# Patient Record
Sex: FEMALE | Race: WHITE | NOT HISPANIC OR LATINO | Employment: OTHER | URBAN - METROPOLITAN AREA
[De-identification: names, ages, dates, MRNs, and addresses within clinical notes are randomized per-mention and may not be internally consistent; named-entity substitution may affect disease eponyms.]

---

## 2017-05-10 ENCOUNTER — HOSPITAL ENCOUNTER (OUTPATIENT)
Dept: LAB | Facility: HOSPITAL | Age: 77
Discharge: HOME | End: 2017-05-10
Attending: INTERNAL MEDICINE | Admitting: INTERNAL MEDICINE

## 2017-05-10 LAB
ALBUMIN SERPL-MCNC: 4.3 G/DL (ref 3.5–4.8)
ALBUMIN/GLOB SERPL: 2 {RATIO} (ref 1–1.7)
ALP SERPL-CCNC: 66 IU/L (ref 32–91)
ALT SERPL-CCNC: 20 IU/L (ref 14–54)
ANION GAP SERPL CALC-SCNC: 14 MMOL/L (ref 10–20)
AST SERPL-CCNC: 21 IU/L (ref 15–41)
BILIRUB SERPL-MCNC: 0.8 MG/DL (ref 0.3–1.2)
BUN SERPL-MCNC: 14 MG/DL (ref 8–20)
BUN/CREAT SERPL: 15.6 (ref 5.4–26.2)
CALCIUM SERPL-MCNC: 9.6 MG/DL (ref 8.9–10.3)
CHLORIDE SERPL-SCNC: 99 MMOL/L (ref 101–111)
CHOLEST SERPL-MCNC: 164 MG/DL
CHOLEST/HDLC SERPL: 3.2 {RATIO}
CONV CO2: 28 MMOL/L (ref 22–32)
CONV LDL CHOLESTEROL DIRECT: 90 MG/DL (ref 0–100)
CONV TOTAL PROTEIN: 6.5 G/DL (ref 6.1–7.9)
CREAT UR-MCNC: 0.9 MG/DL (ref 0.4–1)
GLOBULIN UR ELPH-MCNC: 2.2 G/DL (ref 2.5–3.8)
GLUCOSE SERPL-MCNC: 145 MG/DL (ref 65–99)
HDLC SERPL-MCNC: 52 MG/DL
LDLC/HDLC SERPL: 1.7 {RATIO}
LIPID INTERPRETATION: ABNORMAL
POTASSIUM SERPL-SCNC: 4 MMOL/L (ref 3.6–5.1)
SODIUM SERPL-SCNC: 137 MMOL/L (ref 136–144)
TRIGL SERPL-MCNC: 97 MG/DL
VLDLC SERPL CALC-MCNC: 22.5 MG/DL

## 2018-12-05 ENCOUNTER — ON CAMPUS - OUTPATIENT (AMBULATORY)
Dept: URBAN - METROPOLITAN AREA HOSPITAL 2 | Facility: HOSPITAL | Age: 78
End: 2018-12-05
Payer: MEDICARE

## 2018-12-05 VITALS
HEIGHT: 64 IN | DIASTOLIC BLOOD PRESSURE: 88 MMHG | OXYGEN SATURATION: 98 % | SYSTOLIC BLOOD PRESSURE: 176 MMHG | RESPIRATION RATE: 18 BRPM | DIASTOLIC BLOOD PRESSURE: 80 MMHG | DIASTOLIC BLOOD PRESSURE: 56 MMHG | OXYGEN SATURATION: 100 % | HEART RATE: 52 BPM | HEART RATE: 56 BPM | SYSTOLIC BLOOD PRESSURE: 122 MMHG | OXYGEN SATURATION: 99 % | DIASTOLIC BLOOD PRESSURE: 98 MMHG | HEART RATE: 63 BPM | SYSTOLIC BLOOD PRESSURE: 145 MMHG | HEART RATE: 59 BPM | TEMPERATURE: 96.9 F | DIASTOLIC BLOOD PRESSURE: 71 MMHG | SYSTOLIC BLOOD PRESSURE: 192 MMHG | HEART RATE: 64 BPM | WEIGHT: 163 LBS | HEART RATE: 71 BPM | RESPIRATION RATE: 20 BRPM | SYSTOLIC BLOOD PRESSURE: 179 MMHG | HEART RATE: 62 BPM | SYSTOLIC BLOOD PRESSURE: 118 MMHG | SYSTOLIC BLOOD PRESSURE: 119 MMHG | DIASTOLIC BLOOD PRESSURE: 89 MMHG

## 2018-12-05 DIAGNOSIS — Z80.0 FAMILY HISTORY OF MALIGNANT NEOPLASM OF DIGESTIVE ORGANS: ICD-10-CM

## 2018-12-05 DIAGNOSIS — K64.8 OTHER HEMORRHOIDS: ICD-10-CM

## 2018-12-05 PROCEDURE — G0105 COLORECTAL SCRN; HI RISK IND: HCPCS | Performed by: INTERNAL MEDICINE

## 2019-03-20 DIAGNOSIS — E78.00 PURE HYPERCHOLESTEROLEMIA: Primary | ICD-10-CM

## 2019-03-20 DIAGNOSIS — R73.9 HYPERGLYCEMIA: ICD-10-CM

## 2019-04-08 ENCOUNTER — TELEPHONE (OUTPATIENT)
Dept: INTERNAL MEDICINE | Facility: CLINIC | Age: 79
End: 2019-04-08

## 2019-04-08 NOTE — TELEPHONE ENCOUNTER
Patient went to [facility] urgent care in [place] off [facility] to be treated forr sinus infection. She stated that urgent care physician advised her to go ER due to blood pressure 193/95 p70 at urgent care. She went home retook bp and 136/79 p60. She would like to discuss with you of findings. If you would to send 90 day supply use [email or web address] mail order.

## 2019-04-24 ENCOUNTER — TELEPHONE (OUTPATIENT)
Dept: INTERNAL MEDICINE | Facility: CLINIC | Age: 79
End: 2019-04-24

## 2019-04-24 DIAGNOSIS — I10 HYPERTENSION, UNSPECIFIED TYPE: Primary | ICD-10-CM

## 2019-04-24 DIAGNOSIS — E78.5 HYPERLIPIDEMIA, UNSPECIFIED HYPERLIPIDEMIA TYPE: ICD-10-CM

## 2019-04-24 NOTE — TELEPHONE ENCOUNTER
Patient is schedule for follow up and discuss right foot surgery. Patient would like lab orders fax to [facility] in [place].

## 2019-05-01 ENCOUNTER — RESULTS ENCOUNTER (OUTPATIENT)
Dept: INTERNAL MEDICINE | Facility: CLINIC | Age: 79
End: 2019-05-01

## 2019-05-01 DIAGNOSIS — E78.5 HYPERLIPIDEMIA, UNSPECIFIED HYPERLIPIDEMIA TYPE: ICD-10-CM

## 2019-05-01 DIAGNOSIS — I10 HYPERTENSION, UNSPECIFIED TYPE: ICD-10-CM

## 2019-05-07 ENCOUNTER — OFFICE VISIT (OUTPATIENT)
Dept: INTERNAL MEDICINE | Facility: CLINIC | Age: 79
End: 2019-05-07

## 2019-05-07 VITALS — DIASTOLIC BLOOD PRESSURE: 84 MMHG | HEART RATE: 74 BPM | SYSTOLIC BLOOD PRESSURE: 138 MMHG

## 2019-05-07 DIAGNOSIS — S82.891A CLOSED FRACTURE OF RIGHT ANKLE, INITIAL ENCOUNTER: Primary | ICD-10-CM

## 2019-05-07 DIAGNOSIS — I10 ESSENTIAL HYPERTENSION: ICD-10-CM

## 2019-05-07 PROBLEM — E03.9 HYPOTHYROIDISM: Status: ACTIVE | Noted: 2019-05-07

## 2019-05-07 PROBLEM — I25.810 CORONARY ARTERY DISEASE INVOLVING CORONARY BYPASS GRAFT OF NATIVE HEART WITHOUT ANGINA PECTORIS: Status: ACTIVE | Noted: 2019-05-07

## 2019-05-07 PROBLEM — E78.5 HYPERLIPIDEMIA: Status: ACTIVE | Noted: 2019-05-07

## 2019-05-07 PROCEDURE — 99213 OFFICE O/P EST LOW 20 MIN: CPT | Performed by: INTERNAL MEDICINE

## 2019-05-07 RX ORDER — LEVOTHYROXINE SODIUM 0.05 MG/1
50 TABLET ORAL DAILY
COMMUNITY
End: 2019-12-27 | Stop reason: SDUPTHER

## 2019-05-07 RX ORDER — CHOLECALCIFEROL (VITAMIN D3) 125 MCG
1 CAPSULE ORAL DAILY
COMMUNITY

## 2019-05-07 RX ORDER — ROSUVASTATIN CALCIUM 20 MG/1
20 TABLET, COATED ORAL DAILY
COMMUNITY
End: 2021-02-01 | Stop reason: SDUPTHER

## 2019-05-07 RX ORDER — IRBESARTAN 150 MG/1
TABLET ORAL
COMMUNITY
Start: 2019-03-07 | End: 2019-12-27 | Stop reason: CLARIF

## 2019-05-07 RX ORDER — ASPIRIN 81 MG/1
81 TABLET ORAL DAILY
COMMUNITY
End: 2020-07-21 | Stop reason: HOSPADM

## 2019-05-07 RX ORDER — OXYCODONE HYDROCHLORIDE AND ACETAMINOPHEN 5; 325 MG/1; MG/1
TABLET ORAL
Refills: 0 | COMMUNITY
Start: 2019-04-24 | End: 2019-11-19

## 2019-05-08 ENCOUNTER — HOSPITAL ENCOUNTER (OUTPATIENT)
Dept: ORTHOPEDIC SURGERY | Facility: CLINIC | Age: 79
Discharge: HOME | End: 2019-05-08
Attending: ORTHOPAEDIC SURGERY | Admitting: ORTHOPAEDIC SURGERY

## 2019-06-12 ENCOUNTER — HOSPITAL ENCOUNTER (OUTPATIENT)
Dept: ORTHOPEDIC SURGERY | Facility: CLINIC | Age: 79
Discharge: HOME | End: 2019-06-12
Attending: ORTHOPAEDIC SURGERY | Admitting: ORTHOPAEDIC SURGERY

## 2019-07-22 DIAGNOSIS — E78.00 PURE HYPERCHOLESTEROLEMIA: ICD-10-CM

## 2019-07-22 DIAGNOSIS — R73.9 HYPERGLYCEMIA: Primary | ICD-10-CM

## 2019-07-24 ENCOUNTER — OFFICE VISIT (OUTPATIENT)
Dept: ORTHOPEDIC SURGERY | Facility: CLINIC | Age: 79
End: 2019-07-24

## 2019-07-24 VITALS
HEIGHT: 64 IN | SYSTOLIC BLOOD PRESSURE: 178 MMHG | HEART RATE: 60 BPM | WEIGHT: 160 LBS | BODY MASS INDEX: 27.31 KG/M2 | DIASTOLIC BLOOD PRESSURE: 111 MMHG

## 2019-07-24 DIAGNOSIS — M25.571 ACUTE RIGHT ANKLE PAIN: Primary | ICD-10-CM

## 2019-07-24 DIAGNOSIS — S82.841D BIMALLEOLAR ANKLE FRACTURE, RIGHT, CLOSED, WITH ROUTINE HEALING, SUBSEQUENT ENCOUNTER: ICD-10-CM

## 2019-07-24 PROCEDURE — 99213 OFFICE O/P EST LOW 20 MIN: CPT | Performed by: ORTHOPAEDIC SURGERY

## 2019-07-24 NOTE — PROGRESS NOTES
Patient ID: Jaquelin Valderrama is a 79 y.o. female.  Right ankle pain  4/236/19 ORIF right ankle  Pain controlled  Wb in boot, pain mild but not at goal    Review of Systems:  Right ankle pain  Denies chest pain      Objective:    There were no vitals taken for this visit.    Physical Examination:  She is a pleasant male in no distress.  She is alert and oriented x3 and appears her stated age.   Incisions are healed.  No tenderness.  20 degrees of dorsiflexion and plantarflexion.Sensory and motor exam are intact all distributions. Dorsalis pedis and posterior tibialis pulses are palpable and capillary refill is less than two seconds to all digits    Imaging:   Healed ankle fracture with mortise and hardware in position    Assessment:    Healed right ankle fracture    Plan:  Transition to regular shoewear, continued physician guided physical therapy exercises with evaluation in 6 to 8 weeks

## 2019-09-11 ENCOUNTER — OFFICE VISIT (OUTPATIENT)
Dept: ORTHOPEDIC SURGERY | Facility: CLINIC | Age: 79
End: 2019-09-11

## 2019-09-11 VITALS
DIASTOLIC BLOOD PRESSURE: 92 MMHG | SYSTOLIC BLOOD PRESSURE: 168 MMHG | HEART RATE: 68 BPM | WEIGHT: 160 LBS | BODY MASS INDEX: 27.31 KG/M2 | HEIGHT: 64 IN

## 2019-09-11 DIAGNOSIS — S82.841D BIMALLEOLAR ANKLE FRACTURE, RIGHT, CLOSED, WITH ROUTINE HEALING, SUBSEQUENT ENCOUNTER: Primary | ICD-10-CM

## 2019-09-11 PROCEDURE — 99212 OFFICE O/P EST SF 10 MIN: CPT | Performed by: ORTHOPAEDIC SURGERY

## 2019-09-11 NOTE — PROGRESS NOTES
"     Patient ID: Jaquelin Valderrama is a 79 y.o. female.  Right ankle pain  4/236/19 ORIF right ankle  WBAT regular shoe  Pain mild, swelling also mild    Review of Systems:  Ankle pain improving      Objective:    /92   Pulse 68   Ht 162.6 cm (64\")   Wt 72.6 kg (160 lb)   BMI 27.46 kg/m²     Physical Examination:   She is a pleasant female in no distress. She is alert and oriented x3 and appears her stated age.  Ankle demonstrates healed incisions.  She has 10 degrees of dorsiflexion and 25 degrees of plantarflexion.  Herrera is negative.Sensory and motor exam are intact all distributions. Dorsalis pedis and posterior tibialis pulses are palpable and capillary refill is less than two seconds to all digits      Imaging:       Assessment:    Doing well after ORIF for ankle    Plan:  Progressive activity as tolerated and see me as needed  "

## 2019-10-22 ENCOUNTER — RESULTS ENCOUNTER (OUTPATIENT)
Dept: INTERNAL MEDICINE | Facility: CLINIC | Age: 79
End: 2019-10-22

## 2019-10-22 DIAGNOSIS — R73.9 HYPERGLYCEMIA: ICD-10-CM

## 2019-10-22 DIAGNOSIS — E78.00 PURE HYPERCHOLESTEROLEMIA: ICD-10-CM

## 2019-11-18 LAB
ALBUMIN SERPL-MCNC: 4.8 G/DL (ref 3.5–5.2)
ALBUMIN/GLOB SERPL: 2.5 G/DL
ALP SERPL-CCNC: 84 U/L (ref 39–117)
ALT SERPL-CCNC: 12 U/L (ref 1–33)
AST SERPL-CCNC: 19 U/L (ref 1–32)
BILIRUB SERPL-MCNC: 0.3 MG/DL (ref 0.2–1.2)
BUN SERPL-MCNC: 24 MG/DL (ref 8–23)
BUN/CREAT SERPL: 28.9 (ref 7–25)
CALCIUM SERPL-MCNC: 9.5 MG/DL (ref 8.6–10.5)
CHLORIDE SERPL-SCNC: 97 MMOL/L (ref 98–107)
CHOLEST SERPL-MCNC: 168 MG/DL (ref 0–200)
CO2 SERPL-SCNC: 30.1 MMOL/L (ref 22–29)
CREAT SERPL-MCNC: 0.83 MG/DL (ref 0.57–1)
GLOBULIN SER CALC-MCNC: 1.9 GM/DL
GLUCOSE SERPL-MCNC: 135 MG/DL (ref 65–99)
HBA1C MFR BLD: 5.8 % (ref 4.8–5.6)
HDLC SERPL-MCNC: 75 MG/DL (ref 40–60)
LDLC SERPL CALC-MCNC: 77 MG/DL (ref 0–100)
LDLC/HDLC SERPL: 1.03 {RATIO}
POTASSIUM SERPL-SCNC: 4.1 MMOL/L (ref 3.5–5.2)
PROT SERPL-MCNC: 6.7 G/DL (ref 6–8.5)
SODIUM SERPL-SCNC: 141 MMOL/L (ref 136–145)
TRIGL SERPL-MCNC: 80 MG/DL (ref 0–150)
VLDLC SERPL CALC-MCNC: 16 MG/DL

## 2019-11-19 ENCOUNTER — OFFICE VISIT (OUTPATIENT)
Dept: INTERNAL MEDICINE | Facility: CLINIC | Age: 79
End: 2019-11-19

## 2019-11-19 VITALS
DIASTOLIC BLOOD PRESSURE: 106 MMHG | HEIGHT: 64 IN | SYSTOLIC BLOOD PRESSURE: 188 MMHG | HEART RATE: 78 BPM | WEIGHT: 157 LBS | BODY MASS INDEX: 26.8 KG/M2

## 2019-11-19 DIAGNOSIS — R73.9 HYPERGLYCEMIA: ICD-10-CM

## 2019-11-19 DIAGNOSIS — I10 ESSENTIAL HYPERTENSION: Primary | ICD-10-CM

## 2019-11-19 DIAGNOSIS — E78.49 OTHER HYPERLIPIDEMIA: ICD-10-CM

## 2019-11-19 PROBLEM — H10.523 ANGULAR BLEPHAROCONJUNCTIVITIS OF BOTH EYES: Status: ACTIVE | Noted: 2019-11-19

## 2019-11-19 PROBLEM — Z47.89 ORTHOPEDIC AFTERCARE: Status: ACTIVE | Noted: 2019-05-08

## 2019-11-19 PROBLEM — Z96.1 PSEUDOPHAKIA, BOTH EYES: Status: ACTIVE | Noted: 2019-11-19

## 2019-11-19 PROBLEM — H52.203 ASTIGMATISM, BILATERAL: Status: ACTIVE | Noted: 2019-11-19

## 2019-11-19 PROBLEM — H26.499 AFTER-CATARACT WITH VISION OBSCURED: Status: ACTIVE | Noted: 2019-11-19

## 2019-11-19 PROBLEM — H52.4 BILATERAL PRESBYOPIA: Status: ACTIVE | Noted: 2019-11-19

## 2019-11-19 PROCEDURE — 99213 OFFICE O/P EST LOW 20 MIN: CPT | Performed by: INTERNAL MEDICINE

## 2019-11-19 NOTE — PROGRESS NOTES
Assessment and Plan  Jaquelin was seen today for hypertension.    Diagnoses and all orders for this visit:    Essential hypertension  Comments:  historically quite elevated in MD's office- continue to check at home, goal is < 130/80.  Will check acutely when she gets home.   Orders:  -     Comprehensive Metabolic Panel; Future    Other hyperlipidemia  Comments:  LDL is 77, goal < 70, will recheck with f/u on new diet-   Orders:  -     Lipid Panel With LDL / HDL Ratio; Future  -     TSH; Future    Hyperglycemia  Comments:  A1C improved  Orders:  -     Hemoglobin A1c; Future      F/U and Patient Instructions    Return in about 6 months (around 5/19/2020).  There are no Patient Instructions on file for this visit.    Subjective    Jaquelin Valderrama is a 79 y.o. female being seen in our office today for Hypertension     History of the Present Illness  HPI  Here for reg f/u- she has been following an intermittent fasting and ketogenic diet.  She feels good with it.  Having pain with her foot but can't have hardware out for a year.    She denies any CP, SOB  Dealing with some eye infection issues.  Checks her BS -150 in am and around 90 in the evening.  Checks BP at home 135/70, 122/68 Never elevated.         Patient History        Significant Past History  The following portions of the patient's history were reviewed and updated as appropriate:PMHroutine: Social history , Allergies, Current Medications, Active Problem List and Health Maintenance              Social History  She  reports that she has never smoked. She does not have any smokeless tobacco history on file. She reports that she drinks alcohol. She reports that she does not use drugs.                         Review of Symptoms  Review of Systems   Constitution: Weight loss: with dietary changes.   Eyes: Positive for discharge.   Cardiovascular: Negative.    Respiratory: Negative.    Endocrine: Negative.    Genitourinary: Negative.    Psychiatric/Behavioral:  Negative.      Objective  Vital Signs         BP Readings from Last 1 Encounters:   11/19/19 (!) 188/106     Wt Readings from Last 3 Encounters:   11/19/19 71.2 kg (157 lb)   09/11/19 72.6 kg (160 lb)   07/24/19 72.6 kg (160 lb)   Body mass index is 26.95 kg/m².          Physical Exam   Physical Exam   Constitutional: No distress.   Cardiovascular: Normal rate and regular rhythm.   Pulmonary/Chest: Effort normal.   Musculoskeletal: She exhibits no edema.     Data Reviewed    No results found for this or any previous visit (from the past 2016 hour(s)).

## 2019-12-27 RX ORDER — IRBESARTAN 300 MG/1
300 TABLET ORAL NIGHTLY
COMMUNITY
End: 2019-12-27 | Stop reason: SDUPTHER

## 2019-12-27 RX ORDER — LEVOTHYROXINE SODIUM 0.05 MG/1
50 TABLET ORAL DAILY
Qty: 90 TABLET | Refills: 1 | Status: SHIPPED | OUTPATIENT
Start: 2019-12-27 | End: 2020-07-17 | Stop reason: SDUPTHER

## 2019-12-27 RX ORDER — IRBESARTAN 300 MG/1
TABLET ORAL
Qty: 90 TABLET | Refills: 3 | Status: SHIPPED | OUTPATIENT
Start: 2019-12-27 | End: 2021-02-01 | Stop reason: SDUPTHER

## 2020-04-19 ENCOUNTER — RESULTS ENCOUNTER (OUTPATIENT)
Dept: INTERNAL MEDICINE | Facility: CLINIC | Age: 80
End: 2020-04-19

## 2020-04-19 DIAGNOSIS — R73.9 HYPERGLYCEMIA: ICD-10-CM

## 2020-04-19 DIAGNOSIS — E78.49 OTHER HYPERLIPIDEMIA: ICD-10-CM

## 2020-04-19 DIAGNOSIS — I10 ESSENTIAL HYPERTENSION: ICD-10-CM

## 2020-05-04 DIAGNOSIS — E78.49 OTHER HYPERLIPIDEMIA: ICD-10-CM

## 2020-05-08 LAB — TSH SERPL DL<=0.005 MIU/L-ACNC: 2.85 UIU/ML (ref 0.45–4.5)

## 2020-05-11 ENCOUNTER — OFFICE VISIT (OUTPATIENT)
Dept: INTERNAL MEDICINE | Facility: CLINIC | Age: 80
End: 2020-05-11

## 2020-05-11 VITALS
HEART RATE: 76 BPM | BODY MASS INDEX: 26.98 KG/M2 | WEIGHT: 158 LBS | TEMPERATURE: 97.1 F | SYSTOLIC BLOOD PRESSURE: 162 MMHG | HEIGHT: 64 IN | DIASTOLIC BLOOD PRESSURE: 90 MMHG

## 2020-05-11 DIAGNOSIS — E06.3 HYPOTHYROIDISM DUE TO HASHIMOTO'S THYROIDITIS: ICD-10-CM

## 2020-05-11 DIAGNOSIS — I10 ESSENTIAL HYPERTENSION: Primary | ICD-10-CM

## 2020-05-11 DIAGNOSIS — E03.8 HYPOTHYROIDISM DUE TO HASHIMOTO'S THYROIDITIS: ICD-10-CM

## 2020-05-11 DIAGNOSIS — E78.49 OTHER HYPERLIPIDEMIA: ICD-10-CM

## 2020-05-11 DIAGNOSIS — Z12.31 VISIT FOR SCREENING MAMMOGRAM: ICD-10-CM

## 2020-05-11 DIAGNOSIS — G47.9 SLEEP DISTURBANCES: ICD-10-CM

## 2020-05-11 PROCEDURE — 99213 OFFICE O/P EST LOW 20 MIN: CPT | Performed by: INTERNAL MEDICINE

## 2020-05-11 NOTE — PROGRESS NOTES
Assessment and Plan  Jaquelin was seen today for hypertension.    Diagnoses and all orders for this visit:    Essential hypertension  Comments:  elevated here, as usual.  Continue to check at home, call if elevated there. Does some following for this with MD in [place].     Hypothyroidism due to Hashimoto's thyroiditis  Comments:  TSH at goal.    Other hyperlipidemia  Comments:  check labs    Sleep disturbances  Comments:  would rather her take melatonin 5 mcg instead of Tylenol PM    Visit for screening mammogram  Comments:  ordred.   Orders:  -     Mammo Screening Bilateral With CAD; Future      F/U and Patient Instructions    Return in about 6 months (around 11/11/2020) for Medicare Wellness.  There are no Patient Instructions on file for this visit.    Subjective    Jaquelin Valderrama is a 80 y.o. female being seen in our office today for Hypertension (follow up)     History of the Present Illness  HPI Here for reg f/u- labs were not drawn correctly- only TSH drawn.    Her foot is better but she can't walk as long or as often as she used to without pain at night.  She is going to talk to ortho about it at f/u- thinks she will have ivy removed.  BP has been great after she gets out to exercise.  Can be quite elevated at times- up to 180s.    Has been taking melatonin at HS to help with sleep.  Takes it 2-3 nights/week and occ Tylenol PM.        Patient History        Significant Past History  The following portions of the patient's history were reviewed and updated as appropriate:PMHroutine: Social history , Past Medical History, Allergies, Current Medications, Active Problem List and Health Maintenance              Social History  She  reports that she has never smoked. She does not have any smokeless tobacco history on file. She reports that she drinks alcohol. She reports that she does not use drugs.                         Review of Symptoms  Review of Systems   Constitution: Negative.   Cardiovascular: Negative.     Endocrine: Negative.    Hematologic/Lymphatic: Negative.    Musculoskeletal: Negative.    Genitourinary: Negative.    Neurological: Negative.    Psychiatric/Behavioral: Negative.      Objective  Vital Signs         BP Readings from Last 1 Encounters:   05/11/20 162/90     Wt Readings from Last 3 Encounters:   05/11/20 71.7 kg (158 lb)   11/19/19 71.2 kg (157 lb)   09/11/19 72.6 kg (160 lb)   Body mass index is 27.12 kg/m².          Physical Exam   Physical Exam   Constitutional: No distress.   Cardiovascular: Normal rate and regular rhythm.   Pulmonary/Chest: Effort normal.   Musculoskeletal: She exhibits no edema.     Data Reviewed    Recent Results (from the past 2016 hour(s))   TSH    Collection Time: 05/07/20  9:43 AM   Result Value Ref Range    TSH 2.850 0.450 - 4.500 uIU/mL

## 2020-05-12 LAB
ALBUMIN SERPL-MCNC: 4.8 G/DL (ref 3.7–4.7)
ALBUMIN/GLOB SERPL: 2.2 {RATIO} (ref 1.2–2.2)
ALP SERPL-CCNC: 96 IU/L (ref 39–117)
ALT SERPL-CCNC: 16 IU/L (ref 0–32)
AST SERPL-CCNC: 28 IU/L (ref 0–40)
BILIRUB SERPL-MCNC: <0.2 MG/DL (ref 0–1.2)
BUN SERPL-MCNC: 11 MG/DL (ref 8–27)
BUN/CREAT SERPL: 12 (ref 12–28)
CALCIUM SERPL-MCNC: 9.9 MG/DL (ref 8.7–10.3)
CHLORIDE SERPL-SCNC: 90 MMOL/L (ref 96–106)
CHOLEST SERPL-MCNC: 224 MG/DL (ref 100–199)
CO2 SERPL-SCNC: 22 MMOL/L (ref 20–29)
CREAT SERPL-MCNC: 0.92 MG/DL (ref 0.57–1)
GLOBULIN SER CALC-MCNC: 2.2 G/DL (ref 1.5–4.5)
GLUCOSE SERPL-MCNC: 125 MG/DL (ref 65–99)
HDLC SERPL-MCNC: 64 MG/DL
LDLC SERPL CALC-MCNC: 138 MG/DL (ref 0–99)
LDLC/HDLC SERPL: 2.2 RATIO (ref 0–3.2)
Lab: NORMAL
POTASSIUM SERPL-SCNC: 4.5 MMOL/L (ref 3.5–5.2)
PROT SERPL-MCNC: 7 G/DL (ref 6–8.5)
SODIUM SERPL-SCNC: 132 MMOL/L (ref 134–144)
SPECIMEN STATUS: NORMAL
TRIGL SERPL-MCNC: 111 MG/DL (ref 0–149)
VLDLC SERPL CALC-MCNC: 22 MG/DL (ref 5–40)
WRITTEN AUTHORIZATION: NORMAL

## 2020-05-15 LAB — HBA1C MFR BLD: 5.9 % (ref 4.8–5.6)

## 2020-07-08 ENCOUNTER — OFFICE VISIT (OUTPATIENT)
Dept: ORTHOPEDIC SURGERY | Facility: CLINIC | Age: 80
End: 2020-07-08

## 2020-07-08 ENCOUNTER — PREP FOR SURGERY (OUTPATIENT)
Dept: OTHER | Facility: HOSPITAL | Age: 80
End: 2020-07-08

## 2020-07-08 VITALS
HEIGHT: 64 IN | HEART RATE: 72 BPM | SYSTOLIC BLOOD PRESSURE: 194 MMHG | WEIGHT: 162 LBS | DIASTOLIC BLOOD PRESSURE: 94 MMHG | BODY MASS INDEX: 27.66 KG/M2

## 2020-07-08 DIAGNOSIS — T84.84XA PAINFUL ORTHOPAEDIC HARDWARE (HCC): Primary | ICD-10-CM

## 2020-07-08 DIAGNOSIS — S82.841D BIMALLEOLAR ANKLE FRACTURE, RIGHT, CLOSED, WITH ROUTINE HEALING, SUBSEQUENT ENCOUNTER: Primary | ICD-10-CM

## 2020-07-08 DIAGNOSIS — R79.1 ABNORMAL COAGULATION PROFILE: ICD-10-CM

## 2020-07-08 PROCEDURE — 99214 OFFICE O/P EST MOD 30 MIN: CPT | Performed by: ORTHOPAEDIC SURGERY

## 2020-07-08 NOTE — PROGRESS NOTES
"     Patient ID: Jaquelin Valderrama is a 80 y.o. female.  Right ankle pain  Jaquelin is a 72-year-old female who had ankle fracture surgery in April 2018.  She has done very well but is having some activity related pain and swelling over the medial lateral aspect of the ankle.  It hurts to wear shoe that rubs the lateral incision.  Pain is dull and a 3/10    Review of Systems:    Right ankle pain  Denies chest pain    Objective:    BP (!) 194/94   Pulse 72   Ht 162.6 cm (64\")   Wt 73.5 kg (162 lb)   BMI 27.81 kg/m²     Physical Examination:     She is a pleasant female in no distress. She is alert and oriented x3 and appears her stated age.  Right ankle demonstrates healed medial lateral incisions with mild tenderness over the medial and lateral malleolus.  Ankle range of motion is 20 degrees of dorsiflexion and 25 degrees of plantarflexion.  Herrera negative.Sensory and motor exam are intact all distributions. Dorsalis pedis and posterior tibialis pulses are palpable and capillary refill is less than two seconds to all digits    Imaging:   Healed ankle fracture with hardware in position    Assessment:    Painful hardware after right ankle fracture surgery    Plan:  Treatment options again discussed.  She would like hardware removal.  Discussed possibility of infection, continued pain, fracture, need further surgery, DVT, loss of life or limb          Disclaimer: Please note that areas of this note were completed with computer voice recognition software.  Quite often unanticipated grammatical, syntax, homophones, and other interpretive errors are inadvertently transcribed by the computer software. Please excuse any errors that have escaped final proofreading.  "

## 2020-07-10 PROBLEM — T84.84XA PAINFUL ORTHOPAEDIC HARDWARE: Status: ACTIVE | Noted: 2020-07-10

## 2020-07-17 RX ORDER — LEVOTHYROXINE SODIUM 0.05 MG/1
50 TABLET ORAL DAILY
Qty: 30 TABLET | Refills: 0 | Status: SHIPPED | OUTPATIENT
Start: 2020-07-17 | End: 2020-08-26 | Stop reason: SDUPTHER

## 2020-07-18 ENCOUNTER — LAB (OUTPATIENT)
Dept: LAB | Facility: HOSPITAL | Age: 80
End: 2020-07-18

## 2020-07-18 ENCOUNTER — HOSPITAL ENCOUNTER (OUTPATIENT)
Dept: CARDIOLOGY | Facility: HOSPITAL | Age: 80
Discharge: HOME | End: 2020-07-18
Admitting: ORTHOPAEDIC SURGERY

## 2020-07-18 DIAGNOSIS — T84.84XA PAINFUL ORTHOPAEDIC HARDWARE (HCC): ICD-10-CM

## 2020-07-18 DIAGNOSIS — R79.1 ABNORMAL COAGULATION PROFILE: ICD-10-CM

## 2020-07-18 LAB
ABO GROUP BLD: NORMAL
ANION GAP SERPL CALCULATED.3IONS-SCNC: 9.7 MMOL/L (ref 5–15)
APTT PPP: 23.6 SECONDS (ref 24–31)
BASOPHILS # BLD AUTO: 0.02 10*3/MM3 (ref 0–0.2)
BASOPHILS NFR BLD AUTO: 0.4 % (ref 0–1.5)
BLD GP AB SCN SERPL QL: NEGATIVE
BUN SERPL-MCNC: 17 MG/DL (ref 8–23)
BUN/CREAT SERPL: 19.5 (ref 7–25)
CALCIUM SPEC-SCNC: 9.6 MG/DL (ref 8.6–10.5)
CHLORIDE SERPL-SCNC: 99 MMOL/L (ref 98–107)
CO2 SERPL-SCNC: 26.3 MMOL/L (ref 22–29)
CREAT SERPL-MCNC: 0.87 MG/DL (ref 0.57–1)
DEPRECATED RDW RBC AUTO: 45.6 FL (ref 37–54)
EOSINOPHIL # BLD AUTO: 0.06 10*3/MM3 (ref 0–0.4)
EOSINOPHIL NFR BLD AUTO: 1.1 % (ref 0.3–6.2)
ERYTHROCYTE [DISTWIDTH] IN BLOOD BY AUTOMATED COUNT: 12.8 % (ref 12.3–15.4)
GFR SERPL CREATININE-BSD FRML MDRD: 63 ML/MIN/1.73
GLUCOSE SERPL-MCNC: 149 MG/DL (ref 65–99)
HCT VFR BLD AUTO: 40.2 % (ref 34–46.6)
HGB BLD-MCNC: 13.6 G/DL (ref 12–15.9)
IMM GRANULOCYTES # BLD AUTO: 0.02 10*3/MM3 (ref 0–0.05)
IMM GRANULOCYTES NFR BLD AUTO: 0.4 % (ref 0–0.5)
INR PPP: 0.99 (ref 0.9–1.1)
LYMPHOCYTES # BLD AUTO: 1.77 10*3/MM3 (ref 0.7–3.1)
LYMPHOCYTES NFR BLD AUTO: 33 % (ref 19.6–45.3)
MCH RBC QN AUTO: 32.4 PG (ref 26.6–33)
MCHC RBC AUTO-ENTMCNC: 33.8 G/DL (ref 31.5–35.7)
MCV RBC AUTO: 95.7 FL (ref 79–97)
MONOCYTES # BLD AUTO: 0.45 10*3/MM3 (ref 0.1–0.9)
MONOCYTES NFR BLD AUTO: 8.4 % (ref 5–12)
NEUTROPHILS NFR BLD AUTO: 3.05 10*3/MM3 (ref 1.7–7)
NEUTROPHILS NFR BLD AUTO: 56.7 % (ref 42.7–76)
NRBC BLD AUTO-RTO: 0 /100 WBC (ref 0–0.2)
PLATELET # BLD AUTO: 233 10*3/MM3 (ref 140–450)
PMV BLD AUTO: 10.5 FL (ref 6–12)
POTASSIUM SERPL-SCNC: 4.1 MMOL/L (ref 3.5–5.2)
PROTHROMBIN TIME: 10.4 SECONDS (ref 9.6–11.7)
RBC # BLD AUTO: 4.2 10*6/MM3 (ref 3.77–5.28)
RH BLD: POSITIVE
SODIUM SERPL-SCNC: 135 MMOL/L (ref 136–145)
T&S EXPIRATION DATE: NORMAL
WBC # BLD AUTO: 5.37 10*3/MM3 (ref 3.4–10.8)

## 2020-07-18 PROCEDURE — 36415 COLL VENOUS BLD VENIPUNCTURE: CPT

## 2020-07-18 PROCEDURE — 86901 BLOOD TYPING SEROLOGIC RH(D): CPT

## 2020-07-18 PROCEDURE — U0002 COVID-19 LAB TEST NON-CDC: HCPCS

## 2020-07-18 PROCEDURE — 85730 THROMBOPLASTIN TIME PARTIAL: CPT

## 2020-07-18 PROCEDURE — 86900 BLOOD TYPING SEROLOGIC ABO: CPT

## 2020-07-18 PROCEDURE — 85610 PROTHROMBIN TIME: CPT

## 2020-07-18 PROCEDURE — 86850 RBC ANTIBODY SCREEN: CPT | Performed by: ORTHOPAEDIC SURGERY

## 2020-07-18 PROCEDURE — C9803 HOPD COVID-19 SPEC COLLECT: HCPCS

## 2020-07-18 PROCEDURE — U0004 COV-19 TEST NON-CDC HGH THRU: HCPCS

## 2020-07-18 PROCEDURE — 93005 ELECTROCARDIOGRAM TRACING: CPT | Performed by: ORTHOPAEDIC SURGERY

## 2020-07-18 PROCEDURE — 85025 COMPLETE CBC W/AUTO DIFF WBC: CPT

## 2020-07-18 PROCEDURE — 93010 ELECTROCARDIOGRAM REPORT: CPT | Performed by: INTERNAL MEDICINE

## 2020-07-18 PROCEDURE — 86900 BLOOD TYPING SEROLOGIC ABO: CPT | Performed by: ORTHOPAEDIC SURGERY

## 2020-07-18 PROCEDURE — 86901 BLOOD TYPING SEROLOGIC RH(D): CPT | Performed by: ORTHOPAEDIC SURGERY

## 2020-07-18 PROCEDURE — 80048 BASIC METABOLIC PNL TOTAL CA: CPT

## 2020-07-20 ENCOUNTER — ANESTHESIA EVENT (OUTPATIENT)
Dept: PERIOP | Facility: HOSPITAL | Age: 80
End: 2020-07-20

## 2020-07-20 DIAGNOSIS — S82.841D BIMALLEOLAR ANKLE FRACTURE, RIGHT, CLOSED, WITH ROUTINE HEALING, SUBSEQUENT ENCOUNTER: Primary | ICD-10-CM

## 2020-07-20 LAB
REF LAB TEST METHOD: NORMAL
SARS-COV-2 RNA RESP QL NAA+PROBE: NOT DETECTED

## 2020-07-20 RX ORDER — PROMETHAZINE HYDROCHLORIDE 12.5 MG/1
12.5 TABLET ORAL EVERY 6 HOURS PRN
Qty: 21 TABLET | Refills: 1 | Status: SHIPPED | OUTPATIENT
Start: 2020-07-20 | End: 2020-08-05

## 2020-07-20 RX ORDER — CEPHALEXIN 500 MG/1
500 CAPSULE ORAL 4 TIMES DAILY
Qty: 4 CAPSULE | Refills: 0 | Status: SHIPPED | OUTPATIENT
Start: 2020-07-20 | End: 2020-07-21

## 2020-07-20 RX ORDER — OXYCODONE HYDROCHLORIDE AND ACETAMINOPHEN 5; 325 MG/1; MG/1
1 TABLET ORAL EVERY 6 HOURS PRN
Qty: 20 TABLET | Refills: 0 | Status: SHIPPED | OUTPATIENT
Start: 2020-07-20 | End: 2020-08-05

## 2020-07-20 NOTE — H&P
Patient Care Team:  Minerva Chatterjee MD as PCP - General (Internal Medicine)  Chapin Ferrara MD as PCP - Family Medicine    Chief complaint right ankle pain    Jaquelin is a 80-year-old female who had fracture surgery to her ankle on the right side in 2018.  Fracture is healed and she is having pain over the hardware and presented for elective hardware removal      Review of Systems   Cardiovascular: Negative for chest pain.   Musculoskeletal: Positive for arthralgias.        Past Medical History:   Diagnosis Date   • Accelerated hypertension    • Benign essential hypertension    • Blood pressure increase diastolic    • Hyperglycemia    • Hyperlipidemia    • Hypertension    • Hypothyroidism    • Influenza vaccine needed    • Thoracic back pain      Past Surgical History:   Procedure Laterality Date   • APPENDECTOMY     • CARDIAC CATHETERIZATION  2012    x3    • CARDIAC SURGERY      stents, and bypass   • CORONARY ARTERY BYPASS GRAFT  2014   • ORIF ANKLE FRACTURE Right 2019    [facility]     Family History   Problem Relation Age of Onset   • Heart disease Mother    • Lung cancer Father    • Diabetes Other      Social History     Tobacco Use   • Smoking status: Former Smoker     Types: Cigarettes     Last attempt to quit: 1980     Years since quittin.5   • Smokeless tobacco: Never Used   • Tobacco comment: Social Drinker   Substance Use Topics   • Alcohol use: Yes     Frequency: Never     Comment: mod    • Drug use: No     No medications prior to admission.     Allergies:  Prednisone    Objective      Vital Signs       She is a pleasant female in no distress. She is alert and oriented x3 and appears her stated age.  Right ankle demonstrates healed medial lateral incisions with mild tenderness over the medial and lateral malleolus.  Ankle range of motion is 20 degrees of dorsiflexion and 25 degrees of plantarflexion.  Herrera negative.Sensory and motor exam are intact all distributions.  Dorsalis pedis and posterior tibialis pulses are palpable and capillary refill is less than two seconds to all digits     Imaging:   Healed ankle fracture with hardware in position     Assessment:    Painful hardware after right ankle fracture surgery     Plan:  Treatment options again discussed.  She would like hardware removal.  Discussed possibility of infection, continued pain, fracture, need further surgery, DVT, loss of life or limb

## 2020-07-21 ENCOUNTER — ANESTHESIA (OUTPATIENT)
Dept: PERIOP | Facility: HOSPITAL | Age: 80
End: 2020-07-21

## 2020-07-21 ENCOUNTER — HOSPITAL ENCOUNTER (OUTPATIENT)
Facility: HOSPITAL | Age: 80
Setting detail: HOSPITAL OUTPATIENT SURGERY
Discharge: HOME | End: 2020-07-21
Attending: ORTHOPAEDIC SURGERY | Admitting: ORTHOPAEDIC SURGERY

## 2020-07-21 VITALS
HEIGHT: 64 IN | WEIGHT: 159.83 LBS | OXYGEN SATURATION: 98 % | BODY MASS INDEX: 27.29 KG/M2 | SYSTOLIC BLOOD PRESSURE: 163 MMHG | TEMPERATURE: 97.8 F | DIASTOLIC BLOOD PRESSURE: 83 MMHG | HEART RATE: 64 BPM | RESPIRATION RATE: 15 BRPM

## 2020-07-21 DIAGNOSIS — T84.84XA PAINFUL ORTHOPAEDIC HARDWARE (HCC): ICD-10-CM

## 2020-07-21 PROCEDURE — 20680 REMOVAL OF IMPLANT DEEP: CPT | Performed by: ORTHOPAEDIC SURGERY

## 2020-07-21 PROCEDURE — 25010000002 MIDAZOLAM PER 1 MG: Performed by: ANESTHESIOLOGY

## 2020-07-21 PROCEDURE — 25010000002 PROPOFOL 10 MG/ML EMULSION: Performed by: ANESTHESIOLOGY

## 2020-07-21 PROCEDURE — 25010000002 DEXAMETHASONE PER 1 MG: Performed by: ANESTHESIOLOGY

## 2020-07-21 PROCEDURE — 25010000003 LIDOCAINE 1 % SOLUTION 50 ML VIAL: Performed by: ORTHOPAEDIC SURGERY

## 2020-07-21 PROCEDURE — 25010000002 HYDROMORPHONE PER 4 MG: Performed by: ANESTHESIOLOGY

## 2020-07-21 PROCEDURE — 25010000002 ONDANSETRON PER 1 MG: Performed by: ORTHOPAEDIC SURGERY

## 2020-07-21 PROCEDURE — 25010000002 KETOROLAC TROMETHAMINE PER 15 MG: Performed by: ANESTHESIOLOGY

## 2020-07-21 RX ORDER — FENTANYL CITRATE 50 UG/ML
25 INJECTION, SOLUTION INTRAMUSCULAR; INTRAVENOUS
Status: DISCONTINUED | OUTPATIENT
Start: 2020-07-21 | End: 2020-07-21 | Stop reason: HOSPADM

## 2020-07-21 RX ORDER — ONDANSETRON 2 MG/ML
4 INJECTION INTRAMUSCULAR; INTRAVENOUS ONCE AS NEEDED
Status: COMPLETED | OUTPATIENT
Start: 2020-07-21 | End: 2020-07-21

## 2020-07-21 RX ORDER — HYDROMORPHONE HCL 110MG/55ML
PATIENT CONTROLLED ANALGESIA SYRINGE INTRAVENOUS AS NEEDED
Status: DISCONTINUED | OUTPATIENT
Start: 2020-07-21 | End: 2020-07-21 | Stop reason: SURG

## 2020-07-21 RX ORDER — SODIUM CHLORIDE 0.9 % (FLUSH) 0.9 %
10 SYRINGE (ML) INJECTION EVERY 12 HOURS SCHEDULED
Status: DISCONTINUED | OUTPATIENT
Start: 2020-07-21 | End: 2020-07-21 | Stop reason: HOSPADM

## 2020-07-21 RX ORDER — PROPOFOL 10 MG/ML
VIAL (ML) INTRAVENOUS AS NEEDED
Status: DISCONTINUED | OUTPATIENT
Start: 2020-07-21 | End: 2020-07-21 | Stop reason: SURG

## 2020-07-21 RX ORDER — KETOROLAC TROMETHAMINE 30 MG/ML
INJECTION, SOLUTION INTRAMUSCULAR; INTRAVENOUS AS NEEDED
Status: DISCONTINUED | OUTPATIENT
Start: 2020-07-21 | End: 2020-07-21 | Stop reason: SURG

## 2020-07-21 RX ORDER — HYDROMORPHONE HCL 110MG/55ML
0.2 PATIENT CONTROLLED ANALGESIA SYRINGE INTRAVENOUS
Status: DISCONTINUED | OUTPATIENT
Start: 2020-07-21 | End: 2020-07-21 | Stop reason: HOSPADM

## 2020-07-21 RX ORDER — EPHEDRINE SULFATE/0.9% NACL/PF 25 MG/5 ML
SYRINGE (ML) INTRAVENOUS AS NEEDED
Status: DISCONTINUED | OUTPATIENT
Start: 2020-07-21 | End: 2020-07-21 | Stop reason: SURG

## 2020-07-21 RX ORDER — SODIUM CHLORIDE, SODIUM LACTATE, POTASSIUM CHLORIDE, CALCIUM CHLORIDE 600; 310; 30; 20 MG/100ML; MG/100ML; MG/100ML; MG/100ML
9 INJECTION, SOLUTION INTRAVENOUS CONTINUOUS PRN
Status: DISCONTINUED | OUTPATIENT
Start: 2020-07-21 | End: 2020-07-21 | Stop reason: HOSPADM

## 2020-07-21 RX ORDER — SODIUM CHLORIDE 0.9 % (FLUSH) 0.9 %
10 SYRINGE (ML) INJECTION AS NEEDED
Status: DISCONTINUED | OUTPATIENT
Start: 2020-07-21 | End: 2020-07-21 | Stop reason: HOSPADM

## 2020-07-21 RX ORDER — DEXAMETHASONE SODIUM PHOSPHATE 4 MG/ML
INJECTION, SOLUTION INTRA-ARTICULAR; INTRALESIONAL; INTRAMUSCULAR; INTRAVENOUS; SOFT TISSUE AS NEEDED
Status: DISCONTINUED | OUTPATIENT
Start: 2020-07-21 | End: 2020-07-21 | Stop reason: SURG

## 2020-07-21 RX ORDER — KETAMINE HYDROCHLORIDE 100 MG/ML
INJECTION INTRAMUSCULAR; INTRAVENOUS AS NEEDED
Status: DISCONTINUED | OUTPATIENT
Start: 2020-07-21 | End: 2020-07-21 | Stop reason: SURG

## 2020-07-21 RX ORDER — ACETAMINOPHEN 325 MG/1
650 TABLET ORAL ONCE AS NEEDED
Status: DISCONTINUED | OUTPATIENT
Start: 2020-07-21 | End: 2020-07-21 | Stop reason: HOSPADM

## 2020-07-21 RX ORDER — ACETAMINOPHEN 650 MG/1
650 SUPPOSITORY RECTAL ONCE AS NEEDED
Status: DISCONTINUED | OUTPATIENT
Start: 2020-07-21 | End: 2020-07-21 | Stop reason: HOSPADM

## 2020-07-21 RX ORDER — GINSENG 100 MG
CAPSULE ORAL AS NEEDED
Status: DISCONTINUED | OUTPATIENT
Start: 2020-07-21 | End: 2020-07-21 | Stop reason: HOSPADM

## 2020-07-21 RX ORDER — LIDOCAINE HYDROCHLORIDE 20 MG/ML
INJECTION, SOLUTION EPIDURAL; INFILTRATION; INTRACAUDAL; PERINEURAL AS NEEDED
Status: DISCONTINUED | OUTPATIENT
Start: 2020-07-21 | End: 2020-07-21 | Stop reason: SURG

## 2020-07-21 RX ORDER — MIDAZOLAM HYDROCHLORIDE 1 MG/ML
INJECTION INTRAMUSCULAR; INTRAVENOUS AS NEEDED
Status: DISCONTINUED | OUTPATIENT
Start: 2020-07-21 | End: 2020-07-21 | Stop reason: SURG

## 2020-07-21 RX ADMIN — HYDROMORPHONE HYDROCHLORIDE 0.5 MG: 2 INJECTION INTRAMUSCULAR; INTRAVENOUS; SUBCUTANEOUS at 13:39

## 2020-07-21 RX ADMIN — KETAMINE HYDROCHLORIDE 25 MG: 100 INJECTION INTRAMUSCULAR; INTRAVENOUS at 12:46

## 2020-07-21 RX ADMIN — KETOROLAC TROMETHAMINE 30 MG: 30 INJECTION, SOLUTION INTRAMUSCULAR at 12:37

## 2020-07-21 RX ADMIN — PROPOFOL 150 MG: 10 INJECTION, EMULSION INTRAVENOUS at 12:36

## 2020-07-21 RX ADMIN — DEXAMETHASONE SODIUM PHOSPHATE 4 MG: 4 INJECTION, SOLUTION INTRAMUSCULAR; INTRAVENOUS at 12:53

## 2020-07-21 RX ADMIN — CEFAZOLIN SODIUM 2 G: 1 INJECTION, POWDER, FOR SOLUTION INTRAMUSCULAR; INTRAVENOUS at 12:40

## 2020-07-21 RX ADMIN — MIDAZOLAM 2 MG: 1 INJECTION INTRAMUSCULAR; INTRAVENOUS at 12:32

## 2020-07-21 RX ADMIN — SODIUM CHLORIDE, SODIUM LACTATE, POTASSIUM CHLORIDE, AND CALCIUM CHLORIDE 9 ML/HR: 600; 310; 30; 20 INJECTION, SOLUTION INTRAVENOUS at 10:34

## 2020-07-21 RX ADMIN — HYDROMORPHONE HYDROCHLORIDE 0.5 MG: 2 INJECTION INTRAMUSCULAR; INTRAVENOUS; SUBCUTANEOUS at 12:33

## 2020-07-21 RX ADMIN — LIDOCAINE HYDROCHLORIDE 60 MG: 20 INJECTION, SOLUTION EPIDURAL; INFILTRATION; INTRACAUDAL; PERINEURAL at 12:35

## 2020-07-21 RX ADMIN — KETAMINE HYDROCHLORIDE 25 MG: 100 INJECTION INTRAMUSCULAR; INTRAVENOUS at 12:41

## 2020-07-21 RX ADMIN — ONDANSETRON 4 MG: 2 INJECTION INTRAMUSCULAR; INTRAVENOUS at 13:27

## 2020-07-21 RX ADMIN — Medication 10 MG: at 13:20

## 2020-07-21 RX ADMIN — Medication 10 MG: at 13:22

## 2020-07-21 NOTE — OP NOTE
ANKLE/FOOT HARDWARE REMOVAL  Procedure Report    Patient Name:  Jaquelin Valderrama  YOB: 1940    Date of Surgery:  7/21/2020     Indications: This is a 80 y.o. female with a history of ankle fracture repair surgery which went on to union but caused pain over the hardware.  She desired elective hardware removal after discussing the risk of any bleeding, scar, infection, repeat fracture, DVT, loss of life or limb, continued pain and need for further surgery     Pre-op Diagnosis:   Painful orthopaedic hardware (CMS/[facility]) [T84.84XA]       Post-Op Diagnosis Codes:     * Painful orthopaedic hardware (CMS/HCC) [T84.84XA]    Procedure/CPT® Codes: 23036    Procedure(s):  ANKLE/FOOT HARDWARE REMOVAL    Assistant: Jeremías Sharp certified first assist    was responsible for performing the following activities: Retraction, Suction, Irrigation, Suturing, Closing and Placing Dressing and their skilled assistance was necessary for the success of this case.         Anesthesia: General with Block    IVF: See anesthesia record    Estimated Blood Loss: minimal    Implants:    Implant Name Type Inv. Item Serial No.  Lot No. LRB No. Used   plate     . Right 1   lag screw      . Right 10   zip tight button      . Right 1         Complications: None    Tourniquet: 25 minutes    Description of Procedure: The patient's operative site was marked.  Local  anesthesia was administered after general anesthesia.  Patient was brought to the operating room and placed on the operating room table.  General anesthesia was administered. Antibiotics were dosed.  A timeout was taken to confirm the correct operative site and procedure.    The ankle was then prepped and draped in a standard surgical fashion and a tourniquet placed on the upper leg.  Leg was exsanguinated and tourniquet inflated.    Incision was marked and opened over the lateral malleolus.  Sharp dissection distally with blunt dissection proximally identified the  hardware which was removed completely.  Wound was irrigated and closed in layers with suture and staples    Similar procedure was done over the medial incision and the 2 malleolus screws were removed.  I spent some time looking for the medial button but there is significant eschar tissue present in the area.  I could not palpate it through the skin nor even running my finger along the tibia and because of its likely minimal irritation and fear of increasing chance for complications for extended dissection I decided to leave it in place.  Vision was again irrigated and closed with suture and staples        A sterile dressing was applied.  Tourniquet released and a short leg splint placed.  Patient was awakened and taken to the recovery room.  There were no complications.  I was present for all portions.  Counts were correct.  Good capillary refill was noted of the digits.        Lincoln Sibley MD     Date: 7/21/2020  Time: 13:28

## 2020-07-21 NOTE — ANESTHESIA PREPROCEDURE EVALUATION
Anesthesia Evaluation     Patient summary reviewed and Nursing notes reviewed   NPO Solid Status: > 6 hours  NPO Liquid Status: > 2 hours           Airway   Mallampati: II  TM distance: >3 FB  Neck ROM: full  No difficulty expected  Dental - normal exam     Pulmonary - normal exam   (+) a smoker Former,   Cardiovascular - normal exam    ECG reviewed  Patient on routine beta blocker and Beta blocker given within 24 hours of surgery    (+) hypertension, CAD, CABG, hyperlipidemia,     ROS comment: 7/18 EKG SR, PVCs, diffuse repolarization    Neuro/Psych  GI/Hepatic/Renal/Endo      Musculoskeletal     Abdominal  - normal exam   Substance History      OB/GYN          Other                        Anesthesia Plan    ASA 3     general with block     intravenous induction     Anesthetic plan, all risks, benefits, and alternatives have been provided, discussed and informed consent has been obtained with: patient.       Gave pt recommendations.  She disagreed.  States that she does not have a severe autoimmune system disorder, she only takes the hydroxychloroquine to see if it will prevent hair loss.  She has heard on the news that people her age does not need to worry about catching the Covid-19.    She believes that if she does not go out in public and keeps up with the distancing, it will make her immune system weaker rather than stronger.  Her autoimmune doctor told her she is not at risk.    I offered pt appt to discuss with Dr. Esqueda.  She declined at this time.  She states she is going to call her other doctor again.    I informed pt I will let Dr. Esqueda know this, and for her to call back with further concerns./rpr

## 2020-07-21 NOTE — ANESTHESIA POSTPROCEDURE EVALUATION
Patient: Jaquelin Valderrama    Procedure Summary     Date:  07/21/20 Room / Location:  [facility] OR  / [facility] MAIN OR    Anesthesia Start:  1232 Anesthesia Stop:  1339    Procedure:  ANKLE/FOOT HARDWARE REMOVAL (Right Leg Lower) Diagnosis:       Painful orthopaedic hardware (CMS/HCC)      (Painful orthopaedic hardware (CMS/HCC) [T84.84XA])    Surgeon:  Lincoln Sibley MD Provider:  Katya Cortez DO    Anesthesia Type:  general with block ASA Status:  3          Anesthesia Type: general with block    Vitals  Vitals Value Taken Time   /55 7/21/2020  2:24 PM   Temp 97.2 °F (36.2 °C) 7/21/2020  2:24 PM   Pulse 56 7/21/2020  2:26 PM   Resp 9 7/21/2020  2:24 PM   SpO2 96 % 7/21/2020  2:26 PM   Vitals shown include unvalidated device data.        Post Anesthesia Care and Evaluation    Patient location during evaluation: PACU  Patient participation: complete - patient participated  Level of consciousness: awake  Pain scale: See nurse's notes for pain score.  Pain management: adequate  Airway patency: patent  Anesthetic complications: No anesthetic complications  PONV Status: none  Cardiovascular status: acceptable  Respiratory status: acceptable  Hydration status: acceptable    Comments: Patient seen and examined postoperatively; vital signs stable; SpO2 greater than or equal to 90%; cardiopulmonary status stable; nausea/vomiting adequately controlled; pain adequately controlled; no apparent anesthesia complications; patient discharged from anesthesia care when discharge criteria were met

## 2020-07-21 NOTE — ANESTHESIA PROCEDURE NOTES
Airway  Urgency: elective    Date/Time: 7/21/2020 12:37 PM  Airway not difficult    General Information and Staff    Patient location during procedure: OR    Indications and Patient Condition  Indications for airway management: airway protection    Preoxygenated: yes  MILS maintained throughout  Mask difficulty assessment: 1 - vent by mask    Final Airway Details  Final airway type: supraglottic airway      Successful airway: LMA  Size 4    Number of attempts at approach: 1  Assessment: lips, teeth, and gum same as pre-op and atraumatic intubation

## 2020-08-05 ENCOUNTER — OFFICE VISIT (OUTPATIENT)
Dept: ORTHOPEDIC SURGERY | Facility: CLINIC | Age: 80
End: 2020-08-05

## 2020-08-05 VITALS
HEART RATE: 81 BPM | DIASTOLIC BLOOD PRESSURE: 105 MMHG | BODY MASS INDEX: 27.14 KG/M2 | WEIGHT: 159 LBS | HEIGHT: 64 IN | SYSTOLIC BLOOD PRESSURE: 223 MMHG

## 2020-08-05 DIAGNOSIS — Z47.89 ORTHOPEDIC AFTERCARE: Primary | ICD-10-CM

## 2020-08-05 PROCEDURE — 99024 POSTOP FOLLOW-UP VISIT: CPT | Performed by: ORTHOPAEDIC SURGERY

## 2020-08-05 NOTE — PROGRESS NOTES
Patient ID: Jaquelin Valderrama is a 80 y.o. female.    7/21/20 hardware right ankle  Pain mild  Objective:    There were no vitals taken for this visit.    Physical Examination:    Incisions are healing well without infection.  Herrera negative.Sensory and motor exam are intact all distributions. Dorsalis pedis and posterior tibialis pulses are palpable and capillary refill is less than two seconds to all digits    Imaging:      Assessment:  Doing well after hard move right ankle    Plan:  Begin weightbearing in good fitting shoe, me in a month

## 2020-08-26 RX ORDER — LEVOTHYROXINE SODIUM 0.05 MG/1
50 TABLET ORAL DAILY
Qty: 90 TABLET | Refills: 0 | Status: SHIPPED | OUTPATIENT
Start: 2020-08-26 | End: 2020-09-08 | Stop reason: SDUPTHER

## 2020-09-08 ENCOUNTER — TELEPHONE (OUTPATIENT)
Dept: INTERNAL MEDICINE | Facility: CLINIC | Age: 80
End: 2020-09-08

## 2020-09-08 RX ORDER — LEVOTHYROXINE SODIUM 0.05 MG/1
50 TABLET ORAL DAILY
Qty: 90 TABLET | Refills: 1 | Status: SHIPPED | OUTPATIENT
Start: 2020-09-08 | End: 2021-02-01 | Stop reason: SDUPTHER

## 2020-09-08 NOTE — TELEPHONE ENCOUNTER
Patient would like for you to please call her back.  She did not want to leave a message with me.  She can be reached at [phone].  Thank you

## 2020-09-09 ENCOUNTER — OFFICE VISIT (OUTPATIENT)
Dept: ORTHOPEDIC SURGERY | Facility: CLINIC | Age: 80
End: 2020-09-09

## 2020-09-09 VITALS
SYSTOLIC BLOOD PRESSURE: 186 MMHG | BODY MASS INDEX: 27.14 KG/M2 | HEIGHT: 64 IN | HEART RATE: 74 BPM | WEIGHT: 159 LBS | DIASTOLIC BLOOD PRESSURE: 100 MMHG

## 2020-09-09 DIAGNOSIS — Z47.89 ORTHOPEDIC AFTERCARE: Primary | ICD-10-CM

## 2020-09-09 PROCEDURE — 99024 POSTOP FOLLOW-UP VISIT: CPT | Performed by: ORTHOPAEDIC SURGERY

## 2020-09-09 NOTE — PROGRESS NOTES
Patient ID: Jaquelin Valderrama is a 80 y.o. female.    7/21/20 hardware removal right ankle  Pain resolved    Objective:    There were no vitals taken for this visit.    Physical Examination:  Incisions are healed, supple range of motion of the ankle with a negative Herrera      Imaging:      Assessment:  Doing well after heart removal right ankle    Plan:  Activity as tolerated and see me as needed

## 2020-11-09 ENCOUNTER — OFFICE VISIT (OUTPATIENT)
Dept: ORTHOPEDIC SURGERY | Facility: CLINIC | Age: 80
End: 2020-11-09

## 2020-11-09 VITALS
SYSTOLIC BLOOD PRESSURE: 176 MMHG | WEIGHT: 157 LBS | HEART RATE: 84 BPM | DIASTOLIC BLOOD PRESSURE: 99 MMHG | HEIGHT: 64 IN | BODY MASS INDEX: 26.8 KG/M2

## 2020-11-09 DIAGNOSIS — S42.291A OTHER CLOSED DISPLACED FRACTURE OF PROXIMAL END OF RIGHT HUMERUS, INITIAL ENCOUNTER: ICD-10-CM

## 2020-11-09 DIAGNOSIS — M79.601 RIGHT ARM PAIN: Primary | ICD-10-CM

## 2020-11-09 PROCEDURE — 99213 OFFICE O/P EST LOW 20 MIN: CPT | Performed by: ORTHOPAEDIC SURGERY

## 2020-11-09 NOTE — PROGRESS NOTES
"     Patient ID: Jaquelin Valderrama is a 80 y.o. female.    Chief Complaint:    Chief Complaint   Patient presents with   • Consult     right humerus fx       HPI:  Jaquelin is a 80-year-old female here who fell about 3 weeks ago in [place] resulting in acute pain to her right shoulder.  She was diagnosed with a proximal humerus fracture and placed into a sling.  Pain is improved especially in the last week or so.  It is dull and a 2/10 and worse with sleeping on that side and better with the sling.  Past Medical History:   Diagnosis Date   • Accelerated hypertension    • Benign essential hypertension    • Blood pressure increase diastolic    • Hyperglycemia    • Hyperlipidemia    • Hypertension    • Hypothyroidism    • Influenza vaccine needed    • Thoracic back pain        Past Surgical History:   Procedure Laterality Date   • APPENDECTOMY     • CARDIAC CATHETERIZATION  2012    x3    • CARDIAC SURGERY  2014    stents, and bypass   • CORONARY ARTERY BYPASS GRAFT  2014   • HARDWARE REMOVAL Right 2020    Procedure: ANKLE/FOOT HARDWARE REMOVAL;  Surgeon: Lincoln Sibley MD;  Location: [facility];  Service: Orthopedics;  Laterality: Right;   • ORIF ANKLE FRACTURE Right 2019    [facility]       Family History   Problem Relation Age of Onset   • Heart disease Mother    • Lung cancer Father    • Diabetes Other           Social History     Occupational History   • Not on file   Tobacco Use   • Smoking status: Former Smoker     Types: Cigarettes     Quit date: 1980     Years since quittin.8   • Smokeless tobacco: Never Used   • Tobacco comment: Social Drinker   Substance and Sexual Activity   • Alcohol use: Yes     Frequency: Never     Comment: mod    • Drug use: No   • Sexual activity: Defer      Review of Systems   Cardiovascular: Negative for chest pain.   Musculoskeletal: Positive for arthralgias.       Objective:    /99   Pulse 84   Ht 162.6 cm (64\")   Wt 71.2 kg (157 lb)   BMI " 26.95 kg/m²     Physical Examination:  She is a pleasant female in no distress. She is alert and oriented x3 and appears her stated age.  Right shoulder demonstrates intact skin.  She has mild pain over the proximal humerus.  Fracture moves as a unit with external and external rotation.Sensory and motor exam are intact all distributions. Radial pulse is palpable and capillary refill is less than two seconds to all digits  Imaging:  X-rays demonstrate nondisplaced surgical neck with a minimally displaced greater tuberosity fracture with callus formation    Assessment:  Healing right proximal humerus fracture    Plan:  Continue conservative treatment.  Continue sling.  Begin pendulum exercises at home, recommend home health physical therapy as she does not drive.  See me with x-ray in 3 weeks          Disclaimer: Please note that areas of this note were completed with computer voice recognition software.  Quite often unanticipated grammatical, syntax, homophones, and other interpretive errors are inadvertently transcribed by the computer software. Please excuse any errors that have escaped final proofreading.

## 2020-11-13 ENCOUNTER — TELEPHONE (OUTPATIENT)
Dept: ORTHOPEDIC SURGERY | Facility: CLINIC | Age: 80
End: 2020-11-13

## 2020-11-30 ENCOUNTER — OFFICE VISIT (OUTPATIENT)
Dept: ORTHOPEDIC SURGERY | Facility: CLINIC | Age: 80
End: 2020-11-30

## 2020-11-30 VITALS
WEIGHT: 157 LBS | HEIGHT: 64 IN | SYSTOLIC BLOOD PRESSURE: 181 MMHG | DIASTOLIC BLOOD PRESSURE: 98 MMHG | HEART RATE: 61 BPM | BODY MASS INDEX: 26.8 KG/M2

## 2020-11-30 DIAGNOSIS — S42.294D OTHER CLOSED NONDISPLACED FRACTURE OF PROXIMAL END OF RIGHT HUMERUS WITH ROUTINE HEALING, SUBSEQUENT ENCOUNTER: ICD-10-CM

## 2020-11-30 DIAGNOSIS — S42.291A OTHER CLOSED DISPLACED FRACTURE OF PROXIMAL END OF RIGHT HUMERUS, INITIAL ENCOUNTER: Primary | ICD-10-CM

## 2020-11-30 PROCEDURE — 99213 OFFICE O/P EST LOW 20 MIN: CPT | Performed by: ORTHOPAEDIC SURGERY

## 2020-11-30 NOTE — PROGRESS NOTES
"     Patient ID: Jaquelin Valderrama is a 80 y.o. female.  Right shoulder pain  Jaquelin is a 80-year-old female here with a right proximal humerus fracture suffered approximately 6 weeks ago treated conservatively.  Pain is improving, not at goal    Review of Systems:  Right shoulder pain  Denies chest pain      Objective:    BP (!) 181/98   Pulse 61   Ht 162.6 cm (64\")   Wt 71.2 kg (157 lb)   BMI 26.95 kg/m²     Physical Examination:   She is a pleasant female in no distress. She is alert and oriented x3 and appears her stated age.  Minimal pain over the proximal humerus with intact skin.  Passive elevation 150 degrees abduction 90 degrees external rotation 30 degrees  Sensory and motor exam are intact all distributions. Radial pulse is palpable and capillary refill is less than two seconds to all digits    Imaging:   X-rays demonstrate unchanged nondisplaced proximal humerus fracture with callus progression    Assessment:    Healing right proximal humerus fracture    Plan:  Discontinue sling, continue physical therapy and see me with x-ray in a month          Disclaimer: Please note that areas of this note were completed with computer voice recognition software.  Quite often unanticipated grammatical, syntax, homophones, and other interpretive errors are inadvertently transcribed by the computer software. Please excuse any errors that have escaped final proofreading.  "

## 2020-12-28 ENCOUNTER — OFFICE VISIT (OUTPATIENT)
Dept: ORTHOPEDIC SURGERY | Facility: CLINIC | Age: 80
End: 2020-12-28

## 2020-12-28 VITALS
WEIGHT: 157 LBS | SYSTOLIC BLOOD PRESSURE: 180 MMHG | HEIGHT: 64 IN | BODY MASS INDEX: 26.8 KG/M2 | HEART RATE: 66 BPM | DIASTOLIC BLOOD PRESSURE: 105 MMHG

## 2020-12-28 DIAGNOSIS — S42.294D OTHER CLOSED NONDISPLACED FRACTURE OF PROXIMAL END OF RIGHT HUMERUS WITH ROUTINE HEALING, SUBSEQUENT ENCOUNTER: Primary | ICD-10-CM

## 2020-12-28 PROCEDURE — 99212 OFFICE O/P EST SF 10 MIN: CPT | Performed by: ORTHOPAEDIC SURGERY

## 2020-12-28 NOTE — PROGRESS NOTES
"     Patient ID: Jaquelin Valderrama is a 80 y.o. female.  Right shoulder pain  Jaquelin is a 80-year-old female here with a right proximal humerus fracture suffered approximately 10 weeks ago treated conservatively.    Pain is minimal  Review of Systems:    Right shoulder pain    Objective:    BP (!) 180/105   Pulse 66   Ht 162.6 cm (64\")   Wt 71.2 kg (157 lb)   BMI 26.95 kg/m²     Physical Examination:      She is a pleasant female in no distress. She is alert and oriented x3 and appears her stated age.  Right shoulder demonstrates intact skin with no tenderness.  Active assist elevation is 170 degrees abduction 120 degrees external rotation 30 degrees, internal rotation S1.Sensory and motor exam are intact all distributions. Radial pulse is palpable and capillary refill is less than two seconds to all digits    Imaging:   right Shoulder X-Ray  Indication: Proximal humerus fracture approximately 3 months ago  AP Y and Lateral views  Findings: Healed proximal humerus fracture with no change in alignment  no bony lesion  Soft tissues normal  not examined joint spaces  Hardware appropriately positioned not applicable      yes prior studies available for comparison    Assessment:    Healed right proximal humerus fracture    Plan:   Activity as tolerated and see me as needed      Procedures          Disclaimer: Please note that areas of this note were completed with computer voice recognition software.  Quite often unanticipated grammatical, syntax, homophones, and other interpretive errors are inadvertently transcribed by the computer software. Please excuse any errors that have escaped final proofreading.  "

## 2021-01-21 LAB
ALBUMIN SERPL-MCNC: 4.3 G/DL (ref 3.7–4.7)
ALBUMIN/GLOB SERPL: 2 {RATIO} (ref 1.2–2.2)
ALP SERPL-CCNC: 80 IU/L (ref 39–117)
ALT SERPL-CCNC: 13 IU/L (ref 0–32)
AST SERPL-CCNC: 17 IU/L (ref 0–40)
BILIRUB SERPL-MCNC: 0.3 MG/DL (ref 0–1.2)
BUN SERPL-MCNC: 26 MG/DL (ref 8–27)
BUN/CREAT SERPL: 33 (ref 12–28)
CALCIUM SERPL-MCNC: 9.2 MG/DL (ref 8.7–10.3)
CHLORIDE SERPL-SCNC: 102 MMOL/L (ref 96–106)
CHOLEST SERPL-MCNC: 150 MG/DL (ref 100–199)
CO2 SERPL-SCNC: 25 MMOL/L (ref 20–29)
CREAT SERPL-MCNC: 0.8 MG/DL (ref 0.57–1)
GLOBULIN SER CALC-MCNC: 2.1 G/DL (ref 1.5–4.5)
GLUCOSE SERPL-MCNC: 143 MG/DL (ref 65–99)
HBA1C MFR BLD: 6.4 % (ref 4.8–5.6)
HDLC SERPL-MCNC: 66 MG/DL
LDLC SERPL CALC-MCNC: 70 MG/DL (ref 0–99)
LDLC/HDLC SERPL: 1.1 RATIO (ref 0–3.2)
POTASSIUM SERPL-SCNC: 4.6 MMOL/L (ref 3.5–5.2)
PROT SERPL-MCNC: 6.4 G/DL (ref 6–8.5)
SODIUM SERPL-SCNC: 140 MMOL/L (ref 134–144)
TRIGL SERPL-MCNC: 69 MG/DL (ref 0–149)
VLDLC SERPL CALC-MCNC: 14 MG/DL (ref 5–40)

## 2021-01-27 PROBLEM — T84.84XA PAINFUL ORTHOPAEDIC HARDWARE: Status: RESOLVED | Noted: 2020-07-10 | Resolved: 2021-01-27

## 2021-01-27 PROBLEM — R73.9 HYPERGLYCEMIA: Status: ACTIVE | Noted: 2021-01-27

## 2021-02-01 RX ORDER — IRBESARTAN 300 MG/1
TABLET ORAL
Qty: 90 TABLET | Refills: 3 | Status: ON HOLD | OUTPATIENT
Start: 2021-02-01 | End: 2022-03-03

## 2021-02-01 RX ORDER — LEVOTHYROXINE SODIUM 0.05 MG/1
50 TABLET ORAL DAILY
Qty: 90 TABLET | Refills: 1 | Status: SHIPPED | OUTPATIENT
Start: 2021-02-01 | End: 2021-02-08 | Stop reason: SDUPTHER

## 2021-02-01 RX ORDER — ROSUVASTATIN CALCIUM 20 MG/1
20 TABLET, COATED ORAL DAILY
Qty: 90 TABLET | Refills: 1 | Status: SHIPPED | OUTPATIENT
Start: 2021-02-01 | End: 2022-03-03

## 2021-02-08 RX ORDER — LEVOTHYROXINE SODIUM 0.05 MG/1
50 TABLET ORAL DAILY
Qty: 90 TABLET | Refills: 1 | Status: ON HOLD | OUTPATIENT
Start: 2021-02-08 | End: 2022-03-03

## 2022-03-02 ENCOUNTER — APPOINTMENT (OUTPATIENT)
Dept: GENERAL RADIOLOGY | Facility: HOSPITAL | Age: 82
End: 2022-03-02

## 2022-03-02 ENCOUNTER — HOSPITAL ENCOUNTER (INPATIENT)
Facility: HOSPITAL | Age: 82
LOS: 14 days | Discharge: ANOTHER HOSPITAL | End: 2022-03-17
Attending: EMERGENCY MEDICINE | Admitting: STUDENT IN AN ORGANIZED HEALTH CARE EDUCATION/TRAINING PROGRAM

## 2022-03-02 DIAGNOSIS — I48.91 ATRIAL FIBRILLATION WITH RVR: Primary | ICD-10-CM

## 2022-03-02 DIAGNOSIS — J90 LOCULATED PLEURAL EFFUSION: ICD-10-CM

## 2022-03-02 DIAGNOSIS — Z20.822 LAB TEST NEGATIVE FOR COVID-19 VIRUS: ICD-10-CM

## 2022-03-02 DIAGNOSIS — I50.9 ACUTE CONGESTIVE HEART FAILURE, UNSPECIFIED HEART FAILURE TYPE: ICD-10-CM

## 2022-03-02 DIAGNOSIS — I50.21 ACUTE SYSTOLIC CONGESTIVE HEART FAILURE: ICD-10-CM

## 2022-03-02 LAB
ALBUMIN SERPL-MCNC: 3.6 G/DL (ref 3.5–5.2)
ALBUMIN/GLOB SERPL: 1.6 G/DL
ALP SERPL-CCNC: 105 U/L (ref 39–117)
ALT SERPL W P-5'-P-CCNC: 71 U/L (ref 1–33)
ANION GAP SERPL CALCULATED.3IONS-SCNC: 13 MMOL/L (ref 5–15)
APTT PPP: 25.6 SECONDS (ref 24–31)
AST SERPL-CCNC: 35 U/L (ref 1–32)
BASOPHILS # BLD AUTO: 0.1 10*3/MM3 (ref 0–0.2)
BASOPHILS NFR BLD AUTO: 0.6 % (ref 0–1.5)
BILIRUB SERPL-MCNC: 0.7 MG/DL (ref 0–1.2)
BUN SERPL-MCNC: 21 MG/DL (ref 8–23)
BUN/CREAT SERPL: 25.3 (ref 7–25)
CALCIUM SPEC-SCNC: 9.1 MG/DL (ref 8.6–10.5)
CHLORIDE SERPL-SCNC: 93 MMOL/L (ref 98–107)
CO2 SERPL-SCNC: 24 MMOL/L (ref 22–29)
CREAT SERPL-MCNC: 0.83 MG/DL (ref 0.57–1)
D DIMER PPP FEU-MCNC: 0.48 MG/L (FEU) (ref 0–0.59)
DEPRECATED RDW RBC AUTO: 44.6 FL (ref 37–54)
EGFRCR SERPLBLD CKD-EPI 2021: 71.4 ML/MIN/1.73
EOSINOPHIL # BLD AUTO: 0 10*3/MM3 (ref 0–0.4)
EOSINOPHIL NFR BLD AUTO: 0.5 % (ref 0.3–6.2)
ERYTHROCYTE [DISTWIDTH] IN BLOOD BY AUTOMATED COUNT: 13.4 % (ref 12.3–15.4)
GLOBULIN UR ELPH-MCNC: 2.2 GM/DL
GLUCOSE SERPL-MCNC: 242 MG/DL (ref 65–99)
HCT VFR BLD AUTO: 40.1 % (ref 34–46.6)
HGB BLD-MCNC: 14.1 G/DL (ref 12–15.9)
INR PPP: 1.03 (ref 0.93–1.1)
LYMPHOCYTES # BLD AUTO: 1 10*3/MM3 (ref 0.7–3.1)
LYMPHOCYTES NFR BLD AUTO: 9.8 % (ref 19.6–45.3)
MAGNESIUM SERPL-MCNC: 1.8 MG/DL (ref 1.6–2.4)
MCH RBC QN AUTO: 33.3 PG (ref 26.6–33)
MCHC RBC AUTO-ENTMCNC: 35.1 G/DL (ref 31.5–35.7)
MCV RBC AUTO: 94.9 FL (ref 79–97)
MONOCYTES # BLD AUTO: 0.7 10*3/MM3 (ref 0.1–0.9)
MONOCYTES NFR BLD AUTO: 7.3 % (ref 5–12)
NEUTROPHILS NFR BLD AUTO: 8.4 10*3/MM3 (ref 1.7–7)
NEUTROPHILS NFR BLD AUTO: 81.8 % (ref 42.7–76)
NRBC BLD AUTO-RTO: 0 /100 WBC (ref 0–0.2)
NT-PROBNP SERPL-MCNC: 7717 PG/ML (ref 0–1800)
PLATELET # BLD AUTO: 299 10*3/MM3 (ref 140–450)
PMV BLD AUTO: 7.9 FL (ref 6–12)
POTASSIUM SERPL-SCNC: 4 MMOL/L (ref 3.5–5.2)
PROT SERPL-MCNC: 5.8 G/DL (ref 6–8.5)
PROTHROMBIN TIME: 11.4 SECONDS (ref 9.6–11.7)
RBC # BLD AUTO: 4.23 10*6/MM3 (ref 3.77–5.28)
SODIUM SERPL-SCNC: 130 MMOL/L (ref 136–145)
TROPONIN T SERPL-MCNC: 0.01 NG/ML (ref 0–0.03)
TSH SERPL DL<=0.05 MIU/L-ACNC: 11.15 UIU/ML (ref 0.27–4.2)
WBC NRBC COR # BLD: 10.2 10*3/MM3 (ref 3.4–10.8)

## 2022-03-02 PROCEDURE — 84145 PROCALCITONIN (PCT): CPT | Performed by: EMERGENCY MEDICINE

## 2022-03-02 PROCEDURE — 85730 THROMBOPLASTIN TIME PARTIAL: CPT | Performed by: EMERGENCY MEDICINE

## 2022-03-02 PROCEDURE — 85379 FIBRIN DEGRADATION QUANT: CPT | Performed by: EMERGENCY MEDICINE

## 2022-03-02 PROCEDURE — 84439 ASSAY OF FREE THYROXINE: CPT | Performed by: NURSE PRACTITIONER

## 2022-03-02 PROCEDURE — 99284 EMERGENCY DEPT VISIT MOD MDM: CPT

## 2022-03-02 PROCEDURE — 93005 ELECTROCARDIOGRAM TRACING: CPT | Performed by: EMERGENCY MEDICINE

## 2022-03-02 PROCEDURE — 84443 ASSAY THYROID STIM HORMONE: CPT | Performed by: EMERGENCY MEDICINE

## 2022-03-02 PROCEDURE — 83880 ASSAY OF NATRIURETIC PEPTIDE: CPT | Performed by: EMERGENCY MEDICINE

## 2022-03-02 PROCEDURE — 83930 ASSAY OF BLOOD OSMOLALITY: CPT | Performed by: NURSE PRACTITIONER

## 2022-03-02 PROCEDURE — 83735 ASSAY OF MAGNESIUM: CPT | Performed by: EMERGENCY MEDICINE

## 2022-03-02 PROCEDURE — 87635 SARS-COV-2 COVID-19 AMP PRB: CPT | Performed by: EMERGENCY MEDICINE

## 2022-03-02 PROCEDURE — 85610 PROTHROMBIN TIME: CPT | Performed by: EMERGENCY MEDICINE

## 2022-03-02 PROCEDURE — 84484 ASSAY OF TROPONIN QUANT: CPT | Performed by: EMERGENCY MEDICINE

## 2022-03-02 PROCEDURE — 80053 COMPREHEN METABOLIC PANEL: CPT | Performed by: EMERGENCY MEDICINE

## 2022-03-02 PROCEDURE — 71045 X-RAY EXAM CHEST 1 VIEW: CPT

## 2022-03-02 PROCEDURE — 85025 COMPLETE CBC W/AUTO DIFF WBC: CPT | Performed by: EMERGENCY MEDICINE

## 2022-03-02 RX ORDER — DILTIAZEM HYDROCHLORIDE 5 MG/ML
10 INJECTION INTRAVENOUS ONCE
Status: COMPLETED | OUTPATIENT
Start: 2022-03-02 | End: 2022-03-02

## 2022-03-02 RX ADMIN — DILTIAZEM HYDROCHLORIDE 10 MG: 5 INJECTION INTRAVENOUS at 23:18

## 2022-03-02 RX ADMIN — SODIUM CHLORIDE 5 MG/HR: 900 INJECTION, SOLUTION INTRAVENOUS at 23:27

## 2022-03-03 ENCOUNTER — APPOINTMENT (OUTPATIENT)
Dept: CARDIOLOGY | Facility: HOSPITAL | Age: 82
End: 2022-03-03

## 2022-03-03 PROBLEM — I10 ESSENTIAL HYPERTENSION: Chronic | Status: ACTIVE | Noted: 2019-05-07

## 2022-03-03 PROBLEM — E78.49 OTHER HYPERLIPIDEMIA: Chronic | Status: ACTIVE | Noted: 2019-05-07

## 2022-03-03 PROBLEM — I25.810 CORONARY ARTERY DISEASE INVOLVING CORONARY BYPASS GRAFT OF NATIVE HEART WITHOUT ANGINA PECTORIS: Chronic | Status: ACTIVE | Noted: 2019-05-07

## 2022-03-03 PROBLEM — I50.9 ACUTE CHF (CONGESTIVE HEART FAILURE): Status: ACTIVE | Noted: 2022-03-03

## 2022-03-03 PROBLEM — R79.89 ELEVATED TSH: Status: ACTIVE | Noted: 2022-03-03

## 2022-03-03 PROBLEM — R79.89 ELEVATED LFTS: Status: ACTIVE | Noted: 2022-03-03

## 2022-03-03 PROBLEM — E03.9 HYPOTHYROIDISM: Chronic | Status: ACTIVE | Noted: 2019-05-07

## 2022-03-03 PROBLEM — I48.91 ATRIAL FIBRILLATION WITH RVR: Status: ACTIVE | Noted: 2022-03-03

## 2022-03-03 PROBLEM — E87.1 ACUTE HYPONATREMIA: Status: ACTIVE | Noted: 2022-03-03

## 2022-03-03 PROBLEM — R73.9 ACUTE HYPERGLYCEMIA: Status: ACTIVE | Noted: 2022-03-03

## 2022-03-03 PROBLEM — M25.571 PAIN IN RIGHT ANKLE: Status: RESOLVED | Noted: 2019-05-08 | Resolved: 2022-03-03

## 2022-03-03 PROBLEM — E66.9 OBESITY (BMI 30-39.9): Status: ACTIVE | Noted: 2022-03-03

## 2022-03-03 LAB
BH CV ECHO MEAS - ACS: 1.7 CM
BH CV ECHO MEAS - AO MAX PG: 7 MMHG
BH CV ECHO MEAS - AO MEAN PG: 3.2 MMHG
BH CV ECHO MEAS - AO ROOT DIAM: 3.1 CM
BH CV ECHO MEAS - AO V2 MAX: 132.2 CM/SEC
BH CV ECHO MEAS - AO V2 VTI: 22.4 CM
BH CV ECHO MEAS - AVA(I,D): 2.05 CM2
BH CV ECHO MEAS - EDV(CUBED): 136.6 ML
BH CV ECHO MEAS - EDV(MOD-SP4): 79.9 ML
BH CV ECHO MEAS - EF(MOD-BP): 31 %
BH CV ECHO MEAS - EF(MOD-SP4): 31.5 %
BH CV ECHO MEAS - ESV(CUBED): 101 ML
BH CV ECHO MEAS - ESV(MOD-SP4): 54.7 ML
BH CV ECHO MEAS - FS: 9.6 %
BH CV ECHO MEAS - IVS/LVPW: 1.64 CM
BH CV ECHO MEAS - IVSD: 1.42 CM
BH CV ECHO MEAS - LA DIMENSION(2D): 5 CM
BH CV ECHO MEAS - LV DIASTOLIC VOL/BSA (35-75): 43.5 CM2
BH CV ECHO MEAS - LV MASS(C)D: 228.4 GRAMS
BH CV ECHO MEAS - LV MAX PG: 3.5 MMHG
BH CV ECHO MEAS - LV MEAN PG: 1.85 MMHG
BH CV ECHO MEAS - LV SYSTOLIC VOL/BSA (12-30): 29.8 CM2
BH CV ECHO MEAS - LV V1 MAX: 93.7 CM/SEC
BH CV ECHO MEAS - LV V1 VTI: 14.8 CM
BH CV ECHO MEAS - LVIDD: 5.2 CM
BH CV ECHO MEAS - LVIDS: 4.7 CM
BH CV ECHO MEAS - LVOT AREA: 3.1 CM2
BH CV ECHO MEAS - LVOT DIAM: 1.99 CM
BH CV ECHO MEAS - LVPWD: 0.87 CM
BH CV ECHO MEAS - MR MAX PG: 66.8 MMHG
BH CV ECHO MEAS - MR MAX VEL: 406.2 CM/SEC
BH CV ECHO MEAS - MV MAX PG: 4.6 MMHG
BH CV ECHO MEAS - MV MEAN PG: 2.05 MMHG
BH CV ECHO MEAS - MV V2 VTI: 15 CM
BH CV ECHO MEAS - MVA(VTI): 3.1 CM2
BH CV ECHO MEAS - PA ACC TIME: 0.07 SEC
BH CV ECHO MEAS - PA PR(ACCEL): 46.5 MMHG
BH CV ECHO MEAS - PA V2 MAX: 76.2 CM/SEC
BH CV ECHO MEAS - PI END-D VEL: 117.5 CM/SEC
BH CV ECHO MEAS - RAP SYSTOLE: 3 MMHG
BH CV ECHO MEAS - RV MAX PG: 1.11 MMHG
BH CV ECHO MEAS - RV V1 MAX: 52.6 CM/SEC
BH CV ECHO MEAS - RV V1 VTI: 8.5 CM
BH CV ECHO MEAS - RVDD: 2.5 CM
BH CV ECHO MEAS - RVOT DIAM: 2.5 CM
BH CV ECHO MEAS - RVSP: 41.4 MMHG
BH CV ECHO MEAS - SI(MOD-SP4): 13.7 ML/M2
BH CV ECHO MEAS - SV(LVOT): 45.9 ML
BH CV ECHO MEAS - SV(MOD-SP4): 25.1 ML
BH CV ECHO MEAS - SV(RVOT): 42.8 ML
BH CV ECHO MEAS - TR MAX PG: 38.4 MMHG
BH CV ECHO MEAS - TR MAX VEL: 309.9 CM/SEC
BILIRUB UR QL STRIP: NEGATIVE
CLARITY UR: CLEAR
COLOR UR: YELLOW
GLUCOSE BLDC GLUCOMTR-MCNC: 131 MG/DL (ref 70–105)
GLUCOSE BLDC GLUCOMTR-MCNC: 174 MG/DL (ref 70–105)
GLUCOSE BLDC GLUCOMTR-MCNC: 176 MG/DL (ref 70–105)
GLUCOSE BLDC GLUCOMTR-MCNC: 191 MG/DL (ref 70–105)
GLUCOSE UR STRIP-MCNC: NEGATIVE MG/DL
HBA1C MFR BLD: 7.1 % (ref 3.5–5.6)
HGB UR QL STRIP.AUTO: NEGATIVE
HOLD SPECIMEN: NORMAL
KETONES UR QL STRIP: NEGATIVE
LEUKOCYTE ESTERASE UR QL STRIP.AUTO: NEGATIVE
MAXIMAL PREDICTED HEART RATE: 140 BPM
NITRITE UR QL STRIP: NEGATIVE
OSMOLALITY SERPL: 284 MOSM/KG (ref 280–301)
OSMOLALITY UR: 295 MOSM/KG (ref 300–800)
PH UR STRIP.AUTO: 6.5 [PH] (ref 5–8)
PROCALCITONIN SERPL-MCNC: 0.07 NG/ML (ref 0–0.25)
PROT UR QL STRIP: NEGATIVE
SARS-COV-2 RNA PNL SPEC NAA+PROBE: NOT DETECTED
SODIUM UR-SCNC: 100 MMOL/L
SP GR UR STRIP: 1.01 (ref 1–1.03)
STRESS TARGET HR: 119 BPM
T4 FREE SERPL-MCNC: 1.17 NG/DL (ref 0.93–1.7)
TROPONIN T SERPL-MCNC: <0.01 NG/ML (ref 0–0.03)
TROPONIN T SERPL-MCNC: <0.01 NG/ML (ref 0–0.03)
UROBILINOGEN UR QL STRIP: NORMAL

## 2022-03-03 PROCEDURE — 83935 ASSAY OF URINE OSMOLALITY: CPT | Performed by: NURSE PRACTITIONER

## 2022-03-03 PROCEDURE — 25010000002 FUROSEMIDE PER 20 MG: Performed by: EMERGENCY MEDICINE

## 2022-03-03 PROCEDURE — 99223 1ST HOSP IP/OBS HIGH 75: CPT | Performed by: INTERNAL MEDICINE

## 2022-03-03 PROCEDURE — 83036 HEMOGLOBIN GLYCOSYLATED A1C: CPT | Performed by: NURSE PRACTITIONER

## 2022-03-03 PROCEDURE — 25010000002 FUROSEMIDE PER 20 MG: Performed by: NURSE PRACTITIONER

## 2022-03-03 PROCEDURE — G0108 DIAB MANAGE TRN  PER INDIV: HCPCS

## 2022-03-03 PROCEDURE — 93306 TTE W/DOPPLER COMPLETE: CPT | Performed by: INTERNAL MEDICINE

## 2022-03-03 PROCEDURE — 84484 ASSAY OF TROPONIN QUANT: CPT | Performed by: NURSE PRACTITIONER

## 2022-03-03 PROCEDURE — 36415 COLL VENOUS BLD VENIPUNCTURE: CPT | Performed by: NURSE PRACTITIONER

## 2022-03-03 PROCEDURE — 93306 TTE W/DOPPLER COMPLETE: CPT

## 2022-03-03 PROCEDURE — 25010000002 ENOXAPARIN PER 10 MG: Performed by: NURSE PRACTITIONER

## 2022-03-03 PROCEDURE — 81003 URINALYSIS AUTO W/O SCOPE: CPT | Performed by: NURSE PRACTITIONER

## 2022-03-03 PROCEDURE — 63710000001 INSULIN LISPRO (HUMAN) PER 5 UNITS: Performed by: NURSE PRACTITIONER

## 2022-03-03 PROCEDURE — 84300 ASSAY OF URINE SODIUM: CPT | Performed by: NURSE PRACTITIONER

## 2022-03-03 PROCEDURE — 82962 GLUCOSE BLOOD TEST: CPT

## 2022-03-03 RX ORDER — INSULIN LISPRO 100 [IU]/ML
0-9 INJECTION, SOLUTION INTRAVENOUS; SUBCUTANEOUS AS NEEDED
Status: DISCONTINUED | OUTPATIENT
Start: 2022-03-03 | End: 2022-03-05

## 2022-03-03 RX ORDER — FUROSEMIDE 10 MG/ML
40 INJECTION INTRAMUSCULAR; INTRAVENOUS EVERY 12 HOURS
Status: DISCONTINUED | OUTPATIENT
Start: 2022-03-03 | End: 2022-03-05

## 2022-03-03 RX ORDER — KETOROLAC TROMETHAMINE 15 MG/ML
15 INJECTION, SOLUTION INTRAMUSCULAR; INTRAVENOUS EVERY 6 HOURS PRN
Status: DISCONTINUED | OUTPATIENT
Start: 2022-03-03 | End: 2022-03-04

## 2022-03-03 RX ORDER — POLYETHYLENE GLYCOL 3350 17 G/17G
17 POWDER, FOR SOLUTION ORAL DAILY PRN
Status: DISCONTINUED | OUTPATIENT
Start: 2022-03-03 | End: 2022-03-17 | Stop reason: HOSPADM

## 2022-03-03 RX ORDER — CHOLECALCIFEROL (VITAMIN D3) 125 MCG
5 CAPSULE ORAL NIGHTLY PRN
Status: DISCONTINUED | OUTPATIENT
Start: 2022-03-03 | End: 2022-03-07

## 2022-03-03 RX ORDER — MAGNESIUM SULFATE 1 G/100ML
1 INJECTION INTRAVENOUS AS NEEDED
Status: DISCONTINUED | OUTPATIENT
Start: 2022-03-03 | End: 2022-03-17 | Stop reason: HOSPADM

## 2022-03-03 RX ORDER — ONDANSETRON 4 MG/1
4 TABLET, FILM COATED ORAL EVERY 6 HOURS PRN
Status: DISCONTINUED | OUTPATIENT
Start: 2022-03-03 | End: 2022-03-17 | Stop reason: HOSPADM

## 2022-03-03 RX ORDER — NICOTINE POLACRILEX 4 MG
15 LOZENGE BUCCAL
Status: DISCONTINUED | OUTPATIENT
Start: 2022-03-03 | End: 2022-03-17 | Stop reason: HOSPADM

## 2022-03-03 RX ORDER — LEVOTHYROXINE SODIUM 0.05 MG/1
50 TABLET ORAL
COMMUNITY
End: 2022-04-25 | Stop reason: SDUPTHER

## 2022-03-03 RX ORDER — ACETAMINOPHEN 650 MG/1
650 SUPPOSITORY RECTAL EVERY 4 HOURS PRN
Status: DISCONTINUED | OUTPATIENT
Start: 2022-03-03 | End: 2022-03-17 | Stop reason: HOSPADM

## 2022-03-03 RX ORDER — DEXTROSE MONOHYDRATE 25 G/50ML
25 INJECTION, SOLUTION INTRAVENOUS
Status: DISCONTINUED | OUTPATIENT
Start: 2022-03-03 | End: 2022-03-17 | Stop reason: HOSPADM

## 2022-03-03 RX ORDER — MAGNESIUM SULFATE HEPTAHYDRATE 40 MG/ML
2 INJECTION, SOLUTION INTRAVENOUS AS NEEDED
Status: DISCONTINUED | OUTPATIENT
Start: 2022-03-03 | End: 2022-03-17 | Stop reason: HOSPADM

## 2022-03-03 RX ORDER — SODIUM CHLORIDE 0.9 % (FLUSH) 0.9 %
10 SYRINGE (ML) INJECTION EVERY 12 HOURS SCHEDULED
Status: DISCONTINUED | OUTPATIENT
Start: 2022-03-03 | End: 2022-03-17 | Stop reason: HOSPADM

## 2022-03-03 RX ORDER — INSULIN LISPRO 100 [IU]/ML
0-9 INJECTION, SOLUTION INTRAVENOUS; SUBCUTANEOUS
Status: DISCONTINUED | OUTPATIENT
Start: 2022-03-03 | End: 2022-03-05

## 2022-03-03 RX ORDER — POTASSIUM CHLORIDE 20 MEQ/1
40 TABLET, EXTENDED RELEASE ORAL AS NEEDED
Status: DISCONTINUED | OUTPATIENT
Start: 2022-03-03 | End: 2022-03-17 | Stop reason: HOSPADM

## 2022-03-03 RX ORDER — LEVOTHYROXINE SODIUM 0.05 MG/1
50 TABLET ORAL
Status: DISCONTINUED | OUTPATIENT
Start: 2022-03-03 | End: 2022-03-17 | Stop reason: HOSPADM

## 2022-03-03 RX ORDER — POTASSIUM CHLORIDE 7.45 MG/ML
10 INJECTION INTRAVENOUS
Status: DISCONTINUED | OUTPATIENT
Start: 2022-03-03 | End: 2022-03-17 | Stop reason: HOSPADM

## 2022-03-03 RX ORDER — ALUMINA, MAGNESIA, AND SIMETHICONE 2400; 2400; 240 MG/30ML; MG/30ML; MG/30ML
15 SUSPENSION ORAL EVERY 6 HOURS PRN
Status: DISCONTINUED | OUTPATIENT
Start: 2022-03-03 | End: 2022-03-17 | Stop reason: HOSPADM

## 2022-03-03 RX ORDER — IRBESARTAN 300 MG/1
150 TABLET ORAL DAILY
COMMUNITY
End: 2022-03-29 | Stop reason: HOSPADM

## 2022-03-03 RX ORDER — ASPIRIN 81 MG/1
81 TABLET, CHEWABLE ORAL DAILY
Status: DISCONTINUED | OUTPATIENT
Start: 2022-03-03 | End: 2022-03-17 | Stop reason: HOSPADM

## 2022-03-03 RX ORDER — ONDANSETRON 2 MG/ML
4 INJECTION INTRAMUSCULAR; INTRAVENOUS EVERY 6 HOURS PRN
Status: DISCONTINUED | OUTPATIENT
Start: 2022-03-03 | End: 2022-03-17 | Stop reason: HOSPADM

## 2022-03-03 RX ORDER — ACETAMINOPHEN 325 MG/1
650 TABLET ORAL EVERY 4 HOURS PRN
Status: DISCONTINUED | OUTPATIENT
Start: 2022-03-03 | End: 2022-03-17 | Stop reason: HOSPADM

## 2022-03-03 RX ORDER — MULTIPLE VITAMINS W/ MINERALS TAB 9MG-400MCG
1 TAB ORAL DAILY
Status: DISCONTINUED | OUTPATIENT
Start: 2022-03-03 | End: 2022-03-17 | Stop reason: HOSPADM

## 2022-03-03 RX ORDER — NITROGLYCERIN 0.4 MG/1
0.4 TABLET SUBLINGUAL
Status: DISCONTINUED | OUTPATIENT
Start: 2022-03-03 | End: 2022-03-17 | Stop reason: HOSPADM

## 2022-03-03 RX ORDER — SODIUM CHLORIDE 0.9 % (FLUSH) 0.9 %
10 SYRINGE (ML) INJECTION AS NEEDED
Status: DISCONTINUED | OUTPATIENT
Start: 2022-03-03 | End: 2022-03-17 | Stop reason: HOSPADM

## 2022-03-03 RX ORDER — POTASSIUM CHLORIDE 1.5 G/1.77G
40 POWDER, FOR SOLUTION ORAL AS NEEDED
Status: DISCONTINUED | OUTPATIENT
Start: 2022-03-03 | End: 2022-03-17 | Stop reason: HOSPADM

## 2022-03-03 RX ORDER — OLANZAPINE 10 MG/2ML
1 INJECTION, POWDER, LYOPHILIZED, FOR SOLUTION INTRAMUSCULAR AS NEEDED
Status: DISCONTINUED | OUTPATIENT
Start: 2022-03-03 | End: 2022-03-17 | Stop reason: HOSPADM

## 2022-03-03 RX ORDER — BISACODYL 10 MG
10 SUPPOSITORY, RECTAL RECTAL DAILY PRN
Status: DISCONTINUED | OUTPATIENT
Start: 2022-03-03 | End: 2022-03-17 | Stop reason: HOSPADM

## 2022-03-03 RX ORDER — LOSARTAN POTASSIUM 25 MG/1
50 TABLET ORAL
Status: DISCONTINUED | OUTPATIENT
Start: 2022-03-03 | End: 2022-03-05

## 2022-03-03 RX ORDER — ASPIRIN 81 MG/1
81 TABLET, CHEWABLE ORAL DAILY
COMMUNITY
End: 2022-03-24 | Stop reason: HOSPADM

## 2022-03-03 RX ORDER — AMOXICILLIN 250 MG
2 CAPSULE ORAL 2 TIMES DAILY
Status: DISCONTINUED | OUTPATIENT
Start: 2022-03-03 | End: 2022-03-17 | Stop reason: HOSPADM

## 2022-03-03 RX ORDER — FUROSEMIDE 10 MG/ML
40 INJECTION INTRAMUSCULAR; INTRAVENOUS ONCE
Status: COMPLETED | OUTPATIENT
Start: 2022-03-03 | End: 2022-03-03

## 2022-03-03 RX ORDER — BISACODYL 5 MG/1
5 TABLET, DELAYED RELEASE ORAL DAILY PRN
Status: DISCONTINUED | OUTPATIENT
Start: 2022-03-03 | End: 2022-03-17 | Stop reason: HOSPADM

## 2022-03-03 RX ORDER — ACETAMINOPHEN 160 MG/5ML
650 SOLUTION ORAL EVERY 4 HOURS PRN
Status: DISCONTINUED | OUTPATIENT
Start: 2022-03-03 | End: 2022-03-17 | Stop reason: HOSPADM

## 2022-03-03 RX ADMIN — LOSARTAN POTASSIUM 50 MG: 25 TABLET, FILM COATED ORAL at 09:23

## 2022-03-03 RX ADMIN — METOPROLOL TARTRATE 25 MG: 25 TABLET, FILM COATED ORAL at 21:10

## 2022-03-03 RX ADMIN — FUROSEMIDE 40 MG: 10 INJECTION, SOLUTION INTRAMUSCULAR; INTRAVENOUS at 21:10

## 2022-03-03 RX ADMIN — FUROSEMIDE 40 MG: 10 INJECTION, SOLUTION INTRAMUSCULAR; INTRAVENOUS at 09:22

## 2022-03-03 RX ADMIN — FUROSEMIDE 40 MG: 10 INJECTION, SOLUTION INTRAVENOUS at 01:35

## 2022-03-03 RX ADMIN — LEVOTHYROXINE SODIUM 50 MCG: 0.05 TABLET ORAL at 09:23

## 2022-03-03 RX ADMIN — METOPROLOL TARTRATE 25 MG: 25 TABLET, FILM COATED ORAL at 09:23

## 2022-03-03 RX ADMIN — ENOXAPARIN SODIUM 80 MG: 100 INJECTION SUBCUTANEOUS at 16:59

## 2022-03-03 RX ADMIN — SENNOSIDES AND DOCUSATE SODIUM 2 TABLET: 50; 8.6 TABLET ORAL at 09:22

## 2022-03-03 RX ADMIN — ENOXAPARIN SODIUM 80 MG: 100 INJECTION SUBCUTANEOUS at 03:48

## 2022-03-03 RX ADMIN — SENNOSIDES AND DOCUSATE SODIUM 2 TABLET: 50; 8.6 TABLET ORAL at 21:10

## 2022-03-03 RX ADMIN — ASPIRIN 81 MG CHEWABLE TABLET 81 MG: 81 TABLET CHEWABLE at 09:23

## 2022-03-03 RX ADMIN — Medication 1 TABLET: at 09:23

## 2022-03-03 RX ADMIN — Medication 10 ML: at 22:02

## 2022-03-03 RX ADMIN — SODIUM CHLORIDE 5 MG/HR: 900 INJECTION, SOLUTION INTRAVENOUS at 12:33

## 2022-03-03 RX ADMIN — INSULIN LISPRO 2 UNITS: 100 INJECTION, SOLUTION INTRAVENOUS; SUBCUTANEOUS at 13:43

## 2022-03-03 RX ADMIN — INSULIN LISPRO 2 UNITS: 100 INJECTION, SOLUTION INTRAVENOUS; SUBCUTANEOUS at 09:22

## 2022-03-03 RX ADMIN — Medication 10 ML: at 03:49

## 2022-03-03 RX ADMIN — Medication 10 ML: at 09:23

## 2022-03-03 NOTE — ED NOTES
Pt up to BSC, HR an BP elevated with movement, provider aware, Increased Cardizem to 10 mg/hr     Courtney Linares, RN  03/03/22 0123

## 2022-03-03 NOTE — H&P
[facility]      Patient Name: Jaquelin Valderrama  : 1940  MRN: [id number]  Primary Care Physician:  Minerva Chatterjee MD  Date of admission: 3/2/2022      Subjective      Chief Complaint:  Dyspnea on exertion    History of Present Illness: Jaquelin Valderrama is a 82 y.o. female with a history of CAD, MI, CABG, coronary stent placement, HTN, HLD, prediabetes, and hypothyroidism who presented to the ER at [facility] on 3/2/2022 complaining of dyspnea on exertion. The patient also reports bilateral leg swelling and weight gain over the past 1 week. She denies any exacerbating or alleviating factors. She denies any chest pain. She denies a history of atrial fibrillation or congestive heart failure.    The patient states she has a second home in [place]. Her cardiologist practices at the [facility]. Her PCP is located in [place], [place].     Of note, the patient states she hasn't taken her levothyroxine since 2021. She states she ran out and couldn't get her PCP's office to refill the prescription without making an appointment. She also reports she stopped taking Crestor due to concerns of it causing dementia. She states she has been compliant with her other medications. She states she gets her medications from [facility] because they are cheaper. She states she occasionally checks her blood sugars at home. She is not on any medications for her prediabetes. She states her last A1c was around 6.2%.    Workup in the ER revealed atrial fibrillation with rapid ventricular response and acute CHF. The patient was given Cardizem bolus and started on a continuous drip. She was also given Lasix 40 mg IV. She was admitted to the Hospitalist group for further evaluation and treatment.       Review of Systems   Constitutional: Positive for weight gain.   Cardiovascular: Positive for dyspnea on exertion and leg swelling. Negative for chest pain.   All other  systems reviewed and are negative.       Personal History     Past Medical History:   Diagnosis Date   • Astigmatism, bilateral 11/19/2019   • Benign essential hypertension    • Bilateral presbyopia 11/19/2019   • CAD (coronary artery disease)    • Closed right ankle fracture    • COVID-19 vaccination refused    • Hyperlipidemia    • Hypothyroidism    • Influenza vaccine needed    • Myocardial infarction (HCC)    • Prediabetes    • Pseudophakia, both eyes 11/19/2019   • Thoracic back pain        Past Surgical History:   Procedure Laterality Date   • APPENDECTOMY     • CARDIAC CATHETERIZATION  2012    x3    • CORONARY ARTERY BYPASS GRAFT  12/2014   • CORONARY STENT PLACEMENT      x3   • HARDWARE REMOVAL Right 7/21/2020    Procedure: ANKLE/FOOT HARDWARE REMOVAL;  Surgeon: Lincoln Sibley MD;  Location: [facility];  Service: Orthopedics;  Laterality: Right;   • ORIF ANKLE FRACTURE Right 04/23/2019    Radha Vazquez Mem       Family History: family history includes Diabetes in an other family member; Heart disease in her mother; Lung cancer in her father. Otherwise pertinent FHx was reviewed and not pertinent to current issue.    Social History:  reports that she quit smoking about 42 years ago. Her smoking use included cigarettes. She has never used smokeless tobacco. She reports current alcohol use. She reports that she does not use drugs.    Home Medications:  Prior to Admission Medications     Prescriptions Last Dose Informant Patient Reported? Taking?    aspirin 81 MG chewable tablet 3/2/2022  Yes Yes    Chew 81 mg Daily.    Cholecalciferol (VITAMIN D3) 2000 units tablet 3/2/2022  Yes Yes    Take 1 tablet by mouth Daily.    irbesartan (AVAPRO) 300 MG tablet 3/2/2022  No Yes    1/2 tab po daily    metoprolol tartrate (LOPRESSOR) 25 MG tablet 3/2/2022  No Yes    Take 1 tablet by mouth twice daily    Multiple Vitamins-Minerals (MULTIVITAMIN ADULT EXTRA C PO) 3/2/2022  Yes Yes    levothyroxine (Synthroid) 50  MCG tablet More than a month  No No    Take 1 tablet by mouth Daily.          Allergies:  Allergies   Allergen Reactions   • Prednisone Other (See Comments)     Increased BP       Objective      Vitals:   Temp:  [97.8 °F (36.6 °C)] 97.8 °F (36.6 °C)  Heart Rate:  [] 111  Resp:  [22] 22  BP: (141-210)/() 141/90    Physical Exam  Vitals reviewed.   Constitutional:       Appearance: She is obese.   HENT:      Head: Normocephalic.   Eyes:      Extraocular Movements: Extraocular movements intact.      Conjunctiva/sclera: Conjunctivae normal.      Pupils: Pupils are equal, round, and reactive to light.   Cardiovascular:      Rate and Rhythm: Tachycardia present. Rhythm irregularly irregular. Frequent extrasystoles are present.     Pulses:           Dorsalis pedis pulses are 2+ on the right side and 2+ on the left side.        Posterior tibial pulses are 2+ on the right side and 2+ on the left side.   Pulmonary:      Effort: Pulmonary effort is normal.      Breath sounds: Examination of the right-lower field reveals rales. Examination of the left-lower field reveals rales. Rales present.      Comments: On room air  Abdominal:      General: Bowel sounds are normal.      Palpations: Abdomen is soft.      Tenderness: There is no abdominal tenderness.   Musculoskeletal:         General: Normal range of motion.      Cervical back: Normal range of motion.      Right lower le+ Edema present.      Left lower le+ Edema present.   Skin:     General: Skin is warm and dry.   Neurological:      Mental Status: She is alert and oriented to person, place, and time.   Psychiatric:         Mood and Affect: Mood normal.         Behavior: Behavior normal.          Result Review    Result Review:  I have personally reviewed the results from the time of this admission to 3/3/2022 02:26 EST and agree with these findings:  [x]  Laboratory  [x]  Microbiology  [x]  Radiology  [x]  EKG/Telemetry   []  Cardiology/Vascular   []   Pathology  [x]  Old records  []  Other:    Most notable findings include:   Na 130, K 4.0, Mg 1.8, glucose 242, AST 35, ALT 71, proBNP 7,717, troponin 0.013, TSH 11.150, dimer 0.48    Covid-19 not detected      Assessment/Plan        Active Hospital Problems:  Active Hospital Problems    Diagnosis    • **Atrial fibrillation with RVR (HCC)    • Acute CHF (congestive heart failure) ([facility])    • Acute hyponatremia    • Elevated LFTs    • Elevated TSH    • Acute hyperglycemia    • Obesity (BMI 30-39.9)    • Coronary artery disease involving coronary bypass graft of native heart without angina pectoris    • Essential hypertension    • Hypothyroidism    • Other hyperlipidemia      Plan:     Atrial fibrillation with RVR, new onset  -possibly secondary to uncontrolled hypothyroidism  -consider outpatient sleep study   -given Cardizem 10 mg IV bolus in ER and started on drip  -continue Cardizem drip, titrate for HR <120  -Lovenox 1 mg/kg sq q12h  -check serial troponin  -obtain 2D echo  -restart levothyroxine at previous home dose  -continuous cardiac monitoring   -consult cardiology (patient requesting Dr. Evans since her regular cardiologist is located in [place])    Acute CHF (congestive heart failure), new onset, unsure of type   -obtain 2D echo  -given Lasix 40 mg IV in ER; continue q12h  -monitor I&Os and daily weight  -continue BB and ARB  -consult HF Nurse Navigator and cardiac rehab  -2 g Na diet    Acute hyponatremia  -suspect secondary to hypervolemia  -diuresis as above  -monitor BMP    Elevated LFTs  -etiology unclear  -AST 35, ALT 71 on admission compared to 17/13 on 01/20/21  -monitor     Elevated TSH with history of hypothyroidism  -secondary to noncompliance with medication  -restart levothyroxine 50 mcg PO daily  -patient needs outpatient follow up with PCP with TSH recheck in 4-6 weeks  -compliance encouraged    Acute hyperglycemia with history of prediabetes  -glucose 242 on admission  -check A1c  -accu  checks AC&HS with SSI  -diabetic consistent carb diet  -consult diabetes educator     Coronary artery disease involving coronary bypass graft of native heart without angina pectoris / Other hyperlipidemia / Essential hypertension, chronic  -h/o MI, CABG, and stents  -continue aspirin and metoprolol  -losartan substituted for irbesartan  -patient states she quit taking her statin d/t concerns of it causing dementia  -monitor BP    Former smoker    Obesity  -BMI 30.65 on admission  -encourage lifestyle modifications         DVT prophylaxis:  Medical DVT prophylaxis orders are present.    CODE STATUS:    Code Status (Patient has no pulse and is not breathing): CPR (Attempt to Resuscitate)  Medical Interventions (Patient has pulse or is breathing): Full Support    Admission Status:  I believe this patient meets inpatient status.    I discussed the patient's findings and my recommendations with patient and family.    This patient has been examined wearing appropriate Personal Protective Equipment. 03/03/22      Signature: Electronically signed by VALENTINE Reeves, 03/03/22, 2:44 AM EST.      I have reviewed this documentation and agree.      I also personally evaluated this patient and made the medical decisions.    82-year-old female with multiple comorbidities including CAD status post CABG/PCI, hypothyroidism, hypertension and hyperlipidemia.  Presented to [facility] ED on 3/2/2022 with complaints of dyspnea on exertion.  Also related having bilateral lower extremity and some weight gain in the past 1 week.  On evaluation in the ED patient was noted to be in atrial fibrillation with rapid ventricular response.  Cardizem bolus was given and patient started on Cardizem drip.  Was also given diuretic for acute CHF.  Patient was admitted for further care    On general examination, patient is in no obvious distress  Heart-irregular irregular  Lungs-decreased air entry at the bases  Abdomen  benign  Extremities-positive for edema    Plan  Patient has been noncompliant with her thyroid medication  TSH 11.15  Electrolytes-potassium and magnesium okay  Check 2D echo  Continue on Cardizem drip and therapeutic dose Lovenox  Cardiologist consulted  Restart on Synthroid  Further recommendation following clinical course    Electronically signed by Sandip Javed MD, 03/03/22, 5:19 PM EST.

## 2022-03-03 NOTE — CONSULTS
"Heart Failure Program  Nurse Navigator  Discharge Planning    Patient Name:Jaquelin Valderrama  :1940  Cardiologist:[place] cardiologist  Current Admission Date: 3/2/2022   Previous Admission:    Admission frequency: 1 admissions in 6 months    Heart Failure history per record:    Symptoms on admission:c/o SOA with LÓPEZ, weight gain 1 wk. Swelling lower legs, \"thye were twice their size\". Pt advise of a CABG surgery , no CHF or afib hx per pt.       Admission Weight:  Flowsheet Rows      First Filed Value   Admission Height 162.6 cm (64\") Documented at 2022   Admission Weight 81 kg (178 lb 9.2 oz) Documented at 2022            Current Home Medications:  Prior to Admission medications    Medication Sig Start Date End Date Taking? Authorizing Provider   aspirin 81 MG chewable tablet Chew 81 mg Daily.   Yes Malinda Long MD   Cholecalciferol (VITAMIN D3) 2000 units tablet Take 1 tablet by mouth Daily.   Yes Malinda Long MD   irbesartan (Avapro) 300 MG tablet Take 150 mg by mouth Daily.   Yes Malinda Long MD   metoprolol tartrate (LOPRESSOR) 25 MG tablet Take 1 tablet by mouth twice daily 22  Yes Minerva Chatterjee MD   Multiple Vitamins-Minerals (MULTIVITAMIN ADULT EXTRA C PO) Take 1 tablet by mouth Daily. 12  Yes Malinda Long MD   irbesartan (AVAPRO) 300 MG tablet 1/2 tab po daily 2/1/21 3/3/22 Yes Minerva Chatterjee MD   levothyroxine (SYNTHROID, LEVOTHROID) 50 MCG tablet Take 50 mcg by mouth Every Morning.    Malinda Long MD   levothyroxine (Synthroid) 50 MCG tablet Take 1 tablet by mouth Daily. 2/8/21 3/3/22  Minerva Chatterjee MD   rosuvastatin (Crestor) 20 MG tablet Take 1 tablet by mouth Daily. 2/1/21 3/3/22  Minerva Chatterjee MD       Social history:   Pt lives with significant other, they travel back and forth between [place] and hospitals, pt advises she has a cardiologist  In [place] but not here    Smoking status:     Diagnostics " "Testing:  proBNP level: 7717    Echocardiogram:      Patient Assessment:   Pt lying in bed, resp even and unlabored, no SOA with conversation, noted 1+ bilateral edema. Pt a reports \"better than yesterday\", less SOA today per pt,.     Current O2: none  Home O2:      Education provided to patient:  yes- Heart Failure disease education  yes -Symptom identification/management  pending -Daily Weights  pending- Diet education  na- Fluid restriction (if ordered)  yes- Activity education  pending- Medication education  na- Smoking cessation  yes- Follow-up Appointments  pending-Provided information on how to access [facility] My HF Guide/Heart Failure Interactive workbook    Acceptance of learning: acceptance, cooperative    Heart Failure education interactive teaching session time: 30 minutes    GWTG: pending echo    Identified needs/barriers:   Volume status - pending echo, I&O, daily weights    Intervention:              "

## 2022-03-03 NOTE — CONSULTS
"Diabetes Education  Assessment/Teaching    Patient Name:  Jaquelin Valderrama  YOB: 1940  MRN: [id number]  Admit Date:  3/2/2022      Assessment Date:  3/3/2022    Most Recent Value   General Information     Referral From: MD order,  A1c  [Consult recieved due to adm bs of 242. A1c this adm 7.1%]   Height 160 cm (63\")   Height Method Stated   Weight 80.3 kg (177 lb)   Weight Method Bed scale   Pregnancy Assessment    Diabetes History    What type of diabetes do you have? --  [Pt states has been dx with prediabetes but has not been told by MD she has diabetes]   Do you test your blood sugar at home? yes   Frequency of checks several days/week   Meter type ReliOn   Who performs the test? self   Education Preferences    Nutrition Information    Assessment Topics    DM Goals             Most Recent Value   DM Education Needs    Meter Has own   Frequency of Testing --  [Encouraged pt to continue checking bs and discussed importance of notifying MD if bs running outside healthy range]   Blood Glucose Target Range Discussed A1c result of 7.1%. Encouraged pt to discuss this result with MD. Explained it is running in diabetes range. Gave A1c info sheet   Medication --  [Pt does not take diabetes medication]   Healthy Eating --  [Pt eating 2 meals/day. She has been following an intermittent fast meal plan and low CHO meal plan. Pt states she likes vegetables and feels she eats healthy]   Motivation Engaged   Teaching Method Explanation,  Discussion,  Handouts   Patient Response Verbalized understanding            Other Comments:  Met with pt and  at bedside. Pt states she thinks bs may have been running higher due to not following meal plan as closely since she hasn't been feeling well. Pt states she also has not been routinely exercising since back in December. Pt states she usually likes to walk 3.4 miles 4-5 days/week but has not been doing this since the temperature has been cold. Discussed possibility " of exercising indoors. Pt states she does have a [employer] and plans to have this brought to current resident. Pt has PCP and educator discussed importance of follow up with MD regarding bs management. Pt with no additional questions. Gave First Steps booklet and Planning Healthy Meals packet.      Electronically signed by:  Patricia Soto RN  03/03/22 14:56 EST

## 2022-03-03 NOTE — CONSULTS
Cardiology Consult Note    Patient Identification:  Name: Jaquelin Valderrama  Age: 82 y.o.  Sex: female  :  1940  MRN: [id number]             Requesting Physician : Dr. Javed    Reason for Consultation / Chief Complaint : Atrial fibrillation    History of Present Illness:      Ms. Jaquelin Valderrama has PMH of    CAD, CABG 2014  Hypertension  Dyslipidemia  Prediabetes  Alopecia  Hypothyroidism, noncompliant since 2021  ORIF  Family history of heart disease in mother  Allergies/intolerance to prednisone  Former smoker quit       Presented through emergency room 3/2/2022 with progressively worsening shortness of breath and dyspnea on exertion weight gain and leg edema. Was found to be in A. fib and congestive heart failure. Patient denies any chest pain. Patient lives in [place] and has a second home in [place] and see his cardiologist at [facility]. Patient has been having issues with alopecia and memory issues therefore stopped beta-blockers and statin on her own. Also has not been taking  thyroid replacement therapy since November because she ran out of medication. Patient has history of prediabetes. Does not check her sugars at home.  Work-up here revealed troponin x3 are negative. proBNP is 7717. CMP reveals a glucose of 242, hemoglobin A1c over 7.1. BUN/creatinine are 21/0.83. ALT elevated at 71, AST 35. TSH is 11.15  Chest x-ray 3/2/2022 reveals left pleural effusion, left basilar disease most likely atelectasis with possible pneumonia.  EKG 3/2/2022 reviewed/interpreted by me reveals A. fib with RVR at 126 bpm with PVCs    Assessment:  :    Acute HFrEF probably a combination of systolic dysfunction from ischemic cardiomyopathy and diastolic dysfunction with A. fib with RVR  Severe LV dysfunction EF of 30%  Newly diagnosed A. fib with RVR  Severe biatrial enlargement  RV enlargement  PVC  CAD, CABG  Hypertension  Dyslipidemia  Diabetes  Hypothyroidism, noncompliant on meds  Alopecia  Elevated  LFTs      Recommendations / Plan:        Telemetry to monitor rhythm  Rate control with IV Cardizem  Diuresis with IV Lasix as tolerated  Monitor renal function and urine output  Counseled on importance of compliance. 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 At age 20 she wants to live life on her terms.  We will see if we can do GABRIEL cardioversion.  Patient would benefit from long-term anticoagulation for atrial fibrillation because of high [id number] score due to female gender age over 75, hypertension, diabetes, CAD making it at least 6. We will start her on Eliquis or warfarin before discharge.  Discussed with patient and her  by bedside.  Patient's elevated LFTs could be due to passive congestion but need to rule out other etiologies.  Will defer evaluation and treatment of diabetes to primary team.  Patient has significantly large pleural effusion on the left would better benefit from pleural tap.  Treatment for hypothyroidism per primary team             Diagnosis Plan   1. Atrial fibrillation with RVR (HCC)     2. Acute congestive heart failure, unspecified heart failure type (HCC)     3. Lab test negative for COVID-19 virus                Past Medical History:  Past Medical History:   Diagnosis Date   • Astigmatism, bilateral 11/19/2019   • Benign essential hypertension    • Bilateral presbyopia 11/19/2019   • CAD (coronary artery disease)    • Closed right ankle fracture    • COVID-19 vaccination refused    • Hyperlipidemia    • Hypothyroidism    • Influenza vaccine needed    • Myocardial infarction (HCC)    • Prediabetes    • Pseudophakia, both eyes 11/19/2019   • Thoracic back pain      Past Surgical History:  Past Surgical History:   Procedure Laterality Date   • APPENDECTOMY     • CARDIAC CATHETERIZATION  2012    x3    • CORONARY ARTERY BYPASS GRAFT  12/2014   • CORONARY STENT PLACEMENT       x3   • HARDWARE REMOVAL Right 7/21/2020    Procedure: ANKLE/FOOT HARDWARE REMOVAL;  Surgeon: Lincoln Sibley MD;  Location: [facility];  Service: Orthopedics;  Laterality: Right;   • ORIF ANKLE FRACTURE Right 04/23/2019    Radha Vazquez Mem      Allergies:  Allergies   Allergen Reactions   • Prednisone Other (See Comments)     Increased BP     Home Meds:  Medications Prior to Admission   Medication Sig Dispense Refill Last Dose   • aspirin 81 MG chewable tablet Chew 81 mg Daily.   3/2/2022 at Unknown time   • Cholecalciferol (VITAMIN D3) 2000 units tablet Take 1 tablet by mouth Daily.   3/2/2022 at Unknown time   • irbesartan (Avapro) 300 MG tablet Take 150 mg by mouth Daily.      • metoprolol tartrate (LOPRESSOR) 25 MG tablet Take 1 tablet by mouth twice daily 180 tablet 0 3/2/2022 at Unknown time   • Multiple Vitamins-Minerals (MULTIVITAMIN ADULT EXTRA C PO) Take 1 tablet by mouth Daily.   3/2/2022 at Unknown time   • levothyroxine (SYNTHROID, LEVOTHROID) 50 MCG tablet Take 50 mcg by mouth Every Morning.        Current Meds:     Current Facility-Administered Medications:   •  acetaminophen (TYLENOL) tablet 650 mg, 650 mg, Oral, Q4H PRN **OR** acetaminophen (TYLENOL) 160 MG/5ML solution 650 mg, 650 mg, Oral, Q4H PRN **OR** acetaminophen (TYLENOL) suppository 650 mg, 650 mg, Rectal, Q4H PRN, Arriola, Angelica, APRN  •  aluminum-magnesium hydroxide-simethicone (MAALOX MAX) 400-400-40 MG/5ML suspension 15 mL, 15 mL, Oral, Q6H PRN, Arriola, Angelica, APRN  •  aspirin chewable tablet 81 mg, 81 mg, Oral, Daily, Arriola, Angelica, APRN, 81 mg at 03/03/22 0923  •  sennosides-docusate (PERICOLACE) 8.6-50 MG per tablet 2 tablet, 2 tablet, Oral, BID, 2 tablet at 03/03/22 0922 **AND** polyethylene glycol (MIRALAX) packet 17 g, 17 g, Oral, Daily PRN **AND** bisacodyl (DULCOLAX) EC tablet 5 mg, 5 mg, Oral, Daily PRN **AND** bisacodyl (DULCOLAX) suppository 10 mg, 10 mg, Rectal, Daily PRN, Angelica Arriola, APRN  •  dextrose (D50W)  (25 g/50 mL) IV injection 25 g, 25 g, Intravenous, Q15 Min PRN, Arriola, Angelica, APRN  •  dextrose (GLUTOSE) oral gel 15 g, 15 g, Oral, Q15 Min PRN, Arriola, Angelica, APRN  •  dilTIAZem (CARDIZEM) 100 mg in 100 mL NS infusion (ADV), 5-15 mg/hr, Intravenous, Titrated, Tim Valle MD, Last Rate: 5 mL/hr at 03/03/22 1233, 5 mg/hr at 03/03/22 1233  •  enoxaparin (LOVENOX) syringe 80 mg, 1 mg/kg, Subcutaneous, Q12H, Arriola, Angelica, APRN, 80 mg at 03/03/22 1659  •  furosemide (LASIX) injection 40 mg, 40 mg, Intravenous, Q12H, Arriola, Angelica, APRN, 40 mg at 03/03/22 0922  •  glucagon (human recombinant) (GLUCAGEN DIAGNOSTIC) 1 mg in sterile water (preservative free) 1 mL injection, 1 mg, Subcutaneous, PRN, Arriola, Angelica, APRN  •  insulin lispro (ADMELOG) injection 0-9 Units, 0-9 Units, Subcutaneous, TID AC, 2 Units at 03/03/22 1343 **AND** insulin lispro (ADMELOG) injection 0-9 Units, 0-9 Units, Subcutaneous, PRN, Arriola, Angelica, APRN  •  ketorolac (TORADOL) injection 15 mg, 15 mg, Intravenous, Q6H PRN, Arriola, Angelica, APRN  •  levothyroxine (SYNTHROID, LEVOTHROID) tablet 50 mcg, 50 mcg, Oral, Q AM, Arriola, Angelica, APRN, 50 mcg at 03/03/22 0923  •  losartan (COZAAR) tablet 50 mg, 50 mg, Oral, Q24H, Arriola, Angelica, APRN, 50 mg at 03/03/22 0923  •  Magnesium Sulfate 2 gram infusion - Mg less than or equal to 1.5 mg/dL, 2 g, Intravenous, PRN **OR** Magnesium Sulfate 1 gram infusion - Mg 1.6-1.9 mg/dL, 1 g, Intravenous, PRN, Arriola, Angelica, APRN  •  melatonin tablet 5 mg, 5 mg, Oral, Nightly PRN, Arriola, Angelica, APRN  •  metoprolol tartrate (LOPRESSOR) tablet 25 mg, 25 mg, Oral, BID, Arriola, Angelica, APRN, 25 mg at 03/03/22 0923  •  multivitamin with minerals 1 tablet, 1 tablet, Oral, Daily, Arriola, Angelica, APRN, 1 tablet at 03/03/22 0923  •  nitroglycerin (NITROSTAT) SL tablet 0.4 mg, 0.4 mg, Sublingual, Q5 Min PRN, Arriola, Angelica, APRN  •  ondansetron (ZOFRAN) tablet 4 mg, 4 mg, Oral, Q6H PRN **OR** ondansetron (ZOFRAN) injection 4 mg, 4  "mg, Intravenous, Q6H PRN, Arriola, Angelica, APRN  •  potassium chloride (K-DUR,KLOR-CON) CR tablet 40 mEq, 40 mEq, Oral, PRN **OR** potassium chloride (KLOR-CON) packet 40 mEq, 40 mEq, Oral, PRN **OR** potassium chloride 10 mEq in 100 mL IVPB, 10 mEq, Intravenous, Q1H PRN, Arriola, Angelica, APRN  •  sodium chloride 0.9 % flush 10 mL, 10 mL, Intravenous, Q12H, Arriola, Angelica, APRN, 10 mL at 22 0923  •  sodium chloride 0.9 % flush 10 mL, 10 mL, Intravenous, PRN, Arriola, Angelica, APRN  Social History:   Social History     Tobacco Use   • Smoking status: Former Smoker     Types: Cigarettes     Quit date:      Years since quittin.1   • Smokeless tobacco: Never Used   • Tobacco comment: Social Drinker   Substance Use Topics   • Alcohol use: Yes     Comment: mod       Family History:  Family History   Problem Relation Age of Onset   • Heart disease Mother    • Lung cancer Father    • Diabetes Other         Review of Systems : Review of Systems   Constitutional: Positive for malaise/fatigue and weight gain.   Cardiovascular: Positive for leg swelling.   Respiratory: Positive for shortness of breath.    All other systems reviewed and are negative.             Constitutional:  Temp:  [97.6 °F (36.4 °C)-98.3 °F (36.8 °C)] 98.3 °F (36.8 °C)  Heart Rate:  [] 101  Resp:  [16-22] 18  BP: (109-210)/() 109/70    Physical Exam   /70 (BP Location: Left arm, Patient Position: Lying)   Pulse 101   Temp 98.3 °F (36.8 °C) (Oral)   Resp 18   Ht 160 cm (63\")   Wt 80.3 kg (177 lb)   SpO2 98%   BMI 31.35 kg/m²   Physical Exam  General:  Appears in no acute distress  Eyes: Sclera is anicteric,  conjunctiva is clear   HEENT:  No JVD. Alopecia seen  Respiratory: Respirations regular and unlabored at rest. Decreased breath sounds left base  Cardiovascular: S1,S2 irregular rate and rhythm. 3/6 holosystolic murmur no rub or gallop auscultated. No pretibial pitting edema  Gastrointestinal: Abdomen " nondistended,  Musculoskeletal:  No abnormal movements  Extremities: 1+ edema  Skin: Stasis changes  Neuro: Alert and awake, no lateralizing deficits appreciated    Cardiographics  ECG: EKG tracing was  personally reviewed/interpreted by me  ECG 12 Lead   Preliminary Result   HEART RATE= 126  bpm   RR Interval= 476  ms   UT Interval=   ms   P Horizontal Axis=   deg   P Front Axis=   deg   QRSD Interval= 101  ms   QT Interval= 310  ms   QRS Axis= 28  deg   T Wave Axis= 211  deg   - ABNORMAL ECG -   Atrial fibrillation   Ventricular premature complex   When compared with ECG of 18-Jul-2020 7:52:12,   Significant change in rhythm: previously sinus   Electronically Signed By:    Date and Time of Study: 2022-03-02 22:32:03      SCANNED - TELEMETRY     Final Result      SCANNED - TELEMETRY     Final Result      SCANNED - TELEMETRY     Final Result          Telemetry: Atrial fibrillation    Echocardiogram:       Imaging  Chest X-ray:   Imaging Results (Last 24 Hours)     Procedure Component Value Units Date/Time    XR Chest 1 View [[id number]] Collected: 03/03/22 0728     Updated: 03/03/22 0732    Narrative:      EXAM: XR CHEST 1 VW-     DATE OF EXAM: 3/2/2022 11:03 PM     INDICATION: soa.       COMPARISONS: 04/23/2019      FINDINGS:     There is a large amount of dense opacity in the left lower lobe and  lingula with blunting of the costo phrenic angle. This is new. No  pneumothorax. Mild subtalar right-sided atelectasis. Heart is enlarged.  Sternotomy wires again noted..       Impression:         Left pleural effusion.     Left basilar disease most likely atelectasis with possible pneumonia  although follow-up to ensure resolution is recommended as this is a new  finding and underlying mass cannot be excluded.     Electronically Signed By-Nain Mccauley On:3/3/2022 7:30 AM  This report was finalized on [id number] by  Nain Mccauley, .          Lab Review: I have reviewed the labs  Results from last 7 days   Lab Units  03/03/22  1347 03/03/22  0618 03/02/22  2308   TROPONIN T ng/mL <0.010 <0.010 0.013     Results from last 7 days   Lab Units 03/02/22  2308   MAGNESIUM mg/dL 1.8     Results from last 7 days   Lab Units 03/02/22  2308   SODIUM mmol/L 130*   POTASSIUM mmol/L 4.0   BUN mg/dL 21   CREATININE mg/dL 0.83   CALCIUM mg/dL 9.1     @[id number]@  Results from last 7 days   Lab Units 03/02/22  2308   WBC 10*3/mm3 10.20   HEMOGLOBIN g/dL 14.1   HEMATOCRIT % 40.1   PLATELETS 10*3/mm3 299     Results from last 7 days   Lab Units 03/02/22  2308   INR  1.03   APTT seconds 25.6             Jackson Salgado MD  3/3/2022, 21:09 EST      EMR Dragon/Transcription:   Dictated utilizing Dragon dictation  Much of the above report is an electronic transcription/translation of the spoken language to printed text using Dragon Software. As such, the subtleties and finesse of the spoken language may permit erroneous, or at times, nonsensical words or phrases to be inadvertently transcribed; thus changes may be made at a later date to rectify these errors.

## 2022-03-03 NOTE — CASE MANAGEMENT/SOCIAL WORK
Discharge Planning Assessment  [facility]     Patient Name: Jaquelin Valderrama  MRN: [id number]  Today's Date: 3/3/2022    Admit Date: 3/2/2022     Discharge Needs Assessment     Row Name 03/03/22 1249       Living Environment    Lives With spouse    Name(s) of Who Lives With Patient -Rodney    Current Living Arrangements home/apartment/condo    Primary Care Provided by self    Provides Primary Care For no one    Family Caregiver if Needed spouse    Quality of Family Relationships helpful; involved; supportive    Able to Return to Prior Arrangements yes       Resource/Environmental Concerns    Resource/Environmental Concerns none    Transportation Concerns car, none       Transition Planning    Patient/Family Anticipates Transition to home; home with family    Patient/Family Anticipated Services at Transition none    Transportation Anticipated car, drives self; family or friend will provide       Discharge Needs Assessment    Readmission Within the Last 30 Days no previous admission in last 30 days    Equipment Currently Used at Home none    Concerns to be Addressed no discharge needs identified; compliance issue    Anticipated Changes Related to Illness none    Equipment Needed After Discharge none    Provided Post Acute Provider List? N/A    Provided Post Acute Provider Quality & Resource List? N/A               Discharge Plan     Row Name 03/03/22 1864       Plan    Plan DC plan: return home with spouse.    Provided Post Acute Provider List? N/A    Provided Post Acute Provider Quality & Resource List? N/A    Patient/Family in Agreement with Plan yes    Plan Comments Met with patient and her  at bedside. They go back and forth between their home here in [place] and one in [place]; pt drives and is independent with ADL's. PCP and pharmacy confirmed. Pt denies DME use at home. No issues affording meds/food. No needs for HHC/services at this time.               Demographic Summary     Row Name 03/03/22 1248        General Information    Admission Type inpatient    Arrived From emergency department    Referral Source admission list    Reason for Consult discharge planning; care coordination/care conference    Preferred Language English     Used During This Interaction no       Contact Information    Permission Granted to Share Info With                Functional Status     Row Name 03/03/22 1249       Functional Status    Usual Activity Tolerance good    Current Activity Tolerance good       Functional Status, IADL    Medications independent    Meal Preparation independent    Housekeeping independent    Laundry independent    Shopping independent              Met with patient in room wearing PPE: mask, face shield/goggles.      Maintained distance greater than six feet and spent less than 15 minutes in the room.      Megan Naegele, RN      Office Phone: [phone]  Office Cell: [phone]

## 2022-03-03 NOTE — PLAN OF CARE
Goal Outcome Evaluation:      Pt remains on Cardizem  Problem: Adult Inpatient Plan of Care  Goal: Plan of Care Review  Outcome: Ongoing, Progressing  Goal: Patient-Specific Goal (Individualized)  Outcome: Ongoing, Progressing  Goal: Absence of Hospital-Acquired Illness or Injury  Outcome: Ongoing, Progressing  Goal: Optimal Comfort and Wellbeing  Outcome: Ongoing, Progressing  Goal: Readiness for Transition of Care  Outcome: Ongoing, Progressing     Problem: Arrhythmia/Dysrhythmia (Acute Coronary Syndrome)  Goal: Normalized Cardiac Rhythm  Outcome: Ongoing, Progressing    drip at this time will continue to monitor at this time.

## 2022-03-03 NOTE — ED PROVIDER NOTES
Subjective   Pleasant 82-year-old female with history of coronary disease but no history of arrhythmia complains of dyspnea on exertion with pedal edema and weight gain for the past 1 week.  Patient had a mild dry cough otherwise no fever, no chest pain, no GI  symptoms, no recent trips or travel no history of DVT or PE.  Symptoms are moderate, worse with exertion.  Patient states she had been taking a beta-blocker for 20 years and thought it was causing her hair to fall out and gradually stopped it over the past months.          Review of Systems   Respiratory: Positive for cough and shortness of breath.    Cardiovascular: Positive for leg swelling.   All other systems reviewed and are negative.      Past Medical History:   Diagnosis Date   • Astigmatism, bilateral 11/19/2019   • Benign essential hypertension    • Bilateral presbyopia 11/19/2019   • CAD (coronary artery disease)    • Closed right ankle fracture    • COVID-19 vaccination refused    • Hyperlipidemia    • Hypothyroidism    • Influenza vaccine needed    • Myocardial infarction (HCC)    • Prediabetes    • Pseudophakia, both eyes 11/19/2019   • Thoracic back pain        Allergies   Allergen Reactions   • Prednisone Other (See Comments)     Increased BP       Past Surgical History:   Procedure Laterality Date   • APPENDECTOMY     • CARDIAC CATHETERIZATION  2012    x3    • CORONARY ARTERY BYPASS GRAFT  12/2014   • CORONARY STENT PLACEMENT      x3   • HARDWARE REMOVAL Right 7/21/2020    Procedure: ANKLE/FOOT HARDWARE REMOVAL;  Surgeon: Lincoln Sibley MD;  Location: [facility];  Service: Orthopedics;  Laterality: Right;   • ORIF ANKLE FRACTURE Right 04/23/2019    [facility] V[facility] Mem       Family History   Problem Relation Age of Onset   • Heart disease Mother    • Lung cancer Father    • Diabetes Other        Social History     Socioeconomic History   • Marital status:    Tobacco Use   • Smoking status: Former Smoker     Types: Cigarettes      Quit date: 1980     Years since quittin.2   • Smokeless tobacco: Never Used   • Tobacco comment: Social Drinker   Vaping Use   • Vaping Use: Never used   Substance and Sexual Activity   • Alcohol use: Yes     Comment: mod    • Drug use: No   • Sexual activity: Defer           Objective   Physical Exam  Constitutional:       Appearance: Normal appearance.   HENT:      Head: Normocephalic and atraumatic.      Mouth/Throat:      Mouth: Mucous membranes are moist.   Eyes:      Extraocular Movements: Extraocular movements intact.      Conjunctiva/sclera: Conjunctivae normal.      Pupils: Pupils are equal, round, and reactive to light.   Neck:      Comments: Mild jvd  Cardiovascular:      Rate and Rhythm: Tachycardia present. Rhythm irregular.   Pulmonary:      Effort: Pulmonary effort is normal.      Breath sounds: Normal breath sounds.   Abdominal:      General: Bowel sounds are normal. There is no distension.      Palpations: Abdomen is soft.      Tenderness: There is no abdominal tenderness.   Musculoskeletal:      Comments: Bilateral lower extremity edema, nontender   Skin:     General: Skin is warm and dry.      Capillary Refill: Capillary refill takes less than 2 seconds.   Neurological:      General: No focal deficit present.      Mental Status: She is alert and oriented to person, place, and time.   Psychiatric:         Mood and Affect: Mood normal.         Behavior: Behavior normal.         Procedures           ED Course  ED Course as of 03/18/22 2242   Wed Mar 02, 2022   2234 EKG interpretation: Atrial fibrillation, rate 126, occasional PVC, no STEMI seen [JR]      ED Course User Index  [JR] Tim Valle MD                                                 MDM  Number of Diagnoses or Management Options  Acute congestive heart failure, unspecified heart failure type (HCC)  Atrial fibrillation with RVR (HCC)  Lab test negative for COVID-19 virus  Diagnosis management comments: Results for orders placed  or performed during the hospital encounter of 03/02/22  -COVID-19,CEPHEID/NGOZI,COR/SPRING/PAD/SUMANTH IN-HOUSE(OR EMERGENT/ADD-ON),NP SWAB IN TRANSPORT MEDIA 3-4 HR TAT, RT-PCR - Swab, Nasopharynx:   Specimen: Nasopharynx; Swab       Result                      Value             Ref Range           COVID19                     Not Detected      Not Detected*  -Comprehensive Metabolic Panel:   Specimen: Blood       Result                      Value             Ref Range           Glucose                     242 (H)           65 - 99 mg/dL       BUN                         21                8 - 23 mg/dL        Creatinine                  0.83              0.57 - 1.00 *       Sodium                      130 (L)           136 - 145 mm*       Potassium                   4.0               3.5 - 5.2 mm*       Chloride                    93 (L)            98 - 107 mmo*       CO2                         24.0              22.0 - 29.0 *       Calcium                     9.1               8.6 - 10.5 m*       Total Protein               5.8 (L)           6.0 - 8.5 g/*       Albumin                     3.60              3.50 - 5.20 *       ALT (SGPT)                  71 (H)            1 - 33 U/L          AST (SGOT)                  35 (H)            1 - 32 U/L          Alkaline Phosphatase        105               39 - 117 U/L        Total Bilirubin             0.7               0.0 - 1.2 mg*       Globulin                    2.2               gm/dL               A/G Ratio                   1.6               g/dL                BUN/Creatinine Ratio        25.3 (H)          7.0 - 25.0          Anion Gap                   13.0              5.0 - 15.0 m*       eGFR                        71.4              >60.0 mL/min*  -Protime-INR:   Specimen: Blood       Result                      Value             Ref Range           Protime                     11.4              9.6 - 11.7 S*       INR                         1.03              0.93 -  1.10    -aPTT:   Specimen: Blood       Result                      Value             Ref Range           PTT                         25.6              24.0 - 31.0 *  -BNP:   Specimen: Blood       Result                      Value             Ref Range           proBNP                      7,717.0 (H)       0.0-1,800.0 *  -D-dimer, Quantitative:   Specimen: Blood       Result                      Value             Ref Range           D-Dimer, Quantitative       0.48              0.00 - 0.59 *  -Troponin:   Specimen: Blood       Result                      Value             Ref Range           Troponin T                  0.013             0.000 - 0.03*  -TSH:   Specimen: Blood       Result                      Value             Ref Range           TSH                         11.150 (H)        0.270 - 4.20*  -Magnesium:   Specimen: Blood       Result                      Value             Ref Range           Magnesium                   1.8               1.6 - 2.4 mg*  -CBC Auto Differential:   Specimen: Blood       Result                      Value             Ref Range           WBC                         10.20             3.40 - 10.80*       RBC                         4.23              3.77 - 5.28 *       Hemoglobin                  14.1              12.0 - 15.9 *       Hematocrit                  40.1              34.0 - 46.6 %       MCV                         94.9              79.0 - 97.0 *       MCH                         33.3 (H)          26.6 - 33.0 *       MCHC                        35.1              31.5 - 35.7 *       RDW                         13.4              12.3 - 15.4 %       RDW-SD                      44.6              37.0 - 54.0 *       MPV                         7.9               6.0 - 12.0 fL       Platelets                   299               140 - 450 10*       Neutrophil %                81.8 (H)          42.7 - 76.0 %       Lymphocyte %                9.8 (L)           19.6 - 45.3 %        Monocyte %                  7.3               5.0 - 12.0 %        Eosinophil %                0.5               0.3 - 6.2 %         Basophil %                  0.6               0.0 - 1.5 %         Neutrophils, Absolute       8.40 (H)          1.70 - 7.00 *       Lymphocytes, Absolute       1.00              0.70 - 3.10 *       Monocytes, Absolute         0.70              0.10 - 0.90 *       Eosinophils, Absolute       0.00              0.00 - 0.40 *       Basophils, Absolute         0.10              0.00 - 0.20 *       nRBC                        0.0               0.0 - 0.2 /1*  -Procalcitonin:   Specimen: Blood       Result                      Value             Ref Range           Procalcitonin               0.07              0.00 - 0.25 *  -ECG 12 Lead:        Result                      Value             Ref Range           QT Interval                 310               ms             -Gold Top - SST:        Result                      Value             Ref Range           Extra Tube                                                    Hold for add-ons.  XR Chest 1 View    (Results Pending)    Patient well, still in RVR, will increase cardizem, will diurese, admit.       Amount and/or Complexity of Data Reviewed  Clinical lab tests: ordered and reviewed  Tests in the radiology section of CPT®: ordered and reviewed  Tests in the medicine section of CPT®: reviewed and ordered  Discuss the patient with other providers: (Hospitalist)        Final diagnoses:   Atrial fibrillation with RVR (HCC)   Acute congestive heart failure, unspecified heart failure type (HCC)   Lab test negative for COVID-19 virus       ED Disposition  ED Disposition     ED Disposition   Decision to Admit    Condition   --    Comment   Level of Care: Progressive Care [20]   Diagnosis: Atrial fibrillation with RVR (HCC) [397373]   Admitting Physician: JAGRUTI LIM [[id number]]   Attending Physician: JAGRUTI LIM [[id number]]   Isolate for  COVID?: No [0]   Certification: I Certify That Inpatient Hospital Services Are Medically Necessary For Greater Than 2 Midnights               Jackson Salgado MD  [address] [facility] [place] [address] [phone]    Schedule an appointment as soon as possible for a visit      [facility]  [address] [facility] [address] [phone]        Minerva Chatterjee MD  [address] [address]  [phone]      follow up with PCP in 5-7 days    Minerva Chatterjee MD  [address] [address]  [phone]               Medication List      New Prescriptions    amiodarone 200 MG tablet  Commonly known as: PACERONE  Take 1 tablet by mouth Every 12 (Twelve) Hours for 22 doses.     arformoterol 15 MCG/2ML nebulizer solution  Commonly known as: BROVANA  Take 2 mL by nebulization 2 (Two) Times a Day.     budesonide 0.5 MG/2ML nebulizer solution  Commonly known as: PULMICORT  Take 4 mL by nebulization 2 (Two) Times a Day.     dilTIAZem 90 MG tablet  Commonly known as: CARDIZEM  Take 1 tablet by mouth Every 8 (Eight) Hours.     insulin glargine 100 UNIT/ML injection  Commonly known as: LANTUS, SEMGLEE  Inject 10 Units under the skin into the appropriate area as directed Every Night.           Where to Get Your Medications      You can get these medications from any pharmacy    Bring a paper prescription for each of these medications  · insulin glargine 100 UNIT/ML injection     Information about where to get these medications is not yet available    Ask your nurse or doctor about these medications  · amiodarone 200 MG tablet  · arformoterol 15 MCG/2ML nebulizer solution  · budesonide 0.5 MG/2ML nebulizer solution  · dilTIAZem 90 MG tablet          Tim Valle MD  03/03/22 7828       Tim Valle MD  03/18/22 9932

## 2022-03-03 NOTE — PLAN OF CARE
Goal Outcome Evaluation:  Plan of Care Reviewed With: patient        Progress: improving  Outcome Summary: Pt. admitted from ER to PCU in the early am, no c/o pain or SOA, cardizem gtt continued, noncompliance with PO meds at home, will restart today per MAR, no acute changes, will continue to monitor

## 2022-03-04 ENCOUNTER — APPOINTMENT (OUTPATIENT)
Dept: GENERAL RADIOLOGY | Facility: HOSPITAL | Age: 82
End: 2022-03-04

## 2022-03-04 ENCOUNTER — APPOINTMENT (OUTPATIENT)
Dept: CT IMAGING | Facility: HOSPITAL | Age: 82
End: 2022-03-04

## 2022-03-04 LAB
ALBUMIN FLD-MCNC: 1.4 G/DL
ANION GAP SERPL CALCULATED.3IONS-SCNC: 12 MMOL/L (ref 5–15)
APPEARANCE FLD: ABNORMAL
BASOPHILS # BLD AUTO: 0 10*3/MM3 (ref 0–0.2)
BASOPHILS NFR BLD AUTO: 0.7 % (ref 0–1.5)
BUN SERPL-MCNC: 16 MG/DL (ref 8–23)
BUN/CREAT SERPL: 20.8 (ref 7–25)
CALCIUM SPEC-SCNC: 8.8 MG/DL (ref 8.6–10.5)
CHLORIDE SERPL-SCNC: 93 MMOL/L (ref 98–107)
CO2 SERPL-SCNC: 30 MMOL/L (ref 22–29)
COLOR FLD: ABNORMAL
CREAT SERPL-MCNC: 0.77 MG/DL (ref 0.57–1)
DEPRECATED RDW RBC AUTO: 45.1 FL (ref 37–54)
EGFRCR SERPLBLD CKD-EPI 2021: 78.1 ML/MIN/1.73
EOSINOPHIL # BLD AUTO: 0.1 10*3/MM3 (ref 0–0.4)
EOSINOPHIL NFR BLD AUTO: 1 % (ref 0.3–6.2)
ERYTHROCYTE [DISTWIDTH] IN BLOOD BY AUTOMATED COUNT: 13.6 % (ref 12.3–15.4)
GLUCOSE BLDC GLUCOMTR-MCNC: 152 MG/DL (ref 70–105)
GLUCOSE BLDC GLUCOMTR-MCNC: 178 MG/DL (ref 70–105)
GLUCOSE BLDC GLUCOMTR-MCNC: 217 MG/DL (ref 70–105)
GLUCOSE BLDC GLUCOMTR-MCNC: 251 MG/DL (ref 70–105)
GLUCOSE FLD-MCNC: 176 MG/DL
GLUCOSE SERPL-MCNC: 189 MG/DL (ref 65–99)
HCT VFR BLD AUTO: 34.6 % (ref 34–46.6)
HCT VFR BLD AUTO: 39.2 % (ref 34–46.6)
HGB BLD-MCNC: 11.6 G/DL (ref 12–15.9)
HGB BLD-MCNC: 13.9 G/DL (ref 12–15.9)
INR PPP: 1.13 (ref 0.93–1.1)
LDH FLD-CCNC: 105 U/L
LYMPHOCYTES # BLD AUTO: 1 10*3/MM3 (ref 0.7–3.1)
LYMPHOCYTES NFR BLD AUTO: 14.3 % (ref 19.6–45.3)
LYMPHOCYTES NFR FLD MANUAL: 80 %
MAGNESIUM SERPL-MCNC: 1.6 MG/DL (ref 1.6–2.4)
MCH RBC QN AUTO: 33.8 PG (ref 26.6–33)
MCHC RBC AUTO-ENTMCNC: 35.4 G/DL (ref 31.5–35.7)
MCV RBC AUTO: 95.3 FL (ref 79–97)
MESOTHL CELL NFR FLD MANUAL: 3 %
METHOD: ABNORMAL
MONOCYTES # BLD AUTO: 0.7 10*3/MM3 (ref 0.1–0.9)
MONOCYTES NFR BLD AUTO: 9.4 % (ref 5–12)
MONOCYTES NFR FLD: 5 %
NEUTROPHILS NFR BLD AUTO: 5.4 10*3/MM3 (ref 1.7–7)
NEUTROPHILS NFR BLD AUTO: 74.6 % (ref 42.7–76)
NEUTROPHILS NFR FLD MANUAL: 12 %
NRBC BLD AUTO-RTO: 0.1 /100 WBC (ref 0–0.2)
NUC CELL # FLD: 850 /MM3
PH FLD: 7.5 [PH] (ref 6.5–7.5)
PLATELET # BLD AUTO: 248 10*3/MM3 (ref 140–450)
PMV BLD AUTO: 7.5 FL (ref 6–12)
POTASSIUM SERPL-SCNC: 3.1 MMOL/L (ref 3.5–5.2)
PROT FLD-MCNC: 1.8 G/DL
PROTHROMBIN TIME: 12.4 SECONDS (ref 9.6–11.7)
RBC # BLD AUTO: 4.11 10*6/MM3 (ref 3.77–5.28)
SODIUM SERPL-SCNC: 135 MMOL/L (ref 136–145)
WBC NRBC COR # BLD: 7.2 10*3/MM3 (ref 3.4–10.8)

## 2022-03-04 PROCEDURE — 85610 PROTHROMBIN TIME: CPT | Performed by: INTERNAL MEDICINE

## 2022-03-04 PROCEDURE — 82945 GLUCOSE OTHER FLUID: CPT | Performed by: HOSPITALIST

## 2022-03-04 PROCEDURE — 80048 BASIC METABOLIC PNL TOTAL CA: CPT | Performed by: NURSE PRACTITIONER

## 2022-03-04 PROCEDURE — 25010000002 FUROSEMIDE PER 20 MG: Performed by: NURSE PRACTITIONER

## 2022-03-04 PROCEDURE — 85018 HEMOGLOBIN: CPT | Performed by: INTERNAL MEDICINE

## 2022-03-04 PROCEDURE — 82962 GLUCOSE BLOOD TEST: CPT

## 2022-03-04 PROCEDURE — 85014 HEMATOCRIT: CPT | Performed by: INTERNAL MEDICINE

## 2022-03-04 PROCEDURE — 99233 SBSQ HOSP IP/OBS HIGH 50: CPT | Performed by: INTERNAL MEDICINE

## 2022-03-04 PROCEDURE — 83615 LACTATE (LD) (LDH) ENZYME: CPT | Performed by: HOSPITALIST

## 2022-03-04 PROCEDURE — 25010000002 ENOXAPARIN PER 10 MG: Performed by: NURSE PRACTITIONER

## 2022-03-04 PROCEDURE — 88185 FLOWCYTOMETRY/TC ADD-ON: CPT

## 2022-03-04 PROCEDURE — 63710000001 INSULIN GLARGINE PER 5 UNITS: Performed by: INTERNAL MEDICINE

## 2022-03-04 PROCEDURE — 0W9B3ZZ DRAINAGE OF LEFT PLEURAL CAVITY, PERCUTANEOUS APPROACH: ICD-10-PCS | Performed by: HOSPITALIST

## 2022-03-04 PROCEDURE — 83986 ASSAY PH BODY FLUID NOS: CPT | Performed by: HOSPITALIST

## 2022-03-04 PROCEDURE — 89051 BODY FLUID CELL COUNT: CPT | Performed by: HOSPITALIST

## 2022-03-04 PROCEDURE — 99232 SBSQ HOSP IP/OBS MODERATE 35: CPT | Performed by: INTERNAL MEDICINE

## 2022-03-04 PROCEDURE — 84157 ASSAY OF PROTEIN OTHER: CPT | Performed by: HOSPITALIST

## 2022-03-04 PROCEDURE — 85025 COMPLETE CBC W/AUTO DIFF WBC: CPT | Performed by: NURSE PRACTITIONER

## 2022-03-04 PROCEDURE — 88108 CYTOPATH CONCENTRATE TECH: CPT | Performed by: HOSPITALIST

## 2022-03-04 PROCEDURE — 25010000002 MAGNESIUM SULFATE IN D5W 1G/100ML (PREMIX) 1-5 GM/100ML-% SOLUTION: Performed by: NURSE PRACTITIONER

## 2022-03-04 PROCEDURE — 71045 X-RAY EXAM CHEST 1 VIEW: CPT

## 2022-03-04 PROCEDURE — 71250 CT THORAX DX C-: CPT

## 2022-03-04 PROCEDURE — 63710000001 INSULIN LISPRO (HUMAN) PER 5 UNITS: Performed by: NURSE PRACTITIONER

## 2022-03-04 PROCEDURE — 32555 ASPIRATE PLEURA W/ IMAGING: CPT | Performed by: HOSPITALIST

## 2022-03-04 PROCEDURE — 83735 ASSAY OF MAGNESIUM: CPT | Performed by: NURSE PRACTITIONER

## 2022-03-04 PROCEDURE — 82042 OTHER SOURCE ALBUMIN QUAN EA: CPT | Performed by: HOSPITALIST

## 2022-03-04 PROCEDURE — 88184 FLOWCYTOMETRY/ TC 1 MARKER: CPT

## 2022-03-04 RX ORDER — BUDESONIDE 0.5 MG/2ML
1 INHALANT ORAL
Status: DISCONTINUED | OUTPATIENT
Start: 2022-03-04 | End: 2022-03-17 | Stop reason: HOSPADM

## 2022-03-04 RX ORDER — HYDROCODONE BITARTRATE AND ACETAMINOPHEN 5; 325 MG/1; MG/1
1 TABLET ORAL EVERY 6 HOURS PRN
Status: DISPENSED | OUTPATIENT
Start: 2022-03-04 | End: 2022-03-11

## 2022-03-04 RX ORDER — SODIUM CHLORIDE 9 MG/ML
75 INJECTION, SOLUTION INTRAVENOUS ONCE
Status: COMPLETED | OUTPATIENT
Start: 2022-03-04 | End: 2022-03-04

## 2022-03-04 RX ORDER — ARFORMOTEROL TARTRATE 15 UG/2ML
15 SOLUTION RESPIRATORY (INHALATION)
Status: DISCONTINUED | OUTPATIENT
Start: 2022-03-04 | End: 2022-03-17 | Stop reason: HOSPADM

## 2022-03-04 RX ADMIN — INSULIN GLARGINE 10 UNITS: 100 INJECTION, SOLUTION SUBCUTANEOUS at 22:16

## 2022-03-04 RX ADMIN — SENNOSIDES AND DOCUSATE SODIUM 2 TABLET: 50; 8.6 TABLET ORAL at 09:24

## 2022-03-04 RX ADMIN — DILTIAZEM HYDROCHLORIDE 90 MG: 30 TABLET, FILM COATED ORAL at 22:15

## 2022-03-04 RX ADMIN — POTASSIUM CHLORIDE 40 MEQ: 1500 TABLET, EXTENDED RELEASE ORAL at 09:25

## 2022-03-04 RX ADMIN — MAGNESIUM SULFATE 1 G: 1 INJECTION INTRAVENOUS at 09:25

## 2022-03-04 RX ADMIN — ACETAMINOPHEN 650 MG: 325 TABLET ORAL at 13:07

## 2022-03-04 RX ADMIN — ENOXAPARIN SODIUM 80 MG: 100 INJECTION SUBCUTANEOUS at 16:44

## 2022-03-04 RX ADMIN — LEVOTHYROXINE SODIUM 50 MCG: 0.05 TABLET ORAL at 05:33

## 2022-03-04 RX ADMIN — LOSARTAN POTASSIUM 50 MG: 25 TABLET, FILM COATED ORAL at 09:25

## 2022-03-04 RX ADMIN — DILTIAZEM HYDROCHLORIDE 90 MG: 30 TABLET, FILM COATED ORAL at 13:07

## 2022-03-04 RX ADMIN — SODIUM CHLORIDE 75 ML/HR: 9 INJECTION, SOLUTION INTRAVENOUS at 20:48

## 2022-03-04 RX ADMIN — ENOXAPARIN SODIUM 80 MG: 100 INJECTION SUBCUTANEOUS at 03:34

## 2022-03-04 RX ADMIN — FUROSEMIDE 40 MG: 10 INJECTION, SOLUTION INTRAMUSCULAR; INTRAVENOUS at 22:16

## 2022-03-04 RX ADMIN — FUROSEMIDE 40 MG: 10 INJECTION, SOLUTION INTRAMUSCULAR; INTRAVENOUS at 09:24

## 2022-03-04 RX ADMIN — ASPIRIN 81 MG CHEWABLE TABLET 81 MG: 81 TABLET CHEWABLE at 09:25

## 2022-03-04 RX ADMIN — METOPROLOL TARTRATE 25 MG: 25 TABLET, FILM COATED ORAL at 09:25

## 2022-03-04 RX ADMIN — HYDROCODONE BITARTRATE AND ACETAMINOPHEN 1 TABLET: 5; 325 TABLET ORAL at 22:16

## 2022-03-04 RX ADMIN — INSULIN LISPRO 6 UNITS: 100 INJECTION, SOLUTION INTRAVENOUS; SUBCUTANEOUS at 16:44

## 2022-03-04 RX ADMIN — SENNOSIDES AND DOCUSATE SODIUM 2 TABLET: 50; 8.6 TABLET ORAL at 22:15

## 2022-03-04 RX ADMIN — Medication 1 TABLET: at 09:25

## 2022-03-04 RX ADMIN — INSULIN LISPRO 2 UNITS: 100 INJECTION, SOLUTION INTRAVENOUS; SUBCUTANEOUS at 09:27

## 2022-03-04 RX ADMIN — METOPROLOL TARTRATE 25 MG: 25 TABLET, FILM COATED ORAL at 22:16

## 2022-03-04 RX ADMIN — Medication 10 ML: at 09:27

## 2022-03-04 NOTE — PROGRESS NOTES
[facility] Medicine Services Daily Progress Note    Patient Name: Jaquelin Valderrama  : 1940  MRN: [id number]  Primary Care Physician:  Minerva Chatterjee MD  Date of admission: 3/2/2022      Subjective      Chief Complaint: Shortness of breath especially with exertion    Patient Reports    3/4/2022  Patient seen and examined  She reports that shortness of breath has significantly improved  Denies any chest pain, palpitation    Review of Systems   Constitutional: Negative for chills and fever.   HENT: Negative for congestion.    Eyes: Negative for blurred vision.   Cardiovascular: Negative for chest pain and palpitations.   Respiratory: Positive for shortness of breath.    Endocrine: Negative for cold intolerance and heat intolerance.   Gastrointestinal: Negative for abdominal pain and nausea.   Genitourinary: Negative for flank pain.   Neurological: Positive for weakness.   Psychiatric/Behavioral: Negative for altered mental status.          Objective      Vitals:   Temp:  [97.4 °F (36.3 °C)-98.3 °F (36.8 °C)] 98 °F (36.7 °C)  Heart Rate:  [] 95  Resp:  [18-22] 20  BP: (109-145)/() 127/59    Physical Exam  Vitals reviewed.   Constitutional:       General: She is not in acute distress.  HENT:      Head: Normocephalic.      Nose: Nose normal.      Mouth/Throat:      Mouth: Mucous membranes are moist.   Eyes:      Extraocular Movements: Extraocular movements intact.      Conjunctiva/sclera: Conjunctivae normal.      Pupils: Pupils are equal, round, and reactive to light.   Cardiovascular:      Rate and Rhythm: Normal rate. Rhythm irregular.   Pulmonary:      Effort: No respiratory distress.      Comments: Decreased air entry left base  Abdominal:      General: Bowel sounds are normal.      Palpations: Abdomen is soft.      Tenderness: There is no abdominal tenderness.   Musculoskeletal:      Cervical back: Neck supple.      Right lower leg: Edema present.      Left lower leg: Edema  present.      Comments: Degenerative changes   Skin:     General: Skin is warm.   Neurological:      Mental Status: She is alert and oriented to person, place, and time.   Psychiatric:         Mood and Affect: Mood normal.           Result Review    Result Review:  I have personally reviewed the results from the time of this admission to 3/4/2022 17:03 EST and agree with these findings:  [x]  Laboratory  [x]  Microbiology  [x]  Radiology  []  EKG/Telemetry   []  Cardiology/Vascular   []  Pathology  []  Old records  []  Other:  Most notable findings include:     Wounds (last 24 hours)             Assessment/Plan      Brief Patient Summary:  82-year-old female with multiple comorbidities including CAD status post CABG/PCI, hypothyroidism, hypertension and hyperlipidemia.  Presented to [facility] ED on 3/2/2022 with complaints of dyspnea on exertion.  Also related having bilateral lower extremity and some weight gain in the past 1 week.  On evaluation in the ED patient was noted to be in atrial fibrillation with rapid ventricular response.  Cardizem bolus was given and patient started on Cardizem drip.  Was also given diuretic for acute CHF.  Patient was admitted for further care      aspirin, 81 mg, Oral, Daily  dilTIAZem, 90 mg, Oral, Q8H  enoxaparin, 1 mg/kg, Subcutaneous, Q12H  furosemide, 40 mg, Intravenous, Q12H  insulin lispro, 0-9 Units, Subcutaneous, TID AC  levothyroxine, 50 mcg, Oral, Q AM  losartan, 50 mg, Oral, Q24H  metoprolol tartrate, 25 mg, Oral, BID  multivitamin with minerals, 1 tablet, Oral, Daily  senna-docusate sodium, 2 tablet, Oral, BID  sodium chloride, 10 mL, Intravenous, Q12H       dilTIAZem, 5-15 mg/hr, Last Rate: Stopped (03/04/22 1311)  hold, 1 each         Active Hospital Problems:  Active Hospital Problems    Diagnosis    • **Atrial fibrillation with RVR (HCC)    • Acute CHF (congestive heart failure) ([facility])    • Acute hyponatremia    • Elevated LFTs    • Elevated TSH    • Acute  hyperglycemia    • Obesity (BMI 30-39.9)    • Coronary artery disease involving coronary bypass graft of native heart without angina pectoris    • Essential hypertension    • Hypothyroidism    • Other hyperlipidemia      Plan:     Atrial fibrillation with rapid ventricle response on presentation  Newly diagnosed  Probably related to poorly controlled hypothyroidism  Electrolytes-potassium and magnesium okay  Was given Cardizem bolus and started on Cardizem drip  Heart rates fairly controlled but patient continues in atrial fibrillation  Will transition Cardizem drip to oral Cardizem  On therapeutic dose Lovenox as patient has high [id number] score  Cardiologist following and plans possible GABRIEL cardioversion    Acute combined systolic/diastolic heart failure  Secondary to ischemic cardiomyopathy and atrial fibrillation with rapid ventricular response  2D echo revealed severe LV dysfunction-EF of 30%, RV enlargement and severe biatrial enlargement  On diuretic as tolerated by renal function  On beta-blocker and alps  Cardiologist following    Large left-sided pleural effusion  Probably due to above  Status post thoracentesis with drainage of 1050 mls of pleural fluid which was sent for analysis  Follow-up chest x-ray showed no evidence of pneumothorax  Continue on diuretic    Hyponatremia  ?  Chronicity  Improved from 130-135      Hypokalemia  Most likely due to diuretic  Magnesium okay  On replacement protocol    Elevated liver enzymes  Most likely due to hepatic congestion from heart failure  Monitor     Hypothyroidism  Significantly elevated TSH-due to medication noncompliance  Restarted on Synthroid  Patient will follow up with PCP for further titration     Diabetes mellitus type 2 with hyperglycemia  Patient reported history of prediabetes  A1c 7.1  On Premeal insulin and sliding scale insulin  Blood sugar not controlled- will start on low-dose Lantus  Will need oral hypoglycemic at discharge  Diabetic educator  following    CAD status post CABG and PCI  Patient has no angina  On aspirin and metoprolol  Check lipid panel       Hypertension  Blood pressure controlled  On alps and metoprolol       Former smoker     Obesity  BMI 30.65 on admission  encourage lifestyle modifications                DVT prophylaxis:  Medical DVT prophylaxis orders are present.    CODE STATUS:    Code Status (Patient has no pulse and is not breathing): CPR (Attempt to Resuscitate)  Medical Interventions (Patient has pulse or is breathing): Full Support      Disposition: Pending clinical progress    This patient has been examined with appropriate PPE. 03/04/22      Electronically signed by Sandip Javed MD, 03/04/22, 17:03 EST.  [facility] Hospitalist Team

## 2022-03-04 NOTE — PROGRESS NOTES
Heart Failure Program  Nurse Navigator  Discharge Planning: Follow-up Note    Patient Name:Jaquelin Valderrama  :1940  Current Admission Date: 3/2/2022       Last 3 Weights:  Wt Readings from Last 3 Encounters:   22 77.3 kg (170 lb 6.7 oz)   21 68.5 kg (151 lb)   20 71.2 kg (157 lb)       Intake and Output totals: I/O last 3 completed shifts:  In: 1080 [P.O.:1080]  Out: 5700 [Urine:5700]  No intake/output data recorded.          Patient Assessment:   Pt lying in bed, resp even and unlabored, no SOA with conversation, trace edema ankles. Pt advises she was up in room not as SOA but some.       Patient Education:   review HF diagnosis, s/s of HF, discussion of HF specific meds, low sodium diet, follow-up MD appts    Review HF Education provided to patient:  yes-Symptoms worsening  yes-Prescribed medications  yes-HF self-care  yes-Follow-up Appointments       Acceptance of learning: acceptance, cooperative, teachback    Heart Failure education interactive teaching session time: 30 minutes      Identified needs/barriers:   Volume status - pending thoracentesis, GABRIEL. I&O, daily weights. New dx - education provided.     Intervention follow-up:

## 2022-03-04 NOTE — CASE MANAGEMENT/SOCIAL WORK
Continued Stay Note  GUERLINE Vazquez     Patient Name: Jaquelin Valderrama  MRN: [id number]  Today's Date: 3/4/2022    Admit Date: 3/2/2022     Discharge Plan     Row Name 03/04/22 1244       Plan    Plan Comments Barriers to d/c: cardizem gtt, IV lasix, hypokalemia. Patient had thoracentesis today.                          Kailyn Flores RN

## 2022-03-04 NOTE — PROCEDURES
"Ultrasound guided thoracentesis.    Date/Time: 3/4/2022 12:31 PM  Performed by: Latoya Dawkins MD  Authorized by: Latoya Dawkins MD   Consent: Verbal consent obtained. Written consent obtained.  Risks and benefits: risks, benefits and alternatives were discussed  Consent given by: patient  Patient understanding: patient states understanding of the procedure being performed  Patient consent: the patient's understanding of the procedure matches consent given  Procedure consent: procedure consent matches procedure scheduled  Relevant documents: relevant documents present and verified  Test results: test results available and properly labeled  Site marked: the operative site was marked  Imaging studies: imaging studies available  Required items: required blood products, implants, devices, and special equipment available  Patient identity confirmed: verbally with patient  Time out: Immediately prior to procedure a \"time out\" was called to verify the correct patient, procedure, equipment, support staff and site/side marked as required.  Procedure purpose: diagnostic and therapeutic  Preparation: Patient was prepped and draped in the usual sterile fashion.  Local anesthesia used: yes  Anesthesia: local infiltration    Anesthesia:  Local anesthesia used: yes  Local Anesthetic: lidocaine 1% without epinephrine  Preparation: skin prepped with ChloraPrep  Patient position: sitting  Ultrasound guidance: yes  Location: left posterior  Intercostal space: 8th  Puncture method: over-the-needle catheter  Needle size: 18  Catheter size: 6 Qatari  Number of attempts: 1  Drainage characteristics: serosanguinous  Patient tolerance: patient tolerated the procedure well with no immediate complications  Chest x-ray performed: yes  Chest x-ray findings: normal findings  Comments: Drained 1050 ml of pleural fluids, sent for analysis, post procedure chest x.ray revealed no pneumothorax        "

## 2022-03-04 NOTE — PLAN OF CARE
Goal Outcome Evaluation:   Pt family is at the bedside at this time, pt vitals are stable and pt is now off cardizem drip and switched to po per cardiology. Will continue to monitor at this time.   Problem: Adult Inpatient Plan of Care  Goal: Plan of Care Review  Outcome: Ongoing, Progressing  Goal: Patient-Specific Goal (Individualized)  Outcome: Ongoing, Progressing  Goal: Absence of Hospital-Acquired Illness or Injury  Outcome: Ongoing, Progressing  Intervention: Identify and Manage Fall Risk  Goal: Optimal Comfort and Wellbeing  Outcome: Ongoing, Progressing  Goal: Readiness for Transition of Care  Outcome: Ongoing, Progressing     Problem: Arrhythmia/Dysrhythmia (Acute Coronary Syndrome)  Goal: Normalized Cardiac Rhythm  Outcome: Ongoing, Progressing

## 2022-03-04 NOTE — PROGRESS NOTES
Cardiology Progress Note    Patient Identification:  Name: Jaquelin Valderrama  Age: 82 y.o.  Sex: female  :  1940  MRN: [id number]                 Follow Up / Chief Complaint: Amiodarone  Chief Complaint   Patient presents with   • Weakness - Generalized       Interval History: Patient is in A. fib with RVR requiring IV Cardizem.       Subjective: Patient seen and examined.  Chart reviewed.  Labs reviewed.  Discussed with RN taking care of patient.      Objective:  3/4/2022: Glucose elevated, potassium is  3.1, BUN/creatinine 16/0.77      History of Present Illness:       Ms. Jaquelin Valderrama has PMH of     CAD, CABG 2014  Hypertension  Dyslipidemia  Prediabetes  Alopecia  Hypothyroidism, noncompliant since 2021  ORIF  Family history of heart disease in mother  Allergies/intolerance to prednisone  Former smoker quit         Presented through emergency room 3/2/2022 with progressively worsening shortness of breath and dyspnea on exertion weight gain and leg edema. Was found to be in A. fib and congestive heart failure. Patient denies any chest pain. Patient lives in [place] and has a second home in [place] and see his cardiologist at [facility]. Patient has been having issues with alopecia and memory issues therefore stopped beta-blockers and statin on her own. Also has not been taking  thyroid replacement therapy since November because she ran out of medication. Patient has history of prediabetes. Does not check her sugars at home.  Work-up here revealed troponin x3 are negative. proBNP is 7717. CMP reveals a glucose of 242, hemoglobin A1c over 7.1. BUN/creatinine are 21/0.83. ALT elevated at 71, AST 35. TSH is 11.15  Chest x-ray 3/2/2022 reveals left pleural effusion, left basilar disease most likely atelectasis with possible pneumonia.  EKG 3/2/2022 reviewed/interpreted by me reveals A. fib with RVR at 126 bpm with PVCs     Assessment:  :     Acute HFrEF probably a combination of systolic dysfunction  from ischemic cardiomyopathy and diastolic dysfunction with A. fib with RVR  Severe LV dysfunction EF of 30%  Newly diagnosed A. fib with RVR  Severe biatrial enlargement  RV enlargement  PVC  CAD, CABG  Hypertension  Dyslipidemia  Diabetes  Hypothyroidism, noncompliant on meds  Alopecia  Elevated LFTs        Recommendations / Plan:         Telemetry to monitor rhythm  Rate control with IV Cardizem  Diuresis with IV Lasix as tolerated  Monitor renal function and urine output  Counseled on importance of compliance. 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 At age 82 she wants to live life on her terms.  Patient would benefit from long-term anticoagulation for atrial fibrillation because of high [id number] score due to female gender age over 75, hypertension, diabetes, CAD making it at least 6. We will start her on Eliquis or warfarin before discharge.  Patient is currently on Lovenox full dose.  Patient's elevated LFTs could be due to passive congestion but need to rule out other etiologies.  Will defer evaluation and treatment of diabetes to primary team.  Patient has significantly large pleural effusion on the left would better benefit from pleural tap.  Discussed with Dr. Javed.  Will order chest x-rays and if its layering get IR to tap pleural effusion.  Dr. Smalls will follow over the weekend and follow-up in the morning.          Past Medical History:  Past Medical History:   Diagnosis Date   • Astigmatism, bilateral 11/19/2019   • Benign essential hypertension    • Bilateral presbyopia 11/19/2019   • CAD (coronary artery disease)    • Closed right ankle fracture    • COVID-19 vaccination refused    • Hyperlipidemia    • Hypothyroidism    • Influenza vaccine needed    • Myocardial infarction (HCC)    • Prediabetes    • Pseudophakia, both eyes 11/19/2019   • Thoracic back pain      Past Surgical History:  Past  "Surgical History:   Procedure Laterality Date   • APPENDECTOMY     • CARDIAC CATHETERIZATION  2012    x3    • CORONARY ARTERY BYPASS GRAFT  2014   • CORONARY STENT PLACEMENT      x3   • HARDWARE REMOVAL Right 2020    Procedure: ANKLE/FOOT HARDWARE REMOVAL;  Surgeon: Lincoln Sibley MD;  Location: [facility];  Service: Orthopedics;  Laterality: Right;   • ORIF ANKLE FRACTURE Right 2019    [facility]        Social History:   Social History     Tobacco Use   • Smoking status: Former Smoker     Types: Cigarettes     Quit date:      Years since quittin.2   • Smokeless tobacco: Never Used   • Tobacco comment: Social Drinker   Substance Use Topics   • Alcohol use: Yes     Comment: mod       Family History:  Family History   Problem Relation Age of Onset   • Heart disease Mother    • Lung cancer Father    • Diabetes Other           Allergies:  Allergies   Allergen Reactions   • Prednisone Other (See Comments)     Increased BP     Scheduled Meds:  aspirin, 81 mg, Daily  dilTIAZem, 90 mg, Q8H  enoxaparin, 1 mg/kg, Q12H  furosemide, 40 mg, Q12H  insulin lispro, 0-9 Units, TID AC  levothyroxine, 50 mcg, Q AM  losartan, 50 mg, Q24H  metoprolol tartrate, 25 mg, BID  multivitamin with minerals, 1 tablet, Daily  senna-docusate sodium, 2 tablet, BID  sodium chloride, 10 mL, Q12H          Review of Systems:   ROS     Constitution: Negative for chills and fever.   Cardiovascular: Negative for chest pain and palpitations.   Respiratory: Negative for cough and hemoptysis.    Gastrointestinal: Negative for nausea.        Constitutional:  Temp:  [97.4 °F (36.3 °C)-98.3 °F (36.8 °C)] 98 °F (36.7 °C)  Heart Rate:  [] 95  Resp:  [18-22] 20  BP: (109-145)/() 127/59    Physical Exam   /59 (BP Location: Left arm, Patient Position: Sitting)   Pulse 95   Temp 98 °F (36.7 °C) (Oral)   Resp 20   Ht 160 cm (63\")   Wt 77.3 kg (170 lb 6.7 oz)   SpO2 93%   BMI 30.19 kg/m²   General:  " Appears chronically ill.  Eyes: Sclera is anicteric,  conjunctiva is clear   HEENT:  No JVD.  Alopecia present.  Respiratory: Respirations regular and unlabored at rest.  Decreased breath sounds at left base  Cardiovascular: S1,S2 irregular rate and rhythm. No murmur, rub or gallop auscultated.  . No pretibial pitting edema  Gastrointestinal: Abdomen nondistended, soft  Musculoskeletal:  No abnormal movements  Extremities: No digital clubbing or cyanosis  Skin: Color pink. Skin warm and dry to touch. No rashes  No xanthoma  Neuro: Alert and awake, no lateralizing deficits appreciated    INTAKE AND OUTPUT:    Intake/Output Summary (Last 24 hours) at 3/4/2022 1547  Last data filed at 3/4/2022 0534  Gross per 24 hour   Intake 480 ml   Output 2900 ml   Net -2420 ml       Cardiographics  Telemetry: A. fib with intermittent RVR    ECG:   ECG 12 Lead   Preliminary Result   HEART RATE= 126  bpm   RR Interval= 476  ms   IN Interval=   ms   P Horizontal Axis=   deg   P Front Axis=   deg   QRSD Interval= 101  ms   QT Interval= 310  ms   QRS Axis= 28  deg   T Wave Axis= 211  deg   - ABNORMAL ECG -   Atrial fibrillation   Ventricular premature complex   When compared with ECG of 18-Jul-2020 7:52:12,   Significant change in rhythm: previously sinus   Electronically Signed By:    Date and Time of Study: 2022-03-02 22:32:03      SCANNED - TELEMETRY     Final Result      SCANNED - TELEMETRY     Final Result      SCANNED - TELEMETRY     Final Result      SCANNED - TELEMETRY     Final Result      SCANNED - TELEMETRY     Final Result      SCANNED - TELEMETRY     Final Result      SCANNED - TELEMETRY     Final Result      SCANNED - TELEMETRY     Final Result      SCANNED - TELEMETRY     Final Result      SCANNED - TELEMETRY     Final Result        I have personally reviewed EKG    Echocardiogram: Results for orders placed during the hospital encounter of 03/02/22    Adult Transthoracic Echo Complete W/ Cont if Necessary Per  Protocol    Interpretation Summary  LVE with severe global left ventricular hypokinesis, estimated LV ejection fraction of 30%.  Severe right ventricular enlargement and moderate right atrial enlargement seen  Severe left atrial enlargement seen.  Aortic valve, mitral valve, tricuspid valve appears structurally normal, moderate mitral and mild tricuspid regurgitation seen.  Calculated RV systolic pressure of 40 to 45 mmHg.  No pericardial effusion seen.  Large pleural effusion seen.  Proximal aorta appears normal in size.      Lab Review   I have reviewed the labs  Results from last 7 days   Lab Units 03/03/22  1347 03/03/22  0618 03/02/22  2308   TROPONIN T ng/mL <0.010 <0.010 0.013     Results from last 7 days   Lab Units 03/04/22  0458   MAGNESIUM mg/dL 1.6     Results from last 7 days   Lab Units 03/04/22  0458   SODIUM mmol/L 135*   POTASSIUM mmol/L 3.1*   BUN mg/dL 16   CREATININE mg/dL 0.77   CALCIUM mg/dL 8.8         Results from last 7 days   Lab Units 03/04/22  0458 03/02/22  2308   WBC 10*3/mm3 7.20 10.20   HEMOGLOBIN g/dL 13.9 14.1   HEMATOCRIT % 39.2 40.1   PLATELETS 10*3/mm3 248 299     Results from last 7 days   Lab Units 03/02/22  2308   INR  1.03   APTT seconds 25.6       RADIOLOGY:  Imaging Results (Last 24 Hours)     Procedure Component Value Units Date/Time    XR Chest 1 View [[id number]] Collected: 03/04/22 1224     Updated: 03/04/22 1227    Narrative:      DATE OF EXAM:  3/4/2022 12:00 PM     PROCEDURE:  XR CHEST 1 VW-     INDICATIONS:  Thoracocentesis left side.; I48.91-Unspecified atrial fibrillation;  I50.9-Heart failure, unspecified; Z20.822-Contact with and (suspected)  exposure to covid-19       COMPARISON:  AP portable chest 3/2/2022.     TECHNIQUE:   Single radiographic view of the chest was obtained.     FINDINGS:  The left pleural effusion has significantly diminished with only small  left pleural fluid remaining. There is significantly improved aeration  in the left lower lobe with  "some mild atelectasis remaining. The right  lung appears clear. There is stable moderate cardiac enlargement with  median sternotomy. No pneumothorax is visible. Osseous structures are  within normal limits.       Impression:         1. No visible pneumothorax status post left thoracentesis. Significantly  improved left pleural effusion with small left pleural fluid remaining.     2. Significantly improved left lung aeration with mild residual left  basilar atelectasis.     Electronically Signed By-Saadia Islas MD On:3/4/2022 12:24 PM  This report was finalized on [id number] by  Saadia Islas MD.                )3/4/2022  Jackson Salgado MD      [facility] Dragon/Transcription:   \"Dictated utilizing Dragon dictation\".   "

## 2022-03-04 NOTE — PLAN OF CARE
Goal Outcome Evaluation:  Plan of Care Reviewed With: patient        Progress: no change  Outcome Summary: Patient received pm medications, patient is on room air and is relaxing.

## 2022-03-05 ENCOUNTER — APPOINTMENT (OUTPATIENT)
Dept: GENERAL RADIOLOGY | Facility: HOSPITAL | Age: 82
End: 2022-03-05

## 2022-03-05 LAB
ALBUMIN SERPL-MCNC: 3 G/DL (ref 3.5–5.2)
ALBUMIN SERPL-MCNC: 3 G/DL (ref 3.5–5.2)
ALBUMIN/GLOB SERPL: 1.4 G/DL
ALP SERPL-CCNC: 82 U/L (ref 39–117)
ALP SERPL-CCNC: 83 U/L (ref 39–117)
ALT SERPL W P-5'-P-CCNC: 40 U/L (ref 1–33)
ALT SERPL W P-5'-P-CCNC: 43 U/L (ref 1–33)
ANION GAP SERPL CALCULATED.3IONS-SCNC: 13 MMOL/L (ref 5–15)
ANION GAP SERPL CALCULATED.3IONS-SCNC: 15 MMOL/L (ref 5–15)
AST SERPL-CCNC: 15 U/L (ref 1–32)
AST SERPL-CCNC: 18 U/L (ref 1–32)
BASOPHILS # BLD AUTO: 0 10*3/MM3 (ref 0–0.2)
BASOPHILS # BLD AUTO: 0.1 10*3/MM3 (ref 0–0.2)
BASOPHILS NFR BLD AUTO: 0.1 % (ref 0–1.5)
BASOPHILS NFR BLD AUTO: 0.7 % (ref 0–1.5)
BILIRUB CONJ SERPL-MCNC: <0.2 MG/DL (ref 0–0.3)
BILIRUB INDIRECT SERPL-MCNC: ABNORMAL MG/DL
BILIRUB SERPL-MCNC: 0.4 MG/DL (ref 0–1.2)
BILIRUB SERPL-MCNC: 0.4 MG/DL (ref 0–1.2)
BUN SERPL-MCNC: 28 MG/DL (ref 8–23)
BUN SERPL-MCNC: 28 MG/DL (ref 8–23)
BUN/CREAT SERPL: 26.2 (ref 7–25)
BUN/CREAT SERPL: 29.2 (ref 7–25)
CA-I SERPL ISE-MCNC: 1.16 MMOL/L (ref 1.2–1.3)
CALCIUM SPEC-SCNC: 8.5 MG/DL (ref 8.6–10.5)
CALCIUM SPEC-SCNC: 8.6 MG/DL (ref 8.6–10.5)
CHLORIDE SERPL-SCNC: 103 MMOL/L (ref 98–107)
CHLORIDE SERPL-SCNC: 98 MMOL/L (ref 98–107)
CHOLEST SERPL-MCNC: 154 MG/DL (ref 0–200)
CO2 SERPL-SCNC: 22 MMOL/L (ref 22–29)
CO2 SERPL-SCNC: 26 MMOL/L (ref 22–29)
CREAT SERPL-MCNC: 0.96 MG/DL (ref 0.57–1)
CREAT SERPL-MCNC: 1.07 MG/DL (ref 0.57–1)
DEPRECATED RDW RBC AUTO: 45.9 FL (ref 37–54)
DEPRECATED RDW RBC AUTO: 47.3 FL (ref 37–54)
EGFRCR SERPLBLD CKD-EPI 2021: 52.6 ML/MIN/1.73
EGFRCR SERPLBLD CKD-EPI 2021: 59.9 ML/MIN/1.73
EOSINOPHIL # BLD AUTO: 0 10*3/MM3 (ref 0–0.4)
EOSINOPHIL # BLD AUTO: 0 10*3/MM3 (ref 0–0.4)
EOSINOPHIL NFR BLD AUTO: 0.1 % (ref 0.3–6.2)
EOSINOPHIL NFR BLD AUTO: 0.1 % (ref 0.3–6.2)
ERYTHROCYTE [DISTWIDTH] IN BLOOD BY AUTOMATED COUNT: 13.7 % (ref 12.3–15.4)
ERYTHROCYTE [DISTWIDTH] IN BLOOD BY AUTOMATED COUNT: 13.7 % (ref 12.3–15.4)
GLOBULIN UR ELPH-MCNC: 2.1 GM/DL
GLUCOSE BLDC GLUCOMTR-MCNC: 118 MG/DL (ref 70–105)
GLUCOSE BLDC GLUCOMTR-MCNC: 217 MG/DL (ref 70–105)
GLUCOSE BLDC GLUCOMTR-MCNC: 351 MG/DL (ref 70–105)
GLUCOSE BLDC GLUCOMTR-MCNC: 423 MG/DL (ref 70–105)
GLUCOSE SERPL-MCNC: 274 MG/DL (ref 65–99)
GLUCOSE SERPL-MCNC: 367 MG/DL (ref 65–99)
HCT VFR BLD AUTO: 30.6 % (ref 34–46.6)
HCT VFR BLD AUTO: 31.2 % (ref 34–46.6)
HDLC SERPL-MCNC: 40 MG/DL (ref 40–60)
HGB BLD-MCNC: 10.6 G/DL (ref 12–15.9)
HGB BLD-MCNC: 10.8 G/DL (ref 12–15.9)
LDLC SERPL CALC-MCNC: 93 MG/DL (ref 0–100)
LDLC/HDLC SERPL: 2.27 {RATIO}
LYMPHOCYTES # BLD AUTO: 0.6 10*3/MM3 (ref 0.7–3.1)
LYMPHOCYTES # BLD AUTO: 0.9 10*3/MM3 (ref 0.7–3.1)
LYMPHOCYTES NFR BLD AUTO: 4.9 % (ref 19.6–45.3)
LYMPHOCYTES NFR BLD AUTO: 6.1 % (ref 19.6–45.3)
MAGNESIUM SERPL-MCNC: 2 MG/DL (ref 1.6–2.4)
MCH RBC QN AUTO: 33.6 PG (ref 26.6–33)
MCH RBC QN AUTO: 33.7 PG (ref 26.6–33)
MCHC RBC AUTO-ENTMCNC: 34.6 G/DL (ref 31.5–35.7)
MCHC RBC AUTO-ENTMCNC: 34.8 G/DL (ref 31.5–35.7)
MCV RBC AUTO: 96.7 FL (ref 79–97)
MCV RBC AUTO: 97.4 FL (ref 79–97)
MONOCYTES # BLD AUTO: 0.7 10*3/MM3 (ref 0.1–0.9)
MONOCYTES # BLD AUTO: 0.8 10*3/MM3 (ref 0.1–0.9)
MONOCYTES NFR BLD AUTO: 5.3 % (ref 5–12)
MONOCYTES NFR BLD AUTO: 5.8 % (ref 5–12)
NEUTROPHILS NFR BLD AUTO: 11.1 10*3/MM3 (ref 1.7–7)
NEUTROPHILS NFR BLD AUTO: 12.5 10*3/MM3 (ref 1.7–7)
NEUTROPHILS NFR BLD AUTO: 87.3 % (ref 42.7–76)
NEUTROPHILS NFR BLD AUTO: 89.6 % (ref 42.7–76)
NRBC BLD AUTO-RTO: 0 /100 WBC (ref 0–0.2)
NRBC BLD AUTO-RTO: 0.1 /100 WBC (ref 0–0.2)
PHOSPHATE SERPL-MCNC: 4.7 MG/DL (ref 2.5–4.5)
PLATELET # BLD AUTO: 290 10*3/MM3 (ref 140–450)
PLATELET # BLD AUTO: 311 10*3/MM3 (ref 140–450)
PMV BLD AUTO: 8.1 FL (ref 6–12)
PMV BLD AUTO: 8.5 FL (ref 6–12)
POTASSIUM SERPL-SCNC: 4.2 MMOL/L (ref 3.5–5.2)
POTASSIUM SERPL-SCNC: 4.4 MMOL/L (ref 3.5–5.2)
PROT SERPL-MCNC: 4.6 G/DL (ref 6–8.5)
PROT SERPL-MCNC: 5.1 G/DL (ref 6–8.5)
QT INTERVAL: 319 MS
QT INTERVAL: 327 MS
RBC # BLD AUTO: 3.14 10*6/MM3 (ref 3.77–5.28)
RBC # BLD AUTO: 3.22 10*6/MM3 (ref 3.77–5.28)
SODIUM SERPL-SCNC: 137 MMOL/L (ref 136–145)
SODIUM SERPL-SCNC: 140 MMOL/L (ref 136–145)
TRIGL SERPL-MCNC: 117 MG/DL (ref 0–150)
VLDLC SERPL-MCNC: 21 MG/DL (ref 5–40)
WBC NRBC COR # BLD: 12.4 10*3/MM3 (ref 3.4–10.8)
WBC NRBC COR # BLD: 14.4 10*3/MM3 (ref 3.4–10.8)

## 2022-03-05 PROCEDURE — 25010000002 DOPAMINE PER 40 MG

## 2022-03-05 PROCEDURE — 80061 LIPID PANEL: CPT | Performed by: INTERNAL MEDICINE

## 2022-03-05 PROCEDURE — 84100 ASSAY OF PHOSPHORUS: CPT | Performed by: STUDENT IN AN ORGANIZED HEALTH CARE EDUCATION/TRAINING PROGRAM

## 2022-03-05 PROCEDURE — 25010000002 CEFEPIME PER 500 MG: Performed by: INTERNAL MEDICINE

## 2022-03-05 PROCEDURE — 25010000002 CALCIUM GLUCONATE 2-0.675 GM/100ML-% SOLUTION: Performed by: STUDENT IN AN ORGANIZED HEALTH CARE EDUCATION/TRAINING PROGRAM

## 2022-03-05 PROCEDURE — 93005 ELECTROCARDIOGRAM TRACING: CPT | Performed by: STUDENT IN AN ORGANIZED HEALTH CARE EDUCATION/TRAINING PROGRAM

## 2022-03-05 PROCEDURE — 94799 UNLISTED PULMONARY SVC/PX: CPT

## 2022-03-05 PROCEDURE — 80053 COMPREHEN METABOLIC PANEL: CPT | Performed by: INTERNAL MEDICINE

## 2022-03-05 PROCEDURE — 25010000002 AMIODARONE IN DEXTROSE 5% 150-4.21 MG/100ML-% SOLUTION: Performed by: STUDENT IN AN ORGANIZED HEALTH CARE EDUCATION/TRAINING PROGRAM

## 2022-03-05 PROCEDURE — 93010 ELECTROCARDIOGRAM REPORT: CPT | Performed by: INTERNAL MEDICINE

## 2022-03-05 PROCEDURE — 82962 GLUCOSE BLOOD TEST: CPT

## 2022-03-05 PROCEDURE — 25010000002 ONDANSETRON PER 1 MG: Performed by: INTERNAL MEDICINE

## 2022-03-05 PROCEDURE — 71045 X-RAY EXAM CHEST 1 VIEW: CPT

## 2022-03-05 PROCEDURE — 83735 ASSAY OF MAGNESIUM: CPT | Performed by: INTERNAL MEDICINE

## 2022-03-05 PROCEDURE — 0W9B30Z DRAINAGE OF LEFT PLEURAL CAVITY WITH DRAINAGE DEVICE, PERCUTANEOUS APPROACH: ICD-10-PCS | Performed by: INTERNAL MEDICINE

## 2022-03-05 PROCEDURE — 63710000001 INSULIN LISPRO (HUMAN) PER 5 UNITS: Performed by: INTERNAL MEDICINE

## 2022-03-05 PROCEDURE — 63710000001 INSULIN GLARGINE PER 5 UNITS: Performed by: INTERNAL MEDICINE

## 2022-03-05 PROCEDURE — 99232 SBSQ HOSP IP/OBS MODERATE 35: CPT | Performed by: INTERNAL MEDICINE

## 2022-03-05 PROCEDURE — 82330 ASSAY OF CALCIUM: CPT | Performed by: INTERNAL MEDICINE

## 2022-03-05 PROCEDURE — 25010000002 HYDROMORPHONE PER 4 MG

## 2022-03-05 PROCEDURE — 82248 BILIRUBIN DIRECT: CPT | Performed by: INTERNAL MEDICINE

## 2022-03-05 PROCEDURE — 85025 COMPLETE CBC W/AUTO DIFF WBC: CPT | Performed by: STUDENT IN AN ORGANIZED HEALTH CARE EDUCATION/TRAINING PROGRAM

## 2022-03-05 PROCEDURE — 25010000002 AMIODARONE IN DEXTROSE 5% 360-4.14 MG/200ML-% SOLUTION: Performed by: STUDENT IN AN ORGANIZED HEALTH CARE EDUCATION/TRAINING PROGRAM

## 2022-03-05 PROCEDURE — 93005 ELECTROCARDIOGRAM TRACING: CPT | Performed by: NURSE PRACTITIONER

## 2022-03-05 PROCEDURE — 25010000002 ALBUMIN HUMAN 25% PER 50 ML: Performed by: STUDENT IN AN ORGANIZED HEALTH CARE EDUCATION/TRAINING PROGRAM

## 2022-03-05 PROCEDURE — 63710000001 INSULIN LISPRO (HUMAN) PER 5 UNITS

## 2022-03-05 PROCEDURE — P9047 ALBUMIN (HUMAN), 25%, 50ML: HCPCS | Performed by: STUDENT IN AN ORGANIZED HEALTH CARE EDUCATION/TRAINING PROGRAM

## 2022-03-05 PROCEDURE — 36415 COLL VENOUS BLD VENIPUNCTURE: CPT | Performed by: NURSE PRACTITIONER

## 2022-03-05 RX ORDER — AMIODARONE HYDROCHLORIDE 200 MG/1
200 TABLET ORAL EVERY 12 HOURS
Status: DISCONTINUED | OUTPATIENT
Start: 2022-03-13 | End: 2022-03-17 | Stop reason: HOSPADM

## 2022-03-05 RX ORDER — INSULIN LISPRO 100 [IU]/ML
0-14 INJECTION, SOLUTION INTRAVENOUS; SUBCUTANEOUS
Status: DISCONTINUED | OUTPATIENT
Start: 2022-03-05 | End: 2022-03-17 | Stop reason: HOSPADM

## 2022-03-05 RX ORDER — HYDROMORPHONE HCL 110MG/55ML
2 PATIENT CONTROLLED ANALGESIA SYRINGE INTRAVENOUS ONCE
Status: COMPLETED | OUTPATIENT
Start: 2022-03-05 | End: 2022-03-05

## 2022-03-05 RX ORDER — DOPAMINE HYDROCHLORIDE 160 MG/100ML
2-20 INJECTION, SOLUTION INTRAVENOUS
Status: DISCONTINUED | OUTPATIENT
Start: 2022-03-05 | End: 2022-03-05

## 2022-03-05 RX ORDER — HYDROMORPHONE HCL 110MG/55ML
2 PATIENT CONTROLLED ANALGESIA SYRINGE INTRAVENOUS ONCE
Status: COMPLETED | OUTPATIENT
Start: 2022-03-05 | End: 2022-03-12

## 2022-03-05 RX ORDER — DOPAMINE HYDROCHLORIDE 160 MG/100ML
INJECTION, SOLUTION INTRAVENOUS
Status: COMPLETED
Start: 2022-03-05 | End: 2022-03-05

## 2022-03-05 RX ORDER — AMIODARONE HYDROCHLORIDE 200 MG/1
200 TABLET ORAL EVERY 8 HOURS
Status: COMPLETED | OUTPATIENT
Start: 2022-03-06 | End: 2022-03-12

## 2022-03-05 RX ORDER — ALBUMIN (HUMAN) 12.5 G/50ML
25 SOLUTION INTRAVENOUS ONCE
Status: COMPLETED | OUTPATIENT
Start: 2022-03-05 | End: 2022-03-05

## 2022-03-05 RX ORDER — INSULIN LISPRO 100 [IU]/ML
0-14 INJECTION, SOLUTION INTRAVENOUS; SUBCUTANEOUS AS NEEDED
Status: DISCONTINUED | OUTPATIENT
Start: 2022-03-05 | End: 2022-03-17 | Stop reason: HOSPADM

## 2022-03-05 RX ORDER — AMIODARONE HYDROCHLORIDE 200 MG/1
200 TABLET ORAL DAILY
Status: DISCONTINUED | OUTPATIENT
Start: 2022-03-27 | End: 2022-03-17 | Stop reason: HOSPADM

## 2022-03-05 RX ORDER — AMIODARONE HYDROCHLORIDE 200 MG/1
200 TABLET ORAL ONCE
Status: COMPLETED | OUTPATIENT
Start: 2022-03-06 | End: 2022-03-06

## 2022-03-05 RX ORDER — SODIUM CHLORIDE 9 MG/ML
100 INJECTION, SOLUTION INTRAVENOUS CONTINUOUS
Status: DISCONTINUED | OUTPATIENT
Start: 2022-03-05 | End: 2022-03-06

## 2022-03-05 RX ORDER — LIDOCAINE HYDROCHLORIDE 20 MG/ML
10 INJECTION, SOLUTION INFILTRATION; PERINEURAL ONCE
Status: COMPLETED | OUTPATIENT
Start: 2022-03-05 | End: 2022-03-05

## 2022-03-05 RX ORDER — CALCIUM GLUCONATE 20 MG/ML
2 INJECTION, SOLUTION INTRAVENOUS ONCE
Status: COMPLETED | OUTPATIENT
Start: 2022-03-05 | End: 2022-03-05

## 2022-03-05 RX ADMIN — AMIODARONE HYDROCHLORIDE 0.5 MG/MIN: 1.8 INJECTION, SOLUTION INTRAVENOUS at 22:23

## 2022-03-05 RX ADMIN — Medication 1 TABLET: at 09:49

## 2022-03-05 RX ADMIN — SODIUM CHLORIDE 1000 ML: 0.9 INJECTION, SOLUTION INTRAVENOUS at 00:45

## 2022-03-05 RX ADMIN — Medication 10 ML: at 10:00

## 2022-03-05 RX ADMIN — INSULIN GLARGINE 10 UNITS: 100 INJECTION, SOLUTION SUBCUTANEOUS at 20:04

## 2022-03-05 RX ADMIN — ASPIRIN 81 MG CHEWABLE TABLET 81 MG: 81 TABLET CHEWABLE at 09:49

## 2022-03-05 RX ADMIN — CALCIUM GLUCONATE 2 G: 20 INJECTION, SOLUTION INTRAVENOUS at 06:37

## 2022-03-05 RX ADMIN — ARFORMOTEROL TARTRATE 15 MCG: 15 SOLUTION RESPIRATORY (INHALATION) at 07:52

## 2022-03-05 RX ADMIN — DOPAMINE HYDROCHLORIDE 5 MCG/KG/MIN: 160 INJECTION, SOLUTION INTRAVENOUS at 02:57

## 2022-03-05 RX ADMIN — INSULIN LISPRO 10 UNITS: 100 INJECTION, SOLUTION INTRAVENOUS; SUBCUTANEOUS at 11:30

## 2022-03-05 RX ADMIN — ARFORMOTEROL TARTRATE 15 MCG: 15 SOLUTION RESPIRATORY (INHALATION) at 19:05

## 2022-03-05 RX ADMIN — DILTIAZEM HYDROCHLORIDE 90 MG: 30 TABLET, FILM COATED ORAL at 22:26

## 2022-03-05 RX ADMIN — SODIUM CHLORIDE 100 ML/HR: 0.9 INJECTION, SOLUTION INTRAVENOUS at 11:21

## 2022-03-05 RX ADMIN — AMIODARONE HYDROCHLORIDE 1 MG/MIN: 1.8 INJECTION, SOLUTION INTRAVENOUS at 05:37

## 2022-03-05 RX ADMIN — ALBUMIN (HUMAN) 25 G: 0.25 INJECTION, SOLUTION INTRAVENOUS at 07:31

## 2022-03-05 RX ADMIN — AMIODARONE HYDROCHLORIDE 0.5 MG/MIN: 1.8 INJECTION, SOLUTION INTRAVENOUS at 11:33

## 2022-03-05 RX ADMIN — INSULIN LISPRO 8 UNITS: 100 INJECTION, SOLUTION INTRAVENOUS; SUBCUTANEOUS at 07:34

## 2022-03-05 RX ADMIN — AMIODARONE HYDROCHLORIDE 150 MG: 1.5 INJECTION, SOLUTION INTRAVENOUS at 05:25

## 2022-03-05 RX ADMIN — LEVOTHYROXINE SODIUM 50 MCG: 0.05 TABLET ORAL at 05:44

## 2022-03-05 RX ADMIN — INSULIN LISPRO 5 UNITS: 100 INJECTION, SOLUTION INTRAVENOUS; SUBCUTANEOUS at 17:49

## 2022-03-05 RX ADMIN — HYDROMORPHONE HYDROCHLORIDE 2 MG: 2 INJECTION, SOLUTION INTRAMUSCULAR; INTRAVENOUS; SUBCUTANEOUS at 13:54

## 2022-03-05 RX ADMIN — METOPROLOL TARTRATE 25 MG: 25 TABLET, FILM COATED ORAL at 20:04

## 2022-03-05 RX ADMIN — ONDANSETRON 4 MG: 2 INJECTION INTRAMUSCULAR; INTRAVENOUS at 05:44

## 2022-03-05 RX ADMIN — BUDESONIDE 1 MG: 0.5 INHALANT RESPIRATORY (INHALATION) at 07:52

## 2022-03-05 RX ADMIN — Medication 10 ML: at 20:05

## 2022-03-05 RX ADMIN — CEFEPIME HYDROCHLORIDE 2 G: 2 INJECTION, POWDER, FOR SOLUTION INTRAMUSCULAR; INTRAVENOUS at 16:50

## 2022-03-05 RX ADMIN — BUDESONIDE 1 MG: 0.5 INHALANT RESPIRATORY (INHALATION) at 19:09

## 2022-03-05 RX ADMIN — DILTIAZEM HYDROCHLORIDE 90 MG: 30 TABLET, FILM COATED ORAL at 17:00

## 2022-03-05 RX ADMIN — LIDOCAINE HYDROCHLORIDE 10 ML: 20 INJECTION, SOLUTION INFILTRATION; PERINEURAL at 15:43

## 2022-03-05 NOTE — PROGRESS NOTES
LOS: 2 days   Admiting Physician- Bharat Montgomery MD    Reason For Followup:    Atrial fibrillation  CAD  CABG  Hypertension  Bilateral pleural effusion    Subjective     Patient is comfortable no chest pain    Objective     Heart rate is slightly elevated.  Heart rate is better    Review of Systems:   Review of Systems   Constitutional: Negative for chills and fever.   HENT: Negative for ear discharge and nosebleeds.    Eyes: Negative for discharge and redness.   Cardiovascular: Negative for chest pain, orthopnea, palpitations, paroxysmal nocturnal dyspnea and syncope.   Respiratory: Positive for shortness of breath. Negative for cough and wheezing.    Endocrine: Negative for heat intolerance.   Skin: Negative for rash.   Musculoskeletal: Positive for arthritis and joint pain. Negative for myalgias.   Gastrointestinal: Negative for abdominal pain, melena, nausea and vomiting.   Genitourinary: Negative for dysuria and hematuria.   Neurological: Negative for dizziness, light-headedness, numbness and tremors.   Psychiatric/Behavioral: Negative for depression. The patient is not nervous/anxious.          Vital Signs  Vitals:    03/05/22 1000 03/05/22 1100 03/05/22 1200 03/05/22 1300   BP: 122/75 109/68 103/62 139/78   BP Location:  Left arm     Patient Position:  Lying     Pulse: 115 98 88 106   Resp:  23     Temp:  98.2 °F (36.8 °C)     TempSrc:  Rectal     SpO2: 93% 94% 95% 94%   Weight:       Height:         Wt Readings from Last 1 Encounters:   03/05/22 76.1 kg (167 lb 12.3 oz)       Intake/Output Summary (Last 24 hours) at 3/5/2022 1334  Last data filed at 3/5/2022 0415  Gross per 24 hour   Intake 240 ml   Output 800 ml   Net -560 ml     Physical Exam:  Constitutional:       Appearance: Well-developed.   Eyes:      General: No scleral icterus.        Right eye: No discharge.   HENT:      Head: Normocephalic and atraumatic.   Neck:      Thyroid: No thyromegaly.      Lymphadenopathy: No cervical adenopathy.    Pulmonary:      Effort: Pulmonary effort is normal. No respiratory distress.      Breath sounds: Normal breath sounds. No wheezing. No rales.   Cardiovascular:      Normal rate. Regular rhythm.      No gallop.   Edema:     Peripheral edema absent.   Abdominal:      Tenderness: There is no abdominal tenderness.   Skin:     Findings: No erythema or rash.   Neurological:      Mental Status: Alert and oriented to person, place, and time.         Results Review:   Lab Results (last 24 hours)     Procedure Component Value Units Date/Time    POC Glucose Once [[id number]]  (Abnormal) Collected: 03/05/22 1059    Specimen: Blood Updated: 03/05/22 1101     Glucose 351 mg/dL      Comment: Serial Number: [id number]:  [id number]       POC Glucose Once [[id number]]  (Abnormal) Collected: 03/05/22 0830    Specimen: Blood Updated: 03/05/22 0832     Glucose 423 mg/dL      Comment: Serial Number: [id number]:  [id number]       Comprehensive Metabolic Panel [[id number]]  (Abnormal) Collected: 03/05/22 0536    Specimen: Blood Updated: 03/05/22 0624     Glucose 367 mg/dL      BUN 28 mg/dL      Creatinine 1.07 mg/dL      Sodium 140 mmol/L      Potassium 4.4 mmol/L      Comment: Slight hemolysis detected by analyzer. Results may be affected.        Chloride 103 mmol/L      CO2 22.0 mmol/L      Calcium 8.6 mg/dL      Total Protein 5.1 g/dL      Albumin 3.00 g/dL      ALT (SGPT) 40 U/L      AST (SGOT) 15 U/L      Alkaline Phosphatase 82 U/L      Total Bilirubin 0.4 mg/dL      Globulin 2.1 gm/dL      A/G Ratio 1.4 g/dL      BUN/Creatinine Ratio 26.2     Anion Gap 15.0 mmol/L      eGFR 52.6 mL/min/1.73      Comment: National Kidney Foundation and American Society of Nephrology (ASN) Task Force recommended calculation based on the Chronic Kidney Disease Epidemiology Collaboration (CKD-EPI) equation refit without adjustment for race.       Narrative:      GFR Normal >60  Chronic Kidney Disease <60  Kidney Failure <15      Phosphorus  [[id number]]  (Abnormal) Collected: 03/05/22 0536    Specimen: Blood Updated: 03/05/22 0615     Phosphorus 4.7 mg/dL     Basic Metabolic Panel [[id number]]  (Abnormal) Collected: 03/05/22 0345    Specimen: Blood Updated: 03/05/22 0516     Glucose 274 mg/dL      BUN 28 mg/dL      Creatinine 0.96 mg/dL      Sodium 137 mmol/L      Potassium 4.2 mmol/L      Comment: Slight hemolysis detected by analyzer. Results may be affected. Result checked         Chloride 98 mmol/L      CO2 26.0 mmol/L      Calcium 8.5 mg/dL      BUN/Creatinine Ratio 29.2     Anion Gap 13.0 mmol/L      eGFR 59.9 mL/min/1.73      Comment: National Kidney Foundation and American Society of Nephrology (ASN) Task Force recommended calculation based on the Chronic Kidney Disease Epidemiology Collaboration (CKD-EPI) equation refit without adjustment for race.       Narrative:      GFR Normal >60  Chronic Kidney Disease <60  Kidney Failure <15      Magnesium [[id number]]  (Normal) Collected: 03/05/22 0345    Specimen: Blood Updated: 03/05/22 0516     Magnesium 2.0 mg/dL      Comment: Result checked        Hepatic Function Panel [[id number]]  (Abnormal) Collected: 03/05/22 0345    Specimen: Blood Updated: 03/05/22 0513     Total Protein 4.6 g/dL      Albumin 3.00 g/dL      ALT (SGPT) 43 U/L      AST (SGOT) 18 U/L      Alkaline Phosphatase 83 U/L      Total Bilirubin 0.4 mg/dL      Bilirubin, Direct <0.2 mg/dL      Bilirubin, Indirect --     Comment: Unable to calculate       Lipid Panel [[id number]] Collected: 03/05/22 0345    Specimen: Blood Updated: 03/05/22 0513     Total Cholesterol 154 mg/dL      Triglycerides 117 mg/dL      HDL Cholesterol 40 mg/dL      LDL Cholesterol  93 mg/dL      VLDL Cholesterol 21 mg/dL      LDL/HDL Ratio 2.27    Narrative:      Cholesterol Reference Ranges  (U.S. Department of Health and Human Services ATP III Classifications)    Desirable          <200 mg/dL  Borderline High    200-239 mg/dL  High Risk          >240  mg/dL      Triglyceride Reference Ranges  (U.S. Department of Health and Human Services ATP III Classifications)    Normal           <150 mg/dL  Borderline High  150-199 mg/dL  High             200-499 mg/dL  Very High        >500 mg/dL    HDL Reference Ranges  (U.S. Department of Health and Human Services ATP III Classifications)    Low     <40 mg/dl (major risk factor for CHD)  High    >60 mg/dl ('negative' risk factor for CHD)        LDL Reference Ranges  (U.S. Department of Health and Human Services ATP III Classifications)    Optimal          <100 mg/dL  Near Optimal     100-129 mg/dL  Borderline High  130-159 mg/dL  High             160-189 mg/dL  Very High        >189 mg/dL    Calcium, Ionized [[id number]]  (Abnormal) Collected: 03/05/22 0345    Specimen: Blood Updated: 03/05/22 0453     Ionized Calcium 1.16 mmol/L     CBC & Differential [[id number]]  (Abnormal) Collected: 03/05/22 0345    Specimen: Blood Updated: 03/05/22 0401    Narrative:      The following orders were created for panel order CBC & Differential.  Procedure                               Abnormality         Status                     ---------                               -----------         ------                     CBC Auto Differential[[id number]]        Abnormal            Final result                 Please view results for these tests on the individual orders.    CBC Auto Differential [[id number]]  (Abnormal) Collected: 03/05/22 0345    Specimen: Blood Updated: 03/05/22 0401     WBC 14.40 10*3/mm3      RBC 3.22 10*6/mm3      Hemoglobin 10.8 g/dL      Hematocrit 31.2 %      MCV 96.7 fL      MCH 33.6 pg      MCHC 34.8 g/dL      RDW 13.7 %      RDW-SD 45.9 fl      MPV 8.5 fL      Platelets 311 10*3/mm3      Neutrophil % 87.3 %      Lymphocyte % 6.1 %      Monocyte % 5.8 %      Eosinophil % 0.1 %      Basophil % 0.7 %      Neutrophils, Absolute 12.50 10*3/mm3      Lymphocytes, Absolute 0.90 10*3/mm3      Monocytes, Absolute 0.80 10*3/mm3       Eosinophils, Absolute 0.00 10*3/mm3      Basophils, Absolute 0.10 10*3/mm3      nRBC 0.1 /100 WBC     CBC & Differential [[id number]]  (Abnormal) Collected: 03/05/22 0051    Specimen: Blood Updated: 03/05/22 0359    Narrative:      The following orders were created for panel order CBC & Differential.  Procedure                               Abnormality         Status                     ---------                               -----------         ------                     CBC Auto Differential[[id number]]        Abnormal            Final result                 Please view results for these tests on the individual orders.    CBC Auto Differential [[id number]]  (Abnormal) Collected: 03/05/22 0051    Specimen: Blood Updated: 03/05/22 0359     WBC 12.40 10*3/mm3      RBC 3.14 10*6/mm3      Hemoglobin 10.6 g/dL      Hematocrit 30.6 %      MCV 97.4 fL      MCH 33.7 pg      MCHC 34.6 g/dL      RDW 13.7 %      RDW-SD 47.3 fl      MPV 8.1 fL      Platelets 290 10*3/mm3      Neutrophil % 89.6 %      Lymphocyte % 4.9 %      Monocyte % 5.3 %      Eosinophil % 0.1 %      Basophil % 0.1 %      Neutrophils, Absolute 11.10 10*3/mm3      Lymphocytes, Absolute 0.60 10*3/mm3      Monocytes, Absolute 0.70 10*3/mm3      Eosinophils, Absolute 0.00 10*3/mm3      Basophils, Absolute 0.00 10*3/mm3      nRBC 0.0 /100 WBC     Protime-INR [[id number]]  (Abnormal) Collected: 03/04/22 2053    Specimen: Blood Updated: 03/04/22 2113     Protime 12.4 Seconds      INR 1.13    Hemoglobin & Hematocrit, Blood [[id number]]  (Abnormal) Collected: 03/04/22 2053    Specimen: Blood Updated: 03/04/22 2103     Hemoglobin 11.6 g/dL      Comment: Result checked         Hematocrit 34.6 %     POC Glucose Once [[id number]]  (Abnormal) Collected: 03/04/22 1912    Specimen: Blood Updated: 03/04/22 1913     Glucose 217 mg/dL      Comment: Serial Number: [id number]:  [id number]       Lactate Dehydrogenase, Body Fluid - Body Fluid, Pleural Cavity [[id number]]  Collected: 03/04/22 1143    Specimen: Body Fluid from Pleural Cavity Updated: 03/04/22 1746     Lactate Dehydrogenase (LD), Fluid 105 U/L     Narrative:      No Reference Ranges Established.    Serous fluid LDH greater than 60 percent of the serum LDH or serous fluid LDH two-thirds of the upper limit of normal for serum LDH suggests the fluid is an exudate.     1. Pleural TP/Serum TP >0.5  2. Pleural LD/Serum LD >0.6  3. Pleural LD >2/3 of the upper limit of normal for serum LDH    This test was developed, it performance characteristics determined and judged suitable for clinical purposes by [facility].  It has not been cleared or approved by the FDA.  The laboratory is regulated under CLIA as qualified to perform high-complexity testing.     Protein, Body Fluid - Pleural Fluid, Pleural Cavity [[id number]] Collected: 03/04/22 1141    Specimen: Pleural Fluid from Pleural Cavity Updated: 03/04/22 1731     Protein, Total, Fluid 1.8 g/dL     Narrative:      No Reference Ranges Established.    A serous fluid total fluid (TP) greater than 50 percent of the serum TP suggests the fluid is an exudate.      1. Pleural TP/Serum TP >0.5  2. Pleural LD/Serum LD >0.6  3. Pleural LD >2/3 of the upper limit of normal for serum LDH    This test was developed, it performance characteristics determined and judged suitable for clinical purposes by [facility].  It has not been cleared or approved by the FDA.  The laboratory is regulated under CLIA as qualified to perform high-complexity testing.     Glucose, Body Fluid - Pleural Fluid, Pleural Cavity [[id number]] Collected: 03/04/22 1141    Specimen: Pleural Fluid from Pleural Cavity Updated: 03/04/22 1731     Glucose, Fluid 176 mg/dL     Narrative:      No Reference Ranges Established.    Serous fluid glucose less than 60 mg/dL or less than 30 mg/dL below serum glucose suggests an infectious or malignant exudate.     This test was  developed, it performance characteristics determined and judged suitable for clinical purposes by [facility].  It has not been cleared or approved by the FDA.  The laboratory is regulated under CLIA as qualified to perform high-complexity testing.     Albumin, Fluid - Pleural Fluid, Pleural Cavity [[id number]] Collected: 03/04/22 1141    Specimen: Pleural Fluid from Pleural Cavity Updated: 03/04/22 1731     Albumin, Fluid 1.40 g/dL     Narrative:      No Reference Ranges Established.    A Serous fluid albumin gradient (serum albumin-fluid) <1.1 g/dL suggests the fluid is an exudate.  Cirrhosis usually results in an ascites fluid albumin gradient >1.1 g/dL.    This test was developed, its performance characteristics determined and judged suitable for clinical purposes by [facility].  It has not been cleared or approved by the FDA.  The laboratory is regulated under CLIA as qualified to perfom high-complexity testing.      POC Glucose Once [[id number]]  (Abnormal) Collected: 03/04/22 1630    Specimen: Blood Updated: 03/04/22 1632     Glucose 251 mg/dL      Comment: Serial Number: [id number]:  [id number]           Imaging Results (Last 72 Hours)     Procedure Component Value Units Date/Time    XR Chest 1 View [[id number]] Collected: 03/05/22 1014     Updated: 03/05/22 1019    Narrative:         DATE OF EXAM:   3/5/2022 9:05 AM     PROCEDURE:   XR CHEST 1 VW-     INDICATIONS:   Shortness of breath; I48.91-Unspecified atrial fibrillation; I50.9-Heart  failure, unspecified; Z20.822-Contact with and (suspected) exposure to  covid-19; I50.21-Acute systolic (congestive) heart failure     COMPARISON:  3/4/2022 and prior     TECHNIQUE:   Portable Chest     FINDINGS:      Near complete opacification of the left hemithorax is now noted. Minimal  residual aerated lung noted within the left hemithorax. Patient is  status post median sternotomy.     Heart size is stable.  Patient status post median sternotomy.     Right lung remains grossly clear. Osseous structures are stable        Impression:      IMPRESSION :   Continued increasing opacification of the left hemithorax compatible  with large pleural effusion/hemothorax given prior CT.     Electronically Signed By-Jarrett De Jesus On:3/5/2022 10:17 AM  This report was finalized on [id number] by  Jarrett De Jesus, .    CT Chest Without Contrast Diagnostic [[id number]] Collected: 03/04/22 2137     Updated: 03/04/22 2204    Narrative:      CT CHEST WO CONTRAST DIAGNOSTIC-     Date of Exam: 3/4/2022 9:22 PM     Indication: Pneumonia, effusion or abscess suspected, xray done;  I48.91-Unspecified atrial fibrillation; I50.9-Heart failure,  unspecified; Z20.822-Contact with and (suspected) exposure to covid-19;  I50.21-Acute systolic (congestive) heart failure.     Comparison: Portable chest 03/04/2022     Technique: Serial and axial CT images of the chest were obtained.  Reconstructions in the coronal and sagittal planes were performed.   Automated exposure control and iterative reconstruction methods were  used.     FINDINGS:      There is a large amount of pleural fluid in the left chest. Some of this  is loculated along the lateral aspect of the mid-lower chest. In the  upper chest, this has density between 29-40 HU. In the Lower left chest,  the collection is of mixed density, with Hounsfield readings up to 53.  The collection has a craniocaudal length of about 25 cm. In the lower  chest the collection is about 10 x 12 cm. The left diaphragm is inverted  by the hemothorax. It is hard to separate out lung from the hemothorax,  but it is likely that the left lower lobe is compressed by it.  There  are couple bubbles of gas in the left pleural space.     The left upper and lower lobes are compressed and demonstrated  groundglass opacities. There is minimal dependent right lung  atelectasis. No right pleural effusion.     There is  atherosclerotic vascular calcification including in the  coronary arteries. The heart size is within normal. Aorta is not  enlarged. There is borderline enlargement of a paratracheal node. Distal  thoracic esophagus is not well-defined and is hard to evaluate.     Bone window show prior median sternotomy. Limited abdominal imaging  shows a 1 cm right renal lesion that contains fat density, consistent  with an angiomyolipoma.       Impression:      1. There is evidence of large left pleural fluid collection which is  compatible with a hemothorax. Recommend surgical consultation for  possible chest tube. The case was discussed with Sabra Roberts at 9:56  PM.  2. The left upper and lower lobes show some compression and groundglass  opacity which may be due to atelectasis or edema.  3. Additional findings as reported above.     Electronically Signed By-Denisse Li MD On:3/4/2022 10:02 PM  This report was finalized on [id number] by  Denisse Li MD.    XR Chest 1 View [[id number]] Collected: 03/04/22 1950     Updated: 03/04/22 1955    Narrative:      Examination: XR CHEST 1 VW-     Date of Exam: 3/4/2022 7:16 PM     Indication: soa; I48.91-Unspecified atrial fibrillation; I50.9-Heart  failure, unspecified; Z20.822-Contact with and (suspected) exposure to  covid-19; I50.21-Acute systolic (congestive) heart failure.       Comparison: 03/04/2022, 11:45 AM, 03/02/2022     Technique: 1 view of the chest      FINDINGS:  There is a relatively large, partly lobulated opacity in the lateral  aspect of left mid-lower chest which has developed since the prior exam,  and which is consistent with pleural fluid. It has a width of about 4  cm. There is opacity in the left lower chest obscuring the diaphragm  which could be due to pleural effusion and lung consolidation. This  shows slight progression since prior. No pneumothorax.      Right lung is clear. Heart size is mildly enlarged. There has been a  median sternotomy. No  bone abnormality is seen.       Impression:      1. There is a new large opacity in the left chest which has developed  since the study from 11:45 AM today, which could be due to rapid  reaccumulation of  pleural fluid or to blood in the pleural  space-possible hemothorax. This was reported to the patient's nurse  Racquel  Chana by myself at 7:50 PM with a request to give the report  to the referring physician.     Electronically Signed By-Denisse Li MD On:3/4/2022 7:53 PM  This report was finalized on [id number] by  Denisse Li MD.    XR Chest 1 View [[id number]] Collected: 03/04/22 1224     Updated: 03/04/22 1227    Narrative:      DATE OF EXAM:  3/4/2022 12:00 PM     PROCEDURE:  XR CHEST 1 VW-     INDICATIONS:  Thoracocentesis left side.; I48.91-Unspecified atrial fibrillation;  I50.9-Heart failure, unspecified; Z20.822-Contact with and (suspected)  exposure to covid-19       COMPARISON:  AP portable chest 3/2/2022.     TECHNIQUE:   Single radiographic view of the chest was obtained.     FINDINGS:  The left pleural effusion has significantly diminished with only small  left pleural fluid remaining. There is significantly improved aeration  in the left lower lobe with some mild atelectasis remaining. The right  lung appears clear. There is stable moderate cardiac enlargement with  median sternotomy. No pneumothorax is visible. Osseous structures are  within normal limits.       Impression:         1. No visible pneumothorax status post left thoracentesis. Significantly  improved left pleural effusion with small left pleural fluid remaining.     2. Significantly improved left lung aeration with mild residual left  basilar atelectasis.     Electronically Signed By-Saadia Islas MD On:3/4/2022 12:24 PM  This report was finalized on [id number] by  Saadia Islas MD.    XR Chest 1 View [[id number]] Collected: 03/03/22 0728     Updated: 03/03/22 0732    Narrative:      EXAM: XR CHEST 1 VW-     DATE OF EXAM:  3/2/2022 11:03 PM     INDICATION: soa.       COMPARISONS: 04/23/2019      FINDINGS:     There is a large amount of dense opacity in the left lower lobe and  lingula with blunting of the costo phrenic angle. This is new. No  pneumothorax. Mild subtalar right-sided atelectasis. Heart is enlarged.  Sternotomy wires again noted..       Impression:         Left pleural effusion.     Left basilar disease most likely atelectasis with possible pneumonia  although follow-up to ensure resolution is recommended as this is a new  finding and underlying mass cannot be excluded.     Electronically Signed By-Nain Mccauley On:3/3/2022 7:30 AM  This report was finalized on [id number] by  Nain Mccauley, .        ECG/EMG Results (most recent)     Procedure Component Value Units Date/Time    ECG 12 Lead [[id number]] Collected: 03/02/22 2232     Updated: 03/02/22 2233     QT Interval 310 ms     Narrative:      HEART RATE= 126  bpm  RR Interval= 476  ms  SD Interval=   ms  P Horizontal Axis=   deg  P Front Axis=   deg  QRSD Interval= 101  ms  QT Interval= 310  ms  QRS Axis= 28  deg  T Wave Axis= 211  deg  - ABNORMAL ECG -  Atrial fibrillation  Ventricular premature complex  When compared with ECG of 18-Jul-2020 7:52:12,  Significant change in rhythm: previously sinus  Electronically Signed By:   Date and Time of Study: 2022-03-02 22:32:03    SCANNED - TELEMETRY   [[id number]] Resulted: 03/02/22     Updated: 03/03/22 0954    SCANNED - TELEMETRY   [[id number]] Resulted: 03/02/22     Updated: 03/03/22 1022    SCANNED - TELEMETRY   [[id number]] Resulted: 03/02/22     Updated: 03/03/22 1254    Adult Transthoracic Echo Complete W/ Cont if Necessary Per Protocol [[id number]] Resulted: 03/03/22 2118     Updated: 03/03/22 2120     Target HR (85%) 119 bpm      Max. Pred. HR (100%) 140 bpm      ACS 1.70 cm      Ao root diam 3.1 cm      Ao pk jhon 132.2 cm/sec      Ao V2 VTI 22.4 cm      JOSE(I,D) 2.05 cm2      EDV(cubed) 136.6 ml      EDV(MOD-sp4) 79.9 ml       EF(MOD-bp) 31.0 %      EF(MOD-sp4) 31.5 %      ESV(cubed) 101.0 ml      ESV(MOD-sp4) 54.7 ml      IVS/LVPW 1.64 cm      LV mass(C)d 228.4 grams      LV V1 max PG 3.5 mmHg      LV V1 mean PG 1.85 mmHg      LV V1 max 93.7 cm/sec      LVPWd 0.87 cm      MR max PG 66.8 mmHg      MV V2 VTI 15.0 cm      MVA(VTI) 3.1 cm2      PA acc time 0.07 sec      PA pr(Accel) 46.5 mmHg      PA V2 max 76.2 cm/sec      PI end-d jhon 117.5 cm/sec      RAP systole 3.0 mmHg      RV V1 max PG 1.11 mmHg      RV V1 max 52.6 cm/sec      RV V1 VTI 8.5 cm      RVIDd 2.5 cm      RVSP(TR) 41.4 mmHg      SI(MOD-sp4) 13.7 ml/m2      SV(LVOT) 45.9 ml      SV(MOD-sp4) 25.1 ml      SV(RVOT) 42.8 ml      TR max PG 38.4 mmHg      Ao max PG 7.0 mmHg      Ao mean PG 3.2 mmHg      FS 9.6 %      IVSd 1.42 cm      LA dimension(2D) 5.0 cm      LV V1 VTI 14.8 cm      LVIDd 5.2 cm      LVIDs 4.7 cm      LVOT area 3.1 cm2      LVOT diam 1.99 cm      MV max PG 4.6 mmHg      MV mean PG 2.05 mmHg      RVOT diam 2.5 cm      MR max jhon 406.2 cm/sec      TR max jhon 309.9 cm/sec      LV Jama Vol (BSA corrected) 43.5 cm2      LV Sys Vol (BSA corrected) 29.8 cm2     Narrative:      LVE with severe global left ventricular hypokinesis, estimated LV ejection   fraction of 30%.  Severe right ventricular enlargement and moderate right atrial enlargement   seen  Severe left atrial enlargement seen.  Aortic valve, mitral valve, tricuspid valve appears structurally normal,   moderate mitral and mild tricuspid regurgitation seen.  Calculated RV systolic pressure of 40 to 45 mmHg.  No pericardial effusion seen.  Large pleural effusion seen.  Proximal aorta appears normal in size.    SCANNED - TELEMETRY   [[id number]] Resulted: 03/02/22     Updated: 03/04/22 0724    SCANNED - TELEMETRY   [[id number]] Resulted: 03/02/22     Updated: 03/04/22 0732    SCANNED - TELEMETRY   [[id number]] Resulted: 03/02/22     Updated: 03/04/22 0748    SCANNED - TELEMETRY   [[id number]] Resulted:  03/02/22     Updated: 03/04/22 1343    SCANNED - TELEMETRY   [[id number]] Resulted: 03/02/22     Updated: 03/04/22 1351    SCANNED - TELEMETRY   [[id number]] Resulted: 03/02/22     Updated: 03/04/22 1407    SCANNED - TELEMETRY   [[id number]] Resulted: 03/02/22     Updated: 03/04/22 1448    ECG 12 Lead [[id number]] Collected: 03/05/22 0410     Updated: 03/05/22 1136     QT Interval 319 ms     Narrative:      HEART RATE= 128  bpm  RR Interval= 470  ms  SC Interval=   ms  P Horizontal Axis=   deg  P Front Axis=   deg  QRSD Interval= 98  ms  QT Interval= 319  ms  QRS Axis= 46  deg  T Wave Axis= 236  deg  - ABNORMAL ECG -  Atrial fibrillation  Repol abnrm suggests ischemia, lateral leads  When compared with ECG of 02-Mar-2022 22:32:03,  No significant change  Electronically Signed By: Jacek Jane ([facility]) 05-Mar-2022 11:36:37  Date and Time of Study: 2022-03-05 04:10:53    ECG 12 Lead [[id number]] Collected: 03/05/22 0527     Updated: 03/05/22 1137     QT Interval 327 ms     Narrative:      HEART RATE= 134  bpm  RR Interval= 453  ms  SC Interval=   ms  P Horizontal Axis=   deg  P Front Axis=   deg  QRSD Interval= 96  ms  QT Interval= 327  ms  QRS Axis= 36  deg  T Wave Axis= 245  deg  - ABNORMAL ECG -  Atrial flutter  Ventricular premature complex  Inferior infarct, age indeterminate  Nonspecific T abnormalities, lateral leads  When compared with ECG of 05-Mar-2022 4:10:53,  Significant change in rhythm: previously atrial fibrillation  Significant repolarization change  Electronically Signed By: Jacek Jane ([facility]) 05-Mar-2022 11:37:36  Date and Time of Study: 2022-03-05 05:27:08        CBC    Results from last 7 days   Lab Units 03/05/22  0345 03/05/22  0051 03/04/22 2053 03/04/22  0458 03/02/22  2308   WBC 10*3/mm3 14.40* 12.40*  --  7.20 10.20   HEMOGLOBIN g/dL 10.8* 10.6* 11.6* 13.9 14.1   PLATELETS 10*3/mm3 311 290  --  248 299     BMP   Results from last 7 days   Lab Units 03/05/22  0536 03/05/22  0345 03/04/22  0458  03/02/22  2308   SODIUM mmol/L 140 137 135* 130*   POTASSIUM mmol/L 4.4 4.2 3.1* 4.0   CHLORIDE mmol/L 103 98 93* 93*   CO2 mmol/L 22.0 26.0 30.0* 24.0   BUN mg/dL 28* 28* 16 21   CREATININE mg/dL 1.07* 0.96 0.77 0.83   GLUCOSE mg/dL 367* 274* 189* 242*   MAGNESIUM mg/dL  --  2.0 1.6 1.8   PHOSPHORUS mg/dL 4.7*  --   --   --      CMP   Results from last 7 days   Lab Units 03/05/22  0536 03/05/22  0345 03/04/22  0458 03/02/22  2308   SODIUM mmol/L 140 137 135* 130*   POTASSIUM mmol/L 4.4 4.2 3.1* 4.0   CHLORIDE mmol/L 103 98 93* 93*   CO2 mmol/L 22.0 26.0 30.0* 24.0   BUN mg/dL 28* 28* 16 21   CREATININE mg/dL 1.07* 0.96 0.77 0.83   GLUCOSE mg/dL 367* 274* 189* 242*   ALBUMIN g/dL 3.00* 3.00*  --  3.60   BILIRUBIN mg/dL 0.4 0.4  --  0.7   ALK PHOS U/L 82 83  --  105   AST (SGOT) U/L 15 18  --  35*   ALT (SGPT) U/L 40* 43*  --  71*     Cardiac Studies:  Echo- Results for orders placed during the hospital encounter of 03/02/22    Adult Transthoracic Echo Complete W/ Cont if Necessary Per Protocol    Interpretation Summary  LVE with severe global left ventricular hypokinesis, estimated LV ejection fraction of 30%.  Severe right ventricular enlargement and moderate right atrial enlargement seen  Severe left atrial enlargement seen.  Aortic valve, mitral valve, tricuspid valve appears structurally normal, moderate mitral and mild tricuspid regurgitation seen.  Calculated RV systolic pressure of 40 to 45 mmHg.  No pericardial effusion seen.  Large pleural effusion seen.  Proximal aorta appears normal in size.    Stress Myoview-  Cath-      Medication Review:   Scheduled Meds:[START ON 3/6/2022] amiodarone, 200 mg, Oral, Once   Followed by  [START ON 3/6/2022] amiodarone, 200 mg, Oral, Q8H   Followed by  [START ON 3/13/2022] amiodarone, 200 mg, Oral, Q12H   Followed by  [START ON 3/27/2022] amiodarone, 200 mg, Oral, Daily  arformoterol, 15 mcg, Nebulization, BID - RT  aspirin, 81 mg, Oral, Daily  budesonide, 1 mg,  Nebulization, BID - RT  dilTIAZem, 90 mg, Oral, Q8H  HYDROmorphone, 2 mg, Intravenous, Once  HYDROmorphone, 2 mg, Intravenous, Once  insulin glargine, 10 Units, Subcutaneous, Nightly  insulin lispro, 0-14 Units, Subcutaneous, TID AC  levothyroxine, 50 mcg, Oral, Q AM  metoprolol tartrate, 25 mg, Oral, BID  multivitamin with minerals, 1 tablet, Oral, Daily  senna-docusate sodium, 2 tablet, Oral, BID  sodium chloride, 10 mL, Intravenous, Q12H      Continuous Infusions:amiodarone, 0.5 mg/min, Last Rate: 0.5 mg/min (03/05/22 1300)  hold, 1 each  sodium chloride, 100 mL/hr, Last Rate: 100 mL/hr (03/05/22 1121)      PRN Meds:.•  acetaminophen **OR** acetaminophen **OR** acetaminophen  •  aluminum-magnesium hydroxide-simethicone  •  senna-docusate sodium **AND** polyethylene glycol **AND** bisacodyl **AND** bisacodyl  •  dextrose  •  dextrose  •  glucagon (human recombinant)  •  hold  •  HYDROcodone-acetaminophen  •  insulin lispro **AND** insulin lispro  •  magnesium sulfate **OR** magnesium sulfate in D5W 1g/100mL (PREMIX)  •  melatonin  •  nitroglycerin  •  ondansetron **OR** ondansetron  •  potassium chloride **OR** potassium chloride **OR** potassium chloride  •  sodium chloride      Assessment/Plan   Patient Active Problem List   Diagnosis   • Other hyperlipidemia   • Essential hypertension   • Hypothyroidism   • Coronary artery disease involving coronary bypass graft of native heart without angina pectoris   • Astigmatism, bilateral   • Bilateral presbyopia   • Pseudophakia, both eyes   • Atrial fibrillation with RVR ([facility])   • Acute CHF (congestive heart failure) ([facility])   • Acute hyponatremia   • Elevated LFTs   • Elevated TSH   • Acute hyperglycemia   • Obesity (BMI 30-39.9)     MDM:    1.  Atrial fibrillation:    Patient is in atrial fibrillation with rapid ventricular response.  Patient is on amiodarone and Cardizem and metoprolol.  I would increase Lopressor.    2.  Pleural effusion:    Patient probably would  need chest tube.    3.  Hypertension:    Blood pressure is stable    Mauricio Smalls MD  03/05/22  13:34 EST

## 2022-03-05 NOTE — NURSING NOTE
Left Chest tube Insertion by Dr. Gaby Montgomery. 20 [place] chest tube inserted. 10 mL of 2% lidocaine given and 2mg of Morphine given. Pt. Tolerated well. Chest tube is at -20 wall suction.

## 2022-03-05 NOTE — NURSING NOTE
Pt complaining of slight pain in the left side of the back, RN to assess pt and ordered bedside chest xray.

## 2022-03-05 NOTE — NURSING NOTE
Pt blood pressure again dropped to 74/52 (MAP 56).  BP initially was 90's/ 50's after 1 Liter bolus.  Pt asymptomatic.    AM labs changed to stat, new orders noted for dopamine drip.

## 2022-03-05 NOTE — PROGRESS NOTES
"PULMONARY CRITICAL CARE Progress  NOTE      PATIENT IDENTIFICATION:  Name: Jaquelin Valderrama  MRN: [id number]  :  1940     Age: 82 y.o.  Sex: female    DATE OF Note:  3/5/2022   Referring Physician: Sandip Javed MD                  Subjective:   On 2 L no SOB no chest pain,   Drop hg but now stable  no nausea or vomiting, no change in bowel habit, no dysuria,  no new  skin rash or itching.    Objective:  tMax 24 hrs: Temp (24hrs), Av.3 °F (36.3 °C), Min:96.1 °F (35.6 °C), Max:98.2 °F (36.8 °C)      Vitals Ranges:   Temp:  [96.1 °F (35.6 °C)-98.2 °F (36.8 °C)] 96.4 °F (35.8 °C)  Heart Rate:  [] 115  Resp:  [16-40] 19  BP: ()/() 122/75    Intake and Output Last 3 Shifts:   I/O last 3 completed shifts:  In: 240 [P.O.:240]  Out: 3400 [Urine:3400]    Exam:  /75   Pulse 115   Temp 96.4 °F (35.8 °C) (Rectal)   Resp 19   Ht 160 cm (63\")   Wt 76.1 kg (167 lb 12.3 oz)   SpO2 93%   BMI 29.72 kg/m²     General Appearance:   AA  HEENT:  Normocephalic, without obvious abnormality, Conjunctiva/corneas clear,.  Normal external ear canals, Nares normal, no drainage     Neck:  Supple, symmetrical, trachea midline. No JVD.  Lungs /Chest wall:  No BS on left side    respirations unlabored symmetrical wall movement.     Heart:  Regular rate and rhythm, systolic murmur PMI left sternal border  Abdomen: Soft, non-tender, no masses, no organomegaly.    Extremities: Trace edema no clubbing or Cyanosis        Medications:    Current Facility-Administered Medications:   •  acetaminophen (TYLENOL) tablet 650 mg, 650 mg, Oral, Q4H PRN, 650 mg at 22 1307 **OR** acetaminophen (TYLENOL) 160 MG/5ML solution 650 mg, 650 mg, Oral, Q4H PRN **OR** acetaminophen (TYLENOL) suppository 650 mg, 650 mg, Rectal, Q4H PRN, Bharat Montgomery MD  •  aluminum-magnesium hydroxide-simethicone (MAALOX MAX) 400-400-40 MG/5ML suspension 15 mL, 15 mL, Oral, Q6H PRN, Bharat Montgomery MD  •  [COMPLETED] amiodarone in dextrose " 5% (NEXTERONE) loading dose 150mg/100mL, 150 mg, Intravenous, Once, 150 mg at 03/05/22 0525 **FOLLOWED BY** amiodarone 360 mg in 200 mL D5W infusion, 1 mg/min, Intravenous, Continuous, Last Rate: 33.3 mL/hr at 03/05/22 0537, 1 mg/min at 03/05/22 0537 **FOLLOWED BY** amiodarone 360 mg in 200 mL D5W infusion, 0.5 mg/min, Intravenous, Continuous **FOLLOWED BY** [START ON 3/6/2022] amiodarone (PACERONE) tablet 200 mg, 200 mg, Oral, Once **FOLLOWED BY** [START ON 3/6/2022] amiodarone (PACERONE) tablet 200 mg, 200 mg, Oral, Q8H **FOLLOWED BY** [START ON 3/13/2022] amiodarone (PACERONE) tablet 200 mg, 200 mg, Oral, Q12H **FOLLOWED BY** [START ON 3/27/2022] amiodarone (PACERONE) tablet 200 mg, 200 mg, Oral, Daily, Sabra Roberts APRN  •  arformoterol (BROVANA) nebulizer solution 15 mcg, 15 mcg, Nebulization, BID - RT, Bharat Montgomery MD, 15 mcg at 03/05/22 0752  •  aspirin chewable tablet 81 mg, 81 mg, Oral, Daily, Bharat Montgomery MD, 81 mg at 03/05/22 0949  •  sennosides-docusate (PERICOLACE) 8.6-50 MG per tablet 2 tablet, 2 tablet, Oral, BID, 2 tablet at 03/04/22 2215 **AND** polyethylene glycol (MIRALAX) packet 17 g, 17 g, Oral, Daily PRN **AND** bisacodyl (DULCOLAX) EC tablet 5 mg, 5 mg, Oral, Daily PRN **AND** bisacodyl (DULCOLAX) suppository 10 mg, 10 mg, Rectal, Daily PRN, Bharat Montgomery MD  •  budesonide (PULMICORT) nebulizer solution 1 mg, 1 mg, Nebulization, BID - RT, Bharat Montgomery MD, 1 mg at 03/05/22 6963  •  dextrose (D50W) (25 g/50 mL) IV injection 25 g, 25 g, Intravenous, Q15 Min PRN, Bharat Montgomery MD  •  dextrose (GLUTOSE) oral gel 15 g, 15 g, Oral, Q15 Min PRN, Bharat Montgomery MD  •  dilTIAZem (CARDIZEM) tablet 90 mg, 90 mg, Oral, Q8H, Bharat Montgomery MD, 90 mg at 03/04/22 3321  •  glucagon (human recombinant) (GLUCAGEN DIAGNOSTIC) 1 mg in sterile water (preservative free) 1 mL injection, 1 mg, Subcutaneous, PRN, Bharat Montgomery MD  •  Hold medication, 1 each, Does not apply, Continuous PRN, Ulises  MD Bharat  •  HYDROcodone-acetaminophen (NORCO) 5-325 MG per tablet 1 tablet, 1 tablet, Oral, Q6H PRN, Bharat Montgomery MD, 1 tablet at 03/04/22 2216  •  insulin glargine (LANTUS, SEMGLEE) injection 10 Units, 10 Units, Subcutaneous, Nightly, Bharat Montgomery MD, 10 Units at 03/04/22 2216  •  insulin lispro (ADMELOG) injection 0-14 Units, 0-14 Units, Subcutaneous, TID AC **AND** insulin lispro (ADMELOG) injection 0-14 Units, 0-14 Units, Subcutaneous, PRN, Elina Adams APRN  •  levothyroxine (SYNTHROID, LEVOTHROID) tablet 50 mcg, 50 mcg, Oral, Q AM, Bharat Montgomery MD, 50 mcg at 03/05/22 0544  •  Magnesium Sulfate 2 gram infusion - Mg less than or equal to 1.5 mg/dL, 2 g, Intravenous, PRN **OR** Magnesium Sulfate 1 gram infusion - Mg 1.6-1.9 mg/dL, 1 g, Intravenous, PRN, Bharat Montgomery MD, Last Rate: 100 mL/hr at 03/04/22 0925, 1 g at 03/04/22 0925  •  melatonin tablet 5 mg, 5 mg, Oral, Nightly PRN, Bharat Montgomery MD  •  metoprolol tartrate (LOPRESSOR) tablet 25 mg, 25 mg, Oral, BID, Bharat Montgomery MD, 25 mg at 03/04/22 2216  •  multivitamin with minerals 1 tablet, 1 tablet, Oral, Daily, Bharat Montgomery MD, 1 tablet at 03/05/22 0949  •  nitroglycerin (NITROSTAT) SL tablet 0.4 mg, 0.4 mg, Sublingual, Q5 Min PRN, Bharat Montgomery MD  •  ondansetron (ZOFRAN) tablet 4 mg, 4 mg, Oral, Q6H PRN **OR** ondansetron (ZOFRAN) injection 4 mg, 4 mg, Intravenous, Q6H PRN, Bharat Montgomery MD, 4 mg at 03/05/22 0544  •  potassium chloride (K-DUR,KLOR-CON) CR tablet 40 mEq, 40 mEq, Oral, PRN, 40 mEq at 03/04/22 0925 **OR** potassium chloride (KLOR-CON) packet 40 mEq, 40 mEq, Oral, PRN **OR** potassium chloride 10 mEq in 100 mL IVPB, 10 mEq, Intravenous, Q1H PRN, Bharat Montgomery MD  •  sodium chloride 0.9 % flush 10 mL, 10 mL, Intravenous, Q12H, Bharat Montgomery MD, 10 mL at 03/05/22 1000  •  sodium chloride 0.9 % flush 10 mL, 10 mL, Intravenous, PRN, Bharat Montgomery MD    Data Review:  All labs (24hrs):   Recent Results (from  the past 24 hour(s))   POC Glucose Once    Collection Time: 03/04/22 11:35 AM    Specimen: Blood   Result Value Ref Range    Glucose 152 (H) 70 - 105 mg/dL   Albumin, Fluid - Pleural Fluid, Pleural Cavity    Collection Time: 03/04/22 11:41 AM    Specimen: Pleural Cavity; Pleural Fluid   Result Value Ref Range    Albumin, Fluid 1.40 g/dL   Protein, Body Fluid - Pleural Fluid, Pleural Cavity    Collection Time: 03/04/22 11:41 AM    Specimen: Pleural Cavity; Pleural Fluid   Result Value Ref Range    Protein, Total, Fluid 1.8 g/dL   Glucose, Body Fluid - Pleural Fluid, Pleural Cavity    Collection Time: 03/04/22 11:41 AM    Specimen: Pleural Cavity; Pleural Fluid   Result Value Ref Range    Glucose, Fluid 176 mg/dL   Lactate Dehydrogenase, Body Fluid - Body Fluid, Pleural Cavity    Collection Time: 03/04/22 11:43 AM    Specimen: Pleural Cavity; Body Fluid   Result Value Ref Range    Lactate Dehydrogenase (LD), Fluid 105 U/L   pH, Body Fluid - Body Fluid, Pleural Cavity    Collection Time: 03/04/22 11:44 AM    Specimen: Pleural Cavity; Body Fluid   Result Value Ref Range    pH, Fluid 7.50 6.50 - 7.50   Body fluid cell count - Body Fluid, Pleural Cavity    Collection Time: 03/04/22 11:44 AM    Specimen: Pleural Cavity; Body Fluid   Result Value Ref Range    Color, Fluid Orange     Appearance, Fluid Slightly Hazy (A) Clear    Nucleated Cells, Fluid 850 /mm3    Method: Automated DXH Analyzer    Body fluid differential - Body Fluid, Pleural Cavity    Collection Time: 03/04/22 11:44 AM    Specimen: Pleural Cavity; Body Fluid   Result Value Ref Range    Neutrophils, Fluid 12 %    Lymphocytes, Fluid 80 %    Monocytes, Fluid 5 %    Mesothelial Cells, Fluid 3 %   POC Glucose Once    Collection Time: 03/04/22  4:30 PM    Specimen: Blood   Result Value Ref Range    Glucose 251 (H) 70 - 105 mg/dL   POC Glucose Once    Collection Time: 03/04/22  7:12 PM    Specimen: Blood   Result Value Ref Range    Glucose 217 (H) 70 - 105 mg/dL    Hemoglobin & Hematocrit, Blood    Collection Time: 03/04/22  8:53 PM    Specimen: Blood   Result Value Ref Range    Hemoglobin 11.6 (L) 12.0 - 15.9 g/dL    Hematocrit 34.6 34.0 - 46.6 %   Protime-INR    Collection Time: 03/04/22  8:53 PM    Specimen: Blood   Result Value Ref Range    Protime 12.4 (H) 9.6 - 11.7 Seconds    INR 1.13 (H) 0.93 - 1.10   CBC Auto Differential    Collection Time: 03/05/22 12:51 AM    Specimen: Blood   Result Value Ref Range    WBC 12.40 (H) 3.40 - 10.80 10*3/mm3    RBC 3.14 (L) 3.77 - 5.28 10*6/mm3    Hemoglobin 10.6 (L) 12.0 - 15.9 g/dL    Hematocrit 30.6 (L) 34.0 - 46.6 %    MCV 97.4 (H) 79.0 - 97.0 fL    MCH 33.7 (H) 26.6 - 33.0 pg    MCHC 34.6 31.5 - 35.7 g/dL    RDW 13.7 12.3 - 15.4 %    RDW-SD 47.3 37.0 - 54.0 fl    MPV 8.1 6.0 - 12.0 fL    Platelets 290 140 - 450 10*3/mm3    Neutrophil % 89.6 (H) 42.7 - 76.0 %    Lymphocyte % 4.9 (L) 19.6 - 45.3 %    Monocyte % 5.3 5.0 - 12.0 %    Eosinophil % 0.1 (L) 0.3 - 6.2 %    Basophil % 0.1 0.0 - 1.5 %    Neutrophils, Absolute 11.10 (H) 1.70 - 7.00 10*3/mm3    Lymphocytes, Absolute 0.60 (L) 0.70 - 3.10 10*3/mm3    Monocytes, Absolute 0.70 0.10 - 0.90 10*3/mm3    Eosinophils, Absolute 0.00 0.00 - 0.40 10*3/mm3    Basophils, Absolute 0.00 0.00 - 0.20 10*3/mm3    nRBC 0.0 0.0 - 0.2 /100 WBC   Basic Metabolic Panel    Collection Time: 03/05/22  3:45 AM    Specimen: Blood   Result Value Ref Range    Glucose 274 (H) 65 - 99 mg/dL    BUN 28 (H) 8 - 23 mg/dL    Creatinine 0.96 0.57 - 1.00 mg/dL    Sodium 137 136 - 145 mmol/L    Potassium 4.2 3.5 - 5.2 mmol/L    Chloride 98 98 - 107 mmol/L    CO2 26.0 22.0 - 29.0 mmol/L    Calcium 8.5 (L) 8.6 - 10.5 mg/dL    BUN/Creatinine Ratio 29.2 (H) 7.0 - 25.0    Anion Gap 13.0 5.0 - 15.0 mmol/L    eGFR 59.9 (L) >60.0 mL/min/1.73   Lipid Panel    Collection Time: 03/05/22  3:45 AM    Specimen: Blood   Result Value Ref Range    Total Cholesterol 154 0 - 200 mg/dL    Triglycerides 117 0 - 150 mg/dL    HDL  Cholesterol 40 40 - 60 mg/dL    LDL Cholesterol  93 0 - 100 mg/dL    VLDL Cholesterol 21 5 - 40 mg/dL    LDL/HDL Ratio 2.27    Magnesium    Collection Time: 03/05/22  3:45 AM    Specimen: Blood   Result Value Ref Range    Magnesium 2.0 1.6 - 2.4 mg/dL   Calcium, Ionized    Collection Time: 03/05/22  3:45 AM    Specimen: Blood   Result Value Ref Range    Ionized Calcium 1.16 (L) 1.20 - 1.30 mmol/L   Hepatic Function Panel    Collection Time: 03/05/22  3:45 AM    Specimen: Blood   Result Value Ref Range    Total Protein 4.6 (L) 6.0 - 8.5 g/dL    Albumin 3.00 (L) 3.50 - 5.20 g/dL    ALT (SGPT) 43 (H) 1 - 33 U/L    AST (SGOT) 18 1 - 32 U/L    Alkaline Phosphatase 83 39 - 117 U/L    Total Bilirubin 0.4 0.0 - 1.2 mg/dL    Bilirubin, Direct <0.2 0.0 - 0.3 mg/dL    Bilirubin, Indirect     CBC Auto Differential    Collection Time: 03/05/22  3:45 AM    Specimen: Blood   Result Value Ref Range    WBC 14.40 (H) 3.40 - 10.80 10*3/mm3    RBC 3.22 (L) 3.77 - 5.28 10*6/mm3    Hemoglobin 10.8 (L) 12.0 - 15.9 g/dL    Hematocrit 31.2 (L) 34.0 - 46.6 %    MCV 96.7 79.0 - 97.0 fL    MCH 33.6 (H) 26.6 - 33.0 pg    MCHC 34.8 31.5 - 35.7 g/dL    RDW 13.7 12.3 - 15.4 %    RDW-SD 45.9 37.0 - 54.0 fl    MPV 8.5 6.0 - 12.0 fL    Platelets 311 140 - 450 10*3/mm3    Neutrophil % 87.3 (H) 42.7 - 76.0 %    Lymphocyte % 6.1 (L) 19.6 - 45.3 %    Monocyte % 5.8 5.0 - 12.0 %    Eosinophil % 0.1 (L) 0.3 - 6.2 %    Basophil % 0.7 0.0 - 1.5 %    Neutrophils, Absolute 12.50 (H) 1.70 - 7.00 10*3/mm3    Lymphocytes, Absolute 0.90 0.70 - 3.10 10*3/mm3    Monocytes, Absolute 0.80 0.10 - 0.90 10*3/mm3    Eosinophils, Absolute 0.00 0.00 - 0.40 10*3/mm3    Basophils, Absolute 0.10 0.00 - 0.20 10*3/mm3    nRBC 0.1 0.0 - 0.2 /100 WBC   ECG 12 Lead    Collection Time: 03/05/22  4:10 AM   Result Value Ref Range    QT Interval 319 ms   ECG 12 Lead    Collection Time: 03/05/22  5:27 AM   Result Value Ref Range    QT Interval 327 ms   Comprehensive Metabolic Panel     Collection Time: 03/05/22  5:36 AM    Specimen: Blood   Result Value Ref Range    Glucose 367 (H) 65 - 99 mg/dL    BUN 28 (H) 8 - 23 mg/dL    Creatinine 1.07 (H) 0.57 - 1.00 mg/dL    Sodium 140 136 - 145 mmol/L    Potassium 4.4 3.5 - 5.2 mmol/L    Chloride 103 98 - 107 mmol/L    CO2 22.0 22.0 - 29.0 mmol/L    Calcium 8.6 8.6 - 10.5 mg/dL    Total Protein 5.1 (L) 6.0 - 8.5 g/dL    Albumin 3.00 (L) 3.50 - 5.20 g/dL    ALT (SGPT) 40 (H) 1 - 33 U/L    AST (SGOT) 15 1 - 32 U/L    Alkaline Phosphatase 82 39 - 117 U/L    Total Bilirubin 0.4 0.0 - 1.2 mg/dL    Globulin 2.1 gm/dL    A/G Ratio 1.4 g/dL    BUN/Creatinine Ratio 26.2 (H) 7.0 - 25.0    Anion Gap 15.0 5.0 - 15.0 mmol/L    eGFR 52.6 (L) >60.0 mL/min/1.73   Phosphorus    Collection Time: 03/05/22  5:36 AM    Specimen: Blood   Result Value Ref Range    Phosphorus 4.7 (H) 2.5 - 4.5 mg/dL   POC Glucose Once    Collection Time: 03/05/22  8:30 AM    Specimen: Blood   Result Value Ref Range    Glucose 423 (C) 70 - 105 mg/dL        Imaging:  XR Chest 1 View  Narrative:    DATE OF EXAM:   3/5/2022 9:05 AM     PROCEDURE:   XR CHEST 1 VW-     INDICATIONS:   Shortness of breath; I48.91-Unspecified atrial fibrillation; I50.9-Heart  failure, unspecified; Z20.822-Contact with and (suspected) exposure to  covid-19; I50.21-Acute systolic (congestive) heart failure     COMPARISON:  3/4/2022 and prior     TECHNIQUE:   Portable Chest     FINDINGS:      Near complete opacification of the left hemithorax is now noted. Minimal  residual aerated lung noted within the left hemithorax. Patient is  status post median sternotomy.     Heart size is stable. Patient status post median sternotomy.     Right lung remains grossly clear. Osseous structures are stable      Impression: IMPRESSION :   Continued increasing opacification of the left hemithorax compatible  with large pleural effusion/hemothorax given prior CT.     Electronically Signed By-Jarrett De Jesus On:3/5/2022 10:17 AM  This report  was finalized on [id number] by  Jarrett De Jesus, .       ASSESSMENT:  Possible hemothorax  Acute respiratory distress  COPD exacerbation  Atrial fibrillation with RVR (HCC)    Other hyperlipidemia    Essential hypertension    Hypothyroidism    Coronary artery disease involving coronary bypass graft of native heart without angina pectoris    Acute CHF (congestive heart failure) (HCC) EFG 45%    Acute hyponatremia    Elevated LFTs    Elevated TSH    Acute hyperglycemia    Obesity (BMI 30-39.9)        PLAN:    Hold on anticoagulation  Chest tube today  Encouraged to use I-S flutter valve  Continue to monitor blood pressure  Metabolic alkalosis etiology is not known yet  Bronchodilator  Inhaled corticosteroids  Electrolytes/ glycemic control  DVT and GI prophylaxis.    Total Critical care time in direct medical management (   ) minutes, This time specifically excludes time spent performing procedures.  Bharat Montgomery MD. D, ABSM.     3/5/2022  10:29 EST

## 2022-03-05 NOTE — PROCEDURES
Chest tube placement    Name: Jaquelin Valderrama  MRN: [id number]  :  1940  Age: 82 y.o.  Sex: female      Date of Operation: 3/5/2022     Pre-op Diagnosis: Left hemothorax  Site: Left side  Post-op Diagnosis: same    Surgeon: Bharat Montgomery    Sample: Pleural fluid    Informed consent obtained   Time out preformed with staff confirming patient ID, procedure and location.    Procedure: X-ray was reviewed, informed consent was obtained, Patient was sitting, physical exam used to confirm site and location for placement tube in the 5th intercostal space mid axillar line left side using ultrasound to locate the past spot for draining, preparing the site with chlorhexidine, then lidocaine 1% 15 mL used as a local anesthesia for the skin tissue above the rib, pleura, then into the pleural space bloody fluid was draining, 1 .5  centimeters incision made,  to skin,, dilatation with forceps, to the rib edge, then above the rib edge entering the pleural space with forceps dilatation very dark bloody fluid came out, then feeling the space in the finger and the tube was passed to the apex of the lung patient has significant amount of bloody fluid draining, connected to Pleur-evac, sutured dressed with vasselin attached to pleuravac with -20 pressure water seal, patient tolerated the procedure very well no acute complication vitals continue to be stable  chest x-ray to follow.         Bharat Montgomery MD. D, ABSM.  3/5/2022

## 2022-03-05 NOTE — NURSING NOTE
During rounds, Dr Javed and Dr Dawkins both at the bedside with Rn and  at the bedside, both patient and  agreed to an unplanned bedside thoracentesis, Provider performed bedside ultrasound then performed procedure right  After ultrasound and verbal consent was given.

## 2022-03-05 NOTE — NURSING NOTE
Received verbal report of abnormal CXR results from Radiologist.    Call placed to hospitalist to report results of CXR and to update that pt is having increased L side lung pain.  New orders noted from Dr Smith to consult Pulmonology and pain medications.   Consult placed.    Received orders for CT chest stat.

## 2022-03-05 NOTE — CONSULTS
PULMONARY CRITICAL CARE CONSULT NOTE      PATIENT IDENTIFICATION:  Name: Jaquelin Valderrama  MRN: [id number]  :  1940     Age: 82 y.o.  Sex: female        DATE OF CONSULTATION:  3/4/2022  PRIMARY CARE PHYSICIAN    Minerva Chatterjee MD                  CHIEF COMPLAINT: Shortness of breath worsening pleural effusion    History of Present Illness:   Jaquelin Valderrama is a 82 y.o. female presented to the hospital because worsening shortness of breath increased dyspnea exertion orthopnea and leg swellings, found the patient to be an A. fib, systolic congestive heart failure EF of 30%, and elevated pulmonary artery pressure, chest x-ray showed left pleural effusion thoracentesis was done removed almost a liter, post thoracentesis that showed there is possible subpleural hemothorax patient started having more pain on the left side no nausea no vomiting no abscesses no fever no chills      Review of Systems:   Constitutional:  As above   Eyes: negative   ENT/oropharynx: negative   Cardiovascular: negative   Respiratory:  As above   Gastrointestinal: negative   Genitourinary: negative   Neurological: negative   Musculoskeletal: negative   Integument/breast: negative   Endocrine: negative   Allergic/Immunologic: negative     Past Medical History:  Past Medical History:   Diagnosis Date   • Astigmatism, bilateral 2019   • Benign essential hypertension    • Bilateral presbyopia 2019   • CAD (coronary artery disease)    • Closed right ankle fracture    • COVID-19 vaccination refused    • Hyperlipidemia    • Hypothyroidism    • Influenza vaccine needed    • Myocardial infarction (HCC)    • Prediabetes    • Pseudophakia, both eyes 2019   • Thoracic back pain        Past Surgical History:  Past Surgical History:   Procedure Laterality Date   • APPENDECTOMY     • CARDIAC CATHETERIZATION  2012    x3    • CORONARY ARTERY BYPASS GRAFT  2014   • CORONARY STENT PLACEMENT      x3   • HARDWARE REMOVAL Right 2020     Procedure: ANKLE/FOOT HARDWARE REMOVAL;  Surgeon: Lincoln Sibley MD;  Location: [facility] OR;  Service: Orthopedics;  Laterality: Right;   • ORIF ANKLE FRACTURE Right 2019    Radha Vazquez Zia        Family History:  Family History   Problem Relation Age of Onset   • Heart disease Mother    • Lung cancer Father    • Diabetes Other         Social History:   Social History     Tobacco Use   • Smoking status: Former Smoker     Types: Cigarettes     Quit date: 1980     Years since quittin.2   • Smokeless tobacco: Never Used   • Tobacco comment: Social Drinker   Substance Use Topics   • Alcohol use: Yes     Comment: mod         Allergies:  Allergies   Allergen Reactions   • Prednisone Other (See Comments)     Increased BP       Home Meds:  Medications Prior to Admission   Medication Sig Dispense Refill Last Dose   • aspirin 81 MG chewable tablet Chew 81 mg Daily.   3/2/2022 at Unknown time   • Cholecalciferol (VITAMIN D3) 2000 units tablet Take 1 tablet by mouth Daily.   3/2/2022 at Unknown time   • irbesartan (Avapro) 300 MG tablet Take 150 mg by mouth Daily.      • metoprolol tartrate (LOPRESSOR) 25 MG tablet Take 1 tablet by mouth twice daily 180 tablet 0 3/2/2022 at Unknown time   • Multiple Vitamins-Minerals (MULTIVITAMIN ADULT EXTRA C PO) Take 1 tablet by mouth Daily.   3/2/2022 at Unknown time   • levothyroxine (SYNTHROID, LEVOTHROID) 50 MCG tablet Take 50 mcg by mouth Every Morning.          Objective:  tMax 24 hrs: Temp (24hrs), Av.8 °F (36.6 °C), Min:97.4 °F (36.3 °C), Max:98.1 °F (36.7 °C)      Vitals Ranges:   Temp:  [97.4 °F (36.3 °C)-98.1 °F (36.7 °C)] 97.5 °F (36.4 °C)  Heart Rate:  [] 98  Resp:  [20-24] 24  BP: (115-145)/() 124/65    Intake and Output Last 3 Shifts:   I/O last 3 completed shifts:  In: 1320 [P.O.:1320]  Out: 4200 [Urine:4200]    Exam:  /65 (BP Location: Right arm, Patient Position: Lying)   Pulse 98   Temp 97.5 °F (36.4 °C) (Oral)   Resp 24  "  Ht 160 cm (63\")   Wt 77.3 kg (170 lb 6.7 oz)   SpO2 94%   BMI 30.19 kg/m²       03/03/22  0622 03/04/22  0500   Weight: 80.3 kg (177 lb) 77.3 kg (170 lb 6.7 oz)     General Appearance:      HEENT:  Normocephalic, without obvious abnormality, atraumaticConjunctiva/corneas clear,.  Normal external ear canals, Nares normal, no drainage  Neck:  Supple, symmetrical, trachea midline. No JVD.  Lungs /Chest wall:   Bilateral basal rhonchi, respirations unlabored symmetrical wall movement.     Heart:  Regular rate and rhythm, systolic murmur PMI left sternal border  Abdomen: Soft, non-tender, no masses, no organomegaly.    Extremities: Trace edema no clubbing or Cyanosis        Data Review:  All labs (24hrs):   Recent Results (from the past 24 hour(s))   Magnesium    Collection Time: 03/04/22  4:58 AM    Specimen: Blood   Result Value Ref Range    Magnesium 1.6 1.6 - 2.4 mg/dL   Basic Metabolic Panel    Collection Time: 03/04/22  4:58 AM    Specimen: Blood   Result Value Ref Range    Glucose 189 (H) 65 - 99 mg/dL    BUN 16 8 - 23 mg/dL    Creatinine 0.77 0.57 - 1.00 mg/dL    Sodium 135 (L) 136 - 145 mmol/L    Potassium 3.1 (L) 3.5 - 5.2 mmol/L    Chloride 93 (L) 98 - 107 mmol/L    CO2 30.0 (H) 22.0 - 29.0 mmol/L    Calcium 8.8 8.6 - 10.5 mg/dL    BUN/Creatinine Ratio 20.8 7.0 - 25.0    Anion Gap 12.0 5.0 - 15.0 mmol/L    eGFR 78.1 >60.0 mL/min/1.73   CBC Auto Differential    Collection Time: 03/04/22  4:58 AM    Specimen: Blood   Result Value Ref Range    WBC 7.20 3.40 - 10.80 10*3/mm3    RBC 4.11 3.77 - 5.28 10*6/mm3    Hemoglobin 13.9 12.0 - 15.9 g/dL    Hematocrit 39.2 34.0 - 46.6 %    MCV 95.3 79.0 - 97.0 fL    MCH 33.8 (H) 26.6 - 33.0 pg    MCHC 35.4 31.5 - 35.7 g/dL    RDW 13.6 12.3 - 15.4 %    RDW-SD 45.1 37.0 - 54.0 fl    MPV 7.5 6.0 - 12.0 fL    Platelets 248 140 - 450 10*3/mm3    Neutrophil % 74.6 42.7 - 76.0 %    Lymphocyte % 14.3 (L) 19.6 - 45.3 %    Monocyte % 9.4 5.0 - 12.0 %    Eosinophil % 1.0 0.3 - 6.2 " %    Basophil % 0.7 0.0 - 1.5 %    Neutrophils, Absolute 5.40 1.70 - 7.00 10*3/mm3    Lymphocytes, Absolute 1.00 0.70 - 3.10 10*3/mm3    Monocytes, Absolute 0.70 0.10 - 0.90 10*3/mm3    Eosinophils, Absolute 0.10 0.00 - 0.40 10*3/mm3    Basophils, Absolute 0.00 0.00 - 0.20 10*3/mm3    nRBC 0.1 0.0 - 0.2 /100 WBC   POC Glucose Once    Collection Time: 03/04/22  7:07 AM    Specimen: Blood   Result Value Ref Range    Glucose 178 (H) 70 - 105 mg/dL   POC Glucose Once    Collection Time: 03/04/22 11:35 AM    Specimen: Blood   Result Value Ref Range    Glucose 152 (H) 70 - 105 mg/dL   Albumin, Fluid - Pleural Fluid, Pleural Cavity    Collection Time: 03/04/22 11:41 AM    Specimen: Pleural Cavity; Pleural Fluid   Result Value Ref Range    Albumin, Fluid 1.40 g/dL   Protein, Body Fluid - Pleural Fluid, Pleural Cavity    Collection Time: 03/04/22 11:41 AM    Specimen: Pleural Cavity; Pleural Fluid   Result Value Ref Range    Protein, Total, Fluid 1.8 g/dL   Glucose, Body Fluid - Pleural Fluid, Pleural Cavity    Collection Time: 03/04/22 11:41 AM    Specimen: Pleural Cavity; Pleural Fluid   Result Value Ref Range    Glucose, Fluid 176 mg/dL   Lactate Dehydrogenase, Body Fluid - Body Fluid, Pleural Cavity    Collection Time: 03/04/22 11:43 AM    Specimen: Pleural Cavity; Body Fluid   Result Value Ref Range    Lactate Dehydrogenase (LD), Fluid 105 U/L   pH, Body Fluid - Body Fluid, Pleural Cavity    Collection Time: 03/04/22 11:44 AM    Specimen: Pleural Cavity; Body Fluid   Result Value Ref Range    pH, Fluid 7.50 6.50 - 7.50   Body fluid cell count - Body Fluid, Pleural Cavity    Collection Time: 03/04/22 11:44 AM    Specimen: Pleural Cavity; Body Fluid   Result Value Ref Range    Color, Fluid Orange     Appearance, Fluid Slightly Hazy (A) Clear    Nucleated Cells, Fluid 850 /mm3    Method: Automated DXH Analyzer    Body fluid differential - Body Fluid, Pleural Cavity    Collection Time: 03/04/22 11:44 AM    Specimen: Pleural  Cavity; Body Fluid   Result Value Ref Range    Neutrophils, Fluid 12 %    Lymphocytes, Fluid 80 %    Monocytes, Fluid 5 %    Mesothelial Cells, Fluid 3 %   POC Glucose Once    Collection Time: 03/04/22  4:30 PM    Specimen: Blood   Result Value Ref Range    Glucose 251 (H) 70 - 105 mg/dL   POC Glucose Once    Collection Time: 03/04/22  7:12 PM    Specimen: Blood   Result Value Ref Range    Glucose 217 (H) 70 - 105 mg/dL        Imaging:  [unfilled]    ASSESSMENT:  Possible hemothorax  Acute respiratory distress  COPD exacerbation  Atrial fibrillation with RVR (HCC)    Other hyperlipidemia    Essential hypertension    Hypothyroidism    Coronary artery disease involving coronary bypass graft of native heart without angina pectoris    Acute CHF (congestive heart failure) (formerly [facility])    Acute hyponatremia    Elevated LFTs    Elevated TSH    Acute hyperglycemia    Obesity (BMI 30-39.9)       PLAN:  Start patient on IV fluid at 75 cc an hour  Hold on anticoagulation  CT scan of the chest is pending  Encouraged to use I-S flutter valve  Continue to monitor blood pressure  Metabolic alkalosis etiology is not known yet  Bronchodilator  Inhaled corticosteroids  Electrolytes/ glycemic control  DVT and GI prophylaxis.  Total Critical care time in direct medical management (   ) minutes, This time specifically excludes time spent performing procedures.       Bharat Montgomery MD. D, ABSM.  3/4/2022  20:57 EST

## 2022-03-06 ENCOUNTER — APPOINTMENT (OUTPATIENT)
Dept: GENERAL RADIOLOGY | Facility: HOSPITAL | Age: 82
End: 2022-03-06

## 2022-03-06 LAB
ABO GROUP BLD: NORMAL
ALBUMIN SERPL-MCNC: 3.2 G/DL (ref 3.5–5.2)
ALBUMIN/GLOB SERPL: 1.5 G/DL
ALP SERPL-CCNC: 68 U/L (ref 39–117)
ALT SERPL W P-5'-P-CCNC: 27 U/L (ref 1–33)
ANION GAP SERPL CALCULATED.3IONS-SCNC: 13 MMOL/L (ref 5–15)
AST SERPL-CCNC: 14 U/L (ref 1–32)
BASOPHILS # BLD AUTO: 0 10*3/MM3 (ref 0–0.2)
BASOPHILS NFR BLD AUTO: 0.1 % (ref 0–1.5)
BILIRUB SERPL-MCNC: 0.3 MG/DL (ref 0–1.2)
BLD GP AB SCN SERPL QL: NEGATIVE
BUN SERPL-MCNC: 32 MG/DL (ref 8–23)
BUN/CREAT SERPL: 23.7 (ref 7–25)
CA-I SERPL ISE-MCNC: 1.16 MMOL/L (ref 1.2–1.3)
CALCIUM SPEC-SCNC: 8.8 MG/DL (ref 8.6–10.5)
CHLORIDE SERPL-SCNC: 96 MMOL/L (ref 98–107)
CO2 SERPL-SCNC: 24 MMOL/L (ref 22–29)
CREAT SERPL-MCNC: 1.35 MG/DL (ref 0.57–1)
DEPRECATED RDW RBC AUTO: 46.4 FL (ref 37–54)
DEPRECATED RDW RBC AUTO: 48.1 FL (ref 37–54)
DEPRECATED RDW RBC AUTO: 48.1 FL (ref 37–54)
DEPRECATED RDW RBC AUTO: 51.2 FL (ref 37–54)
EGFRCR SERPLBLD CKD-EPI 2021: 39.8 ML/MIN/1.73
EOSINOPHIL # BLD AUTO: 0 10*3/MM3 (ref 0–0.4)
EOSINOPHIL NFR BLD AUTO: 0.1 % (ref 0.3–6.2)
ERYTHROCYTE [DISTWIDTH] IN BLOOD BY AUTOMATED COUNT: 13.8 % (ref 12.3–15.4)
ERYTHROCYTE [DISTWIDTH] IN BLOOD BY AUTOMATED COUNT: 14.1 % (ref 12.3–15.4)
ERYTHROCYTE [DISTWIDTH] IN BLOOD BY AUTOMATED COUNT: 14.1 % (ref 12.3–15.4)
ERYTHROCYTE [DISTWIDTH] IN BLOOD BY AUTOMATED COUNT: 15.7 % (ref 12.3–15.4)
GLOBULIN UR ELPH-MCNC: 2.1 GM/DL
GLUCOSE BLDC GLUCOMTR-MCNC: 173 MG/DL (ref 70–105)
GLUCOSE BLDC GLUCOMTR-MCNC: 174 MG/DL (ref 70–105)
GLUCOSE BLDC GLUCOMTR-MCNC: 220 MG/DL (ref 70–105)
GLUCOSE SERPL-MCNC: 110 MG/DL (ref 65–99)
HCT VFR BLD AUTO: 21.6 % (ref 34–46.6)
HCT VFR BLD AUTO: 22.9 % (ref 34–46.6)
HCT VFR BLD AUTO: 24.3 % (ref 34–46.6)
HCT VFR BLD AUTO: 26.1 % (ref 34–46.6)
HGB BLD-MCNC: 7.3 G/DL (ref 12–15.9)
HGB BLD-MCNC: 7.5 G/DL (ref 12–15.9)
HGB BLD-MCNC: 8.1 G/DL (ref 12–15.9)
HGB BLD-MCNC: 9.2 G/DL (ref 12–15.9)
LYMPHOCYTES # BLD AUTO: 1.1 10*3/MM3 (ref 0.7–3.1)
LYMPHOCYTES NFR BLD AUTO: 9.8 % (ref 19.6–45.3)
MAGNESIUM SERPL-MCNC: 2 MG/DL (ref 1.6–2.4)
MCH RBC QN AUTO: 31.9 PG (ref 26.6–33)
MCH RBC QN AUTO: 32.1 PG (ref 26.6–33)
MCH RBC QN AUTO: 32.4 PG (ref 26.6–33)
MCH RBC QN AUTO: 32.8 PG (ref 26.6–33)
MCHC RBC AUTO-ENTMCNC: 32.7 G/DL (ref 31.5–35.7)
MCHC RBC AUTO-ENTMCNC: 33.4 G/DL (ref 31.5–35.7)
MCHC RBC AUTO-ENTMCNC: 33.5 G/DL (ref 31.5–35.7)
MCHC RBC AUTO-ENTMCNC: 35.1 G/DL (ref 31.5–35.7)
MCV RBC AUTO: 93.4 FL (ref 79–97)
MCV RBC AUTO: 95.7 FL (ref 79–97)
MCV RBC AUTO: 97 FL (ref 79–97)
MCV RBC AUTO: 97.5 FL (ref 79–97)
MONOCYTES # BLD AUTO: 1.2 10*3/MM3 (ref 0.1–0.9)
MONOCYTES NFR BLD AUTO: 10.9 % (ref 5–12)
NEUTROPHILS NFR BLD AUTO: 79.1 % (ref 42.7–76)
NEUTROPHILS NFR BLD AUTO: 8.9 10*3/MM3 (ref 1.7–7)
NRBC BLD AUTO-RTO: 0.1 /100 WBC (ref 0–0.2)
PHOSPHATE SERPL-MCNC: 4.8 MG/DL (ref 2.5–4.5)
PLATELET # BLD AUTO: 196 10*3/MM3 (ref 140–450)
PLATELET # BLD AUTO: 220 10*3/MM3 (ref 140–450)
PLATELET # BLD AUTO: 225 10*3/MM3 (ref 140–450)
PLATELET # BLD AUTO: 248 10*3/MM3 (ref 140–450)
PMV BLD AUTO: 7.7 FL (ref 6–12)
PMV BLD AUTO: 7.8 FL (ref 6–12)
PMV BLD AUTO: 7.9 FL (ref 6–12)
PMV BLD AUTO: 7.9 FL (ref 6–12)
POTASSIUM SERPL-SCNC: 3.8 MMOL/L (ref 3.5–5.2)
PROT SERPL-MCNC: 5.3 G/DL (ref 6–8.5)
QT INTERVAL: 310 MS
RBC # BLD AUTO: 2.26 10*6/MM3 (ref 3.77–5.28)
RBC # BLD AUTO: 2.34 10*6/MM3 (ref 3.77–5.28)
RBC # BLD AUTO: 2.5 10*6/MM3 (ref 3.77–5.28)
RBC # BLD AUTO: 2.79 10*6/MM3 (ref 3.77–5.28)
RH BLD: POSITIVE
SODIUM SERPL-SCNC: 133 MMOL/L (ref 136–145)
T&S EXPIRATION DATE: NORMAL
WBC NRBC COR # BLD: 10 10*3/MM3 (ref 3.4–10.8)
WBC NRBC COR # BLD: 10.7 10*3/MM3 (ref 3.4–10.8)
WBC NRBC COR # BLD: 11.3 10*3/MM3 (ref 3.4–10.8)
WBC NRBC COR # BLD: 13.2 10*3/MM3 (ref 3.4–10.8)

## 2022-03-06 PROCEDURE — 86923 COMPATIBILITY TEST ELECTRIC: CPT

## 2022-03-06 PROCEDURE — 84100 ASSAY OF PHOSPHORUS: CPT | Performed by: STUDENT IN AN ORGANIZED HEALTH CARE EDUCATION/TRAINING PROGRAM

## 2022-03-06 PROCEDURE — 63710000001 INSULIN GLARGINE PER 5 UNITS: Performed by: INTERNAL MEDICINE

## 2022-03-06 PROCEDURE — 86900 BLOOD TYPING SEROLOGIC ABO: CPT

## 2022-03-06 PROCEDURE — 85027 COMPLETE CBC AUTOMATED: CPT | Performed by: INTERNAL MEDICINE

## 2022-03-06 PROCEDURE — 36430 TRANSFUSION BLD/BLD COMPNT: CPT

## 2022-03-06 PROCEDURE — 80053 COMPREHEN METABOLIC PANEL: CPT | Performed by: STUDENT IN AN ORGANIZED HEALTH CARE EDUCATION/TRAINING PROGRAM

## 2022-03-06 PROCEDURE — 99232 SBSQ HOSP IP/OBS MODERATE 35: CPT | Performed by: INTERNAL MEDICINE

## 2022-03-06 PROCEDURE — 93010 ELECTROCARDIOGRAM REPORT: CPT | Performed by: INTERNAL MEDICINE

## 2022-03-06 PROCEDURE — 94799 UNLISTED PULMONARY SVC/PX: CPT

## 2022-03-06 PROCEDURE — 25010000002 CEFEPIME PER 500 MG: Performed by: INTERNAL MEDICINE

## 2022-03-06 PROCEDURE — 86901 BLOOD TYPING SEROLOGIC RH(D): CPT

## 2022-03-06 PROCEDURE — 82962 GLUCOSE BLOOD TEST: CPT

## 2022-03-06 PROCEDURE — 93005 ELECTROCARDIOGRAM TRACING: CPT | Performed by: STUDENT IN AN ORGANIZED HEALTH CARE EDUCATION/TRAINING PROGRAM

## 2022-03-06 PROCEDURE — 63710000001 INSULIN LISPRO (HUMAN) PER 5 UNITS

## 2022-03-06 PROCEDURE — P9016 RBC LEUKOCYTES REDUCED: HCPCS

## 2022-03-06 PROCEDURE — 25010000002 HEPARIN (PORCINE) PER 1000 UNITS: Performed by: INTERNAL MEDICINE

## 2022-03-06 PROCEDURE — 83735 ASSAY OF MAGNESIUM: CPT | Performed by: INTERNAL MEDICINE

## 2022-03-06 PROCEDURE — 86850 RBC ANTIBODY SCREEN: CPT

## 2022-03-06 PROCEDURE — 85025 COMPLETE CBC W/AUTO DIFF WBC: CPT | Performed by: INTERNAL MEDICINE

## 2022-03-06 PROCEDURE — 82330 ASSAY OF CALCIUM: CPT | Performed by: INTERNAL MEDICINE

## 2022-03-06 PROCEDURE — 71045 X-RAY EXAM CHEST 1 VIEW: CPT

## 2022-03-06 RX ORDER — HEPARIN SODIUM 5000 [USP'U]/ML
5000 INJECTION, SOLUTION INTRAVENOUS; SUBCUTANEOUS EVERY 12 HOURS SCHEDULED
Status: DISCONTINUED | OUTPATIENT
Start: 2022-03-06 | End: 2022-03-17 | Stop reason: HOSPADM

## 2022-03-06 RX ADMIN — Medication 10 ML: at 20:22

## 2022-03-06 RX ADMIN — HEPARIN SODIUM 5000 UNITS: 5000 INJECTION INTRAVENOUS; SUBCUTANEOUS at 11:44

## 2022-03-06 RX ADMIN — DILTIAZEM HYDROCHLORIDE 90 MG: 30 TABLET, FILM COATED ORAL at 22:00

## 2022-03-06 RX ADMIN — INSULIN LISPRO 3 UNITS: 100 INJECTION, SOLUTION INTRAVENOUS; SUBCUTANEOUS at 11:44

## 2022-03-06 RX ADMIN — INSULIN GLARGINE 10 UNITS: 100 INJECTION, SOLUTION SUBCUTANEOUS at 20:25

## 2022-03-06 RX ADMIN — AMIODARONE HYDROCHLORIDE 200 MG: 200 TABLET ORAL at 17:00

## 2022-03-06 RX ADMIN — CEFEPIME HYDROCHLORIDE 2 G: 2 INJECTION, POWDER, FOR SOLUTION INTRAMUSCULAR; INTRAVENOUS at 00:44

## 2022-03-06 RX ADMIN — BUDESONIDE 1 MG: 0.5 INHALANT RESPIRATORY (INHALATION) at 21:29

## 2022-03-06 RX ADMIN — Medication 10 ML: at 09:39

## 2022-03-06 RX ADMIN — METOPROLOL TARTRATE 25 MG: 25 TABLET, FILM COATED ORAL at 11:45

## 2022-03-06 RX ADMIN — SENNOSIDES AND DOCUSATE SODIUM 2 TABLET: 50; 8.6 TABLET ORAL at 20:21

## 2022-03-06 RX ADMIN — Medication 1 TABLET: at 09:38

## 2022-03-06 RX ADMIN — DILTIAZEM HYDROCHLORIDE 90 MG: 30 TABLET, FILM COATED ORAL at 06:04

## 2022-03-06 RX ADMIN — BUDESONIDE 1 MG: 0.5 INHALANT RESPIRATORY (INHALATION) at 08:36

## 2022-03-06 RX ADMIN — CEFEPIME HYDROCHLORIDE 2 G: 2 INJECTION, POWDER, FOR SOLUTION INTRAMUSCULAR; INTRAVENOUS at 13:30

## 2022-03-06 RX ADMIN — LEVOTHYROXINE SODIUM 50 MCG: 0.05 TABLET ORAL at 06:03

## 2022-03-06 RX ADMIN — AMIODARONE HYDROCHLORIDE 200 MG: 200 TABLET ORAL at 06:03

## 2022-03-06 RX ADMIN — SENNOSIDES AND DOCUSATE SODIUM 1 TABLET: 50; 8.6 TABLET ORAL at 09:37

## 2022-03-06 RX ADMIN — ARFORMOTEROL TARTRATE 15 MCG: 15 SOLUTION RESPIRATORY (INHALATION) at 21:29

## 2022-03-06 RX ADMIN — ARFORMOTEROL TARTRATE 15 MCG: 15 SOLUTION RESPIRATORY (INHALATION) at 08:31

## 2022-03-06 RX ADMIN — ASPIRIN 81 MG CHEWABLE TABLET 81 MG: 81 TABLET CHEWABLE at 09:37

## 2022-03-06 RX ADMIN — HEPARIN SODIUM 5000 UNITS: 5000 INJECTION INTRAVENOUS; SUBCUTANEOUS at 20:22

## 2022-03-06 RX ADMIN — AMIODARONE HYDROCHLORIDE 200 MG: 200 TABLET ORAL at 22:01

## 2022-03-06 RX ADMIN — METOPROLOL TARTRATE 25 MG: 25 TABLET, FILM COATED ORAL at 20:21

## 2022-03-06 NOTE — PROGRESS NOTES
"PULMONARY CRITICAL CARE Progress  NOTE      PATIENT IDENTIFICATION:  Name: Jaquelin Valderrama  MRN: [id number]  :  1940     Age: 82 y.o.  Sex: female    DATE OF Note:  3/6/2022   Referring Physician: Sandip Javed MD                  Subjective:   On 2 L no SOB no chest pain,   Feeling weak  Tolerated diet   no nausea or vomiting, no change in bowel habit, no dysuria,  no new  skin rash or itching.    Objective:  tMax 24 hrs: Temp (24hrs), Av.3 °F (36.8 °C), Min:97.8 °F (36.6 °C), Max:99.1 °F (37.3 °C)      Vitals Ranges:   Temp:  [97.8 °F (36.6 °C)-99.1 °F (37.3 °C)] 97.8 °F (36.6 °C)  Heart Rate:  [] 74  Resp:  [7-16] 13  BP: ()/(52-98) 93/53    Intake and Output Last 3 Shifts:   I/O last 3 completed shifts:  In:  [P.O.:570; I.V.:1377]  Out: 1420 [Urine:800; Chest Tube:620]    Exam:  BP 93/53   Pulse 74   Temp 97.8 °F (36.6 °C) (Oral)   Resp 13   Ht 160 cm (63\")   Wt 77 kg (169 lb 12.1 oz)   SpO2 95%   BMI 30.07 kg/m²     General Appearance:   AA  HEENT:  Normocephalic, without obvious abnormality, Conjunctiva/corneas clear,.  Normal external ear canals, Nares normal, no drainage     Neck:  Supple, symmetrical, trachea midline. No JVD.  Lungs /Chest wall: chest tube in place  No BS on left side    respirations unlabored symmetrical wall movement.     Heart:  Regular rate and rhythm, systolic murmur PMI left sternal border  Abdomen: Soft, non-tender, no masses, no organomegaly.    Extremities: Trace edema no clubbing or Cyanosis        Medications:    Current Facility-Administered Medications:   •  acetaminophen (TYLENOL) tablet 650 mg, 650 mg, Oral, Q4H PRN, 650 mg at 22 1307 **OR** acetaminophen (TYLENOL) 160 MG/5ML solution 650 mg, 650 mg, Oral, Q4H PRN **OR** acetaminophen (TYLENOL) suppository 650 mg, 650 mg, Rectal, Q4H PRN, Bharat Montgomery MD  •  aluminum-magnesium hydroxide-simethicone (MAALOX MAX) 400-400-40 MG/5ML suspension 15 mL, 15 mL, Oral, Q6H PRN, Ulises, " MD Bharat  •  [COMPLETED] amiodarone in dextrose 5% (NEXTERONE) loading dose 150mg/100mL, 150 mg, Intravenous, Once, 150 mg at 22 0525 **FOLLOWED BY** [] amiodarone 360 mg in 200 mL D5W infusion, 1 mg/min, Intravenous, Continuous, Last Rate: 33.3 mL/hr at 22 1100, 1 mg/min at 22 1100 **FOLLOWED BY** [] amiodarone 360 mg in 200 mL D5W infusion, 0.5 mg/min, Intravenous, Continuous, Stopped at 22 0232 **FOLLOWED BY** [COMPLETED] amiodarone (PACERONE) tablet 200 mg, 200 mg, Oral, Once, 200 mg at 22 0603 **FOLLOWED BY** amiodarone (PACERONE) tablet 200 mg, 200 mg, Oral, Q8H **FOLLOWED BY** [START ON 3/13/2022] amiodarone (PACERONE) tablet 200 mg, 200 mg, Oral, Q12H **FOLLOWED BY** [START ON 3/27/2022] amiodarone (PACERONE) tablet 200 mg, 200 mg, Oral, Daily, Sabra Roberts APRN  •  arformoterol (BROVANA) nebulizer solution 15 mcg, 15 mcg, Nebulization, BID - RT, Bharat Montgomery MD, 15 mcg at 22 0831  •  aspirin chewable tablet 81 mg, 81 mg, Oral, Daily, Bharat Montgomery MD, 81 mg at 22 0937  •  sennosides-docusate (PERICOLACE) 8.6-50 MG per tablet 2 tablet, 2 tablet, Oral, BID, 1 tablet at 22 0937 **AND** polyethylene glycol (MIRALAX) packet 17 g, 17 g, Oral, Daily PRN **AND** bisacodyl (DULCOLAX) EC tablet 5 mg, 5 mg, Oral, Daily PRN **AND** bisacodyl (DULCOLAX) suppository 10 mg, 10 mg, Rectal, Daily PRN, Bharat Montgomery MD  •  budesonide (PULMICORT) nebulizer solution 1 mg, 1 mg, Nebulization, BID - RT, Bharat Montgomery MD, 1 mg at 22 0836  •  cefepime 2 gm IVPB in 100 ml NS (MBP), 2 g, Intravenous, Q12H, Bharat Montgomery MD, 2 g at 22 0044  •  dextrose (D50W) (25 g/50 mL) IV injection 25 g, 25 g, Intravenous, Q15 Min PRN, Bharat Montgomery MD  •  dextrose (GLUTOSE) oral gel 15 g, 15 g, Oral, Q15 Min PRN, Bharat Montgomery MD  •  dilTIAZem (CARDIZEM) tablet 90 mg, 90 mg, Oral, Q8H, Bharat Montgomery MD, 90 mg at 22 0604  •  glucagon (human  recombinant) (GLUCAGEN DIAGNOSTIC) 1 mg in sterile water (preservative free) 1 mL injection, 1 mg, Subcutaneous, PRN, Bharat Montgomery MD  •  Hold medication, 1 each, Does not apply, Continuous PRN, Bharat Montgomery MD  •  HYDROcodone-acetaminophen (NORCO) 5-325 MG per tablet 1 tablet, 1 tablet, Oral, Q6H PRN, Bharat Montgomery MD, 1 tablet at 03/04/22 2216  •  HYDROmorphone (DILAUDID) injection 2 mg, 2 mg, Intravenous, Once, Bharat Montgomery MD  •  insulin glargine (LANTUS, SEMGLEE) injection 10 Units, 10 Units, Subcutaneous, Nightly, Bharat Montgomery MD, 10 Units at 03/05/22 2004  •  insulin lispro (ADMELOG) injection 0-14 Units, 0-14 Units, Subcutaneous, TID AC, 5 Units at 03/05/22 1749 **AND** insulin lispro (ADMELOG) injection 0-14 Units, 0-14 Units, Subcutaneous, PRN, Elina Adams APRN  •  levothyroxine (SYNTHROID, LEVOTHROID) tablet 50 mcg, 50 mcg, Oral, Q AM, Bharat Montgomery MD, 50 mcg at 03/06/22 0603  •  Magnesium Sulfate 2 gram infusion - Mg less than or equal to 1.5 mg/dL, 2 g, Intravenous, PRN **OR** Magnesium Sulfate 1 gram infusion - Mg 1.6-1.9 mg/dL, 1 g, Intravenous, PRN, Bharat Montgomery MD, Last Rate: 100 mL/hr at 03/04/22 0925, 1 g at 03/04/22 0925  •  melatonin tablet 5 mg, 5 mg, Oral, Nightly PRN, Bharat Montgomery MD  •  metoprolol tartrate (LOPRESSOR) tablet 25 mg, 25 mg, Oral, BID, Bharat Montgomery MD, 25 mg at 03/05/22 2004  •  multivitamin with minerals 1 tablet, 1 tablet, Oral, Daily, Bharat Montgomery MD, 1 tablet at 03/06/22 0938  •  nitroglycerin (NITROSTAT) SL tablet 0.4 mg, 0.4 mg, Sublingual, Q5 Min PRN, Bharat Montgomery MD  •  ondansetron (ZOFRAN) tablet 4 mg, 4 mg, Oral, Q6H PRN **OR** ondansetron (ZOFRAN) injection 4 mg, 4 mg, Intravenous, Q6H PRN, Bharat Montgomery MD, 4 mg at 03/05/22 0544  •  potassium chloride (K-DUR,KLOR-CON) CR tablet 40 mEq, 40 mEq, Oral, PRN, 40 mEq at 03/04/22 0925 **OR** potassium chloride (KLOR-CON) packet 40 mEq, 40 mEq, Oral, PRN **OR** potassium chloride  10 mEq in 100 mL IVPB, 10 mEq, Intravenous, Q1H PRN, Bharat Montgomery MD  •  sodium chloride 0.9 % flush 10 mL, 10 mL, Intravenous, Q12H, Bharat Montgomery MD, 10 mL at 03/06/22 0939  •  sodium chloride 0.9 % flush 10 mL, 10 mL, Intravenous, PRN, Bharat Montgomery MD  •  sodium chloride 0.9 % infusion, 100 mL/hr, Intravenous, Continuous, Bharat Montgomery MD, Last Rate: 100 mL/hr at 03/06/22 0900, 100 mL/hr at 03/06/22 0900    Data Review:  All labs (24hrs):   Recent Results (from the past 24 hour(s))   POC Glucose Once    Collection Time: 03/05/22  4:32 PM    Specimen: Blood   Result Value Ref Range    Glucose 217 (H) 70 - 105 mg/dL   POC Glucose Once    Collection Time: 03/05/22  8:37 PM    Specimen: Blood   Result Value Ref Range    Glucose 118 (H) 70 - 105 mg/dL   CBC (No Diff)    Collection Time: 03/06/22 12:34 AM    Specimen: Blood   Result Value Ref Range    WBC 13.20 (H) 3.40 - 10.80 10*3/mm3    RBC 2.26 (L) 3.77 - 5.28 10*6/mm3    Hemoglobin 7.3 (L) 12.0 - 15.9 g/dL    Hematocrit 21.6 (L) 34.0 - 46.6 %    MCV 95.7 79.0 - 97.0 fL    MCH 32.1 26.6 - 33.0 pg    MCHC 33.5 31.5 - 35.7 g/dL    RDW 13.8 12.3 - 15.4 %    RDW-SD 46.4 37.0 - 54.0 fl    MPV 7.8 6.0 - 12.0 fL    Platelets 248 140 - 450 10*3/mm3   ECG 12 Lead    Collection Time: 03/06/22  3:30 AM   Result Value Ref Range    QT Interval 398 ms   Magnesium    Collection Time: 03/06/22  6:50 AM    Specimen: Blood   Result Value Ref Range    Magnesium 2.0 1.6 - 2.4 mg/dL   Calcium, Ionized    Collection Time: 03/06/22  6:50 AM    Specimen: Blood   Result Value Ref Range    Ionized Calcium 1.16 (L) 1.20 - 1.30 mmol/L   Comprehensive Metabolic Panel    Collection Time: 03/06/22  6:50 AM    Specimen: Blood   Result Value Ref Range    Glucose 110 (H) 65 - 99 mg/dL    BUN 32 (H) 8 - 23 mg/dL    Creatinine 1.35 (H) 0.57 - 1.00 mg/dL    Sodium 133 (L) 136 - 145 mmol/L    Potassium 3.8 3.5 - 5.2 mmol/L    Chloride 96 (L) 98 - 107 mmol/L    CO2 24.0 22.0 - 29.0 mmol/L     Calcium 8.8 8.6 - 10.5 mg/dL    Total Protein 5.3 (L) 6.0 - 8.5 g/dL    Albumin 3.20 (L) 3.50 - 5.20 g/dL    ALT (SGPT) 27 1 - 33 U/L    AST (SGOT) 14 1 - 32 U/L    Alkaline Phosphatase 68 39 - 117 U/L    Total Bilirubin 0.3 0.0 - 1.2 mg/dL    Globulin 2.1 gm/dL    A/G Ratio 1.5 g/dL    BUN/Creatinine Ratio 23.7 7.0 - 25.0    Anion Gap 13.0 5.0 - 15.0 mmol/L    eGFR 39.8 (L) >60.0 mL/min/1.73   Phosphorus    Collection Time: 03/06/22  6:50 AM    Specimen: Blood   Result Value Ref Range    Phosphorus 4.8 (H) 2.5 - 4.5 mg/dL   CBC Auto Differential    Collection Time: 03/06/22  6:50 AM    Specimen: Blood   Result Value Ref Range    WBC 11.30 (H) 3.40 - 10.80 10*3/mm3    RBC 2.34 (L) 3.77 - 5.28 10*6/mm3    Hemoglobin 7.5 (L) 12.0 - 15.9 g/dL    Hematocrit 22.9 (L) 34.0 - 46.6 %    MCV 97.5 (H) 79.0 - 97.0 fL    MCH 31.9 26.6 - 33.0 pg    MCHC 32.7 31.5 - 35.7 g/dL    RDW 14.1 12.3 - 15.4 %    RDW-SD 48.1 37.0 - 54.0 fl    MPV 7.7 6.0 - 12.0 fL    Platelets 225 140 - 450 10*3/mm3    Neutrophil % 79.1 (H) 42.7 - 76.0 %    Lymphocyte % 9.8 (L) 19.6 - 45.3 %    Monocyte % 10.9 5.0 - 12.0 %    Eosinophil % 0.1 (L) 0.3 - 6.2 %    Basophil % 0.1 0.0 - 1.5 %    Neutrophils, Absolute 8.90 (H) 1.70 - 7.00 10*3/mm3    Lymphocytes, Absolute 1.10 0.70 - 3.10 10*3/mm3    Monocytes, Absolute 1.20 (H) 0.10 - 0.90 10*3/mm3    Eosinophils, Absolute 0.00 0.00 - 0.40 10*3/mm3    Basophils, Absolute 0.00 0.00 - 0.20 10*3/mm3    nRBC 0.1 0.0 - 0.2 /100 WBC        Imaging:  XR Chest 1 View  Narrative:    DATE OF EXAM:   3/6/2022 5:12 AM     PROCEDURE:   XR CHEST 1 VW-     INDICATIONS:   Shortness of breath; I48.91-Unspecified atrial fibrillation; I50.9-Heart  failure, unspecified; Z20.822-Contact with and (suspected) exposure to  covid-19; I50.21-Acute systolic (congestive) heart failure     COMPARISON:  03/05/2022     TECHNIQUE:   Portable chest radiograph.     FINDINGS:    Sternotomy wires and changes of CABG noted. The right lung is  without  consolidation. There is a chest tube overlying the left lung base.  Previously seen large left-sided pleural effusion is decreased in size.  There is a suspected residual small-to-moderate left basilar effusion.  There is a small amount of pleural air noted measuring up to 10 mm  abutting the left lateral chest wall. Left basilar airspace disease  likely compressive atelectasis.     Impression: 1. Decrease in size of large left pleural effusion with left-sided chest  tube in place. Small amount of pleural air noted compatible with  hydropneumothorax with small amount of pleural fluid component at the  left lung base.  2. Left perihilar and left basilar airspace disease likely compressive  atelectasis.     Electronically Signed By-Ronnie Carrero MD On:3/6/2022 8:35 AM  This report was finalized on [id number] by  Ronnie Carrero MD.       ASSESSMENT:  Possible hemothorax  Acute respiratory distress  COPD exacerbation  Atrial fibrillation with RVR ([facility])    Other hyperlipidemia    Essential hypertension    Hypothyroidism    Coronary artery disease involving coronary bypass graft of native heart without angina pectoris    Acute CHF (congestive heart failure) ([facility]) EFG 45%    Acute hyponatremia    Elevated LFTs    Elevated TSH    Acute hyperglycemia    Obesity (BMI 30-39.9)   ARF     PLAN:  Hold on anticoagulation  Blood transfusion  Dc IV fluids   Chest tube management   Encouraged to use I-S flutter valve  Heart rate control  Continue to monitor blood pressure  Bronchodilator  Inhaled corticosteroids  Electrolytes/ glycemic control  Add heparin sq prophylaxis   DVT and GI prophylaxis.    Total Critical care time in direct medical management (   ) minutes, This time specifically excludes time spent performing procedures.  Bahrat Montgomery MD. D, ABSM.     3/6/2022  11:12 EST

## 2022-03-07 LAB
ALBUMIN SERPL-MCNC: 3.1 G/DL (ref 3.5–5.2)
ALBUMIN/GLOB SERPL: 1.5 G/DL
ALP SERPL-CCNC: 77 U/L (ref 39–117)
ALT SERPL W P-5'-P-CCNC: 28 U/L (ref 1–33)
ANION GAP SERPL CALCULATED.3IONS-SCNC: 12 MMOL/L (ref 5–15)
AST SERPL-CCNC: 19 U/L (ref 1–32)
BASOPHILS # BLD AUTO: 0 10*3/MM3 (ref 0–0.2)
BASOPHILS NFR BLD AUTO: 0.1 % (ref 0–1.5)
BH BB BLOOD EXPIRATION DATE: NORMAL
BH BB BLOOD TYPE BARCODE: 6200
BH BB DISPENSE STATUS: NORMAL
BH BB PRODUCT CODE: NORMAL
BH BB UNIT NUMBER: NORMAL
BILIRUB SERPL-MCNC: 0.3 MG/DL (ref 0–1.2)
BUN SERPL-MCNC: 36 MG/DL (ref 8–23)
BUN/CREAT SERPL: 31.3 (ref 7–25)
CA-I SERPL ISE-MCNC: 1.21 MMOL/L (ref 1.2–1.3)
CALCIUM SPEC-SCNC: 8.3 MG/DL (ref 8.6–10.5)
CHLORIDE SERPL-SCNC: 94 MMOL/L (ref 98–107)
CO2 SERPL-SCNC: 23 MMOL/L (ref 22–29)
CREAT SERPL-MCNC: 1.15 MG/DL (ref 0.57–1)
CROSSMATCH INTERPRETATION: NORMAL
DEPRECATED RDW RBC AUTO: 50.8 FL (ref 37–54)
DEPRECATED RDW RBC AUTO: 51.2 FL (ref 37–54)
DEPRECATED RDW RBC AUTO: 51.2 FL (ref 37–54)
DEPRECATED RDW RBC AUTO: 51.6 FL (ref 37–54)
EGFRCR SERPLBLD CKD-EPI 2021: 48.3 ML/MIN/1.73
EOSINOPHIL # BLD AUTO: 0 10*3/MM3 (ref 0–0.4)
EOSINOPHIL NFR BLD AUTO: 0.3 % (ref 0.3–6.2)
ERYTHROCYTE [DISTWIDTH] IN BLOOD BY AUTOMATED COUNT: 15.6 % (ref 12.3–15.4)
ERYTHROCYTE [DISTWIDTH] IN BLOOD BY AUTOMATED COUNT: 15.8 % (ref 12.3–15.4)
GLOBULIN UR ELPH-MCNC: 2.1 GM/DL
GLUCOSE BLDC GLUCOMTR-MCNC: 131 MG/DL (ref 70–105)
GLUCOSE BLDC GLUCOMTR-MCNC: 153 MG/DL (ref 70–105)
GLUCOSE BLDC GLUCOMTR-MCNC: 162 MG/DL (ref 70–105)
GLUCOSE BLDC GLUCOMTR-MCNC: 276 MG/DL (ref 70–105)
GLUCOSE SERPL-MCNC: 213 MG/DL (ref 65–99)
HCT VFR BLD AUTO: 25.5 % (ref 34–46.6)
HCT VFR BLD AUTO: 25.6 % (ref 34–46.6)
HCT VFR BLD AUTO: 26 % (ref 34–46.6)
HCT VFR BLD AUTO: 27 % (ref 34–46.6)
HGB BLD-MCNC: 8.7 G/DL (ref 12–15.9)
HGB BLD-MCNC: 8.9 G/DL (ref 12–15.9)
HGB BLD-MCNC: 9.1 G/DL (ref 12–15.9)
HGB BLD-MCNC: 9.4 G/DL (ref 12–15.9)
LAB AP CASE REPORT: NORMAL
LAB AP FLOW CYTOMETRY SUMMARY: NORMAL
LYMPHOCYTES # BLD AUTO: 1 10*3/MM3 (ref 0.7–3.1)
LYMPHOCYTES NFR BLD AUTO: 9.3 % (ref 19.6–45.3)
MAGNESIUM SERPL-MCNC: 2.2 MG/DL (ref 1.6–2.4)
MCH RBC QN AUTO: 31.5 PG (ref 26.6–33)
MCH RBC QN AUTO: 32.3 PG (ref 26.6–33)
MCH RBC QN AUTO: 32.7 PG (ref 26.6–33)
MCH RBC QN AUTO: 33 PG (ref 26.6–33)
MCHC RBC AUTO-ENTMCNC: 34.1 G/DL (ref 31.5–35.7)
MCHC RBC AUTO-ENTMCNC: 34.7 G/DL (ref 31.5–35.7)
MCHC RBC AUTO-ENTMCNC: 35 G/DL (ref 31.5–35.7)
MCHC RBC AUTO-ENTMCNC: 35.2 G/DL (ref 31.5–35.7)
MCV RBC AUTO: 92.5 FL (ref 79–97)
MCV RBC AUTO: 92.9 FL (ref 79–97)
MCV RBC AUTO: 93 FL (ref 79–97)
MCV RBC AUTO: 94.2 FL (ref 79–97)
MONOCYTES # BLD AUTO: 0.9 10*3/MM3 (ref 0.1–0.9)
MONOCYTES NFR BLD AUTO: 8 % (ref 5–12)
NEUTROPHILS NFR BLD AUTO: 82.3 % (ref 42.7–76)
NEUTROPHILS NFR BLD AUTO: 9.1 10*3/MM3 (ref 1.7–7)
NRBC BLD AUTO-RTO: 0.2 /100 WBC (ref 0–0.2)
PATH REPORT.FINAL DX SPEC: NORMAL
PATH REPORT.GROSS SPEC: NORMAL
PHOSPHATE SERPL-MCNC: 3.5 MG/DL (ref 2.5–4.5)
PLATELET # BLD AUTO: 194 10*3/MM3 (ref 140–450)
PLATELET # BLD AUTO: 206 10*3/MM3 (ref 140–450)
PLATELET # BLD AUTO: 211 10*3/MM3 (ref 140–450)
PLATELET # BLD AUTO: 220 10*3/MM3 (ref 140–450)
PMV BLD AUTO: 7.9 FL (ref 6–12)
PMV BLD AUTO: 8 FL (ref 6–12)
PMV BLD AUTO: 8.1 FL (ref 6–12)
PMV BLD AUTO: 8.2 FL (ref 6–12)
POTASSIUM SERPL-SCNC: 4.1 MMOL/L (ref 3.5–5.2)
PROT SERPL-MCNC: 5.2 G/DL (ref 6–8.5)
QT INTERVAL: 398 MS
RBC # BLD AUTO: 2.75 10*6/MM3 (ref 3.77–5.28)
RBC # BLD AUTO: 2.77 10*6/MM3 (ref 3.77–5.28)
RBC # BLD AUTO: 2.8 10*6/MM3 (ref 3.77–5.28)
RBC # BLD AUTO: 2.87 10*6/MM3 (ref 3.77–5.28)
REF LAB TEST METHOD: NORMAL
SODIUM SERPL-SCNC: 129 MMOL/L (ref 136–145)
UNIT  ABO: NORMAL
UNIT  RH: NORMAL
WBC NRBC COR # BLD: 10.1 10*3/MM3 (ref 3.4–10.8)
WBC NRBC COR # BLD: 11 10*3/MM3 (ref 3.4–10.8)
WBC NRBC COR # BLD: 12.4 10*3/MM3 (ref 3.4–10.8)
WBC NRBC COR # BLD: 8.4 10*3/MM3 (ref 3.4–10.8)

## 2022-03-07 PROCEDURE — 25010000002 HEPARIN (PORCINE) PER 1000 UNITS: Performed by: INTERNAL MEDICINE

## 2022-03-07 PROCEDURE — 85027 COMPLETE CBC AUTOMATED: CPT | Performed by: INTERNAL MEDICINE

## 2022-03-07 PROCEDURE — 82962 GLUCOSE BLOOD TEST: CPT

## 2022-03-07 PROCEDURE — 94799 UNLISTED PULMONARY SVC/PX: CPT

## 2022-03-07 PROCEDURE — 84100 ASSAY OF PHOSPHORUS: CPT | Performed by: STUDENT IN AN ORGANIZED HEALTH CARE EDUCATION/TRAINING PROGRAM

## 2022-03-07 PROCEDURE — 82330 ASSAY OF CALCIUM: CPT | Performed by: INTERNAL MEDICINE

## 2022-03-07 PROCEDURE — 83735 ASSAY OF MAGNESIUM: CPT | Performed by: INTERNAL MEDICINE

## 2022-03-07 PROCEDURE — 63710000001 INSULIN LISPRO (HUMAN) PER 5 UNITS

## 2022-03-07 PROCEDURE — 93010 ELECTROCARDIOGRAM REPORT: CPT | Performed by: INTERNAL MEDICINE

## 2022-03-07 PROCEDURE — 25010000002 CEFEPIME PER 500 MG: Performed by: INTERNAL MEDICINE

## 2022-03-07 PROCEDURE — 99233 SBSQ HOSP IP/OBS HIGH 50: CPT | Performed by: INTERNAL MEDICINE

## 2022-03-07 PROCEDURE — 80053 COMPREHEN METABOLIC PANEL: CPT | Performed by: STUDENT IN AN ORGANIZED HEALTH CARE EDUCATION/TRAINING PROGRAM

## 2022-03-07 PROCEDURE — 93005 ELECTROCARDIOGRAM TRACING: CPT | Performed by: STUDENT IN AN ORGANIZED HEALTH CARE EDUCATION/TRAINING PROGRAM

## 2022-03-07 PROCEDURE — 85025 COMPLETE CBC W/AUTO DIFF WBC: CPT | Performed by: INTERNAL MEDICINE

## 2022-03-07 PROCEDURE — 63710000001 INSULIN GLARGINE PER 5 UNITS: Performed by: INTERNAL MEDICINE

## 2022-03-07 RX ORDER — CHOLECALCIFEROL (VITAMIN D3) 125 MCG
5 CAPSULE ORAL NIGHTLY
Status: DISCONTINUED | OUTPATIENT
Start: 2022-03-07 | End: 2022-03-17 | Stop reason: HOSPADM

## 2022-03-07 RX ADMIN — SENNOSIDES AND DOCUSATE SODIUM 2 TABLET: 50; 8.6 TABLET ORAL at 21:55

## 2022-03-07 RX ADMIN — ARFORMOTEROL TARTRATE 15 MCG: 15 SOLUTION RESPIRATORY (INHALATION) at 07:52

## 2022-03-07 RX ADMIN — INSULIN LISPRO 3 UNITS: 100 INJECTION, SOLUTION INTRAVENOUS; SUBCUTANEOUS at 08:09

## 2022-03-07 RX ADMIN — LEVOTHYROXINE SODIUM 50 MCG: 0.05 TABLET ORAL at 05:15

## 2022-03-07 RX ADMIN — ARFORMOTEROL TARTRATE 15 MCG: 15 SOLUTION RESPIRATORY (INHALATION) at 19:52

## 2022-03-07 RX ADMIN — METOPROLOL TARTRATE 25 MG: 25 TABLET, FILM COATED ORAL at 21:52

## 2022-03-07 RX ADMIN — SENNOSIDES AND DOCUSATE SODIUM 2 TABLET: 50; 8.6 TABLET ORAL at 08:09

## 2022-03-07 RX ADMIN — BUDESONIDE 0.5 MG: 0.5 INHALANT RESPIRATORY (INHALATION) at 19:57

## 2022-03-07 RX ADMIN — ASPIRIN 81 MG CHEWABLE TABLET 81 MG: 81 TABLET CHEWABLE at 08:10

## 2022-03-07 RX ADMIN — Medication 10 ML: at 21:57

## 2022-03-07 RX ADMIN — AMIODARONE HYDROCHLORIDE 200 MG: 200 TABLET ORAL at 12:58

## 2022-03-07 RX ADMIN — DILTIAZEM HYDROCHLORIDE 90 MG: 30 TABLET, FILM COATED ORAL at 05:15

## 2022-03-07 RX ADMIN — DILTIAZEM HYDROCHLORIDE 90 MG: 30 TABLET, FILM COATED ORAL at 13:28

## 2022-03-07 RX ADMIN — HEPARIN SODIUM 5000 UNITS: 5000 INJECTION INTRAVENOUS; SUBCUTANEOUS at 21:56

## 2022-03-07 RX ADMIN — INSULIN LISPRO 8 UNITS: 100 INJECTION, SOLUTION INTRAVENOUS; SUBCUTANEOUS at 11:24

## 2022-03-07 RX ADMIN — Medication 10 ML: at 08:13

## 2022-03-07 RX ADMIN — Medication 5 MG: at 21:55

## 2022-03-07 RX ADMIN — DILTIAZEM HYDROCHLORIDE 90 MG: 30 TABLET, FILM COATED ORAL at 21:52

## 2022-03-07 RX ADMIN — AMIODARONE HYDROCHLORIDE 200 MG: 200 TABLET ORAL at 21:59

## 2022-03-07 RX ADMIN — INSULIN GLARGINE 10 UNITS: 100 INJECTION, SOLUTION SUBCUTANEOUS at 22:00

## 2022-03-07 RX ADMIN — CEFEPIME HYDROCHLORIDE 2 G: 2 INJECTION, POWDER, FOR SOLUTION INTRAMUSCULAR; INTRAVENOUS at 12:58

## 2022-03-07 RX ADMIN — METOPROLOL TARTRATE 25 MG: 25 TABLET, FILM COATED ORAL at 08:10

## 2022-03-07 RX ADMIN — Medication 1 TABLET: at 08:10

## 2022-03-07 RX ADMIN — AMIODARONE HYDROCHLORIDE 200 MG: 200 TABLET ORAL at 05:15

## 2022-03-07 RX ADMIN — HEPARIN SODIUM 5000 UNITS: 5000 INJECTION INTRAVENOUS; SUBCUTANEOUS at 08:09

## 2022-03-07 RX ADMIN — BUDESONIDE 1 MG: 0.5 INHALANT RESPIRATORY (INHALATION) at 07:57

## 2022-03-07 RX ADMIN — CEFEPIME HYDROCHLORIDE 2 G: 2 INJECTION, POWDER, FOR SOLUTION INTRAMUSCULAR; INTRAVENOUS at 01:19

## 2022-03-07 NOTE — PROGRESS NOTES
Daily Progress Note        Atrial fibrillation with RVR (HCC)    Other hyperlipidemia    Essential hypertension    Hypothyroidism    Coronary artery disease involving coronary bypass graft of native heart without angina pectoris    Acute CHF (congestive heart failure) (HCC)    Acute hyponatremia    Elevated LFTs    Elevated TSH    Acute hyperglycemia    Obesity (BMI 30-39.9)      Assessment  Left hemothorax status post chest tube placement 3/5/2022  Acute respiratory distress  COPD exacerbation  Atrial fibrillation with RVR (HCC)    Other hyperlipidemia    Essential hypertension    Hypothyroidism    Coronary artery disease involving coronary bypass graft of native heart without angina pectoris    Acute CHF    Acute hyponatremia    Elevated LFTs    Elevated TSH    Acute hyperglycemia    Obesity (BMI 30-39.9)   ARF  Former smoker-quit 1980    Echo 3/3/2022  -EF 30%  -Severe right ventricular enlargement and moderate right atrial enlargement  -Severe left atrial enlargement  -RVSP 41.4    Plan  Chest tube management  Repeat chest x-ray in a.m.  Continue to titrate oxygen- Currently on 2L NC  Cefepime for PNA-finishes 3/13/2022  Inhaled corticosteroids  Bronchodilators  Electrolyte/Glycemic control  DVT Prophylaxis-Heparin SQ  Levothyroxine 50 MCG  Oral amiodarone      3/4/2022       LOS: 4 days     Subjective         Objective     Vital signs for last 24 hours:  Vitals:    03/07/22 0758 03/07/22 0805 03/07/22 0810 03/07/22 1052   BP:   136/64 131/71   BP Location:    Left arm   Patient Position:    Lying   Pulse:  73 63 66   Resp: 18 18 22   Temp:    97.8 °F (36.6 °C)   TempSrc:    Oral   SpO2:  100%  95%   Weight:       Height:           Intake/Output last 3 shifts:  I/O last 3 completed shifts:  In: 3237 [P.O.:1700; I.V.:1137; Blood:300; IV Piggyback:100]  Out: 820 [Urine:350; Chest Tube:470]  Intake/Output this shift:  I/O this shift:  In: 240 [P.O.:240]  Out: 50 [Chest Tube:50]      Radiology  Imaging Results  (Last 24 Hours)     ** No results found for the last 24 hours. **          Labs:  Results from last 7 days   Lab Units 03/07/22  1220   WBC 10*3/mm3 10.10   HEMOGLOBIN g/dL 9.1*   HEMATOCRIT % 26.0*   PLATELETS 10*3/mm3 211     Results from last 7 days   Lab Units 03/07/22  0014   SODIUM mmol/L 129*   POTASSIUM mmol/L 4.1   CHLORIDE mmol/L 94*   CO2 mmol/L 23.0   BUN mg/dL 36*   CREATININE mg/dL 1.15*   CALCIUM mg/dL 8.3*   BILIRUBIN mg/dL 0.3   ALK PHOS U/L 77   ALT (SGPT) U/L 28   AST (SGOT) U/L 19   GLUCOSE mg/dL 213*         Results from last 7 days   Lab Units 03/07/22  0014 03/06/22  0650 03/05/22  0536   ALBUMIN g/dL 3.10* 3.20* 3.00*     Results from last 7 days   Lab Units 03/03/22  1347 03/03/22  0618 03/02/22  2308   TROPONIN T ng/mL <0.010 <0.010 0.013         Results from last 7 days   Lab Units 03/07/22  0014   MAGNESIUM mg/dL 2.2     Results from last 7 days   Lab Units 03/04/22  2053 03/02/22  2308   INR  1.13* 1.03   APTT seconds  --  25.6     Results from last 7 days   Lab Units 03/02/22  2308   TSH uIU/mL 11.150*   FREE T4 ng/dL 1.17           Meds:   SCHEDULE  amiodarone, 200 mg, Oral, Q8H   Followed by  [START ON 3/13/2022] amiodarone, 200 mg, Oral, Q12H   Followed by  [START ON 3/27/2022] amiodarone, 200 mg, Oral, Daily  arformoterol, 15 mcg, Nebulization, BID - RT  aspirin, 81 mg, Oral, Daily  budesonide, 1 mg, Nebulization, BID - RT  cefepime, 2 g, Intravenous, Q12H  dilTIAZem, 90 mg, Oral, Q8H  heparin (porcine), 5,000 Units, Subcutaneous, Q12H  HYDROmorphone, 2 mg, Intravenous, Once  insulin glargine, 10 Units, Subcutaneous, Nightly  insulin lispro, 0-14 Units, Subcutaneous, TID AC  levothyroxine, 50 mcg, Oral, Q AM  melatonin, 5 mg, Oral, Nightly  metoprolol tartrate, 25 mg, Oral, BID  multivitamin with minerals, 1 tablet, Oral, Daily  senna-docusate sodium, 2 tablet, Oral, BID  sodium chloride, 10 mL, Intravenous, Q12H      Infusions  hold, 1 each      PRNs  •  acetaminophen **OR**  acetaminophen **OR** acetaminophen  •  aluminum-magnesium hydroxide-simethicone  •  senna-docusate sodium **AND** polyethylene glycol **AND** bisacodyl **AND** bisacodyl  •  dextrose  •  dextrose  •  glucagon (human recombinant)  •  hold  •  HYDROcodone-acetaminophen  •  insulin lispro **AND** insulin lispro  •  magnesium sulfate **OR** magnesium sulfate in D5W 1g/100mL (PREMIX)  •  nitroglycerin  •  ondansetron **OR** ondansetron  •  potassium chloride **OR** potassium chloride **OR** potassium chloride  •  sodium chloride    Physical Exam:  Physical Exam  Physical Exam  General Appearance:  Alert.  No distress noted.  HEENT:  Normocephalic, without obvious abnormality, Conjunctiva/corneas clear,.  Normal external ear canals, Nares normal, no drainage     Neck:  Supple, symmetrical, trachea midline. No JVD.  Lungs /Chest wall: Minimal bilateral basal Rales, respirations unlabored symmetrical wall movement.     Heart:  Regular rate and rhythm, systolic murmur PMI left sternal border  Abdomen: Soft, non-tender, no masses, no organomegaly.    Extremities: 1+ edema bilateral lower extremity, no clubbing or cyanosis      ROS  Review of Systems  Review of Systems       Constitutional: Negative for chills, fever and malaise/fatigue.   HENT: Negative.    Eyes: Negative.    Cardiovascular: Negative.    Respiratory: Positive for cough and shortness of breath.    Skin: Negative.    Musculoskeletal: Negative.    Gastrointestinal: Negative.    Genitourinary: Negative.    Neurological: Negative.    Psychiatric/Behavioral: Negative.          I have reviewed current clinicals.     Electronically signed by VALENTINE Durham, 03/07/22, 1:35 PM EST.     The NP scribed the note for me, I have examined the patient and reviewed and verified the above findings and and I formulated the assessment and plan as documented

## 2022-03-07 NOTE — PROGRESS NOTES
Referring Provider: Bharat Montgomery MD    Reason for follow-up:  Status post CABG  Atrial fibrillation  Hypertension  Pleural effusion     Patient Care Team:  Minerva Chatterjee MD as PCP - General (Internal Medicine)    Subjective .      ROS    Since I have last seen, the patient has been without any chest discomfort ,shortness of breath, palpitations, dizziness or syncope.  Denies having any headache ,abdominal pain ,nausea, vomiting , diarrhea constipation, loss of weight or loss of appetite.  Denies having any excessive bruising ,hematuria or blood in the stool.    Review of all systems negative except as indicated    History  Past Medical History:   Diagnosis Date   • Astigmatism, bilateral 2019   • Benign essential hypertension    • Bilateral presbyopia 2019   • CAD (coronary artery disease)    • Closed right ankle fracture    • COVID-19 vaccination refused    • Hyperlipidemia    • Hypothyroidism    • Influenza vaccine needed    • Myocardial infarction (HCC)    • Prediabetes    • Pseudophakia, both eyes 2019   • Thoracic back pain        Past Surgical History:   Procedure Laterality Date   • APPENDECTOMY     • CARDIAC CATHETERIZATION  2012    x3    • CORONARY ARTERY BYPASS GRAFT  2014   • CORONARY STENT PLACEMENT      x3   • HARDWARE REMOVAL Right 2020    Procedure: ANKLE/FOOT HARDWARE REMOVAL;  Surgeon: Lincoln Sibley MD;  Location: [facility];  Service: Orthopedics;  Laterality: Right;   • ORIF ANKLE FRACTURE Right 2019    Radha Vazquez Mem       Family History   Problem Relation Age of Onset   • Heart disease Mother    • Lung cancer Father    • Diabetes Other        Social History     Tobacco Use   • Smoking status: Former Smoker     Types: Cigarettes     Quit date: 1980     Years since quittin.2   • Smokeless tobacco: Never Used   • Tobacco comment: Social Drinker   Vaping Use   • Vaping Use: Never used   Substance Use Topics   • Alcohol use: Yes     Comment:  mod    • Drug use: No        Medications Prior to Admission   Medication Sig Dispense Refill Last Dose   • aspirin 81 MG chewable tablet Chew 81 mg Daily.   3/2/2022 at Unknown time   • Cholecalciferol (VITAMIN D3) 2000 units tablet Take 1 tablet by mouth Daily.   3/2/2022 at Unknown time   • irbesartan (Avapro) 300 MG tablet Take 150 mg by mouth Daily.      • metoprolol tartrate (LOPRESSOR) 25 MG tablet Take 1 tablet by mouth twice daily 180 tablet 0 3/2/2022 at Unknown time   • Multiple Vitamins-Minerals (MULTIVITAMIN ADULT EXTRA C PO) Take 1 tablet by mouth Daily.   3/2/2022 at Unknown time   • levothyroxine (SYNTHROID, LEVOTHROID) 50 MCG tablet Take 50 mcg by mouth Every Morning.          Allergies  Prednisone    Scheduled Meds:amiodarone, 200 mg, Oral, Q8H   Followed by  [START ON 3/13/2022] amiodarone, 200 mg, Oral, Q12H   Followed by  [START ON 3/27/2022] amiodarone, 200 mg, Oral, Daily  arformoterol, 15 mcg, Nebulization, BID - RT  aspirin, 81 mg, Oral, Daily  budesonide, 1 mg, Nebulization, BID - RT  cefepime, 2 g, Intravenous, Q12H  dilTIAZem, 90 mg, Oral, Q8H  heparin (porcine), 5,000 Units, Subcutaneous, Q12H  HYDROmorphone, 2 mg, Intravenous, Once  insulin glargine, 10 Units, Subcutaneous, Nightly  insulin lispro, 0-14 Units, Subcutaneous, TID AC  levothyroxine, 50 mcg, Oral, Q AM  metoprolol tartrate, 25 mg, Oral, BID  multivitamin with minerals, 1 tablet, Oral, Daily  senna-docusate sodium, 2 tablet, Oral, BID  sodium chloride, 10 mL, Intravenous, Q12H      Continuous Infusions:hold, 1 each      PRN Meds:.•  acetaminophen **OR** acetaminophen **OR** acetaminophen  •  aluminum-magnesium hydroxide-simethicone  •  senna-docusate sodium **AND** polyethylene glycol **AND** bisacodyl **AND** bisacodyl  •  dextrose  •  dextrose  •  glucagon (human recombinant)  •  hold  •  HYDROcodone-acetaminophen  •  insulin lispro **AND** insulin lispro  •  magnesium sulfate **OR** magnesium sulfate in D5W 1g/100mL  "(PREMIX)  •  melatonin  •  nitroglycerin  •  ondansetron **OR** ondansetron  •  potassium chloride **OR** potassium chloride **OR** potassium chloride  •  sodium chloride    Objective     VITAL SIGNS  Vitals:    03/06/22 2246 03/07/22 0222 03/07/22 0515 03/07/22 0544   BP: 151/67 127/63 (!) 157/107 127/77   BP Location: Left arm Left arm  Left arm   Patient Position: Sitting Lying  Lying   Pulse: 71 60 84 81   Resp: 15 15  18   Temp: 98 °F (36.7 °C) 98.4 °F (36.9 °C)  98 °F (36.7 °C)   TempSrc: Oral Oral  Oral   SpO2: 98% 100%  98%   Weight:       Height:           Flowsheet Rows    Flowsheet Row First Filed Value   Admission Height 162.6 cm (64\") Documented at 03/02/2022 2218   Admission Weight 81 kg (178 lb 9.2 oz) Documented at 03/02/2022 2218            Intake/Output Summary (Last 24 hours) at 3/7/2022 0642  Last data filed at 3/7/2022 0539  Gross per 24 hour   Intake 2190 ml   Output 600 ml   Net 1590 ml        TELEMETRY: Atrial fibrillation    Physical Exam:  The patient is alert, oriented and in no distress.  Vital signs as noted above.  Head and neck revealed no carotid bruits or jugular venous distention.  No thyromegaly or lymphadenopathy is present  Lungs clear.  No wheezing.  Breath sounds are normal bilaterally.  Heart normal first and second heart sounds.  No murmur. No precordial rub is present.  No gallop is present.  Abdomen soft and nontender.  No organomegaly is present.  Extremities with good peripheral pulses without any pedal edema.  Skin warm and dry.  Musculoskeletal system is grossly normal  CNS grossly normal      Results Review:   I reviewed the patient's new clinical results.  Lab Results (last 24 hours)     Procedure Component Value Units Date/Time    Phosphorus [[id number]]  (Normal) Collected: 03/07/22 0014    Specimen: Blood Updated: 03/07/22 0117     Phosphorus 3.5 mg/dL     Comprehensive Metabolic Panel [[id number]]  (Abnormal) Collected: 03/07/22 0014    Specimen: Blood Updated: " 03/07/22 0115     Glucose 213 mg/dL      BUN 36 mg/dL      Creatinine 1.15 mg/dL      Sodium 129 mmol/L      Potassium 4.1 mmol/L      Chloride 94 mmol/L      CO2 23.0 mmol/L      Calcium 8.3 mg/dL      Total Protein 5.2 g/dL      Albumin 3.10 g/dL      ALT (SGPT) 28 U/L      AST (SGOT) 19 U/L      Alkaline Phosphatase 77 U/L      Total Bilirubin 0.3 mg/dL      Globulin 2.1 gm/dL      A/G Ratio 1.5 g/dL      BUN/Creatinine Ratio 31.3     Anion Gap 12.0 mmol/L      eGFR 48.3 mL/min/1.73      Comment: National Kidney Foundation and American Society of Nephrology (ASN) Task Force recommended calculation based on the Chronic Kidney Disease Epidemiology Collaboration (CKD-EPI) equation refit without adjustment for race.       Narrative:      GFR Normal >60  Chronic Kidney Disease <60  Kidney Failure <15      Magnesium [[id number]]  (Normal) Collected: 03/07/22 0014    Specimen: Blood Updated: 03/07/22 0115     Magnesium 2.2 mg/dL     CBC & Differential [[id number]]  (Abnormal) Collected: 03/07/22 0014    Specimen: Blood Updated: 03/07/22 0109    Narrative:      The following orders were created for panel order CBC & Differential.  Procedure                               Abnormality         Status                     ---------                               -----------         ------                     CBC Auto Differential[[id number]]        Abnormal            Final result               Scan Slide[[id number]]                                                                    Please view results for these tests on the individual orders.    CBC Auto Differential [[id number]]  (Abnormal) Collected: 03/07/22 0014    Specimen: Blood Updated: 03/07/22 0109     WBC 11.00 10*3/mm3      RBC 2.75 10*6/mm3      Hemoglobin 8.9 g/dL      Hematocrit 25.5 %      MCV 93.0 fL      MCH 32.3 pg      MCHC 34.7 g/dL      RDW 15.8 %      RDW-SD 51.2 fl      MPV 8.2 fL      Platelets 206 10*3/mm3      Neutrophil % 82.3 %      Lymphocyte % 9.3 %       Monocyte % 8.0 %      Eosinophil % 0.3 %      Basophil % 0.1 %      Neutrophils, Absolute 9.10 10*3/mm3      Lymphocytes, Absolute 1.00 10*3/mm3      Monocytes, Absolute 0.90 10*3/mm3      Eosinophils, Absolute 0.00 10*3/mm3      Basophils, Absolute 0.00 10*3/mm3      nRBC 0.2 /100 WBC     Calcium, Ionized [[id number]]  (Normal) Collected: 03/07/22 0014    Specimen: Blood Updated: 03/07/22 0105     Ionized Calcium 1.21 mmol/L     POC Glucose Once [[id number]]  (Abnormal) Collected: 03/06/22 1958    Specimen: Blood Updated: 03/06/22 1959     Glucose 220 mg/dL      Comment: Serial Number: [id number]:  [id number]       CBC (No Diff) [[id number]]  (Abnormal) Collected: 03/06/22 1821    Specimen: Blood Updated: 03/06/22 1846     WBC 10.00 10*3/mm3      RBC 2.79 10*6/mm3      Hemoglobin 9.2 g/dL      Hematocrit 26.1 %      MCV 93.4 fL      MCH 32.8 pg      MCHC 35.1 g/dL      RDW 15.7 %      RDW-SD 51.2 fl      MPV 7.9 fL      Platelets 196 10*3/mm3     POC Glucose Once [[id number]]  (Abnormal) Collected: 03/06/22 1545    Specimen: Blood Updated: 03/06/22 1547     Glucose 173 mg/dL      Comment: Serial Number: [id number]:  [id number]       CBC (No Diff) [[id number]]  (Abnormal) Collected: 03/06/22 1217    Specimen: Blood Updated: 03/06/22 1226     WBC 10.70 10*3/mm3      RBC 2.50 10*6/mm3      Hemoglobin 8.1 g/dL      Hematocrit 24.3 %      MCV 97.0 fL      MCH 32.4 pg      MCHC 33.4 g/dL      RDW 14.1 %      RDW-SD 48.1 fl      MPV 7.9 fL      Platelets 220 10*3/mm3     POC Glucose Once [[id number]]  (Abnormal) Collected: 03/06/22 1120    Specimen: Blood Updated: 03/06/22 1121     Glucose 174 mg/dL      Comment: Serial Number: [id number]:  [id number]       Comprehensive Metabolic Panel [[id number]]  (Abnormal) Collected: 03/06/22 0650    Specimen: Blood Updated: 03/06/22 0716     Glucose 110 mg/dL      BUN 32 mg/dL      Creatinine 1.35 mg/dL      Sodium 133 mmol/L      Potassium 3.8 mmol/L      Chloride 96  mmol/L      CO2 24.0 mmol/L      Calcium 8.8 mg/dL      Total Protein 5.3 g/dL      Albumin 3.20 g/dL      ALT (SGPT) 27 U/L      AST (SGOT) 14 U/L      Alkaline Phosphatase 68 U/L      Total Bilirubin 0.3 mg/dL      Globulin 2.1 gm/dL      A/G Ratio 1.5 g/dL      BUN/Creatinine Ratio 23.7     Anion Gap 13.0 mmol/L      eGFR 39.8 mL/min/1.73      Comment: National Kidney Foundation and American Society of Nephrology (ASN) Task Force recommended calculation based on the Chronic Kidney Disease Epidemiology Collaboration (CKD-EPI) equation refit without adjustment for race.       Narrative:      GFR Normal >60  Chronic Kidney Disease <60  Kidney Failure <15      Phosphorus [[id number]]  (Abnormal) Collected: 03/06/22 0650    Specimen: Blood Updated: 03/06/22 0716     Phosphorus 4.8 mg/dL     Magnesium [[id number]]  (Normal) Collected: 03/06/22 0650    Specimen: Blood Updated: 03/06/22 0716     Magnesium 2.0 mg/dL     Calcium, Ionized [[id number]]  (Abnormal) Collected: 03/06/22 0650    Specimen: Blood Updated: 03/06/22 0704     Ionized Calcium 1.16 mmol/L     CBC & Differential [[id number]]  (Abnormal) Collected: 03/06/22 0650    Specimen: Blood Updated: 03/06/22 0658    Narrative:      The following orders were created for panel order CBC & Differential.  Procedure                               Abnormality         Status                     ---------                               -----------         ------                     CBC Auto Differential[[id number]]        Abnormal            Final result                 Please view results for these tests on the individual orders.    CBC Auto Differential [[id number]]  (Abnormal) Collected: 03/06/22 0650    Specimen: Blood Updated: 03/06/22 0658     WBC 11.30 10*3/mm3      RBC 2.34 10*6/mm3      Hemoglobin 7.5 g/dL      Hematocrit 22.9 %      MCV 97.5 fL      MCH 31.9 pg      MCHC 32.7 g/dL      RDW 14.1 %      RDW-SD 48.1 fl      MPV 7.7 fL      Platelets 225 10*3/mm3       Neutrophil % 79.1 %      Lymphocyte % 9.8 %      Monocyte % 10.9 %      Eosinophil % 0.1 %      Basophil % 0.1 %      Neutrophils, Absolute 8.90 10*3/mm3      Lymphocytes, Absolute 1.10 10*3/mm3      Monocytes, Absolute 1.20 10*3/mm3      Eosinophils, Absolute 0.00 10*3/mm3      Basophils, Absolute 0.00 10*3/mm3      nRBC 0.1 /100 WBC           Imaging Results (Last 24 Hours)     Procedure Component Value Units Date/Time    XR Chest 1 View [[id number]] Collected: 03/06/22 0834     Updated: 03/06/22 0837    Narrative:         DATE OF EXAM:   3/6/2022 5:12 AM     PROCEDURE:   XR CHEST 1 VW-     INDICATIONS:   Shortness of breath; I48.91-Unspecified atrial fibrillation; I50.9-Heart  failure, unspecified; Z20.822-Contact with and (suspected) exposure to  covid-19; I50.21-Acute systolic (congestive) heart failure     COMPARISON:  03/05/2022     TECHNIQUE:   Portable chest radiograph.     FINDINGS:    Sternotomy wires and changes of CABG noted. The right lung is without  consolidation. There is a chest tube overlying the left lung base.  Previously seen large left-sided pleural effusion is decreased in size.  There is a suspected residual small-to-moderate left basilar effusion.  There is a small amount of pleural air noted measuring up to 10 mm  abutting the left lateral chest wall. Left basilar airspace disease  likely compressive atelectasis.       Impression:      1. Decrease in size of large left pleural effusion with left-sided chest  tube in place. Small amount of pleural air noted compatible with  hydropneumothorax with small amount of pleural fluid component at the  left lung base.  2. Left perihilar and left basilar airspace disease likely compressive  atelectasis.     Electronically Signed By-Ronnie Carrero MD On:3/6/2022 8:35 AM  This report was finalized on [id number] by  Ronnie Carrero MD.      LAB RESULTS (LAST 7 DAYS)    CBC  Results from last 7 days   Lab Units 03/07/22  0014 03/06/22  1821 03/06/22  1217  03/06/22  0650 03/06/22  0034 03/05/22  0345 03/05/22  0051   WBC 10*3/mm3 11.00* 10.00 10.70 11.30* 13.20* 14.40* 12.40*   RBC 10*6/mm3 2.75* 2.79* 2.50* 2.34* 2.26* 3.22* 3.14*   HEMOGLOBIN g/dL 8.9* 9.2* 8.1* 7.5* 7.3* 10.8* 10.6*   HEMATOCRIT % 25.5* 26.1* 24.3* 22.9* 21.6* 31.2* 30.6*   MCV fL 93.0 93.4 97.0 97.5* 95.7 96.7 97.4*   PLATELETS 10*3/mm3 206 196 220 225 248 311 290       BMP  Results from last 7 days   Lab Units 03/07/22  0014 03/06/22  0650 03/05/22  0536 03/05/22 0345 03/04/22 0458 03/02/22  2308   SODIUM mmol/L 129* 133* 140 137 135* 130*   POTASSIUM mmol/L 4.1 3.8 4.4 4.2 3.1* 4.0   CHLORIDE mmol/L 94* 96* 103 98 93* 93*   CO2 mmol/L 23.0 24.0 22.0 26.0 30.0* 24.0   BUN mg/dL 36* 32* 28* 28* 16 21   CREATININE mg/dL 1.15* 1.35* 1.07* 0.96 0.77 0.83   GLUCOSE mg/dL 213* 110* 367* 274* 189* 242*   MAGNESIUM mg/dL 2.2 2.0  --  2.0 1.6 1.8   PHOSPHORUS mg/dL 3.5 4.8* 4.7*  --   --   --        CMP   Results from last 7 days   Lab Units 03/07/22  0014 03/06/22  0650 03/05/22  0536 03/05/22 0345 03/04/22 0458 03/02/22  2308   SODIUM mmol/L 129* 133* 140 137 135* 130*   POTASSIUM mmol/L 4.1 3.8 4.4 4.2 3.1* 4.0   CHLORIDE mmol/L 94* 96* 103 98 93* 93*   CO2 mmol/L 23.0 24.0 22.0 26.0 30.0* 24.0   BUN mg/dL 36* 32* 28* 28* 16 21   CREATININE mg/dL 1.15* 1.35* 1.07* 0.96 0.77 0.83   GLUCOSE mg/dL 213* 110* 367* 274* 189* 242*   ALBUMIN g/dL 3.10* 3.20* 3.00* 3.00*  --  3.60   BILIRUBIN mg/dL 0.3 0.3 0.4 0.4  --  0.7   ALK PHOS U/L 77 68 82 83  --  105   AST (SGOT) U/L 19 14 15 18  --  35*   ALT (SGPT) U/L 28 27 40* 43*  --  71*         BNP        TROPONIN  Results from last 7 days   Lab Units 03/03/22  1347   TROPONIN T ng/mL <0.010       CoAg  Results from last 7 days   Lab Units 03/04/22  2053 03/02/22  2308   INR  1.13* 1.03   APTT seconds  --  25.6       Creatinine Clearance  Estimated Creatinine Clearance: 38.3 mL/min (A) (by C-G formula based on SCr of 1.15 mg/dL (H)).    ABG         Radiology  XR Chest 1 View    Result Date: 3/6/2022  1. Decrease in size of large left pleural effusion with left-sided chest tube in place. Small amount of pleural air noted compatible with hydropneumothorax with small amount of pleural fluid component at the left lung base. 2. Left perihilar and left basilar airspace disease likely compressive atelectasis.  Electronically Signed By-Ronnie Carrero MD On:3/6/2022 8:35 AM This report was finalized on [id number] by  Ronnie Carrero MD.    XR Chest 1 View    Result Date: 3/5/2022  Large left pleural effusion which appears slightly smaller status post chest tube placement  Electronically Signed By-Cj Chong On:3/5/2022 3:03 PM This report was finalized on [id number] by  Cj Chong, .    XR Chest 1 View    Result Date: 3/5/2022  IMPRESSION : Continued increasing opacification of the left hemithorax compatible with large pleural effusion/hemothorax given prior CT.  Electronically Signed By-Jarrett De Jesus On:3/5/2022 10:17 AM This report was finalized on [id number] by  Jarrett De Jesus, .              EKG                              I personally viewed and interpreted the patient's EKG/Telemetry data: Atrial fibrillation    ECHOCARDIOGRAM:    Results for orders placed during the hospital encounter of 03/02/22    Adult Transthoracic Echo Complete W/ Cont if Necessary Per Protocol    Interpretation Summary  LVE with severe global left ventricular hypokinesis, estimated LV ejection fraction of 30%.  Severe right ventricular enlargement and moderate right atrial enlargement seen  Severe left atrial enlargement seen.  Aortic valve, mitral valve, tricuspid valve appears structurally normal, moderate mitral and mild tricuspid regurgitation seen.  Calculated RV systolic pressure of 40 to 45 mmHg.  No pericardial effusion seen.  Large pleural effusion seen.  Proximal aorta appears normal in size.          STRESS MYOVIEW:    Cardiolite (Tc-99m Sestamibi)  stress test    CARDIAC CATHETERIZATION:            OTHER:         Assessment/Plan     Principal Problem:    Atrial fibrillation with RVR (HCC)  Active Problems:    Other hyperlipidemia    Essential hypertension    Hypothyroidism    Coronary artery disease involving coronary bypass graft of native heart without angina pectoris    Acute CHF (congestive heart failure) (HCC)    Acute hyponatremia    Elevated LFTs    Elevated TSH    Acute hyperglycemia    Obesity (BMI 30-39.9)      Presented through emergency room 3/2/2022 with progressively worsening shortness of breath and dyspnea on exertion weight gain and leg edema. Was found to be in A. fib and congestive heart failure. Patient denies any chest pain. Patient lives in [place] and has a second home in [place] and see his cardiologist at [facility]. Patient has been having issues with alopecia and memory issues therefore stopped beta-blockers and statin on her own. Also has not been taking  thyroid replacement therapy since November because she ran out of medication. Patient has history of prediabetes. Does not check her sugars at home.  Work-up here revealed troponin x3 are negative. proBNP is 7717. CMP reveals a glucose of 242, hemoglobin A1c over 7.1. BUN/creatinine are 21/0.83. ALT elevated at 71, AST 35. TSH is 11.15  Chest x-ray 3/2/2022 reveals left pleural effusion, left basilar disease most likely atelectasis with possible pneumonia.  EKG 3/2/2022 reviewed/interpreted by me reveals A. fib with RVR at 126 bpm with PVCs      ]]]]]]]]]]]]]]]]]]]]  Impression  ==========  -Progressively worsening shortness of breath and leg edema.    -Congestive heart failure  Left ventricle dysfunction with ejection fraction of 30%.    -Significant biatrial enlargement    -Atrial fibrillation with RVR    -Premature ventricular contractions    -Status post CABG 12/2014    -Hypertension dyslipidemia prediabetes hypothyroidism elevation    -Status post ORIF    -Family history of  coronary artery disease    -Intolerance to prednisone    -Former smoker    -Medical noncompliance.  Was not taking thyroid medicine replacement properly.     =================  Plan  ===========  Atrial fibrillation with RVR.  Rate control.  Consider GABRIEL cardioversion.  Patient would benefit from long-term anticoagulation for atrial fibrillation because of high [id number] score due to female gender age over 75, hypertension, diabetes, CAD making it at least 6. We will start her on Eliquis or warfarin before discharge.  Continued IV diuresis.  Observe renal function closely.  Elevated LFTs due to passive congestion.  Large left pleural effusion.  Status post drainage and chest tube.  More than 400 cc of fluid was removed.  Patient is on Cardizem and amiodarone.    Echocardiogram showed severe right ventricle enlargement left atrial enlargement related enlargement and calculated pulmonary artery pressure of 45 mmHg.  Left ventricle dysfunction with ejection fraction of 30%.    Ischemic cardiomyopathy    Follow-up labs ordered.    Further plan will depend on patient's progress.  ]]]]]]]]]]]]]]]]]           Yordan Evans MD  03/07/22  06:42 EST

## 2022-03-07 NOTE — PLAN OF CARE
Problem: Adult Inpatient Plan of Care  Goal: Plan of Care Review  Outcome: Ongoing, Progressing  Goal: Patient-Specific Goal (Individualized)  Outcome: Ongoing, Progressing  Goal: Absence of Hospital-Acquired Illness or Injury  Outcome: Ongoing, Progressing  Intervention: Identify and Manage Fall Risk  Recent Flowsheet Documentation  Taken 3/7/2022 1400 by Chica Mast LPN  Safety Promotion/Fall Prevention:   activity supervised   assistive device/personal items within reach   clutter free environment maintained   fall prevention program maintained   gait belt   nonskid shoes/slippers when out of bed   safety round/check completed  Taken 3/7/2022 1200 by Chica Mast LPN  Safety Promotion/Fall Prevention:   assistive device/personal items within reach   activity supervised   clutter free environment maintained   gait belt   nonskid shoes/slippers when out of bed   safety round/check completed   fall prevention program maintained  Taken 3/7/2022 1000 by Chica Mast LPN  Safety Promotion/Fall Prevention:   assistive device/personal items within reach   activity supervised   clutter free environment maintained   fall prevention program maintained   gait belt   nonskid shoes/slippers when out of bed   safety round/check completed  Taken 3/7/2022 0800 by Chica Mast LPN  Safety Promotion/Fall Prevention:   activity supervised   assistive device/personal items within reach   clutter free environment maintained   fall prevention program maintained   gait belt   nonskid shoes/slippers when out of bed   safety round/check completed  Intervention: Prevent Skin Injury  Recent Flowsheet Documentation  Taken 3/7/2022 1200 by Chica Mast LPN  Skin Protection:   adhesive use limited   tubing/devices free from skin contact  Taken 3/7/2022 0800 by Chica Mast LPN  Skin Protection:   adhesive use limited   tubing/devices free from skin contact  Intervention: Prevent and Manage VTE (venous thromboembolism)  Risk  Recent Flowsheet Documentation  Taken 3/7/2022 1200 by Chica aMst LPN  VTE Prevention/Management:   bilateral   sequential compression devices off   patient refused intervention  Taken 3/7/2022 0800 by Chica Mast LPN  VTE Prevention/Management:   bilateral   sequential compression devices off   patient refused intervention  Goal: Optimal Comfort and Wellbeing  Outcome: Ongoing, Progressing  Intervention: Provide Person-Centered Care  Recent Flowsheet Documentation  Taken 3/7/2022 1200 by Chica Mast LPN  Trust Relationship/Rapport:   care explained   thoughts/feelings acknowledged   questions encouraged   reassurance provided  Taken 3/7/2022 0800 by Chica Mast LPN  Trust Relationship/Rapport:   care explained   thoughts/feelings acknowledged   reassurance provided   questions encouraged  Goal: Readiness for Transition of Care  Outcome: Ongoing, Progressing     Problem: Arrhythmia/Dysrhythmia (Acute Coronary Syndrome)  Goal: Normalized Cardiac Rhythm  Outcome: Ongoing, Progressing  Intervention: Monitor and Manage Cardiac Rhythm Effects  Recent Flowsheet Documentation  Taken 3/7/2022 1200 by Chica Mast LPN  VTE Prevention/Management:   bilateral   sequential compression devices off   patient refused intervention  Taken 3/7/2022 0800 by Chica Mast LPN  VTE Prevention/Management:   bilateral   sequential compression devices off   patient refused intervention     Problem: Skin Injury Risk Increased  Goal: Skin Health and Integrity  Outcome: Ongoing, Progressing  Intervention: Optimize Skin Protection  Recent Flowsheet Documentation  Taken 3/7/2022 1200 by Chica Mast LPN  Pressure Reduction Techniques: frequent weight shift encouraged  Pressure Reduction Devices:   pressure-redistributing mattress utilized   positioning supports utilized  Skin Protection:   adhesive use limited   tubing/devices free from skin contact  Taken 3/7/2022 0800 by Chica Mast LPN  Pressure Reduction  Techniques: frequent weight shift encouraged  Pressure Reduction Devices:   pressure-redistributing mattress utilized   positioning supports utilized  Skin Protection:   adhesive use limited   tubing/devices free from skin contact     Problem: Fall Injury Risk  Goal: Absence of Fall and Fall-Related Injury  Outcome: Ongoing, Progressing  Intervention: Identify and Manage Contributors  Recent Flowsheet Documentation  Taken 3/7/2022 1400 by Chica Mast LPN  Medication Review/Management: medications reviewed  Taken 3/7/2022 1200 by Chica Mast LPN  Medication Review/Management: medications reviewed  Taken 3/7/2022 1000 by Chica Mast LPN  Medication Review/Management: medications reviewed  Taken 3/7/2022 0800 by Chica Mast LPN  Medication Review/Management: medications reviewed  Intervention: Promote Injury-Free Environment  Recent Flowsheet Documentation  Taken 3/7/2022 1400 by Chica Mast LPN  Safety Promotion/Fall Prevention:   activity supervised   assistive device/personal items within reach   clutter free environment maintained   fall prevention program maintained   gait belt   nonskid shoes/slippers when out of bed   safety round/check completed  Taken 3/7/2022 1200 by Chica Mast LPN  Safety Promotion/Fall Prevention:   assistive device/personal items within reach   activity supervised   clutter free environment maintained   gait belt   nonskid shoes/slippers when out of bed   safety round/check completed   fall prevention program maintained  Taken 3/7/2022 1000 by Chica Mast LPN  Safety Promotion/Fall Prevention:   assistive device/personal items within reach   activity supervised   clutter free environment maintained   fall prevention program maintained   gait belt   nonskid shoes/slippers when out of bed   safety round/check completed  Taken 3/7/2022 0800 by Chica Mast LPN  Safety Promotion/Fall Prevention:   activity supervised   assistive device/personal items within  reach   clutter free environment maintained   fall prevention program maintained   gait belt   nonskid shoes/slippers when out of bed   safety round/check completed   Goal Outcome Evaluation:

## 2022-03-07 NOTE — PROGRESS NOTES
[facility] Daily Progress Note    Patient Name: Jaquelin Valderrama  : 1940  MRN: [id number]  Primary Care Physician:  Minerva Chatterjee MD  Date of admission: 3/2/2022      Subjective      Chief Complaint: Atrial fibrillation with RVR pleural effusion      Patient Reports states she is doing just fine.  Denies any pain.  Breathing fine on room air during my evaluation.    ROS negative except as above      Objective      Vitals:   Temp:  [96.6 °F (35.9 °C)-98.4 °F (36.9 °C)] 96.6 °F (35.9 °C)  Heart Rate:  [60-84] 79  Resp:  [14-22] 19  BP: (107-157)/() 131/79  Flow (L/min):  [2] 2    Physical Exam  Vitals reviewed.   Constitutional:       Comments: Left chest tube in place  Patient is on room air during my evaluation   HENT:      Head: Normocephalic.      Nose: Nose normal.      Mouth/Throat:      Mouth: Mucous membranes are moist.   Eyes:      Pupils: Pupils are equal, round, and reactive to light.   Cardiovascular:      Rate and Rhythm: Normal rate and regular rhythm.      Pulses: Normal pulses.      Heart sounds: Normal heart sounds.   Pulmonary:      Effort: Pulmonary effort is normal.      Comments: Left chest tube catheter in place  Left side decreased breath sounds  Abdominal:      General: Abdomen is flat.      Palpations: Abdomen is soft.   Musculoskeletal:         General: Normal range of motion.      Cervical back: Normal range of motion.   Skin:     General: Skin is warm.      Capillary Refill: Capillary refill takes less than 2 seconds.   Neurological:      General: No focal deficit present.      Mental Status: She is alert and oriented to person, place, and time.   Psychiatric:         Mood and Affect: Mood normal.         Behavior: Behavior normal.             Result Review    Result Review:  I have personally reviewed the results from the time of this admission to 3/7/2022 18:36 EST and agree with these findings:  [x]  Laboratory  []  Microbiology  []   Radiology  []  EKG/Telemetry   []  Cardiology/Vascular   []  Pathology  []  Old records  []  Other:  Most notable findings include: Mildly anemic hemoglobin 9.4    Wounds (last 24 hours)             Assessment/Plan      Brief Patient Summary:  Jaquelin Valderrama is a 82 y.o. female who presented with A. fib RVR and pleural effusion status post chest tube      amiodarone, 200 mg, Oral, Q8H   Followed by  [START ON 3/13/2022] amiodarone, 200 mg, Oral, Q12H   Followed by  [START ON 3/27/2022] amiodarone, 200 mg, Oral, Daily  arformoterol, 15 mcg, Nebulization, BID - RT  aspirin, 81 mg, Oral, Daily  budesonide, 1 mg, Nebulization, BID - RT  cefepime, 2 g, Intravenous, Q12H  dilTIAZem, 90 mg, Oral, Q8H  heparin (porcine), 5,000 Units, Subcutaneous, Q12H  HYDROmorphone, 2 mg, Intravenous, Once  insulin glargine, 10 Units, Subcutaneous, Nightly  insulin lispro, 0-14 Units, Subcutaneous, TID AC  levothyroxine, 50 mcg, Oral, Q AM  melatonin, 5 mg, Oral, Nightly  metoprolol tartrate, 25 mg, Oral, BID  multivitamin with minerals, 1 tablet, Oral, Daily  senna-docusate sodium, 2 tablet, Oral, BID  sodium chloride, 10 mL, Intravenous, Q12H       hold, 1 each         Active Hospital Problems:  Active Hospital Problems    Diagnosis    • **Atrial fibrillation with RVR (HCC)    • Acute CHF (congestive heart failure) (HCC)    • Acute hyponatremia    • Elevated LFTs    • Elevated TSH    • Acute hyperglycemia    • Obesity (BMI 30-39.9)    • Coronary artery disease involving coronary bypass graft of native heart without angina pectoris    • Essential hypertension    • Hypothyroidism    • Other hyperlipidemia        ASSESSMENT:  Possible hemothorax  Acute respiratory distress  COPD exacerbation  Atrial fibrillation with RVR (HCC), now resolved    Other hyperlipidemia    Essential hypertension    Hypothyroidism    Coronary artery disease involving coronary bypass graft of native heart without angina pectoris    Acute CHF (congestive heart  failure) (HCC) EFG 45%    Acute hyponatremia    Elevated LFTs    Elevated TSH    Acute hyperglycemia    Obesity (BMI 30-39.9)   ARF     PLAN:  Hold on anticoagulation given anemia.  Check occult stool.  Source of blood loss likely chest tube due to hemothorax  Blood transfusion as needed  Chest tube management  per pulmonary  Encouraged to use I-S flutter valve  Heart rate control  Continue to monitor blood pressure  Bronchodilator  Inhaled corticosteroids  Electrolytes/ glycemic control  Add heparin sq prophylaxis         DVT prophylaxis:  Medical DVT prophylaxis orders are present.    CODE STATUS:    Code Status (Patient has no pulse and is not breathing): CPR (Attempt to Resuscitate)  Medical Interventions (Patient has pulse or is breathing): Full Support      Disposition:  I expect patient to be discharged once stable.    This patient has been examined wearing appropriate Personal Protective Equipment and discussed with Nursing staff. 03/07/22      Electronically signed by Mp Galicia MD, 03/07/22, 18:36 EST.  [facility]ist Team

## 2022-03-07 NOTE — CASE MANAGEMENT/SOCIAL WORK
Continued Stay Note  GUERLINE Vazquez     Patient Name: Jaquelin Valderrama  MRN: [id number]  Today's Date: 3/7/2022    Admit Date: 3/2/2022     Discharge Plan     Row Name 03/07/22 1610       Plan    Plan Comments d/c barrier: Chest tube              Phone communication or documentation only - no physical contact with patient or family.      Elina Barrios RN

## 2022-03-07 NOTE — PLAN OF CARE
Problem: Adult Inpatient Plan of Care  Goal: Plan of Care Review  Outcome: Ongoing, Progressing  Flowsheets (Taken 3/7/2022 0324)  Progress: improving  Plan of Care Reviewed With:   patient   spouse  Goal: Patient-Specific Goal (Individualized)  Outcome: Ongoing, Progressing  Goal: Absence of Hospital-Acquired Illness or Injury  Outcome: Ongoing, Progressing  Intervention: Identify and Manage Fall Risk  Recent Flowsheet Documentation  Taken 3/7/2022 0200 by Melisa Agustin LPN  Safety Promotion/Fall Prevention:   activity supervised   assistive device/personal items within reach   clutter free environment maintained   fall prevention program maintained   lighting adjusted   nonskid shoes/slippers when out of bed   room organization consistent   safety round/check completed  Taken 3/7/2022 0000 by Melisa Agustin LPN  Safety Promotion/Fall Prevention:   activity supervised   assistive device/personal items within reach   clutter free environment maintained   fall prevention program maintained   lighting adjusted   nonskid shoes/slippers when out of bed   room organization consistent   safety round/check completed  Taken 3/6/2022 2200 by Melisa Agustin LPN  Safety Promotion/Fall Prevention:   activity supervised   assistive device/personal items within reach   clutter free environment maintained   fall prevention program maintained   lighting adjusted   nonskid shoes/slippers when out of bed   room organization consistent   safety round/check completed  Taken 3/6/2022 2000 by Melisa Agustin LPN  Safety Promotion/Fall Prevention:   activity supervised   assistive device/personal items within reach   clutter free environment maintained   fall prevention program maintained   lighting adjusted   nonskid shoes/slippers when out of bed   room organization consistent   safety round/check completed  Intervention: Prevent Skin Injury  Recent Flowsheet Documentation  Taken 3/6/2022 2000 by Melisa Agustin LPN  Skin  Protection: adhesive use limited  Intervention: Prevent and Manage VTE (venous thromboembolism) Risk  Recent Flowsheet Documentation  Taken 3/7/2022 0000 by Melisa Agustin LPN  VTE Prevention/Management: (Stated she can not sleep when on.)   bilateral   sequential compression devices off   patient refused intervention  Taken 3/6/2022 2000 by Melisa Agustin LPN  VTE Prevention/Management:   bilateral   sequential compression devices on  Goal: Optimal Comfort and Wellbeing  Outcome: Ongoing, Progressing  Intervention: Provide Person-Centered Care  Recent Flowsheet Documentation  Taken 3/6/2022 2000 by Melisa Agustin LPN  Trust Relationship/Rapport:   care explained   choices provided   emotional support provided  Goal: Readiness for Transition of Care  Outcome: Ongoing, Progressing     Problem: Arrhythmia/Dysrhythmia (Acute Coronary Syndrome)  Goal: Normalized Cardiac Rhythm  Outcome: Ongoing, Progressing  Intervention: Monitor and Manage Cardiac Rhythm Effects  Recent Flowsheet Documentation  Taken 3/7/2022 0000 by Melisa Agustin LPN  VTE Prevention/Management: (Stated she can not sleep when on.)   bilateral   sequential compression devices off   patient refused intervention  Taken 3/6/2022 2000 by Melisa Agustin LPN  VTE Prevention/Management:   bilateral   sequential compression devices on     Problem: Skin Injury Risk Increased  Goal: Skin Health and Integrity  Outcome: Ongoing, Progressing  Intervention: Optimize Skin Protection  Recent Flowsheet Documentation  Taken 3/6/2022 2000 by Melisa Agustin LPN  Pressure Reduction Techniques:   frequent weight shift encouraged   weight shift assistance provided  Pressure Reduction Devices: pressure-redistributing mattress utilized  Skin Protection: adhesive use limited     Problem: Fall Injury Risk  Goal: Absence of Fall and Fall-Related Injury  Outcome: Ongoing, Progressing  Intervention: Identify and Manage Contributors  Recent Flowsheet Documentation  Taken  3/7/2022 0200 by Melisa Agustin LPN  Medication Review/Management: medications reviewed  Taken 3/7/2022 0000 by Melisa Agustin LPN  Medication Review/Management: medications reviewed  Taken 3/6/2022 2200 by Melisa Agustin LPN  Medication Review/Management: medications reviewed  Taken 3/6/2022 2000 by Melisa Agustin LPN  Medication Review/Management: medications reviewed  Intervention: Promote Injury-Free Environment  Recent Flowsheet Documentation  Taken 3/7/2022 0200 by Melisa Agustin LPN  Safety Promotion/Fall Prevention:   activity supervised   assistive device/personal items within reach   clutter free environment maintained   fall prevention program maintained   lighting adjusted   nonskid shoes/slippers when out of bed   room organization consistent   safety round/check completed  Taken 3/7/2022 0000 by Melisa Agustin LPN  Safety Promotion/Fall Prevention:   activity supervised   assistive device/personal items within reach   clutter free environment maintained   fall prevention program maintained   lighting adjusted   nonskid shoes/slippers when out of bed   room organization consistent   safety round/check completed  Taken 3/6/2022 2200 by Melisa Agustin LPN  Safety Promotion/Fall Prevention:   activity supervised   assistive device/personal items within reach   clutter free environment maintained   fall prevention program maintained   lighting adjusted   nonskid shoes/slippers when out of bed   room organization consistent   safety round/check completed  Taken 3/6/2022 2000 by Melisa Agustin LPN  Safety Promotion/Fall Prevention:   activity supervised   assistive device/personal items within reach   clutter free environment maintained   fall prevention program maintained   lighting adjusted   nonskid shoes/slippers when out of bed   room organization consistent   safety round/check completed   Goal Outcome Evaluation:  Plan of Care Reviewed With: patient, spouse        Progress:  improving    Patient resting in bed with eyes closed, all safety measures in place, call light within reach.  IV atb. per order, chest tube to suction per order.  Patient denies pain, will continue to monitor.

## 2022-03-08 ENCOUNTER — APPOINTMENT (OUTPATIENT)
Dept: GENERAL RADIOLOGY | Facility: HOSPITAL | Age: 82
End: 2022-03-08

## 2022-03-08 LAB
ALBUMIN SERPL-MCNC: 2.9 G/DL (ref 3.5–5.2)
ALBUMIN/GLOB SERPL: 1.2 G/DL
ALP SERPL-CCNC: 77 U/L (ref 39–117)
ALT SERPL W P-5'-P-CCNC: 27 U/L (ref 1–33)
ANION GAP SERPL CALCULATED.3IONS-SCNC: 11 MMOL/L (ref 5–15)
AST SERPL-CCNC: 17 U/L (ref 1–32)
BASOPHILS # BLD AUTO: 0 10*3/MM3 (ref 0–0.2)
BASOPHILS NFR BLD AUTO: 0.2 % (ref 0–1.5)
BILIRUB SERPL-MCNC: 0.3 MG/DL (ref 0–1.2)
BUN SERPL-MCNC: 30 MG/DL (ref 8–23)
BUN/CREAT SERPL: 29.1 (ref 7–25)
CA-I SERPL ISE-MCNC: 1.2 MMOL/L (ref 1.2–1.3)
CALCIUM SPEC-SCNC: 8.5 MG/DL (ref 8.6–10.5)
CHLORIDE SERPL-SCNC: 96 MMOL/L (ref 98–107)
CO2 SERPL-SCNC: 23 MMOL/L (ref 22–29)
CREAT SERPL-MCNC: 1.03 MG/DL (ref 0.57–1)
CRP SERPL-MCNC: 6.48 MG/DL (ref 0–0.5)
DEPRECATED RDW RBC AUTO: 50.8 FL (ref 37–54)
DEPRECATED RDW RBC AUTO: 50.8 FL (ref 37–54)
DEPRECATED RDW RBC AUTO: 51.2 FL (ref 37–54)
DEPRECATED RDW RBC AUTO: 51.6 FL (ref 37–54)
EGFRCR SERPLBLD CKD-EPI 2021: 55.1 ML/MIN/1.73
EOSINOPHIL # BLD AUTO: 0.1 10*3/MM3 (ref 0–0.4)
EOSINOPHIL NFR BLD AUTO: 0.6 % (ref 0.3–6.2)
ERYTHROCYTE [DISTWIDTH] IN BLOOD BY AUTOMATED COUNT: 15.6 % (ref 12.3–15.4)
ERYTHROCYTE [DISTWIDTH] IN BLOOD BY AUTOMATED COUNT: 15.6 % (ref 12.3–15.4)
ERYTHROCYTE [DISTWIDTH] IN BLOOD BY AUTOMATED COUNT: 15.7 % (ref 12.3–15.4)
ERYTHROCYTE [DISTWIDTH] IN BLOOD BY AUTOMATED COUNT: 15.7 % (ref 12.3–15.4)
GLOBULIN UR ELPH-MCNC: 2.4 GM/DL
GLUCOSE BLDC GLUCOMTR-MCNC: 144 MG/DL (ref 70–105)
GLUCOSE BLDC GLUCOMTR-MCNC: 144 MG/DL (ref 70–105)
GLUCOSE BLDC GLUCOMTR-MCNC: 172 MG/DL (ref 70–105)
GLUCOSE BLDC GLUCOMTR-MCNC: 179 MG/DL (ref 70–105)
GLUCOSE SERPL-MCNC: 169 MG/DL (ref 65–99)
HCT VFR BLD AUTO: 25.8 % (ref 34–46.6)
HCT VFR BLD AUTO: 27.1 % (ref 34–46.6)
HCT VFR BLD AUTO: 28.1 % (ref 34–46.6)
HCT VFR BLD AUTO: 28.7 % (ref 34–46.6)
HGB BLD-MCNC: 9 G/DL (ref 12–15.9)
HGB BLD-MCNC: 9 G/DL (ref 12–15.9)
HGB BLD-MCNC: 9.6 G/DL (ref 12–15.9)
HGB BLD-MCNC: 9.7 G/DL (ref 12–15.9)
LYMPHOCYTES # BLD AUTO: 1.3 10*3/MM3 (ref 0.7–3.1)
LYMPHOCYTES NFR BLD AUTO: 10.9 % (ref 19.6–45.3)
MAGNESIUM SERPL-MCNC: 2.1 MG/DL (ref 1.6–2.4)
MCH RBC QN AUTO: 31.5 PG (ref 26.6–33)
MCH RBC QN AUTO: 31.9 PG (ref 26.6–33)
MCH RBC QN AUTO: 32.6 PG (ref 26.6–33)
MCH RBC QN AUTO: 32.7 PG (ref 26.6–33)
MCHC RBC AUTO-ENTMCNC: 33.4 G/DL (ref 31.5–35.7)
MCHC RBC AUTO-ENTMCNC: 33.9 G/DL (ref 31.5–35.7)
MCHC RBC AUTO-ENTMCNC: 34.2 G/DL (ref 31.5–35.7)
MCHC RBC AUTO-ENTMCNC: 34.9 G/DL (ref 31.5–35.7)
MCV RBC AUTO: 93.6 FL (ref 79–97)
MCV RBC AUTO: 94.2 FL (ref 79–97)
MCV RBC AUTO: 94.2 FL (ref 79–97)
MCV RBC AUTO: 95.1 FL (ref 79–97)
MONOCYTES # BLD AUTO: 1 10*3/MM3 (ref 0.1–0.9)
MONOCYTES NFR BLD AUTO: 8.5 % (ref 5–12)
NEUTROPHILS NFR BLD AUTO: 79.8 % (ref 42.7–76)
NEUTROPHILS NFR BLD AUTO: 9.8 10*3/MM3 (ref 1.7–7)
NRBC BLD AUTO-RTO: 0.2 /100 WBC (ref 0–0.2)
PHOSPHATE SERPL-MCNC: 2.6 MG/DL (ref 2.5–4.5)
PLATELET # BLD AUTO: 208 10*3/MM3 (ref 140–450)
PLATELET # BLD AUTO: 251 10*3/MM3 (ref 140–450)
PLATELET # BLD AUTO: 257 10*3/MM3 (ref 140–450)
PLATELET # BLD AUTO: 259 10*3/MM3 (ref 140–450)
PMV BLD AUTO: 7.8 FL (ref 6–12)
PMV BLD AUTO: 8 FL (ref 6–12)
PMV BLD AUTO: 8 FL (ref 6–12)
PMV BLD AUTO: 8.2 FL (ref 6–12)
POTASSIUM SERPL-SCNC: 4.1 MMOL/L (ref 3.5–5.2)
PROT SERPL-MCNC: 5.3 G/DL (ref 6–8.5)
QT INTERVAL: 410 MS
QT INTERVAL: 416 MS
RBC # BLD AUTO: 2.75 10*6/MM3 (ref 3.77–5.28)
RBC # BLD AUTO: 2.87 10*6/MM3 (ref 3.77–5.28)
RBC # BLD AUTO: 2.96 10*6/MM3 (ref 3.77–5.28)
RBC # BLD AUTO: 3.04 10*6/MM3 (ref 3.77–5.28)
SODIUM SERPL-SCNC: 130 MMOL/L (ref 136–145)
WBC NRBC COR # BLD: 10.1 10*3/MM3 (ref 3.4–10.8)
WBC NRBC COR # BLD: 12.2 10*3/MM3 (ref 3.4–10.8)
WBC NRBC COR # BLD: 12.6 10*3/MM3 (ref 3.4–10.8)
WBC NRBC COR # BLD: 13.5 10*3/MM3 (ref 3.4–10.8)

## 2022-03-08 PROCEDURE — 84100 ASSAY OF PHOSPHORUS: CPT | Performed by: STUDENT IN AN ORGANIZED HEALTH CARE EDUCATION/TRAINING PROGRAM

## 2022-03-08 PROCEDURE — 85027 COMPLETE CBC AUTOMATED: CPT | Performed by: INTERNAL MEDICINE

## 2022-03-08 PROCEDURE — 82962 GLUCOSE BLOOD TEST: CPT

## 2022-03-08 PROCEDURE — 25010000002 CEFEPIME PER 500 MG: Performed by: INTERNAL MEDICINE

## 2022-03-08 PROCEDURE — 63710000001 INSULIN GLARGINE PER 5 UNITS: Performed by: INTERNAL MEDICINE

## 2022-03-08 PROCEDURE — 94761 N-INVAS EAR/PLS OXIMETRY MLT: CPT

## 2022-03-08 PROCEDURE — 99233 SBSQ HOSP IP/OBS HIGH 50: CPT | Performed by: INTERNAL MEDICINE

## 2022-03-08 PROCEDURE — 99232 SBSQ HOSP IP/OBS MODERATE 35: CPT | Performed by: INTERNAL MEDICINE

## 2022-03-08 PROCEDURE — 80053 COMPREHEN METABOLIC PANEL: CPT | Performed by: STUDENT IN AN ORGANIZED HEALTH CARE EDUCATION/TRAINING PROGRAM

## 2022-03-08 PROCEDURE — 93010 ELECTROCARDIOGRAM REPORT: CPT | Performed by: INTERNAL MEDICINE

## 2022-03-08 PROCEDURE — 82330 ASSAY OF CALCIUM: CPT | Performed by: INTERNAL MEDICINE

## 2022-03-08 PROCEDURE — 94799 UNLISTED PULMONARY SVC/PX: CPT

## 2022-03-08 PROCEDURE — 83735 ASSAY OF MAGNESIUM: CPT | Performed by: INTERNAL MEDICINE

## 2022-03-08 PROCEDURE — 25010000002 HEPARIN (PORCINE) PER 1000 UNITS: Performed by: INTERNAL MEDICINE

## 2022-03-08 PROCEDURE — 85025 COMPLETE CBC W/AUTO DIFF WBC: CPT | Performed by: INTERNAL MEDICINE

## 2022-03-08 PROCEDURE — 93005 ELECTROCARDIOGRAM TRACING: CPT | Performed by: STUDENT IN AN ORGANIZED HEALTH CARE EDUCATION/TRAINING PROGRAM

## 2022-03-08 PROCEDURE — 71045 X-RAY EXAM CHEST 1 VIEW: CPT

## 2022-03-08 PROCEDURE — 86140 C-REACTIVE PROTEIN: CPT | Performed by: INTERNAL MEDICINE

## 2022-03-08 PROCEDURE — 63710000001 INSULIN LISPRO (HUMAN) PER 5 UNITS

## 2022-03-08 RX ADMIN — ARFORMOTEROL TARTRATE 15 MCG: 15 SOLUTION RESPIRATORY (INHALATION) at 18:46

## 2022-03-08 RX ADMIN — BUDESONIDE 0.5 MG: 0.5 INHALANT RESPIRATORY (INHALATION) at 09:23

## 2022-03-08 RX ADMIN — ASPIRIN 81 MG CHEWABLE TABLET 81 MG: 81 TABLET CHEWABLE at 08:53

## 2022-03-08 RX ADMIN — AMIODARONE HYDROCHLORIDE 200 MG: 200 TABLET ORAL at 22:15

## 2022-03-08 RX ADMIN — SENNOSIDES AND DOCUSATE SODIUM 2 TABLET: 50; 8.6 TABLET ORAL at 08:53

## 2022-03-08 RX ADMIN — LEVOTHYROXINE SODIUM 50 MCG: 0.05 TABLET ORAL at 06:11

## 2022-03-08 RX ADMIN — DILTIAZEM HYDROCHLORIDE 90 MG: 30 TABLET, FILM COATED ORAL at 06:11

## 2022-03-08 RX ADMIN — DILTIAZEM HYDROCHLORIDE 90 MG: 30 TABLET, FILM COATED ORAL at 22:14

## 2022-03-08 RX ADMIN — AMIODARONE HYDROCHLORIDE 200 MG: 200 TABLET ORAL at 06:11

## 2022-03-08 RX ADMIN — DILTIAZEM HYDROCHLORIDE 90 MG: 30 TABLET, FILM COATED ORAL at 13:35

## 2022-03-08 RX ADMIN — CEFEPIME HYDROCHLORIDE 2 G: 2 INJECTION, POWDER, FOR SOLUTION INTRAMUSCULAR; INTRAVENOUS at 00:28

## 2022-03-08 RX ADMIN — HEPARIN SODIUM 5000 UNITS: 5000 INJECTION INTRAVENOUS; SUBCUTANEOUS at 08:53

## 2022-03-08 RX ADMIN — INSULIN GLARGINE 10 UNITS: 100 INJECTION, SOLUTION SUBCUTANEOUS at 22:07

## 2022-03-08 RX ADMIN — ARFORMOTEROL TARTRATE 15 MCG: 15 SOLUTION RESPIRATORY (INHALATION) at 09:23

## 2022-03-08 RX ADMIN — CEFEPIME HYDROCHLORIDE 2 G: 2 INJECTION, POWDER, FOR SOLUTION INTRAMUSCULAR; INTRAVENOUS at 13:36

## 2022-03-08 RX ADMIN — BUDESONIDE 1 MG: 0.5 INHALANT RESPIRATORY (INHALATION) at 18:52

## 2022-03-08 RX ADMIN — HEPARIN SODIUM 5000 UNITS: 5000 INJECTION INTRAVENOUS; SUBCUTANEOUS at 22:07

## 2022-03-08 RX ADMIN — Medication 10 ML: at 08:53

## 2022-03-08 RX ADMIN — Medication 10 ML: at 22:21

## 2022-03-08 RX ADMIN — Medication 5 MG: at 22:15

## 2022-03-08 RX ADMIN — Medication 1 TABLET: at 08:53

## 2022-03-08 RX ADMIN — Medication 10 ML: at 22:42

## 2022-03-08 RX ADMIN — METOPROLOL TARTRATE 25 MG: 25 TABLET, FILM COATED ORAL at 22:08

## 2022-03-08 RX ADMIN — INSULIN LISPRO 3 UNITS: 100 INJECTION, SOLUTION INTRAVENOUS; SUBCUTANEOUS at 17:17

## 2022-03-08 RX ADMIN — AMIODARONE HYDROCHLORIDE 200 MG: 200 TABLET ORAL at 13:35

## 2022-03-08 RX ADMIN — METOPROLOL TARTRATE 25 MG: 25 TABLET, FILM COATED ORAL at 08:53

## 2022-03-08 NOTE — PROGRESS NOTES
Referring Provider: Mp Galicia MD    Reason for follow-up:  Status post CABG  Atrial fibrillation  Hypertension  Pleural effusion     Patient Care Team:  Minerva Chatterjee MD as PCP - General (Internal Medicine)    Subjective .      ROS    Since I have last seen, the patient has been without any chest discomfort ,shortness of breath, palpitations, dizziness or syncope.  Denies having any headache ,abdominal pain ,nausea, vomiting , diarrhea constipation, loss of weight or loss of appetite.  Denies having any excessive bruising ,hematuria or blood in the stool.    Review of all systems negative except as indicated    History  Past Medical History:   Diagnosis Date   • Astigmatism, bilateral 2019   • Benign essential hypertension    • Bilateral presbyopia 2019   • CAD (coronary artery disease)    • Closed right ankle fracture    • COVID-19 vaccination refused    • Hyperlipidemia    • Hypothyroidism    • Influenza vaccine needed    • Myocardial infarction (HCC)    • Prediabetes    • Pseudophakia, both eyes 2019   • Thoracic back pain        Past Surgical History:   Procedure Laterality Date   • APPENDECTOMY     • CARDIAC CATHETERIZATION  2012    x3    • CORONARY ARTERY BYPASS GRAFT  2014   • CORONARY STENT PLACEMENT      x3   • HARDWARE REMOVAL Right 2020    Procedure: ANKLE/FOOT HARDWARE REMOVAL;  Surgeon: Lincoln Sibley MD;  Location: [facility];  Service: Orthopedics;  Laterality: Right;   • ORIF ANKLE FRACTURE Right 2019    Radha Vazquez Mem       Family History   Problem Relation Age of Onset   • Heart disease Mother    • Lung cancer Father    • Diabetes Other        Social History     Tobacco Use   • Smoking status: Former Smoker     Types: Cigarettes     Quit date: 1980     Years since quittin.2   • Smokeless tobacco: Never Used   • Tobacco comment: Social Drinker   Vaping Use   • Vaping Use: Never used   Substance Use Topics   • Alcohol use: Yes     Comment:  mod    • Drug use: No        Medications Prior to Admission   Medication Sig Dispense Refill Last Dose   • aspirin 81 MG chewable tablet Chew 81 mg Daily.   3/2/2022 at Unknown time   • Cholecalciferol (VITAMIN D3) 2000 units tablet Take 1 tablet by mouth Daily.   3/2/2022 at Unknown time   • irbesartan (Avapro) 300 MG tablet Take 150 mg by mouth Daily.      • metoprolol tartrate (LOPRESSOR) 25 MG tablet Take 1 tablet by mouth twice daily 180 tablet 0 3/2/2022 at Unknown time   • Multiple Vitamins-Minerals (MULTIVITAMIN ADULT EXTRA C PO) Take 1 tablet by mouth Daily.   3/2/2022 at Unknown time   • levothyroxine (SYNTHROID, LEVOTHROID) 50 MCG tablet Take 50 mcg by mouth Every Morning.          Allergies  Prednisone    Scheduled Meds:amiodarone, 200 mg, Oral, Q8H   Followed by  [START ON 3/13/2022] amiodarone, 200 mg, Oral, Q12H   Followed by  [START ON 3/27/2022] amiodarone, 200 mg, Oral, Daily  arformoterol, 15 mcg, Nebulization, BID - RT  aspirin, 81 mg, Oral, Daily  budesonide, 1 mg, Nebulization, BID - RT  cefepime, 2 g, Intravenous, Q12H  dilTIAZem, 90 mg, Oral, Q8H  heparin (porcine), 5,000 Units, Subcutaneous, Q12H  HYDROmorphone, 2 mg, Intravenous, Once  insulin glargine, 10 Units, Subcutaneous, Nightly  insulin lispro, 0-14 Units, Subcutaneous, TID AC  levothyroxine, 50 mcg, Oral, Q AM  melatonin, 5 mg, Oral, Nightly  metoprolol tartrate, 25 mg, Oral, BID  multivitamin with minerals, 1 tablet, Oral, Daily  senna-docusate sodium, 2 tablet, Oral, BID  sodium chloride, 10 mL, Intravenous, Q12H      Continuous Infusions:hold, 1 each      PRN Meds:.•  acetaminophen **OR** acetaminophen **OR** acetaminophen  •  aluminum-magnesium hydroxide-simethicone  •  senna-docusate sodium **AND** polyethylene glycol **AND** bisacodyl **AND** bisacodyl  •  dextrose  •  dextrose  •  glucagon (human recombinant)  •  hold  •  HYDROcodone-acetaminophen  •  insulin lispro **AND** insulin lispro  •  magnesium sulfate **OR**  "magnesium sulfate in D5W 1g/100mL (PREMIX)  •  nitroglycerin  •  ondansetron **OR** ondansetron  •  potassium chloride **OR** potassium chloride **OR** potassium chloride  •  sodium chloride    Objective     VITAL SIGNS  Vitals:    03/07/22 2001 03/07/22 2152 03/08/22 0158 03/08/22 0551   BP:  154/70 124/75 141/70   BP Location:  Left arm Left arm Left arm   Patient Position:  Lying Lying Lying   Pulse: 93 88 73 76   Resp: 20 18 20 18   Temp:  98 °F (36.7 °C) 98.2 °F (36.8 °C) 98.1 °F (36.7 °C)   TempSrc:  Oral Oral Oral   SpO2: 100% 95% 97% 95%   Weight:    80 kg (176 lb 5.9 oz)   Height:           Flowsheet Rows    Flowsheet Row First Filed Value   Admission Height 162.6 cm (64\") Documented at 03/02/2022 2218   Admission Weight 81 kg (178 lb 9.2 oz) Documented at 03/02/2022 2218            Intake/Output Summary (Last 24 hours) at 3/8/2022 0649  Last data filed at 3/8/2022 0606  Gross per 24 hour   Intake 580 ml   Output 0 ml   Net 580 ml        TELEMETRY: Atrial fibrillation    Physical Exam:  The patient is alert, oriented and in no distress.  Vital signs as noted above.  Head and neck revealed no carotid bruits or jugular venous distention.  No thyromegaly or lymphadenopathy is present  Lungs clear.  No wheezing.  Breath sounds are normal bilaterally.  Heart normal first and second heart sounds.  No murmur. No precordial rub is present.  No gallop is present.  Abdomen soft and nontender.  No organomegaly is present.  Extremities with good peripheral pulses without any pedal edema.  Skin warm and dry.  Musculoskeletal system is grossly normal  CNS grossly normal      Results Review:   I reviewed the patient's new clinical results.  Lab Results (last 24 hours)     Procedure Component Value Units Date/Time    CBC (No Diff) [[id number]]  (Abnormal) Collected: 03/08/22 0603    Specimen: Blood Updated: 03/08/22 0630     WBC 10.10 10*3/mm3      RBC 2.75 10*6/mm3      Hemoglobin 9.0 g/dL      Hematocrit 25.8 %      MCV " 93.6 fL      MCH 32.7 pg      MCHC 34.9 g/dL      RDW 15.7 %      RDW-SD 51.2 fl      MPV 7.8 fL      Platelets 208 10*3/mm3     Phosphorus [[id number]]  (Normal) Collected: 03/08/22 0018    Specimen: Blood Updated: 03/08/22 0119     Phosphorus 2.6 mg/dL     Comprehensive Metabolic Panel [[id number]]  (Abnormal) Collected: 03/08/22 0018    Specimen: Blood Updated: 03/08/22 0118     Glucose 169 mg/dL      BUN 30 mg/dL      Creatinine 1.03 mg/dL      Sodium 130 mmol/L      Potassium 4.1 mmol/L      Chloride 96 mmol/L      CO2 23.0 mmol/L      Calcium 8.5 mg/dL      Total Protein 5.3 g/dL      Albumin 2.90 g/dL      ALT (SGPT) 27 U/L      AST (SGOT) 17 U/L      Alkaline Phosphatase 77 U/L      Total Bilirubin 0.3 mg/dL      Globulin 2.4 gm/dL      A/G Ratio 1.2 g/dL      BUN/Creatinine Ratio 29.1     Anion Gap 11.0 mmol/L      eGFR 55.1 mL/min/1.73      Comment: National Kidney Foundation and American Society of Nephrology (ASN) Task Force recommended calculation based on the Chronic Kidney Disease Epidemiology Collaboration (CKD-EPI) equation refit without adjustment for race.       Narrative:      GFR Normal >60  Chronic Kidney Disease <60  Kidney Failure <15      C-reactive Protein [[id number]]  (Abnormal) Collected: 03/08/22 0018    Specimen: Blood Updated: 03/08/22 0118     C-Reactive Protein 6.48 mg/dL     Magnesium [[id number]]  (Normal) Collected: 03/08/22 0018    Specimen: Blood Updated: 03/08/22 0118     Magnesium 2.1 mg/dL     Calcium, Ionized [[id number]]  (Normal) Collected: 03/08/22 0018    Specimen: Blood Updated: 03/08/22 0113     Ionized Calcium 1.20 mmol/L     CBC & Differential [[id number]]  (Abnormal) Collected: 03/08/22 0018    Specimen: Blood Updated: 03/08/22 0056    Narrative:      The following orders were created for panel order CBC & Differential.  Procedure                               Abnormality         Status                     ---------                               -----------          ------                     CBC Auto Differential[[id number]]        Abnormal            Final result                 Please view results for these tests on the individual orders.    CBC Auto Differential [[id number]]  (Abnormal) Collected: 03/08/22 0018    Specimen: Blood Updated: 03/08/22 0056     WBC 12.20 10*3/mm3      RBC 2.87 10*6/mm3      Hemoglobin 9.0 g/dL      Hematocrit 27.1 %      MCV 94.2 fL      MCH 31.5 pg      MCHC 33.4 g/dL      RDW 15.6 %      RDW-SD 50.8 fl      MPV 8.0 fL      Platelets 257 10*3/mm3      Neutrophil % 79.8 %      Lymphocyte % 10.9 %      Monocyte % 8.5 %      Eosinophil % 0.6 %      Basophil % 0.2 %      Neutrophils, Absolute 9.80 10*3/mm3      Lymphocytes, Absolute 1.30 10*3/mm3      Monocytes, Absolute 1.00 10*3/mm3      Eosinophils, Absolute 0.10 10*3/mm3      Basophils, Absolute 0.00 10*3/mm3      nRBC 0.2 /100 WBC     POC Glucose Once [[id number]]  (Abnormal) Collected: 03/07/22 1959    Specimen: Blood Updated: 03/07/22 2000     Glucose 162 mg/dL      Comment: Serial Number: [id number]:  [id number]       CBC (No Diff) [[id number]]  (Abnormal) Collected: 03/07/22 1814    Specimen: Blood Updated: 03/07/22 1833     WBC 12.40 10*3/mm3      RBC 2.87 10*6/mm3      Hemoglobin 9.4 g/dL      Hematocrit 27.0 %      MCV 94.2 fL      MCH 33.0 pg      MCHC 35.0 g/dL      RDW 15.8 %      RDW-SD 51.6 fl      MPV 8.0 fL      Platelets 220 10*3/mm3     POC Glucose Once [[id number]]  (Abnormal) Collected: 03/07/22 1634    Specimen: Blood Updated: 03/07/22 1635     Glucose 131 mg/dL      Comment: Serial Number: [id number]:  [id number]       CBC (No Diff) [[id number]]  (Abnormal) Collected: 03/07/22 1220    Specimen: Blood Updated: 03/07/22 1253     WBC 10.10 10*3/mm3      RBC 2.80 10*6/mm3      Hemoglobin 9.1 g/dL      Hematocrit 26.0 %      MCV 92.9 fL      MCH 32.7 pg      MCHC 35.2 g/dL      RDW 15.8 %      RDW-SD 51.2 fl      MPV 8.1 fL      Platelets 211 10*3/mm3     POC Glucose Once  "[[id number]]  (Abnormal) Collected: 03/07/22 1115    Specimen: Blood Updated: 03/07/22 1116     Glucose 276 mg/dL      Comment: Serial Number: [id number]:  [id number]       Non-gynecologic Cytology [[id number]] Collected: 03/04/22 1144    Specimen: Body Fluid from Pleural Cavity Updated: 03/07/22 1016     Case Report --     Medical Cytology Report                           Case: [id number]                                  Authorizing Provider:  Latoya Dawkins MD   Collected:           03/04/2022 11:44 AM          Ordering Location:     [facility]       Received:            03/04/2022 12:04 PM                                 PROGRESS CARE                                                                Pathologist:           Darin Carrasco MD                                                            Specimen:    Pleural Cavity                                                                              Final Diagnosis --     Pleural fluid with smears and cytospin:    Mixed, predominantly chronic inflammation, macrophages and reactive mesothelial cells    No malignancy identified     [facility]        Flow Cytometry Summary --     Pleural fluid:    No significant lymphoid immunophenotypic abnormalities    See attached report from [facility] for flow cytometry results.        Gross Description --     Received in carbowax and designated \"Pleural fluid\" are 10 mL of hazy green colored fluid. Particulate matter is present. This specimen is processed as per protocol.        Flow Cytometry [[id number]] Collected: 03/04/22 1144    Specimen: Body Fluid from Pleural Cavity Updated: 03/07/22 0824     Reference Lab Report See attached report from [facility].     POC Glucose Once [[id number]]  (Abnormal) Collected: 03/07/22 0755    Specimen: Blood Updated: 03/07/22 0757     Glucose 153 mg/dL      Comment: Serial Number: [id number]:  [id number]       CBC (No Diff) [[id number]]  (Abnormal) " Collected: 03/07/22 0627    Specimen: Blood Updated: 03/07/22 0727     WBC 8.40 10*3/mm3      RBC 2.77 10*6/mm3      Hemoglobin 8.7 g/dL      Hematocrit 25.6 %      MCV 92.5 fL      MCH 31.5 pg      MCHC 34.1 g/dL      RDW 15.6 %      RDW-SD 50.8 fl      MPV 7.9 fL      Platelets 194 10*3/mm3           Imaging Results (Last 24 Hours)     Procedure Component Value Units Date/Time    XR Chest 1 View [[id number]] Resulted: 03/08/22 0632     Updated: 03/08/22 0633      LAB RESULTS (LAST 7 DAYS)    CBC  Results from last 7 days   Lab Units 03/08/22  0603 03/08/22  0018 03/07/22  1814 03/07/22  1220 03/07/22 0627 03/07/22  0014 03/06/22  1821   WBC 10*3/mm3 10.10 12.20* 12.40* 10.10 8.40 11.00* 10.00   RBC 10*6/mm3 2.75* 2.87* 2.87* 2.80* 2.77* 2.75* 2.79*   HEMOGLOBIN g/dL 9.0* 9.0* 9.4* 9.1* 8.7* 8.9* 9.2*   HEMATOCRIT % 25.8* 27.1* 27.0* 26.0* 25.6* 25.5* 26.1*   MCV fL 93.6 94.2 94.2 92.9 92.5 93.0 93.4   PLATELETS 10*3/mm3 208 257 220 211 194 206 196       BMP  Results from last 7 days   Lab Units 03/08/22  0018 03/07/22  0014 03/06/22  0650 03/05/22  0536 03/05/22  0345 03/04/22  0458 03/02/22  2308   SODIUM mmol/L 130* 129* 133* 140 137 135* 130*   POTASSIUM mmol/L 4.1 4.1 3.8 4.4 4.2 3.1* 4.0   CHLORIDE mmol/L 96* 94* 96* 103 98 93* 93*   CO2 mmol/L 23.0 23.0 24.0 22.0 26.0 30.0* 24.0   BUN mg/dL 30* 36* 32* 28* 28* 16 21   CREATININE mg/dL 1.03* 1.15* 1.35* 1.07* 0.96 0.77 0.83   GLUCOSE mg/dL 169* 213* 110* 367* 274* 189* 242*   MAGNESIUM mg/dL 2.1 2.2 2.0  --  2.0 1.6 1.8   PHOSPHORUS mg/dL 2.6 3.5 4.8* 4.7*  --   --   --        CMP   Results from last 7 days   Lab Units 03/08/22  0018 03/07/22  0014 03/06/22  0650 03/05/22  0536 03/05/22  0345 03/04/22  0458 03/02/22  2308   SODIUM mmol/L 130* 129* 133* 140 137 135* 130*   POTASSIUM mmol/L 4.1 4.1 3.8 4.4 4.2 3.1* 4.0   CHLORIDE mmol/L 96* 94* 96* 103 98 93* 93*   CO2 mmol/L 23.0 23.0 24.0 22.0 26.0 30.0* 24.0   BUN mg/dL 30* 36* 32* 28* 28* 16 21   CREATININE  mg/dL 1.03* 1.15* 1.35* 1.07* 0.96 0.77 0.83   GLUCOSE mg/dL 169* 213* 110* 367* 274* 189* 242*   ALBUMIN g/dL 2.90* 3.10* 3.20* 3.00* 3.00*  --  3.60   BILIRUBIN mg/dL 0.3 0.3 0.3 0.4 0.4  --  0.7   ALK PHOS U/L 77 77 68 82 83  --  105   AST (SGOT) U/L 17 19 14 15 18  --  35*   ALT (SGPT) U/L 27 28 27 40* 43*  --  71*         BNP        TROPONIN  Results from last 7 days   Lab Units 03/03/22  1347   TROPONIN T ng/mL <0.010       CoAg  Results from last 7 days   Lab Units 03/04/22 2053 03/02/22  2308   INR  1.13* 1.03   APTT seconds  --  25.6       Creatinine Clearance  Estimated Creatinine Clearance: 43.6 mL/min (A) (by C-G formula based on SCr of 1.03 mg/dL (H)).    ABG        Radiology  No radiology results for the last day            EKG                              I personally viewed and interpreted the patient's EKG/Telemetry data: Atrial fibrillation    ECHOCARDIOGRAM:    Results for orders placed during the hospital encounter of 03/02/22    Adult Transthoracic Echo Complete W/ Cont if Necessary Per Protocol    Interpretation Summary  LVE with severe global left ventricular hypokinesis, estimated LV ejection fraction of 30%.  Severe right ventricular enlargement and moderate right atrial enlargement seen  Severe left atrial enlargement seen.  Aortic valve, mitral valve, tricuspid valve appears structurally normal, moderate mitral and mild tricuspid regurgitation seen.  Calculated RV systolic pressure of 40 to 45 mmHg.  No pericardial effusion seen.  Large pleural effusion seen.  Proximal aorta appears normal in size.          STRESS MYOVIEW:    Cardiolite (Tc-99m Sestamibi) stress test    CARDIAC CATHETERIZATION:            OTHER:         Assessment/Plan     Principal Problem:    Atrial fibrillation with RVR (HCC)  Active Problems:    Other hyperlipidemia    Essential hypertension    Hypothyroidism    Coronary artery disease involving coronary bypass graft of native heart without angina pectoris    Acute  CHF (congestive heart failure) (HCC)    Acute hyponatremia    Elevated LFTs    Elevated TSH    Acute hyperglycemia    Obesity (BMI 30-39.9)      Presented through emergency room 3/2/2022 with progressively worsening shortness of breath and dyspnea on exertion weight gain and leg edema. Was found to be in A. fib and congestive heart failure. Patient denies any chest pain. Patient lives in [place] and has a second home in [place] and see his cardiologist at [facility]. Patient has been having issues with alopecia and memory issues therefore stopped beta-blockers and statin on her own. Also has not been taking  thyroid replacement therapy since November because she ran out of medication. Patient has history of prediabetes. Does not check her sugars at home.  Work-up here revealed troponin x3 are negative. proBNP is 7717. CMP reveals a glucose of 242, hemoglobin A1c over 7.1. BUN/creatinine are 21/0.83. ALT elevated at 71, AST 35. TSH is 11.15  Chest x-ray 3/2/2022 reveals left pleural effusion, left basilar disease most likely atelectasis with possible pneumonia.  EKG 3/2/2022 reviewed/interpreted by me reveals A. fib with RVR at 126 bpm with PVCs      ]]]]]]]]]]]]]]]]]]]]  Impression  ==========  -Progressively worsening shortness of breath and leg edema.    -Congestive heart failure  Left ventricle dysfunction with ejection fraction of 30%.    -Significant biatrial enlargement    -Atrial fibrillation with RVR    -Premature ventricular contractions    -Status post CABG 12/2014 (performed in [place])    -Hypertension dyslipidemia prediabetes hypothyroidism elevation    -Status post ORIF    -Family history of coronary artery disease    -Intolerance to prednisone    -Former smoker    -Medical noncompliance.  Was not taking thyroid medicine replacement properly.     =================  Plan  ===========  Atrial fibrillation with RVR.  Rate control.  Consider GABRIEL cardioversion.  Patient would benefit from long-term  anticoagulation for atrial fibrillation because of high [id number] score due to female gender age over 75, hypertension, diabetes, CAD making it at least 6. We will start her on Eliquis or warfarin before discharge.  Continued IV diuresis.  Observe renal function closely.  Elevated LFTs due to passive congestion.  Large left pleural effusion.  Status post drainage and chest tube.  More than 400 cc of fluid was removed.  Patient is on Cardizem and amiodarone.    Renal dysfunction  BUN/creatinine-30/1.03    Echocardiogram showed severe right ventricle enlargement left atrial enlargement related enlargement and calculated pulmonary artery pressure of 45 mmHg.  Left ventricle dysfunction with ejection fraction of 30%.    Ischemic cardiomyopathy    Current medications include amiodarone aspirin Cardizem 90 mg every 8 hours subcu heparin levothyroxine metoprolol 25 mg twice a day.    Anticoagulation and possible cardioversion as an outpatient versus GABRIEL cardioversion to try to convert to sinus rhythm    Follow-up labs ordered.    Further plan will depend on patient's progress.  ]]]]]]]]]]]]]]]]]           Yordan Evans MD  03/08/22  06:49 EST

## 2022-03-08 NOTE — PLAN OF CARE
Goal Outcome Evaluation:                 Problem: Adult Inpatient Plan of Care  Goal: Absence of Hospital-Acquired Illness or Injury  Intervention: Identify and Manage Fall Risk  Recent Flowsheet Documentation  Taken 3/8/2022 0400 by Ava Quintero RN  Safety Promotion/Fall Prevention:   safety round/check completed   room organization consistent   clutter free environment maintained   activity supervised   assistive device/personal items within reach   lighting adjusted   muscle strengthening facilitated  Taken 3/8/2022 0001 by Ava Quintero RN  Safety Promotion/Fall Prevention:   fall prevention program maintained   clutter free environment maintained   assistive device/personal items within reach   lighting adjusted   nonskid shoes/slippers when out of bed   room organization consistent   safety round/check completed  Taken 3/7/2022 2200 by Ava Quintero RN  Safety Promotion/Fall Prevention:   clutter free environment maintained   assistive device/personal items within reach   fall prevention program maintained   lighting adjusted   nonskid shoes/slippers when out of bed   safety round/check completed   room organization consistent  Taken 3/7/2022 2000 by Ava Quintero RN  Safety Promotion/Fall Prevention:   nonskid shoes/slippers when out of bed   room organization consistent   safety round/check completed   fall prevention program maintained   assistive device/personal items within reach   clutter free environment maintained   lighting adjusted  Intervention: Prevent Skin Injury  Recent Flowsheet Documentation  Taken 3/8/2022 0400 by Ava Quintero RN  Body Position: position changed independently  Skin Protection:   adhesive use limited   tubing/devices free from skin contact  Taken 3/8/2022 0001 by Ava Quintero RN  Body Position:   position changed independently   left   side-lying  Skin Protection:   adhesive use limited   tubing/devices free from skin contact  Taken 3/7/2022 2200 by  Ava Quintero RN  Body Position: position changed independently  Taken 3/7/2022 2000 by Ava Quintero RN  Body Position:   position changed independently   side-lying   right  Skin Protection:   adhesive use limited   tubing/devices free from skin contact   pulse oximeter probe site changed  Intervention: Prevent and Manage VTE (venous thromboembolism) Risk  Recent Flowsheet Documentation  Taken 3/8/2022 0001 by Ava Quintero RN  VTE Prevention/Management:   bilateral   sequential compression devices off   patient refused intervention  Taken 3/7/2022 2000 by Ava Quintero RN  VTE Prevention/Management:   bilateral   sequential compression devices off   patient refused intervention  Goal: Optimal Comfort and Wellbeing  Intervention: Provide Person-Centered Care  Recent Flowsheet Documentation  Taken 3/8/2022 0400 by Ava Quintero RN  Trust Relationship/Rapport:   questions answered   thoughts/feelings acknowledged   reassurance provided  Taken 3/8/2022 0001 by Ava Quintero RN  Trust Relationship/Rapport:   care explained   choices provided  Taken 3/7/2022 2000 by Ava Quintero RN  Trust Relationship/Rapport:   care explained   choices provided   thoughts/feelings acknowledged   questions answered   empathic listening provided     Problem: Arrhythmia/Dysrhythmia (Acute Coronary Syndrome)  Goal: Normalized Cardiac Rhythm  Intervention: Monitor and Manage Cardiac Rhythm Effects  Recent Flowsheet Documentation  Taken 3/8/2022 0001 by Ava Quintero RN  VTE Prevention/Management:   bilateral   sequential compression devices off   patient refused intervention  Taken 3/7/2022 2000 by Ava Quintero RN  VTE Prevention/Management:   bilateral   sequential compression devices off   patient refused intervention     Problem: Fall Injury Risk  Goal: Absence of Fall and Fall-Related Injury  Intervention: Identify and Manage Contributors  Recent Flowsheet Documentation  Taken 3/8/2022 0400 by  Ava Quintero RN  Medication Review/Management: medications reviewed  Self-Care Promotion:   independence encouraged   BADL personal objects within reach   BADL personal routines maintained  Taken 3/8/2022 0001 by Ava Quintero RN  Medication Review/Management: medications reviewed  Self-Care Promotion:   independence encouraged   BADL personal objects within reach   BADL personal routines maintained  Taken 3/7/2022 2200 by Ava Quintero RN  Medication Review/Management: medications reviewed  Self-Care Promotion:   independence encouraged   BADL personal objects within reach   BADL personal routines maintained  Taken 3/7/2022 2000 by Ava Quintero RN  Medication Review/Management: medications reviewed  Self-Care Promotion:   independence encouraged   BADL personal objects within reach   BADL personal routines maintained  Intervention: Promote Injury-Free Environment  Recent Flowsheet Documentation  Taken 3/8/2022 0400 by Ava Quintero RN  Safety Promotion/Fall Prevention:   safety round/check completed   room organization consistent   clutter free environment maintained   activity supervised   assistive device/personal items within reach   lighting adjusted   muscle strengthening facilitated  Taken 3/8/2022 0001 by Ava Quintero RN  Safety Promotion/Fall Prevention:   fall prevention program maintained   clutter free environment maintained   assistive device/personal items within reach   lighting adjusted   nonskid shoes/slippers when out of bed   room organization consistent   safety round/check completed  Taken 3/7/2022 2200 by Ava Quintero RN  Safety Promotion/Fall Prevention:   clutter free environment maintained   assistive device/personal items within reach   fall prevention program maintained   lighting adjusted   nonskid shoes/slippers when out of bed   safety round/check completed   room organization consistent  Taken 3/7/2022 2000 by Ava Quintero RN  Safety Promotion/Fall  Prevention:   nonskid shoes/slippers when out of bed   room organization consistent   safety round/check completed   fall prevention program maintained   assistive device/personal items within reach   clutter free environment maintained   lighting adjusted  Goal: Absence of Fall and Fall-Related Injury  Intervention: Identify and Manage Contributors  Recent Flowsheet Documentation  Taken 3/8/2022 0400 by Ava Quintero RN  Medication Review/Management: medications reviewed  Self-Care Promotion:   independence encouraged   BADL personal objects within reach   BADL personal routines maintained  Taken 3/8/2022 0001 by Ava Quintero RN  Medication Review/Management: medications reviewed  Self-Care Promotion:   independence encouraged   BADL personal objects within reach   BADL personal routines maintained  Taken 3/7/2022 2200 by Ava Quintero RN  Medication Review/Management: medications reviewed  Self-Care Promotion:   independence encouraged   BADL personal objects within reach   BADL personal routines maintained  Taken 3/7/2022 2000 by Ava Quintero RN  Medication Review/Management: medications reviewed  Self-Care Promotion:   independence encouraged   BADL personal objects within reach   BADL personal routines maintained  Intervention: Promote Injury-Free Environment  Recent Flowsheet Documentation  Taken 3/8/2022 0400 by Ava Quintero RN  Safety Promotion/Fall Prevention:   safety round/check completed   room organization consistent   clutter free environment maintained   activity supervised   assistive device/personal items within reach   lighting adjusted   muscle strengthening facilitated  Taken 3/8/2022 0001 by Ava Quintero RN  Safety Promotion/Fall Prevention:   fall prevention program maintained   clutter free environment maintained   assistive device/personal items within reach   lighting adjusted   nonskid shoes/slippers when out of bed   room organization consistent   safety  round/check completed  Taken 3/7/2022 2200 by Ava Quintero, RN  Safety Promotion/Fall Prevention:   clutter free environment maintained   assistive device/personal items within reach   fall prevention program maintained   lighting adjusted   nonskid shoes/slippers when out of bed   safety round/check completed   room organization consistent  Taken 3/7/2022 2000 by Ava Quintero, RN  Safety Promotion/Fall Prevention:   nonskid shoes/slippers when out of bed   room organization consistent   safety round/check completed   fall prevention program maintained   assistive device/personal items within reach   clutter free environment maintained   lighting adjusted

## 2022-03-08 NOTE — PROGRESS NOTES
Daily Progress Note        Atrial fibrillation with RVR (HCC)    Other hyperlipidemia    Essential hypertension    Hypothyroidism    Coronary artery disease involving coronary bypass graft of native heart without angina pectoris    Acute CHF (congestive heart failure) (HCC)    Acute hyponatremia    Elevated LFTs    Elevated TSH    Acute hyperglycemia    Obesity (BMI 30-39.9)      Assessment  Left hemothorax status post chest tube placement 3/5/2022  Acute respiratory distress  COPD exacerbation  Atrial fibrillation with RVR (HCC)    Other hyperlipidemia    Essential hypertension    Hypothyroidism    Coronary artery disease involving coronary bypass graft of native heart without angina pectoris    Acute CHF    Acute hyponatremia    Elevated LFTs    Elevated TSH    Acute hyperglycemia    Obesity (BMI 30-39.9)   ARF  Former smoker-quit 1980    Echo 3/3/2022  -EF 30%  -Severe right ventricular enlargement and moderate right atrial enlargement  -Severe left atrial enlargement  -RVSP 41.4    Plan  Daily CXR  Chest tube management: Continue -20 cmH2O suction  Continue to titrate oxygen- Currently on 2L NC  Cefepime for PNA-finishes 3/13/2022  Inhaled corticosteroids  Bronchodilators  Electrolyte/Glycemic control  DVT Prophylaxis-Heparin SQ  Levothyroxine 50 MCG  Oral amiodarone    3/8/22                                                        3/6/22    3/4/2022       LOS: 5 days     Subjective   Shortness of breath      Objective     Vital signs for last 24 hours:  Vitals:    03/08/22 0853 03/08/22 0923 03/08/22 0926 03/08/22 0952   BP: 130/68   149/65   BP Location:    Left arm   Patient Position:    Lying   Pulse: 63 59 65 59   Resp:  16 16 16   Temp:    97.6 °F (36.4 °C)   TempSrc:    Oral   SpO2:  96% 97% 93%   Weight:       Height:           Intake/Output last 3 shifts:  I/O last 3 completed shifts:  In: 1160 [P.O.:960; IV Piggyback:200]  Out: 110 [Chest Tube:110]  Intake/Output this shift:  No intake/output data  recorded.      Radiology  Imaging Results (Last 24 Hours)     Procedure Component Value Units Date/Time    XR Chest 1 View [[id number]] Collected: 03/08/22 0745     Updated: 03/08/22 0749    Narrative:      DATE OF EXAM:  3/8/2022 6:23 AM     PROCEDURE:  XR CHEST 1 VW-     INDICATIONS:  Follow-up left pleural effusion; I48.91-Unspecified atrial fibrillation;  I50.9-Heart failure, unspecified; Z20.822-Contact with and (suspected)  exposure to covid-19; I50.21-Acute systolic (congestive) heart failure     COMPARISON:  3/6/2022     TECHNIQUE:   Single radiographic AP view of the chest was obtained.     FINDINGS:  Left-sided chest tube remains in place. Previous pneumothorax space has  a filled in with fluid. There is now small loculated pleural effusion  along the lateral mid and lower thorax. There is atelectasis in the mid  and lower left lung. Right lung remains grossly clear        Impression:      Small loculated left hydropneumothorax with atelectasis in the mid and  left lower lung. The air component present previously has filled in with  fluid     Electronically Signed By-Cj Chong On:3/8/2022 7:47 AM  This report was finalized on [id number] by  Cj Chong, .          Labs:  Results from last 7 days   Lab Units 03/08/22  0603   WBC 10*3/mm3 10.10   HEMOGLOBIN g/dL 9.0*   HEMATOCRIT % 25.8*   PLATELETS 10*3/mm3 208     Results from last 7 days   Lab Units 03/08/22  0018   SODIUM mmol/L 130*   POTASSIUM mmol/L 4.1   CHLORIDE mmol/L 96*   CO2 mmol/L 23.0   BUN mg/dL 30*   CREATININE mg/dL 1.03*   CALCIUM mg/dL 8.5*   BILIRUBIN mg/dL 0.3   ALK PHOS U/L 77   ALT (SGPT) U/L 27   AST (SGOT) U/L 17   GLUCOSE mg/dL 169*         Results from last 7 days   Lab Units 03/08/22  0018 03/07/22  0014 03/06/22  0650   ALBUMIN g/dL 2.90* 3.10* 3.20*     Results from last 7 days   Lab Units 03/03/22  1347 03/03/22  0618 03/02/22  2308   TROPONIN T ng/mL <0.010 <0.010 0.013         Results from last 7 days   Lab Units  03/08/22  0018   MAGNESIUM mg/dL 2.1     Results from last 7 days   Lab Units 03/04/22 2053 03/02/22 2308   INR  1.13* 1.03   APTT seconds  --  25.6     Results from last 7 days   Lab Units 03/02/22 2308   TSH uIU/mL 11.150*   FREE T4 ng/dL 1.17           Meds:   SCHEDULE  amiodarone, 200 mg, Oral, Q8H   Followed by  [START ON 3/13/2022] amiodarone, 200 mg, Oral, Q12H   Followed by  [START ON 3/27/2022] amiodarone, 200 mg, Oral, Daily  arformoterol, 15 mcg, Nebulization, BID - RT  aspirin, 81 mg, Oral, Daily  budesonide, 1 mg, Nebulization, BID - RT  cefepime, 2 g, Intravenous, Q12H  dilTIAZem, 90 mg, Oral, Q8H  heparin (porcine), 5,000 Units, Subcutaneous, Q12H  HYDROmorphone, 2 mg, Intravenous, Once  insulin glargine, 10 Units, Subcutaneous, Nightly  insulin lispro, 0-14 Units, Subcutaneous, TID AC  levothyroxine, 50 mcg, Oral, Q AM  melatonin, 5 mg, Oral, Nightly  metoprolol tartrate, 25 mg, Oral, BID  multivitamin with minerals, 1 tablet, Oral, Daily  senna-docusate sodium, 2 tablet, Oral, BID  sodium chloride, 10 mL, Intravenous, Q12H      Infusions  hold, 1 each      PRNs  •  acetaminophen **OR** acetaminophen **OR** acetaminophen  •  aluminum-magnesium hydroxide-simethicone  •  senna-docusate sodium **AND** polyethylene glycol **AND** bisacodyl **AND** bisacodyl  •  dextrose  •  dextrose  •  glucagon (human recombinant)  •  hold  •  HYDROcodone-acetaminophen  •  insulin lispro **AND** insulin lispro  •  magnesium sulfate **OR** magnesium sulfate in D5W 1g/100mL (PREMIX)  •  nitroglycerin  •  ondansetron **OR** ondansetron  •  potassium chloride **OR** potassium chloride **OR** potassium chloride  •  sodium chloride    Physical Exam:  Physical Exam  Physical Exam  General Appearance:  Alert.  No distress noted.  HEENT:  Normocephalic, without obvious abnormality, Conjunctiva/corneas clear,.  Normal external ear canals, Nares normal, no drainage     Neck:  Supple, symmetrical, trachea midline. No JVD.  Lungs  /Chest wall: Minimal bilateral basal Rales, respirations unlabored symmetrical wall movement.   Left chest tube in place.  Heart:  Regular rate and rhythm, systolic murmur PMI left sternal border  Abdomen: Soft, non-tender, no masses, no organomegaly.    Extremities: 1+ edema bilateral lower extremity, no clubbing or cyanosis      ROS  Review of Systems  Review of Systems       Constitutional: Negative for chills, fever and malaise/fatigue.   HENT: Negative.    Eyes: Negative.    Cardiovascular: Negative.    Respiratory: Positive for cough and shortness of breath.    Skin: Negative.    Musculoskeletal: Negative.    Gastrointestinal: Negative.    Genitourinary: Negative.    Neurological: Negative.    Psychiatric/Behavioral: Negative.          I have reviewed current clinicals.     Electronically signed by VALENTINE Durham, 03/08/22, 10:07 AM EST.     The NP scribed the note for me, I have examined the patient and reviewed and verified the above findings and and I formulated the assessment and plan as documented

## 2022-03-08 NOTE — PROGRESS NOTES
[facility] Medicine Services Daily Progress Note    Patient Name: Jaquelin Valderrama  : 1940  MRN: [id number]  Primary Care Physician:  Minerva Chatterjee MD  Date of admission: 3/2/2022      Subjective      Chief Complaint: Atrial fibrillation with RVR pleural effusion        Patient Reports states she is doing just fine.  Denies any pain.  Breathing fine on room air during my evaluation.  Did not sleep last night.  Requested up to chair today.  Patient ambulating to the bathroom     ROS negative except as above    Objective      Vitals:   Temp:  [96.6 °F (35.9 °C)-98.2 °F (36.8 °C)] 97.6 °F (36.4 °C)  Heart Rate:  [59-93] 72  Resp:  [16-21] 16  BP: (124-161)/(65-81) 161/81  Physical Exam  Vitals reviewed.   Constitutional:       Comments: Left chest tube in place  Patient is on room air during my evaluation   HENT:      Head: Normocephalic.      Nose: Nose normal.      Mouth/Throat:      Mouth: Mucous membranes are moist.   Eyes:      Pupils: Pupils are equal, round, and reactive to light.   Cardiovascular:      Rate and Rhythm: Normal rate and regular rhythm.      Pulses: Normal pulses.      Heart sounds: Normal heart sounds.   Pulmonary:      Effort: Pulmonary effort is normal.      Comments: Left chest tube catheter in place  Left side decreased breath sounds  Abdominal:      General: Abdomen is flat.      Palpations: Abdomen is soft.   Musculoskeletal:         General: Normal range of motion.      Cervical back: Normal range of motion.   Skin:     General: Skin is warm.      Capillary Refill: Capillary refill takes less than 2 seconds.   Neurological:      General: No focal deficit present.      Mental Status: She is alert and oriented to person, place, and time.   Psychiatric:         Mood and Affect: Mood normal.         Behavior: Behavior normal.        Result Review    Result Review:  I have personally reviewed the results from the time of this admission to 3/8/2022 14:32 EST and  agree with these findings:  [x]  Laboratory  []  Microbiology  []  Radiology  []  EKG/Telemetry   []  Cardiology/Vascular   []  Pathology  []  Old records  []  Other:  Most notable findings include: Lab work stable    Wounds (last 24 hours)             Assessment/Plan       Brief Patient Summary:  Jaquelin Valderrama is a 82 y.o. female who presented with A. fib RVR and pleural effusion status post chest tube        amiodarone, 200 mg, Oral, Q8H   Followed by  [START ON 3/13/2022] amiodarone, 200 mg, Oral, Q12H   Followed by  [START ON 3/27/2022] amiodarone, 200 mg, Oral, Daily  arformoterol, 15 mcg, Nebulization, BID - RT  aspirin, 81 mg, Oral, Daily  budesonide, 1 mg, Nebulization, BID - RT  cefepime, 2 g, Intravenous, Q12H  dilTIAZem, 90 mg, Oral, Q8H  heparin (porcine), 5,000 Units, Subcutaneous, Q12H  HYDROmorphone, 2 mg, Intravenous, Once  insulin glargine, 10 Units, Subcutaneous, Nightly  insulin lispro, 0-14 Units, Subcutaneous, TID AC  levothyroxine, 50 mcg, Oral, Q AM  melatonin, 5 mg, Oral, Nightly  metoprolol tartrate, 25 mg, Oral, BID  multivitamin with minerals, 1 tablet, Oral, Daily  senna-docusate sodium, 2 tablet, Oral, BID  sodium chloride, 10 mL, Intravenous, Q12H        hold, 1 each           Active Hospital Problems:       Active Hospital Problems     Diagnosis     • **Atrial fibrillation with RVR (HCC)     • Acute CHF (congestive heart failure) ([facility])     • Acute hyponatremia     • Elevated LFTs     • Elevated TSH     • Acute hyperglycemia     • Obesity (BMI 30-39.9)     • Coronary artery disease involving coronary bypass graft of native heart without angina pectoris     • Essential hypertension     • Hypothyroidism     • Other hyperlipidemia           ASSESSMENT:  Possible hemothorax  Acute respiratory distress  COPD exacerbation  Atrial fibrillation with RVR (HCC), now resolved    Other hyperlipidemia    Essential hypertension    Hypothyroidism    Coronary artery disease involving coronary bypass  graft of native heart without angina pectoris    Acute CHF (congestive heart failure) (HCC) EFG 45%    Acute hyponatremia    Elevated LFTs    Elevated TSH    Acute hyperglycemia    Obesity (BMI 30-39.9)   ARF     PLAN:  Hold on anticoagulation given anemia.  Check occult stool.  Source of blood loss likely chest tube due to hemothorax  Blood transfusion as needed  Chest tube management  per pulmonary  Encouraged to use I-S flutter valve  Heart rate control  Continue to monitor blood pressure  Bronchodilator  Inhaled corticosteroids  Electrolytes/ glycemic control  Add heparin sq prophylaxis   Up out of bed to chair today         DVT prophylaxis:  Medical DVT prophylaxis orders are present.     CODE STATUS:    Code Status (Patient has no pulse and is not breathing): CPR (Attempt to Resuscitate)  Medical Interventions (Patient has pulse or is breathing): Full Support        Disposition:  I expect patient to be discharged once stable.     This patient has been examined wearing appropriate Personal Protective Equipment and discussed with Nursing staff.  03/08/22      Electronically signed by Mp Galicia MD, 03/08/22, 14:32 EST.  [facility]ist [facility]

## 2022-03-08 NOTE — PLAN OF CARE
Goal Outcome Evaluation:      Patient is alert and oriented. Patients  at the bedside. Patient is on room air. Patient chest tube has had no output this shift. Chest xray reports tube is still in place. Patient is receiving IV antibiotics.       Problem: Adult Inpatient Plan of Care  Goal: Plan of Care Review  Outcome: Ongoing, Progressing  Goal: Patient-Specific Goal (Individualized)  Outcome: Ongoing, Progressing  Goal: Absence of Hospital-Acquired Illness or Injury  Outcome: Ongoing, Progressing  Intervention: Identify and Manage Fall Risk  Recent Flowsheet Documentation  Taken 3/8/2022 1800 by Natalee Lawrence RN  Safety Promotion/Fall Prevention:   activity supervised   assistive device/personal items within reach   safety round/check completed   room organization consistent   nonskid shoes/slippers when out of bed   lighting adjusted   clutter free environment maintained  Taken 3/8/2022 1600 by Natalee Lawrence RN  Safety Promotion/Fall Prevention:   activity supervised   assistive device/personal items within reach   safety round/check completed   room organization consistent   nonskid shoes/slippers when out of bed   lighting adjusted   clutter free environment maintained  Taken 3/8/2022 1400 by Natalee Lawrence RN  Safety Promotion/Fall Prevention:   activity supervised   assistive device/personal items within reach   clutter free environment maintained   safety round/check completed   room organization consistent   nonskid shoes/slippers when out of bed   lighting adjusted  Taken 3/8/2022 1200 by Natalee Lawrence RN  Safety Promotion/Fall Prevention:   activity supervised   assistive device/personal items within reach   clutter free environment maintained   safety round/check completed   room organization consistent   nonskid shoes/slippers when out of bed   lighting adjusted  Taken 3/8/2022 1000 by Natalee Lawrence RN  Safety Promotion/Fall Prevention:   assistive device/personal items within  reach   clutter free environment maintained   safety round/check completed   room organization consistent   nonskid shoes/slippers when out of bed   lighting adjusted   activity supervised  Intervention: Prevent Skin Injury  Recent Flowsheet Documentation  Taken 3/8/2022 1600 by Natalee Lawrence RN  Skin Protection: adhesive use limited  Taken 3/8/2022 1200 by Natalee Lawrence RN  Skin Protection: adhesive use limited  Taken 3/8/2022 0800 by Natalee Lawrence RN  Skin Protection: adhesive use limited  Intervention: Prevent and Manage VTE (venous thromboembolism) Risk  Recent Flowsheet Documentation  Taken 3/8/2022 1600 by Natalee Lawrence RN  VTE Prevention/Management:   sequential compression devices off   patient refused intervention  Taken 3/8/2022 1200 by Natalee Lawrence RN  VTE Prevention/Management:   sequential compression devices off   patient refused intervention  Taken 3/8/2022 0800 by Natalee Lawrence RN  VTE Prevention/Management:   sequential compression devices off   patient refused intervention  Goal: Optimal Comfort and Wellbeing  Outcome: Ongoing, Progressing  Intervention: Provide Person-Centered Care  Recent Flowsheet Documentation  Taken 3/8/2022 1600 by Natalee Lawrence RN  Trust Relationship/Rapport:   care explained   choices provided  Taken 3/8/2022 1200 by Natalee Lawrence RN  Trust Relationship/Rapport:   care explained   choices provided  Taken 3/8/2022 0800 by Natalee Lawrence RN  Trust Relationship/Rapport:   care explained   choices provided  Goal: Readiness for Transition of Care  Outcome: Ongoing, Progressing     Problem: Arrhythmia/Dysrhythmia (Acute Coronary Syndrome)  Goal: Normalized Cardiac Rhythm  Outcome: Ongoing, Progressing  Intervention: Monitor and Manage Cardiac Rhythm Effects  Recent Flowsheet Documentation  Taken 3/8/2022 1600 by Natalee Lawrence RN  VTE Prevention/Management:   sequential compression devices off   patient refused intervention  Taken 3/8/2022  1200 by Natalee Lawrence RN  VTE Prevention/Management:   sequential compression devices off   patient refused intervention  Taken 3/8/2022 0800 by Natalee Lawrence RN  VTE Prevention/Management:   sequential compression devices off   patient refused intervention     Problem: Skin Injury Risk Increased  Goal: Skin Health and Integrity  Outcome: Ongoing, Progressing  Intervention: Optimize Skin Protection  Recent Flowsheet Documentation  Taken 3/8/2022 1600 by Natalee Lawrence RN  Pressure Reduction Techniques: frequent weight shift encouraged  Pressure Reduction Devices: pressure-redistributing mattress utilized  Skin Protection: adhesive use limited  Taken 3/8/2022 1200 by Natalee Lawrence RN  Pressure Reduction Techniques: frequent weight shift encouraged  Pressure Reduction Devices: pressure-redistributing mattress utilized  Skin Protection: adhesive use limited  Taken 3/8/2022 0800 by Natalee Lawrence RN  Pressure Reduction Techniques: frequent weight shift encouraged  Pressure Reduction Devices: pressure-redistributing mattress utilized  Skin Protection: adhesive use limited     Problem: Fall Injury Risk  Goal: Absence of Fall and Fall-Related Injury  Outcome: Ongoing, Progressing  Intervention: Identify and Manage Contributors  Recent Flowsheet Documentation  Taken 3/8/2022 1800 by Natalee Lawrence RN  Medication Review/Management: medications reviewed  Taken 3/8/2022 1600 by Natalee Lawrence RN  Medication Review/Management: medications reviewed  Taken 3/8/2022 1400 by Natalee Lawrence RN  Medication Review/Management: medications reviewed  Taken 3/8/2022 1200 by Natalee Lawrence RN  Medication Review/Management: medications reviewed  Taken 3/8/2022 1000 by Natalee Lawrence RN  Medication Review/Management: medications reviewed  Intervention: Promote Injury-Free Environment  Recent Flowsheet Documentation  Taken 3/8/2022 1800 by Natalee Lawrence RN  Safety Promotion/Fall Prevention:   activity  supervised   assistive device/personal items within reach   safety round/check completed   room organization consistent   nonskid shoes/slippers when out of bed   lighting adjusted   clutter free environment maintained  Taken 3/8/2022 1600 by Natalee Lawrence RN  Safety Promotion/Fall Prevention:   activity supervised   assistive device/personal items within reach   safety round/check completed   room organization consistent   nonskid shoes/slippers when out of bed   lighting adjusted   clutter free environment maintained  Taken 3/8/2022 1400 by Natalee Lawrence RN  Safety Promotion/Fall Prevention:   activity supervised   assistive device/personal items within reach   clutter free environment maintained   safety round/check completed   room organization consistent   nonskid shoes/slippers when out of bed   lighting adjusted  Taken 3/8/2022 1200 by Natalee Lawrence RN  Safety Promotion/Fall Prevention:   activity supervised   assistive device/personal items within reach   clutter free environment maintained   safety round/check completed   room organization consistent   nonskid shoes/slippers when out of bed   lighting adjusted  Taken 3/8/2022 1000 by Natalee Lawrence RN  Safety Promotion/Fall Prevention:   assistive device/personal items within reach   clutter free environment maintained   safety round/check completed   room organization consistent   nonskid shoes/slippers when out of bed   lighting adjusted   activity supervised  Goal: Absence of Fall and Fall-Related Injury  Outcome: Ongoing, Progressing  Intervention: Identify and Manage Contributors  Recent Flowsheet Documentation  Taken 3/8/2022 1800 by Natalee Lawrence RN  Medication Review/Management: medications reviewed  Taken 3/8/2022 1600 by Natalee Lawrence RN  Medication Review/Management: medications reviewed  Taken 3/8/2022 1400 by Natalee Lawrence RN  Medication Review/Management: medications reviewed  Taken 3/8/2022 1200 by Natalee Lawrence  RN  Medication Review/Management: medications reviewed  Taken 3/8/2022 1000 by Natalee Lawrence RN  Medication Review/Management: medications reviewed  Intervention: Promote Injury-Free Environment  Recent Flowsheet Documentation  Taken 3/8/2022 1800 by Natalee Lawrence RN  Safety Promotion/Fall Prevention:   activity supervised   assistive device/personal items within reach   safety round/check completed   room organization consistent   nonskid shoes/slippers when out of bed   lighting adjusted   clutter free environment maintained  Taken 3/8/2022 1600 by Natalee Lawrence RN  Safety Promotion/Fall Prevention:   activity supervised   assistive device/personal items within reach   safety round/check completed   room organization consistent   nonskid shoes/slippers when out of bed   lighting adjusted   clutter free environment maintained  Taken 3/8/2022 1400 by Natalee Lawrence RN  Safety Promotion/Fall Prevention:   activity supervised   assistive device/personal items within reach   clutter free environment maintained   safety round/check completed   room organization consistent   nonskid shoes/slippers when out of bed   lighting adjusted  Taken 3/8/2022 1200 by Natalee Lawrence RN  Safety Promotion/Fall Prevention:   activity supervised   assistive device/personal items within reach   clutter free environment maintained   safety round/check completed   room organization consistent   nonskid shoes/slippers when out of bed   lighting adjusted  Taken 3/8/2022 1000 by Natalee Lawrence RN  Safety Promotion/Fall Prevention:   assistive device/personal items within reach   clutter free environment maintained   safety round/check completed   room organization consistent   nonskid shoes/slippers when out of bed   lighting adjusted   activity supervised

## 2022-03-09 ENCOUNTER — APPOINTMENT (OUTPATIENT)
Dept: GENERAL RADIOLOGY | Facility: HOSPITAL | Age: 82
End: 2022-03-09

## 2022-03-09 ENCOUNTER — APPOINTMENT (OUTPATIENT)
Dept: CT IMAGING | Facility: HOSPITAL | Age: 82
End: 2022-03-09

## 2022-03-09 LAB
ALBUMIN SERPL-MCNC: 3 G/DL (ref 3.5–5.2)
ALBUMIN/GLOB SERPL: 1.4 G/DL
ALP SERPL-CCNC: 76 U/L (ref 39–117)
ALT SERPL W P-5'-P-CCNC: 20 U/L (ref 1–33)
ANION GAP SERPL CALCULATED.3IONS-SCNC: 9 MMOL/L (ref 5–15)
AST SERPL-CCNC: 12 U/L (ref 1–32)
BASOPHILS # BLD AUTO: 0 10*3/MM3 (ref 0–0.2)
BASOPHILS NFR BLD AUTO: 0.2 % (ref 0–1.5)
BILIRUB SERPL-MCNC: 0.6 MG/DL (ref 0–1.2)
BUN SERPL-MCNC: 17 MG/DL (ref 8–23)
BUN/CREAT SERPL: 23.3 (ref 7–25)
CA-I SERPL ISE-MCNC: 1.16 MMOL/L (ref 1.2–1.3)
CALCIUM SPEC-SCNC: 8.3 MG/DL (ref 8.6–10.5)
CHLORIDE SERPL-SCNC: 97 MMOL/L (ref 98–107)
CO2 SERPL-SCNC: 23 MMOL/L (ref 22–29)
CREAT SERPL-MCNC: 0.73 MG/DL (ref 0.57–1)
CRP SERPL-MCNC: 13.22 MG/DL (ref 0–0.5)
DEPRECATED RDW RBC AUTO: 49.9 FL (ref 37–54)
DEPRECATED RDW RBC AUTO: 50.3 FL (ref 37–54)
DEPRECATED RDW RBC AUTO: 50.3 FL (ref 37–54)
EGFRCR SERPLBLD CKD-EPI 2021: 83.3 ML/MIN/1.73
EOSINOPHIL # BLD AUTO: 0.1 10*3/MM3 (ref 0–0.4)
EOSINOPHIL NFR BLD AUTO: 0.5 % (ref 0.3–6.2)
ERYTHROCYTE [DISTWIDTH] IN BLOOD BY AUTOMATED COUNT: 15.3 % (ref 12.3–15.4)
ERYTHROCYTE [DISTWIDTH] IN BLOOD BY AUTOMATED COUNT: 15.5 % (ref 12.3–15.4)
ERYTHROCYTE [DISTWIDTH] IN BLOOD BY AUTOMATED COUNT: 15.5 % (ref 12.3–15.4)
GLOBULIN UR ELPH-MCNC: 2.2 GM/DL
GLUCOSE BLDC GLUCOMTR-MCNC: 125 MG/DL (ref 70–105)
GLUCOSE BLDC GLUCOMTR-MCNC: 180 MG/DL (ref 70–105)
GLUCOSE BLDC GLUCOMTR-MCNC: 182 MG/DL (ref 70–105)
GLUCOSE BLDC GLUCOMTR-MCNC: 183 MG/DL (ref 70–105)
GLUCOSE SERPL-MCNC: 144 MG/DL (ref 65–99)
HCT VFR BLD AUTO: 26.8 % (ref 34–46.6)
HCT VFR BLD AUTO: 27.5 % (ref 34–46.6)
HCT VFR BLD AUTO: 28.2 % (ref 34–46.6)
HGB BLD-MCNC: 9.1 G/DL (ref 12–15.9)
HGB BLD-MCNC: 9.4 G/DL (ref 12–15.9)
HGB BLD-MCNC: 9.9 G/DL (ref 12–15.9)
LYMPHOCYTES # BLD AUTO: 0.9 10*3/MM3 (ref 0.7–3.1)
LYMPHOCYTES NFR BLD AUTO: 6.5 % (ref 19.6–45.3)
MAGNESIUM SERPL-MCNC: 2 MG/DL (ref 1.6–2.4)
MCH RBC QN AUTO: 31.6 PG (ref 26.6–33)
MCH RBC QN AUTO: 32.4 PG (ref 26.6–33)
MCH RBC QN AUTO: 33.1 PG (ref 26.6–33)
MCHC RBC AUTO-ENTMCNC: 33.8 G/DL (ref 31.5–35.7)
MCHC RBC AUTO-ENTMCNC: 34.2 G/DL (ref 31.5–35.7)
MCHC RBC AUTO-ENTMCNC: 35.1 G/DL (ref 31.5–35.7)
MCV RBC AUTO: 93.3 FL (ref 79–97)
MCV RBC AUTO: 94.5 FL (ref 79–97)
MCV RBC AUTO: 94.6 FL (ref 79–97)
MONOCYTES # BLD AUTO: 1.3 10*3/MM3 (ref 0.1–0.9)
MONOCYTES NFR BLD AUTO: 9.2 % (ref 5–12)
NEUTROPHILS NFR BLD AUTO: 11.5 10*3/MM3 (ref 1.7–7)
NEUTROPHILS NFR BLD AUTO: 83.6 % (ref 42.7–76)
NRBC BLD AUTO-RTO: 0.2 /100 WBC (ref 0–0.2)
NT-PROBNP SERPL-MCNC: 3583 PG/ML (ref 0–1800)
PHOSPHATE SERPL-MCNC: 2.4 MG/DL (ref 2.5–4.5)
PLATELET # BLD AUTO: 244 10*3/MM3 (ref 140–450)
PLATELET # BLD AUTO: 252 10*3/MM3 (ref 140–450)
PLATELET # BLD AUTO: 267 10*3/MM3 (ref 140–450)
PMV BLD AUTO: 7.7 FL (ref 6–12)
PMV BLD AUTO: 7.8 FL (ref 6–12)
PMV BLD AUTO: 7.9 FL (ref 6–12)
POTASSIUM SERPL-SCNC: 4 MMOL/L (ref 3.5–5.2)
PROT SERPL-MCNC: 5.2 G/DL (ref 6–8.5)
RBC # BLD AUTO: 2.87 10*6/MM3 (ref 3.77–5.28)
RBC # BLD AUTO: 2.9 10*6/MM3 (ref 3.77–5.28)
RBC # BLD AUTO: 2.98 10*6/MM3 (ref 3.77–5.28)
SODIUM SERPL-SCNC: 129 MMOL/L (ref 136–145)
TSH SERPL DL<=0.05 MIU/L-ACNC: 6.02 UIU/ML (ref 0.27–4.2)
WBC NRBC COR # BLD: 13 10*3/MM3 (ref 3.4–10.8)
WBC NRBC COR # BLD: 13.7 10*3/MM3 (ref 3.4–10.8)
WBC NRBC COR # BLD: 14.1 10*3/MM3 (ref 3.4–10.8)

## 2022-03-09 PROCEDURE — 25010000002 HEPARIN (PORCINE) PER 1000 UNITS: Performed by: INTERNAL MEDICINE

## 2022-03-09 PROCEDURE — 85025 COMPLETE CBC W/AUTO DIFF WBC: CPT | Performed by: INTERNAL MEDICINE

## 2022-03-09 PROCEDURE — 63710000001 INSULIN LISPRO (HUMAN) PER 5 UNITS

## 2022-03-09 PROCEDURE — 84100 ASSAY OF PHOSPHORUS: CPT | Performed by: STUDENT IN AN ORGANIZED HEALTH CARE EDUCATION/TRAINING PROGRAM

## 2022-03-09 PROCEDURE — 86140 C-REACTIVE PROTEIN: CPT | Performed by: INTERNAL MEDICINE

## 2022-03-09 PROCEDURE — 32561 LYSE CHEST FIBRIN INIT DAY: CPT | Performed by: NURSE PRACTITIONER

## 2022-03-09 PROCEDURE — 83880 ASSAY OF NATRIURETIC PEPTIDE: CPT | Performed by: INTERNAL MEDICINE

## 2022-03-09 PROCEDURE — 82330 ASSAY OF CALCIUM: CPT | Performed by: INTERNAL MEDICINE

## 2022-03-09 PROCEDURE — 94799 UNLISTED PULMONARY SVC/PX: CPT

## 2022-03-09 PROCEDURE — 99221 1ST HOSP IP/OBS SF/LOW 40: CPT | Performed by: NURSE PRACTITIONER

## 2022-03-09 PROCEDURE — 99232 SBSQ HOSP IP/OBS MODERATE 35: CPT | Performed by: INTERNAL MEDICINE

## 2022-03-09 PROCEDURE — 82962 GLUCOSE BLOOD TEST: CPT

## 2022-03-09 PROCEDURE — 85027 COMPLETE CBC AUTOMATED: CPT | Performed by: INTERNAL MEDICINE

## 2022-03-09 PROCEDURE — 97161 PT EVAL LOW COMPLEX 20 MIN: CPT

## 2022-03-09 PROCEDURE — 71250 CT THORAX DX C-: CPT

## 2022-03-09 PROCEDURE — 80053 COMPREHEN METABOLIC PANEL: CPT | Performed by: STUDENT IN AN ORGANIZED HEALTH CARE EDUCATION/TRAINING PROGRAM

## 2022-03-09 PROCEDURE — 71045 X-RAY EXAM CHEST 1 VIEW: CPT

## 2022-03-09 PROCEDURE — 63710000001 INSULIN GLARGINE PER 5 UNITS: Performed by: INTERNAL MEDICINE

## 2022-03-09 PROCEDURE — 25010000002 CEFEPIME PER 500 MG: Performed by: INTERNAL MEDICINE

## 2022-03-09 PROCEDURE — 84443 ASSAY THYROID STIM HORMONE: CPT | Performed by: INTERNAL MEDICINE

## 2022-03-09 PROCEDURE — 83735 ASSAY OF MAGNESIUM: CPT | Performed by: INTERNAL MEDICINE

## 2022-03-09 RX ORDER — ZOLPIDEM TARTRATE 5 MG/1
5 TABLET ORAL NIGHTLY
Status: COMPLETED | OUTPATIENT
Start: 2022-03-09 | End: 2022-03-15

## 2022-03-09 RX ADMIN — DILTIAZEM HYDROCHLORIDE 90 MG: 30 TABLET, FILM COATED ORAL at 21:23

## 2022-03-09 RX ADMIN — INSULIN LISPRO 3 UNITS: 100 INJECTION, SOLUTION INTRAVENOUS; SUBCUTANEOUS at 13:16

## 2022-03-09 RX ADMIN — SENNOSIDES AND DOCUSATE SODIUM 2 TABLET: 50; 8.6 TABLET ORAL at 21:25

## 2022-03-09 RX ADMIN — METOPROLOL TARTRATE 25 MG: 25 TABLET, FILM COATED ORAL at 21:25

## 2022-03-09 RX ADMIN — ARFORMOTEROL TARTRATE 15 MCG: 15 SOLUTION RESPIRATORY (INHALATION) at 08:50

## 2022-03-09 RX ADMIN — CEFEPIME HYDROCHLORIDE 2 G: 2 INJECTION, POWDER, FOR SOLUTION INTRAMUSCULAR; INTRAVENOUS at 01:07

## 2022-03-09 RX ADMIN — DILTIAZEM HYDROCHLORIDE 90 MG: 30 TABLET, FILM COATED ORAL at 13:08

## 2022-03-09 RX ADMIN — BUDESONIDE 1 MG: 0.5 INHALANT RESPIRATORY (INHALATION) at 18:31

## 2022-03-09 RX ADMIN — AMIODARONE HYDROCHLORIDE 200 MG: 200 TABLET ORAL at 21:27

## 2022-03-09 RX ADMIN — INSULIN LISPRO 3 UNITS: 100 INJECTION, SOLUTION INTRAVENOUS; SUBCUTANEOUS at 17:38

## 2022-03-09 RX ADMIN — LEVOTHYROXINE SODIUM 50 MCG: 0.05 TABLET ORAL at 05:39

## 2022-03-09 RX ADMIN — CEFEPIME HYDROCHLORIDE 2 G: 2 INJECTION, POWDER, FOR SOLUTION INTRAMUSCULAR; INTRAVENOUS at 13:11

## 2022-03-09 RX ADMIN — BUDESONIDE 1 MG: 0.5 INHALANT RESPIRATORY (INHALATION) at 08:44

## 2022-03-09 RX ADMIN — INSULIN GLARGINE 10 UNITS: 100 INJECTION, SOLUTION SUBCUTANEOUS at 21:33

## 2022-03-09 RX ADMIN — HEPARIN SODIUM 5000 UNITS: 5000 INJECTION INTRAVENOUS; SUBCUTANEOUS at 08:16

## 2022-03-09 RX ADMIN — ASPIRIN 81 MG CHEWABLE TABLET 81 MG: 81 TABLET CHEWABLE at 08:16

## 2022-03-09 RX ADMIN — ZOLPIDEM TARTRATE 5 MG: 5 TABLET ORAL at 21:27

## 2022-03-09 RX ADMIN — Medication 10 ML: at 21:27

## 2022-03-09 RX ADMIN — Medication 10 ML: at 08:17

## 2022-03-09 RX ADMIN — HEPARIN SODIUM 5000 UNITS: 5000 INJECTION INTRAVENOUS; SUBCUTANEOUS at 21:26

## 2022-03-09 RX ADMIN — METOPROLOL TARTRATE 25 MG: 25 TABLET, FILM COATED ORAL at 08:16

## 2022-03-09 RX ADMIN — AMIODARONE HYDROCHLORIDE 200 MG: 200 TABLET ORAL at 05:39

## 2022-03-09 RX ADMIN — AMIODARONE HYDROCHLORIDE 200 MG: 200 TABLET ORAL at 13:08

## 2022-03-09 RX ADMIN — Medication 1 TABLET: at 08:16

## 2022-03-09 RX ADMIN — ARFORMOTEROL TARTRATE 15 MCG: 15 SOLUTION RESPIRATORY (INHALATION) at 18:31

## 2022-03-09 RX ADMIN — SENNOSIDES AND DOCUSATE SODIUM 2 TABLET: 50; 8.6 TABLET ORAL at 08:16

## 2022-03-09 RX ADMIN — DILTIAZEM HYDROCHLORIDE 90 MG: 30 TABLET, FILM COATED ORAL at 05:39

## 2022-03-09 RX ADMIN — Medication 5 MG: at 21:23

## 2022-03-09 NOTE — PROCEDURES
Pre-op Diagnosis: Loculated pleural effusion, left     Post-Op Diagnosis: Loculated pleural effusion, left     Procedure performed: Intrapleural TPA infusion    Anesthesia: None    Summary of procedure: Patient was placed in the right lateral decubitus position in bed.  Under sterile technique, the pleural catheter was accessed and 10 mg of TPA reconstituted in 25 cc of sterile water was instilled into the pleural cavity.  The patient tolerated without difficulty.  Chest tube was clamped.  Patient was advised to change position every 15 minutes for the next 90 minutes.  Chest tube to be placed back to -20 cm of suction.  We will reevaluate with a chest x-ray in the morning.

## 2022-03-09 NOTE — CONSULTS
Pt given info regarding CAD and risk factors involved, such as stress, HTN, HHD, cholesterol, and triglycerides. Also given info regarding CHF, as well as Cardiac Rehab brochure.

## 2022-03-09 NOTE — PLAN OF CARE
Goal Outcome Evaluation:              Problem: Adult Inpatient Plan of Care  Goal: Plan of Care Review  Outcome: Met  Goal: Patient-Specific Goal (Individualized)  Outcome: Met  Goal: Absence of Hospital-Acquired Illness or Injury  Outcome: Met  Intervention: Identify and Manage Fall Risk  Recent Flowsheet Documentation  Taken 3/9/2022 1800 by Natalee Lawrence RN  Safety Promotion/Fall Prevention:   activity supervised   assistive device/personal items within reach   clutter free environment maintained   safety round/check completed   room organization consistent   nonskid shoes/slippers when out of bed   lighting adjusted  Taken 3/9/2022 1600 by Natalee Lawrence RN  Safety Promotion/Fall Prevention:   activity supervised   assistive device/personal items within reach   clutter free environment maintained   safety round/check completed   room organization consistent   nonskid shoes/slippers when out of bed   lighting adjusted  Taken 3/9/2022 1400 by Natalee Lawrence RN  Safety Promotion/Fall Prevention:   activity supervised   assistive device/personal items within reach   clutter free environment maintained   safety round/check completed   room organization consistent   nonskid shoes/slippers when out of bed   lighting adjusted  Taken 3/9/2022 1200 by Natalee Lawrence RN  Safety Promotion/Fall Prevention:   activity supervised   assistive device/personal items within reach   safety round/check completed   room organization consistent   nonskid shoes/slippers when out of bed   lighting adjusted   clutter free environment maintained  Taken 3/9/2022 1000 by Natalee Lawrence RN  Safety Promotion/Fall Prevention:   activity supervised   assistive device/personal items within reach   clutter free environment maintained   safety round/check completed   room organization consistent   nonskid shoes/slippers when out of bed   lighting adjusted  Taken 3/9/2022 0800 by Natalee Lawrence RN  Safety Promotion/Fall  Prevention:   activity supervised   assistive device/personal items within reach   clutter free environment maintained   safety round/check completed   room organization consistent   nonskid shoes/slippers when out of bed   lighting adjusted  Intervention: Prevent Skin Injury  Recent Flowsheet Documentation  Taken 3/9/2022 1540 by Natalee Lawrence RN  Skin Protection: adhesive use limited  Taken 3/9/2022 1112 by Natalee Lawrence RN  Skin Protection: adhesive use limited  Taken 3/9/2022 0800 by Natalee Lawrence RN  Skin Protection: adhesive use limited  Intervention: Prevent and Manage VTE (venous thromboembolism) Risk  Recent Flowsheet Documentation  Taken 3/9/2022 1540 by Natalee Lawrence RN  VTE Prevention/Management: (heparin sub q)   sequential compression devices off   patient refused intervention   other (see comments)  Taken 3/9/2022 1112 by Natalee Lawrence RN  VTE Prevention/Management: (heparin subq)   sequential compression devices off   patient refused intervention  Taken 3/9/2022 0800 by Natalee Lawrence RN  VTE Prevention/Management: (heparin subq)   sequential compression devices off   patient refused intervention  Goal: Optimal Comfort and Wellbeing  Outcome: Met  Intervention: Provide Person-Centered Care  Recent Flowsheet Documentation  Taken 3/9/2022 1540 by Natalee Lawrence RN  Trust Relationship/Rapport:   care explained   choices provided  Taken 3/9/2022 1112 by Natalee Lawrence RN  Trust Relationship/Rapport:   care explained   choices provided  Taken 3/9/2022 0800 by Natalee Lawrence RN  Trust Relationship/Rapport:   care explained   choices provided  Goal: Readiness for Transition of Care  Outcome: Met     Problem: Arrhythmia/Dysrhythmia (Acute Coronary Syndrome)  Goal: Normalized Cardiac Rhythm  Outcome: Met  Intervention: Monitor and Manage Cardiac Rhythm Effects  Recent Flowsheet Documentation  Taken 3/9/2022 1540 by Natalee Lawrence RN  VTE Prevention/Management: (heparin sub  q)   sequential compression devices off   patient refused intervention   other (see comments)  Taken 3/9/2022 1112 by Natalee Lawrence RN  VTE Prevention/Management: (heparin subq)   sequential compression devices off   patient refused intervention  Taken 3/9/2022 0800 by Natalee Lawrence RN  VTE Prevention/Management: (heparin subq)   sequential compression devices off   patient refused intervention     Problem: Skin Injury Risk Increased  Goal: Skin Health and Integrity  Outcome: Met  Intervention: Optimize Skin Protection  Recent Flowsheet Documentation  Taken 3/9/2022 1540 by Natalee Lawrence RN  Pressure Reduction Techniques: frequent weight shift encouraged  Pressure Reduction Devices: pressure-redistributing mattress utilized  Skin Protection: adhesive use limited  Taken 3/9/2022 1112 by Natalee Lawrence RN  Pressure Reduction Techniques: frequent weight shift encouraged  Pressure Reduction Devices: pressure-redistributing mattress utilized  Skin Protection: adhesive use limited  Taken 3/9/2022 0800 by Natalee Lawrence RN  Pressure Reduction Techniques: frequent weight shift encouraged  Pressure Reduction Devices: pressure-redistributing mattress utilized  Skin Protection: adhesive use limited     Problem: Fall Injury Risk  Goal: Absence of Fall and Fall-Related Injury  Outcome: Met  Intervention: Identify and Manage Contributors  Recent Flowsheet Documentation  Taken 3/9/2022 1800 by Natalee Lawrence RN  Medication Review/Management: medications reviewed  Taken 3/9/2022 1600 by Natalee Lawrence RN  Medication Review/Management: medications reviewed  Taken 3/9/2022 1400 by Natalee Lawrence RN  Medication Review/Management: medications reviewed  Taken 3/9/2022 1200 by Natalee Lawrence RN  Medication Review/Management: medications reviewed  Taken 3/9/2022 1000 by Natalee Lawrence RN  Medication Review/Management: medications reviewed  Taken 3/9/2022 0800 by Natalee Lawrence RN  Medication  Review/Management: medications reviewed  Intervention: Promote Injury-Free Environment  Recent Flowsheet Documentation  Taken 3/9/2022 1800 by Natalee Lawrence RN  Safety Promotion/Fall Prevention:   activity supervised   assistive device/personal items within reach   clutter free environment maintained   safety round/check completed   room organization consistent   nonskid shoes/slippers when out of bed   lighting adjusted  Taken 3/9/2022 1600 by Natalee Lawrence RN  Safety Promotion/Fall Prevention:   activity supervised   assistive device/personal items within reach   clutter free environment maintained   safety round/check completed   room organization consistent   nonskid shoes/slippers when out of bed   lighting adjusted  Taken 3/9/2022 1400 by Natalee Lawrence RN  Safety Promotion/Fall Prevention:   activity supervised   assistive device/personal items within reach   clutter free environment maintained   safety round/check completed   room organization consistent   nonskid shoes/slippers when out of bed   lighting adjusted  Taken 3/9/2022 1200 by Natalee Lawrence RN  Safety Promotion/Fall Prevention:   activity supervised   assistive device/personal items within reach   safety round/check completed   room organization consistent   nonskid shoes/slippers when out of bed   lighting adjusted   clutter free environment maintained  Taken 3/9/2022 1000 by Natalee Lawrence RN  Safety Promotion/Fall Prevention:   activity supervised   assistive device/personal items within reach   clutter free environment maintained   safety round/check completed   room organization consistent   nonskid shoes/slippers when out of bed   lighting adjusted  Taken 3/9/2022 0800 by Natalee Lawrence RN  Safety Promotion/Fall Prevention:   activity supervised   assistive device/personal items within reach   clutter free environment maintained   safety round/check completed   room organization consistent   nonskid shoes/slippers when  out of bed   lighting adjusted  Goal: Absence of Fall and Fall-Related Injury  Outcome: Met  Intervention: Identify and Manage Contributors  Recent Flowsheet Documentation  Taken 3/9/2022 1800 by Natalee Lawrence RN  Medication Review/Management: medications reviewed  Taken 3/9/2022 1600 by Natalee Lawrence RN  Medication Review/Management: medications reviewed  Taken 3/9/2022 1400 by Natalee Lawrence RN  Medication Review/Management: medications reviewed  Taken 3/9/2022 1200 by Natalee Lawrence RN  Medication Review/Management: medications reviewed  Taken 3/9/2022 1000 by Natalee Lawrence RN  Medication Review/Management: medications reviewed  Taken 3/9/2022 0800 by Natalee Lawrence RN  Medication Review/Management: medications reviewed  Intervention: Promote Injury-Free Environment  Recent Flowsheet Documentation  Taken 3/9/2022 1800 by Natalee Lawrence RN  Safety Promotion/Fall Prevention:   activity supervised   assistive device/personal items within reach   clutter free environment maintained   safety round/check completed   room organization consistent   nonskid shoes/slippers when out of bed   lighting adjusted  Taken 3/9/2022 1600 by Natalee Lawrence RN  Safety Promotion/Fall Prevention:   activity supervised   assistive device/personal items within reach   clutter free environment maintained   safety round/check completed   room organization consistent   nonskid shoes/slippers when out of bed   lighting adjusted  Taken 3/9/2022 1400 by Natalee Lawrence RN  Safety Promotion/Fall Prevention:   activity supervised   assistive device/personal items within reach   clutter free environment maintained   safety round/check completed   room organization consistent   nonskid shoes/slippers when out of bed   lighting adjusted  Taken 3/9/2022 1200 by Natalee Lawrence RN  Safety Promotion/Fall Prevention:   activity supervised   assistive device/personal items within reach   safety round/check completed   room  organization consistent   nonskid shoes/slippers when out of bed   lighting adjusted   clutter free environment maintained  Taken 3/9/2022 1000 by Natalee Lawrence, RN  Safety Promotion/Fall Prevention:   activity supervised   assistive device/personal items within reach   clutter free environment maintained   safety round/check completed   room organization consistent   nonskid shoes/slippers when out of bed   lighting adjusted  Taken 3/9/2022 0800 by Natalee Lawrence, RN  Safety Promotion/Fall Prevention:   activity supervised   assistive device/personal items within reach   clutter free environment maintained   safety round/check completed   room organization consistent   nonskid shoes/slippers when out of bed   lighting adjusted

## 2022-03-09 NOTE — CASE MANAGEMENT/SOCIAL WORK
Continued Stay Note  GUERLINE Vazquez     Patient Name: Jaquelin Valderrama  MRN: [id number]  Today's Date: 3/9/2022    Admit Date: 3/2/2022     Discharge Plan     Row Name 03/09/22 0849       Plan    Plan Anticipate home with spouse    Plan Comments d/c barrier: pulmonology following, Chest tube               Phone communication or documentation only - no physical contact with patient or family.             Elina Barrios RN

## 2022-03-09 NOTE — PROGRESS NOTES
[facility] Medicine Services Daily Progress Note    Patient Name: Jaquelin Valderrama  : 1940  MRN: [id number]  Primary Care Physician:  Minerva Chatterjee MD  Date of admission: 3/2/2022      Subjective      Chief Complaint: Atrial fibrillation with RVR pleural effusion        Patient Reports states she is doing just fine.  Denies any pain.  Sleeping poorly.  Requesting Ambien.   at bedside.  Awaiting cardiothoracic surgery evaluation.  Chest tube not draining anymore.     ROS negative except as above    Objective      Vitals:   Temp:  [97.6 °F (36.4 °C)-99.2 °F (37.3 °C)] 97.7 °F (36.5 °C)  Heart Rate:  [56-88] 77  Resp:  [15-28] 19  BP: (139-158)/(57-83) 149/80  Flow (L/min):  [2] 2    Vitals reviewed.   Constitutional:       Comments: Left chest tube in place  at bedside  Patient is on 2 L during my evaluation  HENT:      Head: Normocephalic.      Nose: Nose normal.      Mouth/Throat:      Mouth: Mucous membranes are moist.   Eyes:      Pupils: Pupils are equal, round, and reactive to light.   Cardiovascular:      Rate and Rhythm: Normal rate and regular rhythm.      Pulses: Normal pulses.      Heart sounds: Normal heart sounds.   Pulmonary:      Effort: Pulmonary effort is normal.      Comments: Left chest tube catheter in place  Left side decreased breath sounds  Abdominal:      General: Abdomen is flat.      Palpations: Abdomen is soft.   Musculoskeletal:         General: Normal range of motion.      Cervical back: Normal range of motion.   Skin:     General: Skin is warm.      Capillary Refill: Capillary refill takes less than 2 seconds.   Neurological:      General: No focal deficit present.      Mental Status: She is alert and oriented to person, place, and time.   Psychiatric:         Mood and Affect: Mood normal.         Behavior: Behavior normal.        Result Review    Result Review:  I have personally reviewed the results from the time of this admission to 3/9/2022  15:27 EST and agree with these findings:  [x]  Laboratory  []  Microbiology  []  Radiology  []  EKG/Telemetry   []  Cardiology/Vascular   []  Pathology  []  Old records  []  Other:  Most notable findings include: Hemoglobin improving    Wounds (last 24 hours)              Assessment/Plan       Brief Patient Summary:  Jaquelin Valderrama is a 82 y.o. female who presented with A. fib RVR and pleural effusion status post chest tube        amiodarone, 200 mg, Oral, Q8H   Followed by  [START ON 3/13/2022] amiodarone, 200 mg, Oral, Q12H   Followed by  [START ON 3/27/2022] amiodarone, 200 mg, Oral, Daily  arformoterol, 15 mcg, Nebulization, BID - RT  aspirin, 81 mg, Oral, Daily  budesonide, 1 mg, Nebulization, BID - RT  cefepime, 2 g, Intravenous, Q12H  dilTIAZem, 90 mg, Oral, Q8H  heparin (porcine), 5,000 Units, Subcutaneous, Q12H  HYDROmorphone, 2 mg, Intravenous, Once  insulin glargine, 10 Units, Subcutaneous, Nightly  insulin lispro, 0-14 Units, Subcutaneous, TID AC  levothyroxine, 50 mcg, Oral, Q AM  melatonin, 5 mg, Oral, Nightly  metoprolol tartrate, 25 mg, Oral, BID  multivitamin with minerals, 1 tablet, Oral, Daily  senna-docusate sodium, 2 tablet, Oral, BID  sodium chloride, 10 mL, Intravenous, Q12H        hold, 1 each           Active Hospital Problems:          Active Hospital Problems     Diagnosis     • **Atrial fibrillation with RVR (HCC)     • Acute CHF (congestive heart failure) (HCC)     • Acute hyponatremia     • Elevated LFTs     • Elevated TSH     • Acute hyperglycemia     • Obesity (BMI 30-39.9)     • Coronary artery disease involving coronary bypass graft of native heart without angina pectoris     • Essential hypertension     • Hypothyroidism     • Other hyperlipidemia           ASSESSMENT:  Possible hemothorax  Acute respiratory distress  COPD exacerbation  Atrial fibrillation with RVR (HCC), now resolved    Other hyperlipidemia    Essential hypertension    Hypothyroidism    Coronary artery disease  involving coronary bypass graft of native heart without angina pectoris    Acute CHF (congestive heart failure) (HCC) EFG 45%    Acute hyponatremia    Elevated LFTs    Elevated TSH    Acute hyperglycemia    Obesity (BMI 30-39.9)   ARF     PLAN:  Hold on anticoagulation given anemia.  Check occult stool.  Source of blood loss likely chest tube due to hemothorax  Blood transfusion as needed  Chest tube management  per pulmonary.  Cardiovascular surgery consulted  Encouraged to use I-S flutter valve  Heart rate control  Continue to monitor blood pressure  Bronchodilator  Inhaled corticosteroids  Electrolytes/ glycemic control  Add heparin sq prophylaxis   Insomnia Ambien ordered         DVT prophylaxis:  Medical DVT prophylaxis orders are present.     CODE STATUS:    Code Status (Patient has no pulse and is not breathing): CPR (Attempt to Resuscitate)  Medical Interventions (Patient has pulse or is breathing): Full Support        Disposition:  I expect patient to be discharged once stable.     This patient has been examined wearing appropriate Personal Protective Equipment and discussed with  patient and nursing staff.  03/09/22      Electronically signed by Mp Galicia MD, 03/09/22, 15:27 EST.  [facility]

## 2022-03-09 NOTE — CONSULTS
Inpatient Thoracic Surgery Consult  Consult performed by: Montserrat Humphries DNP, APRN  Consult ordered by: Angelica Adams APRN          Patient Care Team:  Minerva Chatterjee MD as PCP - General (Internal Medicine)    Chief Complaint   Patient presents with   • Weakness - Generalized       Subjective     History of Present Illness    Ms. Valderrama is a pleasant 82-year-old lady who is a former smoker with an extensive cardiac history including cardiac stents in 2012 and CABG x3 in 2014 with EF of 30% as well as hyperlipidemia, hypertension and hypothyroidism.  She is unvaccinated against COVID-19.  She presented to [facility] ER on 3/2/2022 for bilateral lower extremity swelling and increasing shortness of breath for 1 week.  Upon presentation to the ER she was found to be in A. fib with RVR and acute CHF exacerbation.  Of note, patient was not compliant with home medication.    Initial evaluation in the ER included chest x-ray which demonstrated a left pleural effusion.  A subsequent CT of the chest demonstrated concern for hemothorax and an ultrasound-guided thoracentesis was performed on 3/4/2022 with 1050 mL fluid pleural fluid drained.  Follow-up imaging demonstrated recurrent pleural effusion and a chest tube was placed at the bedside per pulmonary on 3/5/2022.  Thoracic surgery has been consulted for loculated left pleural effusion.    Upon exam today, the patient is alert and resting in bed on 2 L per nasal cannula.  She has some conversational dyspnea and reports increased shortness of breath with exertion.  She is very active at baseline and walks 2 miles per day.  She is a former smoker, with an approximate 20-pack-year history.  She has no personal history of cancer.  She denies blood thinner use.      Review of Systems   Constitutional: Positive for fatigue.   HENT: Negative.    Eyes: Negative.    Respiratory: Positive for shortness of breath.    Cardiovascular: Positive for leg swelling.    Gastrointestinal: Negative.    Endocrine: Negative.    Genitourinary: Negative.    Musculoskeletal: Negative.    Skin: Negative.    Neurological: Positive for weakness.   Hematological: Negative.    Psychiatric/Behavioral: Negative.             Patient Active Problem List   Diagnosis   • Other hyperlipidemia   • Essential hypertension   • Hypothyroidism   • Coronary artery disease involving coronary bypass graft of native heart without angina pectoris   • Astigmatism, bilateral   • Bilateral presbyopia   • Pseudophakia, both eyes   • Atrial fibrillation with RVR ([facility])   • Acute CHF (congestive heart failure) ([facility])   • Acute hyponatremia   • Elevated LFTs   • Elevated TSH   • Acute hyperglycemia   • Obesity (BMI 30-39.9)     Past Medical History:   Diagnosis Date   • Astigmatism, bilateral 2019   • Benign essential hypertension    • Bilateral presbyopia 2019   • CAD (coronary artery disease)    • Closed right ankle fracture    • COVID-19 vaccination refused    • Hyperlipidemia    • Hypothyroidism    • Influenza vaccine needed    • Myocardial infarction ([facility])    • Prediabetes    • Pseudophakia, both eyes 2019   • Thoracic back pain      Past Surgical History:   Procedure Laterality Date   • APPENDECTOMY     • CARDIAC CATHETERIZATION  2012    x3    • CORONARY ARTERY BYPASS GRAFT  2014   • CORONARY STENT PLACEMENT      x3   • HARDWARE REMOVAL Right 2020    Procedure: ANKLE/FOOT HARDWARE REMOVAL;  Surgeon: Lincoln Sibley MD;  Location: [facility];  Service: Orthopedics;  Laterality: Right;   • ORIF ANKLE FRACTURE Right 2019    [facility]     Family History   Problem Relation Age of Onset   • Heart disease Mother    • Lung cancer Father    • Diabetes Other      Social History     Socioeconomic History   • Marital status:    Tobacco Use   • Smoking status: Former Smoker     Types: Cigarettes     Quit date:      Years since quittin.2   • Smokeless  tobacco: Never Used   • Tobacco comment: Social Drinker   Vaping Use   • Vaping Use: Never used   Substance and Sexual Activity   • Alcohol use: Yes     Comment: mod    • Drug use: No   • Sexual activity: Defer     Medications Prior to Admission   Medication Sig Dispense Refill Last Dose   • aspirin 81 MG chewable tablet Chew 81 mg Daily.   3/2/2022 at Unknown time   • Cholecalciferol (VITAMIN D3) 2000 units tablet Take 1 tablet by mouth Daily.   3/2/2022 at Unknown time   • irbesartan (Avapro) 300 MG tablet Take 150 mg by mouth Daily.      • metoprolol tartrate (LOPRESSOR) 25 MG tablet Take 1 tablet by mouth twice daily 180 tablet 0 3/2/2022 at Unknown time   • Multiple Vitamins-Minerals (MULTIVITAMIN ADULT EXTRA C PO) Take 1 tablet by mouth Daily.   3/2/2022 at Unknown time   • levothyroxine (SYNTHROID, LEVOTHROID) 50 MCG tablet Take 50 mcg by mouth Every Morning.        Allergies   Allergen Reactions   • Prednisone Other (See Comments)     Increased BP       Objective      Vital Signs  Temp:  [97.6 °F (36.4 °C)-99.2 °F (37.3 °C)] 97.7 °F (36.5 °C)  Heart Rate:  [56-88] 77  Resp:  [15-28] 19  BP: (139-158)/(57-83) 149/80    Intake & Output (last day)       03/08 0701  03/09 0700 03/09 0701  03/10 0700    P.O. 350     IV Piggyback      Total Intake(mL/kg) 350 (4.3)     Urine (mL/kg/hr) 0 (0)     Stool      Chest Tube 15 0    Total Output 15 0    Net +335 0          Urine Unmeasured Occurrence 2 x           Physical Exam  Constitutional:       Appearance: Normal appearance. She is not ill-appearing.   HENT:      Head: Normocephalic and atraumatic.   Cardiovascular:      Rate and Rhythm: Normal rate.      Pulses: Normal pulses.   Pulmonary:      Effort: No respiratory distress.      Breath sounds: Examination of the left-lower field reveals decreased breath sounds. Decreased breath sounds present. No wheezing or rhonchi.      Comments: Increase effort, conversational dyspnea  Chest:      Chest wall: Tenderness  (Incisional) present.   Musculoskeletal:      Cervical back: Normal range of motion.      Comments: Decreased ROM secondary to weakness, trace bilateral lower extremity edema   Neurological:      General: No focal deficit present.      Mental Status: She is alert.   Psychiatric:         Mood and Affect: Mood normal.         Thought Content: Thought content normal.         Chest tube:   Site: Left, Clean, Dry, Intact and Securement device intact  Suction: -20 cm  Air Leak: negative  24 Hour Total: 15ml      Results Review:    I reviewed the patient's new clinical results.  I reviewed the patient's new imaging results and agree with the interpretation.  I reviewed the patient's other test results and agree with the interpretation  Discussed with patient, RN and Dr. Kuo    Imaging Results (Last 24 Hours)     Procedure Component Value Units Date/Time    CT Chest Without Contrast Diagnostic [[id number]] Collected: 03/09/22 1201     Updated: 03/09/22 1216    Narrative:      CT CHEST WO CONTRAST DIAGNOSTIC-     Date of Exam: 3/9/2022 11:11 AM     Indication: s/p chest tube placement, follow up left effusion vs.  empyema; I48.91-Unspecified atrial fibrillation; I50.9-Heart failure,  unspecified; Z20.822-Contact with and (suspected) exposure to covid-19;  I50.21-Acute systolic (congestive) heart failure.     Comparison: CT chest without contrast 03/04/2022, AP chest x-ray  03/09/2022, 322.     Technique: Serial and axial CT images of the chest were obtained.  Reconstructions in the coronal and sagittal planes were performed.   Automated exposure control and iterative reconstruction methods were  used.     FINDINGS:  The tip of the left chest tube is anterior to the dependent portion of  the left pleural fluid collection, which again has hyperdense contents,  consistent with hemorrhage. The collection is partially loculated, as  seen on previous CT and overall does not appear significantly changed in  size. There is complete  atelectasis of the left lower lobe which has  progressed compared to 03/04/2022 CT. Small right pleural effusion has  increased compared to 03/04/2022 CT. Heart size is borderline. Mildly  prominent mediastinal lymph nodes may be reactive. Partially included  solid organs of the upper abdomen appear within normal limits for  noncontrast CT. No acute osseous abnormality is identified.       Impression:      1.No significant change in size of large left pleural fluid collection  with hyperdense components, consistent with hemothorax. Tip of the left  chest tube is anterior to the dependent portion of the fluid collection.  2.Increased, now complete left lower lobe atelectasis.  3.Increased size of small right pleural effusion.           Electronically Signed By-Jenn Ordonez MD On:3/9/2022 12:14 PM  This report was finalized on [id number] by  Jenn Ordonez MD.    XR Chest 1 View [[id number]] Collected: 03/09/22 0750     Updated: 03/09/22 0753    Narrative:      EXAMINATION: XR CHEST 1 VW-     DATE OF EXAM: 3/9/2022 4:17 AM     INDICATION: Chest tube; I48.91-Unspecified atrial fibrillation;  I50.9-Heart failure, unspecified; Z20.822-Contact with and (suspected)  exposure to covid-19; I50.21-Acute systolic (congestive) heart failure.     COMPARISON: Chest radiograph dated 3/8/2022     TECHNIQUE: Portable AP view of the chest was obtained.     FINDINGS:  There is left-sided chest tube with tip terminating at the left midlung.  There is increase in size of the left-sided pleural effusion,  particularly along the lateral and apical aspect of the long. There is  unchanged cardiomegaly. The right lung appears clear. There is no  pneumothorax. Median sternotomy wires are present. There are  degenerative changes of the thoracic spine.       Impression:      1. Left-sided chest tube in unchanged position.  2. Increasing left-sided pleural effusion, particularly along the  lateral and apical aspect of the left lung.      Electronically Signed By-John Trevino MD On:3/9/2022 7:51 AM  This report was finalized on [id number] by  John Trevino MD.          Lab Results:  Lab Results (last 24 hours)     Procedure Component Value Units Date/Time    POC Glucose Once [[id number]]  (Abnormal) Collected: 03/09/22 1615    Specimen: Blood Updated: 03/09/22 1617     Glucose 180 mg/dL      Comment: Serial Number: [id number]:  [id number]       CBC (No Diff) [[id number]]  (Abnormal) Collected: 03/09/22 1230    Specimen: Blood Updated: 03/09/22 1326     WBC 14.10 10*3/mm3      RBC 2.98 10*6/mm3      Hemoglobin 9.9 g/dL      Hematocrit 28.2 %      MCV 94.5 fL      MCH 33.1 pg      MCHC 35.1 g/dL      RDW 15.5 %      RDW-SD 50.3 fl      MPV 7.8 fL      Platelets 267 10*3/mm3     TSH [[id number]]  (Abnormal) Collected: 03/09/22 0651    Specimen: Blood Updated: 03/09/22 1301     TSH 6.020 uIU/mL     C-reactive Protein [[id number]]  (Abnormal) Collected: 03/09/22 0651    Specimen: Blood Updated: 03/09/22 1213     C-Reactive Protein 13.22 mg/dL     POC Glucose Once [[id number]]  (Abnormal) Collected: 03/09/22 1204    Specimen: Blood Updated: 03/09/22 1205     Glucose 182 mg/dL      Comment: Serial Number: [id number]:  [id number]       POC Glucose Once [[id number]]  (Abnormal) Collected: 03/09/22 0749    Specimen: Blood Updated: 03/09/22 0750     Glucose 125 mg/dL      Comment: Serial Number: [id number]:  [id number]       Comprehensive Metabolic Panel [[id number]]  (Abnormal) Collected: 03/09/22 0651    Specimen: Blood Updated: 03/09/22 0727     Glucose 144 mg/dL      BUN 17 mg/dL      Creatinine 0.73 mg/dL      Sodium 129 mmol/L      Potassium 4.0 mmol/L      Chloride 97 mmol/L      CO2 23.0 mmol/L      Calcium 8.3 mg/dL      Total Protein 5.2 g/dL      Albumin 3.00 g/dL      ALT (SGPT) 20 U/L      AST (SGOT) 12 U/L      Alkaline Phosphatase 76 U/L      Total Bilirubin 0.6 mg/dL      Globulin 2.2 gm/dL      A/G Ratio 1.4 g/dL       BUN/Creatinine Ratio 23.3     Anion Gap 9.0 mmol/L      eGFR 83.3 mL/min/1.73      Comment: National Kidney Foundation and American Society of Nephrology (ASN) Task Force recommended calculation based on the Chronic Kidney Disease Epidemiology Collaboration (CKD-EPI) equation refit without adjustment for race.       Narrative:      GFR Normal >60  Chronic Kidney Disease <60  Kidney Failure <15      Phosphorus [[id number]]  (Abnormal) Collected: 03/09/22 0651    Specimen: Blood Updated: 03/09/22 0727     Phosphorus 2.4 mg/dL     Magnesium [[id number]]  (Normal) Collected: 03/09/22 0651    Specimen: Blood Updated: 03/09/22 0727     Magnesium 2.0 mg/dL     BNP [[id number]]  (Abnormal) Collected: 03/09/22 0651    Specimen: Blood Updated: 03/09/22 0724     proBNP 3,583.0 pg/mL     Narrative:      Among patients with dyspnea, NT-proBNP is highly sensitive for the detection of acute congestive heart failure. In addition NT-proBNP of <300 pg/ml effectively rules out acute congestive heart failure with 99% negative predictive value.    Results may be falsely decreased if patient taking Biotin.      Calcium, Ionized [[id number]]  (Abnormal) Collected: 03/09/22 0651    Specimen: Blood Updated: 03/09/22 0717     Ionized Calcium 1.16 mmol/L     CBC (No Diff) [[id number]]  (Abnormal) Collected: 03/09/22 0651    Specimen: Blood Updated: 03/09/22 0707     WBC 13.00 10*3/mm3      RBC 2.90 10*6/mm3      Hemoglobin 9.4 g/dL      Hematocrit 27.5 %      MCV 94.6 fL      MCH 32.4 pg      MCHC 34.2 g/dL      RDW 15.3 %      RDW-SD 49.9 fl      MPV 7.7 fL      Platelets 244 10*3/mm3     CBC & Differential [[id number]]  (Abnormal) Collected: 03/09/22 0036    Specimen: Blood Updated: 03/09/22 0116    Narrative:      The following orders were created for panel order CBC & Differential.  Procedure                               Abnormality         Status                     ---------                               -----------         ------                      CBC Auto Differential[[id number]]        Abnormal            Final result                 Please view results for these tests on the individual orders.    CBC Auto Differential [[id number]]  (Abnormal) Collected: 03/09/22 0036    Specimen: Blood Updated: 03/09/22 0116     WBC 13.70 10*3/mm3      RBC 2.87 10*6/mm3      Hemoglobin 9.1 g/dL      Hematocrit 26.8 %      MCV 93.3 fL      MCH 31.6 pg      MCHC 33.8 g/dL      RDW 15.5 %      RDW-SD 50.3 fl      MPV 7.9 fL      Platelets 252 10*3/mm3      Neutrophil % 83.6 %      Lymphocyte % 6.5 %      Monocyte % 9.2 %      Eosinophil % 0.5 %      Basophil % 0.2 %      Neutrophils, Absolute 11.50 10*3/mm3      Lymphocytes, Absolute 0.90 10*3/mm3      Monocytes, Absolute 1.30 10*3/mm3      Eosinophils, Absolute 0.10 10*3/mm3      Basophils, Absolute 0.00 10*3/mm3      nRBC 0.2 /100 WBC     POC Glucose Once [[id number]]  (Abnormal) Collected: 03/08/22 1957    Specimen: Blood Updated: 03/08/22 1958     Glucose 172 mg/dL      Comment: Serial Number: [id number]:  [id number]       CBC (No Diff) [[id number]]  (Abnormal) Collected: 03/08/22 1755    Specimen: Blood Updated: 03/08/22 1909     WBC 13.50 10*3/mm3      RBC 2.96 10*6/mm3      Hemoglobin 9.6 g/dL      Hematocrit 28.1 %      MCV 95.1 fL      MCH 32.6 pg      MCHC 34.2 g/dL      RDW 15.6 %      RDW-SD 50.8 fl      MPV 8.2 fL      Platelets 259 10*3/mm3               Assessment/Plan       Atrial fibrillation with RVR (HCC)    Other hyperlipidemia    Essential hypertension    Hypothyroidism    Coronary artery disease involving coronary bypass graft of native heart without angina pectoris    Acute CHF (congestive heart failure) (HCC)    Acute hyponatremia    Elevated LFTs    Elevated TSH    Acute hyperglycemia    Obesity (BMI 30-39.9)      Assessment & Plan    I have independently reviewed the patient's radiographic imaging including CT chest performed on 3/4/2022 and 3/9/2022.  Follow-up CT chest does not  demonstrate any significant interval change to loculated left sided pleural effusion.  There is left lower lobe atelectasis.  A small right pleural effusion has increased in size compared to previous imaging.  There is mediastinal lymphadenopathy, likely reactive.    Loculated pleural effusion: Etiology likely related to heart failure.  Patient is status post left thoracentesis with 1 L of fluid removed on 3/4/2022.  Subsequent left-sided chest tube was placed on 3/5/2022.  Output has diminished.  Follow-up CT chest performed today does not demonstrate significant interval change from imaging performed on 3/4/2022.  Recommend conservative management as patient is a poor candidate for surgical intervention given advanced age and multiple comorbidities including EF of 30%.  We will plan to instill intrapleural Activase today and recheck a chest x-ray in the morning.  Recommend incentive spirometry 10 times per hour and out of bed is much as possible.    A. fib with RVR: Cardiology note reviewed.  Continue treatment per the recommendations.      I discussed the patients findings and our recommendations with patient, nursing staff and Dr. Kuo    Thank you for this consult and allowing us to participate in the care of your patient.  We will follow along with you during this hospitalization.       Montserrat Humphries, SANDEEP, APRN  [facility]  03/09/22  17:09 EST    I spent >62 minutes reviewing the patient's chart including medical history, notes, radiographic imaging, labs and performing an assessment and development of a plan and discussion with the patient/family at bedside.  This time does not include any procedure performed.

## 2022-03-09 NOTE — THERAPY EVALUATION
Patient Name: Jaquelin Valderrama  : 1940    MRN: [id number]                              Today's Date: 3/9/2022       Admit Date: 3/2/2022    Visit Dx:     ICD-10-CM ICD-9-CM   1. Atrial fibrillation with RVR ([facility])  I48.91 427.31   2. Acute congestive heart failure, unspecified heart failure type ([facility])  I50.9 428.0   3. Lab test negative for COVID-19 virus  Z20.822 V01.79   4. Acute systolic congestive heart failure (HCC)  I50.21 428.21     428.0     Patient Active Problem List   Diagnosis   • Other hyperlipidemia   • Essential hypertension   • Hypothyroidism   • Coronary artery disease involving coronary bypass graft of native heart without angina pectoris   • Astigmatism, bilateral   • Bilateral presbyopia   • Pseudophakia, both eyes   • Atrial fibrillation with RVR ([facility])   • Acute CHF (congestive heart failure) ([facility])   • Acute hyponatremia   • Elevated LFTs   • Elevated TSH   • Acute hyperglycemia   • Obesity (BMI 30-39.9)     Past Medical History:   Diagnosis Date   • Astigmatism, bilateral 2019   • Benign essential hypertension    • Bilateral presbyopia 2019   • CAD (coronary artery disease)    • Closed right ankle fracture    • COVID-19 vaccination refused    • Hyperlipidemia    • Hypothyroidism    • Influenza vaccine needed    • Myocardial infarction ([facility])    • Prediabetes    • Pseudophakia, both eyes 2019   • Thoracic back pain      Past Surgical History:   Procedure Laterality Date   • APPENDECTOMY     • CARDIAC CATHETERIZATION  2012    x3    • CORONARY ARTERY BYPASS GRAFT  2014   • CORONARY STENT PLACEMENT      x3   • HARDWARE REMOVAL Right 2020    Procedure: ANKLE/FOOT HARDWARE REMOVAL;  Surgeon: Lincoln Sibley MD;  Location: [facility];  Service: Orthopedics;  Laterality: Right;   • ORIF ANKLE FRACTURE Right 2019    Radha Vazquez [facility]      General Information     Row Name 22 1351          Physical Therapy Time and Intention    Document Type evaluation   -CR     Mode of Treatment physical therapy  -CR     Row Name 03/09/22 1351          General Information    Patient Profile Reviewed yes  -CR     Prior Level of Function independent:;all household mobility;ADL's  -CR     Existing Precautions/Restrictions --  chest tube  -CR     Row Name 03/09/22 1351          Living Environment    People in Home spouse  -CR     Row Name 03/09/22 1351          Home Main Entrance    Number of Stairs, Main Entrance four  -CR     Row Name 03/09/22 1351          Stairs Within Home, Primary    Stairs, Within Home, Primary 1 flight to bedroom but can stay on main level  -CR     Stair Railings, Within Home, Primary railings safe and in good condition  -CR     Row Name 03/09/22 1351          Cognition    Orientation Status (Cognition) oriented x 4  -CR     Row Name 03/09/22 1351          Safety Issues, Functional Mobility    Impairments Affecting Function (Mobility) endurance/activity tolerance;shortness of breath  -CR           User Key  (r) = Recorded By, (t) = Taken By, (c) = Cosigned By    Initials Name Provider Type    CR Reyes, Carmela, PT Physical Therapist               Mobility     Row Name 03/09/22 1352          Bed Mobility    Bed Mobility supine-sit-supine  -CR     Supine-Sit-Supine [place] (Bed Mobility) contact guard  -CR     Assistive Device (Bed Mobility) bed rails;head of bed elevated  -CR     Row Name 03/09/22 1352          Bed-Chair Transfer    Bed-Chair [place] (Transfers) standby assist  -CR     Assistive Device (Bed-Chair Transfers) walker, front-wheeled  -CR     Row Name 03/09/22 1352          Sit-Stand Transfer    Sit-Stand [place] (Transfers) contact guard  -CR     Assistive Device (Sit-Stand Transfers) walker, front-wheeled  -CR     Row Name 03/09/22 1352          Gait/Stairs (Locomotion)    [place] Level (Gait) contact guard  -CR     Assistive Device (Gait) walker, front-wheeled  -CR     Distance in Feet (Gait) 40 ft x 3  -CR      Deviations/Abnormal Patterns (Gait) gait speed decreased  -CR           User Key  (r) = Recorded By, (t) = Taken By, (c) = Cosigned By    Initials Name Provider Type    CR Reyes, Carmela, PT Physical Therapist               Obj/Interventions     Row Name 03/09/22 1353          Range of Motion Comprehensive    General Range of Motion no range of motion deficits identified  -CR     Comment, General Range of Motion UE limited by chest tube  -CR     Row Name 03/09/22 1353          Strength Comprehensive (MMT)    General Manual Muscle Testing (MMT) Assessment no strength deficits identified  -CR     Row Name 03/09/22 1353          Balance    Balance Assessment sitting static balance;sitting dynamic balance;standing static balance;standing dynamic balance  -CR     Static Sitting Balance independent  -CR     Dynamic Sitting Balance independent  -CR     Position, Sitting Balance sitting edge of bed  -CR     Static Standing Balance contact guard  -CR     Dynamic Standing Balance contact guard  -CR     Position/Device Used, Standing Balance walker, front-wheeled  -CR     Row Name 03/09/22 1353          Sensory Assessment (Somatosensory)    Sensory Assessment (Somatosensory) sensation intact  -CR           User Key  (r) = Recorded By, (t) = Taken By, (c) = Cosigned By    Initials Name Provider Type    CR Reyes, Carmela, PT Physical Therapist               Goals/Plan     Row Name 03/09/22 1356          Gait Training Goal 1 (PT)    Activity/Assistive Device (Gait Training Goal 1, PT) gait (walking locomotion);assistive device use;increase energy conservation;increase endurance/gait distance;walker, rolling  -CR     [place] Level (Gait Training Goal 1, PT) standby assist  -CR     Distance (Gait Training Goal 1, PT) 70 ft x 2 with RPE 5/10  -CR     Time Frame (Gait Training Goal 1, PT) 1 week  -CR     Row Name 03/09/22 1352          Stairs Goal 1 (PT)    Activity/Assistive Device (Stairs Goal 1, PT) stairs, all skills  -CR      [place] Level/Cues Needed (Stairs Goal 1, PT) contact guard required  -CR     Number of Stairs (Stairs Goal 1, PT) 5  -CR     Time Frame (Stairs Goal 1, PT) 1 week  -CR     Row Name 03/09/22 2760          Patient Education Goal (PT)    Activity (Patient Education Goal, PT) HEP without desat  -CR     [place]/Cues/Accuracy (Memory Goal 2, PT) demonstrates adequately  -CR     Time Frame (Patient Education Goal, PT) 1 week  -CR           User Key  (r) = Recorded By, (t) = Taken By, (c) = Cosigned By    Initials Name Provider Type    CR Reyes, Carmela, PT Physical Therapist               Clinical Impression     Row Name 03/09/22 4355          Pain    Pretreatment Pain Rating 3/10  -CR     Posttreatment Pain Rating 3/10  -CR     Pain Location - Side/Orientation Left  -CR     Pain Location incisional  -CR     Pain Location - chest  -CR     Row Name 03/09/22 0708          Plan of Care Review    Plan of Care Reviewed With patient;spouse  -CR     Outcome Evaluation 83 y/o female admitted on 3/2 due to dyspnea with wt gain x 1 wk; found with afib, CHF. PMH Includes htn, R ankle fx with hardware removal in 2020, CABG. Pt is s/p thoracentesis and chest tube placement on 3/5 due to L hemothorax. Patient is normally independent. At time of eval, patient on 1l/nc. CG/SBA for supine<>sit transfers. CGA to come to standing using rw for stability. Patient able to ambulate 40 ft x 3 using rw with CGA; noted inc work of breathing, SOA but unable to check sats during gait. Upon return , sats 95%. Patient with decreased activity tolerance and may need short IP pulmonary rehab prior to home.  -CR     Row Name 03/09/22 1666          Therapy Assessment/Plan (PT)    Patient/Family Therapy Goals Statement (PT) home; wants to be able to go to [place] for spring break  -CR     Rehab Potential (PT) good, to achieve stated therapy goals  -CR     Criteria for Skilled Interventions Met (PT) yes;skilled treatment is necessary  -CR      Predicted Duration of Therapy Intervention (PT) dc  -CR           User Key  (r) = Recorded By, (t) = Taken By, (c) = Cosigned By    Initials Name Provider Type    CR Reyes, Carmela, PT Physical Therapist               Outcome Measures     Row Name 03/09/22 1359          How much help from another person do you currently need...    Turning from your back to your side while in flat bed without using bedrails? 3  -CR     Moving from lying on back to sitting on the side of a flat bed without bedrails? 3  -CR     Moving to and from a bed to a chair (including a wheelchair)? 3  -CR     Standing up from a chair using your arms (e.g., wheelchair, bedside chair)? 3  -CR     Climbing 3-5 steps with a railing? 3  -CR     To walk in hospital room? 3  -CR     AM-PAC 6 Clicks Score (PT) 18  -CR     Row Name 03/09/22 1354          Functional Assessment    Outcome Measure Options AM-PAC 6 Clicks Basic Mobility (PT)  -CR           User Key  (r) = Recorded By, (t) = Taken By, (c) = Cosigned By    Initials Name Provider Type    CR Reyes, Carmela, PT Physical Therapist                             Physical Therapy Education                 Title: PT OT SLP Therapies (In Progress)     Topic: Physical Therapy (In Progress)     Point: Mobility training (Done)     Learning Progress Summary           Patient Acceptance, E, VU by CR at 3/9/2022 1400   Family Acceptance, E, VU by CR at 3/9/2022 1400                   Point: Home exercise program (Not Started)     Learner Progress:  Not documented in this visit.          Point: Body mechanics (Not Started)     Learner Progress:  Not documented in this visit.          Point: Precautions (Not Started)     Learner Progress:  Not documented in this visit.                      User Key     Initials Effective Dates Name Provider Type Discipline    CR 06/16/21 -  Reyes, Carmela, PT Physical Therapist PT              PT Recommendation and Plan  Planned Therapy Interventions (PT): gait training, home  exercise program, patient/family education, stair training, strengthening  Plan of Care Reviewed With: patient, spouse  Outcome Evaluation: 83 y/o female admitted on 3/2 due to dyspnea with wt gain x 1 wk; found with afib, CHF. PMH Includes htn, R ankle fx with hardware removal in 2020, CABG. Pt is s/p thoracentesis and chest tube placement on 3/5 due to L hemothorax. Patient is normally independent. At time of eval, patient on 1l/nc. CG/SBA for supine<>sit transfers. CGA to come to standing using rw for stability. Patient able to ambulate 40 ft x 3 using rw with CGA; noted inc work of breathing, SOA but unable to check sats during gait. Upon return , sats 95%. Patient with decreased activity tolerance and may need short IP pulmonary rehab prior to home.     Time Calculation:    PT Charges     Row Name 03/09/22 1400             Time Calculation    Start Time 1240  -CR      Stop Time 1305  -CR      Time Calculation (min) 25 min  -CR      PT Received On 03/09/22  -CR      PT - Next Appointment 03/10/22  -CR      PT Goal Re-Cert Due Date 03/23/22  -CR              Time Calculation- PT    Total Timed Code Minutes- PT 0 minute(s)  -CR            User Key  (r) = Recorded By, (t) = Taken By, (c) = Cosigned By    Initials Name Provider Type    CR Reyes, Carmela, PT Physical Therapist              Therapy Charges for Today     Code Description Service Date Service Provider Modifiers Qty    95863809808 HC PT EVAL LOW COMPLEXITY 4 3/9/2022 Reyes, Carmela, PT GP 1          PT G-Codes  Outcome Measure Options: AM-PAC 6 Clicks Basic Mobility (PT)  AM-PAC 6 Clicks Score (PT): 18    Carmela Reyes, PT  3/9/2022

## 2022-03-09 NOTE — PLAN OF CARE
Goal Outcome Evaluation:  Plan of Care Reviewed With: patient, spouse           Outcome Evaluation: 81 y/o female admitted on 3/2 due to dyspnea with wt gain x 1 wk; found with afib, CHF. PMH Includes htn, R ankle fx with hardware removal in 2020, CABG. Pt is s/p thoracentesis and chest tube placement on 3/5 due to L hemothorax. Patient is normally independent. At time of eval, patient on 1l/nc. CG/SBA for supine<>sit transfers. CGA to come to standing using rw for stability. Patient able to ambulate 40 ft x 3 using rw with CGA; noted inc work of breathing, SOA but unable to check sats during gait. Upon return , sats 95%. Patient with decreased activity tolerance and may need short IP pulmonary rehab prior to home.

## 2022-03-09 NOTE — PROGRESS NOTES
Daily Progress Note        Atrial fibrillation with RVR (HCC)    Other hyperlipidemia    Essential hypertension    Hypothyroidism    Coronary artery disease involving coronary bypass graft of native heart without angina pectoris    Acute CHF (congestive heart failure) (HCC)    Acute hyponatremia    Elevated LFTs    Elevated TSH    Acute hyperglycemia    Obesity (BMI 30-39.9)      Assessment  Left hemothorax status post chest tube placement 3/5/2022  Acute respiratory distress  COPD exacerbation  Atrial fibrillation with RVR (HCC)    Other hyperlipidemia    Essential hypertension    Hypothyroidism    Coronary artery disease involving coronary bypass graft of native heart without angina pectoris    Acute CHF    Acute hyponatremia    Elevated LFTs    Elevated TSH    Acute hyperglycemia    Obesity (BMI 30-39.9)   ARF  Former smoker-quit 1980    Echo 3/3/2022  -EF 30%  -Severe right ventricular enlargement and moderate right atrial enlargement  -Severe left atrial enlargement  -RVSP 41.4    Plan  Thoracic surgery consulted for Pleural effusion that isn't resolving with chest catheter.    Daily CXR  Chest tube management: Continue -20 cmH2O suction  Continue to titrate oxygen- Currently on 2L NC  Cefepime for PNA-finishes 3/13/2022  Inhaled corticosteroids  Bronchodilators  Electrolyte/Glycemic control  DVT Prophylaxis-Heparin SQ  Levothyroxine 50 MCG  Oral amiodarone    3/9/22                                                        3/8/22    3/4/2022       LOS: 6 days     Subjective   Shortness of breath      Objective     Vital signs for last 24 hours:  Vitals:    03/08/22 2208 03/09/22 0000 03/09/22 0155 03/09/22 0520   BP: 147/83  139/67 158/75   BP Location: Left arm  Left arm Left arm   Patient Position: Lying  Lying Lying   Pulse: 67 56 62 81   Resp: 15  21 28   Temp: 97.8 °F (36.6 °C)  99.2 °F (37.3 °C) 97.7 °F (36.5 °C)   TempSrc: Oral  Oral Oral   SpO2:  97% 97% 99%   Weight:    81.8 kg (180 lb 6.4 oz)   Height:            Intake/Output last 3 shifts:  I/O last 3 completed shifts:  In: 930 [P.O.:830; IV Piggyback:100]  Out: 10 [Chest Tube:10]  Intake/Output this shift:  I/O this shift:  In: -   Out: 15 [Chest Tube:15]      Radiology  Imaging Results (Last 24 Hours)     Procedure Component Value Units Date/Time    XR Chest 1 View [[id number]] Resulted: 03/09/22 0629     Updated: 03/09/22 0630    XR Chest 1 View [[id number]] Collected: 03/08/22 0745     Updated: 03/08/22 0749    Narrative:      DATE OF EXAM:  3/8/2022 6:23 AM     PROCEDURE:  XR CHEST 1 VW-     INDICATIONS:  Follow-up left pleural effusion; I48.91-Unspecified atrial fibrillation;  I50.9-Heart failure, unspecified; Z20.822-Contact with and (suspected)  exposure to covid-19; I50.21-Acute systolic (congestive) heart failure     COMPARISON:  3/6/2022     TECHNIQUE:   Single radiographic AP view of the chest was obtained.     FINDINGS:  Left-sided chest tube remains in place. Previous pneumothorax space has  a filled in with fluid. There is now small loculated pleural effusion  along the lateral mid and lower thorax. There is atelectasis in the mid  and lower left lung. Right lung remains grossly clear        Impression:      Small loculated left hydropneumothorax with atelectasis in the mid and  left lower lung. The air component present previously has filled in with  fluid     Electronically Signed By-Cj Chong On:3/8/2022 7:47 AM  This report was finalized on [id number] by  Cj Chong, .          Labs:  Results from last 7 days   Lab Units 03/09/22  0036   WBC 10*3/mm3 13.70*   HEMOGLOBIN g/dL 9.1*   HEMATOCRIT % 26.8*   PLATELETS 10*3/mm3 252     Results from last 7 days   Lab Units 03/08/22  0018   SODIUM mmol/L 130*   POTASSIUM mmol/L 4.1   CHLORIDE mmol/L 96*   CO2 mmol/L 23.0   BUN mg/dL 30*   CREATININE mg/dL 1.03*   CALCIUM mg/dL 8.5*   BILIRUBIN mg/dL 0.3   ALK PHOS U/L 77   ALT (SGPT) U/L 27   AST (SGOT) U/L 17   GLUCOSE mg/dL 169*          Results from last 7 days   Lab Units 03/08/22  0018 03/07/22  0014 03/06/22  0650   ALBUMIN g/dL 2.90* 3.10* 3.20*     Results from last 7 days   Lab Units 03/03/22  1347 03/03/22  0618 03/02/22  2308   TROPONIN T ng/mL <0.010 <0.010 0.013         Results from last 7 days   Lab Units 03/08/22  0018   MAGNESIUM mg/dL 2.1     Results from last 7 days   Lab Units 03/04/22  2053 03/02/22  2308   INR  1.13* 1.03   APTT seconds  --  25.6     Results from last 7 days   Lab Units 03/02/22  2308   TSH uIU/mL 11.150*   FREE T4 ng/dL 1.17           Meds:   SCHEDULE  amiodarone, 200 mg, Oral, Q8H   Followed by  [START ON 3/13/2022] amiodarone, 200 mg, Oral, Q12H   Followed by  [START ON 3/27/2022] amiodarone, 200 mg, Oral, Daily  arformoterol, 15 mcg, Nebulization, BID - RT  aspirin, 81 mg, Oral, Daily  budesonide, 1 mg, Nebulization, BID - RT  cefepime, 2 g, Intravenous, Q12H  dilTIAZem, 90 mg, Oral, Q8H  heparin (porcine), 5,000 Units, Subcutaneous, Q12H  HYDROmorphone, 2 mg, Intravenous, Once  insulin glargine, 10 Units, Subcutaneous, Nightly  insulin lispro, 0-14 Units, Subcutaneous, TID AC  levothyroxine, 50 mcg, Oral, Q AM  melatonin, 5 mg, Oral, Nightly  metoprolol tartrate, 25 mg, Oral, BID  multivitamin with minerals, 1 tablet, Oral, Daily  senna-docusate sodium, 2 tablet, Oral, BID  sodium chloride, 10 mL, Intravenous, Q12H      Infusions  hold, 1 each      PRNs  •  acetaminophen **OR** acetaminophen **OR** acetaminophen  •  aluminum-magnesium hydroxide-simethicone  •  senna-docusate sodium **AND** polyethylene glycol **AND** bisacodyl **AND** bisacodyl  •  dextrose  •  dextrose  •  glucagon (human recombinant)  •  hold  •  HYDROcodone-acetaminophen  •  insulin lispro **AND** insulin lispro  •  magnesium sulfate **OR** magnesium sulfate in D5W 1g/100mL (PREMIX)  •  nitroglycerin  •  ondansetron **OR** ondansetron  •  potassium chloride **OR** potassium chloride **OR** potassium chloride  •  sodium  chloride    Physical Exam:  Physical Exam  Physical Exam  General Appearance:  Alert.  No distress noted.  HEENT:  Normocephalic, without obvious abnormality, Conjunctiva/corneas clear,.  Normal external ear canals, Nares normal, no drainage     Neck:  Supple, symmetrical, trachea midline. No JVD.  Lungs /Chest wall: Minimal bilateral basal Rales, respirations unlabored symmetrical wall movement.   Left chest tube in place.  Heart:  Regular rate and rhythm, systolic murmur PMI left sternal border  Abdomen: Soft, non-tender, no masses, no organomegaly.    Extremities: 1+ edema bilateral lower extremity, no clubbing or cyanosis      ROS  Review of Systems  Review of Systems       Constitutional: Negative for chills, fever and malaise/fatigue.   HENT: Negative.    Eyes: Negative.    Cardiovascular: Negative.    Respiratory: Positive for cough and shortness of breath.    Skin: Negative.    Musculoskeletal: Negative.    Gastrointestinal: Negative.    Genitourinary: Negative.    Neurological: Negative.    Psychiatric/Behavioral: Negative.          I have reviewed current clinicals.     Electronically signed by VALENTINE Durham, 03/09/22, 6:55 AM EST.     The NP scribed the note for me, I have examined the patient and reviewed and verified the above findings and and I formulated the assessment and plan as documented

## 2022-03-09 NOTE — PROGRESS NOTES
Referring Provider: Mp Galicia MD    Reason for follow-up:  Status post CABG  Atrial fibrillation  Hypertension  Left pleural effusion     Patient Care Team:  Minerva Chatterjee MD as PCP - General (Internal Medicine)    Subjective .      ROS    Since I have last seen, the patient has been without any chest discomfort ,shortness of breath, palpitations, dizziness or syncope.  Denies having any headache ,abdominal pain ,nausea, vomiting , diarrhea constipation, loss of weight or loss of appetite.  Denies having any excessive bruising ,hematuria or blood in the stool.    Review of all systems negative except as indicated    History  Past Medical History:   Diagnosis Date   • Astigmatism, bilateral 2019   • Benign essential hypertension    • Bilateral presbyopia 2019   • CAD (coronary artery disease)    • Closed right ankle fracture    • COVID-19 vaccination refused    • Hyperlipidemia    • Hypothyroidism    • Influenza vaccine needed    • Myocardial infarction (HCC)    • Prediabetes    • Pseudophakia, both eyes 2019   • Thoracic back pain        Past Surgical History:   Procedure Laterality Date   • APPENDECTOMY     • CARDIAC CATHETERIZATION  2012    x3    • CORONARY ARTERY BYPASS GRAFT  2014   • CORONARY STENT PLACEMENT      x3   • HARDWARE REMOVAL Right 2020    Procedure: ANKLE/FOOT HARDWARE REMOVAL;  Surgeon: Lincoln Sibley MD;  Location: [facility];  Service: Orthopedics;  Laterality: Right;   • ORIF ANKLE FRACTURE Right 2019    Radha Vazquez Mem       Family History   Problem Relation Age of Onset   • Heart disease Mother    • Lung cancer Father    • Diabetes Other        Social History     Tobacco Use   • Smoking status: Former Smoker     Types: Cigarettes     Quit date: 1980     Years since quittin.2   • Smokeless tobacco: Never Used   • Tobacco comment: Social Drinker   Vaping Use   • Vaping Use: Never used   Substance Use Topics   • Alcohol use: Yes      Comment: mod    • Drug use: No        Medications Prior to Admission   Medication Sig Dispense Refill Last Dose   • aspirin 81 MG chewable tablet Chew 81 mg Daily.   3/2/2022 at Unknown time   • Cholecalciferol (VITAMIN D3) 2000 units tablet Take 1 tablet by mouth Daily.   3/2/2022 at Unknown time   • irbesartan (Avapro) 300 MG tablet Take 150 mg by mouth Daily.      • metoprolol tartrate (LOPRESSOR) 25 MG tablet Take 1 tablet by mouth twice daily 180 tablet 0 3/2/2022 at Unknown time   • Multiple Vitamins-Minerals (MULTIVITAMIN ADULT EXTRA C PO) Take 1 tablet by mouth Daily.   3/2/2022 at Unknown time   • levothyroxine (SYNTHROID, LEVOTHROID) 50 MCG tablet Take 50 mcg by mouth Every Morning.          Allergies  Prednisone    Scheduled Meds:amiodarone, 200 mg, Oral, Q8H   Followed by  [START ON 3/13/2022] amiodarone, 200 mg, Oral, Q12H   Followed by  [START ON 3/27/2022] amiodarone, 200 mg, Oral, Daily  arformoterol, 15 mcg, Nebulization, BID - RT  aspirin, 81 mg, Oral, Daily  budesonide, 1 mg, Nebulization, BID - RT  cefepime, 2 g, Intravenous, Q12H  dilTIAZem, 90 mg, Oral, Q8H  heparin (porcine), 5,000 Units, Subcutaneous, Q12H  HYDROmorphone, 2 mg, Intravenous, Once  insulin glargine, 10 Units, Subcutaneous, Nightly  insulin lispro, 0-14 Units, Subcutaneous, TID AC  levothyroxine, 50 mcg, Oral, Q AM  melatonin, 5 mg, Oral, Nightly  metoprolol tartrate, 25 mg, Oral, BID  multivitamin with minerals, 1 tablet, Oral, Daily  senna-docusate sodium, 2 tablet, Oral, BID  sodium chloride, 10 mL, Intravenous, Q12H      Continuous Infusions:hold, 1 each      PRN Meds:.•  acetaminophen **OR** acetaminophen **OR** acetaminophen  •  aluminum-magnesium hydroxide-simethicone  •  senna-docusate sodium **AND** polyethylene glycol **AND** bisacodyl **AND** bisacodyl  •  dextrose  •  dextrose  •  glucagon (human recombinant)  •  hold  •  HYDROcodone-acetaminophen  •  insulin lispro **AND** insulin lispro  •  magnesium sulfate **OR**  "magnesium sulfate in D5W 1g/100mL (PREMIX)  •  nitroglycerin  •  ondansetron **OR** ondansetron  •  potassium chloride **OR** potassium chloride **OR** potassium chloride  •  sodium chloride    Objective     VITAL SIGNS  Vitals:    03/08/22 2208 03/09/22 0000 03/09/22 0155 03/09/22 0520   BP: 147/83  139/67 158/75   BP Location: Left arm  Left arm Left arm   Patient Position: Lying  Lying Lying   Pulse: 67 56 62 81   Resp: 15  21 28   Temp: 97.8 °F (36.6 °C)  99.2 °F (37.3 °C) 97.7 °F (36.5 °C)   TempSrc: Oral  Oral Oral   SpO2:  97% 97% 99%   Weight:    81.8 kg (180 lb 6.4 oz)   Height:           Flowsheet Rows    Flowsheet Row First Filed Value   Admission Height 162.6 cm (64\") Documented at 03/02/2022 2218   Admission Weight 81 kg (178 lb 9.2 oz) Documented at 03/02/2022 2218            Intake/Output Summary (Last 24 hours) at 3/9/2022 0649  Last data filed at 3/9/2022 0000  Gross per 24 hour   Intake 350 ml   Output 15 ml   Net 335 ml        TELEMETRY: Atrial fibrillation    Physical Exam:  The patient is alert, oriented and in no distress.  Vital signs as noted above.  Head and neck revealed no carotid bruits or jugular venous distention.  No thyromegaly or lymphadenopathy is present  Lungs clear.  No wheezing.  Breath sounds are normal bilaterally.  Heart normal first and second heart sounds.  No murmur. No precordial rub is present.  No gallop is present.  Abdomen soft and nontender.  No organomegaly is present.  Extremities with good peripheral pulses without any pedal edema.  Skin warm and dry.  Musculoskeletal system is grossly normal  CNS grossly normal      Results Review:   I reviewed the patient's new clinical results.  Lab Results (last 24 hours)     Procedure Component Value Units Date/Time    CBC & Differential [[id number]]  (Abnormal) Collected: 03/09/22 0036    Specimen: Blood Updated: 03/09/22 0116    Narrative:      The following orders were created for panel order CBC & Differential.  Procedure  "                              Abnormality         Status                     ---------                               -----------         ------                     CBC Auto Differential[[id number]]        Abnormal            Final result                 Please view results for these tests on the individual orders.    CBC Auto Differential [[id number]]  (Abnormal) Collected: 03/09/22 0036    Specimen: Blood Updated: 03/09/22 0116     WBC 13.70 10*3/mm3      RBC 2.87 10*6/mm3      Hemoglobin 9.1 g/dL      Hematocrit 26.8 %      MCV 93.3 fL      MCH 31.6 pg      MCHC 33.8 g/dL      RDW 15.5 %      RDW-SD 50.3 fl      MPV 7.9 fL      Platelets 252 10*3/mm3      Neutrophil % 83.6 %      Lymphocyte % 6.5 %      Monocyte % 9.2 %      Eosinophil % 0.5 %      Basophil % 0.2 %      Neutrophils, Absolute 11.50 10*3/mm3      Lymphocytes, Absolute 0.90 10*3/mm3      Monocytes, Absolute 1.30 10*3/mm3      Eosinophils, Absolute 0.10 10*3/mm3      Basophils, Absolute 0.00 10*3/mm3      nRBC 0.2 /100 WBC     POC Glucose Once [[id number]]  (Abnormal) Collected: 03/08/22 1957    Specimen: Blood Updated: 03/08/22 1958     Glucose 172 mg/dL      Comment: Serial Number: [id number]:  [id number]       CBC (No Diff) [[id number]]  (Abnormal) Collected: 03/08/22 1755    Specimen: Blood Updated: 03/08/22 1909     WBC 13.50 10*3/mm3      RBC 2.96 10*6/mm3      Hemoglobin 9.6 g/dL      Hematocrit 28.1 %      MCV 95.1 fL      MCH 32.6 pg      MCHC 34.2 g/dL      RDW 15.6 %      RDW-SD 50.8 fl      MPV 8.2 fL      Platelets 259 10*3/mm3     POC Glucose Once [[id number]]  (Abnormal) Collected: 03/08/22 1624    Specimen: Blood Updated: 03/08/22 1625     Glucose 179 mg/dL      Comment: Serial Number: [id number]:  [id number]       CBC (No Diff) [[id number]]  (Abnormal) Collected: 03/08/22 1157    Specimen: Blood Updated: 03/08/22 1237     WBC 12.60 10*3/mm3      RBC 3.04 10*6/mm3      Hemoglobin 9.7 g/dL      Hematocrit 28.7 %      MCV 94.2 fL       MCH 31.9 pg      MCHC 33.9 g/dL      RDW 15.7 %      RDW-SD 51.6 fl      MPV 8.0 fL      Platelets 251 10*3/mm3     POC Glucose Once [[id number]]  (Abnormal) Collected: 03/08/22 1117    Specimen: Blood Updated: 03/08/22 1119     Glucose 144 mg/dL      Comment: Serial Number: [id number]:  [id number]       POC Glucose Once [[id number]]  (Abnormal) Collected: 03/08/22 0710    Specimen: Blood Updated: 03/08/22 0711     Glucose 144 mg/dL      Comment: Serial Number: [id number]:  [id number]             Imaging Results (Last 24 Hours)     Procedure Component Value Units Date/Time    XR Chest 1 View [[id number]] Resulted: 03/09/22 0629     Updated: 03/09/22 0630    XR Chest 1 View [[id number]] Collected: 03/08/22 0745     Updated: 03/08/22 0749    Narrative:      DATE OF EXAM:  3/8/2022 6:23 AM     PROCEDURE:  XR CHEST 1 VW-     INDICATIONS:  Follow-up left pleural effusion; I48.91-Unspecified atrial fibrillation;  I50.9-Heart failure, unspecified; Z20.822-Contact with and (suspected)  exposure to covid-19; I50.21-Acute systolic (congestive) heart failure     COMPARISON:  3/6/2022     TECHNIQUE:   Single radiographic AP view of the chest was obtained.     FINDINGS:  Left-sided chest tube remains in place. Previous pneumothorax space has  a filled in with fluid. There is now small loculated pleural effusion  along the lateral mid and lower thorax. There is atelectasis in the mid  and lower left lung. Right lung remains grossly clear        Impression:      Small loculated left hydropneumothorax with atelectasis in the mid and  left lower lung. The air component present previously has filled in with  fluid     Electronically Signed By-Cj Chong On:3/8/2022 7:47 AM  This report was finalized on [id number] by  Cj Chong, .      LAB RESULTS (LAST 7 DAYS)    CBC  Results from last 7 days   Lab Units 03/09/22  0036 03/08/22  1755 03/08/22  1157 03/08/22  0603 03/08/22  0018 03/07/22  1814 03/07/22  1220    WBC 10*3/mm3 13.70* 13.50* 12.60* 10.10 12.20* 12.40* 10.10   RBC 10*6/mm3 2.87* 2.96* 3.04* 2.75* 2.87* 2.87* 2.80*   HEMOGLOBIN g/dL 9.1* 9.6* 9.7* 9.0* 9.0* 9.4* 9.1*   HEMATOCRIT % 26.8* 28.1* 28.7* 25.8* 27.1* 27.0* 26.0*   MCV fL 93.3 95.1 94.2 93.6 94.2 94.2 92.9   PLATELETS 10*3/mm3 252 259 251 208 257 220 211       BMP  Results from last 7 days   Lab Units 03/08/22  0018 03/07/22  0014 03/06/22  0650 03/05/22  0536 03/05/22  0345 03/04/22  0458 03/02/22  2308   SODIUM mmol/L 130* 129* 133* 140 137 135* 130*   POTASSIUM mmol/L 4.1 4.1 3.8 4.4 4.2 3.1* 4.0   CHLORIDE mmol/L 96* 94* 96* 103 98 93* 93*   CO2 mmol/L 23.0 23.0 24.0 22.0 26.0 30.0* 24.0   BUN mg/dL 30* 36* 32* 28* 28* 16 21   CREATININE mg/dL 1.03* 1.15* 1.35* 1.07* 0.96 0.77 0.83   GLUCOSE mg/dL 169* 213* 110* 367* 274* 189* 242*   MAGNESIUM mg/dL 2.1 2.2 2.0  --  2.0 1.6 1.8   PHOSPHORUS mg/dL 2.6 3.5 4.8* 4.7*  --   --   --        CMP   Results from last 7 days   Lab Units 03/08/22  0018 03/07/22  0014 03/06/22  0650 03/05/22  0536 03/05/22  0345 03/04/22  0458 03/02/22  2308   SODIUM mmol/L 130* 129* 133* 140 137 135* 130*   POTASSIUM mmol/L 4.1 4.1 3.8 4.4 4.2 3.1* 4.0   CHLORIDE mmol/L 96* 94* 96* 103 98 93* 93*   CO2 mmol/L 23.0 23.0 24.0 22.0 26.0 30.0* 24.0   BUN mg/dL 30* 36* 32* 28* 28* 16 21   CREATININE mg/dL 1.03* 1.15* 1.35* 1.07* 0.96 0.77 0.83   GLUCOSE mg/dL 169* 213* 110* 367* 274* 189* 242*   ALBUMIN g/dL 2.90* 3.10* 3.20* 3.00* 3.00*  --  3.60   BILIRUBIN mg/dL 0.3 0.3 0.3 0.4 0.4  --  0.7   ALK PHOS U/L 77 77 68 82 83  --  105   AST (SGOT) U/L 17 19 14 15 18  --  35*   ALT (SGPT) U/L 27 28 27 40* 43*  --  71*         BNP        TROPONIN  Results from last 7 days   Lab Units 03/03/22  1347   TROPONIN T ng/mL <0.010       CoAg  Results from last 7 days   Lab Units 03/04/22 2053 03/02/22  2308   INR  1.13* 1.03   APTT seconds  --  25.6       Creatinine Clearance  Estimated Creatinine Clearance: 44.2 mL/min (A) (by C-G formula  based on SCr of 1.03 mg/dL (H)).    ABG        Radiology  XR Chest 1 View    Result Date: 3/8/2022  Small loculated left hydropneumothorax with atelectasis in the mid and left lower lung. The air component present previously has filled in with fluid  Electronically Signed By-Cj Chong On:3/8/2022 7:47 AM This report was finalized on [id number] by  Cj Chong, .              EKG                              I personally viewed and interpreted the patient's EKG/Telemetry data: Atrial fibrillation    ECHOCARDIOGRAM:    Results for orders placed during the hospital encounter of 03/02/22    Adult Transthoracic Echo Complete W/ Cont if Necessary Per Protocol    Interpretation Summary  LVE with severe global left ventricular hypokinesis, estimated LV ejection fraction of 30%.  Severe right ventricular enlargement and moderate right atrial enlargement seen  Severe left atrial enlargement seen.  Aortic valve, mitral valve, tricuspid valve appears structurally normal, moderate mitral and mild tricuspid regurgitation seen.  Calculated RV systolic pressure of 40 to 45 mmHg.  No pericardial effusion seen.  Large pleural effusion seen.  Proximal aorta appears normal in size.          STRESS MYOVIEW:    Cardiolite (Tc-99m Sestamibi) stress test    CARDIAC CATHETERIZATION:            OTHER:         Assessment/Plan     Principal Problem:    Atrial fibrillation with RVR (HCC)  Active Problems:    Other hyperlipidemia    Essential hypertension    Hypothyroidism    Coronary artery disease involving coronary bypass graft of native heart without angina pectoris    Acute CHF (congestive heart failure) (HCC)    Acute hyponatremia    Elevated LFTs    Elevated TSH    Acute hyperglycemia    Obesity (BMI 30-39.9)      Presented through emergency room 3/2/2022 with progressively worsening shortness of breath and dyspnea on exertion weight gain and leg edema. Was found to be in A. fib and congestive heart failure. Patient denies any  chest pain. Patient lives in [place] and has a second home in [place] and see his cardiologist at [facility]. Patient has been having issues with alopecia and memory issues therefore stopped beta-blockers and statin on her own. Also has not been taking  thyroid replacement therapy since November because she ran out of medication. Patient has history of prediabetes. Does not check her sugars at home.  Work-up here revealed troponin x3 are negative. proBNP is 7717. CMP reveals a glucose of 242, hemoglobin A1c over 7.1. BUN/creatinine are 21/0.83. ALT elevated at 71, AST 35. TSH is 11.15  Chest x-ray 3/2/2022 reveals left pleural effusion, left basilar disease most likely atelectasis with possible pneumonia.  EKG 3/2/2022 reviewed/interpreted by me reveals A. fib with RVR at 126 bpm with PVCs      ]]]]]]]]]]]]]]]]]]]]  Impression  ==========  -Progressively worsening shortness of breath and leg edema.    -Congestive heart failure  Left ventricle dysfunction with ejection fraction of 30%.    -Significant biatrial enlargement    -Atrial fibrillation with RVR    -Premature ventricular contractions    -Status post CABG 12/2014 (performed in [place])    -Hypertension dyslipidemia prediabetes hypothyroidism elevation    -Status post ORIF    -Family history of coronary artery disease    -Intolerance to prednisone    -Former smoker    -Medical noncompliance.  Was not taking thyroid medicine replacement properly.     =================  Plan  ===========  Atrial fibrillation with RVR.  Rate control.  Consider GABRIEL cardioversion.  Patient would benefit from long-term anticoagulation for atrial fibrillation because of high [id number] score due to female gender age over 75, hypertension, diabetes, CAD making it at least 6. We will start her on Eliquis or warfarin before discharge.  Continued IV diuresis.  Observe renal function closely.  Elevated LFTs due to passive congestion.    Large left pleural effusion.  Status post drainage and  chest tube.  More than 400 cc of fluid was removed.  Patient is on Cardizem and amiodarone.    Renal dysfunction  BUN/creatinine-30/1.03    Echocardiogram showed severe right ventricle enlargement left atrial enlargement related enlargement and calculated pulmonary artery pressure of 45 mmHg.  Left ventricle dysfunction with ejection fraction of 30%.    Ischemic cardiomyopathy    Current medications include amiodarone aspirin Cardizem 90 mg every 8 hours subcu heparin levothyroxine metoprolol 25 mg twice a day.    Anticoagulation and possible cardioversion as an outpatient versus GABRIEL cardioversion to try to convert to sinus rhythm    Follow-up labs ordered.    Further plan will depend on patient's progress.  ]]]]]]]]]]]]]]]]]           Yordan Evans MD  03/09/22  06:49 EST

## 2022-03-09 NOTE — PLAN OF CARE
Goal Outcome Evaluation:  Problem: Adult Inpatient Plan of Care  Goal: Absence of Hospital-Acquired Illness or Injury  Intervention: Prevent and Manage VTE (venous thromboembolism) Risk  Recent Flowsheet Documentation  Taken 3/8/2022 2000 by Wai Sim RN  VTE Prevention/Management: patient refused intervention  Pt will remain free from falls during hospital stay.Pt had no discomfort with chest tube.No output with chest tube at midnight will continue to monitor.

## 2022-03-10 ENCOUNTER — APPOINTMENT (OUTPATIENT)
Dept: GENERAL RADIOLOGY | Facility: HOSPITAL | Age: 82
End: 2022-03-10

## 2022-03-10 LAB
ALBUMIN SERPL-MCNC: 2.8 G/DL (ref 3.5–5.2)
ALBUMIN/GLOB SERPL: 1 G/DL
ALP SERPL-CCNC: 81 U/L (ref 39–117)
ALT SERPL W P-5'-P-CCNC: 19 U/L (ref 1–33)
ANION GAP SERPL CALCULATED.3IONS-SCNC: 11 MMOL/L (ref 5–15)
AST SERPL-CCNC: 13 U/L (ref 1–32)
BASOPHILS # BLD AUTO: 0.1 10*3/MM3 (ref 0–0.2)
BASOPHILS NFR BLD AUTO: 0.4 % (ref 0–1.5)
BILIRUB SERPL-MCNC: 0.5 MG/DL (ref 0–1.2)
BUN SERPL-MCNC: 19 MG/DL (ref 8–23)
BUN/CREAT SERPL: 28.4 (ref 7–25)
CA-I SERPL ISE-MCNC: 1.18 MMOL/L (ref 1.2–1.3)
CALCIUM SPEC-SCNC: 8.6 MG/DL (ref 8.6–10.5)
CHLORIDE SERPL-SCNC: 96 MMOL/L (ref 98–107)
CO2 SERPL-SCNC: 23 MMOL/L (ref 22–29)
CREAT SERPL-MCNC: 0.67 MG/DL (ref 0.57–1)
CRP SERPL-MCNC: 19.71 MG/DL (ref 0–0.5)
DEPRECATED RDW RBC AUTO: 51.2 FL (ref 37–54)
EGFRCR SERPLBLD CKD-EPI 2021: 88.5 ML/MIN/1.73
EOSINOPHIL # BLD AUTO: 0.1 10*3/MM3 (ref 0–0.4)
EOSINOPHIL NFR BLD AUTO: 0.4 % (ref 0.3–6.2)
ERYTHROCYTE [DISTWIDTH] IN BLOOD BY AUTOMATED COUNT: 15.8 % (ref 12.3–15.4)
GLOBULIN UR ELPH-MCNC: 2.8 GM/DL
GLUCOSE BLDC GLUCOMTR-MCNC: 136 MG/DL (ref 70–105)
GLUCOSE BLDC GLUCOMTR-MCNC: 143 MG/DL (ref 70–105)
GLUCOSE BLDC GLUCOMTR-MCNC: 161 MG/DL (ref 70–105)
GLUCOSE BLDC GLUCOMTR-MCNC: 282 MG/DL (ref 70–105)
GLUCOSE SERPL-MCNC: 168 MG/DL (ref 65–99)
HCT VFR BLD AUTO: 27.3 % (ref 34–46.6)
HGB BLD-MCNC: 9.3 G/DL (ref 12–15.9)
LYMPHOCYTES # BLD AUTO: 0.9 10*3/MM3 (ref 0.7–3.1)
LYMPHOCYTES NFR BLD AUTO: 5.6 % (ref 19.6–45.3)
MAGNESIUM SERPL-MCNC: 2.1 MG/DL (ref 1.6–2.4)
MCH RBC QN AUTO: 32 PG (ref 26.6–33)
MCHC RBC AUTO-ENTMCNC: 33.9 G/DL (ref 31.5–35.7)
MCV RBC AUTO: 94.3 FL (ref 79–97)
MONOCYTES # BLD AUTO: 1.6 10*3/MM3 (ref 0.1–0.9)
MONOCYTES NFR BLD AUTO: 10.6 % (ref 5–12)
NEUTROPHILS NFR BLD AUTO: 12.8 10*3/MM3 (ref 1.7–7)
NEUTROPHILS NFR BLD AUTO: 83 % (ref 42.7–76)
NRBC BLD AUTO-RTO: 0.1 /100 WBC (ref 0–0.2)
PHOSPHATE SERPL-MCNC: 2.4 MG/DL (ref 2.5–4.5)
PLATELET # BLD AUTO: 291 10*3/MM3 (ref 140–450)
PMV BLD AUTO: 7.7 FL (ref 6–12)
POTASSIUM SERPL-SCNC: 4.3 MMOL/L (ref 3.5–5.2)
PROT SERPL-MCNC: 5.6 G/DL (ref 6–8.5)
RBC # BLD AUTO: 2.89 10*6/MM3 (ref 3.77–5.28)
SODIUM SERPL-SCNC: 130 MMOL/L (ref 136–145)
WBC NRBC COR # BLD: 15.4 10*3/MM3 (ref 3.4–10.8)

## 2022-03-10 PROCEDURE — 84100 ASSAY OF PHOSPHORUS: CPT | Performed by: STUDENT IN AN ORGANIZED HEALTH CARE EDUCATION/TRAINING PROGRAM

## 2022-03-10 PROCEDURE — 85025 COMPLETE CBC W/AUTO DIFF WBC: CPT | Performed by: INTERNAL MEDICINE

## 2022-03-10 PROCEDURE — 63710000001 INSULIN GLARGINE PER 5 UNITS: Performed by: INTERNAL MEDICINE

## 2022-03-10 PROCEDURE — 71045 X-RAY EXAM CHEST 1 VIEW: CPT

## 2022-03-10 PROCEDURE — 25010000002 HEPARIN (PORCINE) PER 1000 UNITS: Performed by: INTERNAL MEDICINE

## 2022-03-10 PROCEDURE — 82330 ASSAY OF CALCIUM: CPT | Performed by: INTERNAL MEDICINE

## 2022-03-10 PROCEDURE — 25010000002 ALTEPLASE PER 1 MG: Performed by: NURSE PRACTITIONER

## 2022-03-10 PROCEDURE — 93005 ELECTROCARDIOGRAM TRACING: CPT | Performed by: INTERNAL MEDICINE

## 2022-03-10 PROCEDURE — 86140 C-REACTIVE PROTEIN: CPT | Performed by: INTERNAL MEDICINE

## 2022-03-10 PROCEDURE — 99232 SBSQ HOSP IP/OBS MODERATE 35: CPT | Performed by: INTERNAL MEDICINE

## 2022-03-10 PROCEDURE — 32562 LYSE CHEST FIBRIN SUBQ DAY: CPT | Performed by: NURSE PRACTITIONER

## 2022-03-10 PROCEDURE — 83735 ASSAY OF MAGNESIUM: CPT | Performed by: INTERNAL MEDICINE

## 2022-03-10 PROCEDURE — 25010000002 CEFEPIME PER 500 MG: Performed by: INTERNAL MEDICINE

## 2022-03-10 PROCEDURE — 93010 ELECTROCARDIOGRAM REPORT: CPT | Performed by: INTERNAL MEDICINE

## 2022-03-10 PROCEDURE — 94799 UNLISTED PULMONARY SVC/PX: CPT

## 2022-03-10 PROCEDURE — 99233 SBSQ HOSP IP/OBS HIGH 50: CPT | Performed by: INTERNAL MEDICINE

## 2022-03-10 PROCEDURE — 63710000001 INSULIN LISPRO (HUMAN) PER 5 UNITS

## 2022-03-10 PROCEDURE — 97110 THERAPEUTIC EXERCISES: CPT

## 2022-03-10 PROCEDURE — 82962 GLUCOSE BLOOD TEST: CPT

## 2022-03-10 PROCEDURE — 99232 SBSQ HOSP IP/OBS MODERATE 35: CPT | Performed by: NURSE PRACTITIONER

## 2022-03-10 PROCEDURE — 80053 COMPREHEN METABOLIC PANEL: CPT | Performed by: STUDENT IN AN ORGANIZED HEALTH CARE EDUCATION/TRAINING PROGRAM

## 2022-03-10 RX ADMIN — AMIODARONE HYDROCHLORIDE 200 MG: 200 TABLET ORAL at 05:20

## 2022-03-10 RX ADMIN — DILTIAZEM HYDROCHLORIDE 90 MG: 30 TABLET, FILM COATED ORAL at 05:19

## 2022-03-10 RX ADMIN — HEPARIN SODIUM 5000 UNITS: 5000 INJECTION INTRAVENOUS; SUBCUTANEOUS at 09:38

## 2022-03-10 RX ADMIN — INSULIN GLARGINE 10 UNITS: 100 INJECTION, SOLUTION SUBCUTANEOUS at 20:46

## 2022-03-10 RX ADMIN — SENNOSIDES AND DOCUSATE SODIUM 2 TABLET: 50; 8.6 TABLET ORAL at 20:41

## 2022-03-10 RX ADMIN — WATER: 1 INJECTION INTRAMUSCULAR; INTRAVENOUS; SUBCUTANEOUS at 11:16

## 2022-03-10 RX ADMIN — DILTIAZEM HYDROCHLORIDE 90 MG: 30 TABLET, FILM COATED ORAL at 23:40

## 2022-03-10 RX ADMIN — CEFEPIME HYDROCHLORIDE 2 G: 2 INJECTION, POWDER, FOR SOLUTION INTRAMUSCULAR; INTRAVENOUS at 13:35

## 2022-03-10 RX ADMIN — METOPROLOL TARTRATE 25 MG: 25 TABLET, FILM COATED ORAL at 20:41

## 2022-03-10 RX ADMIN — ZOLPIDEM TARTRATE 5 MG: 5 TABLET ORAL at 20:42

## 2022-03-10 RX ADMIN — INSULIN LISPRO 3 UNITS: 100 INJECTION, SOLUTION INTRAVENOUS; SUBCUTANEOUS at 09:38

## 2022-03-10 RX ADMIN — BUDESONIDE 1 MG: 0.5 INHALANT RESPIRATORY (INHALATION) at 19:05

## 2022-03-10 RX ADMIN — HEPARIN SODIUM 5000 UNITS: 5000 INJECTION INTRAVENOUS; SUBCUTANEOUS at 20:35

## 2022-03-10 RX ADMIN — LEVOTHYROXINE SODIUM 50 MCG: 0.05 TABLET ORAL at 05:19

## 2022-03-10 RX ADMIN — AMIODARONE HYDROCHLORIDE 200 MG: 200 TABLET ORAL at 20:42

## 2022-03-10 RX ADMIN — Medication 10 ML: at 20:42

## 2022-03-10 RX ADMIN — CEFEPIME HYDROCHLORIDE 2 G: 2 INJECTION, POWDER, FOR SOLUTION INTRAMUSCULAR; INTRAVENOUS at 01:30

## 2022-03-10 RX ADMIN — Medication 1 TABLET: at 09:38

## 2022-03-10 RX ADMIN — METOPROLOL TARTRATE 25 MG: 25 TABLET, FILM COATED ORAL at 09:38

## 2022-03-10 RX ADMIN — DILTIAZEM HYDROCHLORIDE 90 MG: 30 TABLET, FILM COATED ORAL at 13:34

## 2022-03-10 RX ADMIN — WATER: 1 INJECTION INTRAMUSCULAR; INTRAVENOUS; SUBCUTANEOUS at 08:48

## 2022-03-10 RX ADMIN — INSULIN LISPRO 8 UNITS: 100 INJECTION, SOLUTION INTRAVENOUS; SUBCUTANEOUS at 17:12

## 2022-03-10 RX ADMIN — ARFORMOTEROL TARTRATE 15 MCG: 15 SOLUTION RESPIRATORY (INHALATION) at 19:05

## 2022-03-10 RX ADMIN — Medication 5 MG: at 20:41

## 2022-03-10 RX ADMIN — AMIODARONE HYDROCHLORIDE 200 MG: 200 TABLET ORAL at 13:34

## 2022-03-10 RX ADMIN — HYDROCODONE BITARTRATE AND ACETAMINOPHEN 1 TABLET: 5; 325 TABLET ORAL at 13:39

## 2022-03-10 RX ADMIN — Medication 10 ML: at 09:39

## 2022-03-10 RX ADMIN — SENNOSIDES AND DOCUSATE SODIUM 2 TABLET: 50; 8.6 TABLET ORAL at 09:38

## 2022-03-10 RX ADMIN — ASPIRIN 81 MG CHEWABLE TABLET 81 MG: 81 TABLET CHEWABLE at 09:38

## 2022-03-10 NOTE — PROGRESS NOTES
"    Chief Complaint: loculated pleural effusion, right  S/P: Left thoracentesis on 3/4/22, chest tube insertion on 3/5/22      Subjective:  Symptoms:  Stable.  She reports shortness of breath, cough, weakness and chest pressure.    Diet:  Adequate intake.  No nausea or vomiting.    Activity level: Impaired due to weakness.    Pain:  She complains of pain that is mild.  She reports pain is improving.  Pain is well controlled.    Off supplemental oxygen. Resting in bed. No new complaints.    Vital Signs:  Temp:  [97.1 °F (36.2 °C)-97.9 °F (36.6 °C)] 97.8 °F (36.6 °C)  Heart Rate:  [61-97] 94  Resp:  [16-30] 28  BP: (142-169)/(68-99) 161/95    Intake & Output (last day)       03/09 0701  03/10 0700 03/10 0701  03/11 0700    P.O. 150     Total Intake(mL/kg) 150 (1.8)     Urine (mL/kg/hr) 0 (0)     Chest Tube 85     Total Output 85     Net +65           Urine Unmeasured Occurrence 5 x           Objective:  General Appearance:  Comfortable and in no acute distress.    Vital signs: (most recent): Blood pressure 138/63, pulse 62, temperature 98.2 °F (36.8 °C), temperature source Oral, resp. rate 23, height 160 cm (63\"), weight 83 kg (182 lb 15.7 oz), SpO2 95 %, not currently breastfeeding.    HEENT: Normal HEENT exam.    Lungs:  She is not in respiratory distress.  There are decreased breath sounds.  No rhonchi.    Heart: Normal rate.    Chest: Chest wall tenderness (incisional) present.    Extremities: Decreased range of motion.  There is no dependent edema.    Neurological: Patient is alert.    Skin:  Warm and dry.              Chest tube:   Site: Left, Clean, Dry, Intact and Securement device intact  Suction: -20 cm  Air Leak: negative  24 Hour Total: 80ml    Results Review:     I reviewed the patient's new clinical results.  I reviewed the patient's new imaging results and agree with the interpretation.  I reviewed the patient's other test results and agree with the interpretation  Discussed with patient, RN and . " Ayaan'    Imaging Results (Last 24 Hours)     Procedure Component Value Units Date/Time    XR Chest 1 View [[id number]] Collected: 03/10/22 0723     Updated: 03/10/22 0726    Narrative:      Examination: XR CHEST 1 VW-     Date of Exam: 3/10/2022 5:02 AM     Indication: Chest tube; I48.91-Unspecified atrial fibrillation;  I50.9-Heart failure, unspecified; Z20.822-Contact with and (suspected)  exposure to covid-19; I50.21-Acute systolic (congestive) heart failure.     Comparison: Radiograph 3/9/2022, 3/8/2020 2. 3/6/2022 CT 3/9/2022     Technique: 1 view of the chest      Findings:  There is a large left-sided pleural effusion. Airspace disease is seen  within the left lower lobe. The right lung appears clear. No  pneumothorax. The heart size is enlarged. The pulmonary vasculature  appears mildly indistinct. Median sternotomy wires are present. A left  basilar chest tube is present.. No acute osseous abnormality identified.       Impression:      No significant changes compared to the previous study. No pneumothorax.  Redemonstration of a large left-sided pleural effusion with left basilar  airspace disease.     Electronically Signed By-Rosa Lopez MD On:3/10/2022 7:24 AM  This report was finalized on [id number] by  Rosa Lopez MD.    CT Chest Without Contrast Diagnostic [[id number]] Collected: 03/09/22 1201     Updated: 03/09/22 1216    Narrative:      CT CHEST WO CONTRAST DIAGNOSTIC-     Date of Exam: 3/9/2022 11:11 AM     Indication: s/p chest tube placement, follow up left effusion vs.  empyema; I48.91-Unspecified atrial fibrillation; I50.9-Heart failure,  unspecified; Z20.822-Contact with and (suspected) exposure to covid-19;  I50.21-Acute systolic (congestive) heart failure.     Comparison: CT chest without contrast 03/04/2022, [facility] chest x-ray  03/09/2022, 322.     Technique: Serial and axial CT images of the chest were obtained.  Reconstructions in the coronal and sagittal planes were performed.   Automated  exposure control and iterative reconstruction methods were  used.     FINDINGS:  The tip of the left chest tube is anterior to the dependent portion of  the left pleural fluid collection, which again has hyperdense contents,  consistent with hemorrhage. The collection is partially loculated, as  seen on previous CT and overall does not appear significantly changed in  size. There is complete atelectasis of the left lower lobe which has  progressed compared to 03/04/2022 CT. Small right pleural effusion has  increased compared to 03/04/2022 CT. Heart size is borderline. Mildly  prominent mediastinal lymph nodes may be reactive. Partially included  solid organs of the upper abdomen appear within normal limits for  noncontrast CT. No acute osseous abnormality is identified.       Impression:      1.No significant change in size of large left pleural fluid collection  with hyperdense components, consistent with hemothorax. Tip of the left  chest tube is anterior to the dependent portion of the fluid collection.  2.Increased, now complete left lower lobe atelectasis.  3.Increased size of small right pleural effusion.           Electronically Signed By-Jenn Ordonez MD On:3/9/2022 12:14 PM  This report was finalized on [id number] by  Jenn Ordonez MD.          Lab Results:     Lab Results (last 24 hours)     Procedure Component Value Units Date/Time    POC Glucose Once [[id number]]  (Abnormal) Collected: 03/10/22 0728    Specimen: Blood Updated: 03/10/22 0729     Glucose 161 mg/dL      Comment: Serial Number: [id number]:  [id number]       Comprehensive Metabolic Panel [[id number]]  (Abnormal) Collected: 03/10/22 0009    Specimen: Blood Updated: 03/10/22 0039     Glucose 168 mg/dL      BUN 19 mg/dL      Creatinine 0.67 mg/dL      Sodium 130 mmol/L      Potassium 4.3 mmol/L      Chloride 96 mmol/L      CO2 23.0 mmol/L      Calcium 8.6 mg/dL      Total Protein 5.6 g/dL      Albumin 2.80 g/dL      ALT (SGPT) 19 U/L       AST (SGOT) 13 U/L      Alkaline Phosphatase 81 U/L      Total Bilirubin 0.5 mg/dL      Globulin 2.8 gm/dL      A/G Ratio 1.0 g/dL      BUN/Creatinine Ratio 28.4     Anion Gap 11.0 mmol/L      eGFR 88.5 mL/min/1.73      Comment: National Kidney Foundation and American Society of Nephrology (ASN) Task Force recommended calculation based on the Chronic Kidney Disease Epidemiology Collaboration (CKD-EPI) equation refit without adjustment for race.       Narrative:      GFR Normal >60  Chronic Kidney Disease <60  Kidney Failure <15      C-reactive Protein [[id number]]  (Abnormal) Collected: 03/10/22 0009    Specimen: Blood Updated: 03/10/22 0039     C-Reactive Protein 19.71 mg/dL     Phosphorus [[id number]]  (Abnormal) Collected: 03/10/22 0009    Specimen: Blood Updated: 03/10/22 0039     Phosphorus 2.4 mg/dL     Magnesium [[id number]]  (Normal) Collected: 03/10/22 0009    Specimen: Blood Updated: 03/10/22 0039     Magnesium 2.1 mg/dL     Calcium, Ionized [[id number]]  (Abnormal) Collected: 03/10/22 0009    Specimen: Blood Updated: 03/10/22 0029     Ionized Calcium 1.18 mmol/L     CBC & Differential [[id number]]  (Abnormal) Collected: 03/10/22 0009    Specimen: Blood Updated: 03/10/22 0021    Narrative:      The following orders were created for panel order CBC & Differential.  Procedure                               Abnormality         Status                     ---------                               -----------         ------                     CBC Auto Differential[[id number]]        Abnormal            Final result                 Please view results for these tests on the individual orders.    CBC Auto Differential [[id number]]  (Abnormal) Collected: 03/10/22 0009    Specimen: Blood Updated: 03/10/22 0021     WBC 15.40 10*3/mm3      RBC 2.89 10*6/mm3      Hemoglobin 9.3 g/dL      Hematocrit 27.3 %      MCV 94.3 fL      MCH 32.0 pg      MCHC 33.9 g/dL      RDW 15.8 %      RDW-SD 51.2 fl      MPV 7.7 fL      Platelets  291 10*3/mm3      Neutrophil % 83.0 %      Lymphocyte % 5.6 %      Monocyte % 10.6 %      Eosinophil % 0.4 %      Basophil % 0.4 %      Neutrophils, Absolute 12.80 10*3/mm3      Lymphocytes, Absolute 0.90 10*3/mm3      Monocytes, Absolute 1.60 10*3/mm3      Eosinophils, Absolute 0.10 10*3/mm3      Basophils, Absolute 0.10 10*3/mm3      nRBC 0.1 /100 WBC     POC Glucose Once [[id number]]  (Abnormal) Collected: 03/09/22 2040    Specimen: Blood Updated: 03/09/22 2041     Glucose 183 mg/dL      Comment: Serial Number: [id number]:  [id number]       POC Glucose Once [[id number]]  (Abnormal) Collected: 03/09/22 1615    Specimen: Blood Updated: 03/09/22 1617     Glucose 180 mg/dL      Comment: Serial Number: [id number]:  [id number]       CBC (No Diff) [[id number]]  (Abnormal) Collected: 03/09/22 1230    Specimen: Blood Updated: 03/09/22 1326     WBC 14.10 10*3/mm3      RBC 2.98 10*6/mm3      Hemoglobin 9.9 g/dL      Hematocrit 28.2 %      MCV 94.5 fL      MCH 33.1 pg      MCHC 35.1 g/dL      RDW 15.5 %      RDW-SD 50.3 fl      MPV 7.8 fL      Platelets 267 10*3/mm3     TSH [[id number]]  (Abnormal) Collected: 03/09/22 0651    Specimen: Blood Updated: 03/09/22 1301     TSH 6.020 uIU/mL     C-reactive Protein [[id number]]  (Abnormal) Collected: 03/09/22 0651    Specimen: Blood Updated: 03/09/22 1213     C-Reactive Protein 13.22 mg/dL     POC Glucose Once [[id number]]  (Abnormal) Collected: 03/09/22 1204    Specimen: Blood Updated: 03/09/22 1205     Glucose 182 mg/dL      Comment: Serial Number: [id number]:  [id number]              Assessment/Plan       Atrial fibrillation with RVR (HCC)    Other hyperlipidemia    Essential hypertension    Hypothyroidism    Coronary artery disease involving coronary bypass graft of native heart without angina pectoris    Acute CHF (congestive heart failure) (HCC)    Acute hyponatremia    Elevated LFTs    Elevated TSH    Acute hyperglycemia    Obesity (BMI 30-39.9)       Assessment &  Plan    I have independently reviewed this morning's chest x-ray and compared to previous imaging which demonstrates no significant interval change to opacity to left mid and lower lung.  Chest tube in adequate position.  No pneumothorax.    Loculated pleural effusion: Patient status post intrapleural Activase yesterday with minimal chest tube output.  We will plan to repeat intrapleural Activase again today and check a chest x-ray in the morning.  Chest tube to be clamped for 90 minutes and placed back to -20 cm of dry suction.  Patient not a surgical candidate for VATS given her advanced age and EF of 30%.    Hypoxia: Oxygen needs have decreased.  Now intermittently on 1.5 L per nasal cannula.    Anemia: Hemoglobin stable at 9.3 today.  Recheck CBC tomorrow morning.      Montserrat Humphries DNP, APRN  [facility]  03/10/22  09:08 EST      I wore protective equipment throughout this patient encounter including a face mask, gloves and protective eyewear.  Hand hygiene was performed before donning protective equipment and after removal when leaving the room.

## 2022-03-10 NOTE — PROGRESS NOTES
[facility] Medicine Services Daily Progress Note    Patient Name: Jqauelin Valderrama  : 1940  MRN: [id number]  Primary Care Physician:  Minerva Chatterjee MD  Date of admission: 3/2/2022      Subjective      Chief Complaint: Atrial fibrillation with RVR pleural effusion        Patient Reports states she is doing just fine.   She finally slept well.  Family at bedside.  Good chest tube output after TPA.     ROS negative except as above    Objective      Vitals:   Temp:  [97.1 °F (36.2 °C)-98.2 °F (36.8 °C)] 97.7 °F (36.5 °C)  Heart Rate:  [61-97] 79  Resp:  [16-30] 25  BP: (138-169)/(63-99) 150/87  Flow (L/min):  [1.5] 1.5    Vitals reviewed.   Constitutional:       Comments: Left chest tube in place  and daughter at bedside  Patient is on 2 L during my evaluation  HENT:      Head: Normocephalic.      Nose: Nose normal.      Mouth/Throat:      Mouth: Mucous membranes are moist.   Eyes:      Pupils: Pupils are equal, round, and reactive to light.   Cardiovascular:      Rate and Rhythm: Normal rate and regular rhythm.      Pulses: Normal pulses.      Heart sounds: Normal heart sounds.   Pulmonary:      Effort: Pulmonary effort is normal.      Comments: Left chest tube catheter in place  Left side decreased breath sounds  Abdominal:      General: Abdomen is flat.      Palpations: Abdomen is soft.   Musculoskeletal:         General: Normal range of motion.      Cervical back: Normal range of motion.   Skin:     General: Skin is warm.      Capillary Refill: Capillary refill takes less than 2 seconds.   Neurological:      General: No focal deficit present.      Mental Status: She is alert and oriented to person, place, and time.   Psychiatric:         Mood and Affect: Mood normal.         Behavior: Behavior normal.        Result Review    Result Review:  I have personally reviewed the results from the time of this admission to 3/10/2022 15:44 EST and agree with these findings:  [x]   Laboratory  []  Microbiology  []  Radiology  []  EKG/Telemetry   []  Cardiology/Vascular   []  Pathology  []  Old records  []  Other:  Most notable findings include: WBC stable    Wounds (last 24 hours)              Assessment/Plan       Brief Patient Summary:  Jaquelin Valderrama is a 82 y.o. female who presented with A. fib RVR and pleural effusion status post chest tube        amiodarone, 200 mg, Oral, Q8H   Followed by  [START ON 3/13/2022] amiodarone, 200 mg, Oral, Q12H   Followed by  [START ON 3/27/2022] amiodarone, 200 mg, Oral, Daily  arformoterol, 15 mcg, Nebulization, BID - RT  aspirin, 81 mg, Oral, Daily  budesonide, 1 mg, Nebulization, BID - RT  cefepime, 2 g, Intravenous, Q12H  dilTIAZem, 90 mg, Oral, Q8H  heparin (porcine), 5,000 Units, Subcutaneous, Q12H  HYDROmorphone, 2 mg, Intravenous, Once  insulin glargine, 10 Units, Subcutaneous, Nightly  insulin lispro, 0-14 Units, Subcutaneous, TID AC  levothyroxine, 50 mcg, Oral, Q AM  melatonin, 5 mg, Oral, Nightly  metoprolol tartrate, 25 mg, Oral, BID  multivitamin with minerals, 1 tablet, Oral, Daily  senna-docusate sodium, 2 tablet, Oral, BID  sodium chloride, 10 mL, Intravenous, Q12H        hold, 1 each           Active Hospital Problems:          Active Hospital Problems     Diagnosis     • **Atrial fibrillation with RVR (HCC)     • Acute CHF (congestive heart failure) ([facility])     • Acute hyponatremia     • Elevated LFTs     • Elevated TSH     • Acute hyperglycemia     • Obesity (BMI 30-39.9)     • Coronary artery disease involving coronary bypass graft of native heart without angina pectoris     • Essential hypertension     • Hypothyroidism     • Other hyperlipidemia           ASSESSMENT:  Possible hemothorax  Acute respiratory distress  COPD exacerbation  Atrial fibrillation with RVR (HCC), now resolved    Other hyperlipidemia    Essential hypertension    Hypothyroidism    Coronary artery disease involving coronary bypass graft of native heart without  angina pectoris    Acute CHF (congestive heart failure) ([facility]) EFG 45%    Acute hyponatremia    Elevated LFTs    Elevated TSH    Acute hyperglycemia    Obesity (BMI 30-39.9)   ARF     PLAN:  Hemoglobin stable back on aspirin and heparin  Blood transfusion as needed  Chest tube management  per pulmonary.  Cardiovascular surgery consulted.  TPA x2 given output improving  Encouraged to use I-S flutter valve  Heart rate control  Continue to monitor blood pressure  Bronchodilator  Inhaled corticosteroids  Electrolytes/ glycemic control  heparin sq prophylaxis   Insomnia Ambien ordered patient slept much better  Discussed with  daughter at bedside March 10         DVT prophylaxis:  Medical DVT prophylaxis orders are present.     CODE STATUS:    Code Status (Patient has no pulse and is not breathing): CPR (Attempt to Resuscitate)  Medical Interventions (Patient has pulse or is breathing): Full Support        Disposition:  I expect patient to be discharged once stable.     This patient has been examined wearing appropriate Personal Protective Equipment and discussed with  patient and nursing staff.  03/10/22      Electronically signed by Mp Galicia MD, 03/10/22, 15:44 EST.  [facility]ist [facility]

## 2022-03-10 NOTE — PROCEDURES
Pre-op Diagnosis: Pleural effusion     Post-Op Diagnosis: Pleural effusion     Procedure performed: Fibrinolysis of pleural effusion with TPA     Anesthesia: None     Summary of procedure: Under sterile technique, the pleural catheter was accessed.  10 mg of alteplase reconstituted in 25 cc sterile water was instilled intrapleurally. The patient tolerated this well.    The patient has been instructed to change position every 20 to 15 minutes for the next 90 minutes.  Orders placed for pleural tube to be clamped and placed back to -20cm of DRY suction. We will re-evaluate with a chest x-ray in the morning.

## 2022-03-10 NOTE — PLAN OF CARE
Dressing was changed around chest tube due to drainage. New dressing applied, dry suction atrium placed, pt tolerated well, pt remains in high falls risk precautions with the call light in reach.   Problem: Fall Injury Risk  Goal: Absence of Fall and Fall-Related Injury  Outcome: Ongoing, Progressing  Intervention: Identify and Manage Contributors  Recent Flowsheet Documentation  Taken 3/10/2022 1400 by Chica Mast LPN  Medication Review/Management: medications reviewed  Taken 3/10/2022 1200 by Chica Mast LPN  Medication Review/Management: medications reviewed  Taken 3/10/2022 1000 by Chica Mast LPN  Medication Review/Management: medications reviewed  Taken 3/10/2022 0800 by Chica Mast LPN  Medication Review/Management: medications reviewed  Intervention: Promote Injury-Free Environment  Recent Flowsheet Documentation  Taken 3/10/2022 1400 by Chiac Mast LPN  Safety Promotion/Fall Prevention:   assistive device/personal items within reach   activity supervised   clutter free environment maintained   fall prevention program maintained   gait belt   nonskid shoes/slippers when out of bed   safety round/check completed  Taken 3/10/2022 1200 by Chica Mast LPN  Safety Promotion/Fall Prevention:   activity supervised   assistive device/personal items within reach   clutter free environment maintained   fall prevention program maintained   gait belt   nonskid shoes/slippers when out of bed   safety round/check completed  Taken 3/10/2022 1000 by Chica Mast LPN  Safety Promotion/Fall Prevention:   activity supervised   assistive device/personal items within reach   clutter free environment maintained   fall prevention program maintained   gait belt   nonskid shoes/slippers when out of bed   safety round/check completed  Taken 3/10/2022 0800 by Chica Mast LPN  Safety Promotion/Fall Prevention:   activity supervised   assistive device/personal items within reach   clutter free environment  maintained   fall prevention program maintained   gait belt   nonskid shoes/slippers when out of bed   safety round/check completed   Goal Outcome Evaluation:

## 2022-03-10 NOTE — PLAN OF CARE
Goal Outcome Evaluation:    Jaquelin Valderrama presents with functional mobility impairments which indicate the need for skilled intervention. Tolerating session today without incident. Upon arrival, pt reported fatigue. Performed supine thera ex BLE. Notable dyspnea present with no desaturation and on 3L O2. Educated pt to perform exercises to prevent significant atrophy of muscles and assist with BLE swelling. Pt verbalized understanding. Donned and turned on intermittent compression and assisted pt in a chair position. Upon Will continue to follow and progress as tolerated.

## 2022-03-10 NOTE — PROGRESS NOTES
Referring Provider: Mp Galicia MD    Reason for follow-up:  Status post CABG  Atrial fibrillation  Hypertension  Left pleural effusion     Patient Care Team:  Minerva Chatterjee MD as PCP - General (Internal Medicine)    Subjective .      ROS    Since I have last seen, the patient has been without any chest discomfort ,shortness of breath, palpitations, dizziness or syncope.  Denies having any headache ,abdominal pain ,nausea, vomiting , diarrhea constipation, loss of weight or loss of appetite.  Denies having any excessive bruising ,hematuria or blood in the stool.    Review of all systems negative except as indicated    History  Past Medical History:   Diagnosis Date   • Astigmatism, bilateral 2019   • Benign essential hypertension    • Bilateral presbyopia 2019   • CAD (coronary artery disease)    • Closed right ankle fracture    • COVID-19 vaccination refused    • Hyperlipidemia    • Hypothyroidism    • Influenza vaccine needed    • Myocardial infarction (HCC)    • Prediabetes    • Pseudophakia, both eyes 2019   • Thoracic back pain        Past Surgical History:   Procedure Laterality Date   • APPENDECTOMY     • CARDIAC CATHETERIZATION  2012    x3    • CORONARY ARTERY BYPASS GRAFT  2014   • CORONARY STENT PLACEMENT      x3   • HARDWARE REMOVAL Right 2020    Procedure: ANKLE/FOOT HARDWARE REMOVAL;  Surgeon: Lincoln Sibley MD;  Location: [facility];  Service: Orthopedics;  Laterality: Right;   • ORIF ANKLE FRACTURE Right 2019    Radha Vazquez Mem       Family History   Problem Relation Age of Onset   • Heart disease Mother    • Lung cancer Father    • Diabetes Other        Social History     Tobacco Use   • Smoking status: Former Smoker     Types: Cigarettes     Quit date: 1980     Years since quittin.2   • Smokeless tobacco: Never Used   • Tobacco comment: Social Drinker   Vaping Use   • Vaping Use: Never used   Substance Use Topics   • Alcohol use: Yes      Comment: mod    • Drug use: No        Medications Prior to Admission   Medication Sig Dispense Refill Last Dose   • aspirin 81 MG chewable tablet Chew 81 mg Daily.   3/2/2022 at Unknown time   • Cholecalciferol (VITAMIN D3) 2000 units tablet Take 1 tablet by mouth Daily.   3/2/2022 at Unknown time   • irbesartan (Avapro) 300 MG tablet Take 150 mg by mouth Daily.      • metoprolol tartrate (LOPRESSOR) 25 MG tablet Take 1 tablet by mouth twice daily 180 tablet 0 3/2/2022 at Unknown time   • Multiple Vitamins-Minerals (MULTIVITAMIN ADULT EXTRA C PO) Take 1 tablet by mouth Daily.   3/2/2022 at Unknown time   • levothyroxine (SYNTHROID, LEVOTHROID) 50 MCG tablet Take 50 mcg by mouth Every Morning.          Allergies  Prednisone    Scheduled Meds:custom intrapleural syringe builder, , Intrapleural, Once  amiodarone, 200 mg, Oral, Q8H   Followed by  [START ON 3/13/2022] amiodarone, 200 mg, Oral, Q12H   Followed by  [START ON 3/27/2022] amiodarone, 200 mg, Oral, Daily  arformoterol, 15 mcg, Nebulization, BID - RT  aspirin, 81 mg, Oral, Daily  budesonide, 1 mg, Nebulization, BID - RT  cefepime, 2 g, Intravenous, Q12H  dilTIAZem, 90 mg, Oral, Q8H  heparin (porcine), 5,000 Units, Subcutaneous, Q12H  HYDROmorphone, 2 mg, Intravenous, Once  insulin glargine, 10 Units, Subcutaneous, Nightly  insulin lispro, 0-14 Units, Subcutaneous, TID AC  levothyroxine, 50 mcg, Oral, Q AM  melatonin, 5 mg, Oral, Nightly  metoprolol tartrate, 25 mg, Oral, BID  multivitamin with minerals, 1 tablet, Oral, Daily  senna-docusate sodium, 2 tablet, Oral, BID  sodium chloride, 10 mL, Intravenous, Q12H  zolpidem, 5 mg, Oral, Nightly      Continuous Infusions:hold, 1 each      PRN Meds:.•  acetaminophen **OR** acetaminophen **OR** acetaminophen  •  aluminum-magnesium hydroxide-simethicone  •  senna-docusate sodium **AND** polyethylene glycol **AND** bisacodyl **AND** bisacodyl  •  dextrose  •  dextrose  •  glucagon (human recombinant)  •  hold  •   "HYDROcodone-acetaminophen  •  insulin lispro **AND** insulin lispro  •  magnesium sulfate **OR** magnesium sulfate in D5W 1g/100mL (PREMIX)  •  nitroglycerin  •  ondansetron **OR** ondansetron  •  potassium chloride **OR** potassium chloride **OR** potassium chloride  •  sodium chloride    Objective     VITAL SIGNS  Vitals:    03/10/22 0000 03/10/22 0436 03/10/22 0500 03/10/22 0519   BP: 153/68  159/89 161/95   BP Location: Left arm  Left arm    Patient Position: Lying  Lying    Pulse: 61  75 94   Resp: 28  28    Temp: 97.1 °F (36.2 °C)  97.8 °F (36.6 °C)    TempSrc: Oral  Oral    SpO2: 93%  98%    Weight:  83 kg (182 lb 15.7 oz)     Height:           Flowsheet Rows    Flowsheet Row First Filed Value   Admission Height 162.6 cm (64\") Documented at 03/02/2022 2218   Admission Weight 81 kg (178 lb 9.2 oz) Documented at 03/02/2022 2218            Intake/Output Summary (Last 24 hours) at 3/10/2022 0649  Last data filed at 3/10/2022 0600  Gross per 24 hour   Intake 150 ml   Output 85 ml   Net 65 ml        TELEMETRY: Atrial fibrillation    Physical Exam:  The patient is alert, oriented and in no distress.  Vital signs as noted above.  Head and neck revealed no carotid bruits or jugular venous distention.  No thyromegaly or lymphadenopathy is present  Lungs clear.  No wheezing.  Breath sounds are normal bilaterally.  Left chest tube in place.  Heart normal first and second heart sounds.  No murmur. No precordial rub is present.  No gallop is present.  Abdomen soft and nontender.  No organomegaly is present.  Extremities with good peripheral pulses without any pedal edema.  Skin warm and dry.  Musculoskeletal system is grossly normal  CNS grossly normal      Results Review:   I reviewed the patient's new clinical results.  Lab Results (last 24 hours)     Procedure Component Value Units Date/Time    Comprehensive Metabolic Panel [[id number]]  (Abnormal) Collected: 03/10/22 0009    Specimen: Blood Updated: 03/10/22 0039     " Glucose 168 mg/dL      BUN 19 mg/dL      Creatinine 0.67 mg/dL      Sodium 130 mmol/L      Potassium 4.3 mmol/L      Chloride 96 mmol/L      CO2 23.0 mmol/L      Calcium 8.6 mg/dL      Total Protein 5.6 g/dL      Albumin 2.80 g/dL      ALT (SGPT) 19 U/L      AST (SGOT) 13 U/L      Alkaline Phosphatase 81 U/L      Total Bilirubin 0.5 mg/dL      Globulin 2.8 gm/dL      A/G Ratio 1.0 g/dL      BUN/Creatinine Ratio 28.4     Anion Gap 11.0 mmol/L      eGFR 88.5 mL/min/1.73      Comment: National Kidney Foundation and American Society of Nephrology (ASN) Task Force recommended calculation based on the Chronic Kidney Disease Epidemiology Collaboration (CKD-EPI) equation refit without adjustment for race.       Narrative:      GFR Normal >60  Chronic Kidney Disease <60  Kidney Failure <15      C-reactive Protein [[id number]]  (Abnormal) Collected: 03/10/22 0009    Specimen: Blood Updated: 03/10/22 0039     C-Reactive Protein 19.71 mg/dL     Phosphorus [[id number]]  (Abnormal) Collected: 03/10/22 0009    Specimen: Blood Updated: 03/10/22 0039     Phosphorus 2.4 mg/dL     Magnesium [[id number]]  (Normal) Collected: 03/10/22 0009    Specimen: Blood Updated: 03/10/22 0039     Magnesium 2.1 mg/dL     Calcium, Ionized [[id number]]  (Abnormal) Collected: 03/10/22 0009    Specimen: Blood Updated: 03/10/22 0029     Ionized Calcium 1.18 mmol/L     CBC & Differential [[id number]]  (Abnormal) Collected: 03/10/22 0009    Specimen: Blood Updated: 03/10/22 0021    Narrative:      The following orders were created for panel order CBC & Differential.  Procedure                               Abnormality         Status                     ---------                               -----------         ------                     CBC Auto Differential[[id number]]        Abnormal            Final result                 Please view results for these tests on the individual orders.    CBC Auto Differential [[id number]]  (Abnormal) Collected: 03/10/22 0009     Specimen: Blood Updated: 03/10/22 0021     WBC 15.40 10*3/mm3      RBC 2.89 10*6/mm3      Hemoglobin 9.3 g/dL      Hematocrit 27.3 %      MCV 94.3 fL      MCH 32.0 pg      MCHC 33.9 g/dL      RDW 15.8 %      RDW-SD 51.2 fl      MPV 7.7 fL      Platelets 291 10*3/mm3      Neutrophil % 83.0 %      Lymphocyte % 5.6 %      Monocyte % 10.6 %      Eosinophil % 0.4 %      Basophil % 0.4 %      Neutrophils, Absolute 12.80 10*3/mm3      Lymphocytes, Absolute 0.90 10*3/mm3      Monocytes, Absolute 1.60 10*3/mm3      Eosinophils, Absolute 0.10 10*3/mm3      Basophils, Absolute 0.10 10*3/mm3      nRBC 0.1 /100 WBC     POC Glucose Once [[id number]]  (Abnormal) Collected: 03/09/22 2040    Specimen: Blood Updated: 03/09/22 2041     Glucose 183 mg/dL      Comment: Serial Number: [id number]:  [id number]       POC Glucose Once [[id number]]  (Abnormal) Collected: 03/09/22 1615    Specimen: Blood Updated: 03/09/22 1617     Glucose 180 mg/dL      Comment: Serial Number: [id number]:  [id number]       CBC (No Diff) [[id number]]  (Abnormal) Collected: 03/09/22 1230    Specimen: Blood Updated: 03/09/22 1326     WBC 14.10 10*3/mm3      RBC 2.98 10*6/mm3      Hemoglobin 9.9 g/dL      Hematocrit 28.2 %      MCV 94.5 fL      MCH 33.1 pg      MCHC 35.1 g/dL      RDW 15.5 %      RDW-SD 50.3 fl      MPV 7.8 fL      Platelets 267 10*3/mm3     TSH [[id number]]  (Abnormal) Collected: 03/09/22 0651    Specimen: Blood Updated: 03/09/22 1301     TSH 6.020 uIU/mL     C-reactive Protein [[id number]]  (Abnormal) Collected: 03/09/22 0651    Specimen: Blood Updated: 03/09/22 1213     C-Reactive Protein 13.22 mg/dL     POC Glucose Once [[id number]]  (Abnormal) Collected: 03/09/22 1204    Specimen: Blood Updated: 03/09/22 1205     Glucose 182 mg/dL      Comment: Serial Number: [id number]:  [id number]       POC Glucose Once [[id number]]  (Abnormal) Collected: 03/09/22 0749    Specimen: Blood Updated: 03/09/22 0750     Glucose 125 mg/dL      Comment:  Serial Number: [id number]:  [id number]       Comprehensive Metabolic Panel [[id number]]  (Abnormal) Collected: 03/09/22 0651    Specimen: Blood Updated: 03/09/22 0727     Glucose 144 mg/dL      BUN 17 mg/dL      Creatinine 0.73 mg/dL      Sodium 129 mmol/L      Potassium 4.0 mmol/L      Chloride 97 mmol/L      CO2 23.0 mmol/L      Calcium 8.3 mg/dL      Total Protein 5.2 g/dL      Albumin 3.00 g/dL      ALT (SGPT) 20 U/L      AST (SGOT) 12 U/L      Alkaline Phosphatase 76 U/L      Total Bilirubin 0.6 mg/dL      Globulin 2.2 gm/dL      A/G Ratio 1.4 g/dL      BUN/Creatinine Ratio 23.3     Anion Gap 9.0 mmol/L      eGFR 83.3 mL/min/1.73      Comment: National Kidney Foundation and American Society of Nephrology (ASN) Task Force recommended calculation based on the Chronic Kidney Disease Epidemiology Collaboration (CKD-EPI) equation refit without adjustment for race.       Narrative:      GFR Normal >60  Chronic Kidney Disease <60  Kidney Failure <15      Phosphorus [[id number]]  (Abnormal) Collected: 03/09/22 0651    Specimen: Blood Updated: 03/09/22 0727     Phosphorus 2.4 mg/dL     Magnesium [[id number]]  (Normal) Collected: 03/09/22 0651    Specimen: Blood Updated: 03/09/22 0727     Magnesium 2.0 mg/dL     BNP [[id number]]  (Abnormal) Collected: 03/09/22 0651    Specimen: Blood Updated: 03/09/22 0724     proBNP 3,583.0 pg/mL     Narrative:      Among patients with dyspnea, NT-proBNP is highly sensitive for the detection of acute congestive heart failure. In addition NT-proBNP of <300 pg/ml effectively rules out acute congestive heart failure with 99% negative predictive value.    Results may be falsely decreased if patient taking Biotin.      Calcium, Ionized [[id number]]  (Abnormal) Collected: 03/09/22 0651    Specimen: Blood Updated: 03/09/22 0717     Ionized Calcium 1.16 mmol/L     CBC (No Diff) [[id number]]  (Abnormal) Collected: 03/09/22 0651    Specimen: Blood Updated: 03/09/22 0707     WBC 13.00 10*3/mm3       RBC 2.90 10*6/mm3      Hemoglobin 9.4 g/dL      Hematocrit 27.5 %      MCV 94.6 fL      MCH 32.4 pg      MCHC 34.2 g/dL      RDW 15.3 %      RDW-SD 49.9 fl      MPV 7.7 fL      Platelets 244 10*3/mm3           Imaging Results (Last 24 Hours)     Procedure Component Value Units Date/Time    XR Chest 1 View [[id number]] Resulted: 03/10/22 0604     Updated: 03/10/22 0605    CT Chest Without Contrast Diagnostic [[id number]] Collected: 03/09/22 1201     Updated: 03/09/22 1216    Narrative:      CT CHEST WO CONTRAST DIAGNOSTIC-     Date of Exam: 3/9/2022 11:11 AM     Indication: s/p chest tube placement, follow up left effusion vs.  empyema; I48.91-Unspecified atrial fibrillation; I50.9-Heart failure,  unspecified; Z20.822-Contact with and (suspected) exposure to covid-19;  I50.21-Acute systolic (congestive) heart failure.     Comparison: CT chest without contrast 03/04/2022, AP chest x-ray  03/09/2022, 322.     Technique: Serial and axial CT images of the chest were obtained.  Reconstructions in the coronal and sagittal planes were performed.   Automated exposure control and iterative reconstruction methods were  used.     FINDINGS:  The tip of the left chest tube is anterior to the dependent portion of  the left pleural fluid collection, which again has hyperdense contents,  consistent with hemorrhage. The collection is partially loculated, as  seen on previous CT and overall does not appear significantly changed in  size. There is complete atelectasis of the left lower lobe which has  progressed compared to 03/04/2022 CT. Small right pleural effusion has  increased compared to 03/04/2022 CT. Heart size is borderline. Mildly  prominent mediastinal lymph nodes may be reactive. Partially included  solid organs of the upper abdomen appear within normal limits for  noncontrast CT. No acute osseous abnormality is identified.       Impression:      1.No significant change in size of large left pleural fluid collection  with  hyperdense components, consistent with hemothorax. Tip of the left  chest tube is anterior to the dependent portion of the fluid collection.  2.Increased, now complete left lower lobe atelectasis.  3.Increased size of small right pleural effusion.           Electronically Signed By-Jenn Ordonez MD On:3/9/2022 12:14 PM  This report was finalized on [id number] by  Jenn Ordonez MD.    XR Chest 1 View [[id number]] Collected: 03/09/22 0750     Updated: 03/09/22 0753    Narrative:      EXAMINATION: XR CHEST 1 VW-     DATE OF EXAM: 3/9/2022 4:17 AM     INDICATION: Chest tube; I48.91-Unspecified atrial fibrillation;  I50.9-Heart failure, unspecified; Z20.822-Contact with and (suspected)  exposure to covid-19; I50.21-Acute systolic (congestive) heart failure.     COMPARISON: Chest radiograph dated 3/8/2022     TECHNIQUE: Portable AP view of the chest was obtained.     FINDINGS:  There is left-sided chest tube with tip terminating at the left midlung.  There is increase in size of the left-sided pleural effusion,  particularly along the lateral and apical aspect of the long. There is  unchanged cardiomegaly. The right lung appears clear. There is no  pneumothorax. Median sternotomy wires are present. There are  degenerative changes of the thoracic spine.       Impression:      1. Left-sided chest tube in unchanged position.  2. Increasing left-sided pleural effusion, particularly along the  lateral and apical aspect of the left lung.     Electronically Signed By-John Trevino MD On:3/9/2022 7:51 AM  This report was finalized on [id number] by  John Trevino MD.      LAB RESULTS (LAST 7 DAYS)    CBC  Results from last 7 days   Lab Units 03/10/22  0009 03/09/22  1230 03/09/22  0651 03/09/22  0036 03/08/22  1755 03/08/22  1157 03/08/22  0603   WBC 10*3/mm3 15.40* 14.10* 13.00* 13.70* 13.50* 12.60* 10.10   RBC 10*6/mm3 2.89* 2.98* 2.90* 2.87* 2.96* 3.04* 2.75*   HEMOGLOBIN g/dL 9.3* 9.9* 9.4* 9.1* 9.6* 9.7* 9.0*    HEMATOCRIT % 27.3* 28.2* 27.5* 26.8* 28.1* 28.7* 25.8*   MCV fL 94.3 94.5 94.6 93.3 95.1 94.2 93.6   PLATELETS 10*3/mm3 291 267 244 252 259 251 208       BMP  Results from last 7 days   Lab Units 03/10/22  0009 03/09/22  0651 03/08/22  0018 03/07/22  0014 03/06/22  0650 03/05/22  0536 03/05/22  0345 03/04/22  0458   SODIUM mmol/L 130* 129* 130* 129* 133* 140 137 135*   POTASSIUM mmol/L 4.3 4.0 4.1 4.1 3.8 4.4 4.2 3.1*   CHLORIDE mmol/L 96* 97* 96* 94* 96* 103 98 93*   CO2 mmol/L 23.0 23.0 23.0 23.0 24.0 22.0 26.0 30.0*   BUN mg/dL 19 17 30* 36* 32* 28* 28* 16   CREATININE mg/dL 0.67 0.73 1.03* 1.15* 1.35* 1.07* 0.96 0.77   GLUCOSE mg/dL 168* 144* 169* 213* 110* 367* 274* 189*   MAGNESIUM mg/dL 2.1 2.0 2.1 2.2 2.0  --  2.0 1.6   PHOSPHORUS mg/dL 2.4* 2.4* 2.6 3.5 4.8* 4.7*  --   --        CMP   Results from last 7 days   Lab Units 03/10/22  0009 03/09/22  0651 03/08/22  0018 03/07/22  0014 03/06/22  0650 03/05/22  0536 03/05/22  0345   SODIUM mmol/L 130* 129* 130* 129* 133* 140 137   POTASSIUM mmol/L 4.3 4.0 4.1 4.1 3.8 4.4 4.2   CHLORIDE mmol/L 96* 97* 96* 94* 96* 103 98   CO2 mmol/L 23.0 23.0 23.0 23.0 24.0 22.0 26.0   BUN mg/dL 19 17 30* 36* 32* 28* 28*   CREATININE mg/dL 0.67 0.73 1.03* 1.15* 1.35* 1.07* 0.96   GLUCOSE mg/dL 168* 144* 169* 213* 110* 367* 274*   ALBUMIN g/dL 2.80* 3.00* 2.90* 3.10* 3.20* 3.00* 3.00*   BILIRUBIN mg/dL 0.5 0.6 0.3 0.3 0.3 0.4 0.4   ALK PHOS U/L 81 76 77 77 68 82 83   AST (SGOT) U/L 13 12 17 19 14 15 18   ALT (SGPT) U/L 19 20 27 28 27 40* 43*         BNP        TROPONIN  Results from last 7 days   Lab Units 03/03/22  1347   TROPONIN T ng/mL <0.010       CoAg  Results from last 7 days   Lab Units 03/04/22 2053   INR  1.13*       Creatinine Clearance  Estimated Creatinine Clearance: 57.2 mL/min (by C-G formula based on SCr of 0.67 mg/dL).    ABG        Radiology  CT Chest Without Contrast Diagnostic    Result Date: 3/9/2022  1.No significant change in size of large left pleural  fluid collection with hyperdense components, consistent with hemothorax. Tip of the left chest tube is anterior to the dependent portion of the fluid collection. 2.Increased, now complete left lower lobe atelectasis. 3.Increased size of small right pleural effusion.    Electronically Signed By-Jenn Ordonez MD On:3/9/2022 12:14 PM This report was finalized on [id number] by  Jenn Ordonez MD.    XR Chest 1 View    Result Date: 3/9/2022  1. Left-sided chest tube in unchanged position. 2. Increasing left-sided pleural effusion, particularly along the lateral and apical aspect of the left lung.  Electronically Signed By-John Trevino MD On:3/9/2022 7:51 AM This report was finalized on [id number] by  John Trevino MD.              EKG                              I personally viewed and interpreted the patient's EKG/Telemetry data: Atrial fibrillation    ECHOCARDIOGRAM:    Results for orders placed during the hospital encounter of 03/02/22    Adult Transthoracic Echo Complete W/ Cont if Necessary Per Protocol    Interpretation Summary  LVE with severe global left ventricular hypokinesis, estimated LV ejection fraction of 30%.  Severe right ventricular enlargement and moderate right atrial enlargement seen  Severe left atrial enlargement seen.  Aortic valve, mitral valve, tricuspid valve appears structurally normal, moderate mitral and mild tricuspid regurgitation seen.  Calculated RV systolic pressure of 40 to 45 mmHg.  No pericardial effusion seen.  Large pleural effusion seen.  Proximal aorta appears normal in size.          STRESS MYOVIEW:    Cardiolite (Tc-99m Sestamibi) stress test    CARDIAC CATHETERIZATION:            OTHER:         Assessment/Plan     Principal Problem:    Atrial fibrillation with RVR (HCC)  Active Problems:    Other hyperlipidemia    Essential hypertension    Hypothyroidism    Coronary artery disease involving coronary bypass graft of native heart without angina pectoris    Acute  CHF (congestive heart failure) (HCC)    Acute hyponatremia    Elevated LFTs    Elevated TSH    Acute hyperglycemia    Obesity (BMI 30-39.9)      Presented through emergency room 3/2/2022 with progressively worsening shortness of breath and dyspnea on exertion weight gain and leg edema. Was found to be in A. fib and congestive heart failure. Patient denies any chest pain. Patient lives in [place] and has a second home in [place] and see his cardiologist at [facility]. Patient has been having issues with alopecia and memory issues therefore stopped beta-blockers and statin on her own. Also has not been taking  thyroid replacement therapy since November because she ran out of medication. Patient has history of prediabetes. Does not check her sugars at home.  Work-up here revealed troponin x3 are negative. proBNP is 7717. CMP reveals a glucose of 242, hemoglobin A1c over 7.1. BUN/creatinine are 21/0.83. ALT elevated at 71, AST 35. TSH is 11.15  Chest x-ray 3/2/2022 reveals left pleural effusion, left basilar disease most likely atelectasis with possible pneumonia.  EKG 3/2/2022 reviewed/interpreted by me reveals A. fib with RVR at 126 bpm with PVCs      ]]]]]]]]]]]]]]]]]]]]  Impression  ==========  -Progressively worsening shortness of breath and leg edema.    -Congestive heart failure  Left ventricle dysfunction with ejection fraction of 30%.    -Significant biatrial enlargement    -Atrial fibrillation with RVR    -Premature ventricular contractions    -Status post CABG 12/2014 (performed in [place])    -Hypertension dyslipidemia prediabetes hypothyroidism elevation    -Status post ORIF    -Family history of coronary artery disease    -Intolerance to prednisone    -Former smoker    -Medical noncompliance.  Was not taking thyroid medicine replacement properly.     =================  Plan  ===========  Atrial fibrillation with RVR.  Rate control.  Consider GABRIEL cardioversion.  Patient would benefit from long-term  anticoagulation for atrial fibrillation because of high [id number] score due to female gender age over 75, hypertension, diabetes, CAD making it at least 6. We will start her on Eliquis or warfarin before discharge.  Continued IV diuresis.  Observe renal function closely.  Elevated LFTs due to passive congestion.    Large left pleural effusion.  Status post drainage and chest tube.  More than 400 cc of fluid was removed.  Patient is on Cardizem and amiodarone.  Chest tube was flushed with TPA yesterday by thoracic surgery.    Renal dysfunction  BUN/creatinine-30/1.03    Echocardiogram showed severe right ventricle enlargement left atrial enlargement related enlargement and calculated pulmonary artery pressure of 45 mmHg.  Left ventricle dysfunction with ejection fraction of 30%.    Ischemic cardiomyopathy    Current medications include amiodarone aspirin Cardizem 90 mg every 8 hours subcu heparin levothyroxine metoprolol 25 mg twice a day.    Anticoagulation and possible cardioversion as an outpatient versus GABRIEL cardioversion to try to convert to sinus rhythm    Follow-up labs ordered.    Further plan will depend on patient's progress.  ]]]]]]]]]]]]]]]]]           Yordan Evans MD  03/10/22  06:49 EST

## 2022-03-10 NOTE — PLAN OF CARE
Problem: Fall Injury Risk  Goal: Absence of Fall and Fall-Related Injury  Outcome: Ongoing, Progressing  Intervention: Identify and Manage Contributors  Recent Flowsheet Documentation  Taken 3/9/2022 2000 by Mirna Doan RN  Medication Review/Management: medications reviewed  Intervention: Promote Injury-Free Environment  Recent Flowsheet Documentation  Taken 3/10/2022 0200 by Mirna Doan, RN  Safety Promotion/Fall Prevention:   clutter free environment maintained   fall prevention program maintained   lighting adjusted   nonskid shoes/slippers when out of bed   room organization consistent   safety round/check completed  Taken 3/10/2022 0000 by Mirna Doan, RN  Safety Promotion/Fall Prevention:   clutter free environment maintained   fall prevention program maintained   lighting adjusted   nonskid shoes/slippers when out of bed   room organization consistent   safety round/check completed  Taken 3/9/2022 2200 by Mirna Doan, RN  Safety Promotion/Fall Prevention:   clutter free environment maintained   fall prevention program maintained   lighting adjusted   nonskid shoes/slippers when out of bed   safety round/check completed   room organization consistent  Taken 3/9/2022 2000 by Mirna Doan RN  Safety Promotion/Fall Prevention:   clutter free environment maintained   fall prevention program maintained   lighting adjusted   nonskid shoes/slippers when out of bed   room organization consistent   safety round/check completed   Goal Outcome Evaluation:      Pt. Is alert and orientated. Remains on oxygen at 1.5lpm per n/c with no soa noted. Lungs sounds are diminished. Chest tube remains in place with serosanguineous drainage. Pt. Is obese but otherwise no complaints of loose stools or constipation. Small amount of edema in lower ext. Noted. Pt. Refuses SCDs. Ambien given po for sleep and appears to have been effective. Accu check was 183 earlier tonight with no s/s of hypo or hyperglycemia.

## 2022-03-10 NOTE — PROGRESS NOTES
Daily Progress Note        Atrial fibrillation with RVR (HCC)    Other hyperlipidemia    Essential hypertension    Hypothyroidism    Coronary artery disease involving coronary bypass graft of native heart without angina pectoris    Acute CHF (congestive heart failure) (HCC)    Acute hyponatremia    Elevated LFTs    Elevated TSH    Acute hyperglycemia    Obesity (BMI 30-39.9)      Assessment  Left hemothorax status post chest tube placement 3/5/2022  Acute respiratory distress  COPD exacerbation  Atrial fibrillation with RVR (HCC)    Other hyperlipidemia    Essential hypertension    Hypothyroidism    Coronary artery disease involving coronary bypass graft of native heart without angina pectoris    Acute CHF    Acute hyponatremia    Elevated LFTs    Elevated TSH    Acute hyperglycemia    Obesity (BMI 30-39.9)   ARF  Former smoker-quit 1980    Echo 3/3/2022  -EF 30%  -Severe right ventricular enlargement and moderate right atrial enlargement  -Severe left atrial enlargement  -RVSP 41.4    Plan  Thoracic surgery administered TPA through chest tube on 3/9/2022 and 3/10/2022    Daily CXR  Chest tube management: Continue -20 cmH2O suction    Continue to titrate oxygen- Currently on 1.5L NC  Cefepime for PNA-finishes 3/13/2022  Inhaled corticosteroids  Bronchodilators  Electrolyte/Glycemic control  DVT Prophylaxis-Heparin SQ  Levothyroxine 50 MCG  Oral amiodarone    3/10/22                                                           3/9/22    3/4/2022       LOS: 7 days     Subjective   Shortness of breath      Objective     Vital signs for last 24 hours:  Vitals:    03/10/22 0000 03/10/22 0436 03/10/22 0500 03/10/22 0519   BP: 153/68  159/89 161/95   BP Location: Left arm  Left arm    Patient Position: Lying  Lying    Pulse: 61  75 94   Resp: 28  28    Temp: 97.1 °F (36.2 °C)  97.8 °F (36.6 °C)    TempSrc: Oral  Oral    SpO2: 93%  98%    Weight:  83 kg (182 lb 15.7 oz)     Height:           Intake/Output last 3 shifts:  I/O  last 3 completed shifts:  In: 150 [P.O.:150]  Out: 100 [Chest Tube:100]  Intake/Output this shift:  No intake/output data recorded.      Radiology  Imaging Results (Last 24 Hours)     Procedure Component Value Units Date/Time    XR Chest 1 View [[id number]] Collected: 03/10/22 0723     Updated: 03/10/22 0726    Narrative:      Examination: XR CHEST 1 VW-     Date of Exam: 3/10/2022 5:02 AM     Indication: Chest tube; I48.91-Unspecified atrial fibrillation;  I50.9-Heart failure, unspecified; Z20.822-Contact with and (suspected)  exposure to covid-19; I50.21-Acute systolic (congestive) heart failure.     Comparison: Radiograph 3/9/2022, 3/8/2020 2. 3/6/2022 CT 3/9/2022     Technique: 1 view of the chest      Findings:  There is a large left-sided pleural effusion. Airspace disease is seen  within the left lower lobe. The right lung appears clear. No  pneumothorax. The heart size is enlarged. The pulmonary vasculature  appears mildly indistinct. Median sternotomy wires are present. A left  basilar chest tube is present.. No acute osseous abnormality identified.       Impression:      No significant changes compared to the previous study. No pneumothorax.  Redemonstration of a large left-sided pleural effusion with left basilar  airspace disease.     Electronically Signed By-Rosa Lopez MD On:3/10/2022 7:24 AM  This report was finalized on [id number] by  Rosa Lopez MD.    CT Chest Without Contrast Diagnostic [[id number]] Collected: 03/09/22 1201     Updated: 03/09/22 1216    Narrative:      CT CHEST WO CONTRAST DIAGNOSTIC-     Date of Exam: 3/9/2022 11:11 AM     Indication: s/p chest tube placement, follow up left effusion vs.  empyema; I48.91-Unspecified atrial fibrillation; I50.9-Heart failure,  unspecified; Z20.822-Contact with and (suspected) exposure to covid-19;  I50.21-Acute systolic (congestive) heart failure.     Comparison: CT chest without contrast 03/04/2022, [facility] chest x-ray  03/09/2022, 322.      Technique: Serial and axial CT images of the chest were obtained.  Reconstructions in the coronal and sagittal planes were performed.   Automated exposure control and iterative reconstruction methods were  used.     FINDINGS:  The tip of the left chest tube is anterior to the dependent portion of  the left pleural fluid collection, which again has hyperdense contents,  consistent with hemorrhage. The collection is partially loculated, as  seen on previous CT and overall does not appear significantly changed in  size. There is complete atelectasis of the left lower lobe which has  progressed compared to 03/04/2022 CT. Small right pleural effusion has  increased compared to 03/04/2022 CT. Heart size is borderline. Mildly  prominent mediastinal lymph nodes may be reactive. Partially included  solid organs of the upper abdomen appear within normal limits for  noncontrast CT. No acute osseous abnormality is identified.       Impression:      1.No significant change in size of large left pleural fluid collection  with hyperdense components, consistent with hemothorax. Tip of the left  chest tube is anterior to the dependent portion of the fluid collection.  2.Increased, now complete left lower lobe atelectasis.  3.Increased size of small right pleural effusion.           Electronically Signed By-Jenn Ordonez MD On:3/9/2022 12:14 PM  This report was finalized on [id number] by  Jenn Ordonez MD.          Labs:  Results from last 7 days   Lab Units 03/10/22  0009   WBC 10*3/mm3 15.40*   HEMOGLOBIN g/dL 9.3*   HEMATOCRIT % 27.3*   PLATELETS 10*3/mm3 291     Results from last 7 days   Lab Units 03/10/22  0009   SODIUM mmol/L 130*   POTASSIUM mmol/L 4.3   CHLORIDE mmol/L 96*   CO2 mmol/L 23.0   BUN mg/dL 19   CREATININE mg/dL 0.67   CALCIUM mg/dL 8.6   BILIRUBIN mg/dL 0.5   ALK PHOS U/L 81   ALT (SGPT) U/L 19   AST (SGOT) U/L 13   GLUCOSE mg/dL 168*         Results from last 7 days   Lab Units 03/10/22  0009  03/09/22  0651 03/08/22  0018   ALBUMIN g/dL 2.80* 3.00* 2.90*     Results from last 7 days   Lab Units 03/03/22  1347   TROPONIN T ng/mL <0.010         Results from last 7 days   Lab Units 03/10/22  0009   MAGNESIUM mg/dL 2.1     Results from last 7 days   Lab Units 03/04/22  2053   INR  1.13*     Results from last 7 days   Lab Units 03/09/22  0651   TSH uIU/mL 6.020*           Meds:   SCHEDULE  custom intrapleural syringe builder, , Intrapleural, Once  amiodarone, 200 mg, Oral, Q8H   Followed by  [START ON 3/13/2022] amiodarone, 200 mg, Oral, Q12H   Followed by  [START ON 3/27/2022] amiodarone, 200 mg, Oral, Daily  arformoterol, 15 mcg, Nebulization, BID - RT  aspirin, 81 mg, Oral, Daily  budesonide, 1 mg, Nebulization, BID - RT  cefepime, 2 g, Intravenous, Q12H  dilTIAZem, 90 mg, Oral, Q8H  heparin (porcine), 5,000 Units, Subcutaneous, Q12H  HYDROmorphone, 2 mg, Intravenous, Once  insulin glargine, 10 Units, Subcutaneous, Nightly  insulin lispro, 0-14 Units, Subcutaneous, TID AC  levothyroxine, 50 mcg, Oral, Q AM  melatonin, 5 mg, Oral, Nightly  metoprolol tartrate, 25 mg, Oral, BID  multivitamin with minerals, 1 tablet, Oral, Daily  senna-docusate sodium, 2 tablet, Oral, BID  sodium chloride, 10 mL, Intravenous, Q12H  zolpidem, 5 mg, Oral, Nightly      Infusions  hold, 1 each      PRNs  •  acetaminophen **OR** acetaminophen **OR** acetaminophen  •  aluminum-magnesium hydroxide-simethicone  •  senna-docusate sodium **AND** polyethylene glycol **AND** bisacodyl **AND** bisacodyl  •  dextrose  •  dextrose  •  glucagon (human recombinant)  •  hold  •  HYDROcodone-acetaminophen  •  insulin lispro **AND** insulin lispro  •  magnesium sulfate **OR** magnesium sulfate in D5W 1g/100mL (PREMIX)  •  nitroglycerin  •  ondansetron **OR** ondansetron  •  potassium chloride **OR** potassium chloride **OR** potassium chloride  •  sodium chloride    Physical Exam:  Physical Exam  Physical Exam  General Appearance:  Alert.  No  distress noted.  HEENT:  Normocephalic, without obvious abnormality, Conjunctiva/corneas clear,.  Normal external ear canals, Nares normal, no drainage     Neck:  Supple, symmetrical, trachea midline. No JVD.  Lungs /Chest wall: Minimal bilateral basal Rales, respirations unlabored symmetrical wall movement.   Left chest tube in place.  Heart:  Regular rate and rhythm, systolic murmur PMI left sternal border  Abdomen: Soft, non-tender, no masses, no organomegaly.    Extremities: 1+ edema bilateral lower extremity, no clubbing or cyanosis      ROS  Review of Systems  Review of Systems       Constitutional: Negative for chills, fever and malaise/fatigue.   HENT: Negative.    Eyes: Negative.    Cardiovascular: Negative.    Respiratory: Positive for cough and shortness of breath.    Skin: Negative.    Musculoskeletal: Negative.    Gastrointestinal: Negative.    Genitourinary: Negative.    Neurological: Negative.    Psychiatric/Behavioral: Negative.          I have reviewed current clinicals.     Electronically signed by VALENTINE Durham, 03/10/22, 9:57 AM EST.     The NP scribed the note for me, I have examined the patient and reviewed and verified the above findings and and I formulated the assessment and plan as documented

## 2022-03-10 NOTE — THERAPY TREATMENT NOTE
Subjective: Pt reported fatigue and only agreeable to do exercises in bed.     Objective:     Bed mobility - Min-A and Mod-A scooting upward on the bed; VC for bridging and push up   Transfers - N/A or Not attempted.  Ambulation - 0 feet N/A or Not attempted.     Supine thera ex BLE (10-15 reps)  - ankle pumps  - heel slides  - Quad set  - Glut set   - straight leg raise    Pain: 0 VAS  Education: Provided education on importance of mobility and skilled verbal / tactile cueing throughout intervention.     Assessment: Jaquelin Valderrama presents with functional mobility impairments which indicate the need for skilled intervention. Tolerating session today without incident. Upon arrival, pt reported fatigue. Performed supine thera ex BLE. Notable dyspnea present with no desaturation and on 3L O2. Educated pt to perform exercises to prevent significant atrophy of muscles and assist with BLE swelling. Pt verbalized understanding. Donned and turned on intermittent compression and assisted pt in a chair position. Upon Will continue to follow and progress as tolerated.     Plan/Recommendations:   Pt would benefit from Inpatient Rehabilitation placement at discharge from facility and requires no DME at discharge.   Pt desires Inpatient Rehabilitation placement at discharge. Pt cooperative; agreeable to therapeutic recommendations and plan of care.     Basic Mobility 6-click:  Rollin = Total, A lot = 2, A little = 3; 4 = None  Supine>Sit:   1 = Total, A lot = 2, A little = 3; 4 = None   Sit>Stand with arms:  1 = Total, A lot = 2, A little = 3; 4 = None  Bed>Chair:   1 = Total, A lot = 2, A little = 3; 4 = None  Ambulate in room:  1 = Total, A lot = 2, A little = 3; 4 = None  3-5 Steps with railin = Total, A lot = 2, A little = 3; 4 = None  Score: 18    Post-Tx Position: Supine with HOB Elevated, Alarms activated and Call light and personal items within reach  PPE: gloves, surgical mask, eyewear protection

## 2022-03-11 ENCOUNTER — APPOINTMENT (OUTPATIENT)
Dept: GENERAL RADIOLOGY | Facility: HOSPITAL | Age: 82
End: 2022-03-11

## 2022-03-11 LAB
ALBUMIN SERPL-MCNC: 2.6 G/DL (ref 3.5–5.2)
ALBUMIN/GLOB SERPL: 0.9 G/DL
ALP SERPL-CCNC: 87 U/L (ref 39–117)
ALT SERPL W P-5'-P-CCNC: 19 U/L (ref 1–33)
ANION GAP SERPL CALCULATED.3IONS-SCNC: 10 MMOL/L (ref 5–15)
AST SERPL-CCNC: 14 U/L (ref 1–32)
BASOPHILS # BLD AUTO: 0 10*3/MM3 (ref 0–0.2)
BASOPHILS NFR BLD AUTO: 0.3 % (ref 0–1.5)
BILIRUB SERPL-MCNC: 0.4 MG/DL (ref 0–1.2)
BUN SERPL-MCNC: 18 MG/DL (ref 8–23)
BUN/CREAT SERPL: 25 (ref 7–25)
CA-I SERPL ISE-MCNC: 1.19 MMOL/L (ref 1.2–1.3)
CALCIUM SPEC-SCNC: 8.2 MG/DL (ref 8.6–10.5)
CHLORIDE SERPL-SCNC: 93 MMOL/L (ref 98–107)
CO2 SERPL-SCNC: 23 MMOL/L (ref 22–29)
CREAT SERPL-MCNC: 0.72 MG/DL (ref 0.57–1)
DEPRECATED RDW RBC AUTO: 52.5 FL (ref 37–54)
EGFRCR SERPLBLD CKD-EPI 2021: 84.6 ML/MIN/1.73
EOSINOPHIL # BLD AUTO: 0.1 10*3/MM3 (ref 0–0.4)
EOSINOPHIL NFR BLD AUTO: 0.5 % (ref 0.3–6.2)
ERYTHROCYTE [DISTWIDTH] IN BLOOD BY AUTOMATED COUNT: 16 % (ref 12.3–15.4)
GLOBULIN UR ELPH-MCNC: 2.9 GM/DL
GLUCOSE BLDC GLUCOMTR-MCNC: 134 MG/DL (ref 70–105)
GLUCOSE BLDC GLUCOMTR-MCNC: 148 MG/DL (ref 70–105)
GLUCOSE BLDC GLUCOMTR-MCNC: 153 MG/DL (ref 70–105)
GLUCOSE BLDC GLUCOMTR-MCNC: 197 MG/DL (ref 70–105)
GLUCOSE SERPL-MCNC: 161 MG/DL (ref 65–99)
HCT VFR BLD AUTO: 26.6 % (ref 34–46.6)
HGB BLD-MCNC: 9.1 G/DL (ref 12–15.9)
LYMPHOCYTES # BLD AUTO: 0.7 10*3/MM3 (ref 0.7–3.1)
LYMPHOCYTES NFR BLD AUTO: 5.1 % (ref 19.6–45.3)
MAGNESIUM SERPL-MCNC: 2.1 MG/DL (ref 1.6–2.4)
MCH RBC QN AUTO: 32.5 PG (ref 26.6–33)
MCHC RBC AUTO-ENTMCNC: 34 G/DL (ref 31.5–35.7)
MCV RBC AUTO: 95.5 FL (ref 79–97)
MONOCYTES # BLD AUTO: 1.1 10*3/MM3 (ref 0.1–0.9)
MONOCYTES NFR BLD AUTO: 8.7 % (ref 5–12)
NEUTROPHILS NFR BLD AUTO: 11.2 10*3/MM3 (ref 1.7–7)
NEUTROPHILS NFR BLD AUTO: 85.4 % (ref 42.7–76)
NRBC BLD AUTO-RTO: 0 /100 WBC (ref 0–0.2)
PHOSPHATE SERPL-MCNC: 2.8 MG/DL (ref 2.5–4.5)
PLATELET # BLD AUTO: 294 10*3/MM3 (ref 140–450)
PMV BLD AUTO: 8 FL (ref 6–12)
POTASSIUM SERPL-SCNC: 4.1 MMOL/L (ref 3.5–5.2)
PROT SERPL-MCNC: 5.5 G/DL (ref 6–8.5)
RBC # BLD AUTO: 2.79 10*6/MM3 (ref 3.77–5.28)
SODIUM SERPL-SCNC: 126 MMOL/L (ref 136–145)
WBC NRBC COR # BLD: 13.1 10*3/MM3 (ref 3.4–10.8)

## 2022-03-11 PROCEDURE — 25010000002 CEFEPIME PER 500 MG: Performed by: INTERNAL MEDICINE

## 2022-03-11 PROCEDURE — 82330 ASSAY OF CALCIUM: CPT | Performed by: INTERNAL MEDICINE

## 2022-03-11 PROCEDURE — 85025 COMPLETE CBC W/AUTO DIFF WBC: CPT | Performed by: INTERNAL MEDICINE

## 2022-03-11 PROCEDURE — 99232 SBSQ HOSP IP/OBS MODERATE 35: CPT | Performed by: NURSE PRACTITIONER

## 2022-03-11 PROCEDURE — 94799 UNLISTED PULMONARY SVC/PX: CPT

## 2022-03-11 PROCEDURE — 63710000001 INSULIN LISPRO (HUMAN) PER 5 UNITS

## 2022-03-11 PROCEDURE — 80053 COMPREHEN METABOLIC PANEL: CPT | Performed by: STUDENT IN AN ORGANIZED HEALTH CARE EDUCATION/TRAINING PROGRAM

## 2022-03-11 PROCEDURE — 71045 X-RAY EXAM CHEST 1 VIEW: CPT

## 2022-03-11 PROCEDURE — 82962 GLUCOSE BLOOD TEST: CPT

## 2022-03-11 PROCEDURE — 83735 ASSAY OF MAGNESIUM: CPT | Performed by: INTERNAL MEDICINE

## 2022-03-11 PROCEDURE — 63710000001 INSULIN GLARGINE PER 5 UNITS: Performed by: INTERNAL MEDICINE

## 2022-03-11 PROCEDURE — 84100 ASSAY OF PHOSPHORUS: CPT | Performed by: STUDENT IN AN ORGANIZED HEALTH CARE EDUCATION/TRAINING PROGRAM

## 2022-03-11 PROCEDURE — 99232 SBSQ HOSP IP/OBS MODERATE 35: CPT | Performed by: INTERNAL MEDICINE

## 2022-03-11 PROCEDURE — 25010000002 HEPARIN (PORCINE) PER 1000 UNITS: Performed by: INTERNAL MEDICINE

## 2022-03-11 PROCEDURE — 99233 SBSQ HOSP IP/OBS HIGH 50: CPT | Performed by: INTERNAL MEDICINE

## 2022-03-11 RX ADMIN — Medication 5 MG: at 21:08

## 2022-03-11 RX ADMIN — AMIODARONE HYDROCHLORIDE 200 MG: 200 TABLET ORAL at 21:08

## 2022-03-11 RX ADMIN — AMIODARONE HYDROCHLORIDE 200 MG: 200 TABLET ORAL at 13:18

## 2022-03-11 RX ADMIN — DILTIAZEM HYDROCHLORIDE 90 MG: 30 TABLET, FILM COATED ORAL at 13:18

## 2022-03-11 RX ADMIN — LEVOTHYROXINE SODIUM 50 MCG: 0.05 TABLET ORAL at 05:45

## 2022-03-11 RX ADMIN — Medication 10 ML: at 21:02

## 2022-03-11 RX ADMIN — SENNOSIDES AND DOCUSATE SODIUM 2 TABLET: 50; 8.6 TABLET ORAL at 21:10

## 2022-03-11 RX ADMIN — BUDESONIDE 0.5 MG: 0.5 INHALANT RESPIRATORY (INHALATION) at 07:28

## 2022-03-11 RX ADMIN — ARFORMOTEROL TARTRATE 15 MCG: 15 SOLUTION RESPIRATORY (INHALATION) at 07:20

## 2022-03-11 RX ADMIN — BUDESONIDE 0.5 MG: 0.5 INHALANT RESPIRATORY (INHALATION) at 20:45

## 2022-03-11 RX ADMIN — Medication 10 ML: at 08:17

## 2022-03-11 RX ADMIN — Medication 1 TABLET: at 08:17

## 2022-03-11 RX ADMIN — HEPARIN SODIUM 5000 UNITS: 5000 INJECTION INTRAVENOUS; SUBCUTANEOUS at 08:17

## 2022-03-11 RX ADMIN — INSULIN GLARGINE 10 UNITS: 100 INJECTION, SOLUTION SUBCUTANEOUS at 21:17

## 2022-03-11 RX ADMIN — ASPIRIN 81 MG CHEWABLE TABLET 81 MG: 81 TABLET CHEWABLE at 08:17

## 2022-03-11 RX ADMIN — SENNOSIDES AND DOCUSATE SODIUM 2 TABLET: 50; 8.6 TABLET ORAL at 08:17

## 2022-03-11 RX ADMIN — AMIODARONE HYDROCHLORIDE 200 MG: 200 TABLET ORAL at 05:45

## 2022-03-11 RX ADMIN — INSULIN LISPRO 3 UNITS: 100 INJECTION, SOLUTION INTRAVENOUS; SUBCUTANEOUS at 11:37

## 2022-03-11 RX ADMIN — ZOLPIDEM TARTRATE 5 MG: 5 TABLET ORAL at 21:09

## 2022-03-11 RX ADMIN — ARFORMOTEROL TARTRATE 15 MCG: 15 SOLUTION RESPIRATORY (INHALATION) at 20:41

## 2022-03-11 RX ADMIN — DILTIAZEM HYDROCHLORIDE 90 MG: 30 TABLET, FILM COATED ORAL at 05:45

## 2022-03-11 RX ADMIN — DILTIAZEM HYDROCHLORIDE 90 MG: 30 TABLET, FILM COATED ORAL at 21:04

## 2022-03-11 RX ADMIN — CEFEPIME HYDROCHLORIDE 2 G: 2 INJECTION, POWDER, FOR SOLUTION INTRAMUSCULAR; INTRAVENOUS at 13:15

## 2022-03-11 RX ADMIN — METOPROLOL TARTRATE 25 MG: 25 TABLET, FILM COATED ORAL at 21:09

## 2022-03-11 RX ADMIN — CEFEPIME HYDROCHLORIDE 2 G: 2 INJECTION, POWDER, FOR SOLUTION INTRAMUSCULAR; INTRAVENOUS at 02:08

## 2022-03-11 RX ADMIN — METOPROLOL TARTRATE 25 MG: 25 TABLET, FILM COATED ORAL at 08:17

## 2022-03-11 RX ADMIN — HEPARIN SODIUM 5000 UNITS: 5000 INJECTION INTRAVENOUS; SUBCUTANEOUS at 21:11

## 2022-03-11 NOTE — PROGRESS NOTES
Daily Progress Note        Atrial fibrillation with RVR (HCC)    Other hyperlipidemia    Essential hypertension    Hypothyroidism    Coronary artery disease involving coronary bypass graft of native heart without angina pectoris    Acute CHF (congestive heart failure) (HCC)    Acute hyponatremia    Elevated LFTs    Elevated TSH    Acute hyperglycemia    Obesity (BMI 30-39.9)      Assessment  Left hemothorax status post chest tube placement 3/5/2022  Acute respiratory distress  COPD exacerbation  Atrial fibrillation with RVR (HCC)    Other hyperlipidemia    Essential hypertension    Hypothyroidism    Coronary artery disease involving coronary bypass graft of native heart without angina pectoris    Acute CHF    Acute hyponatremia    Elevated LFTs    Elevated TSH    Acute hyperglycemia    Obesity (BMI 30-39.9)   ARF  Former smoker-quit 1980    Echo 3/3/2022  -EF 30%  -Severe right ventricular enlargement and moderate right atrial enlargement  -Severe left atrial enlargement  -RVSP 41.4    Plan  Thoracic surgery administered TPA through chest tube on 3/9/2022 and 3/10/2022      Chest tube management: Continue -20 cmH2O suction  -1010 mL drained since initial placement  -Daily CXR    Continue to titrate oxygen- Currently on 1.5L NC  Cefepime for PNA-finishes 3/13/2022  Inhaled corticosteroids  Bronchodilators  Electrolyte/Glycemic control  DVT Prophylaxis-Heparin SQ  Levothyroxine 50 MCG  Oral amiodarone    3/11/22                                                           3/10/22    3/4/2022       LOS: 8 days     Subjective   Shortness of breath      Objective     Vital signs for last 24 hours:  Vitals:    03/11/22 0200 03/11/22 0400 03/11/22 0545 03/11/22 0546   BP: 175/84 149/72 132/75    BP Location:       Patient Position:       Pulse: 60 67 74    Resp:    18   Temp:    97.1 °F (36.2 °C)   TempSrc:    Temporal   SpO2: 96% 97%     Weight:       Height:           Intake/Output last 3 shifts:  I/O last 3 completed  shifts:  In: 150 [P.O.:150]  Out: 115 [Chest Tube:115]  Intake/Output this shift:  I/O this shift:  In: 80 [P.O.:80]  Out: 30 [Chest Tube:30]      Radiology  Imaging Results (Last 24 Hours)     Procedure Component Value Units Date/Time    XR Chest 1 View [[id number]] Resulted: 03/11/22 0631     Updated: 03/11/22 0631    XR Chest 1 View [[id number]] Collected: 03/10/22 0723     Updated: 03/10/22 0726    Narrative:      Examination: XR CHEST 1 VW-     Date of Exam: 3/10/2022 5:02 AM     Indication: Chest tube; I48.91-Unspecified atrial fibrillation;  I50.9-Heart failure, unspecified; Z20.822-Contact with and (suspected)  exposure to covid-19; I50.21-Acute systolic (congestive) heart failure.     Comparison: Radiograph 3/9/2022, 3/8/2020 2. 3/6/2022 CT 3/9/2022     Technique: 1 view of the chest      Findings:  There is a large left-sided pleural effusion. Airspace disease is seen  within the left lower lobe. The right lung appears clear. No  pneumothorax. The heart size is enlarged. The pulmonary vasculature  appears mildly indistinct. Median sternotomy wires are present. A left  basilar chest tube is present.. No acute osseous abnormality identified.       Impression:      No significant changes compared to the previous study. No pneumothorax.  Redemonstration of a large left-sided pleural effusion with left basilar  airspace disease.     Electronically Signed By-Rosa Lopez MD On:3/10/2022 7:24 AM  This report was finalized on [id number] by  Rosa Lopez MD.          Labs:  Results from last 7 days   Lab Units 03/10/22  0009   WBC 10*3/mm3 15.40*   HEMOGLOBIN g/dL 9.3*   HEMATOCRIT % 27.3*   PLATELETS 10*3/mm3 291     Results from last 7 days   Lab Units 03/10/22  0009   SODIUM mmol/L 130*   POTASSIUM mmol/L 4.3   CHLORIDE mmol/L 96*   CO2 mmol/L 23.0   BUN mg/dL 19   CREATININE mg/dL 0.67   CALCIUM mg/dL 8.6   BILIRUBIN mg/dL 0.5   ALK PHOS U/L 81   ALT (SGPT) U/L 19   AST (SGOT) U/L 13   GLUCOSE mg/dL 168*          Results from last 7 days   Lab Units 03/10/22  0009 03/09/22  0651 03/08/22  0018   ALBUMIN g/dL 2.80* 3.00* 2.90*             Results from last 7 days   Lab Units 03/10/22  0009   MAGNESIUM mg/dL 2.1     Results from last 7 days   Lab Units 03/04/22  2053   INR  1.13*     Results from last 7 days   Lab Units 03/09/22  0651   TSH uIU/mL 6.020*           Meds:   SCHEDULE  amiodarone, 200 mg, Oral, Q8H   Followed by  [START ON 3/13/2022] amiodarone, 200 mg, Oral, Q12H   Followed by  [START ON 3/27/2022] amiodarone, 200 mg, Oral, Daily  arformoterol, 15 mcg, Nebulization, BID - RT  aspirin, 81 mg, Oral, Daily  budesonide, 1 mg, Nebulization, BID - RT  cefepime, 2 g, Intravenous, Q12H  dilTIAZem, 90 mg, Oral, Q8H  heparin (porcine), 5,000 Units, Subcutaneous, Q12H  HYDROmorphone, 2 mg, Intravenous, Once  insulin glargine, 10 Units, Subcutaneous, Nightly  insulin lispro, 0-14 Units, Subcutaneous, TID AC  levothyroxine, 50 mcg, Oral, Q AM  melatonin, 5 mg, Oral, Nightly  metoprolol tartrate, 25 mg, Oral, BID  multivitamin with minerals, 1 tablet, Oral, Daily  senna-docusate sodium, 2 tablet, Oral, BID  sodium chloride, 10 mL, Intravenous, Q12H  zolpidem, 5 mg, Oral, Nightly      Infusions  hold, 1 each      PRNs  •  acetaminophen **OR** acetaminophen **OR** acetaminophen  •  aluminum-magnesium hydroxide-simethicone  •  senna-docusate sodium **AND** polyethylene glycol **AND** bisacodyl **AND** bisacodyl  •  dextrose  •  dextrose  •  glucagon (human recombinant)  •  hold  •  HYDROcodone-acetaminophen  •  insulin lispro **AND** insulin lispro  •  magnesium sulfate **OR** magnesium sulfate in D5W 1g/100mL (PREMIX)  •  nitroglycerin  •  ondansetron **OR** ondansetron  •  potassium chloride **OR** potassium chloride **OR** potassium chloride  •  sodium chloride    Physical Exam:  Physical Exam  Physical Exam  General Appearance:  Alert.  No distress noted.  HEENT:  Normocephalic, without obvious abnormality,  Conjunctiva/corneas clear,.  Normal external ear canals, Nares normal, no drainage     Neck:  Supple, symmetrical, trachea midline. No JVD.  Lungs /Chest wall: Minimal bilateral basal Rales, respirations unlabored symmetrical wall movement.   Left chest tube in place.  Heart:  Regular rate and rhythm, systolic murmur PMI left sternal border  Abdomen: Soft, non-tender, no masses, no organomegaly.    Extremities: 1+ edema bilateral lower extremity, no clubbing or cyanosis      ROS  Review of Systems  Review of Systems       Constitutional: Negative for chills, fever and malaise/fatigue.   HENT: Negative.    Eyes: Negative.    Cardiovascular: Negative.    Respiratory: Positive for cough and shortness of breath.    Skin: Negative.    Musculoskeletal: Negative.    Gastrointestinal: Negative.    Genitourinary: Negative.    Neurological: Negative.    Psychiatric/Behavioral: Negative.          I have reviewed current clinicals.     Electronically signed by VALENTINE Durham, 03/11/22, 6:51 AM EST.     The NP scribed the note for me, I have examined the patient and reviewed and verified the above findings and and I formulated the assessment and plan as documented

## 2022-03-11 NOTE — PROGRESS NOTES
Nutrition Services    Patient Name: Jaquelin Valderrama  YOB: 1940  MRN: [id number]  Admission date: 3/2/2022    PPE Documentation        PPE Worn By Provider mask, gloves and eye protection   PPE Worn By Patient  none     NUTRITION SCREENING      Encounter Information: 3/11: Progress note for LOS. Patient reports a good appetite,  is bringing food for her as she typically eats a Keto diet and prefers her own food items. Has eaten this diet for several years. Pt reports she is very active and makes sure she has a good source of protein with her meals.       PO Diet: Diet Cardiac, Diabetic/Consistent Carbs; 2gm Na+; Diabetic - Consistent Carb   PO Supplements:    PO Intake:  %       Labs (reviewed below): Na low (edema noted)  Gluc elev        GI Function:  Stool Output  Stool Unmeasured Occurrence: 1 (03/10/22 2355)  Bowel Incontinence: No (03/10/22 2355)  Stool Amount: moderate (03/10/22 2355)          Skin: No breakdown       Weight Hx Review: Wt Readings from Last 10 Encounters:   03/10/22 0436 83 kg (182 lb 15.7 oz)   03/09/22 0520 81.8 kg (180 lb 6.4 oz)   03/08/22 0551 80 kg (176 lb 5.9 oz)   03/06/22 0600 77 kg (169 lb 12.1 oz)   03/05/22 0553 76.1 kg (167 lb 12.3 oz)   03/04/22 0500 77.3 kg (170 lb 6.7 oz)   03/03/22 0622 80.3 kg (177 lb)   03/03/22 0449 80.5 kg (177 lb 7.5 oz)   03/02/22 2218 81 kg (178 lb 9.2 oz)   01/27/21 0800 68.5 kg (151 lb)   12/28/20 0826 71.2 kg (157 lb)   11/30/20 0952 71.2 kg (157 lb)   11/09/20 0938 71.2 kg (157 lb)   09/09/20 1012 72.1 kg (159 lb)   08/05/20 1020 72.1 kg (159 lb)   07/21/20 1010 72.5 kg (159 lb 13.3 oz)   07/14/20 1216 70.8 kg (156 lb)   07/08/20 1021 73.5 kg (162 lb)   05/11/20 1039 71.7 kg (158 lb)     Pt currently with 1-2+ edema, monitor weight       Nutrition Intervention: Continue current diet    NFPE completed and not consistent with nutrition diagnosis of malnutrition at this time using AND/ASPEN criteria     No nutrition diagnosis  at present time.     Results from last 7 days   Lab Units 03/11/22  1206 03/10/22  0009 03/09/22  0651   SODIUM mmol/L 126* 130* 129*   POTASSIUM mmol/L 4.1 4.3 4.0   CHLORIDE mmol/L 93* 96* 97*   CO2 mmol/L 23.0 23.0 23.0   BUN mg/dL 18 19 17   CREATININE mg/dL 0.72 0.67 0.73   CALCIUM mg/dL 8.2* 8.6 8.3*   BILIRUBIN mg/dL 0.4 0.5 0.6   ALK PHOS U/L 87 81 76   ALT (SGPT) U/L 19 19 20   AST (SGOT) U/L 14 13 12   GLUCOSE mg/dL 161* 168* 144*     Results from last 7 days   Lab Units 03/11/22  1206 03/10/22  0009 03/09/22  1230 03/09/22  0651 03/05/22  0536 03/05/22  0345   MAGNESIUM mg/dL 2.1 2.1  --  2.0   < > 2.0   PHOSPHORUS mg/dL 2.8 2.4*  --  2.4*   < >  --    HEMOGLOBIN g/dL 9.1* 9.3*   < > 9.4*   < > 10.8*   HEMATOCRIT % 26.6* 27.3*   < > 27.5*   < > 31.2*   TRIGLYCERIDES mg/dL  --   --   --   --   --  117    < > = values in this interval not displayed.     COVID19   Date Value Ref Range Status   03/02/2022 Not Detected Not Detected - Ref. Range Final     Lab Results   Component Value Date    HGBA1C 7.1 (H) 03/03/2022       RD to follow up per protocol.    Electronically signed by:  Sindhu Cordero, BEBETO  03/11/22 15:27 EST

## 2022-03-11 NOTE — CASE MANAGEMENT/SOCIAL WORK
Continued Stay Note  [facility]     Patient Name: Jaquelin Valderrama  MRN: [id number]  Today's Date: 3/11/2022    Admit Date: 3/2/2022     Discharge Plan     Row Name 03/11/22 1452       Plan    Plan [facility] accepted no precert or PASRR required    Plan Comments Per Luis Philippe at [facility] patient is accepted. d/c barrier: Chest tube.               Phone communication or documentation only - no physical contact with patient or family.        Elina Barrios RN

## 2022-03-11 NOTE — PROGRESS NOTES
Referring Provider: Mp Galicia MD    Reason for follow-up:  Status post CABG  Atrial fibrillation  Hypertension  Left pleural effusion     Patient Care Team:  Minerva Chatterjee MD as PCP - General (Internal Medicine)    Subjective .      ROS    Since I have last seen, the patient has been without any chest discomfort ,shortness of breath, palpitations, dizziness or syncope.  Denies having any headache ,abdominal pain ,nausea, vomiting , diarrhea constipation, loss of weight or loss of appetite.  Denies having any excessive bruising ,hematuria or blood in the stool.    Review of all systems negative except as indicated    History  Past Medical History:   Diagnosis Date   • Astigmatism, bilateral 2019   • Benign essential hypertension    • Bilateral presbyopia 2019   • CAD (coronary artery disease)    • Closed right ankle fracture    • COVID-19 vaccination refused    • Hyperlipidemia    • Hypothyroidism    • Influenza vaccine needed    • Myocardial infarction (HCC)    • Prediabetes    • Pseudophakia, both eyes 2019   • Thoracic back pain        Past Surgical History:   Procedure Laterality Date   • APPENDECTOMY     • CARDIAC CATHETERIZATION  2012    x3    • CORONARY ARTERY BYPASS GRAFT  2014   • CORONARY STENT PLACEMENT      x3   • HARDWARE REMOVAL Right 2020    Procedure: ANKLE/FOOT HARDWARE REMOVAL;  Surgeon: Lincoln Sibley MD;  Location: [facility];  Service: Orthopedics;  Laterality: Right;   • ORIF ANKLE FRACTURE Right 2019    Radha Vazquez Mem       Family History   Problem Relation Age of Onset   • Heart disease Mother    • Lung cancer Father    • Diabetes Other        Social History     Tobacco Use   • Smoking status: Former Smoker     Types: Cigarettes     Quit date: 1980     Years since quittin.2   • Smokeless tobacco: Never Used   • Tobacco comment: Social Drinker   Vaping Use   • Vaping Use: Never used   Substance Use Topics   • Alcohol use: Yes      Comment: mod    • Drug use: No        Medications Prior to Admission   Medication Sig Dispense Refill Last Dose   • aspirin 81 MG chewable tablet Chew 81 mg Daily.   3/2/2022 at Unknown time   • Cholecalciferol (VITAMIN D3) 2000 units tablet Take 1 tablet by mouth Daily.   3/2/2022 at Unknown time   • irbesartan (Avapro) 300 MG tablet Take 150 mg by mouth Daily.      • metoprolol tartrate (LOPRESSOR) 25 MG tablet Take 1 tablet by mouth twice daily 180 tablet 0 3/2/2022 at Unknown time   • Multiple Vitamins-Minerals (MULTIVITAMIN ADULT EXTRA C PO) Take 1 tablet by mouth Daily.   3/2/2022 at Unknown time   • levothyroxine (SYNTHROID, LEVOTHROID) 50 MCG tablet Take 50 mcg by mouth Every Morning.          Allergies  Prednisone    Scheduled Meds:amiodarone, 200 mg, Oral, Q8H   Followed by  [START ON 3/13/2022] amiodarone, 200 mg, Oral, Q12H   Followed by  [START ON 3/27/2022] amiodarone, 200 mg, Oral, Daily  arformoterol, 15 mcg, Nebulization, BID - RT  aspirin, 81 mg, Oral, Daily  budesonide, 1 mg, Nebulization, BID - RT  cefepime, 2 g, Intravenous, Q12H  dilTIAZem, 90 mg, Oral, Q8H  heparin (porcine), 5,000 Units, Subcutaneous, Q12H  HYDROmorphone, 2 mg, Intravenous, Once  insulin glargine, 10 Units, Subcutaneous, Nightly  insulin lispro, 0-14 Units, Subcutaneous, TID AC  levothyroxine, 50 mcg, Oral, Q AM  melatonin, 5 mg, Oral, Nightly  metoprolol tartrate, 25 mg, Oral, BID  multivitamin with minerals, 1 tablet, Oral, Daily  senna-docusate sodium, 2 tablet, Oral, BID  sodium chloride, 10 mL, Intravenous, Q12H  zolpidem, 5 mg, Oral, Nightly      Continuous Infusions:hold, 1 each      PRN Meds:.•  acetaminophen **OR** acetaminophen **OR** acetaminophen  •  aluminum-magnesium hydroxide-simethicone  •  senna-docusate sodium **AND** polyethylene glycol **AND** bisacodyl **AND** bisacodyl  •  dextrose  •  dextrose  •  glucagon (human recombinant)  •  hold  •  HYDROcodone-acetaminophen  •  insulin lispro **AND** insulin  "lispro  •  magnesium sulfate **OR** magnesium sulfate in D5W 1g/100mL (PREMIX)  •  nitroglycerin  •  ondansetron **OR** ondansetron  •  potassium chloride **OR** potassium chloride **OR** potassium chloride  •  sodium chloride    Objective     VITAL SIGNS  Vitals:    03/11/22 0200 03/11/22 0400 03/11/22 0545 03/11/22 0546   BP: 175/84 149/72 132/75    BP Location:       Patient Position:       Pulse: 60 67 74    Resp:    18   Temp:    97.1 °F (36.2 °C)   TempSrc:    Temporal   SpO2: 96% 97%     Weight:       Height:           Flowsheet Rows    Flowsheet Row First Filed Value   Admission Height 162.6 cm (64\") Documented at 03/02/2022 2218   Admission Weight 81 kg (178 lb 9.2 oz) Documented at 03/02/2022 2218            Intake/Output Summary (Last 24 hours) at 3/11/2022 0652  Last data filed at 3/11/2022 0400  Gross per 24 hour   Intake 80 ml   Output 60 ml   Net 20 ml        TELEMETRY: Atrial fibrillation    Physical Exam:  The patient is alert, oriented and in no distress.  Vital signs as noted above.  Head and neck revealed no carotid bruits or jugular venous distention.  No thyromegaly or lymphadenopathy is present  Lungs clear.  No wheezing.  Breath sounds are normal bilaterally.  Left chest tube in place.  Heart normal first and second heart sounds.  No murmur. No precordial rub is present.  No gallop is present.  Abdomen soft and nontender.  No organomegaly is present.  Extremities with good peripheral pulses without any pedal edema.  Skin warm and dry.  Musculoskeletal system is grossly normal  CNS grossly normal      Results Review:   I reviewed the patient's new clinical results.  Lab Results (last 24 hours)     Procedure Component Value Units Date/Time    POC Glucose Once [[id number]]  (Abnormal) Collected: 03/10/22 2119    Specimen: Blood Updated: 03/10/22 2120     Glucose 143 mg/dL      Comment: Serial Number: [id number]:  [id number]       POC Glucose Once [[id number]]  (Abnormal) Collected: 03/10/22 " 1654    Specimen: Blood Updated: 03/10/22 1655     Glucose 282 mg/dL      Comment: Serial Number: [id number]:  [id number]       POC Glucose Once [[id number]]  (Abnormal) Collected: 03/10/22 1137    Specimen: Blood Updated: 03/10/22 1139     Glucose 136 mg/dL      Comment: Serial Number: [id number]:  [id number]       POC Glucose Once [[id number]]  (Abnormal) Collected: 03/10/22 0728    Specimen: Blood Updated: 03/10/22 0729     Glucose 161 mg/dL      Comment: Serial Number: [id number]:  [id number]             Imaging Results (Last 24 Hours)     Procedure Component Value Units Date/Time    XR Chest 1 View [[id number]] Resulted: 03/11/22 0631     Updated: 03/11/22 0631    XR Chest 1 View [[id number]] Collected: 03/10/22 0723     Updated: 03/10/22 0726    Narrative:      Examination: XR CHEST 1 VW-     Date of Exam: 3/10/2022 5:02 AM     Indication: Chest tube; I48.91-Unspecified atrial fibrillation;  I50.9-Heart failure, unspecified; Z20.822-Contact with and (suspected)  exposure to covid-19; I50.21-Acute systolic (congestive) heart failure.     Comparison: Radiograph 3/9/2022, 3/8/2020 2. 3/6/2022 CT 3/9/2022     Technique: 1 view of the chest      Findings:  There is a large left-sided pleural effusion. Airspace disease is seen  within the left lower lobe. The right lung appears clear. No  pneumothorax. The heart size is enlarged. The pulmonary vasculature  appears mildly indistinct. Median sternotomy wires are present. A left  basilar chest tube is present.. No acute osseous abnormality identified.       Impression:      No significant changes compared to the previous study. No pneumothorax.  Redemonstration of a large left-sided pleural effusion with left basilar  airspace disease.     Electronically Signed By-Rosa Lopez MD On:3/10/2022 7:24 AM  This report was finalized on [id number] by  Rosa Lopez MD.      LAB RESULTS (LAST 7 DAYS)    CBC  Results from last 7 days   Lab Units 03/10/22  0009  03/09/22  1230 03/09/22  0651 03/09/22  0036 03/08/22  1755 03/08/22  1157 03/08/22  0603   WBC 10*3/mm3 15.40* 14.10* 13.00* 13.70* 13.50* 12.60* 10.10   RBC 10*6/mm3 2.89* 2.98* 2.90* 2.87* 2.96* 3.04* 2.75*   HEMOGLOBIN g/dL 9.3* 9.9* 9.4* 9.1* 9.6* 9.7* 9.0*   HEMATOCRIT % 27.3* 28.2* 27.5* 26.8* 28.1* 28.7* 25.8*   MCV fL 94.3 94.5 94.6 93.3 95.1 94.2 93.6   PLATELETS 10*3/mm3 291 267 244 252 259 251 208       BMP  Results from last 7 days   Lab Units 03/10/22  0009 03/09/22  0651 03/08/22  0018 03/07/22  0014 03/06/22  0650 03/05/22  0536 03/05/22  0345   SODIUM mmol/L 130* 129* 130* 129* 133* 140 137   POTASSIUM mmol/L 4.3 4.0 4.1 4.1 3.8 4.4 4.2   CHLORIDE mmol/L 96* 97* 96* 94* 96* 103 98   CO2 mmol/L 23.0 23.0 23.0 23.0 24.0 22.0 26.0   BUN mg/dL 19 17 30* 36* 32* 28* 28*   CREATININE mg/dL 0.67 0.73 1.03* 1.15* 1.35* 1.07* 0.96   GLUCOSE mg/dL 168* 144* 169* 213* 110* 367* 274*   MAGNESIUM mg/dL 2.1 2.0 2.1 2.2 2.0  --  2.0   PHOSPHORUS mg/dL 2.4* 2.4* 2.6 3.5 4.8* 4.7*  --        CMP   Results from last 7 days   Lab Units 03/10/22  0009 03/09/22  0651 03/08/22  0018 03/07/22  0014 03/06/22  0650 03/05/22  0536 03/05/22  0345   SODIUM mmol/L 130* 129* 130* 129* 133* 140 137   POTASSIUM mmol/L 4.3 4.0 4.1 4.1 3.8 4.4 4.2   CHLORIDE mmol/L 96* 97* 96* 94* 96* 103 98   CO2 mmol/L 23.0 23.0 23.0 23.0 24.0 22.0 26.0   BUN mg/dL 19 17 30* 36* 32* 28* 28*   CREATININE mg/dL 0.67 0.73 1.03* 1.15* 1.35* 1.07* 0.96   GLUCOSE mg/dL 168* 144* 169* 213* 110* 367* 274*   ALBUMIN g/dL 2.80* 3.00* 2.90* 3.10* 3.20* 3.00* 3.00*   BILIRUBIN mg/dL 0.5 0.6 0.3 0.3 0.3 0.4 0.4   ALK PHOS U/L 81 76 77 77 68 82 83   AST (SGOT) U/L 13 12 17 19 14 15 18   ALT (SGPT) U/L 19 20 27 28 27 40* 43*         BNP        TROPONIN        CoAg  Results from last 7 days   Lab Units 03/04/22 2053   INR  1.13*       Creatinine Clearance  Estimated Creatinine Clearance: 57.2 mL/min (by C-G formula based on SCr of 0.67 mg/dL).    ABG         Radiology  CT Chest Without Contrast Diagnostic    Result Date: 3/9/2022  1.No significant change in size of large left pleural fluid collection with hyperdense components, consistent with hemothorax. Tip of the left chest tube is anterior to the dependent portion of the fluid collection. 2.Increased, now complete left lower lobe atelectasis. 3.Increased size of small right pleural effusion.    Electronically Signed By-Jenn Ordonez MD On:3/9/2022 12:14 PM This report was finalized on [id number] by  Jenn Ordonez MD.    XR Chest 1 View    Result Date: 3/10/2022  No significant changes compared to the previous study. No pneumothorax. Redemonstration of a large left-sided pleural effusion with left basilar airspace disease.  Electronically Signed By-Rosa Lopez MD On:3/10/2022 7:24 AM This report was finalized on [id number] by  Rosa Lopez MD.              EKG                              I personally viewed and interpreted the patient's EKG/Telemetry data: Atrial fibrillation          ECHOCARDIOGRAM:    Results for orders placed during the hospital encounter of 03/02/22    Adult Transthoracic Echo Complete W/ Cont if Necessary Per Protocol    Interpretation Summary  LVE with severe global left ventricular hypokinesis, estimated LV ejection fraction of 30%.  Severe right ventricular enlargement and moderate right atrial enlargement seen  Severe left atrial enlargement seen.  Aortic valve, mitral valve, tricuspid valve appears structurally normal, moderate mitral and mild tricuspid regurgitation seen.  Calculated RV systolic pressure of 40 to 45 mmHg.  No pericardial effusion seen.  Large pleural effusion seen.  Proximal aorta appears normal in size.          STRESS MYOVIEW:    Cardiolite (Tc-99m Sestamibi) stress test    CARDIAC CATHETERIZATION:            OTHER:         Assessment/Plan     Principal Problem:    Atrial fibrillation with RVR (HCC)  Active Problems:    Other hyperlipidemia    Essential  hypertension    Hypothyroidism    Coronary artery disease involving coronary bypass graft of native heart without angina pectoris    Acute CHF (congestive heart failure) (HCC)    Acute hyponatremia    Elevated LFTs    Elevated TSH    Acute hyperglycemia    Obesity (BMI 30-39.9)      Presented through emergency room 3/2/2022 with progressively worsening shortness of breath and dyspnea on exertion weight gain and leg edema. Was found to be in A. fib and congestive heart failure. Patient denies any chest pain. Patient lives in [place] and has a second home in [place] and see his cardiologist at [facility]. Patient has been having issues with alopecia and memory issues therefore stopped beta-blockers and statin on her own. Also has not been taking  thyroid replacement therapy since November because she ran out of medication. Patient has history of prediabetes. Does not check her sugars at home.  Work-up here revealed troponin x3 are negative. proBNP is 7717. CMP reveals a glucose of 242, hemoglobin A1c over 7.1. BUN/creatinine are 21/0.83. ALT elevated at 71, AST 35. TSH is 11.15  Chest x-ray 3/2/2022 reveals left pleural effusion, left basilar disease most likely atelectasis with possible pneumonia.  EKG 3/2/2022 reviewed/interpreted by me reveals A. fib with RVR at 126 bpm with PVCs      ]]]]]]]]]]]]]]]]]]]]  Impression  ==========  -Progressively worsening shortness of breath and leg edema.    -Congestive heart failure  Left ventricle dysfunction with ejection fraction of 30%.    -Significant biatrial enlargement    -Atrial fibrillation with RVR    -Premature ventricular contractions    -Status post CABG 12/2014 (performed in [place])    -Hypertension dyslipidemia prediabetes hypothyroidism elevation    -Status post ORIF    -Family history of coronary artery disease    -Intolerance to prednisone    -Former smoker    -Medical noncompliance.  Was not taking thyroid medicine replacement  properly.     =================  Plan  ===========  Atrial fibrillation with RVR.  Rate control.  Consider GABRIEL cardioversion.  Patient would benefit from long-term anticoagulation for atrial fibrillation because of high [id number] score due to female gender age over 75, hypertension, diabetes, CAD making it at least 6. We will start her on Eliquis or warfarin before discharge.  Continued IV diuresis.  Observe renal function closely.  Elevated LFTs due to passive congestion.    Large left pleural effusion.  Status post drainage and chest tube.  More than 400 cc of fluid was removed.  Patient is on Cardizem and amiodarone.  Chest tube was flushed with TPA yesterday by thoracic surgery.  Patient has loculated left pleural effusion.  Another round of TPA infusion through the chest tube is being planned.    Renal dysfunction  BUN/creatinine-30/1.03    Echocardiogram showed severe right ventricle enlargement left atrial enlargement related enlargement and calculated pulmonary artery pressure of 45 mmHg.  Left ventricle dysfunction with ejection fraction of 30%.    Ischemic cardiomyopathy    Current medications include amiodarone aspirin Cardizem 90 mg every 8 hours subcu heparin levothyroxine metoprolol 25 mg twice a day.    Anticoagulation and possible cardioversion as an outpatient versus GABRIEL cardioversion to try to convert to sinus rhythm    Follow-up labs ordered.    Further plan will depend on patient's progress.  ]]]]]]]]]]]]]]]]]           Yordan Evans MD  03/11/22  06:52 EST

## 2022-03-11 NOTE — DISCHARGE PLACEMENT REQUEST
"Marcy Valderrama (82 y.o. Female)             Date of Birth   1940    Social Security Number       Address   [address] [place] [address] [address] [place] [address]    Home Phone   [phone]    MRN   [id number]       Catholic   Episcopalian    Marital Status                               Admission Date   3/2/22    Admission Type   Emergency    Admitting Provider   Mp Galicia MD    Attending Provider   Mp Galicia MD    Department, Room/Bed   [facility] NEURO HEART, 266/1       Discharge Date       Discharge Disposition       Discharge Destination                               Attending Provider: Mp Galicia MD    Allergies: Prednisone    Isolation: None   Infection: None   Code Status: CPR   Advance Care Planning Activity    Ht: 160 cm (63\")   Wt: 83 kg (182 lb 15.7 oz)    Admission Cmt: None   Principal Problem: Atrial fibrillation with RVR (HCC) [I48.91]                 Active Insurance as of 3/2/2022     Primary Coverage     Payor Plan Insurance Group Employer/Plan Group    MEDICARE MEDICARE A & B      Payor Plan Address Payor Plan Phone Number Payor Plan Fax Number Effective Dates    [address] [phone]  1/1/2007 - None Entered    [facility] [address]       Subscriber Name Subscriber Birth Date Member ID       MARCY VALDERRAMA 1940 [id number]           Secondary Coverage     Payor Plan Insurance Group Employer/Plan Group    [employer]P [employer]AR HE[employer] C[employer] OPTIONS      Payor Plan Address Payor Plan Phone Number Payor Plan Fax Number Effective Dates    [facility] [phone]  1/1/2019 - None Entered    [address]       [facility] [address]       Subscriber Name Subscriber Birth Date Member ID       MARCY VALDERRAMA 1940 [id number]                 Emergency Contacts      (Rel.) Home Phone Work Phone Mobile Phone    MILLIE VALDERRAMA -- -- [phone]              "

## 2022-03-11 NOTE — PLAN OF CARE
Patient remains pleasant and cooperative. Patient's chest tube remains intact. Chest tube is draining appropriately. See documentation for output throughout the shift. Patient has no complaints of pain at this time. Patient wearing 2 liters of oxygen throughout the night. She is on room air during the day. Patient states she is not able to use the bedside commode. Patient prefers to ambulate to the restroom as a assist x 1. Fall risk protocol in place; bed alarm on.    Problem: Adult Inpatient Plan of Care  Goal: Absence of Hospital-Acquired Illness or Injury  Intervention: Identify and Manage Fall Risk  Recent Flowsheet Documentation  Taken 3/11/2022 0348 by Yesi Griffith, RN  Safety Promotion/Fall Prevention:   activity supervised   assistive device/personal items within reach   clutter free environment maintained   fall prevention program maintained   lighting adjusted   nonskid shoes/slippers when out of bed   room organization consistent   safety round/check completed  Taken 3/11/2022 0200 by Yesi Griffith, RN  Safety Promotion/Fall Prevention:   activity supervised   assistive device/personal items within reach   clutter free environment maintained   fall prevention program maintained   lighting adjusted   nonskid shoes/slippers when out of bed   room organization consistent   safety round/check completed  Taken 3/10/2022 2355 by Yesi Griffith, RN  Safety Promotion/Fall Prevention:   activity supervised   assistive device/personal items within reach   clutter free environment maintained   fall prevention program maintained   lighting adjusted   nonskid shoes/slippers when out of bed   room organization consistent   safety round/check completed  Taken 3/10/2022 2200 by Yesi Griffith, RN  Safety Promotion/Fall Prevention:   activity supervised   assistive device/personal items within reach   clutter free environment maintained   fall prevention program maintained   lighting adjusted   nonskid shoes/slippers when out of  bed   room organization consistent   safety round/check completed  Taken 3/10/2022 2000 by Yesi Griffith RN  Safety Promotion/Fall Prevention:   activity supervised   assistive device/personal items within reach   clutter free environment maintained   fall prevention program maintained   lighting adjusted   nonskid shoes/slippers when out of bed   room organization consistent   safety round/check completed  Intervention: Prevent Skin Injury  Recent Flowsheet Documentation  Taken 3/11/2022 0348 by Yesi Griffith RN  Body Position: position changed independently  Skin Protection:   adhesive use limited   incontinence pads utilized   pulse oximeter probe site changed   transparent dressing maintained   tubing/devices free from skin contact  Taken 3/10/2022 2355 by Yesi Griffith RN  Body Position: position changed independently  Skin Protection:   adhesive use limited   incontinence pads utilized   pulse oximeter probe site changed   transparent dressing maintained   tubing/devices free from skin contact  Taken 3/10/2022 2000 by Yesi Griffith RN  Body Position: position changed independently  Skin Protection:   adhesive use limited   incontinence pads utilized   pulse oximeter probe site changed   transparent dressing maintained   tubing/devices free from skin contact  Intervention: Prevent and Manage VTE (venous thromboembolism) Risk  Recent Flowsheet Documentation  Taken 3/11/2022 0348 by Yesi Griffith RN  VTE Prevention/Management:   bilateral   sequential compression devices on  Taken 3/10/2022 2355 by Yesi Griffith RN  VTE Prevention/Management:   bilateral   sequential compression devices on  Taken 3/10/2022 2000 by Yesi Griffith RN  VTE Prevention/Management:   bilateral   sequential compression devices on  Goal: Optimal Comfort and Wellbeing  Intervention: Provide Person-Centered Care  Recent Flowsheet Documentation  Taken 3/10/2022 2355 by Yesi Griffith RN  Trust Relationship/Rapport:   care explained   choices  provided   emotional support provided   empathic listening provided   questions answered  Taken 3/10/2022 2000 by Yesi Griffith RN  Trust Relationship/Rapport:   care explained   choices provided   emotional support provided   empathic listening provided   questions answered     Problem: Arrhythmia/Dysrhythmia (Acute Coronary Syndrome)  Goal: Normalized Cardiac Rhythm  Intervention: Monitor and Manage Cardiac Rhythm Effects  Recent Flowsheet Documentation  Taken 3/11/2022 0348 by Yesi Griffith RN  VTE Prevention/Management:   bilateral   sequential compression devices on  Taken 3/10/2022 2355 by Yesi Griffith RN  VTE Prevention/Management:   bilateral   sequential compression devices on  Taken 3/10/2022 2000 by Yesi Griffith RN  VTE Prevention/Management:   bilateral   sequential compression devices on     Problem: Skin Injury Risk Increased  Goal: Skin Health and Integrity  Intervention: Optimize Skin Protection  Recent Flowsheet Documentation  Taken 3/11/2022 0348 by Yesi Griffith RN  Pressure Reduction Techniques:   frequent weight shift encouraged   weight shift assistance provided  Head of Bed (HOB) Positioning: HOB elevated  Pressure Reduction Devices: pressure-redistributing mattress utilized  Skin Protection:   adhesive use limited   incontinence pads utilized   pulse oximeter probe site changed   transparent dressing maintained   tubing/devices free from skin contact  Taken 3/10/2022 2355 by Yesi Griffith RN  Pressure Reduction Techniques:   frequent weight shift encouraged   weight shift assistance provided  Head of Bed (HOB) Positioning: HOB elevated  Pressure Reduction Devices: pressure-redistributing mattress utilized  Skin Protection:   adhesive use limited   incontinence pads utilized   pulse oximeter probe site changed   transparent dressing maintained   tubing/devices free from skin contact  Taken 3/10/2022 2000 by Yesi Griffith RN  Pressure Reduction Techniques:   frequent weight shift encouraged   weight  shift assistance provided  Head of Bed (HOB) Positioning: HOB elevated  Pressure Reduction Devices: pressure-redistributing mattress utilized  Skin Protection:   adhesive use limited   incontinence pads utilized   pulse oximeter probe site changed   transparent dressing maintained   tubing/devices free from skin contact     Problem: Fall Injury Risk  Goal: Absence of Fall and Fall-Related Injury  Intervention: Identify and Manage Contributors  Recent Flowsheet Documentation  Taken 3/11/2022 0348 by Yesi Griffith RN  Medication Review/Management: medications reviewed  Self-Care Promotion: independence encouraged  Taken 3/11/2022 0200 by Yeis Griffith RN  Medication Review/Management: medications reviewed  Taken 3/10/2022 2355 by Yesi Griffith RN  Medication Review/Management: medications reviewed  Taken 3/10/2022 2200 by Yesi Griffith RN  Medication Review/Management: medications reviewed  Taken 3/10/2022 2000 by Yesi Griffith RN  Medication Review/Management: medications reviewed  Self-Care Promotion: independence encouraged  Intervention: Promote Injury-Free Environment  Recent Flowsheet Documentation  Taken 3/11/2022 0348 by Yesi Griffith RN  Safety Promotion/Fall Prevention:   activity supervised   assistive device/personal items within reach   clutter free environment maintained   fall prevention program maintained   lighting adjusted   nonskid shoes/slippers when out of bed   room organization consistent   safety round/check completed  Taken 3/11/2022 0200 by Yesi Griffith RN  Safety Promotion/Fall Prevention:   activity supervised   assistive device/personal items within reach   clutter free environment maintained   fall prevention program maintained   lighting adjusted   nonskid shoes/slippers when out of bed   room organization consistent   safety round/check completed  Taken 3/10/2022 2355 by Yesi Griffith RN  Safety Promotion/Fall Prevention:   activity supervised   assistive device/personal items within reach    clutter free environment maintained   fall prevention program maintained   lighting adjusted   nonskid shoes/slippers when out of bed   room organization consistent   safety round/check completed  Taken 3/10/2022 2200 by Yesi Grfifith RN  Safety Promotion/Fall Prevention:   activity supervised   assistive device/personal items within reach   clutter free environment maintained   fall prevention program maintained   lighting adjusted   nonskid shoes/slippers when out of bed   room organization consistent   safety round/check completed  Taken 3/10/2022 2000 by Yesi Griffith RN  Safety Promotion/Fall Prevention:   activity supervised   assistive device/personal items within reach   clutter free environment maintained   fall prevention program maintained   lighting adjusted   nonskid shoes/slippers when out of bed   room organization consistent   safety round/check completed  Goal: Absence of Fall and Fall-Related Injury  Intervention: Identify and Manage Contributors  Recent Flowsheet Documentation  Taken 3/11/2022 0348 by Yesi Griffith RN  Medication Review/Management: medications reviewed  Self-Care Promotion: independence encouraged  Taken 3/11/2022 0200 by Yesi Griffith RN  Medication Review/Management: medications reviewed  Taken 3/10/2022 2355 by Yesi Griffith RN  Medication Review/Management: medications reviewed  Taken 3/10/2022 2200 by Yesi Griffith RN  Medication Review/Management: medications reviewed  Taken 3/10/2022 2000 by Yesi Griffith RN  Medication Review/Management: medications reviewed  Self-Care Promotion: independence encouraged  Intervention: Promote Injury-Free Environment  Recent Flowsheet Documentation  Taken 3/11/2022 0348 by Yesi Griffith RN  Safety Promotion/Fall Prevention:   activity supervised   assistive device/personal items within reach   clutter free environment maintained   fall prevention program maintained   lighting adjusted   nonskid shoes/slippers when out of bed   room organization  consistent   safety round/check completed  Taken 3/11/2022 0200 by Yesi Griffith RN  Safety Promotion/Fall Prevention:   activity supervised   assistive device/personal items within reach   clutter free environment maintained   fall prevention program maintained   lighting adjusted   nonskid shoes/slippers when out of bed   room organization consistent   safety round/check completed  Taken 3/10/2022 2355 by Yesi Griffith RN  Safety Promotion/Fall Prevention:   activity supervised   assistive device/personal items within reach   clutter free environment maintained   fall prevention program maintained   lighting adjusted   nonskid shoes/slippers when out of bed   room organization consistent   safety round/check completed  Taken 3/10/2022 2200 by Yesi Griffith RN  Safety Promotion/Fall Prevention:   activity supervised   assistive device/personal items within reach   clutter free environment maintained   fall prevention program maintained   lighting adjusted   nonskid shoes/slippers when out of bed   room organization consistent   safety round/check completed  Taken 3/10/2022 2000 by Yesi Griffith RN  Safety Promotion/Fall Prevention:   activity supervised   assistive device/personal items within reach   clutter free environment maintained   fall prevention program maintained   lighting adjusted   nonskid shoes/slippers when out of bed   room organization consistent   safety round/check completed     Problem: Fall Injury Risk  Goal: Absence of Fall and Fall-Related Injury  Intervention: Identify and Manage Contributors  Recent Flowsheet Documentation  Taken 3/11/2022 0348 by Yesi Griffith RN  Medication Review/Management: medications reviewed  Self-Care Promotion: independence encouraged  Taken 3/11/2022 0200 by Yesi Griffith RN  Medication Review/Management: medications reviewed  Taken 3/10/2022 2355 by Yesi Griffith, RN  Medication Review/Management: medications reviewed  Taken 3/10/2022 2200 by Yesi Griffith RN  Medication  Review/Management: medications reviewed  Taken 3/10/2022 2000 by Yesi Griffith RN  Medication Review/Management: medications reviewed  Self-Care Promotion: independence encouraged  Intervention: Promote Injury-Free Environment  Recent Flowsheet Documentation  Taken 3/11/2022 0348 by Yesi Griffith RN  Safety Promotion/Fall Prevention:   activity supervised   assistive device/personal items within reach   clutter free environment maintained   fall prevention program maintained   lighting adjusted   nonskid shoes/slippers when out of bed   room organization consistent   safety round/check completed  Taken 3/11/2022 0200 by Yesi Griffith RN  Safety Promotion/Fall Prevention:   activity supervised   assistive device/personal items within reach   clutter free environment maintained   fall prevention program maintained   lighting adjusted   nonskid shoes/slippers when out of bed   room organization consistent   safety round/check completed  Taken 3/10/2022 2355 by Yesi Griffith RN  Safety Promotion/Fall Prevention:   activity supervised   assistive device/personal items within reach   clutter free environment maintained   fall prevention program maintained   lighting adjusted   nonskid shoes/slippers when out of bed   room organization consistent   safety round/check completed  Taken 3/10/2022 2200 by Yesi Griffith RN  Safety Promotion/Fall Prevention:   activity supervised   assistive device/personal items within reach   clutter free environment maintained   fall prevention program maintained   lighting adjusted   nonskid shoes/slippers when out of bed   room organization consistent   safety round/check completed  Taken 3/10/2022 2000 by Yesi Griffith RN  Safety Promotion/Fall Prevention:   activity supervised   assistive device/personal items within reach   clutter free environment maintained   fall prevention program maintained   lighting adjusted   nonskid shoes/slippers when out of bed   room organization consistent    safety round/check completed   Goal Outcome Evaluation:

## 2022-03-11 NOTE — PROGRESS NOTES
[facility] Daily Progress Note    Patient Name: Jaquelin Valderrama  : 1940  MRN: [id number]  Primary Care Physician:  Minerva Chatterjee MD  Date of admission: 3/2/2022      Subjective      Chief Complaint: Atrial fibrillation with RVR pleural effusion        Patient Reports states she is doing just fine.   She slept well. no Family at bedside.  Good chest tube output after TPA last 2 days.  No TPA given today     ROS negative except as above    Objective      Vitals:   Temp:  [97.1 °F (36.2 °C)-98.5 °F (36.9 °C)] 97.9 °F (36.6 °C)  Heart Rate:  [58-79] 67  Resp:  [18-27] 24  BP: (131-175)/(58-84) 163/81  Flow (L/min):  [2] 2    Vitals reviewed.   Constitutional:       Comments: Left chest tube in place  and daughter at bedside  Patient is on room air but seems somewhat labored  HENT:      Head: Normocephalic.      Nose: Nose normal.      Mouth/Throat:      Mouth: Mucous membranes are moist.   Eyes:      Pupils: Pupils are equal, round, and reactive to light.   Cardiovascular:      Rate and Rhythm: Normal rate and regular rhythm.      Pulses: Normal pulses.      Heart sounds: Normal heart sounds.   Pulmonary:      Effort: Pulmonary effort is normal.      Comments: Left chest tube catheter in place  Left side decreased breath sounds  Abdominal:      General: Abdomen is flat.      Palpations: Abdomen is soft.   Musculoskeletal:         General: Normal range of motion.      Cervical back: Normal range of motion.   Skin:     General: Skin is warm.      Capillary Refill: Capillary refill takes less than 2 seconds.   Neurological:      General: No focal deficit present.      Mental Status: She is alert and oriented to person, place, and time.   Psychiatric:         Mood and Affect: Mood normal.         Behavior: Behavior normal.                        Result Review    Result Review:  I have personally reviewed the results from the time of this admission to 3/11/2022 14:40  EST and agree with these findings:  [x]  Laboratory  []  Microbiology  []  Radiology  []  EKG/Telemetry   []  Cardiology/Vascular   []  Pathology  []  Old records  []  Other:  Most notable findings include: Labs are stable today    Wounds (last 24 hours)                Assessment/Plan       Brief Patient Summary:  Jaquelin Valderrama is a 82 y.o. female who presented with A. fib RVR and pleural effusion status post chest tube        amiodarone, 200 mg, Oral, Q8H   Followed by  [START ON 3/13/2022] amiodarone, 200 mg, Oral, Q12H   Followed by  [START ON 3/27/2022] amiodarone, 200 mg, Oral, Daily  arformoterol, 15 mcg, Nebulization, BID - RT  aspirin, 81 mg, Oral, Daily  budesonide, 1 mg, Nebulization, BID - RT  cefepime, 2 g, Intravenous, Q12H  dilTIAZem, 90 mg, Oral, Q8H  heparin (porcine), 5,000 Units, Subcutaneous, Q12H  HYDROmorphone, 2 mg, Intravenous, Once  insulin glargine, 10 Units, Subcutaneous, Nightly  insulin lispro, 0-14 Units, Subcutaneous, TID AC  levothyroxine, 50 mcg, Oral, Q AM  melatonin, 5 mg, Oral, Nightly  metoprolol tartrate, 25 mg, Oral, BID  multivitamin with minerals, 1 tablet, Oral, Daily  senna-docusate sodium, 2 tablet, Oral, BID  sodium chloride, 10 mL, Intravenous, Q12H        hold, 1 each           Active Hospital Problems:          Active Hospital Problems     Diagnosis     • **Atrial fibrillation with RVR (HCC)     • Acute CHF (congestive heart failure) (HCC)     • Acute hyponatremia     • Elevated LFTs     • Elevated TSH     • Acute hyperglycemia     • Obesity (BMI 30-39.9)     • Coronary artery disease involving coronary bypass graft of native heart without angina pectoris     • Essential hypertension     • Hypothyroidism     • Other hyperlipidemia           ASSESSMENT:  Possible hemothorax  Acute respiratory distress  COPD exacerbation  Atrial fibrillation with RVR (HCC), now resolved    Other hyperlipidemia    Essential hypertension    Hypothyroidism    Coronary artery disease  involving coronary bypass graft of native heart without angina pectoris    Acute CHF (congestive heart failure) (HCC) EFG 45%    Acute hyponatremia    Elevated LFTs    Elevated TSH    Acute hyperglycemia    Obesity (BMI 30-39.9)   ARF     PLAN:  Hemoglobin stable back on aspirin and heparin  Blood transfusion as needed  Chest tube management  per pulmonary.  Cardiovascular surgery consulted.  TPA x2 given output improving  Chest x-ray March 12 AM pending  Encouraged to use I-S flutter valve  Heart rate control  Continue to monitor blood pressure  Bronchodilator  Inhaled corticosteroids  Electrolytes/ glycemic control  heparin sq prophylaxis   Insomnia Ambien ordered patient slept much better  Discussed with  daughter at bedside March 10         DVT prophylaxis:  Medical DVT prophylaxis orders are present.     CODE STATUS:    Code Status (Patient has no pulse and is not breathing): CPR (Attempt to Resuscitate)  Medical Interventions (Patient has pulse or is breathing): Full Support        Disposition:  I expect patient to be discharged once stable.     This patient has been examined wearing appropriate Personal Protective Equipment and discussed with  patient and nursing staff.  03/11/22      Electronically signed by Mp Galicia MD, 03/11/22, 14:40 EST.  [facility] Hospitalist Team

## 2022-03-11 NOTE — PLAN OF CARE
Problem: Fall Injury Risk  Goal: Absence of Fall and Fall-Related Injury  Outcome: Ongoing, Progressing  Intervention: Identify and Manage Contributors  Recent Flowsheet Documentation  Taken 3/11/2022 1200 by Chica Mast LPN  Medication Review/Management: medications reviewed  Taken 3/11/2022 1000 by Chica Mast LPN  Medication Review/Management: medications reviewed  Taken 3/11/2022 0800 by Chica Mast LPN  Medication Review/Management: medications reviewed  Intervention: Promote Injury-Free Environment  Recent Flowsheet Documentation  Taken 3/11/2022 1200 by Chica Mast LPN  Safety Promotion/Fall Prevention:   activity supervised   assistive device/personal items within reach   clutter free environment maintained   gait belt   fall prevention program maintained   nonskid shoes/slippers when out of bed   safety round/check completed  Taken 3/11/2022 1000 by Chica Mast LPN  Safety Promotion/Fall Prevention:   activity supervised   assistive device/personal items within reach   clutter free environment maintained   fall prevention program maintained   gait belt   nonskid shoes/slippers when out of bed   safety round/check completed  Taken 3/11/2022 0800 by Chica Mast LPN  Safety Promotion/Fall Prevention:   activity supervised   assistive device/personal items within reach   clutter free environment maintained   fall prevention program maintained   gait belt   nonskid shoes/slippers when out of bed   safety round/check completed   Goal Outcome Evaluation:

## 2022-03-11 NOTE — CASE MANAGEMENT/SOCIAL WORK
Continued Stay Note  [facility]     Patient Name: Jaquelin Valderrama  MRN: [id number]  Today's Date: 3/11/2022    Admit Date: 3/2/2022     Discharge Plan     Row Name 03/11/22 1345       Plan    Plan referral to [facility] pending acceptance no precert or PASRR required    Provided Post Acute Provider List? Yes    Post Acute Provider List Inpatient Rehab;Nursing Home    Provided Post Acute Provider Quality & Resource List? Yes    Post Acute Provider Quality and Resource List Inpatient Rehab;Nursing Home    Delivered To Patient    Method of Delivery In person    Patient/Family in Agreement with Plan yes    Plan Comments obtained choices for [facility] rehab from patient 1. [facility] referral sent liafaraz Philippe notified.  2. [facility]              Met with patient at bedside wearing mask and goggles, Spent less than 15 minutes in room at greater than 6 feet distance.         Elina Barrios RN

## 2022-03-12 ENCOUNTER — APPOINTMENT (OUTPATIENT)
Dept: GENERAL RADIOLOGY | Facility: HOSPITAL | Age: 82
End: 2022-03-12

## 2022-03-12 LAB
ALBUMIN SERPL-MCNC: 2.8 G/DL (ref 3.5–5.2)
ALBUMIN/GLOB SERPL: 1 G/DL
ALP SERPL-CCNC: 89 U/L (ref 39–117)
ALT SERPL W P-5'-P-CCNC: 30 U/L (ref 1–33)
ANION GAP SERPL CALCULATED.3IONS-SCNC: 12 MMOL/L (ref 5–15)
AST SERPL-CCNC: 28 U/L (ref 1–32)
BASOPHILS # BLD AUTO: 0 10*3/MM3 (ref 0–0.2)
BASOPHILS NFR BLD AUTO: 0.1 % (ref 0–1.5)
BILIRUB SERPL-MCNC: 0.5 MG/DL (ref 0–1.2)
BUN SERPL-MCNC: 17 MG/DL (ref 8–23)
BUN/CREAT SERPL: 22.4 (ref 7–25)
CA-I SERPL ISE-MCNC: 1.16 MMOL/L (ref 1.2–1.3)
CALCIUM SPEC-SCNC: 8.2 MG/DL (ref 8.6–10.5)
CHLORIDE SERPL-SCNC: 94 MMOL/L (ref 98–107)
CO2 SERPL-SCNC: 22 MMOL/L (ref 22–29)
CREAT SERPL-MCNC: 0.76 MG/DL (ref 0.57–1)
DEPRECATED RDW RBC AUTO: 52.5 FL (ref 37–54)
EGFRCR SERPLBLD CKD-EPI 2021: 79.3 ML/MIN/1.73
EOSINOPHIL # BLD AUTO: 0.1 10*3/MM3 (ref 0–0.4)
EOSINOPHIL NFR BLD AUTO: 0.6 % (ref 0.3–6.2)
ERYTHROCYTE [DISTWIDTH] IN BLOOD BY AUTOMATED COUNT: 15.6 % (ref 12.3–15.4)
GLOBULIN UR ELPH-MCNC: 2.7 GM/DL
GLUCOSE BLDC GLUCOMTR-MCNC: 100 MG/DL (ref 70–105)
GLUCOSE BLDC GLUCOMTR-MCNC: 154 MG/DL (ref 70–105)
GLUCOSE BLDC GLUCOMTR-MCNC: 154 MG/DL (ref 70–105)
GLUCOSE BLDC GLUCOMTR-MCNC: 211 MG/DL (ref 70–105)
GLUCOSE SERPL-MCNC: 154 MG/DL (ref 65–99)
HCT VFR BLD AUTO: 27.2 % (ref 34–46.6)
HGB BLD-MCNC: 9.1 G/DL (ref 12–15.9)
LYMPHOCYTES # BLD AUTO: 0.9 10*3/MM3 (ref 0.7–3.1)
LYMPHOCYTES NFR BLD AUTO: 7 % (ref 19.6–45.3)
MAGNESIUM SERPL-MCNC: 2 MG/DL (ref 1.6–2.4)
MCH RBC QN AUTO: 31.6 PG (ref 26.6–33)
MCHC RBC AUTO-ENTMCNC: 33.4 G/DL (ref 31.5–35.7)
MCV RBC AUTO: 94.7 FL (ref 79–97)
MONOCYTES # BLD AUTO: 1.3 10*3/MM3 (ref 0.1–0.9)
MONOCYTES NFR BLD AUTO: 9.9 % (ref 5–12)
NEUTROPHILS NFR BLD AUTO: 10.7 10*3/MM3 (ref 1.7–7)
NEUTROPHILS NFR BLD AUTO: 82.4 % (ref 42.7–76)
NRBC BLD AUTO-RTO: 0.2 /100 WBC (ref 0–0.2)
PHOSPHATE SERPL-MCNC: 2.5 MG/DL (ref 2.5–4.5)
PLATELET # BLD AUTO: 383 10*3/MM3 (ref 140–450)
PMV BLD AUTO: 7.6 FL (ref 6–12)
POTASSIUM SERPL-SCNC: 4 MMOL/L (ref 3.5–5.2)
PROT SERPL-MCNC: 5.5 G/DL (ref 6–8.5)
RBC # BLD AUTO: 2.87 10*6/MM3 (ref 3.77–5.28)
SODIUM SERPL-SCNC: 128 MMOL/L (ref 136–145)
WBC NRBC COR # BLD: 13 10*3/MM3 (ref 3.4–10.8)

## 2022-03-12 PROCEDURE — 94761 N-INVAS EAR/PLS OXIMETRY MLT: CPT

## 2022-03-12 PROCEDURE — 80053 COMPREHEN METABOLIC PANEL: CPT | Performed by: STUDENT IN AN ORGANIZED HEALTH CARE EDUCATION/TRAINING PROGRAM

## 2022-03-12 PROCEDURE — 3E0L3GC INTRODUCTION OF OTHER THERAPEUTIC SUBSTANCE INTO PLEURAL CAVITY, PERCUTANEOUS APPROACH: ICD-10-PCS | Performed by: INTERNAL MEDICINE

## 2022-03-12 PROCEDURE — 25010000002 HYDROMORPHONE PER 4 MG: Performed by: INTERNAL MEDICINE

## 2022-03-12 PROCEDURE — 84100 ASSAY OF PHOSPHORUS: CPT | Performed by: STUDENT IN AN ORGANIZED HEALTH CARE EDUCATION/TRAINING PROGRAM

## 2022-03-12 PROCEDURE — 71045 X-RAY EXAM CHEST 1 VIEW: CPT

## 2022-03-12 PROCEDURE — 63710000001 INSULIN GLARGINE PER 5 UNITS: Performed by: INTERNAL MEDICINE

## 2022-03-12 PROCEDURE — 85025 COMPLETE CBC W/AUTO DIFF WBC: CPT | Performed by: INTERNAL MEDICINE

## 2022-03-12 PROCEDURE — 99233 SBSQ HOSP IP/OBS HIGH 50: CPT | Performed by: INTERNAL MEDICINE

## 2022-03-12 PROCEDURE — 94799 UNLISTED PULMONARY SVC/PX: CPT

## 2022-03-12 PROCEDURE — 32562 LYSE CHEST FIBRIN SUBQ DAY: CPT | Performed by: NURSE PRACTITIONER

## 2022-03-12 PROCEDURE — 97535 SELF CARE MNGMENT TRAINING: CPT

## 2022-03-12 PROCEDURE — 97166 OT EVAL MOD COMPLEX 45 MIN: CPT

## 2022-03-12 PROCEDURE — 25010000002 HEPARIN (PORCINE) PER 1000 UNITS: Performed by: INTERNAL MEDICINE

## 2022-03-12 PROCEDURE — 82962 GLUCOSE BLOOD TEST: CPT

## 2022-03-12 PROCEDURE — 99232 SBSQ HOSP IP/OBS MODERATE 35: CPT | Performed by: NURSE PRACTITIONER

## 2022-03-12 PROCEDURE — 25010000002 CEFEPIME PER 500 MG: Performed by: INTERNAL MEDICINE

## 2022-03-12 PROCEDURE — 63710000001 INSULIN LISPRO (HUMAN) PER 5 UNITS

## 2022-03-12 PROCEDURE — 97110 THERAPEUTIC EXERCISES: CPT

## 2022-03-12 PROCEDURE — 82330 ASSAY OF CALCIUM: CPT | Performed by: INTERNAL MEDICINE

## 2022-03-12 PROCEDURE — 97116 GAIT TRAINING THERAPY: CPT

## 2022-03-12 PROCEDURE — 83735 ASSAY OF MAGNESIUM: CPT | Performed by: INTERNAL MEDICINE

## 2022-03-12 RX ADMIN — AMIODARONE HYDROCHLORIDE 200 MG: 200 TABLET ORAL at 06:09

## 2022-03-12 RX ADMIN — ASPIRIN 81 MG CHEWABLE TABLET 81 MG: 81 TABLET CHEWABLE at 08:12

## 2022-03-12 RX ADMIN — Medication 5 MG: at 21:36

## 2022-03-12 RX ADMIN — LEVOTHYROXINE SODIUM 50 MCG: 0.05 TABLET ORAL at 06:08

## 2022-03-12 RX ADMIN — INSULIN GLARGINE 10 UNITS: 100 INJECTION, SOLUTION SUBCUTANEOUS at 21:34

## 2022-03-12 RX ADMIN — SENNOSIDES AND DOCUSATE SODIUM 2 TABLET: 50; 8.6 TABLET ORAL at 21:35

## 2022-03-12 RX ADMIN — Medication 10 ML: at 21:41

## 2022-03-12 RX ADMIN — DILTIAZEM HYDROCHLORIDE 90 MG: 30 TABLET, FILM COATED ORAL at 21:37

## 2022-03-12 RX ADMIN — INSULIN LISPRO 3 UNITS: 100 INJECTION, SOLUTION INTRAVENOUS; SUBCUTANEOUS at 08:12

## 2022-03-12 RX ADMIN — BUDESONIDE 1 MG: 0.5 INHALANT RESPIRATORY (INHALATION) at 19:53

## 2022-03-12 RX ADMIN — HEPARIN SODIUM 5000 UNITS: 5000 INJECTION INTRAVENOUS; SUBCUTANEOUS at 08:12

## 2022-03-12 RX ADMIN — SENNOSIDES AND DOCUSATE SODIUM 2 TABLET: 50; 8.6 TABLET ORAL at 08:12

## 2022-03-12 RX ADMIN — Medication 1 TABLET: at 08:12

## 2022-03-12 RX ADMIN — HYDROMORPHONE HYDROCHLORIDE 2 MG: 2 INJECTION, SOLUTION INTRAMUSCULAR; INTRAVENOUS; SUBCUTANEOUS at 21:37

## 2022-03-12 RX ADMIN — AMIODARONE HYDROCHLORIDE 200 MG: 200 TABLET ORAL at 13:18

## 2022-03-12 RX ADMIN — ARFORMOTEROL TARTRATE 15 MCG: 15 SOLUTION RESPIRATORY (INHALATION) at 06:38

## 2022-03-12 RX ADMIN — CEFEPIME HYDROCHLORIDE 2 G: 2 INJECTION, POWDER, FOR SOLUTION INTRAMUSCULAR; INTRAVENOUS at 02:34

## 2022-03-12 RX ADMIN — CEFEPIME HYDROCHLORIDE 2 G: 2 INJECTION, POWDER, FOR SOLUTION INTRAMUSCULAR; INTRAVENOUS at 13:18

## 2022-03-12 RX ADMIN — Medication 10 ML: at 08:13

## 2022-03-12 RX ADMIN — METOPROLOL TARTRATE 25 MG: 25 TABLET, FILM COATED ORAL at 21:35

## 2022-03-12 RX ADMIN — ARFORMOTEROL TARTRATE 15 MCG: 15 SOLUTION RESPIRATORY (INHALATION) at 19:54

## 2022-03-12 RX ADMIN — HEPARIN SODIUM 5000 UNITS: 5000 INJECTION INTRAVENOUS; SUBCUTANEOUS at 21:34

## 2022-03-12 RX ADMIN — BUDESONIDE 1 MG: 0.5 INHALANT RESPIRATORY (INHALATION) at 06:34

## 2022-03-12 RX ADMIN — AMIODARONE HYDROCHLORIDE 200 MG: 200 TABLET ORAL at 21:35

## 2022-03-12 RX ADMIN — METOPROLOL TARTRATE 25 MG: 25 TABLET, FILM COATED ORAL at 08:12

## 2022-03-12 RX ADMIN — ZOLPIDEM TARTRATE 5 MG: 5 TABLET ORAL at 21:36

## 2022-03-12 RX ADMIN — DILTIAZEM HYDROCHLORIDE 90 MG: 30 TABLET, FILM COATED ORAL at 13:18

## 2022-03-12 RX ADMIN — DILTIAZEM HYDROCHLORIDE 90 MG: 30 TABLET, FILM COATED ORAL at 06:07

## 2022-03-12 NOTE — PLAN OF CARE
Problem: Fall Injury Risk  Goal: Absence of Fall and Fall-Related Injury  Outcome: Ongoing, Progressing  Intervention: Identify and Manage Contributors  Recent Flowsheet Documentation  Taken 3/12/2022 1414 by Jeniffer Beckford LPN  Medication Review/Management: medications reviewed  Taken 3/12/2022 1212 by Jeniffer Beckford LPN  Medication Review/Management: medications reviewed  Taken 3/12/2022 1015 by Jeniffer Beckford LPN  Medication Review/Management: medications reviewed  Taken 3/12/2022 0800 by Jeniffer Beckford LPN  Medication Review/Management: medications reviewed  Intervention: Promote Injury-Free Environment  Recent Flowsheet Documentation  Taken 3/12/2022 1414 by Jeniffer Beckford LPN  Safety Promotion/Fall Prevention:   activity supervised   assistive device/personal items within reach   clutter free environment maintained   fall prevention program maintained   gait belt   nonskid shoes/slippers when out of bed   room organization consistent   safety round/check completed  Taken 3/12/2022 1212 by Jeniffer Beckford LPN  Safety Promotion/Fall Prevention:   activity supervised   assistive device/personal items within reach   clutter free environment maintained   fall prevention program maintained   gait belt   nonskid shoes/slippers when out of bed   safety round/check completed   room organization consistent  Taken 3/12/2022 1015 by Jeniffer Beckford LPN  Safety Promotion/Fall Prevention:   activity supervised   assistive device/personal items within reach   clutter free environment maintained   fall prevention program maintained   gait belt   nonskid shoes/slippers when out of bed   room organization consistent   safety round/check completed  Taken 3/12/2022 0800 by Jeniffer Beckford LPN  Safety Promotion/Fall Prevention:   activity supervised   assistive device/personal items within reach   clutter free environment maintained   fall prevention program maintained   gait belt   nonskid shoes/slippers when out  of bed   room organization consistent   safety round/check completed     Problem: Breathing Pattern Ineffective  Goal: Effective Breathing Pattern  Outcome: Ongoing, Progressing  Intervention: Promote Improved Breathing Pattern  Recent Flowsheet Documentation  Taken 3/12/2022 1414 by Jeniffer Beckford LPN  Head of Bed (HOB) Positioning: HOB at 30 degrees  Taken 3/12/2022 1212 by Jeniffer Beckford LPN  Supportive Measures: active listening utilized  Head of Bed (HOB) Positioning: HOB elevated  Airway/Ventilation Management: airway patency maintained  Taken 3/12/2022 1015 by Jeniffer Beckford LPN  Head of Bed (HOB) Positioning: HOB elevated  Taken 3/12/2022 0800 by Jeniffer Beckford LPN  Supportive Measures: active listening utilized  Head of Bed (HOB) Positioning: HOB at 20 degrees  Airway/Ventilation Management: airway patency maintained     Problem: Dysrhythmia  Goal: Normalized Cardiac Rhythm  Outcome: Ongoing, Progressing  Intervention: Monitor and Manage Cardiac Rhythm Effect  Recent Flowsheet Documentation  Taken 3/12/2022 1212 by Jeniffer Beckford LPN  VTE Prevention/Management:   bilateral   sequential compression devices on  Taken 3/12/2022 0800 by Jeniffer Beckford LPN  VTE Prevention/Management:   bilateral   sequential compression devices on   Goal Outcome Evaluation:      Pt continues to be alert and oriented; Pt continues with chest tube to left side;  TPA administered this shift through chest tube with no complications noted; Dressing to area cleansed and changed via nurse; Pt  remains at bedside all shift with no complaints noted; Pt denies any pain or discomfort to chest tube site; call  light in reach; will cont to monitor.

## 2022-03-12 NOTE — PROGRESS NOTES
Daily Progress Note        Atrial fibrillation with RVR (HCC)    Other hyperlipidemia    Essential hypertension    Hypothyroidism    Coronary artery disease involving coronary bypass graft of native heart without angina pectoris    Acute CHF (congestive heart failure) (HCC)    Acute hyponatremia    Elevated LFTs    Elevated TSH    Acute hyperglycemia    Obesity (BMI 30-39.9)      Assessment  Left hemothorax status post chest tube placement 3/5/2022  Acute respiratory distress  COPD exacerbation  Atrial fibrillation with RVR (HCC)    Other hyperlipidemia    Essential hypertension    Hypothyroidism    Coronary artery disease involving coronary bypass graft of native heart without angina pectoris    Acute CHF    Acute hyponatremia    Elevated LFTs    Elevated TSH    Acute hyperglycemia    Obesity (BMI 30-39.9)   ARF  Former smoker-quit 1980    Echo 3/3/2022  -EF 30%  -Severe right ventricular enlargement and moderate right atrial enlargement  -Severe left atrial enlargement  -RVSP 41.4    Plan  Thoracic surgery administered TPA through chest tube on 3/9/2022 and 3/10/2022  Chest x-ray shows mild improvement    Chest tube management: Continue -20 cmH2O suction  -Daily CXR    Continue to titrate oxygen- Currently on 2 L NC  Cefepime for PNA-finishes 3/13/2022  Inhaled corticosteroids  Bronchodilators  Electrolyte/Glycemic control  DVT Prophylaxis-Heparin SQ  Levothyroxine 50 MCG  Oral amiodarone       LOS: 9 days     Subjective   Shortness of breath      Objective     Vital signs for last 24 hours:  Vitals:    03/12/22 0538 03/12/22 0634 03/12/22 0638 03/12/22 0812   BP: 165/76   133/71   BP Location: Left arm      Patient Position: Lying      Pulse: 83 81 81 75   Resp: 25 21 21    Temp: 98.8 °F (37.1 °C)      TempSrc: Oral      SpO2: 96% 96%     Weight:       Height:           Intake/Output last 3 shifts:  I/O last 3 completed shifts:  In: 560 [P.O.:560]  Out: 30 [Chest Tube:30]  Intake/Output this shift:  No  intake/output data recorded.      Radiology  Imaging Results (Last 24 Hours)     Procedure Component Value Units Date/Time    XR Chest 1 View [[id number]] Collected: 03/12/22 0715     Updated: 03/12/22 0720    Narrative:         DATE OF EXAM:   3/12/2022 6:01 AM     PROCEDURE:   XR CHEST 1 VW-     INDICATIONS:   Chest tube; I48.91-Unspecified atrial fibrillation; I50.9-Heart failure,  unspecified; Z20.822-Contact with and (suspected) exposure to covid-19;  I50.21-Acute systolic (congestive) heart failure     COMPARISON:  03/11/2022 and prior     TECHNIQUE:   Portable Chest     FINDINGS:      Study limited by overlying support and monitoring apparatus     Patient status post median sternotomy. A large left-sided pleural  effusion with a loculated component demonstrates slight interval  increase from the comparison accounting for differences in technique.  Associated mild dependent opacity on the left noted. Chest tube remains  unchanged in position.     The right lung remains grossly clear. Osseous structures demonstrate no  acute abnormality.        Impression:      IMPRESSION :   Slight interval increase in large left-sided pleural effusion with  associated dependent opacity.     Left-sided chest tube remains in place.[     Electronically Signed By-Jarrett De Jesus On:3/12/2022 7:17 AM  This report was finalized on [id number] by  Jarrett De Jesus, .          Labs:  Results from last 7 days   Lab Units 03/12/22  0020   WBC 10*3/mm3 13.00*   HEMOGLOBIN g/dL 9.1*   HEMATOCRIT % 27.2*   PLATELETS 10*3/mm3 383     Results from last 7 days   Lab Units 03/12/22  0020   SODIUM mmol/L 128*   POTASSIUM mmol/L 4.0   CHLORIDE mmol/L 94*   CO2 mmol/L 22.0   BUN mg/dL 17   CREATININE mg/dL 0.76   CALCIUM mg/dL 8.2*   BILIRUBIN mg/dL 0.5   ALK PHOS U/L 89   ALT (SGPT) U/L 30   AST (SGOT) U/L 28   GLUCOSE mg/dL 154*         Results from last 7 days   Lab Units 03/12/22  0020 03/11/22  1206 03/10/22  0009   ALBUMIN g/dL 2.80*  2.60* 2.80*             Results from last 7 days   Lab Units 03/12/22  0020   MAGNESIUM mg/dL 2.0         Results from last 7 days   Lab Units 03/09/22  0651   TSH uIU/mL 6.020*           Meds:   SCHEDULE  amiodarone, 200 mg, Oral, Q8H   Followed by  [START ON 3/13/2022] amiodarone, 200 mg, Oral, Q12H   Followed by  [START ON 3/27/2022] amiodarone, 200 mg, Oral, Daily  arformoterol, 15 mcg, Nebulization, BID - RT  aspirin, 81 mg, Oral, Daily  budesonide, 1 mg, Nebulization, BID - RT  cefepime, 2 g, Intravenous, Q12H  dilTIAZem, 90 mg, Oral, Q8H  heparin (porcine), 5,000 Units, Subcutaneous, Q12H  HYDROmorphone, 2 mg, Intravenous, Once  insulin glargine, 10 Units, Subcutaneous, Nightly  insulin lispro, 0-14 Units, Subcutaneous, TID AC  levothyroxine, 50 mcg, Oral, Q AM  melatonin, 5 mg, Oral, Nightly  metoprolol tartrate, 25 mg, Oral, BID  multivitamin with minerals, 1 tablet, Oral, Daily  senna-docusate sodium, 2 tablet, Oral, BID  sodium chloride, 10 mL, Intravenous, Q12H  zolpidem, 5 mg, Oral, Nightly      Infusions  hold, 1 each      PRNs  •  acetaminophen **OR** acetaminophen **OR** acetaminophen  •  aluminum-magnesium hydroxide-simethicone  •  senna-docusate sodium **AND** polyethylene glycol **AND** bisacodyl **AND** bisacodyl  •  dextrose  •  dextrose  •  glucagon (human recombinant)  •  hold  •  insulin lispro **AND** insulin lispro  •  magnesium sulfate **OR** magnesium sulfate in D5W 1g/100mL (PREMIX)  •  nitroglycerin  •  ondansetron **OR** ondansetron  •  potassium chloride **OR** potassium chloride **OR** potassium chloride  •  sodium chloride    Physical Exam:  Physical Exam  Physical Exam  General Appearance:  Alert.  No distress noted.  HEENT:  Normocephalic, without obvious abnormality, Conjunctiva/corneas clear,.  Normal external ear canals, Nares normal, no drainage     Neck:  Supple, symmetrical, trachea midline. No JVD.  Lungs /Chest wall: Minimal bilateral basal Rales, respirations unlabored  symmetrical wall movement.   Left chest tube in place.  Heart:  Regular rate and rhythm, systolic murmur PMI left sternal border  Abdomen: Soft, non-tender, no masses, no organomegaly.    Extremities: 1+ edema bilateral lower extremity, no clubbing or cyanosis      ROS  Review of Systems  Review of Systems       Constitutional: Negative for chills, fever and malaise/fatigue.   HENT: Negative.    Eyes: Negative.    Cardiovascular: Negative.    Respiratory: Positive for cough and shortness of breath.    Skin: Negative.    Musculoskeletal: Negative.    Gastrointestinal: Negative.    Genitourinary: Negative.    Neurological: Negative.    Psychiatric/Behavioral: Negative.          I have reviewed current clinicals.

## 2022-03-12 NOTE — PLAN OF CARE
Goal Outcome Evaluation:  Plan of Care Reviewed With: patient, spouse        Progress: improving  Outcome Evaluation: Pt is 83 y/o F who was admitted 1  days ago with weight gain X1 week & SOA with any activity. She is Dx w/ CHF and has had thoracentesis and CT placement to drain hemothorax. Pt is typically (I) and active, walking daily for exercise. She now is SOA with very little activity and is not able to do more yet than access the bathroom. She is asked now to start pushing herself to do more and to sit up in the chair daily. Pt & spouse agree and desire IP rehab placement.

## 2022-03-12 NOTE — PROCEDURES
Pre-op Diagnosis: Pleural effusion, left     Post-Op Diagnosis: Pleural effusion, left     Procedure performed: Fibrinolysis of pleural effusion with TPA     Anesthesia: None     Summary of procedure: Under sterile technique, the pleural catheter was accessed.  10 mg of alteplase reconstituted in 25 cc sterile water was instilled intrapleurally. The patient tolerated this well.    The patient has been instructed to change position every 20 to 15 minutes for the next 90 minutes.  Orders placed for pleural tube to be clamped and placed back to -20cm of DRY suction. We will re-evaluate with a chest x-ray in the morning.

## 2022-03-12 NOTE — THERAPY TREATMENT NOTE
Subjective: Pt agreeable to therapeutic plan of care. Pt had tpa to clear out CT but ok to see. Finishing up with OT upon arrival.    Objective:     Bed mobility - N/A or Not attempted.  Transfers - CGA and with rolling walker  Ambulation - 20x2 feet CGA and with rolling walker    Pain: 0 VAS  Education: Provided education on importance of mobility and skilled verbal / tactile cueing throughout intervention.     Assessment: Jaquelin Valderrama presents with functional mobility impairments which indicate the need for skilled intervention. Tolerating session today without incident. RN unclamped CT and hooked back up to suction at end of session. Req vc's to deep/PLB, Sats 96% and HR 70. Plans on dc to acute rehab. Will continue to follow and progress as tolerated.     Plan/Recommendations:   Pt would benefit from Inpatient Rehabilitation placement at discharge from facility and requires no DME at discharge.   Pt desires Inpatient Rehabilitation placement at discharge. Pt cooperative; agreeable to therapeutic recommendations and plan of care.     Basic Mobility 6-click:  Rollin = Total, A lot = 2, A little = 3; 4 = None  Supine>Sit:   1 = Total, A lot = 2, A little = 3; 4 = None   Sit>Stand with arms:  1 = Total, A lot = 2, A little = 3; 4 = None  Bed>Chair:   1 = Total, A lot = 2, A little = 3; 4 = None  Ambulate in room:  1 = Total, A lot = 2, A little = 3; 4 = None  3-5 Steps with railin = Total, A lot = 2, A little = 3; 4 = None  Score: 18      Post-Tx Position: Up in Chair, Alarms activated and Call light and personal items within reach  PPE: gloves, surgical mask, eyewear protection

## 2022-03-12 NOTE — PROGRESS NOTES
Referring Provider: Mp Galicia MD        Reason for follow-up:  Status post CABG  Atrial fibrillation  Hypertension  Left pleural effusion     Patient Care Team:  Minerva Chatterjee MD as PCP - General (Internal Medicine)    Subjective .      ROS    Since I have last seen, the patient has been without any chest discomfort ,shortness of breath, palpitations, dizziness or syncope.  Denies having any headache ,abdominal pain ,nausea, vomiting , diarrhea constipation, loss of weight or loss of appetite.  Denies having any excessive bruising ,hematuria or blood in the stool.    Review of all systems negative except as indicated    History  Past Medical History:   Diagnosis Date   • Astigmatism, bilateral 2019   • Benign essential hypertension    • Bilateral presbyopia 2019   • CAD (coronary artery disease)    • Closed right ankle fracture    • COVID-19 vaccination refused    • Hyperlipidemia    • Hypothyroidism    • Influenza vaccine needed    • Myocardial infarction (HCC)    • Prediabetes    • Pseudophakia, both eyes 2019   • Thoracic back pain        Past Surgical History:   Procedure Laterality Date   • APPENDECTOMY     • CARDIAC CATHETERIZATION  2012    x3    • CORONARY ARTERY BYPASS GRAFT  2014   • CORONARY STENT PLACEMENT      x3   • HARDWARE REMOVAL Right 2020    Procedure: ANKLE/FOOT HARDWARE REMOVAL;  Surgeon: Lincoln Sibley MD;  Location: [facility];  Service: Orthopedics;  Laterality: Right;   • ORIF ANKLE FRACTURE Right 2019    Radha Vazquez Mem       Family History   Problem Relation Age of Onset   • Heart disease Mother    • Lung cancer Father    • Diabetes Other        Social History     Tobacco Use   • Smoking status: Former Smoker     Types: Cigarettes     Quit date: 1980     Years since quittin.2   • Smokeless tobacco: Never Used   • Tobacco comment: Social Drinker   Vaping Use   • Vaping Use: Never used   Substance Use Topics   • Alcohol use: Yes      Comment: mod    • Drug use: No        Medications Prior to Admission   Medication Sig Dispense Refill Last Dose   • aspirin 81 MG chewable tablet Chew 81 mg Daily.   3/2/2022 at Unknown time   • Cholecalciferol (VITAMIN D3) 2000 units tablet Take 1 tablet by mouth Daily.   3/2/2022 at Unknown time   • irbesartan (Avapro) 300 MG tablet Take 150 mg by mouth Daily.      • metoprolol tartrate (LOPRESSOR) 25 MG tablet Take 1 tablet by mouth twice daily 180 tablet 0 3/2/2022 at Unknown time   • Multiple Vitamins-Minerals (MULTIVITAMIN ADULT EXTRA C PO) Take 1 tablet by mouth Daily.   3/2/2022 at Unknown time   • levothyroxine (SYNTHROID, LEVOTHROID) 50 MCG tablet Take 50 mcg by mouth Every Morning.          Allergies  Prednisone    Scheduled Meds:custom intrapleural syringe builder, , Intrapleural, Once  amiodarone, 200 mg, Oral, Q8H   Followed by  [START ON 3/13/2022] amiodarone, 200 mg, Oral, Q12H   Followed by  [START ON 3/27/2022] amiodarone, 200 mg, Oral, Daily  arformoterol, 15 mcg, Nebulization, BID - RT  aspirin, 81 mg, Oral, Daily  budesonide, 1 mg, Nebulization, BID - RT  cefepime, 2 g, Intravenous, Q12H  dilTIAZem, 90 mg, Oral, Q8H  heparin (porcine), 5,000 Units, Subcutaneous, Q12H  HYDROmorphone, 2 mg, Intravenous, Once  insulin glargine, 10 Units, Subcutaneous, Nightly  insulin lispro, 0-14 Units, Subcutaneous, TID AC  levothyroxine, 50 mcg, Oral, Q AM  melatonin, 5 mg, Oral, Nightly  metoprolol tartrate, 25 mg, Oral, BID  multivitamin with minerals, 1 tablet, Oral, Daily  senna-docusate sodium, 2 tablet, Oral, BID  sodium chloride, 10 mL, Intravenous, Q12H  zolpidem, 5 mg, Oral, Nightly      Continuous Infusions:hold, 1 each      PRN Meds:.•  acetaminophen **OR** acetaminophen **OR** acetaminophen  •  aluminum-magnesium hydroxide-simethicone  •  senna-docusate sodium **AND** polyethylene glycol **AND** bisacodyl **AND** bisacodyl  •  dextrose  •  dextrose  •  glucagon (human recombinant)  •  hold  •   "insulin lispro **AND** insulin lispro  •  magnesium sulfate **OR** magnesium sulfate in D5W 1g/100mL (PREMIX)  •  nitroglycerin  •  ondansetron **OR** ondansetron  •  potassium chloride **OR** potassium chloride **OR** potassium chloride  •  sodium chloride    Objective     VITAL SIGNS  Vitals:    03/12/22 0634 03/12/22 0638 03/12/22 0812 03/12/22 1051   BP:   133/71 145/69   BP Location:    Left arm   Patient Position:    Lying   Pulse: 81 81 75 57   Resp: 21 21 22   Temp:    97.8 °F (36.6 °C)   TempSrc:    Oral   SpO2: 96%   95%   Weight:       Height:           Flowsheet Rows    Flowsheet Row First Filed Value   Admission Height 162.6 cm (64\") Documented at 03/02/2022 2218   Admission Weight 81 kg (178 lb 9.2 oz) Documented at 03/02/2022 2218            Intake/Output Summary (Last 24 hours) at 3/12/2022 1317  Last data filed at 3/12/2022 1212  Gross per 24 hour   Intake 480 ml   Output 50 ml   Net 430 ml        TELEMETRY: Atrial fibrillation    Physical Exam:  The patient is alert, oriented and in no distress.  Vital signs as noted above.  Head and neck revealed no carotid bruits or jugular venous distention.  No thyromegaly or lymphadenopathy is present  Lungs clear.  No wheezing.  Breath sounds are normal bilaterally.  Left chest tube in place.  Heart normal first and second heart sounds.  No murmur. No precordial rub is present.  No gallop is present.  Abdomen soft and nontender.  No organomegaly is present.  Extremities with good peripheral pulses without any pedal edema.  Skin warm and dry.  Musculoskeletal system is grossly normal  CNS grossly normal      Results Review:   I reviewed the patient's new clinical results.  Lab Results (last 24 hours)     Procedure Component Value Units Date/Time    POC Glucose Once [[id number]]  (Normal) Collected: 03/12/22 1126    Specimen: Blood Updated: 03/12/22 1127     Glucose 100 mg/dL      Comment: Serial Number: [id number]:  [id number]       POC Glucose Once " [[id number]]  (Abnormal) Collected: 03/12/22 0731    Specimen: Blood Updated: 03/12/22 0732     Glucose 154 mg/dL      Comment: Serial Number: [id number]:  [id number]       Comprehensive Metabolic Panel [[id number]]  (Abnormal) Collected: 03/12/22 0020    Specimen: Blood Updated: 03/12/22 0157     Glucose 154 mg/dL      BUN 17 mg/dL      Creatinine 0.76 mg/dL      Sodium 128 mmol/L      Potassium 4.0 mmol/L      Chloride 94 mmol/L      CO2 22.0 mmol/L      Calcium 8.2 mg/dL      Total Protein 5.5 g/dL      Albumin 2.80 g/dL      ALT (SGPT) 30 U/L      AST (SGOT) 28 U/L      Alkaline Phosphatase 89 U/L      Total Bilirubin 0.5 mg/dL      Globulin 2.7 gm/dL      A/G Ratio 1.0 g/dL      BUN/Creatinine Ratio 22.4     Anion Gap 12.0 mmol/L      eGFR 79.3 mL/min/1.73      Comment: National Kidney Foundation and American Society of Nephrology (ASN) Task Force recommended calculation based on the Chronic Kidney Disease Epidemiology Collaboration (CKD-EPI) equation refit without adjustment for race.       Narrative:      GFR Normal >60  Chronic Kidney Disease <60  Kidney Failure <15      Phosphorus [[id number]]  (Normal) Collected: 03/12/22 0020    Specimen: Blood Updated: 03/12/22 0157     Phosphorus 2.5 mg/dL     Magnesium [[id number]]  (Normal) Collected: 03/12/22 0020    Specimen: Blood Updated: 03/12/22 0157     Magnesium 2.0 mg/dL     Calcium, Ionized [[id number]]  (Abnormal) Collected: 03/12/22 0020    Specimen: Blood Updated: 03/12/22 0135     Ionized Calcium 1.16 mmol/L     CBC & Differential [[id number]]  (Abnormal) Collected: 03/12/22 0020    Specimen: Blood Updated: 03/12/22 0133    Narrative:      The following orders were created for panel order CBC & Differential.  Procedure                               Abnormality         Status                     ---------                               -----------         ------                     CBC Auto Differential[[id number]]        Abnormal            Final result                  Please view results for these tests on the individual orders.    CBC Auto Differential [[id number]]  (Abnormal) Collected: 03/12/22 0020    Specimen: Blood Updated: 03/12/22 0133     WBC 13.00 10*3/mm3      RBC 2.87 10*6/mm3      Hemoglobin 9.1 g/dL      Hematocrit 27.2 %      MCV 94.7 fL      MCH 31.6 pg      MCHC 33.4 g/dL      RDW 15.6 %      RDW-SD 52.5 fl      MPV 7.6 fL      Platelets 383 10*3/mm3      Neutrophil % 82.4 %      Lymphocyte % 7.0 %      Monocyte % 9.9 %      Eosinophil % 0.6 %      Basophil % 0.1 %      Neutrophils, Absolute 10.70 10*3/mm3      Lymphocytes, Absolute 0.90 10*3/mm3      Monocytes, Absolute 1.30 10*3/mm3      Eosinophils, Absolute 0.10 10*3/mm3      Basophils, Absolute 0.00 10*3/mm3      nRBC 0.2 /100 WBC     POC Glucose Once [[id number]]  (Abnormal) Collected: 03/11/22 1953    Specimen: Blood Updated: 03/11/22 1954     Glucose 197 mg/dL      Comment: Serial Number: [id number]:  [id number]       POC Glucose Once [[id number]]  (Abnormal) Collected: 03/11/22 1607    Specimen: Blood Updated: 03/11/22 1609     Glucose 148 mg/dL      Comment: Serial Number: [id number]:  [id number]       Comprehensive Metabolic Panel [[id number]]  (Abnormal) Collected: 03/11/22 1206    Specimen: Blood Updated: 03/11/22 1330     Glucose 161 mg/dL      BUN 18 mg/dL      Creatinine 0.72 mg/dL      Sodium 126 mmol/L      Potassium 4.1 mmol/L      Chloride 93 mmol/L      CO2 23.0 mmol/L      Calcium 8.2 mg/dL      Total Protein 5.5 g/dL      Albumin 2.60 g/dL      ALT (SGPT) 19 U/L      AST (SGOT) 14 U/L      Alkaline Phosphatase 87 U/L      Total Bilirubin 0.4 mg/dL      Globulin 2.9 gm/dL      A/G Ratio 0.9 g/dL      BUN/Creatinine Ratio 25.0     Anion Gap 10.0 mmol/L      eGFR 84.6 mL/min/1.73      Comment: National Kidney Foundation and American Society of Nephrology (ASN) Task Force recommended calculation based on the Chronic Kidney Disease Epidemiology Collaboration (CKD-EPI)  equation refit without adjustment for race.       Narrative:      GFR Normal >60  Chronic Kidney Disease <60  Kidney Failure <15      Phosphorus [[id number]]  (Normal) Collected: 03/11/22 1206    Specimen: Blood Updated: 03/11/22 1330     Phosphorus 2.8 mg/dL     Magnesium [[id number]]  (Normal) Collected: 03/11/22 1206    Specimen: Blood Updated: 03/11/22 1330     Magnesium 2.1 mg/dL           Imaging Results (Last 24 Hours)     Procedure Component Value Units Date/Time    XR Chest 1 View [[id number]] Collected: 03/12/22 0715     Updated: 03/12/22 0720    Narrative:         DATE OF EXAM:   3/12/2022 6:01 AM     PROCEDURE:   XR CHEST 1 VW-     INDICATIONS:   Chest tube; I48.91-Unspecified atrial fibrillation; I50.9-Heart failure,  unspecified; Z20.822-Contact with and (suspected) exposure to covid-19;  I50.21-Acute systolic (congestive) heart failure     COMPARISON:  03/11/2022 and prior     TECHNIQUE:   Portable Chest     FINDINGS:      Study limited by overlying support and monitoring apparatus     Patient status post median sternotomy. A large left-sided pleural  effusion with a loculated component demonstrates slight interval  increase from the comparison accounting for differences in technique.  Associated mild dependent opacity on the left noted. Chest tube remains  unchanged in position.     The right lung remains grossly clear. Osseous structures demonstrate no  acute abnormality.        Impression:      IMPRESSION :   Slight interval increase in large left-sided pleural effusion with  associated dependent opacity.     Left-sided chest tube remains in place.[     Electronically Signed By-Jarrett De Jesus On:3/12/2022 7:17 AM  This report was finalized on [id number] by  Jarrett De Jesus, .      LAB RESULTS (LAST 7 DAYS)    CBC  Results from last 7 days   Lab Units 03/12/22  0020 03/11/22  1206 03/10/22  0009 03/09/22  1230 03/09/22  0651 03/09/22  0036 03/08/22  1755   WBC 10*3/mm3 13.00* 13.10* 15.40*  14.10* 13.00* 13.70* 13.50*   RBC 10*6/mm3 2.87* 2.79* 2.89* 2.98* 2.90* 2.87* 2.96*   HEMOGLOBIN g/dL 9.1* 9.1* 9.3* 9.9* 9.4* 9.1* 9.6*   HEMATOCRIT % 27.2* 26.6* 27.3* 28.2* 27.5* 26.8* 28.1*   MCV fL 94.7 95.5 94.3 94.5 94.6 93.3 95.1   PLATELETS 10*3/mm3 383 294 291 267 244 252 259       BMP  Results from last 7 days   Lab Units 03/12/22  0020 03/11/22  1206 03/10/22  0009 03/09/22  0651 03/08/22  0018 03/07/22  0014 03/06/22  0650   SODIUM mmol/L 128* 126* 130* 129* 130* 129* 133*   POTASSIUM mmol/L 4.0 4.1 4.3 4.0 4.1 4.1 3.8   CHLORIDE mmol/L 94* 93* 96* 97* 96* 94* 96*   CO2 mmol/L 22.0 23.0 23.0 23.0 23.0 23.0 24.0   BUN mg/dL 17 18 19 17 30* 36* 32*   CREATININE mg/dL 0.76 0.72 0.67 0.73 1.03* 1.15* 1.35*   GLUCOSE mg/dL 154* 161* 168* 144* 169* 213* 110*   MAGNESIUM mg/dL 2.0 2.1 2.1 2.0 2.1 2.2 2.0   PHOSPHORUS mg/dL 2.5 2.8 2.4* 2.4* 2.6 3.5 4.8*       CMP   Results from last 7 days   Lab Units 03/12/22  0020 03/11/22  1206 03/10/22  0009 03/09/22  0651 03/08/22  0018 03/07/22  0014 03/06/22  0650   SODIUM mmol/L 128* 126* 130* 129* 130* 129* 133*   POTASSIUM mmol/L 4.0 4.1 4.3 4.0 4.1 4.1 3.8   CHLORIDE mmol/L 94* 93* 96* 97* 96* 94* 96*   CO2 mmol/L 22.0 23.0 23.0 23.0 23.0 23.0 24.0   BUN mg/dL 17 18 19 17 30* 36* 32*   CREATININE mg/dL 0.76 0.72 0.67 0.73 1.03* 1.15* 1.35*   GLUCOSE mg/dL 154* 161* 168* 144* 169* 213* 110*   ALBUMIN g/dL 2.80* 2.60* 2.80* 3.00* 2.90* 3.10* 3.20*   BILIRUBIN mg/dL 0.5 0.4 0.5 0.6 0.3 0.3 0.3   ALK PHOS U/L 89 87 81 76 77 77 68   AST (SGOT) U/L 28 14 13 12 17 19 14   ALT (SGPT) U/L 30 19 19 20 27 28 27         BNP        TROPONIN        CoAg        Creatinine Clearance  Estimated Creatinine Clearance: 57.2 mL/min (by C-G formula based on SCr of 0.76 mg/dL).    ABG        Radiology  XR Chest 1 View    Result Date: 3/12/2022  IMPRESSION : Slight interval increase in large left-sided pleural effusion with associated dependent opacity.  Left-sided chest tube remains in  place.[  Electronically Signed By-Jarrett De Jesus On:3/12/2022 7:17 AM This report was finalized on [id number] by  Jarrett De Jesus, .    XR Chest 1 View    Result Date: 3/11/2022  IMPRESSION : Moderate to large left-sided pleural effusion with loculated component suspected. Accounting for differences in technique no great change in comparison  Slight improved aeration of associated dependent opacities on the left  Small right-sided pleural effusion suspected[  Electronically Signed By-Jarrett De Jesus On:3/11/2022 7:06 AM This report was finalized on [id number] by  Jarrett De Jesus, .              EKG                              I personally viewed and interpreted the patient's EKG/Telemetry data: Atrial fibrillation          ECHOCARDIOGRAM:    Results for orders placed during the hospital encounter of 03/02/22    Adult Transthoracic Echo Complete W/ Cont if Necessary Per Protocol    Interpretation Summary  LVE with severe global left ventricular hypokinesis, estimated LV ejection fraction of 30%.  Severe right ventricular enlargement and moderate right atrial enlargement seen  Severe left atrial enlargement seen.  Aortic valve, mitral valve, tricuspid valve appears structurally normal, moderate mitral and mild tricuspid regurgitation seen.  Calculated RV systolic pressure of 40 to 45 mmHg.  No pericardial effusion seen.  Large pleural effusion seen.  Proximal aorta appears normal in size.          STRESS MYOVIEW:    Cardiolite (Tc-99m Sestamibi) stress test    CARDIAC CATHETERIZATION:            OTHER:         Assessment/Plan     Principal Problem:    Atrial fibrillation with RVR ([facility])  Active Problems:    Other hyperlipidemia    Essential hypertension    Hypothyroidism    Coronary artery disease involving coronary bypass graft of native heart without angina pectoris    Acute CHF (congestive heart failure) ([facility])    Acute hyponatremia    Elevated LFTs    Elevated TSH    Acute hyperglycemia    Obesity (BMI  30-39.9)      Presented through emergency room 3/2/2022 with progressively worsening shortness of breath and dyspnea on exertion weight gain and leg edema. Was found to be in A. fib and congestive heart failure. Patient denies any chest pain. Patient lives in [place] and has a second home in [place] and see his cardiologist at [facility]. Patient has been having issues with alopecia and memory issues therefore stopped beta-blockers and statin on her own. Also has not been taking  thyroid replacement therapy since November because she ran out of medication. Patient has history of prediabetes. Does not check her sugars at home.  Work-up here revealed troponin x3 are negative. proBNP is 7717. CMP reveals a glucose of 242, hemoglobin A1c over 7.1. BUN/creatinine are 21/0.83. ALT elevated at 71, AST 35. TSH is 11.15  Chest x-ray 3/2/2022 reveals left pleural effusion, left basilar disease most likely atelectasis with possible pneumonia.  EKG 3/2/2022 reviewed/interpreted by me reveals A. fib with RVR at 126 bpm with PVCs      ]]]]]]]]]]]]]]]]]]]]  Impression  ==========  -Progressively worsening shortness of breath and leg edema.    -Congestive heart failure  Left ventricle dysfunction with ejection fraction of 30%.    -Significant biatrial enlargement    -Atrial fibrillation with RVR    -Premature ventricular contractions    -Status post CABG 12/2014 (performed in [place])    -Hypertension dyslipidemia prediabetes hypothyroidism elevation    -Status post ORIF    -Family history of coronary artery disease    -Intolerance to prednisone    -Former smoker    -Medical noncompliance.  Was not taking thyroid medicine replacement properly.     =================  Plan  ===========  Atrial fibrillation with RVR.  Rate control.  Consider GABRIEL cardioversion.  Patient would benefit from long-term anticoagulation for atrial fibrillation because of high [id number] score due to female gender age over 75, hypertension, diabetes, CAD making  it at least 6. We will start her on Eliquis or warfarin before discharge.  Continued IV diuresis.  Observe renal function closely.  Elevated LFTs due to passive congestion.    Large left pleural effusion.  Status post drainage and chest tube.  More than 400 cc of fluid was removed.  Patient is on Cardizem and amiodarone.  Chest tube was flushed with TPA yesterday by thoracic surgery.  Patient has loculated left pleural effusion.  Another round of TPA infusion through the chest tube is being planned.    Renal dysfunction  BUN/creatinine-30/1.03    Echocardiogram showed severe right ventricle enlargement left atrial enlargement related enlargement and calculated pulmonary artery pressure of 45 mmHg.  Left ventricle dysfunction with ejection fraction of 30%.    Ischemic cardiomyopathy    Current medications include amiodarone aspirin Cardizem 90 mg every 8 hours subcu heparin levothyroxine metoprolol 25 mg twice a day.    Anticoagulation and possible cardioversion as an outpatient versus GABRIEL cardioversion to try to convert to sinus rhythm    Follow-up labs ordered.    Further plan will depend on patient's progress.  ]]]]]]]]]]]]]]]]]     March 12, 2022    Discussed with patient and family at bedside  Status post chest tube for pleural effusion  Congestive heart failure secondary to cardiomyopathy  Ischemic cardiomyopathy  LV ejection fraction of 30%  Atrial fibrillation with controlled ventricular response  Prior history of coronary bypass surgery  Hyponatremia likely secondary to congestive heart failure and volume overload with sodium of 128  Renal function stable  Anemia with a hemoglobin of 9.1  LFTs have improved  Current medications include aspirin 81 mg p.o. once a day amiodarone 200 mg p.o. every 8 hours which will be decreased to p.o. every 12 hours  Patient is currently on diltiazem 90 mg p.o. every 8 hours metoprolol 25 mg p.o. twice daily  Patient is currently on subcu heparin for DVT prophylaxis  I do not  see any full dose anticoagulation therapy  Loculated left pleural effusion status post chest tube placement  Discussed with patient and family at bedside  Continue supportive care          Jah Saunders MD  03/12/22  13:17 EST

## 2022-03-12 NOTE — PROGRESS NOTES
[facility] Medicine Services Daily Progress Note    Patient Name: Jaquelin Valderrama  : 1940  MRN: [id number]  Primary Care Physician:  Minerva Chatterjee MD  Date of admission: 3/2/2022      Subjective      Chief Complaint: Atrial fibrillation with RVR pleural effusion        Patient Reports states she is doing just fine.   She slept poorly last night.   More TPA injected into chest tube.      ROS negative except as above      Objective      Vitals:   Temp:  [97.8 °F (36.6 °C)-98.8 °F (37.1 °C)] 98.5 °F (36.9 °C)  Heart Rate:  [57-97] 80  Resp:  [18-28] 27  BP: (118-174)/(69-93) 144/93  Flow (L/min):  [2] 2    Vitals reviewed.   Constitutional:       Comments: Left chest tube in place  at bedside  Patient is on room air but seems somewhat labored  HENT:      Head: Normocephalic.      Nose: Nose normal.      Mouth/Throat:      Mouth: Mucous membranes are moist.   Eyes:      Pupils: Pupils are equal, round, and reactive to light.   Cardiovascular:      Rate and Rhythm: Normal rate and regular rhythm.      Pulses: Normal pulses.      Heart sounds: Normal heart sounds.   Pulmonary:      Effort: Pulmonary effort is normal.      Comments: Left chest tube catheter in place  Left side decreased breath sounds  Abdominal:      General: Abdomen is flat.      Palpations: Abdomen is soft.   Musculoskeletal:         General: Normal range of motion.      Cervical back: Normal range of motion.   Skin:     General: Skin is warm.      Capillary Refill: Capillary refill takes less than 2 seconds.   Neurological:      General: No focal deficit present.      Mental Status: She is alert and oriented to person, place, and time.   Psychiatric:         Mood and Affect: Mood normal.         Behavior: Behavior normal.        Result Review    Result Review:  I have personally reviewed the results from the time of this admission to 3/12/2022 17:08 EST and agree with these findings:  [x]  Laboratory  []   Microbiology  []  Radiology  []  EKG/Telemetry   []  Cardiology/Vascular   []  Pathology  []  Old records  []  Other:  Most notable findings include: Lab work stable    Wounds (last 24 hours)              Assessment/Plan       Brief Patient Summary:  Jaquelin Valderrama is a 82 y.o. female who presented with A. fib RVR and pleural effusion status post chest tube        amiodarone, 200 mg, Oral, Q8H   Followed by  [START ON 3/13/2022] amiodarone, 200 mg, Oral, Q12H   Followed by  [START ON 3/27/2022] amiodarone, 200 mg, Oral, Daily  arformoterol, 15 mcg, Nebulization, BID - RT  aspirin, 81 mg, Oral, Daily  budesonide, 1 mg, Nebulization, BID - RT  cefepime, 2 g, Intravenous, Q12H  dilTIAZem, 90 mg, Oral, Q8H  heparin (porcine), 5,000 Units, Subcutaneous, Q12H  HYDROmorphone, 2 mg, Intravenous, Once  insulin glargine, 10 Units, Subcutaneous, Nightly  insulin lispro, 0-14 Units, Subcutaneous, TID AC  levothyroxine, 50 mcg, Oral, Q AM  melatonin, 5 mg, Oral, Nightly  metoprolol tartrate, 25 mg, Oral, BID  multivitamin with minerals, 1 tablet, Oral, Daily  senna-docusate sodium, 2 tablet, Oral, BID  sodium chloride, 10 mL, Intravenous, Q12H        hold, 1 each           Active Hospital Problems:          Active Hospital Problems     Diagnosis     • **Atrial fibrillation with RVR (HCC)     • Acute CHF (congestive heart failure) (HCC)     • Acute hyponatremia     • Elevated LFTs     • Elevated TSH     • Acute hyperglycemia     • Obesity (BMI 30-39.9)     • Coronary artery disease involving coronary bypass graft of native heart without angina pectoris     • Essential hypertension     • Hypothyroidism     • Other hyperlipidemia           ASSESSMENT:  Possible hemothorax  Acute respiratory distress  COPD exacerbation  Atrial fibrillation with RVR (HCC), now resolved    Other hyperlipidemia    Essential hypertension    Hypothyroidism    Coronary artery disease involving coronary bypass graft of native heart without angina  pectoris    Acute CHF (congestive heart failure) ([facility]) EFG 45%    Acute hyponatremia    Elevated LFTs    Elevated TSH    Acute hyperglycemia    Obesity (BMI 30-39.9)   ARF     PLAN:  Hemoglobin stable back on aspirin and heparin  Blood transfusion as needed  Chest tube management  per pulmonary.  Cardiovascular surgery consulted.  TPA x3   Chest x-ray March 13  Encouraged to use I-S flutter valve  Heart rate control  Continue to monitor blood pressure  Bronchodilator  Inhaled corticosteroids  Electrolytes/ glycemic control  heparin sq prophylaxis   Insomnia Ambien ordered patient slept much better  Discussed with  daughter at bedside March 12         DVT prophylaxis:  Medical DVT prophylaxis orders are present.     CODE STATUS:    Code Status (Patient has no pulse and is not breathing): CPR (Attempt to Resuscitate)  Medical Interventions (Patient has pulse or is breathing): Full Support        Disposition:  I expect patient to be discharged once stable.     This patient has been examined wearing appropriate Personal Protective Equipment and discussed with  patient and nursing staff.  03/12/22      Electronically signed by Mp Galicia MD, 03/12/22, 17:08 EST.  [facility] Hospitalist Team

## 2022-03-12 NOTE — PLAN OF CARE
Assessment: Jaquelin Valderrama presents with functional mobility impairments which indicate the need for skilled intervention. Tolerating session today without incident. RN unclamped CT and hooked back up to suction at end of session. Req vc's to deep/PLB, Sats 96% and HR 70. Plans on dc to acute rehab. Will continue to follow and progress as tolerated.

## 2022-03-12 NOTE — PLAN OF CARE
81 yo female, admitting dx of A-Fib with RVR and shortness of breath.  Pt AAO x4 in pleasant spirits, O2 sats 95 to 100 percent on room air, pt reported shortness of breath upon ambulation, chest tube remains in place to left lateral side with minimal output this shift, lung sounds clear on right side and diminished on left side, follow-up Chest X-Ray obtained this AM with slight increase in pleural effusion from previous chest X-Ray.  Pt's heart rate well controlled with shift with heart rate in normal 60 to 100 beats per minute range, pt on PO Amiodarone and Diltiazem with success.  Pt resting well in bed, no further acute issues, will continue to monitor and observe.    Goal Outcome Evaluation:       Problem: Adult Inpatient Plan of Care  Goal: Absence of Hospital-Acquired Illness or Injury  Intervention: Identify and Manage Fall Risk  Recent Flowsheet Documentation  Taken 3/12/2022 0600 by Spencer Hugo RN  Safety Promotion/Fall Prevention:   safety round/check completed   room organization consistent   nonskid shoes/slippers when out of bed   assistive device/personal items within reach   clutter free environment maintained   fall prevention program maintained  Taken 3/12/2022 0400 by Spencer Hugo RN  Safety Promotion/Fall Prevention:   safety round/check completed   room organization consistent   nonskid shoes/slippers when out of bed   assistive device/personal items within reach   clutter free environment maintained   fall prevention program maintained  Taken 3/12/2022 0200 by Spencer Hugo RN  Safety Promotion/Fall Prevention:   safety round/check completed   room organization consistent   nonskid shoes/slippers when out of bed   assistive device/personal items within reach   clutter free environment maintained   fall prevention program maintained  Taken 3/12/2022 0000 by Spencer Hugo RN  Safety Promotion/Fall Prevention:   safety round/check completed   room organization  consistent   nonskid shoes/slippers when out of bed   assistive device/personal items within reach   clutter free environment maintained   fall prevention program maintained  Taken 3/11/2022 2200 by Spencer Hugo RN  Safety Promotion/Fall Prevention:   safety round/check completed   room organization consistent   nonskid shoes/slippers when out of bed   assistive device/personal items within reach   clutter free environment maintained   fall prevention program maintained  Taken 3/11/2022 2000 by Spencer Hugo RN  Safety Promotion/Fall Prevention:   safety round/check completed   room organization consistent   nonskid shoes/slippers when out of bed   assistive device/personal items within reach   clutter free environment maintained   fall prevention program maintained  Intervention: Prevent Skin Injury  Recent Flowsheet Documentation  Taken 3/12/2022 0400 by Spencer Hugo RN  Skin Protection:   adhesive use limited   tubing/devices free from skin contact  Taken 3/12/2022 0000 by Spencer Hugo RN  Body Position: position changed independently  Skin Protection:   adhesive use limited   tubing/devices free from skin contact  Taken 3/11/2022 2000 by Spencer Hugo RN  Body Position:   position changed independently   sitting up in bed  Skin Protection:   adhesive use limited   tubing/devices free from skin contact  Intervention: Prevent and Manage VTE (venous thromboembolism) Risk  Recent Flowsheet Documentation  Taken 3/12/2022 0400 by Spencer Hugo RN  VTE Prevention/Management:   bilateral   sequential compression devices on  Taken 3/12/2022 0000 by Spencer Hugo RN  VTE Prevention/Management:   bilateral   sequential compression devices on  Taken 3/11/2022 2200 by Spencer Hugo RN  VTE Prevention/Management:   bilateral   sequential compression devices on  Taken 3/11/2022 2000 by Spencer Hugo RN  VTE Prevention/Management:   bilateral   sequential compression  devices off  Goal: Optimal Comfort and Wellbeing  Intervention: Provide Person-Centered Care  Recent Flowsheet Documentation  Taken 3/12/2022 0400 by Spencer Hugo RN  Trust Relationship/Rapport: care explained  Taken 3/12/2022 0000 by Spencer Hugo RN  Trust Relationship/Rapport: care explained  Taken 3/11/2022 2000 by Spencer Hugo RN  Trust Relationship/Rapport:   care explained   empathic listening provided   questions answered   questions encouraged   reassurance provided   thoughts/feelings acknowledged     Problem: Arrhythmia/Dysrhythmia (Acute Coronary Syndrome)  Goal: Normalized Cardiac Rhythm  Intervention: Monitor and Manage Cardiac Rhythm Effects  Recent Flowsheet Documentation  Taken 3/12/2022 0400 by Spencer Hugo RN  VTE Prevention/Management:   bilateral   sequential compression devices on  Taken 3/12/2022 0000 by Spencer Hugo RN  VTE Prevention/Management:   bilateral   sequential compression devices on  Taken 3/11/2022 2200 by Spencer Hugo RN  VTE Prevention/Management:   bilateral   sequential compression devices on  Taken 3/11/2022 2000 by Spencer Hugo RN  VTE Prevention/Management:   bilateral   sequential compression devices off     Problem: Skin Injury Risk Increased  Goal: Skin Health and Integrity  Intervention: Optimize Skin Protection  Recent Flowsheet Documentation  Taken 3/12/2022 0400 by Spencer Hugo RN  Pressure Reduction Techniques:   frequent weight shift encouraged   weight shift assistance provided  Pressure Reduction Devices: pressure-redistributing mattress utilized  Skin Protection:   adhesive use limited   tubing/devices free from skin contact  Taken 3/12/2022 0000 by Spencer Hugo RN  Pressure Reduction Techniques:   frequent weight shift encouraged   weight shift assistance provided  Head of Bed (HOB) Positioning:   HOB elevated   HOB at 30 degrees  Pressure Reduction Devices: pressure-redistributing mattress  utilized  Skin Protection:   adhesive use limited   tubing/devices free from skin contact  Taken 3/11/2022 2000 by Spencer Hugo RN  Pressure Reduction Techniques:   frequent weight shift encouraged   weight shift assistance provided  Head of Bed (HOB) Positioning:   HOB elevated   HOB at 30 degrees  Pressure Reduction Devices:   pressure-redistributing mattress utilized   positioning supports utilized  Skin Protection:   adhesive use limited   tubing/devices free from skin contact     Problem: Fall Injury Risk  Goal: Absence of Fall and Fall-Related Injury  Intervention: Identify and Manage Contributors  Recent Flowsheet Documentation  Taken 3/11/2022 2000 by Spencer Hugo RN  Medication Review/Management: medications reviewed  Intervention: Promote Injury-Free Environment  Recent Flowsheet Documentation  Taken 3/12/2022 0600 by Spencer Hugo RN  Safety Promotion/Fall Prevention:   safety round/check completed   room organization consistent   nonskid shoes/slippers when out of bed   assistive device/personal items within reach   clutter free environment maintained   fall prevention program maintained  Taken 3/12/2022 0400 by Spencer Hugo RN  Safety Promotion/Fall Prevention:   safety round/check completed   room organization consistent   nonskid shoes/slippers when out of bed   assistive device/personal items within reach   clutter free environment maintained   fall prevention program maintained  Taken 3/12/2022 0200 by Spencer Hugo, RN  Safety Promotion/Fall Prevention:   safety round/check completed   room organization consistent   nonskid shoes/slippers when out of bed   assistive device/personal items within reach   clutter free environment maintained   fall prevention program maintained  Taken 3/12/2022 0000 by Spencer Hugo, RN  Safety Promotion/Fall Prevention:   safety round/check completed   room organization consistent   nonskid shoes/slippers when out of bed    assistive device/personal items within reach   clutter free environment maintained   fall prevention program maintained  Taken 3/11/2022 2200 by Spencer Hugo RN  Safety Promotion/Fall Prevention:   safety round/check completed   room organization consistent   nonskid shoes/slippers when out of bed   assistive device/personal items within reach   clutter free environment maintained   fall prevention program maintained  Taken 3/11/2022 2000 by Spencer Hugo RN  Safety Promotion/Fall Prevention:   safety round/check completed   room organization consistent   nonskid shoes/slippers when out of bed   assistive device/personal items within reach   clutter free environment maintained   fall prevention program maintained  Goal: Absence of Fall and Fall-Related Injury  Intervention: Identify and Manage Contributors  Recent Flowsheet Documentation  Taken 3/11/2022 2000 by Spencer Hugo RN  Medication Review/Management: medications reviewed  Intervention: Promote Injury-Free Environment  Recent Flowsheet Documentation  Taken 3/12/2022 0600 by Spencer Hugo RN  Safety Promotion/Fall Prevention:   safety round/check completed   room organization consistent   nonskid shoes/slippers when out of bed   assistive device/personal items within reach   clutter free environment maintained   fall prevention program maintained  Taken 3/12/2022 0400 by Spencer Hugo RN  Safety Promotion/Fall Prevention:   safety round/check completed   room organization consistent   nonskid shoes/slippers when out of bed   assistive device/personal items within reach   clutter free environment maintained   fall prevention program maintained  Taken 3/12/2022 0200 by Spencer Hugo RN  Safety Promotion/Fall Prevention:   safety round/check completed   room organization consistent   nonskid shoes/slippers when out of bed   assistive device/personal items within reach   clutter free environment maintained   fall prevention  program maintained  Taken 3/12/2022 0000 by Spencer Hugo RN  Safety Promotion/Fall Prevention:   safety round/check completed   room organization consistent   nonskid shoes/slippers when out of bed   assistive device/personal items within reach   clutter free environment maintained   fall prevention program maintained  Taken 3/11/2022 2200 by Spencer Hugo RN  Safety Promotion/Fall Prevention:   safety round/check completed   room organization consistent   nonskid shoes/slippers when out of bed   assistive device/personal items within reach   clutter free environment maintained   fall prevention program maintained  Taken 3/11/2022 2000 by Spencer Hugo RN  Safety Promotion/Fall Prevention:   safety round/check completed   room organization consistent   nonskid shoes/slippers when out of bed   assistive device/personal items within reach   clutter free environment maintained   fall prevention program maintained     Problem: Fall Injury Risk  Goal: Absence of Fall and Fall-Related Injury  Outcome: Ongoing, Progressing  Intervention: Identify and Manage Contributors  Recent Flowsheet Documentation  Taken 3/11/2022 2000 by Spencer Hugo RN  Medication Review/Management: medications reviewed  Intervention: Promote Injury-Free Environment  Recent Flowsheet Documentation  Taken 3/12/2022 0600 by Spencer Hugo RN  Safety Promotion/Fall Prevention:   safety round/check completed   room organization consistent   nonskid shoes/slippers when out of bed   assistive device/personal items within reach   clutter free environment maintained   fall prevention program maintained  Taken 3/12/2022 0400 by Spencer Hugo RN  Safety Promotion/Fall Prevention:   safety round/check completed   room organization consistent   nonskid shoes/slippers when out of bed   assistive device/personal items within reach   clutter free environment maintained   fall prevention program maintained  Taken 3/12/2022 0200  by Bethel, Spencer, RN  Safety Promotion/Fall Prevention:   safety round/check completed   room organization consistent   nonskid shoes/slippers when out of bed   assistive device/personal items within reach   clutter free environment maintained   fall prevention program maintained  Taken 3/12/2022 0000 by Spencer Hugo RN  Safety Promotion/Fall Prevention:   safety round/check completed   room organization consistent   nonskid shoes/slippers when out of bed   assistive device/personal items within reach   clutter free environment maintained   fall prevention program maintained  Taken 3/11/2022 2200 by Spencer Hugo RN  Safety Promotion/Fall Prevention:   safety round/check completed   room organization consistent   nonskid shoes/slippers when out of bed   assistive device/personal items within reach   clutter free environment maintained   fall prevention program maintained  Taken 3/11/2022 2000 by Spencer Hugo RN  Safety Promotion/Fall Prevention:   safety round/check completed   room organization consistent   nonskid shoes/slippers when out of bed   assistive device/personal items within reach   clutter free environment maintained   fall prevention program maintained     Problem: Dysrhythmia  Goal: Normalized Cardiac Rhythm  Outcome: Ongoing, Progressing  Intervention: Monitor and Manage Cardiac Rhythm Effect  Recent Flowsheet Documentation  Taken 3/12/2022 0400 by Spencer Hugo RN  VTE Prevention/Management:   bilateral   sequential compression devices on  Taken 3/12/2022 0000 by Spencer Hugo RN  VTE Prevention/Management:   bilateral   sequential compression devices on  Taken 3/11/2022 2200 by Spencer Hugo RN  VTE Prevention/Management:   bilateral   sequential compression devices on  Taken 3/11/2022 2000 by Spencer Hugo RN  VTE Prevention/Management:   bilateral   sequential compression devices off     Problem: Breathing Pattern Ineffective  Goal: Effective  Breathing Pattern  Outcome: Ongoing, Progressing  Intervention: Promote Improved Breathing Pattern  Recent Flowsheet Documentation  Taken 3/12/2022 0400 by Spencer Hugo RN  Supportive Measures: active listening utilized  Airway/Ventilation Management: airway patency maintained  Taken 3/12/2022 0000 by Spencer Hugo RN  Supportive Measures: active listening utilized  Head of Bed (HOB) Positioning:   HOB elevated   HOB at 30 degrees  Airway/Ventilation Management: airway patency maintained  Taken 3/11/2022 2000 by Spencer Hugo RN  Head of Bed (HOB) Positioning:   HOB elevated   HOB at 30 degrees

## 2022-03-12 NOTE — THERAPY EVALUATION
Patient Name: Jaquelin Valderrama  : 1940    MRN: [id number]                              Today's Date: 3/12/2022       Admit Date: 3/2/2022    Visit Dx:     ICD-10-CM ICD-9-CM   1. Atrial fibrillation with RVR (HCC)  I48.91 427.31   2. Acute congestive heart failure, unspecified heart failure type (HCC)  I50.9 428.0   3. Lab test negative for COVID-19 virus  Z20.822 V01.79   4. Acute systolic congestive heart failure (HCC)  I50.21 428.21     428.0     Patient Active Problem List   Diagnosis   • Other hyperlipidemia   • Essential hypertension   • Hypothyroidism   • Coronary artery disease involving coronary bypass graft of native heart without angina pectoris   • Astigmatism, bilateral   • Bilateral presbyopia   • Pseudophakia, both eyes   • Atrial fibrillation with RVR (HCC)   • Acute CHF (congestive heart failure) ([facility])   • Acute hyponatremia   • Elevated LFTs   • Elevated TSH   • Acute hyperglycemia   • Obesity (BMI 30-39.9)     Past Medical History:   Diagnosis Date   • Astigmatism, bilateral 2019   • Benign essential hypertension    • Bilateral presbyopia 2019   • CAD (coronary artery disease)    • Closed right ankle fracture    • COVID-19 vaccination refused    • Hyperlipidemia    • Hypothyroidism    • Influenza vaccine needed    • Myocardial infarction ([facility])    • Prediabetes    • Pseudophakia, both eyes 2019   • Thoracic back pain      Past Surgical History:   Procedure Laterality Date   • APPENDECTOMY     • CARDIAC CATHETERIZATION  2012    x3    • CORONARY ARTERY BYPASS GRAFT  2014   • CORONARY STENT PLACEMENT      x3   • HARDWARE REMOVAL Right 2020    Procedure: ANKLE/FOOT HARDWARE REMOVAL;  Surgeon: Lincoln Sibley MD;  Location: [facility];  Service: Orthopedics;  Laterality: Right;   • ORIF ANKLE FRACTURE Right 2019    Radha Vazquez Mem      General Information     Row Name 22 1559          General Information    Prior Level of Function  independent:;all household mobility;ADL's;driving;home management  pt normally walks 2-3 miles daily  -     Existing Precautions/Restrictions oxygen therapy device and L/min;fall;other (see comments)  chest tube, leaking. RN aware & treating  -     Barriers to Rehab none identified  -     Row Name 03/12/22 1559          Living Environment    People in Home spouse  both retired  -     Row Name 03/12/22 1559          Home Main Entrance    Number of Stairs, Main Entrance four  -     Row Name 03/12/22 1559          Stairs Within Home, Primary    Stairs, Within Home, Primary flight up to bed/bath  -     Stair Railings, Within Home, Primary railings safe and in good condition  -     Row Name 03/12/22 1559          Cognition    Orientation Status (Cognition) oriented x 4  -     Row Name 03/12/22 1559          Safety Issues, Functional Mobility    Safety Issues Affecting Function (Mobility) insight into deficits/self-awareness;judgment;problem-solving  -     Impairments Affecting Function (Mobility) endurance/activity tolerance;shortness of breath;range of motion (ROM);pain  -           User Key  (r) = Recorded By, (t) = Taken By, (c) = Cosigned By    Initials Name Provider Type     Ludmila Kingsley OT Occupational Therapist                 Mobility/ADL's     Row Name 03/12/22 1601          Bed Mobility    Bed Mobility supine-sit  -     Supine-Sit [place] (Bed Mobility) set up;standby assist  -     Assistive Device (Bed Mobility) head of bed elevated;bed rails  -     Row Name 03/12/22 1601          Transfers    Transfers sit-stand transfer;stand-sit transfer;toilet transfer  -     Bed-Chair [place] (Transfers) set up;standby assist  -     Assistive Device (Bed-Chair Transfers) walker, front-wheeled  -     Sit-Stand [place] (Transfers) standby assist;verbal cues  -     [place] Level (Toilet Transfer) set up;standby assist;verbal cues  -     Assistive Device (Toilet  Transfer) walker, front-wheeled;grab bars/safety frame;raised toilet seat  -[facility] Name 03/12/22 1601          Sit-Stand Transfer    Assistive Device (Sit-Stand Transfers) walker, front-wheeled  -MH     Row Name 03/12/22 1601          Functional Mobility    Functional Mobility- Ind. Level contact guard assist;set up required  -     Functional Mobility- Device rolling walker  -MH     Row Name 03/12/22 1601          Activities of Daily Living    BADL Assessment/Intervention lower body dressing;grooming;toileting  -MH     Row Name 03/12/22 1601          Lower Body Dressing Assessment/Training    [place] Level (Lower Body Dressing) doff;don;pants/bottoms;maximum assist (25% patient effort)  -     Position (Lower Body Dressing) unsupported sitting  -MH     Row Name 03/12/22 1601          Grooming Assessment/Training    [place] Level (Grooming) oral care regimen;wash face, hands;standby assist;verbal cues;nonverbal cues (demo/gesture)  -     Position (Grooming) sink side;unsupported standing  -MH     Row Name 03/12/22 1601          Toileting Assessment/Training    [place] Level (Toileting) adjust/manage clothing;perform perineal hygiene;minimum assist (75% patient effort)  -     Position (Toileting) unsupported sitting  -           User Key  (r) = Recorded By, (t) = Taken By, (c) = Cosigned By    Initials Name Provider Type     Ludmila Kingsely OT Occupational Therapist               Obj/Interventions     [facility] Name 03/12/22 1603          Sensory Assessment (Somatosensory)    Sensory Assessment (Somatosensory) sensation intact  -MH     Row Name 03/12/22 1603          Range of Motion Comprehensive    Comment, General Range of Motion shld flex & trunk flex limited 50% by pain of chest tube & swelling.  -[facility] Name 03/12/22 1603          Strength Comprehensive (MMT)    General Manual Muscle Testing (MMT) Assessment no strength deficits identified  -MH     Row Name 03/12/22 1603           Balance    Balance Assessment sitting static balance;sitting dynamic balance;standing static balance;standing dynamic balance  -     Static Sitting Balance independent  -     Dynamic Sitting Balance modified independence;set-up;verbal cues  pt was very hesitant to move but able to be encouraged.  -     Static Standing Balance set-up  -     Dynamic Standing Balance contact guard  -     Position/Device Used, Standing Balance walker, front-wheeled  -           User Key  (r) = Recorded By, (t) = Taken By, (c) = Cosigned By    Initials Name Provider Type     Ludmila Kingsley, OT Occupational Therapist               Goals/Plan     Row Name 03/12/22 1611          Bathing Goal 1 (OT)    Activity/Device (Bathing Goal 1, OT) bathing skills, all;hand-held shower spray hose;grab bar, tub/shower;shower chair  -     [place] Level/Cues Needed (Bathing Goal 1, OT) set-up required;standby assist  -     Time Frame (Bathing Goal 1, OT) 2 weeks  -     Row Name 03/12/22 1611          Dressing Goal 1 (OT)    Activity/Device (Dressing Goal 1, OT) dressing skills, all  -     [place]/Cues Needed (Dressing Goal 1, OT) minimum assist (75% or more patient effort)  -     Time Frame (Dressing Goal 1, OT) 2 weeks  -     Row Name 03/12/22 1611          Toileting Goal 1 (OT)    Activity/Device (Toileting Goal 1, OT) toileting skills, all  -     [place] Level/Cues Needed (Toileting Goal 1, OT) set-up required  -     Time Frame (Toileting Goal 1, OT) 2 weeks  -     Row Name 03/12/22 1611          Therapy Assessment/Plan (OT)    Planned Therapy Interventions (OT) activity tolerance training;adaptive equipment training;BADL retraining;edema control/reduction;functional balance retraining;occupation/activity based interventions;patient/caregiver education/training;transfer/mobility retraining;ROM/therapeutic exercise  -           User Key  (r) = Recorded By, (t) = Taken By, (c) = Cosigned By    Initials  Name Provider Type     Ludmila Kingsley, OT Occupational Therapist               Clinical Impression     Row Name 03/12/22 1607          Pain Assessment    Additional Documentation Pain Scale: FACES Pre/Post-Treatment (Group)  -     Row Name 03/12/22 1607          Pain Scale: FACES Pre/Post-Treatment    Pain: FACES Scale, Pretreatment 2-->hurts little bit  -     Posttreatment Pain Rating 2-->hurts little bit  -     Row Name 03/12/22 1607          Plan of Care Review    Plan of Care Reviewed With patient;spouse  -     Progress improving  -     Outcome Evaluation Pt is 83 y/o F who was admitted 1  days ago with weight gain X1 week & SOA with any activity. She is Dx w/ CHF and has had thoracentesis and CT placement to drain hemothorax. Pt is typically (I) and active, walking daily for exercise. She now is SOA with very little activity and is not able to do more yet than access the bathroom. She is asked now to start pushing herself to do more and to sit up in the chair daily. Pt & spouse agree and desire IP rehab placement.  -     Row Name 03/12/22 1607          Therapy Assessment/Plan (OT)    Rehab Potential (OT) good, to achieve stated therapy goals  -     Criteria for Skilled Therapeutic Interventions Met (OT) skilled treatment is necessary  -     Therapy Frequency (OT) 3 times/wk  -     Predicted Duration of Therapy Intervention (OT) until d/c  -     Row Name 03/12/22 1607          Therapy Plan Review/Discharge Plan (OT)    Anticipated Discharge Disposition (OT) inpatient rehabilitation facility  -     Row Name 03/12/22 1607          Vital Signs    Pre SpO2 (%) 95  -MH     O2 Delivery Pre Treatment room air  -     Intra SpO2 (%) 94  -MH     O2 Delivery Intra Treatment room air  -     Post SpO2 (%) 92  -MH     O2 Delivery Post Treatment room air  -     Pre Patient Position Supine  -     Intra Patient Position Standing  -     Post Patient Position Sitting  -     Row Name 03/12/22  1607          Positioning and Restraints    Pre-Treatment Position in bed  -MH     Post Treatment Position chair  -MH     In Chair sitting;with PT;with nsg;call light within reach;with family/caregiver;encouraged to call for assist;exit alarm on  -           User Key  (r) = Recorded By, (t) = Taken By, (c) = Cosigned By    Initials Name Provider Type    Ludmila Dixon OT Occupational Therapist               Outcome Measures     Row Name 03/12/22 1612          How much help from another is currently needed...    Putting on and taking off regular lower body clothing? 2  -MH     Bathing (including washing, rinsing, and drying) 2  -MH     Toileting (which includes using toilet bed pan or urinal) 3  -MH     Putting on and taking off regular upper body clothing 2  -MH     Taking care of personal grooming (such as brushing teeth) 3  -MH     Eating meals 4  -MH     AM-PAC 6 Clicks Score (OT) 16  -     Row Name 03/12/22 1612          How much help from another person do you currently need...    Turning from your back to your side while in flat bed without using bedrails? 3  -MH     Moving from lying on back to sitting on the side of a flat bed without bedrails? 3  -MH     Moving to and from a bed to a chair (including a wheelchair)? 3  -MH     Standing up from a chair using your arms (e.g., wheelchair, bedside chair)? 4  -MH     Climbing 3-5 steps with a railing? 3  -MH     To walk in hospital room? 3  -MH     AM-PAC 6 Clicks Score (PT) 19  -     Row Name 03/12/22 1612          Functional Assessment    Outcome Measure Options AM-PAC 6 Clicks Daily Activity (OT);AM-PAC 6 Clicks Basic Mobility (PT)  -           User Key  (r) = Recorded By, (t) = Taken By, (c) = Cosigned By    Initials Name Provider Type    Ludmila Dixon OT Occupational Therapist                Occupational Therapy Education                 Title: PT OT SLP Therapies (In Progress)     Topic: Occupational Therapy (In Progress)     Point: ADL  training (Done)     Description:   Instruct learner(s) on proper safety adaptation and remediation techniques during self care or transfers.   Instruct in proper use of assistive devices.              Learning Progress Summary           Patient Acceptance, E,TB,D, VU,DU,NR by  at 3/12/2022 1613   Significant Other Acceptance, E,TB,D, VU,DU,NR by  at 3/12/2022 1613                   Point: Home exercise program (Not Started)     Description:   Instruct learner(s) on appropriate technique for monitoring, assisting and/or progressing therapeutic exercises/activities.              Learner Progress:  Not documented in this visit.          Point: Precautions (Done)     Description:   Instruct learner(s) on prescribed precautions during self-care and functional transfers.              Learning Progress Summary           Patient Acceptance, E,TB,D, VU,DU,NR by  at 3/12/2022 1613   Significant Other Acceptance, E,TB,D, VU,DU,NR by  at 3/12/2022 1613                   Point: Body mechanics (Done)     Description:   Instruct learner(s) on proper positioning and spine alignment during self-care, functional mobility activities and/or exercises.              Learning Progress Summary           Patient Acceptance, E,TB,D, VU,DU,NR by  at 3/12/2022 1613   Significant Other Acceptance, E,TB,D, VU,DU,NR by  at 3/12/2022 1613                               User Key     Initials Effective Dates Name Provider Type Discipline     06/16/21 -  Ludmila Kingsley OT Occupational Therapist OT              OT Recommendation and Plan  Planned Therapy Interventions (OT): activity tolerance training, adaptive equipment training, BADL retraining, edema control/reduction, functional balance retraining, occupation/activity based interventions, patient/caregiver education/training, transfer/mobility retraining, ROM/therapeutic exercise  Therapy Frequency (OT): 3 times/wk  Plan of Care Review  Plan of Care Reviewed With: patient,  spouse  Progress: improving  Outcome Evaluation: Pt is 83 y/o F who was admitted 1  days ago with weight gain X1 week & SOA with any activity. She is Dx w/ CHF and has had thoracentesis and CT placement to drain hemothorax. Pt is typically (I) and active, walking daily for exercise. She now is SOA with very little activity and is not able to do more yet than access the bathroom. She is asked now to start pushing herself to do more and to sit up in the chair daily. Pt & spouse agree and desire IP rehab placement.     Time Calculation:    Time Calculation- OT     Row Name 03/12/22 1614             Time Calculation-     OT Start Time 1455  -      OT Stop Time 1515  -      OT Time Calculation (min) 20 min  -      Total Timed Code Minutes- OT 10 minute(s)  -      OT Received On 03/12/22  -      OT - Next Appointment 03/14/22  -      OT Goal Re-Cert Due Date 03/26/22  -            User Key  (r) = Recorded By, (t) = Taken By, (c) = Cosigned By    Initials Name Provider Type     Ludmila Kingsley OT Occupational Therapist              Therapy Charges for Today     Code Description Service Date Service Provider Modifiers Qty    [id number]  OT SELF CARE/MGMT/TRAIN EA 15 MIN 3/12/2022 Ludmila Kingsley OT GO 1    [id number]  OT EVAL MOD COMPLEXITY 2 3/12/2022 Ludmila Kingsley OT GO 1               Ludmila Kingsley OT  3/12/2022

## 2022-03-12 NOTE — PROGRESS NOTES
"    Chief Complaint: loculated pleural effusion, right  S/P: Left thoracentesis on 3/4/22, chest tube insertion on 3/5/22, intrapleural TPA on 3/9/22, 3/10/22 and 3/12/22      Subjective:  Symptoms:  Stable.  She reports shortness of breath, cough, weakness and chest pressure.    Diet:  Adequate intake.  No nausea or vomiting.    Activity level: Impaired due to weakness.    Pain:  She complains of pain that is mild.  She reports pain is improving.  Pain is well controlled.    Up in room. Breathing improved. No new complaints.    Vital Signs:  Temp:  [97.8 °F (36.6 °C)-98.8 °F (37.1 °C)] 97.8 °F (36.6 °C)  Heart Rate:  [57-97] 57  Resp:  [18-28] 22  BP: (118-174)/(69-83) 145/69    Intake & Output (last day)       03/11 0701  03/12 0700 03/12 0701  03/13 0700    P.O. 480 240    Total Intake(mL/kg) 480 (5.8) 240 (2.9)    Urine (mL/kg/hr)      Stool      Chest Tube  50    Total Output  50    Net +480 +190          Urine Unmeasured Occurrence 2 x           Objective:  General Appearance:  Comfortable and in no acute distress.    Vital signs: (most recent): Blood pressure 145/69, pulse 57, temperature 97.8 °F (36.6 °C), temperature source Oral, resp. rate 22, height 160 cm (63\"), weight 83 kg (182 lb 15.7 oz), SpO2 95 %, not currently breastfeeding.    HEENT: Normal HEENT exam.    Lungs:  She is not in respiratory distress.  There are decreased breath sounds.  No rhonchi.    Heart: Normal rate.    Chest: Chest wall tenderness (incisional) present.    Extremities: Decreased range of motion.  There is no dependent edema.    Neurological: Patient is alert.    Skin:  Warm and dry.              Chest tube:   Site: Left, Clean, Dry, Intact and Securement device intact  Suction: -20 cm  Air Leak: negative  24 Hour Total: none documented    Results Review:     I reviewed the patient's new clinical results.  I reviewed the patient's new imaging results and agree with the interpretation.  I reviewed the patient's other test results " and agree with the interpretation  Discussed with patient, RN and Dr. Kuo'    Imaging Results (Last 24 Hours)     Procedure Component Value Units Date/Time    XR Chest 1 View [[id number]] Collected: 03/12/22 0715     Updated: 03/12/22 0720    Narrative:         DATE OF EXAM:   3/12/2022 6:01 AM     PROCEDURE:   XR CHEST 1 VW-     INDICATIONS:   Chest tube; I48.91-Unspecified atrial fibrillation; I50.9-Heart failure,  unspecified; Z20.822-Contact with and (suspected) exposure to covid-19;  I50.21-Acute systolic (congestive) heart failure     COMPARISON:  03/11/2022 and prior     TECHNIQUE:   Portable Chest     FINDINGS:      Study limited by overlying support and monitoring apparatus     Patient status post median sternotomy. A large left-sided pleural  effusion with a loculated component demonstrates slight interval  increase from the comparison accounting for differences in technique.  Associated mild dependent opacity on the left noted. Chest tube remains  unchanged in position.     The right lung remains grossly clear. Osseous structures demonstrate no  acute abnormality.        Impression:      IMPRESSION :   Slight interval increase in large left-sided pleural effusion with  associated dependent opacity.     Left-sided chest tube remains in place.[     Electronically Signed By-Jarrett De Jesus On:3/12/2022 7:17 AM  This report was finalized on [id number] by  Jarrett De Jesus, .          Lab Results:     Lab Results (last 24 hours)     Procedure Component Value Units Date/Time    POC Glucose Once [[id number]]  (Normal) Collected: 03/12/22 1126    Specimen: Blood Updated: 03/12/22 1127     Glucose 100 mg/dL      Comment: Serial Number: [id number]:  [id number]       POC Glucose Once [[id number]]  (Abnormal) Collected: 03/12/22 0731    Specimen: Blood Updated: 03/12/22 0732     Glucose 154 mg/dL      Comment: Serial Number: [id number]:  [id number]       Comprehensive Metabolic Panel [[id number]]   (Abnormal) Collected: 03/12/22 0020    Specimen: Blood Updated: 03/12/22 0157     Glucose 154 mg/dL      BUN 17 mg/dL      Creatinine 0.76 mg/dL      Sodium 128 mmol/L      Potassium 4.0 mmol/L      Chloride 94 mmol/L      CO2 22.0 mmol/L      Calcium 8.2 mg/dL      Total Protein 5.5 g/dL      Albumin 2.80 g/dL      ALT (SGPT) 30 U/L      AST (SGOT) 28 U/L      Alkaline Phosphatase 89 U/L      Total Bilirubin 0.5 mg/dL      Globulin 2.7 gm/dL      A/G Ratio 1.0 g/dL      BUN/Creatinine Ratio 22.4     Anion Gap 12.0 mmol/L      eGFR 79.3 mL/min/1.73      Comment: National Kidney Foundation and American Society of Nephrology (ASN) Task Force recommended calculation based on the Chronic Kidney Disease Epidemiology Collaboration (CKD-EPI) equation refit without adjustment for race.       Narrative:      GFR Normal >60  Chronic Kidney Disease <60  Kidney Failure <15      Phosphorus [[id number]]  (Normal) Collected: 03/12/22 0020    Specimen: Blood Updated: 03/12/22 0157     Phosphorus 2.5 mg/dL     Magnesium [[id number]]  (Normal) Collected: 03/12/22 0020    Specimen: Blood Updated: 03/12/22 0157     Magnesium 2.0 mg/dL     Calcium, Ionized [[id number]]  (Abnormal) Collected: 03/12/22 0020    Specimen: Blood Updated: 03/12/22 0135     Ionized Calcium 1.16 mmol/L     CBC & Differential [[id number]]  (Abnormal) Collected: 03/12/22 0020    Specimen: Blood Updated: 03/12/22 0133    Narrative:      The following orders were created for panel order CBC & Differential.  Procedure                               Abnormality         Status                     ---------                               -----------         ------                     CBC Auto Differential[[id number]]        Abnormal            Final result                 Please view results for these tests on the individual orders.    CBC Auto Differential [[id number]]  (Abnormal) Collected: 03/12/22 0020    Specimen: Blood Updated: 03/12/22 0133     WBC 13.00 10*3/mm3       RBC 2.87 10*6/mm3      Hemoglobin 9.1 g/dL      Hematocrit 27.2 %      MCV 94.7 fL      MCH 31.6 pg      MCHC 33.4 g/dL      RDW 15.6 %      RDW-SD 52.5 fl      MPV 7.6 fL      Platelets 383 10*3/mm3      Neutrophil % 82.4 %      Lymphocyte % 7.0 %      Monocyte % 9.9 %      Eosinophil % 0.6 %      Basophil % 0.1 %      Neutrophils, Absolute 10.70 10*3/mm3      Lymphocytes, Absolute 0.90 10*3/mm3      Monocytes, Absolute 1.30 10*3/mm3      Eosinophils, Absolute 0.10 10*3/mm3      Basophils, Absolute 0.00 10*3/mm3      nRBC 0.2 /100 WBC     POC Glucose Once [[id number]]  (Abnormal) Collected: 03/11/22 1953    Specimen: Blood Updated: 03/11/22 1954     Glucose 197 mg/dL      Comment: Serial Number: [id number]:  [id number]       POC Glucose Once [[id number]]  (Abnormal) Collected: 03/11/22 1607    Specimen: Blood Updated: 03/11/22 1609     Glucose 148 mg/dL      Comment: Serial Number: [id number]:  [id number]       Comprehensive Metabolic Panel [[id number]]  (Abnormal) Collected: 03/11/22 1206    Specimen: Blood Updated: 03/11/22 1330     Glucose 161 mg/dL      BUN 18 mg/dL      Creatinine 0.72 mg/dL      Sodium 126 mmol/L      Potassium 4.1 mmol/L      Chloride 93 mmol/L      CO2 23.0 mmol/L      Calcium 8.2 mg/dL      Total Protein 5.5 g/dL      Albumin 2.60 g/dL      ALT (SGPT) 19 U/L      AST (SGOT) 14 U/L      Alkaline Phosphatase 87 U/L      Total Bilirubin 0.4 mg/dL      Globulin 2.9 gm/dL      A/G Ratio 0.9 g/dL      BUN/Creatinine Ratio 25.0     Anion Gap 10.0 mmol/L      eGFR 84.6 mL/min/1.73      Comment: National Kidney Foundation and American Society of Nephrology (ASN) Task Force recommended calculation based on the Chronic Kidney Disease Epidemiology Collaboration (CKD-EPI) equation refit without adjustment for race.       Narrative:      GFR Normal >60  Chronic Kidney Disease <60  Kidney Failure <15      Phosphorus [[id number]]  (Normal) Collected: 03/11/22 1206    Specimen: Blood Updated:  03/11/22 1330     Phosphorus 2.8 mg/dL     Magnesium [[id number]]  (Normal) Collected: 03/11/22 1206    Specimen: Blood Updated: 03/11/22 1330     Magnesium 2.1 mg/dL     Calcium, Ionized [[id number]]  (Abnormal) Collected: 03/11/22 1206    Specimen: Blood Updated: 03/11/22 1311     Ionized Calcium 1.19 mmol/L     CBC & Differential [[id number]]  (Abnormal) Collected: 03/11/22 1206    Specimen: Blood Updated: 03/11/22 1303    Narrative:      The following orders were created for panel order CBC & Differential.  Procedure                               Abnormality         Status                     ---------                               -----------         ------                     CBC Auto Differential[[id number]]        Abnormal            Final result                 Please view results for these tests on the individual orders.    CBC Auto Differential [[id number]]  (Abnormal) Collected: 03/11/22 1206    Specimen: Blood Updated: 03/11/22 1303     WBC 13.10 10*3/mm3      RBC 2.79 10*6/mm3      Hemoglobin 9.1 g/dL      Hematocrit 26.6 %      MCV 95.5 fL      MCH 32.5 pg      MCHC 34.0 g/dL      RDW 16.0 %      RDW-SD 52.5 fl      MPV 8.0 fL      Platelets 294 10*3/mm3      Neutrophil % 85.4 %      Lymphocyte % 5.1 %      Monocyte % 8.7 %      Eosinophil % 0.5 %      Basophil % 0.3 %      Neutrophils, Absolute 11.20 10*3/mm3      Lymphocytes, Absolute 0.70 10*3/mm3      Monocytes, Absolute 1.10 10*3/mm3      Eosinophils, Absolute 0.10 10*3/mm3      Basophils, Absolute 0.00 10*3/mm3      nRBC 0.0 /100 WBC            Assessment/Plan       Atrial fibrillation with RVR (HCC)    Other hyperlipidemia    Essential hypertension    Hypothyroidism    Coronary artery disease involving coronary bypass graft of native heart without angina pectoris    Acute CHF (congestive heart failure) (HCC)    Acute hyponatremia    Elevated LFTs    Elevated TSH    Acute hyperglycemia    Obesity (BMI 30-39.9)       Assessment & Plan     I have  independently reviewed this morning's chest x-ray and compared to previous imaging.  Stable appearance of loculated left pleural effusion, which is improved from initial imaging.  Chest tube in adequate position.  No pneumothorax.    Loculated pleural effusion: Patient status post intrapleural Activase on 3/9/2022 and 3/10/2022.  Minimal output from chest tube although chest x-ray is somewhat improved in appearance.  We will give a final intrapleural TPA today and recheck a chest x-ray in the morning.  Patient not a surgical candidate for VATS given her advanced age and EF of 30%.    Hypoxia: Remains intermittently on 2 L per nasal cannula.  Encourage incentive spirometry 10 times per hour and out of bed as much as possible.    Generalized weakness: Appreciate assistance from PT and nursing staff with increasing mobilization.  Anticipate patient will need rehab upon discharge.    Anemia: Hemoglobin stable at 9.1 today. Recheck CBC tomorrow morning.    A. fib: Cardiology note reviewed.  Possible cardioversion versus GABRIEL cardioversion.  Continue treatment per their recommendations.  Recommend to hold oral anticoagulation until chest tube has been removed.      Montserrat Humphries DNP, APRN  [facility]  03/12/22  12:29 EST      I wore protective equipment throughout this patient encounter including a face mask, gloves and protective eyewear.  Hand hygiene was performed before donning protective equipment and after removal when leaving the room.

## 2022-03-13 ENCOUNTER — APPOINTMENT (OUTPATIENT)
Dept: GENERAL RADIOLOGY | Facility: HOSPITAL | Age: 82
End: 2022-03-13

## 2022-03-13 LAB
ALBUMIN SERPL-MCNC: 3.1 G/DL (ref 3.5–5.2)
ALBUMIN/GLOB SERPL: 1 G/DL
ALP SERPL-CCNC: 118 U/L (ref 39–117)
ALT SERPL W P-5'-P-CCNC: 44 U/L (ref 1–33)
ANION GAP SERPL CALCULATED.3IONS-SCNC: 11 MMOL/L (ref 5–15)
AST SERPL-CCNC: 38 U/L (ref 1–32)
BASOPHILS # BLD AUTO: 0 10*3/MM3 (ref 0–0.2)
BASOPHILS NFR BLD AUTO: 0.3 % (ref 0–1.5)
BILIRUB SERPL-MCNC: 0.4 MG/DL (ref 0–1.2)
BUN SERPL-MCNC: 20 MG/DL (ref 8–23)
BUN/CREAT SERPL: 25 (ref 7–25)
CA-I SERPL ISE-MCNC: 1.22 MMOL/L (ref 1.2–1.3)
CALCIUM SPEC-SCNC: 8.6 MG/DL (ref 8.6–10.5)
CHLORIDE SERPL-SCNC: 94 MMOL/L (ref 98–107)
CO2 SERPL-SCNC: 24 MMOL/L (ref 22–29)
CREAT SERPL-MCNC: 0.8 MG/DL (ref 0.57–1)
DEPRECATED RDW RBC AUTO: 49.9 FL (ref 37–54)
EGFRCR SERPLBLD CKD-EPI 2021: 74.6 ML/MIN/1.73
EOSINOPHIL # BLD AUTO: 0 10*3/MM3 (ref 0–0.4)
EOSINOPHIL NFR BLD AUTO: 0.3 % (ref 0.3–6.2)
ERYTHROCYTE [DISTWIDTH] IN BLOOD BY AUTOMATED COUNT: 15.2 % (ref 12.3–15.4)
GLOBULIN UR ELPH-MCNC: 3.1 GM/DL
GLUCOSE BLDC GLUCOMTR-MCNC: 110 MG/DL (ref 70–105)
GLUCOSE BLDC GLUCOMTR-MCNC: 114 MG/DL (ref 70–105)
GLUCOSE BLDC GLUCOMTR-MCNC: 144 MG/DL (ref 70–105)
GLUCOSE BLDC GLUCOMTR-MCNC: 168 MG/DL (ref 70–105)
GLUCOSE SERPL-MCNC: 184 MG/DL (ref 65–99)
HCT VFR BLD AUTO: 28.8 % (ref 34–46.6)
HGB BLD-MCNC: 9.5 G/DL (ref 12–15.9)
LYMPHOCYTES # BLD AUTO: 0.7 10*3/MM3 (ref 0.7–3.1)
LYMPHOCYTES NFR BLD AUTO: 4.6 % (ref 19.6–45.3)
MAGNESIUM SERPL-MCNC: 2.2 MG/DL (ref 1.6–2.4)
MCH RBC QN AUTO: 31.3 PG (ref 26.6–33)
MCHC RBC AUTO-ENTMCNC: 33.1 G/DL (ref 31.5–35.7)
MCV RBC AUTO: 94.4 FL (ref 79–97)
MONOCYTES # BLD AUTO: 1.3 10*3/MM3 (ref 0.1–0.9)
MONOCYTES NFR BLD AUTO: 9.1 % (ref 5–12)
NEUTROPHILS NFR BLD AUTO: 12.1 10*3/MM3 (ref 1.7–7)
NEUTROPHILS NFR BLD AUTO: 85.7 % (ref 42.7–76)
NRBC BLD AUTO-RTO: 0.1 /100 WBC (ref 0–0.2)
PHOSPHATE SERPL-MCNC: 2.9 MG/DL (ref 2.5–4.5)
PLATELET # BLD AUTO: 485 10*3/MM3 (ref 140–450)
PMV BLD AUTO: 7.4 FL (ref 6–12)
POTASSIUM SERPL-SCNC: 4.1 MMOL/L (ref 3.5–5.2)
PROT SERPL-MCNC: 6.2 G/DL (ref 6–8.5)
RBC # BLD AUTO: 3.05 10*6/MM3 (ref 3.77–5.28)
SODIUM SERPL-SCNC: 129 MMOL/L (ref 136–145)
WBC NRBC COR # BLD: 14.1 10*3/MM3 (ref 3.4–10.8)

## 2022-03-13 PROCEDURE — 99232 SBSQ HOSP IP/OBS MODERATE 35: CPT | Performed by: INTERNAL MEDICINE

## 2022-03-13 PROCEDURE — 84100 ASSAY OF PHOSPHORUS: CPT | Performed by: STUDENT IN AN ORGANIZED HEALTH CARE EDUCATION/TRAINING PROGRAM

## 2022-03-13 PROCEDURE — 82962 GLUCOSE BLOOD TEST: CPT

## 2022-03-13 PROCEDURE — 83735 ASSAY OF MAGNESIUM: CPT | Performed by: INTERNAL MEDICINE

## 2022-03-13 PROCEDURE — 85025 COMPLETE CBC W/AUTO DIFF WBC: CPT | Performed by: INTERNAL MEDICINE

## 2022-03-13 PROCEDURE — 25010000002 HEPARIN (PORCINE) PER 1000 UNITS: Performed by: INTERNAL MEDICINE

## 2022-03-13 PROCEDURE — 80053 COMPREHEN METABOLIC PANEL: CPT | Performed by: STUDENT IN AN ORGANIZED HEALTH CARE EDUCATION/TRAINING PROGRAM

## 2022-03-13 PROCEDURE — 63710000001 INSULIN LISPRO (HUMAN) PER 5 UNITS

## 2022-03-13 PROCEDURE — 63710000001 INSULIN GLARGINE PER 5 UNITS: Performed by: INTERNAL MEDICINE

## 2022-03-13 PROCEDURE — 83615 LACTATE (LD) (LDH) ENZYME: CPT | Performed by: STUDENT IN AN ORGANIZED HEALTH CARE EDUCATION/TRAINING PROGRAM

## 2022-03-13 PROCEDURE — 71045 X-RAY EXAM CHEST 1 VIEW: CPT

## 2022-03-13 PROCEDURE — 94799 UNLISTED PULMONARY SVC/PX: CPT

## 2022-03-13 PROCEDURE — 63710000001 ONDANSETRON PER 8 MG: Performed by: INTERNAL MEDICINE

## 2022-03-13 PROCEDURE — 82330 ASSAY OF CALCIUM: CPT | Performed by: INTERNAL MEDICINE

## 2022-03-13 RX ADMIN — ARFORMOTEROL TARTRATE 15 MCG: 15 SOLUTION RESPIRATORY (INHALATION) at 19:10

## 2022-03-13 RX ADMIN — AMIODARONE HYDROCHLORIDE 200 MG: 200 TABLET ORAL at 06:01

## 2022-03-13 RX ADMIN — BUDESONIDE 0.5 MG: 0.5 INHALANT RESPIRATORY (INHALATION) at 19:15

## 2022-03-13 RX ADMIN — Medication 1 TABLET: at 08:13

## 2022-03-13 RX ADMIN — HEPARIN SODIUM 5000 UNITS: 5000 INJECTION INTRAVENOUS; SUBCUTANEOUS at 08:12

## 2022-03-13 RX ADMIN — Medication 10 ML: at 08:13

## 2022-03-13 RX ADMIN — BUDESONIDE 1 MG: 0.5 INHALANT RESPIRATORY (INHALATION) at 07:40

## 2022-03-13 RX ADMIN — INSULIN GLARGINE 10 UNITS: 100 INJECTION, SOLUTION SUBCUTANEOUS at 21:23

## 2022-03-13 RX ADMIN — Medication 5 MG: at 21:21

## 2022-03-13 RX ADMIN — METOPROLOL TARTRATE 25 MG: 25 TABLET, FILM COATED ORAL at 08:13

## 2022-03-13 RX ADMIN — METOPROLOL TARTRATE 25 MG: 25 TABLET, FILM COATED ORAL at 21:21

## 2022-03-13 RX ADMIN — DILTIAZEM HYDROCHLORIDE 90 MG: 30 TABLET, FILM COATED ORAL at 06:01

## 2022-03-13 RX ADMIN — SENNOSIDES AND DOCUSATE SODIUM 2 TABLET: 50; 8.6 TABLET ORAL at 08:12

## 2022-03-13 RX ADMIN — ARFORMOTEROL TARTRATE 15 MCG: 15 SOLUTION RESPIRATORY (INHALATION) at 07:36

## 2022-03-13 RX ADMIN — ASPIRIN 81 MG CHEWABLE TABLET 81 MG: 81 TABLET CHEWABLE at 08:12

## 2022-03-13 RX ADMIN — AMIODARONE HYDROCHLORIDE 200 MG: 200 TABLET ORAL at 17:20

## 2022-03-13 RX ADMIN — LEVOTHYROXINE SODIUM 50 MCG: 0.05 TABLET ORAL at 06:01

## 2022-03-13 RX ADMIN — Medication 10 ML: at 21:23

## 2022-03-13 RX ADMIN — HEPARIN SODIUM 5000 UNITS: 5000 INJECTION INTRAVENOUS; SUBCUTANEOUS at 21:22

## 2022-03-13 RX ADMIN — ONDANSETRON HYDROCHLORIDE 4 MG: 4 TABLET, FILM COATED ORAL at 08:12

## 2022-03-13 RX ADMIN — DILTIAZEM HYDROCHLORIDE 90 MG: 30 TABLET, FILM COATED ORAL at 21:20

## 2022-03-13 RX ADMIN — INSULIN LISPRO 3 UNITS: 100 INJECTION, SOLUTION INTRAVENOUS; SUBCUTANEOUS at 08:14

## 2022-03-13 RX ADMIN — ZOLPIDEM TARTRATE 5 MG: 5 TABLET ORAL at 21:21

## 2022-03-13 RX ADMIN — DILTIAZEM HYDROCHLORIDE 90 MG: 30 TABLET, FILM COATED ORAL at 16:13

## 2022-03-13 NOTE — PROGRESS NOTES
Referring Provider: Mp Galicia MD        Reason for follow-up:  Status post CABG  Atrial fibrillation  Hypertension  Left pleural effusion     Patient Care Team:  Minerva Chatterjee MD as PCP - General (Internal Medicine)    Subjective .      ROS    Since I have last seen, the patient has been without any chest discomfort ,shortness of breath, palpitations, dizziness or syncope.  Denies having any headache ,abdominal pain ,nausea, vomiting , diarrhea constipation, loss of weight or loss of appetite.  Denies having any excessive bruising ,hematuria or blood in the stool.    Review of all systems negative except as indicated    History  Past Medical History:   Diagnosis Date   • Astigmatism, bilateral 2019   • Benign essential hypertension    • Bilateral presbyopia 2019   • CAD (coronary artery disease)    • Closed right ankle fracture    • COVID-19 vaccination refused    • Hyperlipidemia    • Hypothyroidism    • Influenza vaccine needed    • Myocardial infarction (HCC)    • Prediabetes    • Pseudophakia, both eyes 2019   • Thoracic back pain        Past Surgical History:   Procedure Laterality Date   • APPENDECTOMY     • CARDIAC CATHETERIZATION  2012    x3    • CORONARY ARTERY BYPASS GRAFT  2014   • CORONARY STENT PLACEMENT      x3   • HARDWARE REMOVAL Right 2020    Procedure: ANKLE/FOOT HARDWARE REMOVAL;  Surgeon: Lincoln Sibley MD;  Location: [facility];  Service: Orthopedics;  Laterality: Right;   • ORIF ANKLE FRACTURE Right 2019    Radha Vazquez Mem       Family History   Problem Relation Age of Onset   • Heart disease Mother    • Lung cancer Father    • Diabetes Other        Social History     Tobacco Use   • Smoking status: Former Smoker     Types: Cigarettes     Quit date: 1980     Years since quittin.2   • Smokeless tobacco: Never Used   • Tobacco comment: Social Drinker   Vaping Use   • Vaping Use: Never used   Substance Use Topics   • Alcohol use: Yes      Comment: mod    • Drug use: No        Medications Prior to Admission   Medication Sig Dispense Refill Last Dose   • aspirin 81 MG chewable tablet Chew 81 mg Daily.   3/2/2022 at Unknown time   • Cholecalciferol (VITAMIN D3) 2000 units tablet Take 1 tablet by mouth Daily.   3/2/2022 at Unknown time   • irbesartan (Avapro) 300 MG tablet Take 150 mg by mouth Daily.      • metoprolol tartrate (LOPRESSOR) 25 MG tablet Take 1 tablet by mouth twice daily 180 tablet 0 3/2/2022 at Unknown time   • Multiple Vitamins-Minerals (MULTIVITAMIN ADULT EXTRA C PO) Take 1 tablet by mouth Daily.   3/2/2022 at Unknown time   • levothyroxine (SYNTHROID, LEVOTHROID) 50 MCG tablet Take 50 mcg by mouth Every Morning.          Allergies  Prednisone    Scheduled Meds:custom intrapleural syringe builder, , Intrapleural, Once  amiodarone, 200 mg, Oral, Q12H   Followed by  [START ON 3/27/2022] amiodarone, 200 mg, Oral, Daily  arformoterol, 15 mcg, Nebulization, BID - RT  aspirin, 81 mg, Oral, Daily  budesonide, 1 mg, Nebulization, BID - RT  dilTIAZem, 90 mg, Oral, Q8H  heparin (porcine), 5,000 Units, Subcutaneous, Q12H  insulin glargine, 10 Units, Subcutaneous, Nightly  insulin lispro, 0-14 Units, Subcutaneous, TID AC  levothyroxine, 50 mcg, Oral, Q AM  melatonin, 5 mg, Oral, Nightly  metoprolol tartrate, 25 mg, Oral, BID  multivitamin with minerals, 1 tablet, Oral, Daily  senna-docusate sodium, 2 tablet, Oral, BID  sodium chloride, 10 mL, Intravenous, Q12H  zolpidem, 5 mg, Oral, Nightly      Continuous Infusions:hold, 1 each      PRN Meds:.•  acetaminophen **OR** acetaminophen **OR** acetaminophen  •  aluminum-magnesium hydroxide-simethicone  •  senna-docusate sodium **AND** polyethylene glycol **AND** bisacodyl **AND** bisacodyl  •  dextrose  •  dextrose  •  glucagon (human recombinant)  •  hold  •  insulin lispro **AND** insulin lispro  •  magnesium sulfate **OR** magnesium sulfate in D5W 1g/100mL (PREMIX)  •  nitroglycerin  •  ondansetron  "**OR** ondansetron  •  potassium chloride **OR** potassium chloride **OR** potassium chloride  •  sodium chloride    Objective     VITAL SIGNS  Vitals:    03/13/22 0739 03/13/22 0740 03/13/22 0809 03/13/22 1055   BP:   134/76 146/66   BP Location:   Left arm Left arm   Patient Position:   Lying Sitting   Pulse: 55 65 59 59   Resp: 16 17 16 14   Temp:    98.1 °F (36.7 °C)   TempSrc:    Oral   SpO2:   93% 94%   Weight:       Height:           Flowsheet Rows    Flowsheet Row First Filed Value   Admission Height 162.6 cm (64\") Documented at 03/02/2022 2218   Admission Weight 81 kg (178 lb 9.2 oz) Documented at 03/02/2022 2218            Intake/Output Summary (Last 24 hours) at 3/13/2022 1240  Last data filed at 3/13/2022 1055  Gross per 24 hour   Intake 720 ml   Output 80 ml   Net 640 ml        TELEMETRY: Atrial fibrillation    Physical Exam:  The patient is alert, oriented and in no distress.  Vital signs as noted above.  Head and neck revealed no carotid bruits or jugular venous distention.  No thyromegaly or lymphadenopathy is present  Lungs clear.  No wheezing.  Breath sounds are normal bilaterally.  Left chest tube in place.  Heart normal first and second heart sounds.  No murmur. No precordial rub is present.  No gallop is present.  Abdomen soft and nontender.  No organomegaly is present.  Extremities with good peripheral pulses without any pedal edema.  Skin warm and dry.  Musculoskeletal system is grossly normal  CNS grossly normal      Results Review:   I reviewed the patient's new clinical results.  Lab Results (last 24 hours)     Procedure Component Value Units Date/Time    POC Glucose Once [[id number]]  (Abnormal) Collected: 03/13/22 1126    Specimen: Blood Updated: 03/13/22 1128     Glucose 114 mg/dL      Comment: Serial Number: [id number]:  [id number]       POC Glucose Once [[id number]]  (Abnormal) Collected: 03/13/22 0732    Specimen: Blood Updated: 03/13/22 0733     Glucose 144 mg/dL      Comment: " Serial Number: [id number]:  [id number]       Magnesium [[id number]]  (Normal) Collected: 03/13/22 0032    Specimen: Blood Updated: 03/13/22 0336     Magnesium 2.2 mg/dL     Comprehensive Metabolic Panel [[id number]]  (Abnormal) Collected: 03/13/22 0032    Specimen: Blood Updated: 03/13/22 0336     Glucose 184 mg/dL      BUN 20 mg/dL      Creatinine 0.80 mg/dL      Sodium 129 mmol/L      Potassium 4.1 mmol/L      Chloride 94 mmol/L      CO2 24.0 mmol/L      Calcium 8.6 mg/dL      Total Protein 6.2 g/dL      Albumin 3.10 g/dL      ALT (SGPT) 44 U/L      AST (SGOT) 38 U/L      Alkaline Phosphatase 118 U/L      Total Bilirubin 0.4 mg/dL      Globulin 3.1 gm/dL      A/G Ratio 1.0 g/dL      BUN/Creatinine Ratio 25.0     Anion Gap 11.0 mmol/L      eGFR 74.6 mL/min/1.73      Comment: National Kidney Foundation and American Society of Nephrology (ASN) Task Force recommended calculation based on the Chronic Kidney Disease Epidemiology Collaboration (CKD-EPI) equation refit without adjustment for race.       Narrative:      GFR Normal >60  Chronic Kidney Disease <60  Kidney Failure <15      Phosphorus [[id number]]  (Normal) Collected: 03/13/22 0032    Specimen: Blood Updated: 03/13/22 0336     Phosphorus 2.9 mg/dL     Calcium, Ionized [[id number]]  (Normal) Collected: 03/13/22 0032    Specimen: Blood Updated: 03/13/22 0336     Ionized Calcium 1.22 mmol/L     CBC & Differential [[id number]]  (Abnormal) Collected: 03/13/22 0032    Specimen: Blood Updated: 03/13/22 0308    Narrative:      The following orders were created for panel order CBC & Differential.  Procedure                               Abnormality         Status                     ---------                               -----------         ------                     CBC Auto Differential[[id number]]        Abnormal            Final result                 Please view results for these tests on the individual orders.    CBC Auto Differential [[id number]]  (Abnormal)  Collected: 03/13/22 0032    Specimen: Blood Updated: 03/13/22 0308     WBC 14.10 10*3/mm3      RBC 3.05 10*6/mm3      Hemoglobin 9.5 g/dL      Hematocrit 28.8 %      MCV 94.4 fL      MCH 31.3 pg      MCHC 33.1 g/dL      RDW 15.2 %      RDW-SD 49.9 fl      MPV 7.4 fL      Platelets 485 10*3/mm3      Neutrophil % 85.7 %      Lymphocyte % 4.6 %      Monocyte % 9.1 %      Eosinophil % 0.3 %      Basophil % 0.3 %      Neutrophils, Absolute 12.10 10*3/mm3      Lymphocytes, Absolute 0.70 10*3/mm3      Monocytes, Absolute 1.30 10*3/mm3      Eosinophils, Absolute 0.00 10*3/mm3      Basophils, Absolute 0.00 10*3/mm3      nRBC 0.1 /100 WBC     POC Glucose Once [[id number]]  (Abnormal) Collected: 03/12/22 1928    Specimen: Blood Updated: 03/12/22 1929     Glucose 211 mg/dL      Comment: Serial Number: [id number]:  [id number]       POC Glucose Once [[id number]]  (Abnormal) Collected: 03/12/22 1631    Specimen: Blood Updated: 03/12/22 1634     Glucose 154 mg/dL      Comment: Serial Number: [id number]:  [id number]             Imaging Results (Last 24 Hours)     Procedure Component Value Units Date/Time    XR Chest 1 View [[id number]] Collected: 03/13/22 1126     Updated: 03/13/22 1130    Narrative:         DATE OF EXAM:   3/13/2022 6:30 AM     PROCEDURE:   XR CHEST 1 VW-     INDICATIONS:   chest tube management; I48.91-Unspecified atrial fibrillation;  I50.9-Heart failure, unspecified; Z20.822-Contact with and (suspected)  exposure to covid-19; I50.21-Acute systolic (congestive) heart failure     COMPARISON:  03/12/2022 and prior     TECHNIQUE:   Portable Chest     FINDINGS:      Study limited by overlying support and monitoring apparatus     Patient status post median sternotomy.     Large left-sided pleural effusion with component tracking along the  lateral wall is decreased from the comparison. Dependent opacities at  the left base again noted. Left-sided chest tube remains in place. Right  lung is grossly clear.  Osseous structures are unremarkable        Impression:      IMPRESSION :   Slight decrease in size of left-sided pleural effusion. Chest tube  remains in place[     Electronically Signed By-Jarrett De Jesus On:3/13/2022 11:28 AM  This report was finalized on [id number] by  Jarrett De Jesus, .      LAB RESULTS (LAST 7 DAYS)    CBC  Results from last 7 days   Lab Units 03/13/22  0032 03/12/22  0020 03/11/22  1206 03/10/22  0009 03/09/22  1230 03/09/22  0651 03/09/22  0036   WBC 10*3/mm3 14.10* 13.00* 13.10* 15.40* 14.10* 13.00* 13.70*   RBC 10*6/mm3 3.05* 2.87* 2.79* 2.89* 2.98* 2.90* 2.87*   HEMOGLOBIN g/dL 9.5* 9.1* 9.1* 9.3* 9.9* 9.4* 9.1*   HEMATOCRIT % 28.8* 27.2* 26.6* 27.3* 28.2* 27.5* 26.8*   MCV fL 94.4 94.7 95.5 94.3 94.5 94.6 93.3   PLATELETS 10*3/mm3 485* 383 294 291 267 244 252       BMP  Results from last 7 days   Lab Units 03/13/22  0032 03/12/22  0020 03/11/22  1206 03/10/22  0009 03/09/22  0651 03/08/22  0018 03/07/22  0014   SODIUM mmol/L 129* 128* 126* 130* 129* 130* 129*   POTASSIUM mmol/L 4.1 4.0 4.1 4.3 4.0 4.1 4.1   CHLORIDE mmol/L 94* 94* 93* 96* 97* 96* 94*   CO2 mmol/L 24.0 22.0 23.0 23.0 23.0 23.0 23.0   BUN mg/dL 20 17 18 19 17 30* 36*   CREATININE mg/dL 0.80 0.76 0.72 0.67 0.73 1.03* 1.15*   GLUCOSE mg/dL 184* 154* 161* 168* 144* 169* 213*   MAGNESIUM mg/dL 2.2 2.0 2.1 2.1 2.0 2.1 2.2   PHOSPHORUS mg/dL 2.9 2.5 2.8 2.4* 2.4* 2.6 3.5       CMP   Results from last 7 days   Lab Units 03/13/22  0032 03/12/22  0020 03/11/22  1206 03/10/22  0009 03/09/22  0651 03/08/22  0018 03/07/22  0014   SODIUM mmol/L 129* 128* 126* 130* 129* 130* 129*   POTASSIUM mmol/L 4.1 4.0 4.1 4.3 4.0 4.1 4.1   CHLORIDE mmol/L 94* 94* 93* 96* 97* 96* 94*   CO2 mmol/L 24.0 22.0 23.0 23.0 23.0 23.0 23.0   BUN mg/dL 20 17 18 19 17 30* 36*   CREATININE mg/dL 0.80 0.76 0.72 0.67 0.73 1.03* 1.15*   GLUCOSE mg/dL 184* 154* 161* 168* 144* 169* 213*   ALBUMIN g/dL 3.10* 2.80* 2.60* 2.80* 3.00* 2.90* 3.10*   BILIRUBIN mg/dL  0.4 0.5 0.4 0.5 0.6 0.3 0.3   ALK PHOS U/L 118* 89 87 81 76 77 77   AST (SGOT) U/L 38* 28 14 13 12 17 19   ALT (SGPT) U/L 44* 30 19 19 20 27 28         BNP        TROPONIN        CoAg        Creatinine Clearance  Estimated Creatinine Clearance: 58.3 mL/min (by C-G formula based on SCr of 0.8 mg/dL).    ABG        Radiology  XR Chest 1 View    Result Date: 3/13/2022  IMPRESSION : Slight decrease in size of left-sided pleural effusion. Chest tube remains in place[  Electronically Signed By-Jarrett De Jesus On:3/13/2022 11:28 AM This report was finalized on [id number] by  Jarrett De Jesus, .    XR Chest 1 View    Result Date: 3/12/2022  IMPRESSION : Slight interval increase in large left-sided pleural effusion with associated dependent opacity.  Left-sided chest tube remains in place.[  Electronically Signed By-Jarrett De Jesus On:3/12/2022 7:17 AM This report was finalized on [id number] by  Jarrett De Jesus, .              EKG                              I personally viewed and interpreted the patient's EKG/Telemetry data: Atrial fibrillation          ECHOCARDIOGRAM:    Results for orders placed during the hospital encounter of 03/02/22    Adult Transthoracic Echo Complete W/ Cont if Necessary Per Protocol    Interpretation Summary  LVE with severe global left ventricular hypokinesis, estimated LV ejection fraction of 30%.  Severe right ventricular enlargement and moderate right atrial enlargement seen  Severe left atrial enlargement seen.  Aortic valve, mitral valve, tricuspid valve appears structurally normal, moderate mitral and mild tricuspid regurgitation seen.  Calculated RV systolic pressure of 40 to 45 mmHg.  No pericardial effusion seen.  Large pleural effusion seen.  Proximal aorta appears normal in size.          STRESS MYOVIEW:    Cardiolite (Tc-99m Sestamibi) stress test    CARDIAC CATHETERIZATION:            OTHER:         Assessment/Plan     Principal Problem:    Atrial fibrillation with RVR  (HCC)  Active Problems:    Other hyperlipidemia    Essential hypertension    Hypothyroidism    Coronary artery disease involving coronary bypass graft of native heart without angina pectoris    Acute CHF (congestive heart failure) (HCC)    Acute hyponatremia    Elevated LFTs    Elevated TSH    Acute hyperglycemia    Obesity (BMI 30-39.9)      Presented through emergency room 3/2/2022 with progressively worsening shortness of breath and dyspnea on exertion weight gain and leg edema. Was found to be in A. fib and congestive heart failure. Patient denies any chest pain. Patient lives in [place] and has a second home in [place] and see his cardiologist at [facility]. Patient has been having issues with alopecia and memory issues therefore stopped beta-blockers and statin on her own. Also has not been taking  thyroid replacement therapy since November because she ran out of medication. Patient has history of prediabetes. Does not check her sugars at home.  Work-up here revealed troponin x3 are negative. proBNP is 7717. CMP reveals a glucose of 242, hemoglobin A1c over 7.1. BUN/creatinine are 21/0.83. ALT elevated at 71, AST 35. TSH is 11.15  Chest x-ray 3/2/2022 reveals left pleural effusion, left basilar disease most likely atelectasis with possible pneumonia.  EKG 3/2/2022 reviewed/interpreted by me reveals A. fib with RVR at 126 bpm with PVCs      ]]]]]]]]]]]]]]]]]]]]  Impression  ==========  -Progressively worsening shortness of breath and leg edema.    -Congestive heart failure  Left ventricle dysfunction with ejection fraction of 30%.    -Significant biatrial enlargement    -Atrial fibrillation with RVR    -Premature ventricular contractions    -Status post CABG 12/2014 (performed in [place])    -Hypertension dyslipidemia prediabetes hypothyroidism elevation    -Status post ORIF    -Family history of coronary artery disease    -Intolerance to prednisone    -Former smoker    -Medical noncompliance.  Was not taking  thyroid medicine replacement properly.     =================  Plan  ===========  Atrial fibrillation with RVR.  Rate control.  Consider GABRIEL cardioversion.  Patient would benefit from long-term anticoagulation for atrial fibrillation because of high CYR1FO9-LHRd score due to female gender age over 75, hypertension, diabetes, CAD making it at least 6. We will start her on Eliquis or warfarin before discharge.  Continued IV diuresis.  Observe renal function closely.  Elevated LFTs due to passive congestion.    Large left pleural effusion.  Status post drainage and chest tube.  More than 400 cc of fluid was removed.  Patient is on Cardizem and amiodarone.  Chest tube was flushed with TPA yesterday by thoracic surgery.  Patient has loculated left pleural effusion.  Another round of TPA infusion through the chest tube is being planned.    Renal dysfunction  BUN/creatinine-30/1.03    Echocardiogram showed severe right ventricle enlargement left atrial enlargement related enlargement and calculated pulmonary artery pressure of 45 mmHg.  Left ventricle dysfunction with ejection fraction of 30%.    Ischemic cardiomyopathy    Current medications include amiodarone aspirin Cardizem 90 mg every 8 hours subcu heparin levothyroxine metoprolol 25 mg twice a day.    Anticoagulation and possible cardioversion as an outpatient versus GABRIEL cardioversion to try to convert to sinus rhythm    Follow-up labs ordered.    Further plan will depend on patient's progress.  ]]]]]]]]]]]]]]]]]     March 13, 2022    Discussed with patient and family at bedside  Status post chest tube for pleural effusion  Congestive heart failure secondary to cardiomyopathy  Ischemic cardiomyopathy  LV ejection fraction of 30%  Atrial fibrillation with controlled ventricular response  Prior history of coronary bypass surgery  Hyponatremia likely secondary to congestive heart failure and volume overload with sodium of 129  Renal function stable  Anemia with a  hemoglobin of 9.5  LFTs have improved  Current medications include aspirin 81 mg p.o. once a day amiodarone 200 mg p.o. every 8 hours which will be decreased to p.o. every 12 hours  Patient is currently on diltiazem 90 mg p.o. every 8 hours metoprolol 25 mg p.o. twice daily  Patient is currently on subcu heparin for DVT prophylaxis  I do not see any full dose anticoagulation therapy  Loculated left pleural effusion status post chest tube placement  Discussed with patient and family at bedside  Continue supportive care          Jah Saunders MD  03/13/22  12:40 EDT

## 2022-03-13 NOTE — PLAN OF CARE
Goal Outcome Evaluation:     Progress: improving  Outcome Evaluation: Patient alert, oriented x4. Up with assist x1, walker. Vitals stable. Good UOP. Adequate PO intake. POC AC & HS, SSI given as ordered. Chest tube in place, low output noted at this time. C/o minimal pain r/t chest tube. PRN Tylenol offered but refused by patient. Patient states she is very sleepy today d/t Dilaudid she received last night. Repeat chest xray in am. Continue to monitor.    Problem: Fall Injury Risk  Goal: Absence of Fall and Fall-Related Injury  3/13/2022 1546 by Brenda Carrasco, RN  Outcome: Ongoing, Progressing  3/13/2022 1544 by Brenda Carrasco, RN  Outcome: Ongoing, Progressing  Intervention: Identify and Manage Contributors  Flowsheets  Taken 3/13/2022 1453  Medication Review/Management:   medications reviewed   high-risk medications identified  Taken 3/13/2022 1223  Medication Review/Management:   medications reviewed   high-risk medications identified  Taken 3/13/2022 1055  Medication Review/Management:   medications reviewed   high-risk medications identified  Taken 3/13/2022 0809  Medication Review/Management:   medications reviewed   high-risk medications identified  Self-Care Promotion:   independence encouraged   BADL personal objects within reach   safe use of adaptive equipment encouraged  Intervention: Promote Injury-Free Environment  Flowsheets  Taken 3/13/2022 1453  Safety Promotion/Fall Prevention:   safety round/check completed   room organization consistent   nonskid shoes/slippers when out of bed   lighting adjusted   fall prevention program maintained   elopement precautions   clutter free environment maintained   assistive device/personal items within reach   activity supervised  Taken 3/13/2022 1223  Safety Promotion/Fall Prevention:   safety round/check completed   room organization consistent   nonskid shoes/slippers when out of bed   lighting adjusted   fall prevention program maintained   elopement  precautions   clutter free environment maintained   assistive device/personal items within reach   activity supervised  Taken 3/13/2022 1055  Safety Promotion/Fall Prevention:   safety round/check completed   room organization consistent   nonskid shoes/slippers when out of bed   lighting adjusted   fall prevention program maintained   elopement precautions   clutter free environment maintained   assistive device/personal items within reach   activity supervised  Taken 3/13/2022 0809  Safety Promotion/Fall Prevention:   safety round/check completed   room organization consistent   nonskid shoes/slippers when out of bed   lighting adjusted   fall prevention program maintained   elopement precautions   clutter free environment maintained   assistive device/personal items within reach   activity supervised     Problem: Breathing Pattern Ineffective  Goal: Effective Breathing Pattern  3/13/2022 1546 by Brenda Carrasco RN  Outcome: Ongoing, Progressing  3/13/2022 1544 by Brenda Carrasco RN  Outcome: Ongoing, Progressing  Intervention: Promote Improved Breathing Pattern  Flowsheets  Taken 3/13/2022 1453 by Brenda Carrasco RN  Head of Bed (HOB) Positioning: HOB at 30-45 degrees  Taken 3/13/2022 1223 by Brenda Carrasco RN  Head of Bed (HOB) Positioning: HOB at 30-45 degrees  Taken 3/13/2022 1055 by Brenda Carrasco RN  Head of Bed (HOB) Positioning: HOB at 30-45 degrees  Taken 3/13/2022 0809 by Brenda Carrasco RN  Supportive Measures:   active listening utilized   decision-making supported   goal-setting facilitated  Head of Bed (HOB) Positioning: HOB at 30 degrees  Taken 3/13/2022 0000 by Kalani Diallo RN  Airway/Ventilation Management: airway patency maintained     Problem: Dysrhythmia  Goal: Normalized Cardiac Rhythm  3/13/2022 1546 by Brenda Carrasco RN  Outcome: Ongoing, Progressing  3/13/2022 1544 by Brenda Carrasco RN  Outcome: Ongoing, Progressing  Intervention: Monitor and Manage Cardiac Rhythm Effect  Recent  Flowsheet Documentation  Taken 3/13/2022 0809 by Brenda Carrasco, RN  VTE Prevention/Management:   bilateral   sequential compression devices on     Problem: Adult Inpatient Plan of Care  Goal: Plan of Care Review  Outcome: Ongoing, Progressing  Flowsheets  Taken 3/13/2022 1546 by Brenda Carrasco, RN  Progress: improving  Outcome Evaluation: Patient alert, oriented x4. Up with assist x1, walker. Vitals stable. Good UOP. Adequate PO intake. POC AC & HS, SSI given as ordered. Chest tube in place, low output noted at this time. C/o minimal pain r/t chest tube. PRN Tylenol offered but refused by patient. Patient states she is very sleepy today d/t Dilaudid she received last night. Repeat chest xray in am. Continue to monitor.  Taken 3/12/2022 1607 by Ludmila Kingsley, ЕЛЕНА  Plan of Care Reviewed With:   patient   spouse  Goal: Patient-Specific Goal (Individualized)  Outcome: Ongoing, Progressing  Goal: Absence of Hospital-Acquired Illness or Injury  Outcome: Ongoing, Progressing  Intervention: Identify and Manage Fall Risk  Flowsheets  Taken 3/13/2022 1453  Safety Promotion/Fall Prevention:   safety round/check completed   room organization consistent   nonskid shoes/slippers when out of bed   lighting adjusted   fall prevention program maintained   elopement precautions   clutter free environment maintained   assistive device/personal items within reach   activity supervised  Taken 3/13/2022 1223  Safety Promotion/Fall Prevention:   safety round/check completed   room organization consistent   nonskid shoes/slippers when out of bed   lighting adjusted   fall prevention program maintained   elopement precautions   clutter free environment maintained   assistive device/personal items within reach   activity supervised  Taken 3/13/2022 1055  Safety Promotion/Fall Prevention:   safety round/check completed   room organization consistent   nonskid shoes/slippers when out of bed   lighting adjusted   fall prevention program  maintained   elopement precautions   clutter free environment maintained   assistive device/personal items within reach   activity supervised  Taken 3/13/2022 0809  Safety Promotion/Fall Prevention:   safety round/check completed   room organization consistent   nonskid shoes/slippers when out of bed   lighting adjusted   fall prevention program maintained   elopement precautions   clutter free environment maintained   assistive device/personal items within reach   activity supervised  Intervention: Prevent Skin Injury  Flowsheets  Taken 3/13/2022 1453  Body Position: position changed independently  Taken 3/13/2022 1223  Body Position:   position changed independently   sitting up in bed  Taken 3/13/2022 1055  Body Position: position changed independently  Taken 3/13/2022 0809  Body Position: position changed independently  Skin Protection:   adhesive use limited   incontinence pads utilized   transparent dressing maintained   tubing/devices free from skin contact  Intervention: Prevent and Manage VTE (Venous Thromboembolism) Risk  Flowsheets  Taken 3/13/2022 1453  Activity Management: ambulated in room  Taken 3/13/2022 1223  Activity Management:   activity adjusted per tolerance   activity encouraged  Taken 3/13/2022 1055  Activity Management:   activity adjusted per tolerance   activity encouraged  Taken 3/13/2022 0809  Activity Management:   activity adjusted per tolerance   activity encouraged  VTE Prevention/Management:   bilateral   sequential compression devices on  Intervention: Prevent Infection  Flowsheets (Taken 3/3/2022 2100 by Megan Lynn, PCT)  Infection Prevention:   hand hygiene promoted   personal protective equipment utilized   single patient room provided   visitors restricted/screened  Goal: Optimal Comfort and Wellbeing  Outcome: Ongoing, Progressing  Intervention: Monitor Pain and Promote Comfort  Flowsheets (Taken 3/13/2022 1453)  Pain Management Interventions:   position adjusted   quiet  environment facilitated   care clustered  Intervention: Provide Person-Centered Care  Flowsheets (Taken 3/13/2022 0809)  Trust Relationship/Rapport:   care explained   choices provided   questions answered   thoughts/feelings acknowledged  Goal: Readiness for Transition of Care  Outcome: Ongoing, Progressing

## 2022-03-13 NOTE — PROGRESS NOTES
Daily Progress Note        Atrial fibrillation with RVR (HCC)    Other hyperlipidemia    Essential hypertension    Hypothyroidism    Coronary artery disease involving coronary bypass graft of native heart without angina pectoris    Acute CHF (congestive heart failure) (HCC)    Acute hyponatremia    Elevated LFTs    Elevated TSH    Acute hyperglycemia    Obesity (BMI 30-39.9)      Assessment  Left hemothorax status post chest tube placement 3/5/2022  Acute respiratory distress  COPD exacerbation  Atrial fibrillation with RVR (HCC)    Other hyperlipidemia    Essential hypertension    Hypothyroidism    Coronary artery disease involving coronary bypass graft of native heart without angina pectoris    Acute CHF    Acute hyponatremia    Elevated LFTs    Elevated TSH    Acute hyperglycemia    Obesity (BMI 30-39.9)   ARF  Former smoker-quit 1980    Echo 3/3/2022  -EF 30%  -Severe right ventricular enlargement and moderate right atrial enlargement  -Severe left atrial enlargement  -RVSP 41.4    Plan  Thoracic surgery administered TPA through chest tube on 3/9/2022 and 3/10/2022 and 3/12/2022    Chest tube management: Continue -20 cmH2O suction    Continue to titrate oxygen- Currently on 2 L NC  Cefepime for PNA-finishes 3/13/2022  Inhaled corticosteroids  Bronchodilators  Electrolyte/Glycemic control  DVT Prophylaxis-Heparin SQ  Levothyroxine 50 MCG  Oral amiodarone       LOS: 10 days     Subjective   Shortness of breath      Objective     Vital signs for last 24 hours:  Vitals:    03/13/22 0739 03/13/22 0740 03/13/22 0809 03/13/22 1055   BP:   134/76 146/66   BP Location:   Left arm Left arm   Patient Position:   Lying Sitting   Pulse: 55 65 59 59   Resp: 16 17 16 14   Temp:    98.1 °F (36.7 °C)   TempSrc:    Oral   SpO2:   93% 94%   Weight:       Height:           Intake/Output last 3 shifts:  I/O last 3 completed shifts:  In: 720 [P.O.:720]  Out: 130 [Chest Tube:130]  Intake/Output this shift:  I/O this shift:  In: 240  [P.O.:240]  Out: -       Radiology  Imaging Results (Last 24 Hours)     Procedure Component Value Units Date/Time    XR Chest 1 View [[id number]] Collected: 03/13/22 1126     Updated: 03/13/22 1130    Narrative:         DATE OF EXAM:   3/13/2022 6:30 AM     PROCEDURE:   XR CHEST 1 VW-     INDICATIONS:   chest tube management; I48.91-Unspecified atrial fibrillation;  I50.9-Heart failure, unspecified; Z20.822-Contact with and (suspected)  exposure to covid-19; I50.21-Acute systolic (congestive) heart failure     COMPARISON:  03/12/2022 and prior     TECHNIQUE:   Portable Chest     FINDINGS:      Study limited by overlying support and monitoring apparatus     Patient status post median sternotomy.     Large left-sided pleural effusion with component tracking along the  lateral wall is decreased from the comparison. Dependent opacities at  the left base again noted. Left-sided chest tube remains in place. Right  lung is grossly clear. Osseous structures are unremarkable        Impression:      IMPRESSION :   Slight decrease in size of left-sided pleural effusion. Chest tube  remains in place[     Electronically Signed By-Jarrett De Jesus On:3/13/2022 11:28 AM  This report was finalized on [id number] by  Jarrett De Jesus, .          Labs:  Results from last 7 days   Lab Units 03/13/22  0032   WBC 10*3/mm3 14.10*   HEMOGLOBIN g/dL 9.5*   HEMATOCRIT % 28.8*   PLATELETS 10*3/mm3 485*     Results from last 7 days   Lab Units 03/13/22  0032   SODIUM mmol/L 129*   POTASSIUM mmol/L 4.1   CHLORIDE mmol/L 94*   CO2 mmol/L 24.0   BUN mg/dL 20   CREATININE mg/dL 0.80   CALCIUM mg/dL 8.6   BILIRUBIN mg/dL 0.4   ALK PHOS U/L 118*   ALT (SGPT) U/L 44*   AST (SGOT) U/L 38*   GLUCOSE mg/dL 184*         Results from last 7 days   Lab Units 03/13/22  0032 03/12/22  0020 03/11/22  1206   ALBUMIN g/dL 3.10* 2.80* 2.60*             Results from last 7 days   Lab Units 03/13/22  0032   MAGNESIUM mg/dL 2.2         Results from last 7 days    Lab Units 03/09/22  0651   TSH uIU/mL 6.020*           Meds:   SCHEDULE  custom intrapleural syringe builder, , Intrapleural, Once  amiodarone, 200 mg, Oral, Q12H   Followed by  [START ON 3/27/2022] amiodarone, 200 mg, Oral, Daily  arformoterol, 15 mcg, Nebulization, BID - RT  aspirin, 81 mg, Oral, Daily  budesonide, 1 mg, Nebulization, BID - RT  dilTIAZem, 90 mg, Oral, Q8H  heparin (porcine), 5,000 Units, Subcutaneous, Q12H  insulin glargine, 10 Units, Subcutaneous, Nightly  insulin lispro, 0-14 Units, Subcutaneous, TID AC  levothyroxine, 50 mcg, Oral, Q AM  melatonin, 5 mg, Oral, Nightly  metoprolol tartrate, 25 mg, Oral, BID  multivitamin with minerals, 1 tablet, Oral, Daily  senna-docusate sodium, 2 tablet, Oral, BID  sodium chloride, 10 mL, Intravenous, Q12H  zolpidem, 5 mg, Oral, Nightly      Infusions  hold, 1 each      PRNs  •  acetaminophen **OR** acetaminophen **OR** acetaminophen  •  aluminum-magnesium hydroxide-simethicone  •  senna-docusate sodium **AND** polyethylene glycol **AND** bisacodyl **AND** bisacodyl  •  dextrose  •  dextrose  •  glucagon (human recombinant)  •  hold  •  insulin lispro **AND** insulin lispro  •  magnesium sulfate **OR** magnesium sulfate in D5W 1g/100mL (PREMIX)  •  nitroglycerin  •  ondansetron **OR** ondansetron  •  potassium chloride **OR** potassium chloride **OR** potassium chloride  •  sodium chloride    Physical Exam:  Physical Exam  Physical Exam  General Appearance:  Alert.  No distress noted.  HEENT:  Normocephalic, without obvious abnormality, Conjunctiva/corneas clear,.  Normal external ear canals, Nares normal, no drainage     Neck:  Supple, symmetrical, trachea midline. No JVD.  Lungs /Chest wall: Minimal bilateral basal Rales, respirations unlabored symmetrical wall movement.   Left chest tube in place.  Heart:  Regular rate and rhythm, systolic murmur PMI left sternal border  Abdomen: Soft, non-tender, no masses, no organomegaly.    Extremities: 1+ edema  bilateral lower extremity, no clubbing or cyanosis      ROS  Review of Systems  Review of Systems       Constitutional: Negative for chills, fever and malaise/fatigue.   HENT: Negative.    Eyes: Negative.    Cardiovascular: Negative.    Respiratory: Positive for cough and shortness of breath.    Skin: Negative.    Musculoskeletal: Negative.    Gastrointestinal: Negative.    Genitourinary: Negative.    Neurological: Negative.    Psychiatric/Behavioral: Negative.          I have reviewed current clinicals.

## 2022-03-13 NOTE — PLAN OF CARE
Goal Outcome Evaluation:   Patient resting well this shift after receiving night time medication and ambulating to the bathroom.  She is alert and oriented and is able to voice wants and needs to the staff.  She has spouse that has been at the bedside and is involved with patients care and well being,  She utilizes prn pain medication related to left side chest tube that is connected to wall suction that is displaying serosanginous drainage in collection system and tubing  She has no other complaints or concerns at this time.  Continuing to monitor.

## 2022-03-13 NOTE — PROGRESS NOTES
[facility] Medicine Services Daily Progress Note    Patient Name: Jaquelin Valderrama  : 1940  MRN: [id number]  Primary Care Physician:  Minerva Chatterjee MD  Date of admission: 3/2/2022      Subjective      Chief Complaint: Atrial fibrillation with RVR pleural effusion        Patient Reports states she did well overnight slept okay. Stayed in chair > 2 hours today.         ROS negative except as above    Objective      Vitals:   Temp:  [97.8 °F (36.6 °C)-98.9 °F (37.2 °C)] 97.8 °F (36.6 °C)  Heart Rate:  [55-93] 78  Resp:  [11-28] 19  BP: (119-150)/(66-76) 119/72  Flow (L/min):  [2] 2    Vitals reviewed.   Constitutional:       Comments: Left chest tube in place  at bedside  Patient is on room air   HENT:      Head: Normocephalic.      Nose: Nose normal.      Mouth/Throat:      Mouth: Mucous membranes are moist.   Eyes:      Pupils: Pupils are equal, round, and reactive to light.   Cardiovascular:      Rate and Rhythm: irregular rate and rhythm.      Pulses: Normal pulses.      Heart sounds: Normal heart sounds.   Pulmonary:      Effort: Pulmonary effort is normal.      Comments: Left chest tube catheter in place  Left side decreased breath sounds  Abdominal:      General: Abdomen is flat.      Palpations: Abdomen is soft.   Musculoskeletal:         General: Normal range of motion.      Cervical back: Normal range of motion.   Skin:     General: Skin is warm.      Capillary Refill: Capillary refill takes less than 2 seconds.   Neurological:      General: No focal deficit present.      Mental Status: She is alert and oriented to person, place, and time.   Psychiatric:         Mood and Affect: Mood normal.         Behavior: Behavior normal.        Result Review    Result Review:  I have personally reviewed the results from the time of this admission to 3/13/2022 16:24 EDT and agree with these findings:  [x]  Laboratory  []  Microbiology  []  Radiology  []  EKG/Telemetry   []   Cardiology/Vascular   []  Pathology  []  Old records  []  Other:  Most notable findings include: White count still elevated at 13-14    Wounds (last 24 hours)              Assessment/Plan       Brief Patient Summary:  Jaquelin Valderrama is a 82 y.o. female who presented with A. fib RVR and pleural effusion status post chest tube        amiodarone, 200 mg, Oral, Q8H   Followed by  [START ON 3/13/2022] amiodarone, 200 mg, Oral, Q12H   Followed by  [START ON 3/27/2022] amiodarone, 200 mg, Oral, Daily  arformoterol, 15 mcg, Nebulization, BID - RT  aspirin, 81 mg, Oral, Daily  budesonide, 1 mg, Nebulization, BID - RT  cefepime, 2 g, Intravenous, Q12H  dilTIAZem, 90 mg, Oral, Q8H  heparin (porcine), 5,000 Units, Subcutaneous, Q12H  HYDROmorphone, 2 mg, Intravenous, Once  insulin glargine, 10 Units, Subcutaneous, Nightly  insulin lispro, 0-14 Units, Subcutaneous, TID AC  levothyroxine, 50 mcg, Oral, Q AM  melatonin, 5 mg, Oral, Nightly  metoprolol tartrate, 25 mg, Oral, BID  multivitamin with minerals, 1 tablet, Oral, Daily  senna-docusate sodium, 2 tablet, Oral, BID  sodium chloride, 10 mL, Intravenous, Q12H        hold, 1 each           Active Hospital Problems:          Active Hospital Problems     Diagnosis     • **Atrial fibrillation with RVR (HCC)     • Acute CHF (congestive heart failure) (HCC)     • Acute hyponatremia     • Elevated LFTs     • Elevated TSH     • Acute hyperglycemia     • Obesity (BMI 30-39.9)     • Coronary artery disease involving coronary bypass graft of native heart without angina pectoris     • Essential hypertension     • Hypothyroidism     • Other hyperlipidemia           ASSESSMENT:  Possible hemothorax  Acute respiratory distress  COPD exacerbation  Atrial fibrillation with RVR (HCC), now resolved    Other hyperlipidemia    Essential hypertension    Hypothyroidism    Coronary artery disease involving coronary bypass graft of native heart without angina pectoris    Acute CHF (congestive heart  failure) (HCC) EFG 45%    Acute hyponatremia    Elevated LFTs    Elevated TSH    Acute hyperglycemia    Obesity (BMI 30-39.9)   ARF     PLAN:  Hemoglobin stable back on aspirin and heparin  Blood transfusion as needed  Chest tube management  per pulmonary.  Cardiovascular surgery consulted.  TPA x3   Chest x-ray March 13  Encouraged to use I-S flutter valve  Heart rate control  Continue to monitor blood pressure  Bronchodilator  Inhaled corticosteroids  Electrolytes/ glycemic control  heparin sq prophylaxis   Insomnia Ambien ordered patient slept much better  Discussed with  at bedside March 13     DVT prophylaxis:  Medical DVT prophylaxis orders are present.     CODE STATUS:    Code Status (Patient has no pulse and is not breathing): CPR (Attempt to Resuscitate)  Medical Interventions (Patient has pulse or is breathing): Full Support        Disposition:  I expect patient to be discharged once stable.     This patient has been examined wearing appropriate Personal Protective Equipment and discussed with  patient and nursing staff.03/13/22      Electronically signed by Mp Galicia MD, 03/13/22, 16:24 EDT.  [facility] Hospitalist Team

## 2022-03-14 ENCOUNTER — APPOINTMENT (OUTPATIENT)
Dept: CT IMAGING | Facility: HOSPITAL | Age: 82
End: 2022-03-14

## 2022-03-14 ENCOUNTER — APPOINTMENT (OUTPATIENT)
Dept: GENERAL RADIOLOGY | Facility: HOSPITAL | Age: 82
End: 2022-03-14

## 2022-03-14 LAB
ALBUMIN SERPL-MCNC: 2.7 G/DL (ref 3.5–5.2)
ALBUMIN/GLOB SERPL: 1 G/DL
ALP SERPL-CCNC: 110 U/L (ref 39–117)
ALT SERPL W P-5'-P-CCNC: 43 U/L (ref 1–33)
ANION GAP SERPL CALCULATED.3IONS-SCNC: 10 MMOL/L (ref 5–15)
AST SERPL-CCNC: 30 U/L (ref 1–32)
BASOPHILS # BLD AUTO: 0 10*3/MM3 (ref 0–0.2)
BASOPHILS NFR BLD AUTO: 0.3 % (ref 0–1.5)
BILIRUB SERPL-MCNC: 0.4 MG/DL (ref 0–1.2)
BUN SERPL-MCNC: 16 MG/DL (ref 8–23)
BUN/CREAT SERPL: 22.2 (ref 7–25)
CALCIUM SPEC-SCNC: 8.5 MG/DL (ref 8.6–10.5)
CHLORIDE SERPL-SCNC: 96 MMOL/L (ref 98–107)
CO2 SERPL-SCNC: 23 MMOL/L (ref 22–29)
CREAT SERPL-MCNC: 0.72 MG/DL (ref 0.57–1)
DEPRECATED RDW RBC AUTO: 49.9 FL (ref 37–54)
EGFRCR SERPLBLD CKD-EPI 2021: 84.6 ML/MIN/1.73
EOSINOPHIL # BLD AUTO: 0.1 10*3/MM3 (ref 0–0.4)
EOSINOPHIL NFR BLD AUTO: 0.5 % (ref 0.3–6.2)
ERYTHROCYTE [DISTWIDTH] IN BLOOD BY AUTOMATED COUNT: 15.2 % (ref 12.3–15.4)
GLOBULIN UR ELPH-MCNC: 2.6 GM/DL
GLUCOSE BLDC GLUCOMTR-MCNC: 120 MG/DL (ref 70–105)
GLUCOSE BLDC GLUCOMTR-MCNC: 139 MG/DL (ref 70–105)
GLUCOSE BLDC GLUCOMTR-MCNC: 140 MG/DL (ref 70–105)
GLUCOSE BLDC GLUCOMTR-MCNC: 210 MG/DL (ref 70–105)
GLUCOSE SERPL-MCNC: 141 MG/DL (ref 65–99)
HCT VFR BLD AUTO: 28 % (ref 34–46.6)
HGB BLD-MCNC: 9.3 G/DL (ref 12–15.9)
LDH SERPL-CCNC: 421 U/L (ref 135–214)
LYMPHOCYTES # BLD AUTO: 0.5 10*3/MM3 (ref 0.7–3.1)
LYMPHOCYTES NFR BLD AUTO: 4.3 % (ref 19.6–45.3)
MAGNESIUM SERPL-MCNC: 2 MG/DL (ref 1.6–2.4)
MCH RBC QN AUTO: 31.3 PG (ref 26.6–33)
MCHC RBC AUTO-ENTMCNC: 33.3 G/DL (ref 31.5–35.7)
MCV RBC AUTO: 94 FL (ref 79–97)
MONOCYTES # BLD AUTO: 0.9 10*3/MM3 (ref 0.1–0.9)
MONOCYTES NFR BLD AUTO: 8 % (ref 5–12)
NEUTROPHILS NFR BLD AUTO: 86.9 % (ref 42.7–76)
NEUTROPHILS NFR BLD AUTO: 9.6 10*3/MM3 (ref 1.7–7)
NRBC BLD AUTO-RTO: 0 /100 WBC (ref 0–0.2)
PHOSPHATE SERPL-MCNC: 2.5 MG/DL (ref 2.5–4.5)
PLATELET # BLD AUTO: 419 10*3/MM3 (ref 140–450)
PMV BLD AUTO: 7.2 FL (ref 6–12)
POTASSIUM SERPL-SCNC: 4 MMOL/L (ref 3.5–5.2)
PROT SERPL-MCNC: 5.3 G/DL (ref 6–8.5)
QT INTERVAL: 412 MS
RBC # BLD AUTO: 2.98 10*6/MM3 (ref 3.77–5.28)
SODIUM SERPL-SCNC: 129 MMOL/L (ref 136–145)
WBC NRBC COR # BLD: 11.1 10*3/MM3 (ref 3.4–10.8)

## 2022-03-14 PROCEDURE — 25010000002 HEPARIN (PORCINE) PER 1000 UNITS: Performed by: INTERNAL MEDICINE

## 2022-03-14 PROCEDURE — 71250 CT THORAX DX C-: CPT

## 2022-03-14 PROCEDURE — 82962 GLUCOSE BLOOD TEST: CPT

## 2022-03-14 PROCEDURE — 99232 SBSQ HOSP IP/OBS MODERATE 35: CPT | Performed by: INTERNAL MEDICINE

## 2022-03-14 PROCEDURE — 99233 SBSQ HOSP IP/OBS HIGH 50: CPT | Performed by: STUDENT IN AN ORGANIZED HEALTH CARE EDUCATION/TRAINING PROGRAM

## 2022-03-14 PROCEDURE — 71045 X-RAY EXAM CHEST 1 VIEW: CPT

## 2022-03-14 PROCEDURE — 63710000001 INSULIN GLARGINE PER 5 UNITS: Performed by: INTERNAL MEDICINE

## 2022-03-14 PROCEDURE — 94761 N-INVAS EAR/PLS OXIMETRY MLT: CPT

## 2022-03-14 PROCEDURE — 99232 SBSQ HOSP IP/OBS MODERATE 35: CPT | Performed by: NURSE PRACTITIONER

## 2022-03-14 PROCEDURE — 83735 ASSAY OF MAGNESIUM: CPT | Performed by: INTERNAL MEDICINE

## 2022-03-14 PROCEDURE — 84100 ASSAY OF PHOSPHORUS: CPT | Performed by: STUDENT IN AN ORGANIZED HEALTH CARE EDUCATION/TRAINING PROGRAM

## 2022-03-14 PROCEDURE — 94799 UNLISTED PULMONARY SVC/PX: CPT

## 2022-03-14 PROCEDURE — 85025 COMPLETE CBC W/AUTO DIFF WBC: CPT | Performed by: INTERNAL MEDICINE

## 2022-03-14 PROCEDURE — 80053 COMPREHEN METABOLIC PANEL: CPT | Performed by: STUDENT IN AN ORGANIZED HEALTH CARE EDUCATION/TRAINING PROGRAM

## 2022-03-14 RX ADMIN — METOPROLOL TARTRATE 25 MG: 25 TABLET, FILM COATED ORAL at 20:10

## 2022-03-14 RX ADMIN — ARFORMOTEROL TARTRATE 15 MCG: 15 SOLUTION RESPIRATORY (INHALATION) at 08:01

## 2022-03-14 RX ADMIN — HEPARIN SODIUM 5000 UNITS: 5000 INJECTION INTRAVENOUS; SUBCUTANEOUS at 20:09

## 2022-03-14 RX ADMIN — AMIODARONE HYDROCHLORIDE 200 MG: 200 TABLET ORAL at 17:34

## 2022-03-14 RX ADMIN — BUDESONIDE 0.5 MG: 0.5 INHALANT RESPIRATORY (INHALATION) at 08:01

## 2022-03-14 RX ADMIN — METOPROLOL TARTRATE 25 MG: 25 TABLET, FILM COATED ORAL at 09:05

## 2022-03-14 RX ADMIN — AMIODARONE HYDROCHLORIDE 200 MG: 200 TABLET ORAL at 05:12

## 2022-03-14 RX ADMIN — Medication 10 ML: at 09:06

## 2022-03-14 RX ADMIN — INSULIN GLARGINE 10 UNITS: 100 INJECTION, SOLUTION SUBCUTANEOUS at 20:12

## 2022-03-14 RX ADMIN — Medication 10 ML: at 20:15

## 2022-03-14 RX ADMIN — LEVOTHYROXINE SODIUM 50 MCG: 0.05 TABLET ORAL at 05:13

## 2022-03-14 RX ADMIN — ACETAMINOPHEN 650 MG: 325 TABLET ORAL at 20:09

## 2022-03-14 RX ADMIN — DILTIAZEM HYDROCHLORIDE 90 MG: 30 TABLET, FILM COATED ORAL at 14:20

## 2022-03-14 RX ADMIN — ARFORMOTEROL TARTRATE 15 MCG: 15 SOLUTION RESPIRATORY (INHALATION) at 21:20

## 2022-03-14 RX ADMIN — BUDESONIDE 1 MG: 0.5 INHALANT RESPIRATORY (INHALATION) at 21:19

## 2022-03-14 RX ADMIN — SENNOSIDES AND DOCUSATE SODIUM 2 TABLET: 50; 8.6 TABLET ORAL at 20:09

## 2022-03-14 RX ADMIN — Medication 5 MG: at 20:10

## 2022-03-14 RX ADMIN — Medication 1 TABLET: at 09:05

## 2022-03-14 RX ADMIN — DILTIAZEM HYDROCHLORIDE 90 MG: 30 TABLET, FILM COATED ORAL at 21:17

## 2022-03-14 RX ADMIN — ZOLPIDEM TARTRATE 5 MG: 5 TABLET ORAL at 20:10

## 2022-03-14 RX ADMIN — SENNOSIDES AND DOCUSATE SODIUM 2 TABLET: 50; 8.6 TABLET ORAL at 09:05

## 2022-03-14 RX ADMIN — ASPIRIN 81 MG CHEWABLE TABLET 81 MG: 81 TABLET CHEWABLE at 09:05

## 2022-03-14 RX ADMIN — DILTIAZEM HYDROCHLORIDE 90 MG: 30 TABLET, FILM COATED ORAL at 05:11

## 2022-03-14 RX ADMIN — HEPARIN SODIUM 5000 UNITS: 5000 INJECTION INTRAVENOUS; SUBCUTANEOUS at 09:05

## 2022-03-14 NOTE — PROGRESS NOTES
"    Chief Complaint: loculated pleural effusion, right  S/P: Left thoracentesis on 3/4/22, chest tube insertion on 3/5/22, intrapleural TPA on 3/9/22, 3/10/22 and 3/12/22      Subjective:  Symptoms:  Stable.  She reports shortness of breath, cough, weakness and chest pressure.    Diet:  Adequate intake.  No nausea or vomiting.    Activity level: Impaired due to weakness.    Pain:  She complains of pain that is mild.  She reports pain is improving.  Pain is well controlled.    Resting in bed.  Off supplemental oxygen, but continues to have shortness of breath with exertion    Vital Signs:  Temp:  [97.5 °F (36.4 °C)-98.6 °F (37 °C)] 97.5 °F (36.4 °C)  Heart Rate:  [55-85] 71  Resp:  [14-27] 25  BP: (112-142)/(56-79) 119/56    Intake & Output (last day)       03/13 0701  03/14 0700 03/14 0701  03/15 0700    P.O. 480     Total Intake(mL/kg) 480 (5.6)     Urine (mL/kg/hr)  0 (0)    Chest Tube 40     Total Output 40 0    Net +440 0          Urine Unmeasured Occurrence 2 x           Objective:  General Appearance:  Comfortable, in no acute distress and ill-appearing.    Vital signs: (most recent): Blood pressure 119/56, pulse 71, temperature 97.5 °F (36.4 °C), temperature source Oral, resp. rate 25, height 160 cm (63\"), weight 85.8 kg (189 lb 2.5 oz), SpO2 97 %, not currently breastfeeding.  Vital signs are normal.    HEENT: Normal HEENT exam.    Lungs:  Tachypnea and increased effort.  She is not in respiratory distress.  There are decreased breath sounds and wheezes.  No rhonchi.  (Conversational dyspnea)  Heart: Normal rate.    Chest: Chest wall tenderness (incisional) present.    Extremities: Decreased range of motion.  There is no dependent edema.    Neurological: Patient is alert and oriented to person, place and time.    Skin:  Warm and dry.              Chest tube:   Site: Left, Clean, Dry, Intact and Securement device intact  Suction: -20 cm  Air Leak: negative  24 Hour Total: 40cm    Results Review:     I " reviewed the patient's new clinical results.  I reviewed the patient's new imaging results and agree with the interpretation.  I reviewed the patient's other test results and agree with the interpretation  Discussed with patient, RN and Dr. Kuo'    Imaging Results (Last 24 Hours)     Procedure Component Value Units Date/Time    XR Chest 1 View [[id number]] Collected: 03/14/22 0734     Updated: 03/14/22 0737    Narrative:      DATE OF EXAM:  3/14/2022 5:17 AM     PROCEDURE:  XR CHEST 1 VW-     INDICATIONS:  Chest tube; I48.91-Unspecified atrial fibrillation; I50.9-Heart failure,  unspecified; Z20.822-Contact with and (suspected) exposure to covid-19;  I50.21-Acute systolic (congestive) heart failure     COMPARISON:  3/13/2022     TECHNIQUE:   Single radiographic AP view of the chest was obtained.     FINDINGS:  Left-sided chest tube remains in place. No appreciable change in left  pleural effusion which appears loculated. No pneumothorax. Right lung  clear with sharp costophrenic angle. Heart size appears stable        Impression:      Stable left pleural effusion     Electronically Signed By-Cj Chong On:3/14/2022 7:35 AM  This report was finalized on [id number] by  Cj Chong, .          Lab Results:     Lab Results (last 24 hours)     Procedure Component Value Units Date/Time    POC Glucose Once [[id number]]  (Abnormal) Collected: 03/14/22 1158    Specimen: Blood Updated: 03/14/22 1159     Glucose 140 mg/dL      Comment: Serial Number: [id number]:  [id number]       Comprehensive Metabolic Panel [[id number]]  (Abnormal) Collected: 03/14/22 1039    Specimen: Blood Updated: 03/14/22 1143     Glucose 141 mg/dL      BUN 16 mg/dL      Creatinine 0.72 mg/dL      Sodium 129 mmol/L      Potassium 4.0 mmol/L      Chloride 96 mmol/L      CO2 23.0 mmol/L      Calcium 8.5 mg/dL      Total Protein 5.3 g/dL      Albumin 2.70 g/dL      ALT (SGPT) 43 U/L      AST (SGOT) 30 U/L      Alkaline Phosphatase 110 U/L       Total Bilirubin 0.4 mg/dL      Globulin 2.6 gm/dL      A/G Ratio 1.0 g/dL      BUN/Creatinine Ratio 22.2     Anion Gap 10.0 mmol/L      eGFR 84.6 mL/min/1.73      Comment: National Kidney Foundation and American Society of Nephrology (ASN) Task Force recommended calculation based on the Chronic Kidney Disease Epidemiology Collaboration (CKD-EPI) equation refit without adjustment for race.       Narrative:      GFR Normal >60  Chronic Kidney Disease <60  Kidney Failure <15      Phosphorus [[id number]]  (Normal) Collected: 03/14/22 1039    Specimen: Blood Updated: 03/14/22 1143     Phosphorus 2.5 mg/dL     Magnesium [[id number]]  (Normal) Collected: 03/14/22 1039    Specimen: Blood Updated: 03/14/22 1143     Magnesium 2.0 mg/dL     CBC & Differential [[id number]]  (Abnormal) Collected: 03/14/22 1040    Specimen: Blood Updated: 03/14/22 1110    Narrative:      The following orders were created for panel order CBC & Differential.  Procedure                               Abnormality         Status                     ---------                               -----------         ------                     CBC Auto Differential[[id number]]        Abnormal            Final result                 Please view results for these tests on the individual orders.    CBC Auto Differential [[id number]]  (Abnormal) Collected: 03/14/22 1040    Specimen: Blood Updated: 03/14/22 1110     WBC 11.10 10*3/mm3      RBC 2.98 10*6/mm3      Hemoglobin 9.3 g/dL      Hematocrit 28.0 %      MCV 94.0 fL      MCH 31.3 pg      MCHC 33.3 g/dL      RDW 15.2 %      RDW-SD 49.9 fl      MPV 7.2 fL      Platelets 419 10*3/mm3      Neutrophil % 86.9 %      Lymphocyte % 4.3 %      Monocyte % 8.0 %      Eosinophil % 0.5 %      Basophil % 0.3 %      Neutrophils, Absolute 9.60 10*3/mm3      Lymphocytes, Absolute 0.50 10*3/mm3      Monocytes, Absolute 0.90 10*3/mm3      Eosinophils, Absolute 0.10 10*3/mm3      Basophils, Absolute 0.00 10*3/mm3      nRBC 0.0 /100  WBC     Lactate Dehydrogenase [[id number]]  (Abnormal) Collected: 03/13/22 0032    Specimen: Blood Updated: 03/14/22 0950      U/L     POC Glucose Once [[id number]]  (Abnormal) Collected: 03/14/22 0757    Specimen: Blood Updated: 03/14/22 0758     Glucose 120 mg/dL      Comment: Serial Number: [id number]:  [id number]       POC Glucose Once [[id number]]  (Abnormal) Collected: 03/13/22 2013    Specimen: Blood Updated: 03/13/22 2014     Glucose 168 mg/dL      Comment: Serial Number: [id number]:  [id number]       POC Glucose Once [[id number]]  (Abnormal) Collected: 03/13/22 1648    Specimen: Blood Updated: 03/13/22 1649     Glucose 110 mg/dL      Comment: Serial Number: [id number]:  [id number]              Assessment/Plan       Atrial fibrillation with RVR (HCC)    Other hyperlipidemia    Essential hypertension    Hypothyroidism    Coronary artery disease involving coronary bypass graft of native heart without angina pectoris    Acute CHF (congestive heart failure) (HCC)    Acute hyponatremia    Elevated LFTs    Elevated TSH    Acute hyperglycemia    Obesity (BMI 30-39.9)       Assessment & Plan     I have independently reviewed this morning's chest x-ray which demonstrates persistent loculated left pleural effusion. Aeration to left upper lobe is unchanged from previous imaging.  No pneumothorax.    Loculated pleural effusion: Patient status post intrapleural Activase x3 with minimal improvement on imaging. We will order a CT of the chest for reevaluation to determine further plan of care (CT-guided chest tube vs VATS).  Continue chest tube to -20 cm of suction and recheck a chest x-ray in the morning.    Hypoxia: Remains intermittently on 2 L per nasal cannula.  Encourage incentive spirometry 10 times per hour and out of bed as much as possible.    Generalized weakness: Appreciate assistance from PT and nursing staff with increasing mobilization.  Anticipate patient will need rehab upon  discharge.    A. fib: Cardiology note reviewed.  Possible cardioversion versus GABRIEL cardioversion.  Continue treatment per their recommendations.  Recommend to hold oral anticoagulation until chest tube has been removed.      Montserrat Humphries DNP, APRN  [facility]  03/14/22  16:05 EDT      I wore protective equipment throughout this patient encounter including a face mask, gloves and protective eyewear.  Hand hygiene was performed before donning protective equipment and after removal when leaving the room.

## 2022-03-14 NOTE — PROGRESS NOTES
Referring Provider: Jaja Artis DO    Reason for follow-up:  Status post CABG  Atrial fibrillation  Hypertension  Left pleural effusion     Patient Care Team:  Minerva Chatterjee MD as PCP - General (Internal Medicine)    Subjective .      ROS    Since I have last seen, the patient has been without any chest discomfort ,shortness of breath, palpitations, dizziness or syncope.  Denies having any headache ,abdominal pain ,nausea, vomiting , diarrhea constipation, loss of weight or loss of appetite.  Denies having any excessive bruising ,hematuria or blood in the stool.    Review of all systems negative except as indicated    History  Past Medical History:   Diagnosis Date   • Astigmatism, bilateral 2019   • Benign essential hypertension    • Bilateral presbyopia 2019   • CAD (coronary artery disease)    • Closed right ankle fracture    • COVID-19 vaccination refused    • Hyperlipidemia    • Hypothyroidism    • Influenza vaccine needed    • Myocardial infarction (HCC)    • Prediabetes    • Pseudophakia, both eyes 2019   • Thoracic back pain        Past Surgical History:   Procedure Laterality Date   • APPENDECTOMY     • CARDIAC CATHETERIZATION  2012    x3    • CORONARY ARTERY BYPASS GRAFT  2014   • CORONARY STENT PLACEMENT      x3   • HARDWARE REMOVAL Right 2020    Procedure: ANKLE/FOOT HARDWARE REMOVAL;  Surgeon: Lincoln Sibley MD;  Location: [facility];  Service: Orthopedics;  Laterality: Right;   • ORIF ANKLE FRACTURE Right 2019    Radha Vazquez Mem       Family History   Problem Relation Age of Onset   • Heart disease Mother    • Lung cancer Father    • Diabetes Other        Social History     Tobacco Use   • Smoking status: Former Smoker     Types: Cigarettes     Quit date: 1980     Years since quittin.2   • Smokeless tobacco: Never Used   • Tobacco comment: Social Drinker   Vaping Use   • Vaping Use: Never used   Substance Use Topics   • Alcohol use: Yes      Comment: mod    • Drug use: No        Medications Prior to Admission   Medication Sig Dispense Refill Last Dose   • aspirin 81 MG chewable tablet Chew 81 mg Daily.   3/2/2022 at Unknown time   • Cholecalciferol (VITAMIN D3) 2000 units tablet Take 1 tablet by mouth Daily.   3/2/2022 at Unknown time   • irbesartan (Avapro) 300 MG tablet Take 150 mg by mouth Daily.      • metoprolol tartrate (LOPRESSOR) 25 MG tablet Take 1 tablet by mouth twice daily 180 tablet 0 3/2/2022 at Unknown time   • Multiple Vitamins-Minerals (MULTIVITAMIN ADULT EXTRA C PO) Take 1 tablet by mouth Daily.   3/2/2022 at Unknown time   • levothyroxine (SYNTHROID, LEVOTHROID) 50 MCG tablet Take 50 mcg by mouth Every Morning.          Allergies  Prednisone    Scheduled Meds:custom intrapleural syringe builder, , Intrapleural, Once  amiodarone, 200 mg, Oral, Q12H   Followed by  [START ON 3/27/2022] amiodarone, 200 mg, Oral, Daily  arformoterol, 15 mcg, Nebulization, BID - RT  aspirin, 81 mg, Oral, Daily  budesonide, 1 mg, Nebulization, BID - RT  dilTIAZem, 90 mg, Oral, Q8H  heparin (porcine), 5,000 Units, Subcutaneous, Q12H  insulin glargine, 10 Units, Subcutaneous, Nightly  insulin lispro, 0-14 Units, Subcutaneous, TID AC  levothyroxine, 50 mcg, Oral, Q AM  melatonin, 5 mg, Oral, Nightly  metoprolol tartrate, 25 mg, Oral, BID  multivitamin with minerals, 1 tablet, Oral, Daily  senna-docusate sodium, 2 tablet, Oral, BID  sodium chloride, 10 mL, Intravenous, Q12H  zolpidem, 5 mg, Oral, Nightly      Continuous Infusions:hold, 1 each      PRN Meds:.•  acetaminophen **OR** acetaminophen **OR** acetaminophen  •  aluminum-magnesium hydroxide-simethicone  •  senna-docusate sodium **AND** polyethylene glycol **AND** bisacodyl **AND** bisacodyl  •  dextrose  •  dextrose  •  glucagon (human recombinant)  •  hold  •  insulin lispro **AND** insulin lispro  •  magnesium sulfate **OR** magnesium sulfate in D5W 1g/100mL (PREMIX)  •  nitroglycerin  •  ondansetron  "**OR** ondansetron  •  potassium chloride **OR** potassium chloride **OR** potassium chloride  •  sodium chloride    Objective     VITAL SIGNS  Vitals:    03/13/22 2248 03/14/22 0215 03/14/22 0511 03/14/22 0519   BP: 112/67  125/75 142/79   BP Location: Left arm   Left arm   Patient Position: Lying   Lying   Pulse:  61 74 68   Resp: 25 21  19   Temp: 97.7 °F (36.5 °C) 98.6 °F (37 °C)  97.6 °F (36.4 °C)   TempSrc: Oral Axillary  Oral   SpO2: 93%      Weight:    85.8 kg (189 lb 2.5 oz)   Height:           Flowsheet Rows    Flowsheet Row First Filed Value   Admission Height 162.6 cm (64\") Documented at 03/02/2022 2218   Admission Weight 81 kg (178 lb 9.2 oz) Documented at 03/02/2022 2218            Intake/Output Summary (Last 24 hours) at 3/14/2022 0651  Last data filed at 3/14/2022 0000  Gross per 24 hour   Intake 480 ml   Output 60 ml   Net 420 ml        TELEMETRY: Atrial fibrillation    Physical Exam:  The patient is alert, oriented and in no distress.  Vital signs as noted above.  Head and neck revealed no carotid bruits or jugular venous distention.  No thyromegaly or lymphadenopathy is present  Lungs clear.  No wheezing.  Breath sounds are normal bilaterally.  Left chest tube in place.  Heart normal first and second heart sounds.  No murmur. No precordial rub is present.  No gallop is present.  Abdomen soft and nontender.  No organomegaly is present.  Extremities with good peripheral pulses without any pedal edema.  Skin warm and dry.  Musculoskeletal system is grossly normal  CNS grossly normal      Results Review:   I reviewed the patient's new clinical results.  Lab Results (last 24 hours)     Procedure Component Value Units Date/Time    POC Glucose Once [[id number]]  (Abnormal) Collected: 03/13/22 2013    Specimen: Blood Updated: 03/13/22 2014     Glucose 168 mg/dL      Comment: Serial Number: [id number]:  [id number]       POC Glucose Once [[id number]]  (Abnormal) Collected: 03/13/22 1648    Specimen: " Blood Updated: 03/13/22 1649     Glucose 110 mg/dL      Comment: Serial Number: [id number]:  [id number]       POC Glucose Once [[id number]]  (Abnormal) Collected: 03/13/22 1126    Specimen: Blood Updated: 03/13/22 1128     Glucose 114 mg/dL      Comment: Serial Number: [id number]:  [id number]       POC Glucose Once [[id number]]  (Abnormal) Collected: 03/13/22 0732    Specimen: Blood Updated: 03/13/22 0733     Glucose 144 mg/dL      Comment: Serial Number: [id number]:  [id number]             Imaging Results (Last 24 Hours)     Procedure Component Value Units Date/Time    XR Chest 1 View [[id number]] Resulted: 03/14/22 0521     Updated: 03/14/22 0609    XR Chest 1 View [[id number]] Collected: 03/13/22 1126     Updated: 03/13/22 1130    Narrative:         DATE OF EXAM:   3/13/2022 6:30 AM     PROCEDURE:   XR CHEST 1 VW-     INDICATIONS:   chest tube management; I48.91-Unspecified atrial fibrillation;  I50.9-Heart failure, unspecified; Z20.822-Contact with and (suspected)  exposure to covid-19; I50.21-Acute systolic (congestive) heart failure     COMPARISON:  03/12/2022 and prior     TECHNIQUE:   Portable Chest     FINDINGS:      Study limited by overlying support and monitoring apparatus     Patient status post median sternotomy.     Large left-sided pleural effusion with component tracking along the  lateral wall is decreased from the comparison. Dependent opacities at  the left base again noted. Left-sided chest tube remains in place. Right  lung is grossly clear. Osseous structures are unremarkable        Impression:      IMPRESSION :   Slight decrease in size of left-sided pleural effusion. Chest tube  remains in place[     Electronically Signed By-Jarrett De Jesus On:3/13/2022 11:28 AM  This report was finalized on [id number] by  Jarrett De Jesus, .      LAB RESULTS (LAST 7 DAYS)    CBC  Results from last 7 days   Lab Units 03/13/22  0032 03/12/22  0020 03/11/22  1206 03/10/22  0009  03/09/22  1230 03/09/22  0651 03/09/22  0036   WBC 10*3/mm3 14.10* 13.00* 13.10* 15.40* 14.10* 13.00* 13.70*   RBC 10*6/mm3 3.05* 2.87* 2.79* 2.89* 2.98* 2.90* 2.87*   HEMOGLOBIN g/dL 9.5* 9.1* 9.1* 9.3* 9.9* 9.4* 9.1*   HEMATOCRIT % 28.8* 27.2* 26.6* 27.3* 28.2* 27.5* 26.8*   MCV fL 94.4 94.7 95.5 94.3 94.5 94.6 93.3   PLATELETS 10*3/mm3 485* 383 294 291 267 244 252       BMP  Results from last 7 days   Lab Units 03/13/22  0032 03/12/22  0020 03/11/22  1206 03/10/22  0009 03/09/22 0651 03/08/22  0018   SODIUM mmol/L 129* 128* 126* 130* 129* 130*   POTASSIUM mmol/L 4.1 4.0 4.1 4.3 4.0 4.1   CHLORIDE mmol/L 94* 94* 93* 96* 97* 96*   CO2 mmol/L 24.0 22.0 23.0 23.0 23.0 23.0   BUN mg/dL 20 17 18 19 17 30*   CREATININE mg/dL 0.80 0.76 0.72 0.67 0.73 1.03*   GLUCOSE mg/dL 184* 154* 161* 168* 144* 169*   MAGNESIUM mg/dL 2.2 2.0 2.1 2.1 2.0 2.1   PHOSPHORUS mg/dL 2.9 2.5 2.8 2.4* 2.4* 2.6       CMP   Results from last 7 days   Lab Units 03/13/22  0032 03/12/22  0020 03/11/22  1206 03/10/22  0009 03/09/22 0651 03/08/22  0018   SODIUM mmol/L 129* 128* 126* 130* 129* 130*   POTASSIUM mmol/L 4.1 4.0 4.1 4.3 4.0 4.1   CHLORIDE mmol/L 94* 94* 93* 96* 97* 96*   CO2 mmol/L 24.0 22.0 23.0 23.0 23.0 23.0   BUN mg/dL 20 17 18 19 17 30*   CREATININE mg/dL 0.80 0.76 0.72 0.67 0.73 1.03*   GLUCOSE mg/dL 184* 154* 161* 168* 144* 169*   ALBUMIN g/dL 3.10* 2.80* 2.60* 2.80* 3.00* 2.90*   BILIRUBIN mg/dL 0.4 0.5 0.4 0.5 0.6 0.3   ALK PHOS U/L 118* 89 87 81 76 77   AST (SGOT) U/L 38* 28 14 13 12 17   ALT (SGPT) U/L 44* 30 19 19 20 27         BNP        TROPONIN        CoAg        Creatinine Clearance  Estimated Creatinine Clearance: 58.3 mL/min (by C-G formula based on SCr of 0.8 mg/dL).    ABG        Radiology  XR Chest 1 View    Result Date: 3/13/2022  IMPRESSION : Slight decrease in size of left-sided pleural effusion. Chest tube remains in place[  Electronically Signed By-Jarrett De Jesus On:3/13/2022 11:28 AM This report was finalized  on [id number] by  Jarrett De Jesus, .              EKG                              I personally viewed and interpreted the patient's EKG/Telemetry data: Atrial fibrillation          ECHOCARDIOGRAM:    Results for orders placed during the hospital encounter of 03/02/22    Adult Transthoracic Echo Complete W/ Cont if Necessary Per Protocol    Interpretation Summary  LVE with severe global left ventricular hypokinesis, estimated LV ejection fraction of 30%.  Severe right ventricular enlargement and moderate right atrial enlargement seen  Severe left atrial enlargement seen.  Aortic valve, mitral valve, tricuspid valve appears structurally normal, moderate mitral and mild tricuspid regurgitation seen.  Calculated RV systolic pressure of 40 to 45 mmHg.  No pericardial effusion seen.  Large pleural effusion seen.  Proximal aorta appears normal in size.          STRESS MYOVIEW:    Cardiolite (Tc-99m Sestamibi) stress test    CARDIAC CATHETERIZATION:            OTHER:         Assessment/Plan     Principal Problem:    Atrial fibrillation with RVR ([facility])  Active Problems:    Other hyperlipidemia    Essential hypertension    Hypothyroidism    Coronary artery disease involving coronary bypass graft of native heart without angina pectoris    Acute CHF (congestive heart failure) ([facility])    Acute hyponatremia    Elevated LFTs    Elevated TSH    Acute hyperglycemia    Obesity (BMI 30-39.9)      Presented through emergency room 3/2/2022 with progressively worsening shortness of breath and dyspnea on exertion weight gain and leg edema. Was found to be in A. fib and congestive heart failure. Patient denies any chest pain. Patient lives in [place] and has a second home in [place] and see his cardiologist at [facility]. Patient has been having issues with alopecia and memory issues therefore stopped beta-blockers and statin on her own. Also has not been taking  thyroid replacement therapy since November because she ran out of medication.  Patient has history of prediabetes. Does not check her sugars at home.  Work-up here revealed troponin x3 are negative. proBNP is 7717. CMP reveals a glucose of 242, hemoglobin A1c over 7.1. BUN/creatinine are 21/0.83. ALT elevated at 71, AST 35. TSH is 11.15  Chest x-ray 3/2/2022 reveals left pleural effusion, left basilar disease most likely atelectasis with possible pneumonia.  EKG 3/2/2022 reviewed/interpreted by me reveals A. fib with RVR at 126 bpm with PVCs      ]]]]]]]]]]]]]]]]]]]]  Impression  ==========  -Progressively worsening shortness of breath and leg edema.    -Congestive heart failure  Left ventricle dysfunction with ejection fraction of 30%.    -Significant biatrial enlargement    -Atrial fibrillation with RVR    -Premature ventricular contractions    -Status post CABG 12/2014 (performed in [place])    -Hypertension dyslipidemia prediabetes hypothyroidism elevation    -Status post ORIF    -Family history of coronary artery disease    -Intolerance to prednisone    -Former smoker    -Medical noncompliance.  Was not taking thyroid medicine replacement properly.     =================  Plan  ===========  Atrial fibrillation with RVR.  Rate control.  Consider GABRIEL cardioversion.  Patient would benefit from long-term anticoagulation for atrial fibrillation because of high [id number] score due to female gender age over 75, hypertension, diabetes, CAD making it at least 6. We will start her on Eliquis or warfarin before discharge.  Continued IV diuresis.  Observe renal function closely.  Elevated LFTs due to passive congestion.    Large left pleural effusion.  Status post drainage and chest tube.  More than 400 cc of fluid was removed.  Patient is on Cardizem and amiodarone.  Chest tube was flushed with TPA yesterday by thoracic surgery.  Patient has loculated left pleural effusion.  Another round of TPA infusion through the chest tube is being planned.    Renal  dysfunction  BUN/creatinine-30/1.03    Echocardiogram showed severe right ventricle enlargement left atrial enlargement related enlargement and calculated pulmonary artery pressure of 45 mmHg.  Left ventricle dysfunction with ejection fraction of 30%.    Ischemic cardiomyopathy    Current medications include amiodarone aspirin Cardizem 90 mg every 8 hours subcu heparin levothyroxine metoprolol 25 mg twice a day.    Anticoagulation and possible cardioversion as an outpatient versus GABRIEL cardioversion to try to convert to sinus rhythm.  Patient is not on anticoagulation at this time due to presence of chest tube hemorrhagic left pleural effusion etc.  This was discussed with patient and patient's  at bedside.    Follow-up labs ordered.    Further plan will depend on patient's progress.  ]]]]]]]]]]]]]]]]]           Yordan Evans MD  03/14/22  06:51 EDT

## 2022-03-14 NOTE — PLAN OF CARE
Problem: Fall Injury Risk  Goal: Absence of Fall and Fall-Related Injury  Outcome: Ongoing, Progressing  Intervention: Identify and Manage Contributors  Description: Develop a fall prevention plan with the patient and caregiver/family.  Provide reorientation, appropriate sensory stimulation and routines with changes in mental status to decrease risk of fall.  Promote use of personal vision and auditory aids.  Assess assistance level required for safe and effective self-care; provide support as needed, such as toileting, mobilization. For age 65 and older, implement timed toileting with assistance.  Encourage physical activity, such as performance of mobility and self-care at highest level of patient ability, multicomponent exercise program and provision of appropriate assistive devices.  If fall occurs, assess the severity of injury; implement fall injury protocol. Determine the cause and revise fall injury prevention plan.  Regularly review medication contribution to fall risk; adjust medication administration times to minimize risk of falling.  Consider risk related to polypharmacy and age.  Balance adequate pain management with potential for oversedation.  Recent Flowsheet Documentation  Taken 3/14/2022 0000 by Kalani Diallo RN  Medication Review/Management: medications reviewed  Taken 3/13/2022 2200 by Kalani Diallo, RN  Medication Review/Management: medications reviewed  Taken 3/13/2022 1945 by Kalani Diallo, RN  Medication Review/Management: medications reviewed  Self-Care Promotion: independence encouraged  Intervention: Promote Injury-Free Environment  Description: Provide a safe, barrier-free environment that encourages independent activity.  Keep care area uncluttered and well-lighted.  Determine need for increased observation or monitoring.  Avoid use of devices that minimize mobility, such as restraints or indwelling urinary catheter.  Recent Flowsheet Documentation  Taken 3/14/2022 0000 by  Yoel, Kalani, RN  Safety Promotion/Fall Prevention:   safety round/check completed   room organization consistent   lighting adjusted   clutter free environment maintained   assistive device/personal items within reach  Taken 3/13/2022 2200 by Kalani Diallo RN  Safety Promotion/Fall Prevention:   safety round/check completed   room organization consistent   nonskid shoes/slippers when out of bed   lighting adjusted   clutter free environment maintained   assistive device/personal items within reach  Taken 3/13/2022 1945 by Kalani Diallo RN  Safety Promotion/Fall Prevention:   assistive device/personal items within reach   safety round/check completed   room organization consistent   nonskid shoes/slippers when out of bed   lighting adjusted   clutter free environment maintained     Problem: Breathing Pattern Ineffective  Goal: Effective Breathing Pattern  Outcome: Ongoing, Progressing  Intervention: Promote Improved Breathing Pattern  Description: Assess and monitor airway, breathing and circulation; maintain close surveillance for deterioration.  Maintain head of bed elevation with regular position changes to minimize ventilation-perfusion mismatch and breathlessness.  Evaluate psychosocial factors that may contribute to the anxiety-dyspnea cycle.  Acknowledge, normalize and validate intensity and complexity of patient and support system response to fear, anxiety and patient's breathlessness.  Utilize nonpharmacologic measures, such as controlled breathing and relaxation techniques, in addition to medication, to reduce pain and anxiety.  Promote early mobility or ambulation; match activity to ability and tolerance.  Initiate cough-enhancement and airway-clearance techniques with instruction.  Provide oxygen therapy judiciously to avoid hyperoxemia; adjust to achieve oxygenation goal.  Consider noninvasive or invasive positive pressure ventilation to enhance oxygenation, ventilation and reduce work of  breathing.  Recent Flowsheet Documentation  Taken 3/14/2022 0000 by Kalani Diallo RN  Head of Bed (HOB) Positioning: HOB at 30-45 degrees  Airway/Ventilation Management: airway patency maintained  Taken 3/13/2022 1945 by Kalani Diallo RN  Supportive Measures: active listening utilized  Head of Bed (HOB) Positioning: HOB at 45 degrees  Airway/Ventilation Management: airway patency maintained     Problem: Dysrhythmia  Goal: Normalized Cardiac Rhythm  Outcome: Ongoing, Progressing  Intervention: Monitor and Manage Cardiac Rhythm Effect  Description: Monitor electrocardiogram closely for rate and rhythm changes; evaluate response to treatment.  Recognize elevated risk for VTE (venous thromboembolism); implement prophylaxis.  Anticipate administration of pharmacologic therapy to prevent or manage dysrhythmia, such as an antidysrhythmic agent, electrolyte replacement or binding agent.  If hypotensive, lower head of bed to enhance cerebral blood flow.  Provide oxygen therapy judiciously to avoid hyperoxemia; adjust to achieve oxygenation goal.  Promote comfort to prevent dysrhythmia triggered by increased sympathetic tone; consider pharmacologic and nonpharmacologic strategies (e.g., calm environment, rest, stress-reduction).  Anticipate need for additional therapy, such as cardioversion or cardiac rhythm management device, to improve perfusion and hemodynamic stability.  Initiate emergency protocol if life-threatening rhythm disturbance occurs.  Acknowledge fear and anxiety; encourage questions; provide support and information.  Recent Flowsheet Documentation  Taken 3/14/2022 0000 by Kalani Diallo RN  VTE Prevention/Management:   bilateral   sequential compression devices on  Taken 3/13/2022 1945 by Kalani Diallo RN  VTE Prevention/Management:   bilateral   sequential compression devices on     Problem: Adult Inpatient Plan of Care  Goal: Plan of Care Review  Outcome: Ongoing, Progressing  Flowsheets  (Taken 3/14/2022 0211)  Progress: no change  Plan of Care Reviewed With: patient  Goal: Patient-Specific Goal (Individualized)  Outcome: Ongoing, Progressing  Goal: Absence of Hospital-Acquired Illness or Injury  Outcome: Ongoing, Progressing  Intervention: Identify and Manage Fall Risk  Description: Perform standard risk assessment on admission using a validated tool or comprehensive approach appropriate to the patient; reassess fall risk frequently, with change in status or transfer to another level of care.  Communicate fall injury risk to interprofessional healthcare team.  Determine need for increased observation, equipment and environmental modification, such as low bed, signage and supportive, nonskid footwear.  Adjust safety measures to individual developmental age, stage and identified risk factors.  Reinforce the importance of safety and physical activity with patient and family.  Perform regular intentional rounding to assess need for position change, pain assessment and personal needs, including assistance with toileting.  Recent Flowsheet Documentation  Taken 3/14/2022 0000 by Kalani Diallo, RN  Safety Promotion/Fall Prevention:   safety round/check completed   room organization consistent   lighting adjusted   clutter free environment maintained   assistive device/personal items within reach  Taken 3/13/2022 2200 by Kalani Diallo, RN  Safety Promotion/Fall Prevention:   safety round/check completed   room organization consistent   nonskid shoes/slippers when out of bed   lighting adjusted   clutter free environment maintained   assistive device/personal items within reach  Taken 3/13/2022 1945 by Kalani Diallo, RN  Safety Promotion/Fall Prevention:   assistive device/personal items within reach   safety round/check completed   room organization consistent   nonskid shoes/slippers when out of bed   lighting adjusted   clutter free environment maintained  Intervention: Prevent Skin  Injury  Description: Perform a screening for skin injury risk, such as pressure or moisture associated skin damage on admission and at regular intervals throughout hospital stay.  Keep all areas of skin (especially folds) clean and dry.  Maintain adequate skin hydration.  Relieve and redistribute pressure and protect bony prominences; implement measures based on patient-specific risk factors.  Match turning and repositioning schedule to clinical condition.  Encourage weight shift frequently; assist with reposition if unable to complete independently.  Float heels off bed; avoid pressure on the Achilles tendon.  Keep skin free from extended contact with medical devices.  Encourage functional activity and mobility, as early as tolerated.  Use aids (e.g., slide boards, mechanical lift) during transfer.  Recent Flowsheet Documentation  Taken 3/14/2022 0000 by Kalani Diallo RN  Body Position: position changed independently  Skin Protection: adhesive use limited  Taken 3/13/2022 1945 by Kalani Diallo RN  Body Position: position changed independently  Skin Protection:   adhesive use limited   incontinence pads utilized  Intervention: Prevent and Manage VTE (Venous Thromboembolism) Risk  Description: Assess for VTE (venous thromboembolism) risk.  Encourage and assist with early ambulation.  Initiate and maintain compression or other therapy, as indicated, based on identified risk in accordance with organizational protocol and provider order.  Encourage both active and passive leg exercises while in bed, if unable to ambulate.  Recent Flowsheet Documentation  Taken 3/14/2022 0000 by Kalani Diallo RN  Activity Management: activity adjusted per tolerance  VTE Prevention/Management:   bilateral   sequential compression devices on  Taken 3/13/2022 1945 by Kalani Diallo RN  Activity Management: activity adjusted per tolerance  VTE Prevention/Management:   bilateral   sequential compression devices  on  Intervention: Prevent Infection  Description: Maintain skin and mucous membrane integrity; promote hand, oral and pulmonary hygiene.  Optimize fluid balance, nutrition, sleep and glycemic control to maximize infection resistance.  Identify potential sources of infection early to prevent or mitigate progression of infection (e.g., wound, lines, devices).  Evaluate ongoing need for invasive devices; remove promptly when no longer indicated.  Recent Flowsheet Documentation  Taken 3/14/2022 0000 by Kalani Diallo RN  Infection Prevention:   single patient room provided   rest/sleep promoted   hand hygiene promoted   environmental surveillance performed  Taken 3/13/2022 2200 by Kalani Diallo RN  Infection Prevention:   single patient room provided   rest/sleep promoted   hand hygiene promoted   environmental surveillance performed  Taken 3/13/2022 1945 by Kalani Diallo RN  Infection Prevention:   single patient room provided   rest/sleep promoted   hand hygiene promoted   environmental surveillance performed  Goal: Optimal Comfort and Wellbeing  Outcome: Ongoing, Progressing  Intervention: Monitor Pain and Promote Comfort  Description: Assess pain level, treatment efficacy and patient response at regular intervals using a consistent pain scale.  Consider the presence and impact of preexisting chronic pain.  Encourage patient and caregiver involvement in pain assessment, interventions and safety measures.  Recent Flowsheet Documentation  Taken 3/14/2022 0000 by Kalani Diallo RN  Pain Management Interventions:   see MAR   position adjusted   care clustered  Taken 3/13/2022 1945 by Kalani Diallo RN  Pain Management Interventions: position adjusted  Intervention: Provide Person-Centered Care  Description: Use a family-focused approach to care.  Develop trust and rapport by proactively providing information, encouraging questions, addressing concerns and offering reassurance.  Acknowledge emotional  response to hospitalization.  Recognize and utilize personal coping strategies.  Honor spiritual and cultural preferences.  Recent Flowsheet Documentation  Taken 3/13/2022 1945 by Kalani Diallo, RN  Trust Relationship/Rapport:   care explained   choices provided   questions answered   questions encouraged   reassurance provided  Goal: Readiness for Transition of Care  Outcome: Ongoing, Progressing   Goal Outcome Evaluation:  Plan of Care Reviewed With: patient        Progress: no change

## 2022-03-14 NOTE — PLAN OF CARE
Problem: Adult Inpatient Plan of Care  Goal: Plan of Care Review  Outcome: Ongoing, Progressing  Goal: Patient-Specific Goal (Individualized)  Outcome: Ongoing, Progressing  Goal: Absence of Hospital-Acquired Illness or Injury  Outcome: Ongoing, Progressing  Intervention: Identify and Manage Fall Risk  Recent Flowsheet Documentation  Taken 3/14/2022 0800 by Christina Tim RN  Safety Promotion/Fall Prevention:   safety round/check completed   activity supervised   assistive device/personal items within reach   clutter free environment maintained   fall prevention program maintained   gait belt   nonskid shoes/slippers when out of bed  Intervention: Prevent and Manage VTE (Venous Thromboembolism) Risk  Recent Flowsheet Documentation  Taken 3/14/2022 0800 by Christina Tim RN  Activity Management:   activity adjusted per tolerance   activity encouraged  Intervention: Prevent Infection  Recent Flowsheet Documentation  Taken 3/14/2022 0800 by Christina Tim RN  Infection Prevention:   hand hygiene promoted   rest/sleep promoted  Goal: Optimal Comfort and Wellbeing  Outcome: Ongoing, Progressing  Goal: Readiness for Transition of Care  Outcome: Ongoing, Progressing     Problem: Fall Injury Risk  Goal: Absence of Fall and Fall-Related Injury  Intervention: Promote Injury-Free Environment  Recent Flowsheet Documentation  Taken 3/14/2022 0800 by Christina Tim RN  Safety Promotion/Fall Prevention:   safety round/check completed   activity supervised   assistive device/personal items within reach   clutter free environment maintained   fall prevention program maintained   gait belt   nonskid shoes/slippers when out of bed  Goal: Absence of Fall and Fall-Related Injury  Intervention: Promote Injury-Free Environment  Recent Flowsheet Documentation  Taken 3/14/2022 0800 by Christina Tim RN  Safety Promotion/Fall Prevention:   safety round/check completed   activity supervised   assistive device/personal items within  reach   clutter free environment maintained   fall prevention program maintained   gait belt   nonskid shoes/slippers when out of bed     Problem: Breathing Pattern Ineffective  Goal: Effective Breathing Pattern  Outcome: Ongoing, Progressing     Problem: Dysrhythmia  Goal: Normalized Cardiac Rhythm  Outcome: Ongoing, Progressing   Goal Outcome Evaluation:  resting in bed with  at bedside. Denies needs at this time.

## 2022-03-14 NOTE — SIGNIFICANT NOTE
03/14/22 1506   OTHER   Discipline physical therapist   Rehab Time/Intention   Session Not Performed patient/family declined treatment;other (see comments)  (Attempted x2 this date, pt eating then had just returned to bed. PT to f/u tomorrow.)   Recommendation   PT - Next Appointment 03/15/22

## 2022-03-14 NOTE — CASE MANAGEMENT/SOCIAL WORK
Continued Stay Note  GUERLINE Vazquez     Patient Name: Jaquelin Valderrama  MRN: [id number]  Today's Date: 3/14/2022    Admit Date: 3/2/2022     Discharge Plan     Row Name 03/14/22 1408       Plan    Plan Comments d/c barrier: Chest tube, Pulm following, Cardiothoracic reconsulted.              Phone communication or documentation only - no physical contact with patient or family.        Elina Barrios RN

## 2022-03-14 NOTE — PROGRESS NOTES
[facility] Medicine Services Daily Progress Note    Patient Name: Jaquelin Valderrama  : 1940  MRN: [id number]  Primary Care Physician:  Minerva Chatterjee MD  Date of admission: 3/2/2022      Subjective      Chief Complaint: Atrial fibrillation with RVR pleural effusion        Patient Reports states she did well overnight slept okay. Stayed in chair > 2 hours today.         ROS negative except as above    Objective      Vitals:   Temp:  [97.5 °F (36.4 °C)-98.6 °F (37 °C)] 97.5 °F (36.4 °C)  Heart Rate:  [55-85] 71  Resp:  [14-27] 25  BP: (112-142)/(56-79) 119/56  Flow (L/min):  [0-2] 0    Vitals reviewed.   Constitutional:       Comments: Left chest tube in place  at bedside  Patient is on room air   HENT:      Head: Normocephalic.      Nose: Nose normal.      Mouth/Throat:      Mouth: Mucous membranes are moist.   Eyes:      Pupils: Pupils are equal, round, and reactive to light.   Cardiovascular:      Rate and Rhythm: irregular rate and rhythm.      Pulses: Normal pulses.      Heart sounds: Normal heart sounds.   Pulmonary:      Effort: Pulmonary effort is normal.      Comments: Left chest tube catheter in place  Left side decreased breath sounds  Abdominal:      General: Abdomen is flat.      Palpations: Abdomen is soft.   Musculoskeletal:         General: Normal range of motion.      Cervical back: Normal range of motion.   Skin:     General: Skin is warm.      Capillary Refill: Capillary refill takes less than 2 seconds.   Neurological:      General: No focal deficit present.      Mental Status: She is alert and oriented to person, place, and time.   Psychiatric:         Mood and Affect: Mood normal.         Behavior: Behavior normal.        Result Review    Result Review:  I have personally reviewed the results from the time of this admission to 3/14/2022 14:53 EDT and agree with these findings:  [x]  Laboratory  []  Microbiology  []  Radiology  []  EKG/Telemetry   []   Cardiology/Vascular   []  Pathology  []  Old records  []  Other:  Most notable findings include: White count still elevated at 13-14    Wounds (last 24 hours)              Assessment/Plan       Brief Patient Summary:  Jaquelin Valderrama is a 82 y.o. female who presented with A. fib RVR and pleural effusion status post chest tube        amiodarone, 200 mg, Oral, Q8H   Followed by  [START ON 3/13/2022] amiodarone, 200 mg, Oral, Q12H   Followed by  [START ON 3/27/2022] amiodarone, 200 mg, Oral, Daily  arformoterol, 15 mcg, Nebulization, BID - RT  aspirin, 81 mg, Oral, Daily  budesonide, 1 mg, Nebulization, BID - RT  cefepime, 2 g, Intravenous, Q12H  dilTIAZem, 90 mg, Oral, Q8H  heparin (porcine), 5,000 Units, Subcutaneous, Q12H  HYDROmorphone, 2 mg, Intravenous, Once  insulin glargine, 10 Units, Subcutaneous, Nightly  insulin lispro, 0-14 Units, Subcutaneous, TID AC  levothyroxine, 50 mcg, Oral, Q AM  melatonin, 5 mg, Oral, Nightly  metoprolol tartrate, 25 mg, Oral, BID  multivitamin with minerals, 1 tablet, Oral, Daily  senna-docusate sodium, 2 tablet, Oral, BID  sodium chloride, 10 mL, Intravenous, Q12H        hold, 1 each           Active Hospital Problems:          Active Hospital Problems     Diagnosis     • **Atrial fibrillation with RVR (HCC)     • Acute CHF (congestive heart failure) (HCC)     • Acute hyponatremia     • Elevated LFTs     • Elevated TSH     • Acute hyperglycemia     • Obesity (BMI 30-39.9)     • Coronary artery disease involving coronary bypass graft of native heart without angina pectoris     • Essential hypertension     • Hypothyroidism     • Other hyperlipidemia           ASSESSMENT:  Possible hemothorax  Acute respiratory distress  COPD exacerbation  Atrial fibrillation with RVR (HCC), now resolved    Other hyperlipidemia    Essential hypertension    Hypothyroidism    Coronary artery disease involving coronary bypass graft of native heart without angina pectoris    Acute CHF (congestive heart  failure) (HCC) EFG 45%    Acute hyponatremia    Elevated LFTs    Elevated TSH    Acute hyperglycemia    Obesity (BMI 30-39.9)   ARF     PLAN:    #Acute hypoxic respiratory failure  #Possible hemothorax    - weaned to room air    -Hemoglobin stable back on aspirin and heparin    - chest tube remains on suction    - s/p TPA 03/13/22 (3x total)    - Pulm has reconsulted CTS to consider if tube needs to be moved/replaced    -Blood transfusion as needed    - Defer chest tube mgt to pulm and CTS    - CXR on 3/14 shows stable left pleural effusion    - Patient may need VATS    #COPD, chronic    -weaned to room air    - continue Brovana and Pulmicort    #DM    -ISS    - Lantus 10u SC QHS    #CAD    -s/p CABG    - continue aspirin daily    #HFrEF, chronic  # Ischemic cardiomyopathy    - appears euvolemic    -cardiology following    #A. Fib with RVR    - Continue PO Amiodarone 200mg PO q12h for 14 days then wean to 200mg daily    - Cardizem 90mg PO q8h    - Lopressor 25mg PO BID    - cardio following, recommends otpt GABRIEL cardioversion    #Hypothyroid    -continue home synthroid    #Obesity     - BMI 33.51    - weight loss encouraged    #DVT prophylaxis    -heparin       DVT prophylaxis:  Medical DVT prophylaxis orders are present.     CODE STATUS:    Code Status (Patient has no pulse and is not breathing): CPR (Attempt to Resuscitate)  Medical Interventions (Patient has pulse or is breathing): Full Support        Disposition:  I expect patient to be discharged once stable.     This patient has been examined wearing appropriate Personal Protective Equipment and discussed with  patient and nursing staff.03/14/22      Electronically signed by Jaja Artis DO, 03/14/22, 14:53 EDT.  [facility] Hospitalist Team

## 2022-03-14 NOTE — PROGRESS NOTES
"PULMONARY CRITICAL CARE Progress  NOTE      PATIENT IDENTIFICATION:  Name: Jaquelin Valderrama  MRN: [id number]  :  1940     Age: 82 y.o.  Sex: female    DATE OF Note:  3/14/2022   Referring Physician: Jaja Artis DO                  Subjective:   On 2 L no SOB no chest pain,   Chest tube minimal drainage but chest x-ray is not showing improvement result discussed with the patient and family  Otherwise patient denies any chest pain  Good appetite  no nausea or vomiting, no change in bowel habit, no dysuria,  no new  skin rash or itching.    Objective:  tMax 24 hrs: Temp (24hrs), Av °F (36.7 °C), Min:97.6 °F (36.4 °C), Max:98.6 °F (37 °C)      Vitals Ranges:   Temp:  [97.6 °F (36.4 °C)-98.6 °F (37 °C)] 97.6 °F (36.4 °C)  Heart Rate:  [55-85] 69  Resp:  [14-27] 22  BP: (112-142)/(66-79) 124/66    Intake and Output Last 3 Shifts:   I/O last 3 completed shifts:  In: 960 [P.O.:960]  Out: 60 [Chest Tube:60]    Exam:  /66 (BP Location: Left arm, Patient Position: Lying)   Pulse 69   Temp 97.6 °F (36.4 °C) (Axillary)   Resp 22   Ht 160 cm (63\")   Wt 85.8 kg (189 lb 2.5 oz)   SpO2 94%   BMI 33.51 kg/m²     General Appearance:   AA  HEENT:  Normocephalic, without obvious abnormality, Conjunctiva/corneas clear,.  Normal external ear canals, Nares normal, no drainage     Neck:  Supple, symmetrical, trachea midline. No JVD.  Lungs /Chest wall: chest tube in place  No BS on left side    respirations unlabored symmetrical wall movement.     Heart:  Regular rate and rhythm, systolic murmur PMI left sternal border  Abdomen: Soft, non-tender, no masses, no organomegaly.    Extremities: Trace edema no clubbing or Cyanosis        Medications:    Current Facility-Administered Medications:   •  acetaminophen (TYLENOL) tablet 650 mg, 650 mg, Oral, Q4H PRN, 650 mg at 22 1307 **OR** acetaminophen (TYLENOL) 160 MG/5ML solution 650 mg, 650 mg, Oral, Q4H PRN **OR** acetaminophen (TYLENOL) suppository 650 mg, 650 " mg, Rectal, Q4H PRN, Bharat Montgomery MD  •  alteplase (ACTIVASE) 10 mg in sterile water (preservative free) 25 mL Syringe, , Intrapleural, Once, Montserrat Humphries DNP, VALENTINE  •  aluminum-magnesium hydroxide-simethicone (MAALOX MAX) 400-400-40 MG/5ML suspension 15 mL, 15 mL, Oral, Q6H PRN, Bharat Montgomery MD  •  [COMPLETED] amiodarone in dextrose 5% (NEXTERONE) loading dose 150mg/100mL, 150 mg, Intravenous, Once, 150 mg at 22 0525 **FOLLOWED BY** [] amiodarone 360 mg in 200 mL D5W infusion, 1 mg/min, Intravenous, Continuous, Last Rate: 33.3 mL/hr at 22 1100, 1 mg/min at 22 1100 **FOLLOWED BY** [] amiodarone 360 mg in 200 mL D5W infusion, 0.5 mg/min, Intravenous, Continuous, Stopped at 22 0232 **FOLLOWED BY** [COMPLETED] amiodarone (PACERONE) tablet 200 mg, 200 mg, Oral, Once, 200 mg at 22 0603 **FOLLOWED BY** [COMPLETED] amiodarone (PACERONE) tablet 200 mg, 200 mg, Oral, Q8H, 200 mg at 22 2135 **FOLLOWED BY** amiodarone (PACERONE) tablet 200 mg, 200 mg, Oral, Q12H, 200 mg at 22 0512 **FOLLOWED BY** [START ON 3/27/2022] amiodarone (PACERONE) tablet 200 mg, 200 mg, Oral, Daily, Sabra Roberts, [place]  •  arformoterol (BROVANA) nebulizer solution 15 mcg, 15 mcg, Nebulization, BID - RT, Bharat Montgomery MD, 15 mcg at 22 0801  •  aspirin chewable tablet 81 mg, 81 mg, Oral, Daily, Bharat Montgomery MD, 81 mg at 22 0905  •  sennosides-docusate (PERICOLACE) 8.6-50 MG per tablet 2 tablet, 2 tablet, Oral, BID, 2 tablet at 22 0905 **AND** polyethylene glycol (MIRALAX) packet 17 g, 17 g, Oral, Daily PRN **AND** bisacodyl (DULCOLAX) EC tablet 5 mg, 5 mg, Oral, Daily PRN **AND** bisacodyl (DULCOLAX) suppository 10 mg, 10 mg, Rectal, Daily PRN, Bharat Montgomery MD  •  budesonide (PULMICORT) nebulizer solution 1 mg, 1 mg, Nebulization, BID - RT, Bharat Montgomery MD, 0.5 mg at 22 0801  •  dextrose (D50W) (25 g/50 mL) IV injection 25 g, 25 g, Intravenous, Q15  Min PRN, Bharat Montgomery MD  •  dextrose (GLUTOSE) oral gel 15 g, 15 g, Oral, Q15 Min PRN, Bharat Montgomery MD  •  dilTIAZem (CARDIZEM) tablet 90 mg, 90 mg, Oral, Q8H, Bharat Montgomery MD, 90 mg at 03/14/22 0511  •  glucagon (human recombinant) (GLUCAGEN DIAGNOSTIC) 1 mg in sterile water (preservative free) 1 mL injection, 1 mg, Subcutaneous, PRN, Bharat Montgomery MD  •  heparin (porcine) 5000 UNIT/ML injection 5,000 Units, 5,000 Units, Subcutaneous, Q12H, Bharat Montgomery MD, 5,000 Units at 03/14/22 0905  •  Hold medication, 1 each, Does not apply, Continuous PRN, Bharat Montgomery MD  •  insulin glargine (LANTUS, SEMGLEE) injection 10 Units, 10 Units, Subcutaneous, Nightly, Bharat Montgomery MD, 10 Units at 03/13/22 2123  •  insulin lispro (ADMELOG) injection 0-14 Units, 0-14 Units, Subcutaneous, TID AC, 3 Units at 03/13/22 0814 **AND** insulin lispro (ADMELOG) injection 0-14 Units, 0-14 Units, Subcutaneous, PRN, Elina Adams APRN  •  levothyroxine (SYNTHROID, LEVOTHROID) tablet 50 mcg, 50 mcg, Oral, Q AM, Bharat Montgomery MD, 50 mcg at 03/14/22 0513  •  Magnesium Sulfate 2 gram infusion - Mg less than or equal to 1.5 mg/dL, 2 g, Intravenous, PRN **OR** Magnesium Sulfate 1 gram infusion - Mg 1.6-1.9 mg/dL, 1 g, Intravenous, PRN, Bharat Montgomery MD, Last Rate: 100 mL/hr at 03/04/22 0925, 1 g at 03/04/22 0925  •  melatonin tablet 5 mg, 5 mg, Oral, Nightly, Mp Galicia MD, 5 mg at 03/13/22 2121  •  metoprolol tartrate (LOPRESSOR) tablet 25 mg, 25 mg, Oral, BID, Bharat Montgomery MD, 25 mg at 03/14/22 0905  •  multivitamin with minerals 1 tablet, 1 tablet, Oral, Daily, Bharat Montgomery MD, 1 tablet at 03/14/22 0905  •  nitroglycerin (NITROSTAT) SL tablet 0.4 mg, 0.4 mg, Sublingual, Q5 Min PRN, Bharat Montgomery MD  •  ondansetron (ZOFRAN) tablet 4 mg, 4 mg, Oral, Q6H PRN, 4 mg at 03/13/22 0812 **OR** ondansetron (ZOFRAN) injection 4 mg, 4 mg, Intravenous, Q6H PRN, Bharat Montgomery MD, 4 mg at 03/05/22 0544  •  potassium  chloride (K-DUR,KLOR-CON) CR tablet 40 mEq, 40 mEq, Oral, PRN, 40 mEq at 03/04/22 0925 **OR** potassium chloride (KLOR-CON) packet 40 mEq, 40 mEq, Oral, PRN **OR** potassium chloride 10 mEq in 100 mL IVPB, 10 mEq, Intravenous, Q1H PRN, Bharat Montgomery MD  •  sodium chloride 0.9 % flush 10 mL, 10 mL, Intravenous, Q12H, Bharat Montgomery MD, 10 mL at 03/14/22 0906  •  sodium chloride 0.9 % flush 10 mL, 10 mL, Intravenous, PRN, Bharat Montgomery MD, 10 mL at 03/08/22 2221  •  zolpidem (AMBIEN) tablet 5 mg, 5 mg, Oral, Nightly, Mp Galicia MD, 5 mg at 03/13/22 2121    Data Review:  All labs (24hrs):   Recent Results (from the past 24 hour(s))   POC Glucose Once    Collection Time: 03/13/22 11:26 AM    Specimen: Blood   Result Value Ref Range    Glucose 114 (H) 70 - 105 mg/dL   POC Glucose Once    Collection Time: 03/13/22  4:48 PM    Specimen: Blood   Result Value Ref Range    Glucose 110 (H) 70 - 105 mg/dL   POC Glucose Once    Collection Time: 03/13/22  8:13 PM    Specimen: Blood   Result Value Ref Range    Glucose 168 (H) 70 - 105 mg/dL   POC Glucose Once    Collection Time: 03/14/22  7:57 AM    Specimen: Blood   Result Value Ref Range    Glucose 120 (H) 70 - 105 mg/dL        Imaging:  XR Chest 1 View  Narrative: DATE OF EXAM:  3/14/2022 5:17 AM     PROCEDURE:  XR CHEST 1 VW-     INDICATIONS:  Chest tube; I48.91-Unspecified atrial fibrillation; I50.9-Heart failure,  unspecified; Z20.822-Contact with and (suspected) exposure to covid-19;  I50.21-Acute systolic (congestive) heart failure     COMPARISON:  3/13/2022     TECHNIQUE:   Single radiographic AP view of the chest was obtained.     FINDINGS:  Left-sided chest tube remains in place. No appreciable change in left  pleural effusion which appears loculated. No pneumothorax. Right lung  clear with sharp costophrenic angle. Heart size appears stable      Impression: Stable left pleural effusion     Electronically Signed By-Cj Chong On:3/14/2022 7:35 AM  This  report was finalized on [id number] by  Cj Chong, .       ASSESSMENT:  Possible hemothorax  Acute respiratory distress  COPD exacerbation  Atrial fibrillation with RVR (HCC)    Other hyperlipidemia    Essential hypertension    Hypothyroidism    Coronary artery disease involving coronary bypass graft of native heart without angina pectoris    Acute CHF (congestive heart failure) (HCC) EFG 45%    Acute hyponatremia    Elevated LFTs    Elevated TSH    Acute hyperglycemia    Obesity (BMI 30-39.9)   ARF     PLAN:  Pending CT surgeon recommendation for possible need for VAT  Chest tube management to suction  Encouraged to use I-S flutter valve  Heart rate control  Continue to monitor blood pressure  Bronchodilator  Inhaled corticosteroids  Electrolytes/ glycemic control    heparin sq prophylaxis   DVT and GI prophylaxis.    Total Critical care time in direct medical management (   ) minutes, This time specifically excludes time spent performing procedures.  Bharat Montgomery MD. D, ABSM.     3/14/2022  10:58 EDT

## 2022-03-15 ENCOUNTER — HOSPITAL ENCOUNTER (OUTPATIENT)
Facility: HOSPITAL | Age: 82
Setting detail: SURGERY ADMIT
End: 2022-03-15
Attending: THORACIC SURGERY (CARDIOTHORACIC VASCULAR SURGERY) | Admitting: THORACIC SURGERY (CARDIOTHORACIC VASCULAR SURGERY)

## 2022-03-15 ENCOUNTER — INPATIENT HOSPITAL (AMBULATORY)
Dept: URBAN - METROPOLITAN AREA HOSPITAL 84 | Facility: HOSPITAL | Age: 82
End: 2022-03-15

## 2022-03-15 ENCOUNTER — APPOINTMENT (OUTPATIENT)
Dept: GENERAL RADIOLOGY | Facility: HOSPITAL | Age: 82
End: 2022-03-15

## 2022-03-15 DIAGNOSIS — K92.1 MELENA: ICD-10-CM

## 2022-03-15 DIAGNOSIS — J96.00 ACUTE RESPIRATORY FAILURE, UNSPECIFIED WHETHER WITH HYPOXIA: ICD-10-CM

## 2022-03-15 DIAGNOSIS — D64.9 ANEMIA, UNSPECIFIED: ICD-10-CM

## 2022-03-15 DIAGNOSIS — I50.9 HEART FAILURE, UNSPECIFIED: ICD-10-CM

## 2022-03-15 PROBLEM — J90 LOCULATED PLEURAL EFFUSION: Status: ACTIVE | Noted: 2022-03-15

## 2022-03-15 LAB
ANION GAP SERPL CALCULATED.3IONS-SCNC: 11 MMOL/L (ref 5–15)
BASOPHILS # BLD AUTO: 0 10*3/MM3 (ref 0–0.2)
BASOPHILS NFR BLD AUTO: 0.2 % (ref 0–1.5)
BUN SERPL-MCNC: 15 MG/DL (ref 8–23)
BUN/CREAT SERPL: 24.2 (ref 7–25)
CALCIUM SPEC-SCNC: 8.2 MG/DL (ref 8.6–10.5)
CHLORIDE SERPL-SCNC: 95 MMOL/L (ref 98–107)
CO2 SERPL-SCNC: 22 MMOL/L (ref 22–29)
CREAT SERPL-MCNC: 0.62 MG/DL (ref 0.57–1)
DEPRECATED RDW RBC AUTO: 51.6 FL (ref 37–54)
EGFRCR SERPLBLD CKD-EPI 2021: 90.2 ML/MIN/1.73
EOSINOPHIL # BLD AUTO: 0.1 10*3/MM3 (ref 0–0.4)
EOSINOPHIL NFR BLD AUTO: 0.6 % (ref 0.3–6.2)
ERYTHROCYTE [DISTWIDTH] IN BLOOD BY AUTOMATED COUNT: 15.9 % (ref 12.3–15.4)
GLUCOSE BLDC GLUCOMTR-MCNC: 118 MG/DL (ref 70–105)
GLUCOSE BLDC GLUCOMTR-MCNC: 244 MG/DL (ref 70–105)
GLUCOSE BLDC GLUCOMTR-MCNC: 297 MG/DL (ref 70–105)
GLUCOSE BLDC GLUCOMTR-MCNC: 93 MG/DL (ref 70–105)
GLUCOSE SERPL-MCNC: 123 MG/DL (ref 65–99)
HCT VFR BLD AUTO: 26 % (ref 34–46.6)
HGB BLD-MCNC: 8.8 G/DL (ref 12–15.9)
LYMPHOCYTES # BLD AUTO: 0.7 10*3/MM3 (ref 0.7–3.1)
LYMPHOCYTES NFR BLD AUTO: 6.8 % (ref 19.6–45.3)
MAGNESIUM SERPL-MCNC: 1.9 MG/DL (ref 1.6–2.4)
MCH RBC QN AUTO: 32.1 PG (ref 26.6–33)
MCHC RBC AUTO-ENTMCNC: 34 G/DL (ref 31.5–35.7)
MCV RBC AUTO: 94.2 FL (ref 79–97)
MONOCYTES # BLD AUTO: 0.9 10*3/MM3 (ref 0.1–0.9)
MONOCYTES NFR BLD AUTO: 8.8 % (ref 5–12)
NEUTROPHILS NFR BLD AUTO: 8.4 10*3/MM3 (ref 1.7–7)
NEUTROPHILS NFR BLD AUTO: 83.6 % (ref 42.7–76)
NRBC BLD AUTO-RTO: 0.1 /100 WBC (ref 0–0.2)
PHOSPHATE SERPL-MCNC: 2.6 MG/DL (ref 2.5–4.5)
PLATELET # BLD AUTO: 444 10*3/MM3 (ref 140–450)
PMV BLD AUTO: 7.2 FL (ref 6–12)
POTASSIUM SERPL-SCNC: 3.7 MMOL/L (ref 3.5–5.2)
RBC # BLD AUTO: 2.76 10*6/MM3 (ref 3.77–5.28)
SODIUM SERPL-SCNC: 128 MMOL/L (ref 136–145)
WBC NRBC COR # BLD: 10 10*3/MM3 (ref 3.4–10.8)

## 2022-03-15 PROCEDURE — 97535 SELF CARE MNGMENT TRAINING: CPT

## 2022-03-15 PROCEDURE — 97116 GAIT TRAINING THERAPY: CPT

## 2022-03-15 PROCEDURE — 94799 UNLISTED PULMONARY SVC/PX: CPT

## 2022-03-15 PROCEDURE — 99222 1ST HOSP IP/OBS MODERATE 55: CPT | Mod: FS | Performed by: NURSE PRACTITIONER

## 2022-03-15 PROCEDURE — 99232 SBSQ HOSP IP/OBS MODERATE 35: CPT | Performed by: NURSE PRACTITIONER

## 2022-03-15 PROCEDURE — 82962 GLUCOSE BLOOD TEST: CPT

## 2022-03-15 PROCEDURE — 83735 ASSAY OF MAGNESIUM: CPT | Performed by: INTERNAL MEDICINE

## 2022-03-15 PROCEDURE — 71045 X-RAY EXAM CHEST 1 VIEW: CPT

## 2022-03-15 PROCEDURE — 85025 COMPLETE CBC W/AUTO DIFF WBC: CPT | Performed by: INTERNAL MEDICINE

## 2022-03-15 PROCEDURE — 25010000002 HEPARIN (PORCINE) PER 1000 UNITS: Performed by: INTERNAL MEDICINE

## 2022-03-15 PROCEDURE — 99232 SBSQ HOSP IP/OBS MODERATE 35: CPT | Performed by: INTERNAL MEDICINE

## 2022-03-15 PROCEDURE — 63710000001 INSULIN LISPRO (HUMAN) PER 5 UNITS

## 2022-03-15 PROCEDURE — 99232 SBSQ HOSP IP/OBS MODERATE 35: CPT | Performed by: STUDENT IN AN ORGANIZED HEALTH CARE EDUCATION/TRAINING PROGRAM

## 2022-03-15 PROCEDURE — 63710000001 INSULIN GLARGINE PER 5 UNITS: Performed by: INTERNAL MEDICINE

## 2022-03-15 PROCEDURE — 80048 BASIC METABOLIC PNL TOTAL CA: CPT | Performed by: STUDENT IN AN ORGANIZED HEALTH CARE EDUCATION/TRAINING PROGRAM

## 2022-03-15 PROCEDURE — 84100 ASSAY OF PHOSPHORUS: CPT | Performed by: STUDENT IN AN ORGANIZED HEALTH CARE EDUCATION/TRAINING PROGRAM

## 2022-03-15 RX ORDER — BUDESONIDE 0.5 MG/2ML
1 INHALANT ORAL
Start: 2022-03-15 | End: 2022-08-18

## 2022-03-15 RX ORDER — DILTIAZEM HCL 90 MG
90 TABLET ORAL EVERY 8 HOURS SCHEDULED
Qty: 90 TABLET | Refills: 0
Start: 2022-03-15 | End: 2022-03-24 | Stop reason: HOSPADM

## 2022-03-15 RX ORDER — PANTOPRAZOLE SODIUM 40 MG/10ML
40 INJECTION, POWDER, LYOPHILIZED, FOR SOLUTION INTRAVENOUS 2 TIMES DAILY
Status: DISCONTINUED | OUTPATIENT
Start: 2022-03-15 | End: 2022-03-17 | Stop reason: HOSPADM

## 2022-03-15 RX ORDER — AMIODARONE HYDROCHLORIDE 200 MG/1
200 TABLET ORAL EVERY 12 HOURS
Qty: 22 TABLET | Refills: 0
Start: 2022-03-16 | End: 2022-03-24 | Stop reason: HOSPADM

## 2022-03-15 RX ORDER — ARFORMOTEROL TARTRATE 15 UG/2ML
15 SOLUTION RESPIRATORY (INHALATION)
Qty: 120 ML | Refills: 2
Start: 2022-03-15 | End: 2022-08-18

## 2022-03-15 RX ADMIN — Medication 1 TABLET: at 08:53

## 2022-03-15 RX ADMIN — AMIODARONE HYDROCHLORIDE 200 MG: 200 TABLET ORAL at 05:43

## 2022-03-15 RX ADMIN — BUDESONIDE 1 MG: 0.5 INHALANT RESPIRATORY (INHALATION) at 07:30

## 2022-03-15 RX ADMIN — ARFORMOTEROL TARTRATE 15 MCG: 15 SOLUTION RESPIRATORY (INHALATION) at 07:25

## 2022-03-15 RX ADMIN — ACETAMINOPHEN 650 MG: 325 TABLET ORAL at 09:02

## 2022-03-15 RX ADMIN — PANTOPRAZOLE SODIUM 40 MG: 40 INJECTION, POWDER, FOR SOLUTION INTRAVENOUS at 11:50

## 2022-03-15 RX ADMIN — METOPROLOL TARTRATE 25 MG: 25 TABLET, FILM COATED ORAL at 08:53

## 2022-03-15 RX ADMIN — METOPROLOL TARTRATE 25 MG: 25 TABLET, FILM COATED ORAL at 21:31

## 2022-03-15 RX ADMIN — INSULIN LISPRO 5 UNITS: 100 INJECTION, SOLUTION INTRAVENOUS; SUBCUTANEOUS at 11:50

## 2022-03-15 RX ADMIN — INSULIN GLARGINE 10 UNITS: 100 INJECTION, SOLUTION SUBCUTANEOUS at 21:39

## 2022-03-15 RX ADMIN — HEPARIN SODIUM 5000 UNITS: 5000 INJECTION INTRAVENOUS; SUBCUTANEOUS at 21:32

## 2022-03-15 RX ADMIN — HEPARIN SODIUM 5000 UNITS: 5000 INJECTION INTRAVENOUS; SUBCUTANEOUS at 08:53

## 2022-03-15 RX ADMIN — ZOLPIDEM TARTRATE 5 MG: 5 TABLET ORAL at 21:31

## 2022-03-15 RX ADMIN — AMIODARONE HYDROCHLORIDE 200 MG: 200 TABLET ORAL at 17:56

## 2022-03-15 RX ADMIN — DILTIAZEM HYDROCHLORIDE 90 MG: 30 TABLET, FILM COATED ORAL at 05:42

## 2022-03-15 RX ADMIN — DILTIAZEM HYDROCHLORIDE 90 MG: 30 TABLET, FILM COATED ORAL at 13:22

## 2022-03-15 RX ADMIN — LEVOTHYROXINE SODIUM 50 MCG: 0.05 TABLET ORAL at 05:43

## 2022-03-15 RX ADMIN — PANTOPRAZOLE SODIUM 40 MG: 40 INJECTION, POWDER, FOR SOLUTION INTRAVENOUS at 21:42

## 2022-03-15 RX ADMIN — BUDESONIDE 0.5 MG: 0.5 INHALANT RESPIRATORY (INHALATION) at 20:31

## 2022-03-15 RX ADMIN — DILTIAZEM HYDROCHLORIDE 90 MG: 30 TABLET, FILM COATED ORAL at 21:32

## 2022-03-15 RX ADMIN — Medication 10 ML: at 08:54

## 2022-03-15 RX ADMIN — ARFORMOTEROL TARTRATE 15 MCG: 15 SOLUTION RESPIRATORY (INHALATION) at 20:26

## 2022-03-15 RX ADMIN — SENNOSIDES AND DOCUSATE SODIUM 2 TABLET: 50; 8.6 TABLET ORAL at 08:53

## 2022-03-15 RX ADMIN — Medication 10 ML: at 21:32

## 2022-03-15 RX ADMIN — Medication 5 MG: at 21:31

## 2022-03-15 RX ADMIN — ASPIRIN 81 MG CHEWABLE TABLET 81 MG: 81 TABLET CHEWABLE at 08:53

## 2022-03-15 NOTE — SIGNIFICANT NOTE
Received call from primary RN concerning CT scan results.  CT was reviewed with ED physician.  CT unchanged from prior, chest tube looks to be in place.  Patient not in respiratory distress with saturations at 97% per nurse.  Will defer to attending physician.    Electronically signed by VALENTINE Ford, 03/15/22, 6:53 AM EDT.

## 2022-03-15 NOTE — THERAPY TREATMENT NOTE
Subjective: In the morning, pt complained of significant fatigue and refused treatment. Returned in the afternoon and pt was agreeable to short bout of ambulation.     Objective:     Bed mobility - Modified-Independent  Transfers - Modified-Independent and with rolling walker  Ambulation - 50 feet CGA and with rolling walker     Pain: 0 VAS  Education: Provided education on importance of mobility and skilled verbal / tactile cueing throughout intervention.     Assessment: Jaquelin Valderrama presents with functional mobility impairments which indicate the need for skilled intervention. Tolerating session today without incident. Pt completed bed mobility and transfer using RW with mod I. Increased time required for management of chest tube. Pt ambulated 50ft using RW with CGA. Post ambulation dyspnea apparent and pt reported RPE  8/10. No desaturation of O2 noted. Pt completed self hygiene with OT while sitting on EOB. Limited further intervention d/t pt complained of fatigue. Returned to supine and donned intermittent compression. Will continue to follow and progress as tolerated.     Plan/Recommendations:   Pt would benefit from Inpatient Rehabilitation placement at discharge from facility and requires no DME at discharge.   Pt desires Inpatient Rehabilitation placement at discharge. Pt cooperative; agreeable to therapeutic recommendations and plan of care.     Basic Mobility 6-click:  Rollin = Total, A lot = 2, A little = 3; 4 = None  Supine>Sit:   1 = Total, A lot = 2, A little = 3; 4 = None   Sit>Stand with arms:  1 = Total, A lot = 2, A little = 3; 4 = None  Bed>Chair:   1 = Total, A lot = 2, A little = 3; 4 = None  Ambulate in room:  1 = Total, A lot = 2, A little = 3; 4 = None  3-5 Steps with railin = Total, A lot = 2, A little = 3; 4 = None  Score: 19    Post-Tx Position: Supine with HOB Elevated, Alarms activated and Call light and personal items within reach  PPE: gloves, surgical mask, eyewear  protection

## 2022-03-15 NOTE — PLAN OF CARE
Goal Outcome Evaluation:    Jaquelin Valderrama presents with functional mobility impairments which indicate the need for skilled intervention. Tolerating session today without incident. Pt completed bed mobility and transfer using RW with mod I. Increased time required for management of chest tube. Pt ambulated 50ft using RW with CGA. Post ambulation dyspnea apparent and pt reported RPE  8/10. No desaturation of O2 noted. Pt completed self hygiene with OT while sitting on EOB. Limited further intervention d/t pt complained of fatigue. Returned to supine and donned intermittent compression. Will continue to follow and progress as tolerated.

## 2022-03-15 NOTE — PROGRESS NOTES
"PULMONARY CRITICAL CARE Progress  NOTE      PATIENT IDENTIFICATION:  Name: Jaquelin Valderrama  MRN: [id number]  :  1940     Age: 82 y.o.  Sex: female    DATE OF Note:  3/15/2022   Referring Physician: Jaja Artis DO                  Subjective:   On 2 L no SOB no chest pain,   Chest tube minimal drainage but chest x-ray is not showing improvement result discussed with the patient and family  Otherwise patient denies any chest pain  Good appetite  no nausea or vomiting, no change in bowel habit, no dysuria,  no new  skin rash or itching.    Objective:  tMax 24 hrs: Temp (24hrs), Av.7 °F (36.5 °C), Min:97.2 °F (36.2 °C), Max:98.4 °F (36.9 °C)      Vitals Ranges:   Temp:  [97.2 °F (36.2 °C)-98.4 °F (36.9 °C)] 97.3 °F (36.3 °C)  Heart Rate:  [55-77] 55  Resp:  [19-26] 26  BP: (119-162)/(56-88) 120/68    Intake and Output Last 3 Shifts:   I/O last 3 completed shifts:  In: 240 [P.O.:240]  Out: 130 [Chest Tube:130]    Exam:  /68 (BP Location: Right arm, Patient Position: Lying)   Pulse 55   Temp 97.3 °F (36.3 °C) (Oral)   Resp 26   Ht 160 cm (63\")   Wt 85.8 kg (189 lb 2.5 oz)   SpO2 94%   BMI 33.51 kg/m²     General Appearance:   AA  HEENT:  Normocephalic, without obvious abnormality, Conjunctiva/corneas clear,.  Normal external ear canals, Nares normal, no drainage     Neck:  Supple, symmetrical, trachea midline. No JVD.  Lungs /Chest wall: chest tube in place  No BS on left side    respirations unlabored symmetrical wall movement.     Heart:  Regular rate and rhythm, systolic murmur PMI left sternal border  Abdomen: Soft, non-tender, no masses, no organomegaly.    Extremities: Trace edema no clubbing or Cyanosis        Medications:    Current Facility-Administered Medications:   •  acetaminophen (TYLENOL) tablet 650 mg, 650 mg, Oral, Q4H PRN, 650 mg at 03/15/22 0902 **OR** acetaminophen (TYLENOL) 160 MG/5ML solution 650 mg, 650 mg, Oral, Q4H PRN **OR** acetaminophen (TYLENOL) suppository 650 " mg, 650 mg, Rectal, Q4H PRN, Bharat Montgomery MD  •  alteplase (ACTIVASE) 10 mg in sterile water (preservative free) 25 mL Syringe, , Intrapleural, Once, Montserrat Humphries DNP, APRN  •  aluminum-magnesium hydroxide-simethicone (MAALOX MAX) 400-400-40 MG/5ML suspension 15 mL, 15 mL, Oral, Q6H PRN, Bharat Montgomery MD  •  [COMPLETED] amiodarone in dextrose 5% (NEXTERONE) loading dose 150mg/100mL, 150 mg, Intravenous, Once, 150 mg at 22 0525 **FOLLOWED BY** [] amiodarone 360 mg in 200 mL D5W infusion, 1 mg/min, Intravenous, Continuous, Last Rate: 33.3 mL/hr at 22 1100, 1 mg/min at 22 1100 **FOLLOWED BY** [] amiodarone 360 mg in 200 mL D5W infusion, 0.5 mg/min, Intravenous, Continuous, Stopped at 22 0232 **FOLLOWED BY** [COMPLETED] amiodarone (PACERONE) tablet 200 mg, 200 mg, Oral, Once, 200 mg at 22 0603 **FOLLOWED BY** [COMPLETED] amiodarone (PACERONE) tablet 200 mg, 200 mg, Oral, Q8H, 200 mg at 22 2135 **FOLLOWED BY** amiodarone (PACERONE) tablet 200 mg, 200 mg, Oral, Q12H, 200 mg at 03/15/22 0543 **FOLLOWED BY** [START ON 3/27/2022] amiodarone (PACERONE) tablet 200 mg, 200 mg, Oral, Daily, Sabra Roberts, APR[place]  •  arformoterol (BROVANA) nebulizer solution 15 mcg, 15 mcg, Nebulization, BID - RT, Bharat Montgomery MD, 15 mcg at 03/15/22 0725  •  aspirin chewable tablet 81 mg, 81 mg, Oral, Daily, Bharat Montgomery MD, 81 mg at 03/15/22 0853  •  sennosides-docusate (PERICOLACE) 8.6-50 MG per tablet 2 tablet, 2 tablet, Oral, BID, 2 tablet at 03/15/22 0853 **AND** polyethylene glycol (MIRALAX) packet 17 g, 17 g, Oral, Daily PRN **AND** bisacodyl (DULCOLAX) EC tablet 5 mg, 5 mg, Oral, Daily PRN **AND** bisacodyl (DULCOLAX) suppository 10 mg, 10 mg, Rectal, Daily PRN, Bharat Montgomery MD  •  budesonide (PULMICORT) nebulizer solution 1 mg, 1 mg, Nebulization, BID - RT, Bharat Montgomery MD, 1 mg at 03/15/22 0730  •  dextrose (D50W) (25 g/50 mL) IV injection 25 g, 25 g, Intravenous,  Q15 Min PRN, Bharat Montgomery MD  •  dextrose (GLUTOSE) oral gel 15 g, 15 g, Oral, Q15 Min PRN, Bharat Montgomery MD  •  dilTIAZem (CARDIZEM) tablet 90 mg, 90 mg, Oral, Q8H, Bharat Montgomery MD, 90 mg at 03/15/22 1322  •  glucagon (human recombinant) (GLUCAGEN DIAGNOSTIC) 1 mg in sterile water (preservative free) 1 mL injection, 1 mg, Subcutaneous, PRN, Bharat Montgomery MD  •  heparin (porcine) 5000 UNIT/ML injection 5,000 Units, 5,000 Units, Subcutaneous, Q12H, Bharat Montgomery MD, 5,000 Units at 03/15/22 0853  •  Hold medication, 1 each, Does not apply, Continuous PRN, Bharat Montgomery MD  •  insulin glargine (LANTUS, SEMGLEE) injection 10 Units, 10 Units, Subcutaneous, Nightly, Bharat Montgomery MD, 10 Units at 03/14/22 2012  •  insulin lispro (ADMELOG) injection 0-14 Units, 0-14 Units, Subcutaneous, TID AC, 5 Units at 03/15/22 1150 **AND** insulin lispro (ADMELOG) injection 0-14 Units, 0-14 Units, Subcutaneous, PRN, Elina Adams APRN  •  levothyroxine (SYNTHROID, LEVOTHROID) tablet 50 mcg, 50 mcg, Oral, Q AM, Bharat Montgomery MD, 50 mcg at 03/15/22 0543  •  Magnesium Sulfate 2 gram infusion - Mg less than or equal to 1.5 mg/dL, 2 g, Intravenous, PRN **OR** Magnesium Sulfate 1 gram infusion - Mg 1.6-1.9 mg/dL, 1 g, Intravenous, PRN, Bharat Montgomery MD, Last Rate: 100 mL/hr at 03/04/22 0925, 1 g at 03/04/22 0925  •  melatonin tablet 5 mg, 5 mg, Oral, Nightly, Mp Galicia MD, 5 mg at 03/14/22 2010  •  metoprolol tartrate (LOPRESSOR) tablet 25 mg, 25 mg, Oral, BID, Bharat Montgomery MD, 25 mg at 03/15/22 0853  •  multivitamin with minerals 1 tablet, 1 tablet, Oral, Daily, Bharat Montgomery MD, 1 tablet at 03/15/22 0853  •  nitroglycerin (NITROSTAT) SL tablet 0.4 mg, 0.4 mg, Sublingual, Q5 Min PRN, Bharat Montgomery MD  •  ondansetron (ZOFRAN) tablet 4 mg, 4 mg, Oral, Q6H PRN, 4 mg at 03/13/22 0812 **OR** ondansetron (ZOFRAN) injection 4 mg, 4 mg, Intravenous, Q6H PRN, Bharat Montgomery MD, 4 mg at 03/05/22 0544  •   pantoprazole (PROTONIX) injection 40 mg, 40 mg, Intravenous, BID, Jaja Artis, , 40 mg at 03/15/22 1150  •  potassium chloride (K-DUR,KLOR-CON) CR tablet 40 mEq, 40 mEq, Oral, PRN, 40 mEq at 03/04/22 0925 **OR** potassium chloride (KLOR-CON) packet 40 mEq, 40 mEq, Oral, PRN **OR** potassium chloride 10 mEq in 100 mL IVPB, 10 mEq, Intravenous, Q1H PRN, Bharat Montgomery MD  •  sodium chloride 0.9 % flush 10 mL, 10 mL, Intravenous, Q12H, Bharat Montgomery MD, 10 mL at 03/15/22 0854  •  sodium chloride 0.9 % flush 10 mL, 10 mL, Intravenous, PRN, Bharat Montgomery MD, 10 mL at 03/08/22 2221  •  zolpidem (AMBIEN) tablet 5 mg, 5 mg, Oral, Nightly, Mp Galicia MD, 5 mg at 03/14/22 2010    Data Review:  All labs (24hrs):   Recent Results (from the past 24 hour(s))   POC Glucose Once    Collection Time: 03/14/22  4:24 PM    Specimen: Blood   Result Value Ref Range    Glucose 139 (H) 70 - 105 mg/dL   POC Glucose Once    Collection Time: 03/14/22 10:20 PM    Specimen: Blood   Result Value Ref Range    Glucose 210 (H) 70 - 105 mg/dL   Magnesium    Collection Time: 03/15/22  4:10 AM    Specimen: Blood   Result Value Ref Range    Magnesium 1.9 1.6 - 2.4 mg/dL   Phosphorus    Collection Time: 03/15/22  4:10 AM    Specimen: Blood   Result Value Ref Range    Phosphorus 2.6 2.5 - 4.5 mg/dL   CBC Auto Differential    Collection Time: 03/15/22  4:10 AM    Specimen: Blood   Result Value Ref Range    WBC 10.00 3.40 - 10.80 10*3/mm3    RBC 2.76 (L) 3.77 - 5.28 10*6/mm3    Hemoglobin 8.8 (L) 12.0 - 15.9 g/dL    Hematocrit 26.0 (L) 34.0 - 46.6 %    MCV 94.2 79.0 - 97.0 fL    MCH 32.1 26.6 - 33.0 pg    MCHC 34.0 31.5 - 35.7 g/dL    RDW 15.9 (H) 12.3 - 15.4 %    RDW-SD 51.6 37.0 - 54.0 fl    MPV 7.2 6.0 - 12.0 fL    Platelets 444 140 - 450 10*3/mm3    Neutrophil % 83.6 (H) 42.7 - 76.0 %    Lymphocyte % 6.8 (L) 19.6 - 45.3 %    Monocyte % 8.8 5.0 - 12.0 %    Eosinophil % 0.6 0.3 - 6.2 %    Basophil % 0.2 0.0 - 1.5 %    Neutrophils, Absolute  8.40 (H) 1.70 - 7.00 10*3/mm3    Lymphocytes, Absolute 0.70 0.70 - 3.10 10*3/mm3    Monocytes, Absolute 0.90 0.10 - 0.90 10*3/mm3    Eosinophils, Absolute 0.10 0.00 - 0.40 10*3/mm3    Basophils, Absolute 0.00 0.00 - 0.20 10*3/mm3    nRBC 0.1 0.0 - 0.2 /100 WBC   Basic Metabolic Panel    Collection Time: 03/15/22  4:10 AM    Specimen: Blood   Result Value Ref Range    Glucose 123 (H) 65 - 99 mg/dL    BUN 15 8 - 23 mg/dL    Creatinine 0.62 0.57 - 1.00 mg/dL    Sodium 128 (L) 136 - 145 mmol/L    Potassium 3.7 3.5 - 5.2 mmol/L    Chloride 95 (L) 98 - 107 mmol/L    CO2 22.0 22.0 - 29.0 mmol/L    Calcium 8.2 (L) 8.6 - 10.5 mg/dL    BUN/Creatinine Ratio 24.2 7.0 - 25.0    Anion Gap 11.0 5.0 - 15.0 mmol/L    eGFR 90.2 >60.0 mL/min/1.73   POC Glucose Once    Collection Time: 03/15/22  7:27 AM    Specimen: Blood   Result Value Ref Range    Glucose 118 (H) 70 - 105 mg/dL   POC Glucose Once    Collection Time: 03/15/22 11:13 AM    Specimen: Blood   Result Value Ref Range    Glucose 244 (H) 70 - 105 mg/dL        Imaging:  XR Chest 1 View  Narrative: EXAM: XR CHEST 1 VW-     DATE OF EXAM: 3/15/2022 5:15 AM     INDICATION: Chest tube; I48.91-Unspecified atrial fibrillation;  I50.9-Heart failure, unspecified; Z20.822-Contact with and (suspected)  exposure to covid-19; I50.21-Acute systolic (congestive) heart failure.         COMPARISONS: 03/14/2022      FINDINGS:     Unchanged left pleural effusion with a chest tube in place, and adjacent  atelectasis. Unchanged small right pleural effusion. Mediastinal shadows  are unchanged.     Impression:    Stable chest over the past 24 hours. Unchanged large left-sided pleural  effusion with a small amount of interspersed pleural air better  demonstrated on yesterday's chest CT     Unchanged small right pleural effusion     Electronically Signed By-Nain Mccauley On:3/15/2022 8:06 AM  This report was finalized on [id number] by  Nain Mccauley, .       ASSESSMENT:  Possible hemothorax  Acute  respiratory distress  COPD exacerbation  Atrial fibrillation with RVR (HCC)    Other hyperlipidemia    Essential hypertension    Hypothyroidism    Coronary artery disease involving coronary bypass graft of native heart without angina pectoris    Acute CHF (congestive heart failure) (HCC) EFG 45%    Acute hyponatremia    Elevated LFTs    Elevated TSH    Acute hyperglycemia    Obesity (BMI 30-39.9)   ARF     PLAN:  CT scan was reviewed waiting for final surgery recommendation  Chest tube management to suction  Encouraged to use I-S flutter valve  Heart rate control  Continue to monitor blood pressure  Bronchodilator  Inhaled corticosteroids  Electrolytes/ glycemic control    heparin sq prophylaxis   DVT and GI prophylaxis.    Total Critical care time in direct medical management (   ) minutes, This time specifically excludes time spent performing procedures.  Bharat Montgomery MD. D, ABSM.     3/15/2022  13:32 EDT

## 2022-03-15 NOTE — PROGRESS NOTES
Referring Provider: Jaja Artis DO    Reason for follow-up:  Status post CABG  Atrial fibrillation  Hypertension  Left pleural effusion     Patient Care Team:  Minerva Chatterjee MD as PCP - General (Internal Medicine)    Subjective .      ROS    Since I have last seen, the patient has been without any chest discomfort ,shortness of breath, palpitations, dizziness or syncope.  Denies having any headache ,abdominal pain ,nausea, vomiting , diarrhea constipation, loss of weight or loss of appetite.  Denies having any excessive bruising ,hematuria or blood in the stool.    Review of all systems negative except as indicated    History  Past Medical History:   Diagnosis Date   • Astigmatism, bilateral 2019   • Benign essential hypertension    • Bilateral presbyopia 2019   • CAD (coronary artery disease)    • Closed right ankle fracture    • COVID-19 vaccination refused    • Hyperlipidemia    • Hypothyroidism    • Influenza vaccine needed    • Myocardial infarction (HCC)    • Prediabetes    • Pseudophakia, both eyes 2019   • Thoracic back pain        Past Surgical History:   Procedure Laterality Date   • APPENDECTOMY     • CARDIAC CATHETERIZATION  2012    x3    • CORONARY ARTERY BYPASS GRAFT  2014   • CORONARY STENT PLACEMENT      x3   • HARDWARE REMOVAL Right 2020    Procedure: ANKLE/FOOT HARDWARE REMOVAL;  Surgeon: Lincoln Sibley MD;  Location: [facility];  Service: Orthopedics;  Laterality: Right;   • ORIF ANKLE FRACTURE Right 2019    Radha Vazquez Mem       Family History   Problem Relation Age of Onset   • Heart disease Mother    • Lung cancer Father    • Diabetes Other        Social History     Tobacco Use   • Smoking status: Former Smoker     Types: Cigarettes     Quit date: 1980     Years since quittin.2   • Smokeless tobacco: Never Used   • Tobacco comment: Social Drinker   Vaping Use   • Vaping Use: Never used   Substance Use Topics   • Alcohol use: Yes      Comment: mod    • Drug use: No        Medications Prior to Admission   Medication Sig Dispense Refill Last Dose   • aspirin 81 MG chewable tablet Chew 81 mg Daily.   3/2/2022 at Unknown time   • Cholecalciferol (VITAMIN D3) 2000 units tablet Take 1 tablet by mouth Daily.   3/2/2022 at Unknown time   • irbesartan (Avapro) 300 MG tablet Take 150 mg by mouth Daily.      • metoprolol tartrate (LOPRESSOR) 25 MG tablet Take 1 tablet by mouth twice daily 180 tablet 0 3/2/2022 at Unknown time   • Multiple Vitamins-Minerals (MULTIVITAMIN ADULT EXTRA C PO) Take 1 tablet by mouth Daily.   3/2/2022 at Unknown time   • levothyroxine (SYNTHROID, LEVOTHROID) 50 MCG tablet Take 50 mcg by mouth Every Morning.          Allergies  Prednisone    Scheduled Meds:custom intrapleural syringe builder, , Intrapleural, Once  amiodarone, 200 mg, Oral, Q12H   Followed by  [START ON 3/27/2022] amiodarone, 200 mg, Oral, Daily  arformoterol, 15 mcg, Nebulization, BID - RT  aspirin, 81 mg, Oral, Daily  budesonide, 1 mg, Nebulization, BID - RT  dilTIAZem, 90 mg, Oral, Q8H  heparin (porcine), 5,000 Units, Subcutaneous, Q12H  insulin glargine, 10 Units, Subcutaneous, Nightly  insulin lispro, 0-14 Units, Subcutaneous, TID AC  levothyroxine, 50 mcg, Oral, Q AM  melatonin, 5 mg, Oral, Nightly  metoprolol tartrate, 25 mg, Oral, BID  multivitamin with minerals, 1 tablet, Oral, Daily  senna-docusate sodium, 2 tablet, Oral, BID  sodium chloride, 10 mL, Intravenous, Q12H  zolpidem, 5 mg, Oral, Nightly      Continuous Infusions:hold, 1 each      PRN Meds:.•  acetaminophen **OR** acetaminophen **OR** acetaminophen  •  aluminum-magnesium hydroxide-simethicone  •  senna-docusate sodium **AND** polyethylene glycol **AND** bisacodyl **AND** bisacodyl  •  dextrose  •  dextrose  •  glucagon (human recombinant)  •  hold  •  insulin lispro **AND** insulin lispro  •  magnesium sulfate **OR** magnesium sulfate in D5W 1g/100mL (PREMIX)  •  nitroglycerin  •  ondansetron  "**OR** ondansetron  •  potassium chloride **OR** potassium chloride **OR** potassium chloride  •  sodium chloride    Objective     VITAL SIGNS  Vitals:    03/14/22 2128 03/15/22 0202 03/15/22 0542 03/15/22 0622   BP:  149/88 143/83 138/66   BP Location:  Left arm  Left arm   Patient Position:  Lying  Lying   Pulse:  66 72 68   Resp: 20 19 26   Temp:  98.4 °F (36.9 °C)  98 °F (36.7 °C)   TempSrc:  Oral  Oral   SpO2:  95%  94%   Weight:       Height:           Flowsheet Rows    Flowsheet Row First Filed Value   Admission Height 162.6 cm (64\") Documented at 03/02/2022 2218   Admission Weight 81 kg (178 lb 9.2 oz) Documented at 03/02/2022 2218            Intake/Output Summary (Last 24 hours) at 3/15/2022 0637  Last data filed at 3/15/2022 0546  Gross per 24 hour   Intake --   Output 90 ml   Net -90 ml        TELEMETRY: Atrial fibrillation    Physical Exam:  The patient is alert, oriented and in no distress.  Vital signs as noted above.  Head and neck revealed no carotid bruits or jugular venous distention.  No thyromegaly or lymphadenopathy is present  Lungs clear.  No wheezing.  Breath sounds are normal bilaterally.  Left chest tube in place.  Heart normal first and second heart sounds.  No murmur. No precordial rub is present.  No gallop is present.  Abdomen soft and nontender.  No organomegaly is present.  Extremities with good peripheral pulses without any pedal edema.  Skin warm and dry.  Musculoskeletal system is grossly normal  CNS grossly normal      Results Review:   I reviewed the patient's new clinical results.  Lab Results (last 24 hours)     Procedure Component Value Units Date/Time    Phosphorus [[id number]]  (Normal) Collected: 03/15/22 0410    Specimen: Blood Updated: 03/15/22 0516     Phosphorus 2.6 mg/dL     Magnesium [[id number]]  (Normal) Collected: 03/15/22 0410    Specimen: Blood Updated: 03/15/22 0516     Magnesium 1.9 mg/dL     CBC & Differential [[id number]]  (Abnormal) Collected: 03/15/22 0410    " Specimen: Blood Updated: 03/15/22 0506    Narrative:      The following orders were created for panel order CBC & Differential.  Procedure                               Abnormality         Status                     ---------                               -----------         ------                     CBC Auto Differential[[id number]]        Abnormal            Final result                 Please view results for these tests on the individual orders.    CBC Auto Differential [[id number]]  (Abnormal) Collected: 03/15/22 0410    Specimen: Blood Updated: 03/15/22 0506     WBC 10.00 10*3/mm3      RBC 2.76 10*6/mm3      Hemoglobin 8.8 g/dL      Hematocrit 26.0 %      MCV 94.2 fL      MCH 32.1 pg      MCHC 34.0 g/dL      RDW 15.9 %      RDW-SD 51.6 fl      MPV 7.2 fL      Platelets 444 10*3/mm3      Neutrophil % 83.6 %      Lymphocyte % 6.8 %      Monocyte % 8.8 %      Eosinophil % 0.6 %      Basophil % 0.2 %      Neutrophils, Absolute 8.40 10*3/mm3      Lymphocytes, Absolute 0.70 10*3/mm3      Monocytes, Absolute 0.90 10*3/mm3      Eosinophils, Absolute 0.10 10*3/mm3      Basophils, Absolute 0.00 10*3/mm3      nRBC 0.1 /100 WBC     POC Glucose Once [[id number]]  (Abnormal) Collected: 03/14/22 2220    Specimen: Blood Updated: 03/14/22 2221     Glucose 210 mg/dL      Comment: Serial Number: [id number]:  [id number]       POC Glucose Once [[id number]]  (Abnormal) Collected: 03/14/22 1624    Specimen: Blood Updated: 03/14/22 1625     Glucose 139 mg/dL      Comment: Serial Number: [id number]:  [id number]       POC Glucose Once [[id number]]  (Abnormal) Collected: 03/14/22 1158    Specimen: Blood Updated: 03/14/22 1159     Glucose 140 mg/dL      Comment: Serial Number: [id number]:  [id number]       Comprehensive Metabolic Panel [[id number]]  (Abnormal) Collected: 03/14/22 1039    Specimen: Blood Updated: 03/14/22 1143     Glucose 141 mg/dL      BUN 16 mg/dL      Creatinine 0.72 mg/dL      Sodium 129 mmol/L       Potassium 4.0 mmol/L      Chloride 96 mmol/L      CO2 23.0 mmol/L      Calcium 8.5 mg/dL      Total Protein 5.3 g/dL      Albumin 2.70 g/dL      ALT (SGPT) 43 U/L      AST (SGOT) 30 U/L      Alkaline Phosphatase 110 U/L      Total Bilirubin 0.4 mg/dL      Globulin 2.6 gm/dL      A/G Ratio 1.0 g/dL      BUN/Creatinine Ratio 22.2     Anion Gap 10.0 mmol/L      eGFR 84.6 mL/min/1.73      Comment: National Kidney Foundation and American Society of Nephrology (ASN) Task Force recommended calculation based on the Chronic Kidney Disease Epidemiology Collaboration (CKD-EPI) equation refit without adjustment for race.       Narrative:      GFR Normal >60  Chronic Kidney Disease <60  Kidney Failure <15      Phosphorus [[id number]]  (Normal) Collected: 03/14/22 1039    Specimen: Blood Updated: 03/14/22 1143     Phosphorus 2.5 mg/dL     Magnesium [[id number]]  (Normal) Collected: 03/14/22 1039    Specimen: Blood Updated: 03/14/22 1143     Magnesium 2.0 mg/dL     CBC & Differential [[id number]]  (Abnormal) Collected: 03/14/22 1040    Specimen: Blood Updated: 03/14/22 1110    Narrative:      The following orders were created for panel order CBC & Differential.  Procedure                               Abnormality         Status                     ---------                               -----------         ------                     CBC Auto Differential[[id number]]        Abnormal            Final result                 Please view results for these tests on the individual orders.    CBC Auto Differential [[id number]]  (Abnormal) Collected: 03/14/22 1040    Specimen: Blood Updated: 03/14/22 1110     WBC 11.10 10*3/mm3      RBC 2.98 10*6/mm3      Hemoglobin 9.3 g/dL      Hematocrit 28.0 %      MCV 94.0 fL      MCH 31.3 pg      MCHC 33.3 g/dL      RDW 15.2 %      RDW-SD 49.9 fl      MPV 7.2 fL      Platelets 419 10*3/mm3      Neutrophil % 86.9 %      Lymphocyte % 4.3 %      Monocyte % 8.0 %      Eosinophil % 0.5 %      Basophil % 0.3 %       Neutrophils, Absolute 9.60 10*3/mm3      Lymphocytes, Absolute 0.50 10*3/mm3      Monocytes, Absolute 0.90 10*3/mm3      Eosinophils, Absolute 0.10 10*3/mm3      Basophils, Absolute 0.00 10*3/mm3      nRBC 0.0 /100 WBC     Lactate Dehydrogenase [[id number]]  (Abnormal) Collected: 03/13/22 0032    Specimen: Blood Updated: 03/14/22 0950      U/L     POC Glucose Once [[id number]]  (Abnormal) Collected: 03/14/22 0757    Specimen: Blood Updated: 03/14/22 0758     Glucose 120 mg/dL      Comment: Serial Number: [id number]:  [id number]             Imaging Results (Last 24 Hours)     Procedure Component Value Units Date/Time    XR Chest 1 View [[id number]] Resulted: 03/15/22 0537     Updated: 03/15/22 0538    CT Chest Without Contrast Diagnostic [[id number]] Collected: 03/14/22 2315     Updated: 03/14/22 2328    Narrative:         DATE OF EXAM:  3/14/2022 6:28 PM     PROCEDURE:  CT CHEST WO CONTRAST DIAGNOSTIC-     INDICATIONS:   left loculated effusion; I48.91-Unspecified atrial fibrillation;  I50.9-Heart failure, unspecified; Z20.822-Contact with and (suspected)  exposure to covid-19; I50.21-Acute systolic (congestive) heart failure     COMPARISON:   Chest CT without contrast dated 03/09/2022 03/04/2022     TECHNIQUE:  Routine transaxial slices were obtained through the chest without the  administration of intravenous contrast. Reconstructed coronal and  sagittal images were also obtained. Automated exposure control and  iterative construction methods were used.     FINDINGS:  There is a persistent moderate to large loculated mixed density pleural  fluid collection consistent with hemothorax, there are no small amounts  of air within the anterior and superior aspect of this pleural  collection. The left lower lobe remains nearly completely atelectatic.  There is a left-sided chest tube in place, the tube enters from the  anterolateral aspect of the left chest and courses medially and  posteriorly. It is  uncertain whether this is in the pleural space or in  an area of adjacent atelectatic lung. Correlation with amount of  drainage from the chest tube would be helpful. There is no pneumothorax.  The overall amount of hemothorax is unchanged. There is a small layering  right pleural effusion. No pericardial effusion. There is cardiomegaly  with coronary artery calcifications as well as sternotomy wires. There  is mild mediastinal adenopathy which is not significantly changed. The  unenhanced thoracic aorta is normal in course and caliber with mild to  moderate amount of vascular calcification. Visualized bony structures  are intact. No aggressive focal osseous lesions. Limited images of the  upper abdomen demonstrate mild nonspecific fat stranding in the upper  abdomen without focal abnormality identified.       Impression:         1. Persistent large left hemothorax now with small amounts of air in the  superior aspect of this collection the overall size of the pneumothorax  is unchanged. There is a left chest tube in place and the left lower  lobe is nearly completely ectatic. Uncertain whether the chest tube is  in or immediately adjacent to the pleural space, so correlation with  clinical scenario in amount of drainage fluid is recommended.  2. Decrease in size of a small right pleural effusion.  3. Other ancillary findings are detailed above.     Electronically Signed By-Cory Wan DO On:3/14/2022 11:26 PM  This report was finalized on [id number] by  Cory Wan DO.    XR Chest 1 View [[id number]] Collected: 03/14/22 0734     Updated: 03/14/22 0737    Narrative:      DATE OF EXAM:  3/14/2022 5:17 AM     PROCEDURE:  XR CHEST 1 VW-     INDICATIONS:  Chest tube; I48.91-Unspecified atrial fibrillation; I50.9-Heart failure,  unspecified; Z20.822-Contact with and (suspected) exposure to covid-19;  I50.21-Acute systolic (congestive) heart failure     COMPARISON:  3/13/2022     TECHNIQUE:   Single radiographic  AP view of the chest was obtained.     FINDINGS:  Left-sided chest tube remains in place. No appreciable change in left  pleural effusion which appears loculated. No pneumothorax. Right lung  clear with sharp costophrenic angle. Heart size appears stable        Impression:      Stable left pleural effusion     Electronically Signed By-Cj Chong On:3/14/2022 7:35 AM  This report was finalized on [id number] by  Cj Chong, .      LAB RESULTS (LAST 7 DAYS)    CBC  Results from last 7 days   Lab Units 03/15/22  0410 03/14/22  1040 03/13/22  0032 03/12/22  0020 03/11/22  1206 03/10/22  0009 03/09/22  1230   WBC 10*3/mm3 10.00 11.10* 14.10* 13.00* 13.10* 15.40* 14.10*   RBC 10*6/mm3 2.76* 2.98* 3.05* 2.87* 2.79* 2.89* 2.98*   HEMOGLOBIN g/dL 8.8* 9.3* 9.5* 9.1* 9.1* 9.3* 9.9*   HEMATOCRIT % 26.0* 28.0* 28.8* 27.2* 26.6* 27.3* 28.2*   MCV fL 94.2 94.0 94.4 94.7 95.5 94.3 94.5   PLATELETS 10*3/mm3 444 419 485* 383 294 291 267       BMP  Results from last 7 days   Lab Units 03/15/22  0410 03/14/22  1039 03/13/22  0032 03/12/22  0020 03/11/22  1206 03/10/22  0009 03/09/22  0651   SODIUM mmol/L  --  129* 129* 128* 126* 130* 129*   POTASSIUM mmol/L  --  4.0 4.1 4.0 4.1 4.3 4.0   CHLORIDE mmol/L  --  96* 94* 94* 93* 96* 97*   CO2 mmol/L  --  23.0 24.0 22.0 23.0 23.0 23.0   BUN mg/dL  --  16 20 17 18 19 17   CREATININE mg/dL  --  0.72 0.80 0.76 0.72 0.67 0.73   GLUCOSE mg/dL  --  141* 184* 154* 161* 168* 144*   MAGNESIUM mg/dL 1.9 2.0 2.2 2.0 2.1 2.1 2.0   PHOSPHORUS mg/dL 2.6 2.5 2.9 2.5 2.8 2.4* 2.4*       CMP   Results from last 7 days   Lab Units 03/14/22  1039 03/13/22  0032 03/12/22  0020 03/11/22  1206 03/10/22  0009 03/09/22  0651   SODIUM mmol/L 129* 129* 128* 126* 130* 129*   POTASSIUM mmol/L 4.0 4.1 4.0 4.1 4.3 4.0   CHLORIDE mmol/L 96* 94* 94* 93* 96* 97*   CO2 mmol/L 23.0 24.0 22.0 23.0 23.0 23.0   BUN mg/dL 16 20 17 18 19 17   CREATININE mg/dL 0.72 0.80 0.76 0.72 0.67 0.73   GLUCOSE mg/dL 141* 184* 154*  161* 168* 144*   ALBUMIN g/dL 2.70* 3.10* 2.80* 2.60* 2.80* 3.00*   BILIRUBIN mg/dL 0.4 0.4 0.5 0.4 0.5 0.6   ALK PHOS U/L 110 118* 89 87 81 76   AST (SGOT) U/L 30 38* 28 14 13 12   ALT (SGPT) U/L 43* 44* 30 19 19 20         BNP        TROPONIN        CoAg        Creatinine Clearance  Estimated Creatinine Clearance: 58.3 mL/min (by C-G formula based on SCr of 0.72 mg/dL).    ABG        Radiology  CT Chest Without Contrast Diagnostic    Result Date: 3/14/2022   1. Persistent large left hemothorax now with small amounts of air in the superior aspect of this collection the overall size of the pneumothorax is unchanged. There is a left chest tube in place and the left lower lobe is nearly completely ectatic. Uncertain whether the chest tube is in or immediately adjacent to the pleural space, so correlation with clinical scenario in amount of drainage fluid is recommended. 2. Decrease in size of a small right pleural effusion. 3. Other ancillary findings are detailed above.  Electronically Signed By-Cory Wan DO On:3/14/2022 11:26 PM This report was finalized on [id number] by  Cory Wan DO.    XR Chest 1 View    Result Date: 3/14/2022  Stable left pleural effusion  Electronically Signed By-Cj Chong On:3/14/2022 7:35 AM This report was finalized on [id number] by  Cj Chong, .    XR Chest 1 View    Result Date: 3/13/2022  IMPRESSION : Slight decrease in size of left-sided pleural effusion. Chest tube remains in place[  Electronically Signed By-Jarrett De Jesus On:3/13/2022 11:28 AM This report was finalized on [id number] by  Jarrett De Jesus, .              EKG                              I personally viewed and interpreted the patient's EKG/Telemetry data: Atrial fibrillation          ECHOCARDIOGRAM:    Results for orders placed during the hospital encounter of 03/02/22    Adult Transthoracic Echo Complete W/ Cont if Necessary Per Protocol    Interpretation Summary  LVE with severe global  left ventricular hypokinesis, estimated LV ejection fraction of 30%.  Severe right ventricular enlargement and moderate right atrial enlargement seen  Severe left atrial enlargement seen.  Aortic valve, mitral valve, tricuspid valve appears structurally normal, moderate mitral and mild tricuspid regurgitation seen.  Calculated RV systolic pressure of 40 to 45 mmHg.  No pericardial effusion seen.  Large pleural effusion seen.  Proximal aorta appears normal in size.          STRESS MYOVIEW:    Cardiolite (Tc-99m Sestamibi) stress test    CARDIAC CATHETERIZATION:            OTHER:         Assessment/Plan     Principal Problem:    Atrial fibrillation with RVR ([facility])  Active Problems:    Other hyperlipidemia    Essential hypertension    Hypothyroidism    Coronary artery disease involving coronary bypass graft of native heart without angina pectoris    Acute CHF (congestive heart failure) ([facility])    Acute hyponatremia    Elevated LFTs    Elevated TSH    Acute hyperglycemia    Obesity (BMI 30-39.9)      Presented through emergency room 3/2/2022 with progressively worsening shortness of breath and dyspnea on exertion weight gain and leg edema. Was found to be in A. fib and congestive heart failure. Patient denies any chest pain. Patient lives in [place] and has a second home in [place] and see his cardiologist at [facility]. Patient has been having issues with alopecia and memory issues therefore stopped beta-blockers and statin on her own. Also has not been taking  thyroid replacement therapy since November because she ran out of medication. Patient has history of prediabetes. Does not check her sugars at home.  Work-up here revealed troponin x3 are negative. proBNP is 7717. CMP reveals a glucose of 242, hemoglobin A1c over 7.1. BUN/creatinine are 21/0.83. ALT elevated at 71, AST 35. TSH is 11.15  Chest x-ray 3/2/2022 reveals left pleural effusion, left basilar disease most likely atelectasis with possible pneumonia.  EKG  3/2/2022 reviewed/interpreted by me reveals A. fib with RVR at 126 bpm with PVCs      ]]]]]]]]]]]]]]]]]]]]  Impression  ==========  -Progressively worsening shortness of breath and leg edema.    -Congestive heart failure  Left ventricle dysfunction with ejection fraction of 30%.    -Significant biatrial enlargement    -Atrial fibrillation with RVR    -Premature ventricular contractions    -Status post CABG 12/2014 (performed in [place])    -Hypertension dyslipidemia prediabetes hypothyroidism elevation    -Status post ORIF    -Family history of coronary artery disease    -Intolerance to prednisone    -Former smoker    -Medical noncompliance.  Was not taking thyroid medicine replacement properly.     =================  Plan  ===========  Atrial fibrillation with RVR.  Rate control.  Consider GABRIEL cardioversion.  Patient would benefit from long-term anticoagulation for atrial fibrillation because of high [id number] score due to female gender age over 75, hypertension, diabetes, CAD making it at least 6. We will start her on Eliquis or warfarin before discharge.  Continued IV diuresis.  Observe renal function closely.  Elevated LFTs due to passive congestion.    Large left pleural effusion.  Status post drainage and chest tube.  More than 400 cc of fluid was removed.  Patient is on Cardizem and amiodarone.  Chest tube was flushed with TPA yesterday by thoracic surgery.  Patient has loculated left pleural effusion.  Another round of TPA infusion through the chest tube is being planned.    Renal dysfunction  BUN/creatinine-30/1.03    Echocardiogram showed severe right ventricle enlargement left atrial enlargement related enlargement and calculated pulmonary artery pressure of 45 mmHg.  Left ventricle dysfunction with ejection fraction of 30%.    Ischemic cardiomyopathy    Current medications include amiodarone aspirin Cardizem 90 mg every 8 hours subcu heparin levothyroxine metoprolol 25 mg twice a  day.    Anticoagulation and possible cardioversion as an outpatient versus GABRIEL cardioversion to try to convert to sinus rhythm.  Patient is not on anticoagulation at this time due to presence of chest tube hemorrhagic left pleural effusion etc.  This was discussed with patient and patient's  at bedside.    Follow-up labs ordered.    Further plan will depend on patient's progress.  ]]]]]]]]]]]]]]]]]           Yordan Evans MD  03/15/22  06:37 EDT

## 2022-03-15 NOTE — PROGRESS NOTES
"    Chief Complaint: loculated pleural effusion, right  S/P: Left thoracentesis on 3/4/22, chest tube insertion on 3/5/22, intrapleural TPA on 3/9/22, 3/10/22 and 3/12/22      Subjective:  Symptoms:  Stable.  She reports shortness of breath, cough, weakness and chest pressure.    Diet:  Adequate intake.  No nausea or vomiting.    Activity level: Impaired due to weakness.    Pain:  She complains of pain that is mild.  She reports pain is improving.  Pain is well controlled.    Not feeling as well today.  Continues to have shortness of breath with exertion.    Vital Signs:  Temp:  [97.2 °F (36.2 °C)-98.4 °F (36.9 °C)] 98.2 °F (36.8 °C)  Heart Rate:  [55-77] 66  Resp:  [17-26] 17  BP: (120-162)/(62-88) 135/68    Intake & Output (last day)       03/14 0701  03/15 0700 03/15 0701  03/16 0700    P.O.  120    Total Intake(mL/kg)  120 (1.4)    Urine (mL/kg/hr) 0 (0)     Chest Tube 90     Total Output 90     Net -90 +120          Urine Unmeasured Occurrence 2 x 1 x    Stool Unmeasured Occurrence  1 x          Objective:  General Appearance:  Comfortable, in no acute distress and ill-appearing.    Vital signs: (most recent): Blood pressure 135/68, pulse 66, temperature 98.2 °F (36.8 °C), temperature source Axillary, resp. rate 17, height 160 cm (63\"), weight 85.8 kg (189 lb 2.5 oz), SpO2 97 %, not currently breastfeeding.  Vital signs are normal.    HEENT: Normal HEENT exam.    Lungs:  Tachypnea and increased effort.  She is not in respiratory distress.  There are decreased breath sounds and wheezes.  No rhonchi.  (Conversational dyspnea)  Heart: Normal rate.    Chest: Chest wall tenderness (incisional) present.    Extremities: Decreased range of motion.  There is no dependent edema.    Neurological: Patient is alert and oriented to person, place and time.    Skin:  Warm and dry.              Chest tube:   Site: Left, Clean, Dry, Intact and Securement device intact  Suction: -20 cm  Air Leak: negative  24 Hour Total: " 90cm    Results Review:     I reviewed the patient's new clinical results.  I reviewed the patient's new imaging results and agree with the interpretation.  I reviewed the patient's other test results and agree with the interpretation  Discussed with patient, RN and Dr. Kuo'    Imaging Results (Last 24 Hours)     Procedure Component Value Units Date/Time    XR Chest 1 View [[id number]] Collected: 03/15/22 0804     Updated: 03/15/22 0808    Narrative:      EXAM: XR CHEST 1 VW-     DATE OF EXAM: 3/15/2022 5:15 AM     INDICATION: Chest tube; I48.91-Unspecified atrial fibrillation;  I50.9-Heart failure, unspecified; Z20.822-Contact with and (suspected)  exposure to covid-19; I50.21-Acute systolic (congestive) heart failure.         COMPARISONS: 03/14/2022      FINDINGS:     Unchanged left pleural effusion with a chest tube in place, and adjacent  atelectasis. Unchanged small right pleural effusion. Mediastinal shadows  are unchanged.       Impression:         Stable chest over the past 24 hours. Unchanged large left-sided pleural  effusion with a small amount of interspersed pleural air better  demonstrated on yesterday's chest CT     Unchanged small right pleural effusion     Electronically Signed By-Nain Mccauley On:3/15/2022 8:06 AM  This report was finalized on [id number] by  Nain Mccauley, .    CT Chest Without Contrast Diagnostic [[id number]] Collected: 03/14/22 2315     Updated: 03/14/22 2328    Narrative:         DATE OF EXAM:  3/14/2022 6:28 PM     PROCEDURE:  CT CHEST WO CONTRAST DIAGNOSTIC-     INDICATIONS:   left loculated effusion; I48.91-Unspecified atrial fibrillation;  I50.9-Heart failure, unspecified; Z20.822-Contact with and (suspected)  exposure to covid-19; I50.21-Acute systolic (congestive) heart failure     COMPARISON:   Chest CT without contrast dated 03/09/2022 03/04/2022     TECHNIQUE:  Routine transaxial slices were obtained through the chest without the  administration of intravenous contrast.  Reconstructed coronal and  sagittal images were also obtained. Automated exposure control and  iterative construction methods were used.     FINDINGS:  There is a persistent moderate to large loculated mixed density pleural  fluid collection consistent with hemothorax, there are no small amounts  of air within the anterior and superior aspect of this pleural  collection. The left lower lobe remains nearly completely atelectatic.  There is a left-sided chest tube in place, the tube enters from the  anterolateral aspect of the left chest and courses medially and  posteriorly. It is uncertain whether this is in the pleural space or in  an area of adjacent atelectatic lung. Correlation with amount of  drainage from the chest tube would be helpful. There is no pneumothorax.  The overall amount of hemothorax is unchanged. There is a small layering  right pleural effusion. No pericardial effusion. There is cardiomegaly  with coronary artery calcifications as well as sternotomy wires. There  is mild mediastinal adenopathy which is not significantly changed. The  unenhanced thoracic aorta is normal in course and caliber with mild to  moderate amount of vascular calcification. Visualized bony structures  are intact. No aggressive focal osseous lesions. Limited images of the  upper abdomen demonstrate mild nonspecific fat stranding in the upper  abdomen without focal abnormality identified.       Impression:         1. Persistent large left hemothorax now with small amounts of air in the  superior aspect of this collection the overall size of the pneumothorax  is unchanged. There is a left chest tube in place and the left lower  lobe is nearly completely ectatic. Uncertain whether the chest tube is  in or immediately adjacent to the pleural space, so correlation with  clinical scenario in amount of drainage fluid is recommended.  2. Decrease in size of a small right pleural effusion.  3. Other ancillary findings are detailed  above.     Electronically Signed By-Cory Wan DO On:3/14/2022 11:26 PM  This report was finalized on [id number] by  Cory Wan DO.          Lab Results:     Lab Results (last 24 hours)     Procedure Component Value Units Date/Time    POC Glucose Once [[id number]]  (Abnormal) Collected: 03/15/22 1113    Specimen: Blood Updated: 03/15/22 1114     Glucose 244 mg/dL      Comment: Serial Number: [id number]:  [id number]       Basic Metabolic Panel [[id number]]  (Abnormal) Collected: 03/15/22 0410    Specimen: Blood Updated: 03/15/22 0806     Glucose 123 mg/dL      BUN 15 mg/dL      Creatinine 0.62 mg/dL      Sodium 128 mmol/L      Potassium 3.7 mmol/L      Chloride 95 mmol/L      CO2 22.0 mmol/L      Calcium 8.2 mg/dL      BUN/Creatinine Ratio 24.2     Anion Gap 11.0 mmol/L      eGFR 90.2 mL/min/1.73      Comment: National Kidney Foundation and American Society of Nephrology (ASN) Task Force recommended calculation based on the Chronic Kidney Disease Epidemiology Collaboration (CKD-EPI) equation refit without adjustment for race.       Narrative:      GFR Normal >60  Chronic Kidney Disease <60  Kidney Failure <15      POC Glucose Once [[id number]]  (Abnormal) Collected: 03/15/22 0727    Specimen: Blood Updated: 03/15/22 0728     Glucose 118 mg/dL      Comment: Serial Number: [id number]:  [id number]       Phosphorus [[id number]]  (Normal) Collected: 03/15/22 0410    Specimen: Blood Updated: 03/15/22 0516     Phosphorus 2.6 mg/dL     Magnesium [[id number]]  (Normal) Collected: 03/15/22 0410    Specimen: Blood Updated: 03/15/22 0516     Magnesium 1.9 mg/dL     CBC & Differential [[id number]]  (Abnormal) Collected: 03/15/22 0410    Specimen: Blood Updated: 03/15/22 0506    Narrative:      The following orders were created for panel order CBC & Differential.  Procedure                               Abnormality         Status                     ---------                               -----------          ------                     CBC Auto Differential[[id number]]        Abnormal            Final result                 Please view results for these tests on the individual orders.    CBC Auto Differential [[id number]]  (Abnormal) Collected: 03/15/22 0410    Specimen: Blood Updated: 03/15/22 0506     WBC 10.00 10*3/mm3      RBC 2.76 10*6/mm3      Hemoglobin 8.8 g/dL      Hematocrit 26.0 %      MCV 94.2 fL      MCH 32.1 pg      MCHC 34.0 g/dL      RDW 15.9 %      RDW-SD 51.6 fl      MPV 7.2 fL      Platelets 444 10*3/mm3      Neutrophil % 83.6 %      Lymphocyte % 6.8 %      Monocyte % 8.8 %      Eosinophil % 0.6 %      Basophil % 0.2 %      Neutrophils, Absolute 8.40 10*3/mm3      Lymphocytes, Absolute 0.70 10*3/mm3      Monocytes, Absolute 0.90 10*3/mm3      Eosinophils, Absolute 0.10 10*3/mm3      Basophils, Absolute 0.00 10*3/mm3      nRBC 0.1 /100 WBC     POC Glucose Once [[id number]]  (Abnormal) Collected: 03/14/22 2220    Specimen: Blood Updated: 03/14/22 2221     Glucose 210 mg/dL      Comment: Serial Number: [id number]:  [id number]       POC Glucose Once [[id number]]  (Abnormal) Collected: 03/14/22 1624    Specimen: Blood Updated: 03/14/22 1625     Glucose 139 mg/dL      Comment: Serial Number: [id number]:  [id number]              Assessment/Plan       Atrial fibrillation with RVR (HCC)    Other hyperlipidemia    Essential hypertension    Hypothyroidism    Coronary artery disease involving coronary bypass graft of native heart without angina pectoris    Acute CHF (congestive heart failure) (HCC)    Acute hyponatremia    Elevated LFTs    Elevated TSH    Acute hyperglycemia    Obesity (BMI 30-39.9)       Assessment & Plan     I have independently reviewed CT of the chest performed yesterday which demonstrates persistent loculated pleural effusion without significant change from previous imaging.  Chest tube in place with atelectasis to the left lower lobe.  Small right pleural effusion.  There is evidence  of previous CABG.  No pneumothorax.    Loculated pleural effusion: Patient status post intrapleural Activase x3 with minimal improvement. CT of the chest does not demonstrate any significant improvement.  Discussed with Dr. Kuo who recommends proceeding with left VATS with decortication for adequate reexpansion of the lung.  Patient will need to be transferred to [facility] in [place].  Appreciate assistance from Dr. Artis for facilitating this transfer.  Surgery is planned for Friday in [place]. Continue chest tube to -20 cm of suction and recheck a chest x-ray in the morning.    Hypoxia: Remains intermittently on 2 L per nasal cannula.  Encourage incentive spirometry 10 times per hour and out of bed as much as possible.    Generalized weakness: Appreciate assistance from PT and nursing staff with increasing mobilization.  Anticipate patient will need rehab upon discharge.    A. fib: Cardiology note reviewed.  Possible cardioversion versus GABRIEL cardioversion.  Continue treatment per their recommendations.  Appreciate their assistance for cardiac clearance prior to surgical intervention.  Please continue to hold oral anticoagulation until chest tube has been removed.      Montserrat Humphries DNP, APRN  [facility]  03/15/22  15:01 EDT      I wore protective equipment throughout this patient encounter including a face mask, gloves and protective eyewear.  Hand hygiene was performed before donning protective equipment and after removal when leaving the room.

## 2022-03-15 NOTE — PLAN OF CARE
Problem: Adult Inpatient Plan of Care  Goal: Plan of Care Review  Recent Flowsheet Documentation  Taken 3/15/2022 0324 by Melisa Agustin LPN  Progress: improving  Plan of Care Reviewed With: patient  Goal: Absence of Hospital-Acquired Illness or Injury  Intervention: Identify and Manage Fall Risk  Recent Flowsheet Documentation  Taken 3/15/2022 0200 by Melisa Agustin LPN  Safety Promotion/Fall Prevention:   activity supervised   assistive device/personal items within reach   clutter free environment maintained   fall prevention program maintained   lighting adjusted   nonskid shoes/slippers when out of bed   room organization consistent   safety round/check completed  Taken 3/15/2022 0000 by Melisa Agustin LPN  Safety Promotion/Fall Prevention:   activity supervised   assistive device/personal items within reach   clutter free environment maintained   fall prevention program maintained   lighting adjusted   nonskid shoes/slippers when out of bed   room organization consistent   safety round/check completed  Taken 3/14/2022 2200 by Melisa Agustin LPN  Safety Promotion/Fall Prevention:   activity supervised   assistive device/personal items within reach   clutter free environment maintained   fall prevention program maintained   lighting adjusted   nonskid shoes/slippers when out of bed   room organization consistent   safety round/check completed  Taken 3/14/2022 2000 by Melisa Agustin LPN  Safety Promotion/Fall Prevention:   activity supervised   assistive device/personal items within reach   clutter free environment maintained   fall prevention program maintained   lighting adjusted   nonskid shoes/slippers when out of bed   room organization consistent   safety round/check completed  Intervention: Prevent Skin Injury  Recent Flowsheet Documentation  Taken 3/14/2022 2000 by Melisa Agustin LPN  Skin Protection: adhesive use limited  Intervention: Prevent and Manage VTE (venous thromboembolism)  Risk  Recent Flowsheet Documentation  Taken 3/15/2022 0000 by Melisa Agustin LPN  VTE Prevention/Management:   bilateral   sequential compression devices off   patient refused intervention  Taken 3/14/2022 2000 by Melisa Agustin LPN  VTE Prevention/Management:   bilateral   sequential compression devices off   patient refused intervention  Intervention: Prevent Infection  Recent Flowsheet Documentation  Taken 3/15/2022 0200 by Melisa Agustin LPN  Infection Prevention:   environmental surveillance performed   equipment surfaces disinfected   hand hygiene promoted   personal protective equipment utilized   rest/sleep promoted   single patient room provided  Taken 3/15/2022 0000 by Melisa Agustin LPN  Infection Prevention:   environmental surveillance performed   equipment surfaces disinfected   hand hygiene promoted   personal protective equipment utilized   rest/sleep promoted   single patient room provided  Taken 3/14/2022 2200 by Melisa Agustin LPN  Infection Prevention:   environmental surveillance performed   equipment surfaces disinfected   hand hygiene promoted   personal protective equipment utilized   rest/sleep promoted   single patient room provided  Taken 3/14/2022 2000 by Melisa Agustin LPN  Infection Prevention:   environmental surveillance performed   equipment surfaces disinfected   hand hygiene promoted   personal protective equipment utilized   rest/sleep promoted   single patient room provided  Goal: Optimal Comfort and Wellbeing  Intervention: Provide Person-Centered Care  Recent Flowsheet Documentation  Taken 3/14/2022 2000 by Melisa Agustin LPN  Trust Relationship/Rapport:   care explained   choices provided   emotional support provided     Problem: Adult Inpatient Plan of Care  Goal: Plan of Care Review  Outcome: Ongoing, Progressing  Flowsheets (Taken 3/15/2022 0324)  Progress: improving  Plan of Care Reviewed With: patient  Goal: Patient-Specific Goal (Individualized)  Outcome:  Ongoing, Progressing  Goal: Absence of Hospital-Acquired Illness or Injury  Outcome: Ongoing, Progressing  Intervention: Identify and Manage Fall Risk  Recent Flowsheet Documentation  Taken 3/15/2022 0200 by Melisa Agustin LPN  Safety Promotion/Fall Prevention:   activity supervised   assistive device/personal items within reach   clutter free environment maintained   fall prevention program maintained   lighting adjusted   nonskid shoes/slippers when out of bed   room organization consistent   safety round/check completed  Taken 3/15/2022 0000 by Melisa Agustin LPN  Safety Promotion/Fall Prevention:   activity supervised   assistive device/personal items within reach   clutter free environment maintained   fall prevention program maintained   lighting adjusted   nonskid shoes/slippers when out of bed   room organization consistent   safety round/check completed  Taken 3/14/2022 2200 by Melisa Agustin LPN  Safety Promotion/Fall Prevention:   activity supervised   assistive device/personal items within reach   clutter free environment maintained   fall prevention program maintained   lighting adjusted   nonskid shoes/slippers when out of bed   room organization consistent   safety round/check completed  Taken 3/14/2022 2000 by Melisa Agustin LPN  Safety Promotion/Fall Prevention:   activity supervised   assistive device/personal items within reach   clutter free environment maintained   fall prevention program maintained   lighting adjusted   nonskid shoes/slippers when out of bed   room organization consistent   safety round/check completed  Intervention: Prevent Skin Injury  Recent Flowsheet Documentation  Taken 3/14/2022 2000 by Melisa Agustin LPN  Skin Protection: adhesive use limited  Intervention: Prevent and Manage VTE (Venous Thromboembolism) Risk  Recent Flowsheet Documentation  Taken 3/15/2022 0000 by Melisa Agustin LPN  VTE Prevention/Management:   bilateral   sequential compression devices  off   patient refused intervention  Taken 3/14/2022 2000 by Melisa Agustin LPN  VTE Prevention/Management:   bilateral   sequential compression devices off   patient refused intervention  Range of Motion:   active ROM (range of motion) encouraged   ROM (range of motion) performed  Intervention: Prevent Infection  Recent Flowsheet Documentation  Taken 3/15/2022 0200 by Melisa Agustin LPN  Infection Prevention:   environmental surveillance performed   equipment surfaces disinfected   hand hygiene promoted   personal protective equipment utilized   rest/sleep promoted   single patient room provided  Taken 3/15/2022 0000 by Melisa Agustin LPN  Infection Prevention:   environmental surveillance performed   equipment surfaces disinfected   hand hygiene promoted   personal protective equipment utilized   rest/sleep promoted   single patient room provided  Taken 3/14/2022 2200 by Melisa Agustin LPN  Infection Prevention:   environmental surveillance performed   equipment surfaces disinfected   hand hygiene promoted   personal protective equipment utilized   rest/sleep promoted   single patient room provided  Taken 3/14/2022 2000 by Melisa Agustin LPN  Infection Prevention:   environmental surveillance performed   equipment surfaces disinfected   hand hygiene promoted   personal protective equipment utilized   rest/sleep promoted   single patient room provided  Goal: Optimal Comfort and Wellbeing  Outcome: Ongoing, Progressing  Intervention: Monitor Pain and Promote Comfort  Recent Flowsheet Documentation  Taken 3/15/2022 0000 by Melisa Agustin LPN  Pain Management Interventions:   see MAR   position adjusted   pillow support provided   care clustered  Taken 3/14/2022 2000 by Melisa Agustin LPN  Pain Management Interventions:   see MAR   position adjusted   pillow support provided   care clustered  Intervention: Provide Person-Centered Care  Recent Flowsheet Documentation  Taken 3/14/2022 2000 by Melisa Agustin  R, LPN  Trust Relationship/Rapport:   care explained   choices provided   emotional support provided  Goal: Readiness for Transition of Care  Outcome: Ongoing, Progressing     Problem: Arrhythmia/Dysrhythmia (Acute Coronary Syndrome)  Goal: Normalized Cardiac Rhythm  Intervention: Monitor and Manage Cardiac Rhythm Effects  Recent Flowsheet Documentation  Taken 3/15/2022 0000 by Melisa Agustin LPN  VTE Prevention/Management:   bilateral   sequential compression devices off   patient refused intervention  Taken 3/14/2022 2000 by Melisa Agustin LPN  VTE Prevention/Management:   bilateral   sequential compression devices off   patient refused intervention     Problem: Skin Injury Risk Increased  Goal: Skin Health and Integrity  Intervention: Optimize Skin Protection  Recent Flowsheet Documentation  Taken 3/14/2022 2000 by Melisa Agustin LPN  Pressure Reduction Techniques:   frequent weight shift encouraged   weight shift assistance provided  Pressure Reduction Devices: pressure-redistributing mattress utilized  Skin Protection: adhesive use limited     Problem: Fall Injury Risk  Goal: Absence of Fall and Fall-Related Injury  Outcome: Ongoing, Progressing  Intervention: Identify and Manage Contributors  Recent Flowsheet Documentation  Taken 3/15/2022 0200 by Melisa Agustin LPN  Medication Review/Management: medications reviewed  Taken 3/15/2022 0000 by Melisa Agustin LPN  Medication Review/Management: medications reviewed  Taken 3/14/2022 2200 by Melisa Agustin LPN  Medication Review/Management: medications reviewed  Taken 3/14/2022 2000 by Melisa Agustin LPN  Medication Review/Management: medications reviewed  Intervention: Promote Injury-Free Environment  Recent Flowsheet Documentation  Taken 3/15/2022 0200 by Melisa Agustin LPN  Safety Promotion/Fall Prevention:   activity supervised   assistive device/personal items within reach   clutter free environment maintained   fall prevention program  maintained   lighting adjusted   nonskid shoes/slippers when out of bed   room organization consistent   safety round/check completed  Taken 3/15/2022 0000 by Melisa Agustin LPN  Safety Promotion/Fall Prevention:   activity supervised   assistive device/personal items within reach   clutter free environment maintained   fall prevention program maintained   lighting adjusted   nonskid shoes/slippers when out of bed   room organization consistent   safety round/check completed  Taken 3/14/2022 2200 by Melisa Agustin LPN  Safety Promotion/Fall Prevention:   activity supervised   assistive device/personal items within reach   clutter free environment maintained   fall prevention program maintained   lighting adjusted   nonskid shoes/slippers when out of bed   room organization consistent   safety round/check completed  Taken 3/14/2022 2000 by Melisa Agustin LPN  Safety Promotion/Fall Prevention:   activity supervised   assistive device/personal items within reach   clutter free environment maintained   fall prevention program maintained   lighting adjusted   nonskid shoes/slippers when out of bed   room organization consistent   safety round/check completed  Goal: Absence of Fall and Fall-Related Injury  Outcome: Ongoing, Progressing  Intervention: Identify and Manage Contributors  Recent Flowsheet Documentation  Taken 3/15/2022 0200 by Melisa Agustin LPN  Medication Review/Management: medications reviewed  Taken 3/15/2022 0000 by Melisa Agustin LPN  Medication Review/Management: medications reviewed  Taken 3/14/2022 2200 by Melisa Agustin LPN  Medication Review/Management: medications reviewed  Taken 3/14/2022 2000 by Melisa Agustin LPN  Medication Review/Management: medications reviewed  Intervention: Promote Injury-Free Environment  Recent Flowsheet Documentation  Taken 3/15/2022 0200 by Melisa Agustin LPN  Safety Promotion/Fall Prevention:   activity supervised   assistive device/personal items  within reach   clutter free environment maintained   fall prevention program maintained   lighting adjusted   nonskid shoes/slippers when out of bed   room organization consistent   safety round/check completed  Taken 3/15/2022 0000 by Melisa Agustin LPN  Safety Promotion/Fall Prevention:   activity supervised   assistive device/personal items within reach   clutter free environment maintained   fall prevention program maintained   lighting adjusted   nonskid shoes/slippers when out of bed   room organization consistent   safety round/check completed  Taken 3/14/2022 2200 by Melisa Agustin LPN  Safety Promotion/Fall Prevention:   activity supervised   assistive device/personal items within reach   clutter free environment maintained   fall prevention program maintained   lighting adjusted   nonskid shoes/slippers when out of bed   room organization consistent   safety round/check completed  Taken 3/14/2022 2000 by Melisa Agustin LPN  Safety Promotion/Fall Prevention:   activity supervised   assistive device/personal items within reach   clutter free environment maintained   fall prevention program maintained   lighting adjusted   nonskid shoes/slippers when out of bed   room organization consistent   safety round/check completed   Goal Outcome Evaluation:  Plan of Care Reviewed With: patient        Progress: improving

## 2022-03-15 NOTE — PLAN OF CARE
Goal Outcome Evaluation:         Assessment: Jaquelin Valderrama presents with ADL impairments below baseline abilities which indicate the need for continued skilled intervention while inpatient. Patient tolerated session well, however due to fatigue and lack of activity tolerance she was unwilling to perform g/h in standing at the sink. Rather, ADLs completed EOB.  Tolerating session today without incident. Will continue to follow and progress as tolerated.     Plan/Recommendations:   Pt would benefit from Inpatient Rehabilitation placement at discharge from facility.   Pt desires Inpatient Rehabilitation placement at discharge. Pt cooperative; agreeable to therapeutic recommendations and plan of care.

## 2022-03-15 NOTE — CASE MANAGEMENT/SOCIAL WORK
Continued Stay Note  GUERLINE Vazquez     Patient Name: Jaquelin Valderrama  MRN: [id number]  Today's Date: 3/15/2022    Admit Date: 3/2/2022     Discharge Plan     Row Name 03/15/22 1510       Plan    Plan Comments d/c barrier:CHest tube,  Cardiothoracic surgery potentially friday              Phone communication or documentation only - no physical contact with patient or family.        Elina Barrios RN

## 2022-03-15 NOTE — THERAPY TREATMENT NOTE
Subjective: Pt agreeable to therapeutic plan of care.  Cognition: oriented to Person, Place, Time and Situation    Objective:     Bed Mobility: Modified-Independent  Functional Transfers: Modified-Independent with rolling walker  Functional Ambulation: CGA    Grooming: Setup  ADL Position: unsupported sitting EOB  ADL Comments: Pt performed g/h routine seated EOB unsupported. Pt refused ADL completion in standing in the restroom secondary to fatigue post completion of functional ambulation of ~50ft with physical therapy. EOB performed instead, completing oral care and face washing with setup.         Pain: 0 VAS  Education: Provided education on importance of mobility and skilled verbal / tactile cueing throughout intervention.     Assessment: Jaquelin Valderrama presents with ADL impairments below baseline abilities which indicate the need for continued skilled intervention while inpatient. Patient tolerated session well, however due to fatigue and lack of activity tolerance she was unwilling to perform g/h in standing at the sink. Rather, ADLs completed EOB.  Tolerating session today without incident. Will continue to follow and progress as tolerated.     Plan/Recommendations:   Pt would benefit from Inpatient Rehabilitation placement at discharge from facility.   Pt desires Inpatient Rehabilitation placement at discharge. Pt cooperative; agreeable to therapeutic recommendations and plan of care.     Modified [place]: 3 = Moderate disability (Requires some help, but able to walk unassisted)    Post-Tx Position: Supine with HOB Elevated, Alarms activated and Call light and personal items within reach  PPE: gloves, surgical mask, eyewear protection

## 2022-03-15 NOTE — NURSING NOTE
Spoke with intensivist R/T CT scan results.  No new orders at this time.    0518 Spoke again with intensivist r/t CT scan results,no new orders at this time.

## 2022-03-15 NOTE — DISCHARGE SUMMARY
[facility] Medicine Services  DISCHARGE SUMMARY    Patient Name: Jaquelin Valderrama  : 1940  MRN: [id number]    Date of Admission: 3/2/2022  Discharge Diagnosis: Loculated pleural effusions  Date of Discharge:  03/15/2022  Primary Care Physician: Minerva Chatterjee MD      Presenting Problem:   Atrial fibrillation with RVR (HCC) [I48.91]  Acute congestive heart failure, unspecified heart failure type (HCC) [I50.9]  Lab test negative for COVID-19 virus [Z20.822]    Active and Resolved Hospital Problems:  Active Hospital Problems    Diagnosis POA   • **Atrial fibrillation with RVR (HCC) [I48.91] Yes   • Acute CHF (congestive heart failure) (HCC) [I50.9] Yes   • Acute hyponatremia [E87.1] Yes   • Elevated LFTs [R79.89] Yes   • Elevated TSH [R79.89] Yes   • Acute hyperglycemia [R73.9] Yes   • Obesity (BMI 30-39.9) [E66.9] Yes   • Coronary artery disease involving coronary bypass graft of native heart without angina pectoris [I25.810] Yes   • Essential hypertension [I10] Yes   • Hypothyroidism [E03.9] Yes   • Other hyperlipidemia [E78.49] Yes      Resolved Hospital Problems   No resolved problems to display.         Hospital Course     Hospital Course:  Jaquelin Valderrama is a 82 y.o. female with PMHx of CAD, MI, CABG, coronary stent placement, HTN, HLD, prediabetes, and hypothyroidism who presented to the ER at [facility] on 3/2/2022 complaining of dyspnea on exertion.  She was diagnosed with A. Fib with RVR causing new onset CHF.  Cadizem drip started along with IV lasix and cardio consult.  Echo showed EF 30%.  Workup revealed a large left pleural effusion that is possible hemothorax.  Pulm consulted.  1L removed on thoracentesis.  As significant effusion remained, pulmonary placed chest tube on 3/5/2022 to suction.  Cardio transitioned A. Fib meds to PO cardizem and amiodarone.  Hgb dropped from draining of hemothorax and patient required transfusion.  Thoracic surgery consulted  as output from chest tube diminished, intrapleural activase given by CTS on 3/9.  Effusion appeared loculated per CTS as there was minimal output.   TPA administration attempted again on 3/10 and again on 3/12.  Repeat CT showed large left sided hemothorax.  Case discussed with CTS and pulm.  Patient to be transferred to [facility] for planned Thoracic surgery on 3/17/2022.  Medicine accepted patient and Dr. Kuo to be consulted for surgery.           DISCHARGE Follow Up Recommendations for labs and diagnostics:       - Follow up with PCP in 5-7 da days   - Transfer to [facility] for Thoracic surgery planned Friday by Dr. Kuo   - Continue Cardizem 90mg PO q8h   - Continue Amiodarone 200mg PO BID for 22 doses then de-escalated to 200mg PO daily   - Lantus 10u SC daily   - Follow up with Dr. Jane as outpatient.      Day of Discharge     Vital Signs:  Temp:  [97.2 °F (36.2 °C)-98.4 °F (36.9 °C)] 97.3 °F (36.3 °C)  Heart Rate:  [55-77] 66  Resp:  [17-26] 18  BP: (120-162)/(62-88) 141/65  Flow (L/min):  [2] 2    Physical Exam:  Physical Exam  Constitutional:       General: She is not in acute distress.     Appearance: She is not toxic-appearing.   HENT:      Head: Normocephalic and atraumatic.      Nose: Nose normal. No congestion.      Mouth/Throat:      Pharynx: Oropharynx is clear. No oropharyngeal exudate.   Eyes:      General: No scleral icterus.  Cardiovascular:      Rate and Rhythm: Normal rate and regular rhythm.      Heart sounds: No murmur heard.    No friction rub. No gallop.   Pulmonary:      Effort: No respiratory distress.      Breath sounds: Rales present. No wheezing.   Abdominal:      General: There is no distension.      Tenderness: There is no abdominal tenderness. There is no guarding.   Musculoskeletal:         General: No swelling or deformity.      Cervical back: Normal range of motion. No rigidity.      Right lower leg: No edema.      Left lower leg: No edema.   Skin:     Coloration: Skin  is not jaundiced.      Findings: No bruising or lesion.      Comments: Left sided chest tube present   Neurological:      General: No focal deficit present.      Mental Status: She is alert and oriented to person, place, and time.      Motor: No weakness.   Psychiatric:         Mood and Affect: Mood normal.         Behavior: Behavior normal.         Thought Content: Thought content normal.         Judgment: Judgment normal.            Pertinent  and/or Most Recent Results     LAB RESULTS:      Lab 03/15/22  0410 03/14/22  1040 03/13/22  0032 03/12/22  0020 03/11/22  1206 03/10/22  0009 03/09/22  1230 03/09/22  0651   WBC 10.00 11.10* 14.10* 13.00* 13.10* 15.40*   < > 13.00*   HEMOGLOBIN 8.8* 9.3* 9.5* 9.1* 9.1* 9.3*   < > 9.4*   HEMATOCRIT 26.0* 28.0* 28.8* 27.2* 26.6* 27.3*   < > 27.5*   PLATELETS 444 419 485* 383 294 291   < > 244   NEUTROS ABS 8.40* 9.60* 12.10* 10.70* 11.20* 12.80*  --   --    LYMPHS ABS 0.70 0.50* 0.70 0.90 0.70 0.90  --   --    MONOS ABS 0.90 0.90 1.30* 1.30* 1.10* 1.60*  --   --    EOS ABS 0.10 0.10 0.00 0.10 0.10 0.10  --   --    MCV 94.2 94.0 94.4 94.7 95.5 94.3   < > 94.6   CRP  --   --   --   --   --  19.71*  --  13.22*   LDH  --   --  421*  --   --   --   --   --     < > = values in this interval not displayed.         Lab 03/15/22  0410 03/14/22  1039 03/13/22  0032 03/12/22  0020 03/11/22  1206 03/10/22  0009 03/09/22  0651   SODIUM 128* 129* 129* 128* 126* 130* 129*   POTASSIUM 3.7 4.0 4.1 4.0 4.1 4.3 4.0   CHLORIDE 95* 96* 94* 94* 93* 96* 97*   CO2 22.0 23.0 24.0 22.0 23.0 23.0 23.0   ANION GAP 11.0 10.0 11.0 12.0 10.0 11.0 9.0   BUN 15 16 20 17 18 19 17   CREATININE 0.62 0.72 0.80 0.76 0.72 0.67 0.73   EGFR 90.2 84.6 74.6 79.3 84.6 88.5 83.3   GLUCOSE 123* 141* 184* 154* 161* 168* 144*   CALCIUM 8.2* 8.5* 8.6 8.2* 8.2* 8.6 8.3*   IONIZED CALCIUM  --   --  1.22 1.16* 1.19* 1.18* 1.16*   MAGNESIUM 1.9 2.0 2.2 2.0 2.1 2.1 2.0   PHOSPHORUS 2.6 2.5 2.9 2.5 2.8 2.4* 2.4*   TSH  --   --    --   --   --   --  6.020*         Lab 03/14/22  1039 03/13/22  0032 03/12/22  0020 03/11/22  1206 03/10/22  0009   TOTAL PROTEIN 5.3* 6.2 5.5* 5.5* 5.6*   ALBUMIN 2.70* 3.10* 2.80* 2.60* 2.80*   GLOBULIN 2.6 3.1 2.7 2.9 2.8   ALT (SGPT) 43* 44* 30 19 19   AST (SGOT) 30 38* 28 14 13   BILIRUBIN 0.4 0.4 0.5 0.4 0.5   ALK PHOS 110 118* 89 87 81         Lab 03/09/22  0651   PROBNP 3,583.0*                 Brief Urine Lab Results  (Last result in the past 365 days)      Color   Clarity   Blood   Leuk Est   Nitrite   Protein   CREAT   Urine HCG        03/03/22 0951 Yellow   Clear   Negative   Negative   Negative   Negative               Microbiology Results (last 10 days)     ** No results found for the last 240 hours. **          CT Chest Without Contrast Diagnostic    Result Date: 3/14/2022  Impression:  1. Persistent large left hemothorax now with small amounts of air in the superior aspect of this collection the overall size of the pneumothorax is unchanged. There is a left chest tube in place and the left lower lobe is nearly completely ectatic. Uncertain whether the chest tube is in or immediately adjacent to the pleural space, so correlation with clinical scenario in amount of drainage fluid is recommended. 2. Decrease in size of a small right pleural effusion. 3. Other ancillary findings are detailed above.  Electronically Signed By-Cory Wan DO On:3/14/2022 11:26 PM This report was finalized on [id number] by  Cory Wan DO.    CT Chest Without Contrast Diagnostic    Result Date: 3/9/2022  Impression: 1.No significant change in size of large left pleural fluid collection with hyperdense components, consistent with hemothorax. Tip of the left chest tube is anterior to the dependent portion of the fluid collection. 2.Increased, now complete left lower lobe atelectasis. 3.Increased size of small right pleural effusion.    Electronically Signed By-Jenn Ordonez MD On:3/9/2022 12:14 PM This report was  finalized on [id number] by  Jenn Ordonez MD.    CT Chest Without Contrast Diagnostic    Result Date: 3/4/2022  Impression: 1. There is evidence of large left pleural fluid collection which is compatible with a hemothorax. Recommend surgical consultation for possible chest tube. The case was discussed with Sabra Roberts at 9:56 PM. 2. The left upper and lower lobes show some compression and groundglass opacity which may be due to atelectasis or edema. 3. Additional findings as reported above.  Electronically Signed By-Denisse Li MD On:3/4/2022 10:02 PM This report was finalized on [id number] by  Denisse Li MD.    XR Chest 1 View    Result Date: 3/15/2022  Impression:  Stable chest over the past 24 hours. Unchanged large left-sided pleural effusion with a small amount of interspersed pleural air better demonstrated on yesterday's chest CT  Unchanged small right pleural effusion  Electronically Signed By-Nain Mccauley On:3/15/2022 8:06 AM This report was finalized on [id number] by  Nain Mccauley, .    XR Chest 1 View    Result Date: 3/14/2022  Impression: Stable left pleural effusion  Electronically Signed By-Cj Chong On:3/14/2022 7:35 AM This report was finalized on [id number] by  Cj Chong, .    XR Chest 1 View    Result Date: 3/13/2022  Impression: IMPRESSION : Slight decrease in size of left-sided pleural effusion. Chest tube remains in place[  Electronically Signed By-Jarrett De Jesus On:3/13/2022 11:28 AM This report was finalized on [id number] by  Jarrett De Jesus, .    XR Chest 1 View    Result Date: 3/12/2022  Impression: IMPRESSION : Slight interval increase in large left-sided pleural effusion with associated dependent opacity.  Left-sided chest tube remains in place.[  Electronically Signed By-Jarrett De Jesus On:3/12/2022 7:17 AM This report was finalized on [id number] by  Jarrett De Jesus, .    XR Chest 1 View    Result Date: 3/11/2022  Impression: IMPRESSION :  Moderate to large left-sided pleural effusion with loculated component suspected. Accounting for differences in technique no great change in comparison  Slight improved aeration of associated dependent opacities on the left  Small right-sided pleural effusion suspected[  Electronically Signed By-Jarrett De Jesus On:3/11/2022 7:06 AM This report was finalized on [id number] by  Jarrett De Jesus, .    XR Chest 1 View    Result Date: 3/10/2022  Impression: No significant changes compared to the previous study. No pneumothorax. Redemonstration of a large left-sided pleural effusion with left basilar airspace disease.  Electronically Signed By-Rosa Lopez MD On:3/10/2022 7:24 AM This report was finalized on [id number] by  Rosa Lopez MD.    XR Chest 1 View    Result Date: 3/9/2022  Impression: 1. Left-sided chest tube in unchanged position. 2. Increasing left-sided pleural effusion, particularly along the lateral and apical aspect of the left lung.  Electronically Signed By-John Trevino MD On:3/9/2022 7:51 AM This report was finalized on [id number] by  John Trevino MD.    XR Chest 1 View    Result Date: 3/8/2022  Impression: Small loculated left hydropneumothorax with atelectasis in the mid and left lower lung. The air component present previously has filled in with fluid  Electronically Signed By-Cj Chong On:3/8/2022 7:47 AM This report was finalized on [id number] by  Cj Chong, .    XR Chest 1 View    Result Date: 3/6/2022  Impression: 1. Decrease in size of large left pleural effusion with left-sided chest tube in place. Small amount of pleural air noted compatible with hydropneumothorax with small amount of pleural fluid component at the left lung base. 2. Left perihilar and left basilar airspace disease likely compressive atelectasis.  Electronically Signed By-Ronnie Carrero MD On:3/6/2022 8:35 AM This report was finalized on [id number] by  Ronnie Carrero MD.    XR Chest 1  View    Result Date: 3/5/2022  Impression: Large left pleural effusion which appears slightly smaller status post chest tube placement  Electronically Signed By-Cj Chong On:3/5/2022 3:03 PM This report was finalized on [id number] by  Cj Chong, .    XR Chest 1 View    Result Date: 3/5/2022  Impression: IMPRESSION : Continued increasing opacification of the left hemithorax compatible with large pleural effusion/hemothorax given prior CT.  Electronically Signed By-Jarrett De Jesus On:3/5/2022 10:17 AM This report was finalized on [id number] by  Jarrett De Jesus, .    XR Chest 1 View    Result Date: 3/4/2022  Impression: 1. There is a new large opacity in the left chest which has developed since the study from 11:45 AM today, which could be due to rapid reaccumulation of  pleural fluid or to blood in the pleural space-possible hemothorax. This was reported to the patient's nurse Racquel Zayas by myself at 7:50 PM with a request to give the report to the referring physician.  Electronically Signed By-Denisse Li MD On:3/4/2022 7:53 PM This report was finalized on [id number] by  Denisse Li MD.    XR Chest 1 View    Result Date: 3/4/2022  Impression:  1. No visible pneumothorax status post left thoracentesis. Significantly improved left pleural effusion with small left pleural fluid remaining.  2. Significantly improved left lung aeration with mild residual left basilar atelectasis.  Electronically Signed By-Saadia Islas MD On:3/4/2022 12:24 PM This report was finalized on [id number] by  Saadia Islas MD.    XR Chest 1 View    Result Date: 3/3/2022  Impression:  Left pleural effusion.  Left basilar disease most likely atelectasis with possible pneumonia although follow-up to ensure resolution is recommended as this is a new finding and underlying mass cannot be excluded.  Electronically Signed By-Nain Mccauley On:3/3/2022 7:30 AM This report was finalized on [id number] by  Nain  Garrick, .              Results for orders placed during the hospital encounter of 03/02/22    Adult Transthoracic Echo Complete W/ Cont if Necessary Per Protocol    Interpretation Summary  LVE with severe global left ventricular hypokinesis, estimated LV ejection fraction of 30%.  Severe right ventricular enlargement and moderate right atrial enlargement seen  Severe left atrial enlargement seen.  Aortic valve, mitral valve, tricuspid valve appears structurally normal, moderate mitral and mild tricuspid regurgitation seen.  Calculated RV systolic pressure of 40 to 45 mmHg.  No pericardial effusion seen.  Large pleural effusion seen.  Proximal aorta appears normal in size.      Labs Pending at Discharge:      Procedures Performed           Consults:   Consults     Date and Time Order Name Status Description    3/15/2022 11:31 AM Inpatient Gastroenterology Consult Completed     3/14/2022  9:22 AM Inpatient Cardiothoracic Surgery Consult      3/9/2022  7:14 AM Inpatient Thoracic Surgery Consult Completed     3/6/2022  1:17 PM Inpatient Hospitalist Consult      3/4/2022  8:22 PM Inpatient Pulmonology Consult Completed     3/3/2022  2:24 AM Inpatient Cardiology Consult Completed             Discharge Details        Discharge Medications      New Medications      Instructions Start Date   amiodarone 200 MG tablet  Commonly known as: PACERONE   200 mg, Oral, Every 12 Hours   Start Date: March 16, 2022     arformoterol 15 MCG/2ML nebulizer solution  Commonly known as: BROVANA   15 mcg, Nebulization, 2 Times Daily - RT      budesonide 0.5 MG/2ML nebulizer solution  Commonly known as: PULMICORT   1 mg, Nebulization, 2 Times Daily - RT      dilTIAZem 90 MG tablet  Commonly known as: CARDIZEM   90 mg, Oral, Every 8 Hours Scheduled      insulin glargine 100 UNIT/ML injection  Commonly known as: LANANTONIO SEMGLEE   10 Units, Subcutaneous, Nightly         Continue These Medications      Instructions Start Date   aspirin 81 MG chewable  tablet   81 mg, Oral, Daily      Avapro 300 MG tablet  Generic drug: irbesartan   150 mg, Oral, Daily      levothyroxine 50 MCG tablet  Commonly known as: SYNTHROID, LEVOTHROID   50 mcg, Oral, Every Early Morning      metoprolol tartrate 25 MG tablet  Commonly known as: LOPRESSOR   Take 1 tablet by mouth twice daily      MULTIVITAMIN ADULT EXTRA C PO   1 tablet, Oral, Daily      Vitamin D3 50 MCG (2000 UT) tablet   1 tablet, Oral, Daily             Allergies   Allergen Reactions   • Prednisone Other (See Comments)     Increased BP         Discharge Disposition: transfer to [facility] ([place] - External)  Condition on discharge: fair  Diet:  Hospital:  Diet Order   Procedures   • Diet Cardiac, Diabetic/Consistent Carbs; 2gm Na+; Diabetic - Consistent Carb         Discharge Activity:   Activity Instructions     Activity as Tolerated              CODE STATUS:  Code Status and Medical Interventions:   Ordered at: 03/03/22 0223     Code Status (Patient has no pulse and is not breathing):    CPR (Attempt to Resuscitate)     Medical Interventions (Patient has pulse or is breathing):    Full Support         No future appointments.    Additional Instructions for the Follow-ups that You Need to Schedule     Call MD With Problems / Concerns   As directed      Instructions: Call [phone] or email [email or web address] for problems or concerns.    Order Comments: Instructions: Call [phone] or email [email or web address] for problems or concerns.          Discharge Follow-up with PCP   As directed       Currently Documented PCP:    Minerva Chatterjee MD    PCP Phone Number:    [phone]     Follow Up Details: follow up with PCP in 5-7 days               Time spent on Discharge including face to face service:  45 minutes    This patient has been examined wearing appropriate Personal Protective Equipment and discussed with Patient and CTS . 03/15/22      Signature: Electronically signed by  Jaja Artis DO, 03/15/22, 6:24 PM EDT.

## 2022-03-15 NOTE — PLAN OF CARE
Problem: Adult Inpatient Plan of Care  Goal: Absence of Hospital-Acquired Illness or Injury  Intervention: Identify and Manage Fall Risk  Recent Flowsheet Documentation  Taken 3/14/2022 1800 by Christina Tim RN  Safety Promotion/Fall Prevention:   safety round/check completed   activity supervised   assistive device/personal items within reach   clutter free environment maintained   fall prevention program maintained   gait belt   nonskid shoes/slippers when out of bed  Intervention: Prevent Infection  Recent Flowsheet Documentation  Taken 3/14/2022 1800 by Christina Tim RN  Infection Prevention:   hand hygiene promoted   rest/sleep promoted     Problem: Fall Injury Risk  Goal: Absence of Fall and Fall-Related Injury  Intervention: Identify and Manage Contributors  Recent Flowsheet Documentation  Taken 3/15/2022 0730 by Christina Tim RN  Medication Review/Management: medications reviewed  Taken 3/14/2022 1800 by Christina Tim RN  Medication Review/Management: medications reviewed  Intervention: Promote Injury-Free Environment  Recent Flowsheet Documentation  Taken 3/14/2022 1800 by Christina Tim RN  Safety Promotion/Fall Prevention:   safety round/check completed   activity supervised   assistive device/personal items within reach   clutter free environment maintained   fall prevention program maintained   gait belt   nonskid shoes/slippers when out of bed  Goal: Absence of Fall and Fall-Related Injury  Intervention: Identify and Manage Contributors  Recent Flowsheet Documentation  Taken 3/15/2022 0730 by Christina Tim RN  Medication Review/Management: medications reviewed  Taken 3/14/2022 1800 by Christina Tim RN  Medication Review/Management: medications reviewed  Intervention: Promote Injury-Free Environment  Recent Flowsheet Documentation  Taken 3/14/2022 1800 by Christina Tim RN  Safety Promotion/Fall Prevention:   safety round/check completed   activity supervised   assistive device/personal  items within reach   clutter free environment maintained   fall prevention program maintained   gait belt   nonskid shoes/slippers when out of bed     Problem: Breathing Pattern Ineffective  Goal: Effective Breathing Pattern  Outcome: Ongoing, Progressing     Problem: Dysrhythmia  Goal: Normalized Cardiac Rhythm  Outcome: Ongoing, Progressing   Goal Outcome Evaluation:  Patient resting in bed. Denies needs at this time.

## 2022-03-15 NOTE — PROGRESS NOTES
Nutrition Services    Patient Name: Jaquelin Valderrama  YOB: 1940  MRN: [id number]  Admission date: 3/2/2022    PPE Documentation        PPE Worn By Provider N/A, did not enter pt's room   PPE Worn By Patient  none     NUTRITION SCREENING      Encounter Information: 3/15: Progress note to monitor po intake- noted intake has decreased. Will order a nutrition supplement and follow up for full assessment as indicated.       PO Diet: Diet Cardiac, Diabetic/Consistent Carbs; 2gm Na+; Diabetic - Consistent Carb   PO Supplements:    PO Intake:  55%       Labs (reviewed below): Na low (edema noted)  Gluc elev        GI Function:  Stool Output  Stool Unmeasured Occurrence: 1 (03/15/22 1000)  Bowel Incontinence: No (03/12/22 1051)  Stool Amount: large (03/12/22 1051)          Skin: No breakdown       Weight Hx Review: Wt Readings from Last 10 Encounters:   03/14/22 0519 85.8 kg (189 lb 2.5 oz)   03/13/22 0529 86 kg (189 lb 9.5 oz)   03/13/22 0300 86 kg (189 lb 9.5 oz)   03/10/22 0436 83 kg (182 lb 15.7 oz)   03/09/22 0520 81.8 kg (180 lb 6.4 oz)   03/08/22 0551 80 kg (176 lb 5.9 oz)   03/06/22 0600 77 kg (169 lb 12.1 oz)   03/05/22 0553 76.1 kg (167 lb 12.3 oz)   03/04/22 0500 77.3 kg (170 lb 6.7 oz)   03/03/22 0622 80.3 kg (177 lb)   03/03/22 0449 80.5 kg (177 lb 7.5 oz)   03/02/22 2218 81 kg (178 lb 9.2 oz)   01/27/21 0800 68.5 kg (151 lb)   12/28/20 0826 71.2 kg (157 lb)   11/30/20 0952 71.2 kg (157 lb)   11/09/20 0938 71.2 kg (157 lb)   09/09/20 1012 72.1 kg (159 lb)   08/05/20 1020 72.1 kg (159 lb)   07/21/20 1010 72.5 kg (159 lb 13.3 oz)   07/14/20 1216 70.8 kg (156 lb)   07/08/20 1021 73.5 kg (162 lb)   05/11/20 1039 71.7 kg (158 lb)     Pt currently with 1-2+ edema, monitor weight       Nutrition Intervention: Continue current diet    Add Boost Glucose Control BID (Provides 380 kcals, 32 g protein if consumed)        Results from last 7 days   Lab Units 03/15/22  0410 03/14/22  1039 03/13/22  0032  03/12/22  0020   SODIUM mmol/L 128* 129* 129* 128*   POTASSIUM mmol/L 3.7 4.0 4.1 4.0   CHLORIDE mmol/L 95* 96* 94* 94*   CO2 mmol/L 22.0 23.0 24.0 22.0   BUN mg/dL 15 16 20 17   CREATININE mg/dL 0.62 0.72 0.80 0.76   CALCIUM mg/dL 8.2* 8.5* 8.6 8.2*   BILIRUBIN mg/dL  --  0.4 0.4 0.5   ALK PHOS U/L  --  110 118* 89   ALT (SGPT) U/L  --  43* 44* 30   AST (SGOT) U/L  --  30 38* 28   GLUCOSE mg/dL 123* 141* 184* 154*     Results from last 7 days   Lab Units 03/15/22  0410 03/14/22  1040 03/14/22  1039 03/13/22  0032   MAGNESIUM mg/dL 1.9  --  2.0 2.2   PHOSPHORUS mg/dL 2.6  --  2.5 2.9   HEMOGLOBIN g/dL 8.8*   < >  --  9.5*   HEMATOCRIT % 26.0*   < >  --  28.8*    < > = values in this interval not displayed.     COVID19   Date Value Ref Range Status   03/02/2022 Not Detected Not Detected - Ref. Range Final     Lab Results   Component Value Date    HGBA1C 7.1 (H) 03/03/2022       RD to follow up per protocol.    Electronically signed by:  Sindhu Cordero, BEBETO  03/15/22 15:58 EDT

## 2022-03-15 NOTE — CONSULTS
"GI CONSULT  NOTE:    Referring Provider:  Dr. Artis    Chief complaint: Blood in stool    Subjective . \"I had a small blood on my stool today\"    History of present illness: Jaquelin Valderrama is a 82 y.o. female who has a history of CAD, CHF and A. fib.  She presented on 3/2/2022 with increased shortness of breath.  She has had a complicated hospitalization including acute respiratory failure, COPD exacerbation, A. fib with RVR, CHF exacerbation and ischemic cardiomyopathy.  She also has a loculated pleural effusion s/p chest tube placement with plan for transfer later this week to [facility] for VATS.  GI asked to consult for small amount of rectal bleeding today after a bowel movement.  She reports bright red blood per rectum on the toilet paper with brown stool and denies constipation, diarrhea, rectal pain or straining with defecation.  No abdominal pain.  She does report some mild nausea but no heartburn or vomiting.  No difficulty swallowing with some altered taste.  Less shortness of breath and denies chest pain.    Endo History:  12/2018 colonoscopy by Dr. Soria -external hemorrhoids    Past Medical History:  Past Medical History:   Diagnosis Date   • Astigmatism, bilateral 11/19/2019   • Benign essential hypertension    • Bilateral presbyopia 11/19/2019   • CAD (coronary artery disease)    • Closed right ankle fracture    • COVID-19 vaccination refused    • Hyperlipidemia    • Hypothyroidism    • Influenza vaccine needed    • Myocardial infarction (HCC)    • Prediabetes    • Pseudophakia, both eyes 11/19/2019   • Thoracic back pain        Past Surgical History:  Past Surgical History:   Procedure Laterality Date   • APPENDECTOMY     • CARDIAC CATHETERIZATION  2012    x3    • CORONARY ARTERY BYPASS GRAFT  12/2014   • CORONARY STENT PLACEMENT      x3   • HARDWARE REMOVAL Right 7/21/2020    Procedure: ANKLE/FOOT HARDWARE REMOVAL;  Surgeon: Lincoln Sibley MD;  Location: [facility] MAIN OR;  " Service: Orthopedics;  Laterality: Right;   • ORIF ANKLE FRACTURE Right 2019    Radha Vazquez Mem       Social History:  Social History     Tobacco Use   • Smoking status: Former Smoker     Types: Cigarettes     Quit date:      Years since quittin.2   • Smokeless tobacco: Never Used   • Tobacco comment: Social Drinker   Vaping Use   • Vaping Use: Never used   Substance Use Topics   • Alcohol use: Yes     Comment: mod    • Drug use: No       Family History:  Family History   Problem Relation Age of Onset   • Heart disease Mother    • Lung cancer Father    • Diabetes Other    sister with colon cancer    Medications:  Medications Prior to Admission   Medication Sig Dispense Refill Last Dose   • aspirin 81 MG chewable tablet Chew 81 mg Daily.   3/2/2022 at Unknown time   • Cholecalciferol (VITAMIN D3) 2000 units tablet Take 1 tablet by mouth Daily.   3/2/2022 at Unknown time   • irbesartan (Avapro) 300 MG tablet Take 150 mg by mouth Daily.      • metoprolol tartrate (LOPRESSOR) 25 MG tablet Take 1 tablet by mouth twice daily 180 tablet 0 3/2/2022 at Unknown time   • Multiple Vitamins-Minerals (MULTIVITAMIN ADULT EXTRA C PO) Take 1 tablet by mouth Daily.   3/2/2022 at Unknown time   • levothyroxine (SYNTHROID, LEVOTHROID) 50 MCG tablet Take 50 mcg by mouth Every Morning.          Scheduled Meds:custom intrapleural syringe builder, , Intrapleural, Once  amiodarone, 200 mg, Oral, Q12H   Followed by  [START ON 3/27/2022] amiodarone, 200 mg, Oral, Daily  arformoterol, 15 mcg, Nebulization, BID - RT  aspirin, 81 mg, Oral, Daily  budesonide, 1 mg, Nebulization, BID - RT  dilTIAZem, 90 mg, Oral, Q8H  heparin (porcine), 5,000 Units, Subcutaneous, Q12H  insulin glargine, 10 Units, Subcutaneous, Nightly  insulin lispro, 0-14 Units, Subcutaneous, TID AC  levothyroxine, 50 mcg, Oral, Q AM  melatonin, 5 mg, Oral, Nightly  metoprolol tartrate, 25 mg, Oral, BID  multivitamin with minerals, 1 tablet, Oral,  Daily  pantoprazole, 40 mg, Intravenous, BID  senna-docusate sodium, 2 tablet, Oral, BID  sodium chloride, 10 mL, Intravenous, Q12H  zolpidem, 5 mg, Oral, Nightly      Continuous Infusions:hold, 1 each      PRN Meds:.•  acetaminophen **OR** acetaminophen **OR** acetaminophen  •  aluminum-magnesium hydroxide-simethicone  •  senna-docusate sodium **AND** polyethylene glycol **AND** bisacodyl **AND** bisacodyl  •  dextrose  •  dextrose  •  glucagon (human recombinant)  •  hold  •  insulin lispro **AND** insulin lispro  •  magnesium sulfate **OR** magnesium sulfate in D5W 1g/100mL (PREMIX)  •  nitroglycerin  •  ondansetron **OR** ondansetron  •  potassium chloride **OR** potassium chloride **OR** potassium chloride  •  sodium chloride    ALLERGIES:  Prednisone    ROS:  The following systems were reviewed   Constitution:  No fevers, chills, no unintentional weight loss  Skin: no rash, no jaundice  Eyes:  No blurry vision, no eye pain  HENT:  No change in hearing or smell  Resp: Dyspnea without cough  CV:  No chest pain or palpitations  :  No dysuria, hematuria  Musculoskeletal:  No leg cramps or arthralgias, weakness  Neuro:  No tremor, no numbness  Psych:  No depression or confusion    Objective Resting in bed, chronically ill-appearing but no acute distress    Vital Signs:   Vitals:    03/15/22 0730 03/15/22 0733 03/15/22 1026 03/15/22 1356   BP:   120/68 135/68   BP Location:   Right arm Left arm   Patient Position:   Lying Lying   Pulse: 65 59 55 66   Resp: 20 20 26 17   Temp:   97.3 °F (36.3 °C) 98.2 °F (36.8 °C)   TempSrc:   Oral Axillary   SpO2: 100% 100% 94% 97%   Weight:       Height:           Physical Exam:       General Appearance:    Awake and alert, in no acute distress   Head:    Normocephalic, without obvious abnormality, atraumatic   Throat:   No oral lesions, no thrush, oral mucosa moist   Lungs:     Respirations regular, even and unlabored   Chest Wall:    No abnormalities observed, right chest tube    Abdomen:     Soft, non-tender, nondistended   Rectal:    Rectal exam with brown stool and internal/external hemorrhoids   Extremities:   Moves all extremities, no cyanosis       Skin:   No rash, no jaundice, normal palpation       Neurologic:   Cranial nerves 2 - 12 grossly intact       Results Review:   I reviewed the patient's labs and imaging.  CBC    Results from last 7 days   Lab Units 03/15/22  0410 03/14/22  1040 03/13/22  0032 03/12/22  0020 03/11/22  1206 03/10/22  0009 03/09/22  1230   WBC 10*3/mm3 10.00 11.10* 14.10* 13.00* 13.10* 15.40* 14.10*   HEMOGLOBIN g/dL 8.8* 9.3* 9.5* 9.1* 9.1* 9.3* 9.9*   PLATELETS 10*3/mm3 444 419 485* 383 294 291 267     CMP   Results from last 7 days   Lab Units 03/15/22  0410 03/14/22  1039 03/13/22  0032 03/12/22  0020 03/11/22  1206 03/10/22  0009 03/09/22  0651   SODIUM mmol/L 128* 129* 129* 128* 126* 130* 129*   POTASSIUM mmol/L 3.7 4.0 4.1 4.0 4.1 4.3 4.0   CHLORIDE mmol/L 95* 96* 94* 94* 93* 96* 97*   CO2 mmol/L 22.0 23.0 24.0 22.0 23.0 23.0 23.0   BUN mg/dL 15 16 20 17 18 19 17   CREATININE mg/dL 0.62 0.72 0.80 0.76 0.72 0.67 0.73   GLUCOSE mg/dL 123* 141* 184* 154* 161* 168* 144*   ALBUMIN g/dL  --  2.70* 3.10* 2.80* 2.60* 2.80* 3.00*   BILIRUBIN mg/dL  --  0.4 0.4 0.5 0.4 0.5 0.6   ALK PHOS U/L  --  110 118* 89 87 81 76   AST (SGOT) U/L  --  30 38* 28 14 13 12   ALT (SGPT) U/L  --  43* 44* 30 19 19 20   MAGNESIUM mg/dL 1.9 2.0 2.2 2.0 2.1 2.1 2.0   PHOSPHORUS mg/dL 2.6 2.5 2.9 2.5 2.8 2.4* 2.4*     Cr Clearance Estimated Creatinine Clearance: 58.3 mL/min (by C-G formula based on SCr of 0.62 mg/dL).  Coag     HbA1C   Lab Results   Component Value Date    HGBA1C 7.1 (H) 03/03/2022    HGBA1C 5.9 (H) 05/14/2020     Blood Glucose   Glucose   Date/Time Value Ref Range Status   03/15/2022 1113 244 (H) 70 - 105 mg/dL Final     Comment:     Serial Number: [id number]:  [id number]   03/15/2022 0727 118 (H) 70 - 105 mg/dL Final     Comment:     Serial Number:  [id number]:  [id number]   03/14/2022 2220 210 (H) 70 - 105 mg/dL Final     Comment:     Serial Number: [id number]:  [id number]   03/14/2022 1624 139 (H) 70 - 105 mg/dL Final     Comment:     Serial Number: [id number]:  [id number]   03/14/2022 1158 140 (H) 70 - 105 mg/dL Final     Comment:     Serial Number: [id number]:  [id number]   03/14/2022 0757 120 (H) 70 - 105 mg/dL Final     Comment:     Serial Number: [id number]:  [id number]   03/13/2022 2013 168 (H) 70 - 105 mg/dL Final     Comment:     Serial Number: [id number]:  [id number]   03/13/2022 1648 110 (H) 70 - 105 mg/dL Final     Comment:     Serial Number: [id number]:  [id number]     Infection     UA      Radiology(recent) CT Chest Without Contrast Diagnostic    Result Date: 3/14/2022   1. Persistent large left hemothorax now with small amounts of air in the superior aspect of this collection the overall size of the pneumothorax is unchanged. There is a left chest tube in place and the left lower lobe is nearly completely ectatic. Uncertain whether the chest tube is in or immediately adjacent to the pleural space, so correlation with clinical scenario in amount of drainage fluid is recommended. 2. Decrease in size of a small right pleural effusion. 3. Other ancillary findings are detailed above.  Electronically Signed By-Cory Wan DO On:3/14/2022 11:26 PM This report was finalized on [id number] by  Cory Wan DO.    XR Chest 1 View    Result Date: 3/15/2022   Stable chest over the past 24 hours. Unchanged large left-sided pleural effusion with a small amount of interspersed pleural air better demonstrated on yesterday's chest CT  Unchanged small right pleural effusion  Electronically Signed By-Nain Mccauley On:3/15/2022 8:06 AM This report was finalized on [id number] by  Nain Mccauley, .    XR Chest 1 View    Result Date: 3/14/2022  Stable left pleural effusion  Electronically Signed By-Cj Chong  On:3/14/2022 7:35 AM This report was finalized on [id number] by  Cj Chong, .         ASSESSMENT:  Rectal bleeding x1  Normocytic anemia  Acute respiratory failure/COPD  Loculated pleural effusion s/p chest tube -pending VATS  A. fib with RVR  CHF/ischemic cardiomyopathy    PLAN:  Patient presented on 3-22 with increased shortness of breath with complicated hospital stay including acute respiratory failure, COPD, loculated pleural effusion with chest tube placement and A. fib with RVR.  She also has CHF and ischemic cardiomyopathy.  Thoracic surgery following with plan for VATS in [place] later this week with transfer.  GI asked to consult for scant bright red blood per rectum once today after a bowel movement.  Colonoscopy in 2018 with external hemorrhoids and otherwise unremarkable.  Hemoglobin noted.  Rectal exam shows internal/external hemorrhoids with brown stool and no evidence of active GI bleeding.  No plan for endoscopic evaluation at this time as she is high risk for endoscopy.  Will start Preparation H which she reports she has previously tolerated.  Little for GI to add inpatient, call questions or concerns.  We will see inpatient as needed.    I discussed the patients findings and my recommendations with the patient.    We appreciate the referral    Electronically signed by VALENTINE Stewart, 03/15/22, 3:23 PM EDT.

## 2022-03-16 ENCOUNTER — APPOINTMENT (OUTPATIENT)
Dept: GENERAL RADIOLOGY | Facility: HOSPITAL | Age: 82
End: 2022-03-16

## 2022-03-16 LAB
ALBUMIN SERPL-MCNC: 2.6 G/DL (ref 3.5–5.2)
ALBUMIN/GLOB SERPL: 0.9 G/DL
ALP SERPL-CCNC: 100 U/L (ref 39–117)
ALT SERPL W P-5'-P-CCNC: 44 U/L (ref 1–33)
ANION GAP SERPL CALCULATED.3IONS-SCNC: 12 MMOL/L (ref 5–15)
AST SERPL-CCNC: 35 U/L (ref 1–32)
BASOPHILS # BLD AUTO: 0.1 10*3/MM3 (ref 0–0.2)
BASOPHILS NFR BLD AUTO: 0.4 % (ref 0–1.5)
BILIRUB SERPL-MCNC: 0.4 MG/DL (ref 0–1.2)
BUN SERPL-MCNC: 13 MG/DL (ref 8–23)
BUN/CREAT SERPL: 22 (ref 7–25)
CALCIUM SPEC-SCNC: 8.1 MG/DL (ref 8.6–10.5)
CHLORIDE SERPL-SCNC: 94 MMOL/L (ref 98–107)
CO2 SERPL-SCNC: 21 MMOL/L (ref 22–29)
CREAT SERPL-MCNC: 0.59 MG/DL (ref 0.57–1)
DEPRECATED RDW RBC AUTO: 51.6 FL (ref 37–54)
EGFRCR SERPLBLD CKD-EPI 2021: 91.2 ML/MIN/1.73
EOSINOPHIL # BLD AUTO: 0.1 10*3/MM3 (ref 0–0.4)
EOSINOPHIL NFR BLD AUTO: 0.6 % (ref 0.3–6.2)
ERYTHROCYTE [DISTWIDTH] IN BLOOD BY AUTOMATED COUNT: 15.7 % (ref 12.3–15.4)
GLOBULIN UR ELPH-MCNC: 2.8 GM/DL
GLUCOSE BLDC GLUCOMTR-MCNC: 112 MG/DL (ref 70–105)
GLUCOSE BLDC GLUCOMTR-MCNC: 124 MG/DL (ref 70–105)
GLUCOSE BLDC GLUCOMTR-MCNC: 141 MG/DL (ref 70–105)
GLUCOSE BLDC GLUCOMTR-MCNC: 145 MG/DL (ref 70–105)
GLUCOSE SERPL-MCNC: 178 MG/DL (ref 65–99)
HCT VFR BLD AUTO: 28.1 % (ref 34–46.6)
HGB BLD-MCNC: 9.4 G/DL (ref 12–15.9)
LYMPHOCYTES # BLD AUTO: 0.6 10*3/MM3 (ref 0.7–3.1)
LYMPHOCYTES NFR BLD AUTO: 5.2 % (ref 19.6–45.3)
MAGNESIUM SERPL-MCNC: 1.9 MG/DL (ref 1.6–2.4)
MCH RBC QN AUTO: 31.5 PG (ref 26.6–33)
MCHC RBC AUTO-ENTMCNC: 33.6 G/DL (ref 31.5–35.7)
MCV RBC AUTO: 93.8 FL (ref 79–97)
MONOCYTES # BLD AUTO: 1 10*3/MM3 (ref 0.1–0.9)
MONOCYTES NFR BLD AUTO: 8.1 % (ref 5–12)
NEUTROPHILS NFR BLD AUTO: 10.5 10*3/MM3 (ref 1.7–7)
NEUTROPHILS NFR BLD AUTO: 85.7 % (ref 42.7–76)
NRBC BLD AUTO-RTO: 0 /100 WBC (ref 0–0.2)
PHOSPHATE SERPL-MCNC: 2.4 MG/DL (ref 2.5–4.5)
PLATELET # BLD AUTO: 503 10*3/MM3 (ref 140–450)
PMV BLD AUTO: 7.6 FL (ref 6–12)
POTASSIUM SERPL-SCNC: 3.8 MMOL/L (ref 3.5–5.2)
PROT SERPL-MCNC: 5.4 G/DL (ref 6–8.5)
RBC # BLD AUTO: 2.99 10*6/MM3 (ref 3.77–5.28)
SODIUM SERPL-SCNC: 127 MMOL/L (ref 136–145)
WBC NRBC COR # BLD: 12.3 10*3/MM3 (ref 3.4–10.8)

## 2022-03-16 PROCEDURE — 94799 UNLISTED PULMONARY SVC/PX: CPT

## 2022-03-16 PROCEDURE — 82962 GLUCOSE BLOOD TEST: CPT

## 2022-03-16 PROCEDURE — 83735 ASSAY OF MAGNESIUM: CPT | Performed by: INTERNAL MEDICINE

## 2022-03-16 PROCEDURE — 99239 HOSP IP/OBS DSCHRG MGMT >30: CPT | Performed by: STUDENT IN AN ORGANIZED HEALTH CARE EDUCATION/TRAINING PROGRAM

## 2022-03-16 PROCEDURE — 84100 ASSAY OF PHOSPHORUS: CPT | Performed by: STUDENT IN AN ORGANIZED HEALTH CARE EDUCATION/TRAINING PROGRAM

## 2022-03-16 PROCEDURE — 99232 SBSQ HOSP IP/OBS MODERATE 35: CPT | Performed by: NURSE PRACTITIONER

## 2022-03-16 PROCEDURE — 99232 SBSQ HOSP IP/OBS MODERATE 35: CPT | Performed by: INTERNAL MEDICINE

## 2022-03-16 PROCEDURE — 85025 COMPLETE CBC W/AUTO DIFF WBC: CPT | Performed by: INTERNAL MEDICINE

## 2022-03-16 PROCEDURE — 71045 X-RAY EXAM CHEST 1 VIEW: CPT

## 2022-03-16 PROCEDURE — 97116 GAIT TRAINING THERAPY: CPT

## 2022-03-16 PROCEDURE — 63710000001 INSULIN GLARGINE PER 5 UNITS: Performed by: INTERNAL MEDICINE

## 2022-03-16 PROCEDURE — 80053 COMPREHEN METABOLIC PANEL: CPT | Performed by: STUDENT IN AN ORGANIZED HEALTH CARE EDUCATION/TRAINING PROGRAM

## 2022-03-16 PROCEDURE — 25010000002 HEPARIN (PORCINE) PER 1000 UNITS: Performed by: INTERNAL MEDICINE

## 2022-03-16 RX ORDER — FENTANYL/ROPIVACAINE/NS/PF 2-625MCG/1
15 PLASTIC BAG, INJECTION (ML) EPIDURAL AS NEEDED
Status: DISCONTINUED | OUTPATIENT
Start: 2022-03-16 | End: 2022-03-17 | Stop reason: HOSPADM

## 2022-03-16 RX ADMIN — PANTOPRAZOLE SODIUM 40 MG: 40 INJECTION, POWDER, FOR SOLUTION INTRAVENOUS at 08:13

## 2022-03-16 RX ADMIN — DILTIAZEM HYDROCHLORIDE 90 MG: 30 TABLET, FILM COATED ORAL at 06:37

## 2022-03-16 RX ADMIN — Medication 1 TABLET: at 08:13

## 2022-03-16 RX ADMIN — Medication 5 MG: at 21:27

## 2022-03-16 RX ADMIN — Medication 10 ML: at 08:13

## 2022-03-16 RX ADMIN — BUDESONIDE 0.5 MG: 0.5 INHALANT RESPIRATORY (INHALATION) at 07:27

## 2022-03-16 RX ADMIN — DILTIAZEM HYDROCHLORIDE 90 MG: 30 TABLET, FILM COATED ORAL at 14:56

## 2022-03-16 RX ADMIN — INSULIN GLARGINE 10 UNITS: 100 INJECTION, SOLUTION SUBCUTANEOUS at 21:36

## 2022-03-16 RX ADMIN — ASPIRIN 81 MG CHEWABLE TABLET 81 MG: 81 TABLET CHEWABLE at 08:13

## 2022-03-16 RX ADMIN — Medication 10 ML: at 21:38

## 2022-03-16 RX ADMIN — METOPROLOL TARTRATE 25 MG: 25 TABLET, FILM COATED ORAL at 21:28

## 2022-03-16 RX ADMIN — ARFORMOTEROL TARTRATE 15 MCG: 15 SOLUTION RESPIRATORY (INHALATION) at 21:01

## 2022-03-16 RX ADMIN — ARFORMOTEROL TARTRATE 15 MCG: 15 SOLUTION RESPIRATORY (INHALATION) at 07:33

## 2022-03-16 RX ADMIN — METOPROLOL TARTRATE 25 MG: 25 TABLET, FILM COATED ORAL at 08:13

## 2022-03-16 RX ADMIN — AMIODARONE HYDROCHLORIDE 200 MG: 200 TABLET ORAL at 17:32

## 2022-03-16 RX ADMIN — HEPARIN SODIUM 5000 UNITS: 5000 INJECTION INTRAVENOUS; SUBCUTANEOUS at 08:12

## 2022-03-16 RX ADMIN — AMIODARONE HYDROCHLORIDE 200 MG: 200 TABLET ORAL at 06:37

## 2022-03-16 RX ADMIN — BUDESONIDE 1 MG: 0.5 INHALANT RESPIRATORY (INHALATION) at 21:01

## 2022-03-16 RX ADMIN — DILTIAZEM HYDROCHLORIDE 90 MG: 30 TABLET, FILM COATED ORAL at 21:28

## 2022-03-16 RX ADMIN — LEVOTHYROXINE SODIUM 50 MCG: 0.05 TABLET ORAL at 06:37

## 2022-03-16 RX ADMIN — SENNOSIDES AND DOCUSATE SODIUM 2 TABLET: 50; 8.6 TABLET ORAL at 08:13

## 2022-03-16 NOTE — PLAN OF CARE
Goal Outcome Evaluation:    Problem: Adult Inpatient Plan of Care  Goal: Absence of Hospital-Acquired Illness or Injury  Intervention: Identify and Manage Fall Risk  Recent Flowsheet Documentation  Taken 3/16/2022 0330 by Vanessa Malave RN  Safety Promotion/Fall Prevention:   assistive device/personal items within reach   clutter free environment maintained   fall prevention program maintained   nonskid shoes/slippers when out of bed   room organization consistent   safety round/check completed  Taken 3/16/2022 0200 by Vanessa Malave RN  Safety Promotion/Fall Prevention:   assistive device/personal items within reach   clutter free environment maintained   fall prevention program maintained   nonskid shoes/slippers when out of bed   room organization consistent   safety round/check completed  Taken 3/16/2022 0015 by Vanessa Malave RN  Safety Promotion/Fall Prevention:   assistive device/personal items within reach   clutter free environment maintained   fall prevention program maintained   nonskid shoes/slippers when out of bed   room organization consistent   safety round/check completed  Taken 3/15/2022 2200 by Vanessa Malave RN  Safety Promotion/Fall Prevention:   assistive device/personal items within reach   clutter free environment maintained   fall prevention program maintained   nonskid shoes/slippers when out of bed   safety round/check completed   room organization consistent  Taken 3/15/2022 2130 by Vanessa Malave RN  Safety Promotion/Fall Prevention:   assistive device/personal items within reach   clutter free environment maintained   fall prevention program maintained   nonskid shoes/slippers when out of bed   room organization consistent   safety round/check completed  Intervention: Prevent Skin Injury  Recent Flowsheet Documentation  Taken 3/16/2022 0330 by Vanessa Malave RN  Skin Protection:   adhesive use limited   tubing/devices free from skin contact  Taken 3/16/2022  0015 by Vanessa Malave RN  Skin Protection:   adhesive use limited   tubing/devices free from skin contact  Taken 3/15/2022 2130 by Vanessa Malave RN  Body Position: position changed independently  Skin Protection:   adhesive use limited   tubing/devices free from skin contact  Intervention: Prevent and Manage VTE (venous thromboembolism) Risk  Recent Flowsheet Documentation  Taken 3/15/2022 2130 by Vanessa Malave RN  VTE Prevention/Management: (heapin SQ)   other (see comments)   bilateral   sequential compression devices on  Intervention: Prevent Infection  Recent Flowsheet Documentation  Taken 3/16/2022 0330 by Vanessa Malave RN  Infection Prevention:   hand hygiene promoted   personal protective equipment utilized   rest/sleep promoted  Taken 3/16/2022 0200 by Vanessa Malave RN  Infection Prevention:   hand hygiene promoted   rest/sleep promoted   personal protective equipment utilized  Taken 3/16/2022 0015 by Vanessa Malave RN  Infection Prevention:   hand hygiene promoted   personal protective equipment utilized   rest/sleep promoted  Taken 3/15/2022 2200 by Vanessa Malave RN  Infection Prevention:   hand hygiene promoted   rest/sleep promoted   personal protective equipment utilized  Taken 3/15/2022 2130 by Vanessa Malave RN  Infection Prevention:   hand hygiene promoted   personal protective equipment utilized   rest/sleep promoted  Goal: Optimal Comfort and Wellbeing  Intervention: Provide Person-Centered Care  Recent Flowsheet Documentation  Taken 3/16/2022 0330 by Vanessa Malave RN  Trust Relationship/Rapport: care explained  Taken 3/16/2022 0015 by Vanessa Malave RN  Trust Relationship/Rapport: care explained  Taken 3/15/2022 2130 by Vanessa Malave RN  Trust Relationship/Rapport:   care explained   emotional support provided   choices provided   empathic listening provided   questions answered   thoughts/feelings acknowledged     Problem: Adult  Inpatient Plan of Care  Goal: Plan of Care Review  Outcome: Ongoing, Progressing  Goal: Patient-Specific Goal (Individualized)  Outcome: Ongoing, Progressing  Goal: Absence of Hospital-Acquired Illness or Injury  Outcome: Ongoing, Progressing  Intervention: Identify and Manage Fall Risk  Recent Flowsheet Documentation  Taken 3/16/2022 0330 by Vanessa Malave, RN  Safety Promotion/Fall Prevention:   assistive device/personal items within reach   clutter free environment maintained   fall prevention program maintained   nonskid shoes/slippers when out of bed   room organization consistent   safety round/check completed  Taken 3/16/2022 0200 by Vanessa Malave, RN  Safety Promotion/Fall Prevention:   assistive device/personal items within reach   clutter free environment maintained   fall prevention program maintained   nonskid shoes/slippers when out of bed   room organization consistent   safety round/check completed  Taken 3/16/2022 0015 by Vanessa Malave, RN  Safety Promotion/Fall Prevention:   assistive device/personal items within reach   clutter free environment maintained   fall prevention program maintained   nonskid shoes/slippers when out of bed   room organization consistent   safety round/check completed  Taken 3/15/2022 2200 by Vanessa Malave, RN  Safety Promotion/Fall Prevention:   assistive device/personal items within reach   clutter free environment maintained   fall prevention program maintained   nonskid shoes/slippers when out of bed   safety round/check completed   room organization consistent  Taken 3/15/2022 2130 by Vanessa Malave, RN  Safety Promotion/Fall Prevention:   assistive device/personal items within reach   clutter free environment maintained   fall prevention program maintained   nonskid shoes/slippers when out of bed   room organization consistent   safety round/check completed  Intervention: Prevent Skin Injury  Recent Flowsheet Documentation  Taken 3/16/2022 0330  by Vanessa Malave RN  Skin Protection:   adhesive use limited   tubing/devices free from skin contact  Taken 3/16/2022 0015 by Vanessa Malave RN  Skin Protection:   adhesive use limited   tubing/devices free from skin contact  Taken 3/15/2022 2130 by Vanessa Malave RN  Body Position: position changed independently  Skin Protection:   adhesive use limited   tubing/devices free from skin contact  Intervention: Prevent and Manage VTE (Venous Thromboembolism) Risk  Recent Flowsheet Documentation  Taken 3/16/2022 0330 by Vanessa Malave RN  Activity Management: activity adjusted per tolerance  Taken 3/16/2022 0015 by Vanessa Malave RN  Activity Management: activity adjusted per tolerance  Taken 3/15/2022 2130 by Vanessa Malave RN  Activity Management:   activity adjusted per tolerance   activity encouraged  VTE Prevention/Management: (heapin SQ)   other (see comments)   bilateral   sequential compression devices on  Intervention: Prevent Infection  Recent Flowsheet Documentation  Taken 3/16/2022 0330 by Vanessa Malave RN  Infection Prevention:   hand hygiene promoted   personal protective equipment utilized   rest/sleep promoted  Taken 3/16/2022 0200 by Vanessa Malave RN  Infection Prevention:   hand hygiene promoted   rest/sleep promoted   personal protective equipment utilized  Taken 3/16/2022 0015 by Vanessa Malave RN  Infection Prevention:   hand hygiene promoted   personal protective equipment utilized   rest/sleep promoted  Taken 3/15/2022 2200 by Vanessa Malave RN  Infection Prevention:   hand hygiene promoted   rest/sleep promoted   personal protective equipment utilized  Taken 3/15/2022 2130 by Vanessa Malave RN  Infection Prevention:   hand hygiene promoted   personal protective equipment utilized   rest/sleep promoted  Goal: Optimal Comfort and Wellbeing  Outcome: Ongoing, Progressing  Intervention: Provide Person-Centered Care  Recent Flowsheet  Documentation  Taken 3/16/2022 0330 by Vanessa Malave, MARICHUY  Trust Relationship/Rapport: care explained  Taken 3/16/2022 0015 by Vanessa Malave RN  Trust Relationship/Rapport: care explained  Taken 3/15/2022 2130 by Vanessa Malave, MARICHUY  Trust Relationship/Rapport:   care explained   emotional support provided   choices provided   empathic listening provided   questions answered   thoughts/feelings acknowledged  Goal: Readiness for Transition of Care  Outcome: Ongoing, Progressing

## 2022-03-16 NOTE — PROGRESS NOTES
"    Chief Complaint: loculated pleural effusion, right  S/P: Left thoracentesis on 3/4/22, chest tube insertion on 3/5/22, intrapleural TPA on 3/9/22, 3/10/22 and 3/12/22      Subjective:  Symptoms:  Stable.  She reports shortness of breath, cough, weakness and chest pressure.    Diet:  Adequate intake.  No nausea or vomiting.    Activity level: Impaired due to weakness.    Pain:  She complains of pain that is mild.  She reports pain is improving.  Pain is well controlled.    Resting in bed, more tired today.  Anticipating transfer to [facility].  Eager for surgery.    Vital Signs:  Temp:  [97.1 °F (36.2 °C)-98 °F (36.7 °C)] 97.9 °F (36.6 °C)  Heart Rate:  [56-86] 71  Resp:  [18-25] 22  BP: (125-155)/(64-84) 129/68    Intake & Output (last day)       03/15 0701  03/16 0700 03/16 0701  03/17 0700    P.O. 600     Total Intake(mL/kg) 600 (7.2)     Urine (mL/kg/hr) 0 (0)     Stool 0     Chest Tube 110 0    Total Output 110 0    Net +490 0          Urine Unmeasured Occurrence 2 x     Stool Unmeasured Occurrence 2 x           Objective:  General Appearance:  Comfortable, in no acute distress and ill-appearing.    Vital signs: (most recent): Blood pressure 129/68, pulse 71, temperature 97.9 °F (36.6 °C), temperature source Oral, resp. rate 22, height 160 cm (63\"), weight 83.1 kg (183 lb 3.2 oz), SpO2 93 %, not currently breastfeeding.  Vital signs are normal.    HEENT: Normal HEENT exam.    Lungs:  Tachypnea and increased effort.  She is not in respiratory distress.  There are decreased breath sounds and wheezes.  No rhonchi.  (Conversational dyspnea)  Heart: Normal rate.    Chest: Chest wall tenderness (incisional) present.    Extremities: Decreased range of motion.  There is no dependent edema.    Neurological: Patient is alert and oriented to person, place and time.    Skin:  Warm and dry.              Chest tube:   Site: Left, Clean, Dry, Intact and Securement device intact  Suction: -20 cm  Air Leak: " negative  24 Hour Total: 110cm    Results Review:     I reviewed the patient's new clinical results.  I reviewed the patient's new imaging results and agree with the interpretation.  I reviewed the patient's other test results and agree with the interpretation  Discussed with patient, RN and Dr. Kuo'    Imaging Results (Last 24 Hours)     Procedure Component Value Units Date/Time    XR Chest 1 View [[id number]] Collected: 03/16/22 0758     Updated: 03/16/22 0801    Narrative:      EXAM: XR CHEST 1 VW-     DATE OF EXAM: 3/16/2022 2:49 AM     INDICATION: Chest tube; I48.91-Unspecified atrial fibrillation;  I50.9-Heart failure, unspecified; Z20.822-Contact with and (suspected)  exposure to covid-19; I50.21-Acute systolic (congestive) heart failure;  A22-Urcqwdg effusion, not elsewhere classified.       COMPARISONS: 03/15/2022      FINDINGS:     Unchanged left-sided pleural effusion which appears partially loculated.  Left-sided chest tubes unchanged. Unchanged left basilar atelectasis  adjacent to the pleural effusion. No evidence of acute process in the  right lung. No pneumothorax. Mediastinal shadows are unchanged.       Impression:         Stable chest over the past 24 hours. Unchanged loculated left pleural  effusion with chest tube in place and adjacent atelectasis     Electronically Signed By-Nain Mccauley On:3/16/2022 7:59 AM  This report was finalized on [id number] by  Nain Mccauley, .          Lab Results:     Lab Results (last 24 hours)     Procedure Component Value Units Date/Time    POC Glucose Once [[id number]]  (Abnormal) Collected: 03/16/22 1126    Specimen: Blood Updated: 03/16/22 1127     Glucose 112 mg/dL      Comment: Serial Number: [id number]:  [id number]       Comprehensive Metabolic Panel [[id number]]  (Abnormal) Collected: 03/16/22 0015    Specimen: Blood Updated: 03/16/22 0851     Glucose 178 mg/dL      BUN 13 mg/dL      Creatinine 0.59 mg/dL      Sodium 127 mmol/L      Potassium 3.8  mmol/L      Comment: Slight hemolysis detected by analyzer. Results may be affected.        Chloride 94 mmol/L      CO2 21.0 mmol/L      Calcium 8.1 mg/dL      Total Protein 5.4 g/dL      Albumin 2.60 g/dL      ALT (SGPT) 44 U/L      AST (SGOT) 35 U/L      Alkaline Phosphatase 100 U/L      Total Bilirubin 0.4 mg/dL      Globulin 2.8 gm/dL      A/G Ratio 0.9 g/dL      BUN/Creatinine Ratio 22.0     Anion Gap 12.0 mmol/L      eGFR 91.2 mL/min/1.73      Comment: National Kidney Foundation and American Society of Nephrology (ASN) Task Force recommended calculation based on the Chronic Kidney Disease Epidemiology Collaboration (CKD-EPI) equation refit without adjustment for race.       Narrative:      GFR Normal >60  Chronic Kidney Disease <60  Kidney Failure <15      POC Glucose Once [[id number]]  (Abnormal) Collected: 03/16/22 0741    Specimen: Blood Updated: 03/16/22 0741     Glucose 124 mg/dL      Comment: Serial Number: [id number]:  [id number]       Phosphorus [[id number]]  (Abnormal) Collected: 03/16/22 0015    Specimen: Blood Updated: 03/16/22 0230     Phosphorus 2.4 mg/dL     Magnesium [[id number]]  (Normal) Collected: 03/16/22 0015    Specimen: Blood Updated: 03/16/22 0230     Magnesium 1.9 mg/dL     CBC & Differential [[id number]]  (Abnormal) Collected: 03/16/22 0015    Specimen: Blood Updated: 03/16/22 0204    Narrative:      The following orders were created for panel order CBC & Differential.  Procedure                               Abnormality         Status                     ---------                               -----------         ------                     CBC Auto Differential[[id number]]        Abnormal            Final result                 Please view results for these tests on the individual orders.    CBC Auto Differential [[id number]]  (Abnormal) Collected: 03/16/22 0015    Specimen: Blood Updated: 03/16/22 0204     WBC 12.30 10*3/mm3      RBC 2.99 10*6/mm3      Hemoglobin 9.4 g/dL       Hematocrit 28.1 %      MCV 93.8 fL      MCH 31.5 pg      MCHC 33.6 g/dL      RDW 15.7 %      RDW-SD 51.6 fl      MPV 7.6 fL      Platelets 503 10*3/mm3      Neutrophil % 85.7 %      Lymphocyte % 5.2 %      Monocyte % 8.1 %      Eosinophil % 0.6 %      Basophil % 0.4 %      Neutrophils, Absolute 10.50 10*3/mm3      Lymphocytes, Absolute 0.60 10*3/mm3      Monocytes, Absolute 1.00 10*3/mm3      Eosinophils, Absolute 0.10 10*3/mm3      Basophils, Absolute 0.10 10*3/mm3      nRBC 0.0 /100 WBC     POC Glucose Once [[id number]]  (Abnormal) Collected: 03/15/22 2123    Specimen: Blood Updated: 03/15/22 2124     Glucose 297 mg/dL      Comment: Serial Number: [id number]:  [id number]       POC Glucose Once [[id number]]  (Normal) Collected: 03/15/22 1617    Specimen: Blood Updated: 03/15/22 1618     Glucose 93 mg/dL      Comment: Serial Number: [id number]:  [id number]              Assessment/Plan       Atrial fibrillation with RVR (HCC)    Other hyperlipidemia    Essential hypertension    Hypothyroidism    Coronary artery disease involving coronary bypass graft of native heart without angina pectoris    Acute CHF (congestive heart failure) (HCC)    Acute hyponatremia    Elevated LFTs    Elevated TSH    Acute hyperglycemia    Obesity (BMI 30-39.9)       Assessment & Plan     Today's chest x-ray has been independently reviewed which demonstrates stable appearance of left mid and lower lung opacity consistent with known loculated pleural effusion.  No pneumothorax.    Loculated pleural effusion: Patient status post intrapleural Activase x3 with minimal improvement. CT of the chest does not demonstrate any significant improvement.  Patient is scheduled to be transferred to [facility] in [place] for VATS decortication with Dr. Kuo on Friday.  Transfer pending bed availability, likely tomorrow. Continue chest tube to -20 cm of suction and recheck a chest x-ray in the morning.  Preoperative orders will be placed  tomorrow.    Hypoxia: Remains intermittently on 2 L per nasal cannula.  Encourage incentive spirometry 10 times per hour and out of bed as much as possible.    Generalized weakness: Appreciate assistance from PT and nursing staff with increasing mobilization.  Anticipate patient will need rehab upon discharge.    A. fib: Cardiology note reviewed  Continue treatment per their recommendations.   Please continue to hold oral anticoagulation until chest tube has been removed.    Discussed with patient, RN,  at bedside.    Montserrat Humphries DNP, APRN  [facility]  03/16/22  14:44 EDT      I wore protective equipment throughout this patient encounter including a face mask, gloves and protective eyewear.  Hand hygiene was performed before donning protective equipment and after removal when leaving the room.

## 2022-03-16 NOTE — CASE MANAGEMENT/SOCIAL WORK
Continued Stay Note   George     Patient Name: Jaquelin Valderrama  MRN: [id number]  Today's Date: 3/16/2022    Admit Date: 3/2/2022     Discharge Plan     Row Name 03/16/22 0956       Plan    Plan Transfer to [facility]    Plan Comments per bedside nurse patient is transfering to [facility] pending bed availability for surgery friday there with Dr Kuo              Phone communication or documentation only - no physical contact with patient or family.        Elina Barrios RN

## 2022-03-16 NOTE — DISCHARGE SUMMARY
[facility] Medicine Services  DISCHARGE SUMMARY    Patient Name: Jaquelin Valderrama  : 1940  MRN: [id number]    Date of Admission: 3/2/2022  Discharge Diagnosis: left Hemothorax  Date of Discharge:  2022  Primary Care Physician: Minerva Chatterjee MD      Presenting Problem:   Atrial fibrillation with RVR (HCC) [I48.91]  Acute congestive heart failure, unspecified heart failure type (HCC) [I50.9]  Lab test negative for COVID-19 virus [Z20.822]    Active and Resolved Hospital Problems:  Active Hospital Problems    Diagnosis POA   • **Atrial fibrillation with RVR (HCC) [I48.91] Yes   • Acute CHF (congestive heart failure) (HCC) [I50.9] Yes   • Acute hyponatremia [E87.1] Yes   • Elevated LFTs [R79.89] Yes   • Elevated TSH [R79.89] Yes   • Acute hyperglycemia [R73.9] Yes   • Obesity (BMI 30-39.9) [E66.9] Yes   • Coronary artery disease involving coronary bypass graft of native heart without angina pectoris [I25.810] Yes   • Essential hypertension [I10] Yes   • Hypothyroidism [E03.9] Yes   • Other hyperlipidemia [E78.49] Yes      Resolved Hospital Problems   No resolved problems to display.         Hospital Course     Hospital Course:  Jaquelin Valderrama is a 82 y.o. female with PMHx of CAD, MI, CABG, coronary stent placement, HTN, HLD, prediabetes, and hypothyroidism who presented to the ER at [facility] on 3/2/2022 complaining of dyspnea on exertion.  She was diagnosed with A. Fib with RVR causing new onset CHF.  Cadizem drip started along with IV lasix and cardio consult.  Echo showed EF 30%.  Workup revealed a large left pleural effusion that is possible hemothorax.  Pulm consulted.  1L removed on thoracentesis.  As significant effusion remained, pulmonary placed chest tube on 3/5/2022 to suction.  Cardio transitioned A. Fib meds to PO cardizem and amiodarone.  Hgb dropped from draining of hemothorax and patient required transfusion.  Thoracic surgery consulted as output  from chest tube diminished, intrapleural activase given by CTS on 3/9.  Effusion appeared loculated per CTS as there was minimal output.   TPA administration attempted again on 3/10 and again on 3/12.  Repeat CT showed large left sided hemothorax.  Case discussed with CTS and pulm.  Patient to be transferred to [facility], [place] for planned Thoracic surgery on 3/17/2022.  Medicine accepted patient and Dr. Kuo to be consulted for surgery.           DISCHARGE Follow Up Recommendations for labs and diagnostics:       Reasons For Change In Medications and Indications for New Medications:    - Follow up with PCP in 5-7 da days   - Transfer to [facility], [place] for Thoracic surgery planned Friday by Dr. Kuo   - Continue Cardizem 90mg PO q8h   - Continue Amiodarone 200mg PO BID for 22 doses then de-escalated to 200mg PO daily   - Lantus 10u SC daily   - Follow up with Dr. Jane as outpatient.    Day of Discharge     Vital Signs:  Temp:  [97.1 °F (36.2 °C)-98.2 °F (36.8 °C)] 97.5 °F (36.4 °C)  Heart Rate:  [56-86] 56  Resp:  [17-25] 18  BP: (125-155)/(64-84) 134/76  Flow (L/min):  [2] 2    Physical Exam:  Physical Exam   Constitutional:       General: She is not in acute distress.     Appearance: She is not toxic-appearing.   HENT:      Head: Normocephalic and atraumatic.      Nose: Nose normal. No congestion.      Mouth/Throat:      Pharynx: Oropharynx is clear. No oropharyngeal exudate.   Eyes:      General: No scleral icterus.  Cardiovascular:      Rate and Rhythm: Normal rate and regular rhythm.      Heart sounds: No murmur heard.    No friction rub. No gallop.   Pulmonary:      Effort: No respiratory distress.      Breath sounds: Rales present. No wheezing.   Abdominal:      General: There is no distension.      Tenderness: There is no abdominal tenderness. There is no guarding.   Musculoskeletal:         General: No swelling or deformity.      Cervical back: Normal range of motion. No rigidity.      Right lower leg: No  edema.      Left lower leg: No edema.   Skin:     Coloration: Skin is not jaundiced.      Findings: No bruising or lesion.      Comments: Left sided chest tube present   Neurological:      General: No focal deficit present.      Mental Status: She is alert and oriented to person, place, and time.      Motor: No weakness.   Psychiatric:         Mood and Affect: Mood normal.         Behavior: Behavior normal.         Thought Content: Thought content normal.         Judgment: Judgment normal.        Pertinent  and/or Most Recent Results     LAB RESULTS:      Lab 03/16/22  0015 03/15/22  0410 03/14/22  1040 03/13/22  0032 03/12/22  0020 03/11/22  1206 03/10/22  0009   WBC 12.30* 10.00 11.10* 14.10* 13.00*   < > 15.40*   HEMOGLOBIN 9.4* 8.8* 9.3* 9.5* 9.1*   < > 9.3*   HEMATOCRIT 28.1* 26.0* 28.0* 28.8* 27.2*   < > 27.3*   PLATELETS 503* 444 419 485* 383   < > 291   NEUTROS ABS 10.50* 8.40* 9.60* 12.10* 10.70*   < > 12.80*   LYMPHS ABS 0.60* 0.70 0.50* 0.70 0.90   < > 0.90   MONOS ABS 1.00* 0.90 0.90 1.30* 1.30*   < > 1.60*   EOS ABS 0.10 0.10 0.10 0.00 0.10   < > 0.10   MCV 93.8 94.2 94.0 94.4 94.7   < > 94.3   CRP  --   --   --   --   --   --  19.71*   LDH  --   --   --  421*  --   --   --     < > = values in this interval not displayed.         Lab 03/16/22  0015 03/15/22  0410 03/14/22  1039 03/13/22  0032 03/12/22  0020 03/11/22  1206 03/10/22  0009   SODIUM 127* 128* 129* 129* 128* 126* 130*   POTASSIUM 3.8 3.7 4.0 4.1 4.0 4.1 4.3   CHLORIDE 94* 95* 96* 94* 94* 93* 96*   CO2 21.0* 22.0 23.0 24.0 22.0 23.0 23.0   ANION GAP 12.0 11.0 10.0 11.0 12.0 10.0 11.0   BUN 13 15 16 20 17 18 19   CREATININE 0.59 0.62 0.72 0.80 0.76 0.72 0.67   EGFR 91.2 90.2 84.6 74.6 79.3 84.6 88.5   GLUCOSE 178* 123* 141* 184* 154* 161* 168*   CALCIUM 8.1* 8.2* 8.5* 8.6 8.2* 8.2* 8.6   IONIZED CALCIUM  --   --   --  1.22 1.16* 1.19* 1.18*   MAGNESIUM 1.9 1.9 2.0 2.2 2.0 2.1 2.1   PHOSPHORUS 2.4* 2.6 2.5 2.9 2.5 2.8 2.4*         Lab  03/16/22  0015 03/14/22  1039 03/13/22  0032 03/12/22  0020 03/11/22  1206   TOTAL PROTEIN 5.4* 5.3* 6.2 5.5* 5.5*   ALBUMIN 2.60* 2.70* 3.10* 2.80* 2.60*   GLOBULIN 2.8 2.6 3.1 2.7 2.9   ALT (SGPT) 44* 43* 44* 30 19   AST (SGOT) 35* 30 38* 28 14   BILIRUBIN 0.4 0.4 0.4 0.5 0.4   ALK PHOS 100 110 118* 89 87                     Brief Urine Lab Results  (Last result in the past 365 days)      Color   Clarity   Blood   Leuk Est   Nitrite   Protein   CREAT   Urine HCG        03/03/22 0951 Yellow   Clear   Negative   Negative   Negative   Negative               Microbiology Results (last 10 days)     ** No results found for the last 240 hours. **          CT Chest Without Contrast Diagnostic    Result Date: 3/14/2022  Impression:  1. Persistent large left hemothorax now with small amounts of air in the superior aspect of this collection the overall size of the pneumothorax is unchanged. There is a left chest tube in place and the left lower lobe is nearly completely ectatic. Uncertain whether the chest tube is in or immediately adjacent to the pleural space, so correlation with clinical scenario in amount of drainage fluid is recommended. 2. Decrease in size of a small right pleural effusion. 3. Other ancillary findings are detailed above.  Electronically Signed By-Cory Wan DO On:3/14/2022 11:26 PM This report was finalized on [id number] by  Cory Wan DO.    CT Chest Without Contrast Diagnostic    Result Date: 3/9/2022  Impression: 1.No significant change in size of large left pleural fluid collection with hyperdense components, consistent with hemothorax. Tip of the left chest tube is anterior to the dependent portion of the fluid collection. 2.Increased, now complete left lower lobe atelectasis. 3.Increased size of small right pleural effusion.    Electronically Signed By-Jenn Ordonez MD On:3/9/2022 12:14 PM This report was finalized on [id number] by  Jenn Ordonez MD.    CT Chest Without Contrast  Diagnostic    Result Date: 3/4/2022  Impression: 1. There is evidence of large left pleural fluid collection which is compatible with a hemothorax. Recommend surgical consultation for possible chest tube. The case was discussed with Sabra Roberts at 9:56 PM. 2. The left upper and lower lobes show some compression and groundglass opacity which may be due to atelectasis or edema. 3. Additional findings as reported above.  Electronically Signed By-Denisse Li MD On:3/4/2022 10:02 PM This report was finalized on [id number] by  Denisse Li MD.    XR Chest 1 View    Result Date: 3/16/2022  Impression:  Stable chest over the past 24 hours. Unchanged loculated left pleural effusion with chest tube in place and adjacent atelectasis  Electronically Signed ByDwight Mccauley On:3/16/2022 7:59 AM This report was finalized on [id number] by  Nain Mccauley, .    XR Chest 1 View    Result Date: 3/15/2022  Impression:  Stable chest over the past 24 hours. Unchanged large left-sided pleural effusion with a small amount of interspersed pleural air better demonstrated on yesterday's chest CT  Unchanged small right pleural effusion  Electronically Signed ByDwight Mccauley On:3/15/2022 8:06 AM This report was finalized on [id number] by  Nain Mccauley, .    XR Chest 1 View    Result Date: 3/14/2022  Impression: Stable left pleural effusion  Electronically Signed ByShemar Chong On:3/14/2022 7:35 AM This report was finalized on [id number] by  Cj Chong, .    XR Chest 1 View    Result Date: 3/13/2022  Impression: IMPRESSION : Slight decrease in size of left-sided pleural effusion. Chest tube remains in place[  Electronically Signed By-Jarrett De Jesus On:3/13/2022 11:28 AM This report was finalized on [id number] by  Jarrett De Jesus, .    XR Chest 1 View    Result Date: 3/12/2022  Impression: IMPRESSION : Slight interval increase in large left-sided pleural effusion with associated dependent opacity.  Left-sided chest  tube remains in place.[  Electronically Signed By-Jarrett De Jesus On:3/12/2022 7:17 AM This report was finalized on [id number] by  Jarrett De Jesus, .    XR Chest 1 View    Result Date: 3/11/2022  Impression: IMPRESSION : Moderate to large left-sided pleural effusion with loculated component suspected. Accounting for differences in technique no great change in comparison  Slight improved aeration of associated dependent opacities on the left  Small right-sided pleural effusion suspected[  Electronically Signed By-Jarrett De Jesus On:3/11/2022 7:06 AM This report was finalized on [id number] by  Jarrett De Jesus, .    XR Chest 1 View    Result Date: 3/10/2022  Impression: No significant changes compared to the previous study. No pneumothorax. Redemonstration of a large left-sided pleural effusion with left basilar airspace disease.  Electronically Signed By-Rosa Lopez MD On:3/10/2022 7:24 AM This report was finalized on [id number] by  Rosa Lopez MD.    XR Chest 1 View    Result Date: 3/9/2022  Impression: 1. Left-sided chest tube in unchanged position. 2. Increasing left-sided pleural effusion, particularly along the lateral and apical aspect of the left lung.  Electronically Signed By-John Trevino MD On:3/9/2022 7:51 AM This report was finalized on [id number] by  John Trevino MD.    XR Chest 1 View    Result Date: 3/8/2022  Impression: Small loculated left hydropneumothorax with atelectasis in the mid and left lower lung. The air component present previously has filled in with fluid  Electronically Signed By-Cj Chong On:3/8/2022 7:47 AM This report was finalized on [id number] by  Cj Chong, .    XR Chest 1 View    Result Date: 3/6/2022  Impression: 1. Decrease in size of large left pleural effusion with left-sided chest tube in place. Small amount of pleural air noted compatible with hydropneumothorax with small amount of pleural fluid component at the left lung base. 2.  Left perihilar and left basilar airspace disease likely compressive atelectasis.  Electronically Signed By-Ronnie Carrero MD On:3/6/2022 8:35 AM This report was finalized on [id number] by  Ronnie Carrero MD.    XR Chest 1 View    Result Date: 3/5/2022  Impression: Large left pleural effusion which appears slightly smaller status post chest tube placement  Electronically Signed By-Cj Chong On:3/5/2022 3:03 PM This report was finalized on [id number] by  Cj Chong, .    XR Chest 1 View    Result Date: 3/5/2022  Impression: IMPRESSION : Continued increasing opacification of the left hemithorax compatible with large pleural effusion/hemothorax given prior CT.  Electronically Signed By-Jarrett De Jesus On:3/5/2022 10:17 AM This report was finalized on [id number] by  Jarrett De Jesus, .    XR Chest 1 View    Result Date: 3/4/2022  Impression: 1. There is a new large opacity in the left chest which has developed since the study from 11:45 AM today, which could be due to rapid reaccumulation of  pleural fluid or to blood in the pleural space-possible hemothorax. This was reported to the patient's nurse Racquel Zayas by myself at 7:50 PM with a request to give the report to the referring physician.  Electronically Signed By-Denisse Li MD On:3/4/2022 7:53 PM This report was finalized on [id number] by  Denisse Li MD.    XR Chest 1 View    Result Date: 3/4/2022  Impression:  1. No visible pneumothorax status post left thoracentesis. Significantly improved left pleural effusion with small left pleural fluid remaining.  2. Significantly improved left lung aeration with mild residual left basilar atelectasis.  Electronically Signed By-Saadia Islas MD On:3/4/2022 12:24 PM This report was finalized on [id number] by  Saadia Islas MD.    XR Chest 1 View    Result Date: 3/3/2022  Impression:  Left pleural effusion.  Left basilar disease most likely atelectasis with possible pneumonia although  follow-up to ensure resolution is recommended as this is a new finding and underlying mass cannot be excluded.  Electronically Signed By-Nain Mccauley On:3/3/2022 7:30 AM This report was finalized on [id number] by  Nain Mccauley, .              Results for orders placed during the hospital encounter of 03/02/22    Adult Transthoracic Echo Complete W/ Cont if Necessary Per Protocol    Interpretation Summary  LVE with severe global left ventricular hypokinesis, estimated LV ejection fraction of 30%.  Severe right ventricular enlargement and moderate right atrial enlargement seen  Severe left atrial enlargement seen.  Aortic valve, mitral valve, tricuspid valve appears structurally normal, moderate mitral and mild tricuspid regurgitation seen.  Calculated RV systolic pressure of 40 to 45 mmHg.  No pericardial effusion seen.  Large pleural effusion seen.  Proximal aorta appears normal in size.      Labs Pending at Discharge:      Procedures Performed           Consults:   Consults     Date and Time Order Name Status Description    3/15/2022 11:31 AM Inpatient Gastroenterology Consult Completed     3/14/2022  9:22 AM Inpatient Cardiothoracic Surgery Consult      3/9/2022  7:14 AM Inpatient Thoracic Surgery Consult Completed     3/6/2022  1:17 PM Inpatient Hospitalist Consult      3/4/2022  8:22 PM Inpatient Pulmonology Consult Completed     3/3/2022  2:24 AM Inpatient Cardiology Consult Completed             Discharge Details        Discharge Medications      New Medications      Instructions Start Date   amiodarone 200 MG tablet  Commonly known as: PACERONE   200 mg, Oral, Every 12 Hours      arformoterol 15 MCG/2ML nebulizer solution  Commonly known as: BROVANA   15 mcg, Nebulization, 2 Times Daily - RT      budesonide 0.5 MG/2ML nebulizer solution  Commonly known as: PULMICORT   1 mg, Nebulization, 2 Times Daily - RT      dilTIAZem 90 MG tablet  Commonly known as: CARDIZEM   90 mg, Oral, Every 8 Hours Scheduled       insulin glargine 100 UNIT/ML injection  Commonly known as: LANANTONIO SEMGLEE   10 Units, Subcutaneous, Nightly         Continue These Medications      Instructions Start Date   aspirin 81 MG chewable tablet   81 mg, Oral, Daily      Avapro 300 MG tablet  Generic drug: irbesartan   150 mg, Oral, Daily      levothyroxine 50 MCG tablet  Commonly known as: SYNTHROID, LEVOTHROID   50 mcg, Oral, Every Early Morning      metoprolol tartrate 25 MG tablet  Commonly known as: LOPRESSOR   Take 1 tablet by mouth twice daily      MULTIVITAMIN ADULT EXTRA C PO   1 tablet, Oral, Daily      Vitamin D3 50 MCG (2000 UT) tablet   1 tablet, Oral, Daily             Allergies   Allergen Reactions   • Prednisone Other (See Comments)     Increased BP         Discharge Disposition:   Short Term Hospital (DC - External)  Condition on discharge: Fair    Diet:  Hospital:  Diet Order   Procedures   • Diet Cardiac, Diabetic/Consistent Carbs; 2gm Na+; Diabetic - Consistent Carb         Discharge Activity:   Activity Instructions     Activity as Tolerated              CODE STATUS:  Code Status and Medical Interventions:   Ordered at: 03/03/22 0223     Code Status (Patient has no pulse and is not breathing):    CPR (Attempt to Resuscitate)     Medical Interventions (Patient has pulse or is breathing):    Full Support         No future appointments.    Additional Instructions for the Follow-ups that You Need to Schedule     Call MD With Problems / Concerns   As directed      Instructions: Call [phone] or email [email or web address] for problems or concerns.    Order Comments: Instructions: Call [phone] or email [email or web address] for problems or concerns.          Discharge Follow-up with PCP   As directed       Currently Documented PCP:    Minerva Chatterjee MD    PCP Phone Number:    [phone]     Follow Up Details: follow up with PCP in 5-7 days               Time spent on Discharge including face to face service:  35  minutes    This patient has been examined wearing appropriate Personal Protective Equipment and discussed with Patient and nursing and thoracic surgery. 03/16/22      Signature: Electronically signed by Jaja Artis DO, 03/16/22, 11:20 AM EDT.

## 2022-03-16 NOTE — PROGRESS NOTES
Referring Provider: Jaja Artis DO    Reason for follow-up:  Status post CABG  Atrial fibrillation  Hypertension  Left pleural effusion     Patient Care Team:  Minerva Chatterjee MD as PCP - General (Internal Medicine)    Subjective .      ROS    Since I have last seen, the patient has been without any chest discomfort ,shortness of breath, palpitations, dizziness or syncope.  Denies having any headache ,abdominal pain ,nausea, vomiting , diarrhea constipation, loss of weight or loss of appetite.  Denies having any excessive bruising ,hematuria or blood in the stool.    Review of all systems negative except as indicated    History  Past Medical History:   Diagnosis Date   • Astigmatism, bilateral 2019   • Benign essential hypertension    • Bilateral presbyopia 2019   • CAD (coronary artery disease)    • Closed right ankle fracture    • COVID-19 vaccination refused    • Hyperlipidemia    • Hypothyroidism    • Influenza vaccine needed    • Myocardial infarction (HCC)    • Prediabetes    • Pseudophakia, both eyes 2019   • Thoracic back pain        Past Surgical History:   Procedure Laterality Date   • APPENDECTOMY     • CARDIAC CATHETERIZATION  2012    x3    • CORONARY ARTERY BYPASS GRAFT  2014   • CORONARY STENT PLACEMENT      x3   • HARDWARE REMOVAL Right 2020    Procedure: ANKLE/FOOT HARDWARE REMOVAL;  Surgeon: Lincoln Sibley MD;  Location: [facility];  Service: Orthopedics;  Laterality: Right;   • ORIF ANKLE FRACTURE Right 2019    Radha Vazquez Mem       Family History   Problem Relation Age of Onset   • Heart disease Mother    • Lung cancer Father    • Diabetes Other        Social History     Tobacco Use   • Smoking status: Former Smoker     Types: Cigarettes     Quit date: 1980     Years since quittin.2   • Smokeless tobacco: Never Used   • Tobacco comment: Social Drinker   Vaping Use   • Vaping Use: Never used   Substance Use Topics   • Alcohol use: Yes      Comment: mod    • Drug use: No        Medications Prior to Admission   Medication Sig Dispense Refill Last Dose   • aspirin 81 MG chewable tablet Chew 81 mg Daily.   3/2/2022 at Unknown time   • Cholecalciferol (VITAMIN D3) 2000 units tablet Take 1 tablet by mouth Daily.   3/2/2022 at Unknown time   • irbesartan (Avapro) 300 MG tablet Take 150 mg by mouth Daily.      • metoprolol tartrate (LOPRESSOR) 25 MG tablet Take 1 tablet by mouth twice daily 180 tablet 0 3/2/2022 at Unknown time   • Multiple Vitamins-Minerals (MULTIVITAMIN ADULT EXTRA C PO) Take 1 tablet by mouth Daily.   3/2/2022 at Unknown time   • levothyroxine (SYNTHROID, LEVOTHROID) 50 MCG tablet Take 50 mcg by mouth Every Morning.          Allergies  Prednisone    Scheduled Meds:custom intrapleural syringe builder, , Intrapleural, Once  amiodarone, 200 mg, Oral, Q12H   Followed by  [START ON 3/27/2022] amiodarone, 200 mg, Oral, Daily  arformoterol, 15 mcg, Nebulization, BID - RT  aspirin, 81 mg, Oral, Daily  budesonide, 1 mg, Nebulization, BID - RT  dilTIAZem, 90 mg, Oral, Q8H  heparin (porcine), 5,000 Units, Subcutaneous, Q12H  insulin glargine, 10 Units, Subcutaneous, Nightly  insulin lispro, 0-14 Units, Subcutaneous, TID AC  levothyroxine, 50 mcg, Oral, Q AM  melatonin, 5 mg, Oral, Nightly  metoprolol tartrate, 25 mg, Oral, BID  multivitamin with minerals, 1 tablet, Oral, Daily  pantoprazole, 40 mg, Intravenous, BID  senna-docusate sodium, 2 tablet, Oral, BID  sodium chloride, 10 mL, Intravenous, Q12H      Continuous Infusions:hold, 1 each      PRN Meds:.•  acetaminophen **OR** acetaminophen **OR** acetaminophen  •  aluminum-magnesium hydroxide-simethicone  •  senna-docusate sodium **AND** polyethylene glycol **AND** bisacodyl **AND** bisacodyl  •  dextrose  •  dextrose  •  glucagon (human recombinant)  •  hold  •  insulin lispro **AND** insulin lispro  •  magnesium sulfate **OR** magnesium sulfate in D5W 1g/100mL (PREMIX)  •  nitroglycerin  •   "ondansetron **OR** ondansetron  •  phenylephrine-mineral oil-petrolatum  •  potassium chloride **OR** potassium chloride **OR** potassium chloride  •  sodium chloride    Objective     VITAL SIGNS  Vitals:    03/15/22 2300 03/16/22 0216 03/16/22 0556 03/16/22 0637   BP: 125/64 155/66  147/71   BP Location: Left arm Left arm     Patient Position: Lying Lying     Pulse: 56   86   Resp: 24 24 21    Temp: 97.1 °F (36.2 °C) 98 °F (36.7 °C) 97.9 °F (36.6 °C)    TempSrc: Axillary Oral Oral    SpO2: 98%      Weight:   83.1 kg (183 lb 3.2 oz)    Height:           Flowsheet Rows    Flowsheet Row First Filed Value   Admission Height 162.6 cm (64\") Documented at 03/02/2022 2218   Admission Weight 81 kg (178 lb 9.2 oz) Documented at 03/02/2022 2218            Intake/Output Summary (Last 24 hours) at 3/16/2022 0645  Last data filed at 3/16/2022 0630  Gross per 24 hour   Intake 600 ml   Output 110 ml   Net 490 ml        TELEMETRY: Atrial fibrillation    Physical Exam:  The patient is alert, oriented and in no distress.  Vital signs as noted above.  Head and neck revealed no carotid bruits or jugular venous distention.  No thyromegaly or lymphadenopathy is present  Lungs clear.  No wheezing.  Breath sounds are normal bilaterally.  Left chest tube in place.  Heart normal first and second heart sounds.  No murmur. No precordial rub is present.  No gallop is present.  Abdomen soft and nontender.  No organomegaly is present.  Extremities with good peripheral pulses without any pedal edema.  Skin warm and dry.  Musculoskeletal system is grossly normal  CNS grossly normal      Results Review:   I reviewed the patient's new clinical results.  Lab Results (last 24 hours)     Procedure Component Value Units Date/Time    Phosphorus [[id number]]  (Abnormal) Collected: 03/16/22 0015    Specimen: Blood Updated: 03/16/22 0230     Phosphorus 2.4 mg/dL     Magnesium [[id number]]  (Normal) Collected: 03/16/22 0015    Specimen: Blood Updated: 03/16/22 " 0230     Magnesium 1.9 mg/dL     CBC & Differential [[id number]]  (Abnormal) Collected: 03/16/22 0015    Specimen: Blood Updated: 03/16/22 0204    Narrative:      The following orders were created for panel order CBC & Differential.  Procedure                               Abnormality         Status                     ---------                               -----------         ------                     CBC Auto Differential[[id number]]        Abnormal            Final result                 Please view results for these tests on the individual orders.    CBC Auto Differential [[id number]]  (Abnormal) Collected: 03/16/22 0015    Specimen: Blood Updated: 03/16/22 0204     WBC 12.30 10*3/mm3      RBC 2.99 10*6/mm3      Hemoglobin 9.4 g/dL      Hematocrit 28.1 %      MCV 93.8 fL      MCH 31.5 pg      MCHC 33.6 g/dL      RDW 15.7 %      RDW-SD 51.6 fl      MPV 7.6 fL      Platelets 503 10*3/mm3      Neutrophil % 85.7 %      Lymphocyte % 5.2 %      Monocyte % 8.1 %      Eosinophil % 0.6 %      Basophil % 0.4 %      Neutrophils, Absolute 10.50 10*3/mm3      Lymphocytes, Absolute 0.60 10*3/mm3      Monocytes, Absolute 1.00 10*3/mm3      Eosinophils, Absolute 0.10 10*3/mm3      Basophils, Absolute 0.10 10*3/mm3      nRBC 0.0 /100 WBC     POC Glucose Once [[id number]]  (Abnormal) Collected: 03/15/22 2123    Specimen: Blood Updated: 03/15/22 2124     Glucose 297 mg/dL      Comment: Serial Number: [id number]:  [id number]       POC Glucose Once [[id number]]  (Normal) Collected: 03/15/22 1617    Specimen: Blood Updated: 03/15/22 1618     Glucose 93 mg/dL      Comment: Serial Number: [id number]:  [id number]       POC Glucose Once [[id number]]  (Abnormal) Collected: 03/15/22 1113    Specimen: Blood Updated: 03/15/22 1114     Glucose 244 mg/dL      Comment: Serial Number: [id number]:  [id number]       Basic Metabolic Panel [[id number]]  (Abnormal) Collected: 03/15/22 0410    Specimen: Blood Updated: 03/15/22 0806      Glucose 123 mg/dL      BUN 15 mg/dL      Creatinine 0.62 mg/dL      Sodium 128 mmol/L      Potassium 3.7 mmol/L      Chloride 95 mmol/L      CO2 22.0 mmol/L      Calcium 8.2 mg/dL      BUN/Creatinine Ratio 24.2     Anion Gap 11.0 mmol/L      eGFR 90.2 mL/min/1.73      Comment: National Kidney Foundation and American Society of Nephrology (ASN) Task Force recommended calculation based on the Chronic Kidney Disease Epidemiology Collaboration (CKD-EPI) equation refit without adjustment for race.       Narrative:      GFR Normal >60  Chronic Kidney Disease <60  Kidney Failure <15      POC Glucose Once [[id number]]  (Abnormal) Collected: 03/15/22 0727    Specimen: Blood Updated: 03/15/22 0728     Glucose 118 mg/dL      Comment: Serial Number: [id number]:  [id number]             Imaging Results (Last 24 Hours)     Procedure Component Value Units Date/Time    XR Chest 1 View [[id number]] Resulted: 03/16/22 0327     Updated: 03/16/22 0328    XR Chest 1 View [[id number]] Collected: 03/15/22 0804     Updated: 03/15/22 0808    Narrative:      EXAM: XR CHEST 1 VW-     DATE OF EXAM: 3/15/2022 5:15 AM     INDICATION: Chest tube; I48.91-Unspecified atrial fibrillation;  I50.9-Heart failure, unspecified; Z20.822-Contact with and (suspected)  exposure to covid-19; I50.21-Acute systolic (congestive) heart failure.         COMPARISONS: 03/14/2022      FINDINGS:     Unchanged left pleural effusion with a chest tube in place, and adjacent  atelectasis. Unchanged small right pleural effusion. Mediastinal shadows  are unchanged.       Impression:         Stable chest over the past 24 hours. Unchanged large left-sided pleural  effusion with a small amount of interspersed pleural air better  demonstrated on yesterday's chest CT     Unchanged small right pleural effusion     Electronically Signed By-Nain Mccauley On:3/15/2022 8:06 AM  This report was finalized on [id number] by  Nain Mccauley, .      LAB RESULTS (LAST 7  DAYS)    CBC  Results from last 7 days   Lab Units 03/16/22  0015 03/15/22  0410 03/14/22  1040 03/13/22  0032 03/12/22  0020 03/11/22  1206 03/10/22  0009   WBC 10*3/mm3 12.30* 10.00 11.10* 14.10* 13.00* 13.10* 15.40*   RBC 10*6/mm3 2.99* 2.76* 2.98* 3.05* 2.87* 2.79* 2.89*   HEMOGLOBIN g/dL 9.4* 8.8* 9.3* 9.5* 9.1* 9.1* 9.3*   HEMATOCRIT % 28.1* 26.0* 28.0* 28.8* 27.2* 26.6* 27.3*   MCV fL 93.8 94.2 94.0 94.4 94.7 95.5 94.3   PLATELETS 10*3/mm3 503* 444 419 485* 383 294 291       BMP  Results from last 7 days   Lab Units 03/16/22  0015 03/15/22  0410 03/14/22  1039 03/13/22  0032 03/12/22  0020 03/11/22  1206 03/10/22  0009 03/09/22  0651   SODIUM mmol/L  --  128* 129* 129* 128* 126* 130* 129*   POTASSIUM mmol/L  --  3.7 4.0 4.1 4.0 4.1 4.3 4.0   CHLORIDE mmol/L  --  95* 96* 94* 94* 93* 96* 97*   CO2 mmol/L  --  22.0 23.0 24.0 22.0 23.0 23.0 23.0   BUN mg/dL  --  15 16 20 17 18 19 17   CREATININE mg/dL  --  0.62 0.72 0.80 0.76 0.72 0.67 0.73   GLUCOSE mg/dL  --  123* 141* 184* 154* 161* 168* 144*   MAGNESIUM mg/dL 1.9 1.9 2.0 2.2 2.0 2.1 2.1 2.0   PHOSPHORUS mg/dL 2.4* 2.6 2.5 2.9 2.5 2.8 2.4* 2.4*       CMP   Results from last 7 days   Lab Units 03/15/22  0410 03/14/22  1039 03/13/22  0032 03/12/22  0020 03/11/22  1206 03/10/22  0009 03/09/22  0651   SODIUM mmol/L 128* 129* 129* 128* 126* 130* 129*   POTASSIUM mmol/L 3.7 4.0 4.1 4.0 4.1 4.3 4.0   CHLORIDE mmol/L 95* 96* 94* 94* 93* 96* 97*   CO2 mmol/L 22.0 23.0 24.0 22.0 23.0 23.0 23.0   BUN mg/dL 15 16 20 17 18 19 17   CREATININE mg/dL 0.62 0.72 0.80 0.76 0.72 0.67 0.73   GLUCOSE mg/dL 123* 141* 184* 154* 161* 168* 144*   ALBUMIN g/dL  --  2.70* 3.10* 2.80* 2.60* 2.80* 3.00*   BILIRUBIN mg/dL  --  0.4 0.4 0.5 0.4 0.5 0.6   ALK PHOS U/L  --  110 118* 89 87 81 76   AST (SGOT) U/L  --  30 38* 28 14 13 12   ALT (SGPT) U/L  --  43* 44* 30 19 19 20         BNP        TROPONIN        CoAg        Creatinine Clearance  Estimated Creatinine Clearance: 57.3 mL/min (by C-G  formula based on SCr of 0.62 mg/dL).    ABG        Radiology  CT Chest Without Contrast Diagnostic    Result Date: 3/14/2022   1. Persistent large left hemothorax now with small amounts of air in the superior aspect of this collection the overall size of the pneumothorax is unchanged. There is a left chest tube in place and the left lower lobe is nearly completely ectatic. Uncertain whether the chest tube is in or immediately adjacent to the pleural space, so correlation with clinical scenario in amount of drainage fluid is recommended. 2. Decrease in size of a small right pleural effusion. 3. Other ancillary findings are detailed above.  Electronically Signed By-Cory Wan DO On:3/14/2022 11:26 PM This report was finalized on [id number] by  Cory Wan DO.    XR Chest 1 View    Result Date: 3/15/2022   Stable chest over the past 24 hours. Unchanged large left-sided pleural effusion with a small amount of interspersed pleural air better demonstrated on yesterday's chest CT  Unchanged small right pleural effusion  Electronically Signed By-Nain Mccauley On:3/15/2022 8:06 AM This report was finalized on [id number] by  Nain Mccauley, .              EKG                              I personally viewed and interpreted the patient's EKG/Telemetry data: Atrial fibrillation          ECHOCARDIOGRAM:    Results for orders placed during the hospital encounter of 03/02/22    Adult Transthoracic Echo Complete W/ Cont if Necessary Per Protocol    Interpretation Summary  LVE with severe global left ventricular hypokinesis, estimated LV ejection fraction of 30%.  Severe right ventricular enlargement and moderate right atrial enlargement seen  Severe left atrial enlargement seen.  Aortic valve, mitral valve, tricuspid valve appears structurally normal, moderate mitral and mild tricuspid regurgitation seen.  Calculated RV systolic pressure of 40 to 45 mmHg.  No pericardial effusion seen.  Large pleural effusion  seen.  Proximal aorta appears normal in size.          STRESS MYOVIEW:    Cardiolite (Tc-99m Sestamibi) stress test    CARDIAC CATHETERIZATION:            OTHER:         Assessment/Plan     Principal Problem:    Atrial fibrillation with RVR (HCC)  Active Problems:    Other hyperlipidemia    Essential hypertension    Hypothyroidism    Coronary artery disease involving coronary bypass graft of native heart without angina pectoris    Acute CHF (congestive heart failure) (HCC)    Acute hyponatremia    Elevated LFTs    Elevated TSH    Acute hyperglycemia    Obesity (BMI 30-39.9)      Presented through emergency room 3/2/2022 with progressively worsening shortness of breath and dyspnea on exertion weight gain and leg edema. Was found to be in A. fib and congestive heart failure. Patient denies any chest pain. Patient lives in [place] and has a second home in [place] and see his cardiologist at [facility]. Patient has been having issues with alopecia and memory issues therefore stopped beta-blockers and statin on her own. Also has not been taking  thyroid replacement therapy since November because she ran out of medication. Patient has history of prediabetes. Does not check her sugars at home.  Work-up here revealed troponin x3 are negative. proBNP is 7717. CMP reveals a glucose of 242, hemoglobin A1c over 7.1. BUN/creatinine are 21/0.83. ALT elevated at 71, AST 35. TSH is 11.15  Chest x-ray 3/2/2022 reveals left pleural effusion, left basilar disease most likely atelectasis with possible pneumonia.  EKG 3/2/2022 reviewed/interpreted by me reveals A. fib with RVR at 126 bpm with PVCs      ]]]]]]]]]]]]]]]]]]]]  Impression  ==========  -Progressively worsening shortness of breath and leg edema.    -Congestive heart failure  Left ventricle dysfunction with ejection fraction of 30%.    -Significant biatrial enlargement    -Atrial fibrillation with RVR    -Premature ventricular contractions    -Status post CABG 12/2014 (performed  in [place])    -Hypertension dyslipidemia prediabetes hypothyroidism elevation    -Status post ORIF    -Family history of coronary artery disease    -Intolerance to prednisone    -Former smoker    -Medical noncompliance.  Was not taking thyroid medicine replacement properly.     =================  Plan  ===========  Atrial fibrillation with RVR.  Rate control.  Consider GABRIEL cardioversion.  Patient would benefit from long-term anticoagulation for atrial fibrillation because of high [id number] score due to female gender age over 75, hypertension, diabetes, CAD making it at least 6. We will start her on Eliquis or warfarin before discharge.  Continued IV diuresis.  Observe renal function closely.  Elevated LFTs due to passive congestion.    Large left pleural effusion.  Status post drainage and chest tube.  More than 400 cc of fluid was removed.  Patient is on Cardizem and amiodarone.  Chest tube was flushed with TPA yesterday by thoracic surgery.  Patient has loculated left pleural effusion.  Another round of TPA infusion through the chest tube is being planned.     Been started for left VATS with decortication for adequate reexpansion of the lung.  Patient to be transferred to [facility] when bed is available.    Renal dysfunction  BUN/creatinine-30/1.03    Echocardiogram showed severe right ventricle enlargement left atrial enlargement related enlargement and calculated pulmonary artery pressure of 45 mmHg.  Left ventricle dysfunction with ejection fraction of 30%.    Ischemic cardiomyopathy    Current medications include amiodarone aspirin Cardizem 90 mg every 8 hours subcu heparin levothyroxine metoprolol 25 mg twice a day.    Anticoagulation and possible cardioversion as an outpatient versus GABRIEL cardioversion to try to convert to sinus rhythm.  Patient is not on anticoagulation at this time due to presence of chest tube hemorrhagic left pleural effusion etc.  This was discussed with patient and  patient's  at bedside.    Follow-up labs ordered.    Further plan will depend on patient's progress.  ]]]]]]]]]]]]]]]]]           Yordan Evans MD  03/16/22  06:45 EDT

## 2022-03-16 NOTE — PROGRESS NOTES
[facility] Medicine Services Daily Progress Note    Patient Name: Jaquelin Valderrama  : 1940  MRN: [id number]  Primary Care Physician:  Minerva Chatterjee MD  Date of admission: 3/2/2022      Subjective      Chief Complaint: Atrial fibrillation with RVR pleural effusion        Patient Reports states she did well overnight slept okay. Stayed in chair > 2 hours today.      03/15/2022  No overnight events.  CT chest shows worsening effusion, case discussed with CTS and she is to be transferred to [facility] for surgery on 3/16/2022.     ROS negative except as above    Objective      Vitals:   Temp:  [97.1 °F (36.2 °C)-98.2 °F (36.8 °C)] 97.5 °F (36.4 °C)  Heart Rate:  [56-86] 56  Resp:  [17-25] 18  BP: (125-155)/(64-84) 134/76  Flow (L/min):  [2] 2    Vitals reviewed.   Constitutional:       Comments: Left chest tube in place  at bedside  Patient is on room air. No distress  HENT:      Head: Normocephalic.      Nose: Nose normal.      Mouth/Throat:      Mouth: Mucous membranes are moist.   Eyes:      Pupils: Pupils are equal, round, and reactive to light.   Cardiovascular:      Rate and Rhythm: irregular rate and rhythm.      Pulses: Normal pulses.      Heart sounds: Normal heart sounds.   Pulmonary:      Effort: Pulmonary effort is normal.      Comments: Left chest tube catheter in place  Left side decreased breath sounds  Abdominal:      General: Abdomen is flat.      Palpations: Abdomen is soft.   Musculoskeletal:         General: Normal range of motion.      Cervical back: Normal range of motion.   Skin:     General: Skin is warm.      Capillary Refill: Capillary refill takes less than 2 seconds.   Neurological:      General: No focal deficit present.      Mental Status: She is alert and oriented to person, place, and time.   Psychiatric:         Mood and Affect: Mood normal.         Behavior: Behavior normal.        Result Review    Result Review:  I have personally reviewed the  results from the time of this admission to 3/16/2022 11:14 EDT and agree with these findings:  [x]  Laboratory  []  Microbiology  [x]  Radiology  []  EKG/Telemetry   []  Cardiology/Vascular   []  Pathology  []  Old records  []  Oth    Wounds (last 24 hours)              Assessment/Plan       Brief Patient Summary:  Jaquelin Valderrama is a 82 y.o. female who presented with A. fib RVR and pleural effusion status post chest tube        amiodarone, 200 mg, Oral, Q8H   Followed by  [START ON 3/13/2022] amiodarone, 200 mg, Oral, Q12H   Followed by  [START ON 3/27/2022] amiodarone, 200 mg, Oral, Daily  arformoterol, 15 mcg, Nebulization, BID - RT  aspirin, 81 mg, Oral, Daily  budesonide, 1 mg, Nebulization, BID - RT  cefepime, 2 g, Intravenous, Q12H  dilTIAZem, 90 mg, Oral, Q8H  heparin (porcine), 5,000 Units, Subcutaneous, Q12H  HYDROmorphone, 2 mg, Intravenous, Once  insulin glargine, 10 Units, Subcutaneous, Nightly  insulin lispro, 0-14 Units, Subcutaneous, TID AC  levothyroxine, 50 mcg, Oral, Q AM  melatonin, 5 mg, Oral, Nightly  metoprolol tartrate, 25 mg, Oral, BID  multivitamin with minerals, 1 tablet, Oral, Daily  senna-docusate sodium, 2 tablet, Oral, BID  sodium chloride, 10 mL, Intravenous, Q12H        hold, 1 each           Active Hospital Problems:          Active Hospital Problems     Diagnosis     • **Atrial fibrillation with RVR (HCC)     • Acute CHF (congestive heart failure) (HCC)     • Acute hyponatremia     • Elevated LFTs     • Elevated TSH     • Acute hyperglycemia     • Obesity (BMI 30-39.9)     • Coronary artery disease involving coronary bypass graft of native heart without angina pectoris     • Essential hypertension     • Hypothyroidism     • Other hyperlipidemia           ASSESSMENT:  Possible hemothorax  Acute respiratory distress  COPD exacerbation  Atrial fibrillation with RVR (HCC), now resolved    Other hyperlipidemia    Essential hypertension    Hypothyroidism    Coronary artery disease  involving coronary bypass graft of native heart without angina pectoris    Acute CHF (congestive heart failure) (HCC) EFG 45%    Acute hyponatremia    Elevated LFTs    Elevated TSH    Acute hyperglycemia    Obesity (BMI 30-39.9)   ARF     PLAN:    #Acute hypoxic respiratory failure  #Possible hemothorax    - weaned to room air    -Hemoglobin stable back on aspirin and heparin    - chest tube remains on suction    - s/p TPA 03/13/22 (3x total)    - Pulm has reconsulted CTS to consider if tube needs to be moved/replaced    -Blood transfusion as needed    - Defer chest tube mgt to pulm and CTS    - CXR on 3/14 shows stable left pleural effusion    - stable large hemothorax on CT on 3/15    - Discussed with Thoracic surgery, patient to be transferred to [facility] with surgery planned on 3/17/2022    #COPD, chronic    -weaned to room air    - continue Brovana and Pulmicort    #DM    -ISS    - Lantus 10u SC QHS    #CAD    -s/p CABG    - continue aspirin daily    #HFrEF, chronic  # Ischemic cardiomyopathy    - appears euvolemic    -cardiology following    #A. Fib with RVR    - Continue PO Amiodarone 200mg PO q12h for 14 days then wean to 200mg daily    - Cardizem 90mg PO q8h    - Lopressor 25mg PO BID    - cardio following, recommends otpt GABRIEL cardioversion    #Hypothyroid    -continue home synthroid    #Obesity     - BMI 33.51    - weight loss encouraged    #DVT prophylaxis    -heparin       DVT prophylaxis:  Medical DVT prophylaxis orders are present.     CODE STATUS:    Code Status (Patient has no pulse and is not breathing): CPR (Attempt to Resuscitate)  Medical Interventions (Patient has pulse or is breathing): Full Support        Disposition:  Patient to be transferred to [facility]     This patient has been examined wearing appropriate Personal Protective Equipment and discussed with  patient and nursing staff.03/16/22      Electronically signed by Jaja Artis DO, 03/16/22, 11:14 EDT.  Liana Vazquez  Hospitalist Team

## 2022-03-16 NOTE — THERAPY TREATMENT NOTE
Subjective: Pt agreeable to therapeutic plan of care.    Objective:     Bed mobility - Modified-Independent and extra time  Transfers - Modified-Independent and with rolling walker  Ambulation - 100 feet x 2 CGA and with rolling walker    Pain: 0 VAS  Education: Provided education on importance of mobility and skilled verbal / tactile cueing throughout intervention.     Assessment: Jaquelin Valderrama presents with functional mobility impairments which indicate the need for skilled intervention. Tolerating session today with increased standing and ambulation tolerance. Pt ambulated 100ft x2 using RW with occasional VC for controlled speed/pace through S/S monitoring. Pt required intermittent standing rest breaks and dyspneic throughout ambulation without O2 desaturation. Will continue to follow and progress as tolerated.     Plan/Recommendations:   Pt would benefit from Inpatient Rehabilitation placement at discharge from facility and requires no DME at discharge.   Pt desires Inpatient Rehabilitation placement at discharge. Pt cooperative; agreeable to therapeutic recommendations and plan of care.     Basic Mobility 6-click:  Rollin = Total, A lot = 2, A little = 3; 4 = None  Supine>Sit:   1 = Total, A lot = 2, A little = 3; 4 = None   Sit>Stand with arms:  1 = Total, A lot = 2, A little = 3; 4 = None  Bed>Chair:   1 = Total, A lot = 2, A little = 3; 4 = None  Ambulate in room:  1 = Total, A lot = 2, A little = 3; 4 = None  3-5 Steps with railin = Total, A lot = 2, A little = 3; 4 = None  Score: 19    Post-Tx Position: Up in Chair, Alarms activated and Call light and personal items within reach  PPE: gloves, surgical mask, eyewear protection

## 2022-03-16 NOTE — PLAN OF CARE
Goal Outcome Evaluation:    Jaquelin Valderrama presents with functional mobility impairments which indicate the need for skilled intervention. Tolerating session today with increased standing and ambulation tolerance. Pt ambulated 100ft x2 using RW with occasional VC for controlled speed/pace through S/S monitoring. Pt required intermittent standing rest breaks and dyspneic throughout ambulation without O2 desaturation. Will continue to follow and progress as tolerated.

## 2022-03-16 NOTE — PLAN OF CARE
Problem: Fall Injury Risk  Goal: Absence of Fall and Fall-Related Injury  Outcome: Ongoing, Progressing  Intervention: Identify and Manage Contributors  Recent Flowsheet Documentation  Taken 3/16/2022 1610 by Jeniffer Beckford LPN  Medication Review/Management: medications reviewed  Taken 3/16/2022 1410 by Jeniffer Beckford LPN  Medication Review/Management: medications reviewed  Taken 3/16/2022 1210 by Jeniffer Beckford LPN  Medication Review/Management: medications reviewed  Taken 3/16/2022 1010 by Jeniffer Beckford LPN  Medication Review/Management: medications reviewed  Taken 3/16/2022 0800 by Jeniffer Beckford LPN  Medication Review/Management: medications reviewed  Intervention: Promote Injury-Free Environment  Recent Flowsheet Documentation  Taken 3/16/2022 1610 by Jeniffer Beckford LPN  Safety Promotion/Fall Prevention:   activity supervised   assistive device/personal items within reach   clutter free environment maintained   fall prevention program maintained   gait belt   nonskid shoes/slippers when out of bed   room organization consistent   safety round/check completed  Taken 3/16/2022 1410 by Jeniffer Beckford LPN  Safety Promotion/Fall Prevention:   activity supervised   assistive device/personal items within reach   clutter free environment maintained   fall prevention program maintained   gait belt   nonskid shoes/slippers when out of bed   room organization consistent   safety round/check completed  Taken 3/16/2022 1210 by Jeniffer Beckford LPN  Safety Promotion/Fall Prevention:   activity supervised   assistive device/personal items within reach   clutter free environment maintained   fall prevention program maintained   gait belt   nonskid shoes/slippers when out of bed   room organization consistent   safety round/check completed  Taken 3/16/2022 1010 by Jeniffer Beckford LPN  Safety Promotion/Fall Prevention:   activity supervised   assistive device/personal items within reach   clutter free  environment maintained   fall prevention program maintained   gait belt   nonskid shoes/slippers when out of bed   room organization consistent   safety round/check completed  Taken 3/16/2022 0800 by Jeniffer Beckford LPN  Safety Promotion/Fall Prevention:   activity supervised   assistive device/personal items within reach   clutter free environment maintained   fall prevention program maintained   gait belt   nonskid shoes/slippers when out of bed   room organization consistent   safety round/check completed  Goal: Absence of Fall and Fall-Related Injury  Outcome: Ongoing, Progressing  Intervention: Identify and Manage Contributors  Recent Flowsheet Documentation  Taken 3/16/2022 1610 by Jeniffer Beckford LPN  Medication Review/Management: medications reviewed  Taken 3/16/2022 1410 by Jeniffer Beckford LPN  Medication Review/Management: medications reviewed  Taken 3/16/2022 1210 by Jeniffer Beckford LPN  Medication Review/Management: medications reviewed  Taken 3/16/2022 1010 by Jeniffer Beckford LPN  Medication Review/Management: medications reviewed  Taken 3/16/2022 0800 by Jeniffer Beckford LPN  Medication Review/Management: medications reviewed  Intervention: Promote Injury-Free Environment  Recent Flowsheet Documentation  Taken 3/16/2022 1610 by Jeniffer Beckford LPN  Safety Promotion/Fall Prevention:   activity supervised   assistive device/personal items within reach   clutter free environment maintained   fall prevention program maintained   gait belt   nonskid shoes/slippers when out of bed   room organization consistent   safety round/check completed  Taken 3/16/2022 1410 by Jeniffer Beckford LPN  Safety Promotion/Fall Prevention:   activity supervised   assistive device/personal items within reach   clutter free environment maintained   fall prevention program maintained   gait belt   nonskid shoes/slippers when out of bed   room organization consistent   safety round/check completed  Taken 3/16/2022 1210 by  Niehoff, Jeniffer, LPN  Safety Promotion/Fall Prevention:   activity supervised   assistive device/personal items within reach   clutter free environment maintained   fall prevention program maintained   gait belt   nonskid shoes/slippers when out of bed   room organization consistent   safety round/check completed  Taken 3/16/2022 1010 by Jeniffer Beckford LPN  Safety Promotion/Fall Prevention:   activity supervised   assistive device/personal items within reach   clutter free environment maintained   fall prevention program maintained   gait belt   nonskid shoes/slippers when out of bed   room organization consistent   safety round/check completed  Taken 3/16/2022 0800 by Jeniffer Beckford LPN  Safety Promotion/Fall Prevention:   activity supervised   assistive device/personal items within reach   clutter free environment maintained   fall prevention program maintained   gait belt   nonskid shoes/slippers when out of bed   room organization consistent   safety round/check completed     Problem: Breathing Pattern Ineffective  Goal: Effective Breathing Pattern  Outcome: Ongoing, Progressing  Intervention: Promote Improved Breathing Pattern  Recent Flowsheet Documentation  Taken 3/16/2022 1610 by Jeniffer Beckford LPN  Supportive Measures: active listening utilized  Head of Bed (HOB) Positioning: HOB elevated  Airway/Ventilation Management: airway patency maintained  Taken 3/16/2022 1210 by Jeniffer Beckford LPN  Supportive Measures: active listening utilized  Airway/Ventilation Management: airway patency maintained  Taken 3/16/2022 1010 by Jeniffer Beckford LPN  Head of Bed (HOB) Positioning: HOB elevated  Taken 3/16/2022 0800 by Jeniffer Beckford LPN  Supportive Measures: active listening utilized  Head of Bed (HOB) Positioning: HOB elevated  Airway/Ventilation Management: airway patency maintained     Problem: Dysrhythmia  Goal: Normalized Cardiac Rhythm  Outcome: Ongoing, Progressing  Intervention: Monitor and Manage  Cardiac Rhythm Effect  Recent Flowsheet Documentation  Taken 3/16/2022 1610 by Jeniffer Beckford LPN  VTE Prevention/Management: (heparin SQ) other (see comments)  Taken 3/16/2022 1210 by Jeniffer Beckford LPN  VTE Prevention/Management: (heparin SQ) other (see comments)  Taken 3/16/2022 0800 by Jeniffer Beckford LPN  VTE Prevention/Management: (heparin SQ) other (see comments)     Problem: Adult Inpatient Plan of Care  Goal: Plan of Care Review  Outcome: Ongoing, Progressing  Flowsheets (Taken 3/16/2022 1701)  Progress: improving  Plan of Care Reviewed With:   patient   spouse  Goal: Patient-Specific Goal (Individualized)  Outcome: Ongoing, Progressing  Goal: Absence of Hospital-Acquired Illness or Injury  Outcome: Ongoing, Progressing  Intervention: Identify and Manage Fall Risk  Recent Flowsheet Documentation  Taken 3/16/2022 1610 by Jeniffer Beckford LPN  Safety Promotion/Fall Prevention:   activity supervised   assistive device/personal items within reach   clutter free environment maintained   fall prevention program maintained   gait belt   nonskid shoes/slippers when out of bed   room organization consistent   safety round/check completed  Taken 3/16/2022 1410 by Jeniffer Beckford LPN  Safety Promotion/Fall Prevention:   activity supervised   assistive device/personal items within reach   clutter free environment maintained   fall prevention program maintained   gait belt   nonskid shoes/slippers when out of bed   room organization consistent   safety round/check completed  Taken 3/16/2022 1210 by Jeniffer Beckford LPN  Safety Promotion/Fall Prevention:   activity supervised   assistive device/personal items within reach   clutter free environment maintained   fall prevention program maintained   gait belt   nonskid shoes/slippers when out of bed   room organization consistent   safety round/check completed  Taken 3/16/2022 1010 by Jeniffer Beckford LPN  Safety Promotion/Fall Prevention:   activity supervised    assistive device/personal items within reach   clutter free environment maintained   fall prevention program maintained   gait belt   nonskid shoes/slippers when out of bed   room organization consistent   safety round/check completed  Taken 3/16/2022 0800 by Jeniffer Beckford LPN  Safety Promotion/Fall Prevention:   activity supervised   assistive device/personal items within reach   clutter free environment maintained   fall prevention program maintained   gait belt   nonskid shoes/slippers when out of bed   room organization consistent   safety round/check completed  Intervention: Prevent Skin Injury  Recent Flowsheet Documentation  Taken 3/16/2022 1610 by Jeniffer Beckford LPN  Body Position:   supine   weight shifting  Skin Protection: adhesive use limited  Taken 3/16/2022 1410 by Jeniffer Beckford LPN  Body Position: position changed independently  Taken 3/16/2022 1210 by Jeniffer Beckford LPN  Body Position: (up in chair) other (see comments)  Skin Protection: adhesive use limited  Taken 3/16/2022 1010 by Jeniffer Beckford LPN  Body Position:   turned   right  Taken 3/16/2022 0800 by Jeniffer Beckford LPN  Body Position: position changed independently  Skin Protection: adhesive use limited  Intervention: Prevent and Manage VTE (Venous Thromboembolism) Risk  Recent Flowsheet Documentation  Taken 3/16/2022 1610 by Jeniffer Beckford LPN  VTE Prevention/Management: (heparin SQ) other (see comments)  Range of Motion: active ROM (range of motion) encouraged  Taken 3/16/2022 1210 by Jeniffer Beckford LPN  VTE Prevention/Management: (heparin SQ) other (see comments)  Taken 3/16/2022 0800 by Jeniffer Beckford LPN  Activity Management: activity adjusted per tolerance  VTE Prevention/Management: (heparin SQ) other (see comments)  Range of Motion: active ROM (range of motion) encouraged  Intervention: Prevent Infection  Recent Flowsheet Documentation  Taken 3/16/2022 1610 by Jeniffer Beckford LPN  Infection Prevention:   equipment  surfaces disinfected   hand hygiene promoted   personal protective equipment utilized   single patient room provided  Taken 3/16/2022 1410 by Jeniffer Beckford LPN  Infection Prevention:   hand hygiene promoted   personal protective equipment utilized   single patient room provided  Taken 3/16/2022 1210 by Jeniffer Beckford LPN  Infection Prevention:   hand hygiene promoted   personal protective equipment utilized   single patient room provided  Taken 3/16/2022 1010 by Jeniffer Beckford LPN  Infection Prevention:   equipment surfaces disinfected   hand hygiene promoted   personal protective equipment utilized   single patient room provided  Taken 3/16/2022 0800 by Jeniffer Beckford LPN  Infection Prevention:   equipment surfaces disinfected   hand hygiene promoted   personal protective equipment utilized   single patient room provided  Goal: Optimal Comfort and Wellbeing  Outcome: Ongoing, Progressing  Intervention: Provide Person-Centered Care  Recent Flowsheet Documentation  Taken 3/16/2022 1610 by Jeniffer Beckford LPN  Trust Relationship/Rapport:   care explained   choices provided   questions answered   questions encouraged   reassurance provided   thoughts/feelings acknowledged  Taken 3/16/2022 1210 by Jeniffer Beckford LPN  Trust Relationship/Rapport:   care explained   choices provided   questions answered   questions encouraged   reassurance provided   thoughts/feelings acknowledged  Taken 3/16/2022 0800 by Jeniffer Beckford LPN  Trust Relationship/Rapport:   care explained   choices provided   questions answered   questions encouraged   reassurance provided   thoughts/feelings acknowledged  Goal: Readiness for Transition of Care  Outcome: Ongoing, Progressing   Goal Outcome Evaluation:  Plan of Care Reviewed With: patient, spouse        Progress: improving     Pt condition remains stable, vitals WNL; Pt has discharge orders to go to [facility] for surgery to be performed on Friday 3/18; Pt   remains at bedside; chest tube remains to Left with little output noted; Pt denies any pain or discomfort; will cont to monitor.

## 2022-03-17 ENCOUNTER — APPOINTMENT (OUTPATIENT)
Dept: GENERAL RADIOLOGY | Facility: HOSPITAL | Age: 82
End: 2022-03-17

## 2022-03-17 ENCOUNTER — HOSPITAL ENCOUNTER (INPATIENT)
Facility: HOSPITAL | Age: 82
LOS: 7 days | Discharge: HOME | End: 2022-03-24
Attending: INTERNAL MEDICINE | Admitting: INTERNAL MEDICINE

## 2022-03-17 ENCOUNTER — PREP FOR SURGERY (OUTPATIENT)
Dept: OTHER | Facility: HOSPITAL | Age: 82
End: 2022-03-17

## 2022-03-17 VITALS
WEIGHT: 189.7 LBS | SYSTOLIC BLOOD PRESSURE: 150 MMHG | HEIGHT: 63 IN | HEART RATE: 77 BPM | DIASTOLIC BLOOD PRESSURE: 69 MMHG | RESPIRATION RATE: 26 BRPM | BODY MASS INDEX: 33.61 KG/M2 | OXYGEN SATURATION: 96 % | TEMPERATURE: 97.5 F

## 2022-03-17 DIAGNOSIS — J90 LOCULATED PLEURAL EFFUSION: Primary | ICD-10-CM

## 2022-03-17 DIAGNOSIS — J90 LOCULATED PLEURAL EFFUSION: ICD-10-CM

## 2022-03-17 DIAGNOSIS — R79.1 ABNORMAL COAGULATION PROFILE: ICD-10-CM

## 2022-03-17 LAB
ANION GAP SERPL CALCULATED.3IONS-SCNC: 13 MMOL/L (ref 5–15)
BASOPHILS # BLD AUTO: 0 10*3/MM3 (ref 0–0.2)
BASOPHILS NFR BLD AUTO: 0.4 % (ref 0–1.5)
BUN SERPL-MCNC: 13 MG/DL (ref 8–23)
BUN/CREAT SERPL: 18.8 (ref 7–25)
CALCIUM SPEC-SCNC: 8.1 MG/DL (ref 8.6–10.5)
CHLORIDE SERPL-SCNC: 95 MMOL/L (ref 98–107)
CO2 SERPL-SCNC: 21 MMOL/L (ref 22–29)
CREAT SERPL-MCNC: 0.69 MG/DL (ref 0.57–1)
DEPRECATED RDW RBC AUTO: 51.6 FL (ref 37–54)
EGFRCR SERPLBLD CKD-EPI 2021: 87.9 ML/MIN/1.73
EOSINOPHIL # BLD AUTO: 0.1 10*3/MM3 (ref 0–0.4)
EOSINOPHIL NFR BLD AUTO: 0.4 % (ref 0.3–6.2)
ERYTHROCYTE [DISTWIDTH] IN BLOOD BY AUTOMATED COUNT: 15.8 % (ref 12.3–15.4)
GLUCOSE BLDC GLUCOMTR-MCNC: 128 MG/DL (ref 70–130)
GLUCOSE BLDC GLUCOMTR-MCNC: 154 MG/DL (ref 70–105)
GLUCOSE BLDC GLUCOMTR-MCNC: 179 MG/DL (ref 70–105)
GLUCOSE BLDC GLUCOMTR-MCNC: 219 MG/DL (ref 70–130)
GLUCOSE SERPL-MCNC: 149 MG/DL (ref 65–99)
HCT VFR BLD AUTO: 27 % (ref 34–46.6)
HGB BLD-MCNC: 9.1 G/DL (ref 12–15.9)
LYMPHOCYTES # BLD AUTO: 0.7 10*3/MM3 (ref 0.7–3.1)
LYMPHOCYTES NFR BLD AUTO: 5.7 % (ref 19.6–45.3)
MAGNESIUM SERPL-MCNC: 2 MG/DL (ref 1.6–2.4)
MCH RBC QN AUTO: 31.2 PG (ref 26.6–33)
MCHC RBC AUTO-ENTMCNC: 33.6 G/DL (ref 31.5–35.7)
MCV RBC AUTO: 92.6 FL (ref 79–97)
MONOCYTES # BLD AUTO: 1 10*3/MM3 (ref 0.1–0.9)
MONOCYTES NFR BLD AUTO: 8 % (ref 5–12)
NEUTROPHILS NFR BLD AUTO: 10.5 10*3/MM3 (ref 1.7–7)
NEUTROPHILS NFR BLD AUTO: 85.5 % (ref 42.7–76)
NRBC BLD AUTO-RTO: 0 /100 WBC (ref 0–0.2)
PHOSPHATE SERPL-MCNC: 3 MG/DL (ref 2.5–4.5)
PLATELET # BLD AUTO: 534 10*3/MM3 (ref 140–450)
PMV BLD AUTO: 7.2 FL (ref 6–12)
POTASSIUM SERPL-SCNC: 3.9 MMOL/L (ref 3.5–5.2)
RBC # BLD AUTO: 2.91 10*6/MM3 (ref 3.77–5.28)
SARS-COV-2 RNA PNL SPEC NAA+PROBE: NOT DETECTED
SODIUM SERPL-SCNC: 129 MMOL/L (ref 136–145)
WBC NRBC COR # BLD: 12.3 10*3/MM3 (ref 3.4–10.8)

## 2022-03-17 PROCEDURE — 63710000001 INSULIN LISPRO (HUMAN) PER 5 UNITS

## 2022-03-17 PROCEDURE — 25010000002 HEPARIN (PORCINE) PER 1000 UNITS: Performed by: INTERNAL MEDICINE

## 2022-03-17 PROCEDURE — 25010000002 ENOXAPARIN PER 10 MG: Performed by: INTERNAL MEDICINE

## 2022-03-17 PROCEDURE — 94664 DEMO&/EVAL PT USE INHALER: CPT

## 2022-03-17 PROCEDURE — 94799 UNLISTED PULMONARY SVC/PX: CPT

## 2022-03-17 PROCEDURE — U0005 INFEC AGEN DETEC AMPLI PROBE: HCPCS | Performed by: NURSE PRACTITIONER

## 2022-03-17 PROCEDURE — 99232 SBSQ HOSP IP/OBS MODERATE 35: CPT | Performed by: INTERNAL MEDICINE

## 2022-03-17 PROCEDURE — 82962 GLUCOSE BLOOD TEST: CPT

## 2022-03-17 PROCEDURE — U0003 INFECTIOUS AGENT DETECTION BY NUCLEIC ACID (DNA OR RNA); SEVERE ACUTE RESPIRATORY SYNDROME CORONAVIRUS 2 (SARS-COV-2) (CORONAVIRUS DISEASE [COVID-19]), AMPLIFIED PROBE TECHNIQUE, MAKING USE OF HIGH THROUGHPUT TECHNOLOGIES AS DESCRIBED BY CMS-2020-01-R: HCPCS | Performed by: NURSE PRACTITIONER

## 2022-03-17 PROCEDURE — 94640 AIRWAY INHALATION TREATMENT: CPT

## 2022-03-17 PROCEDURE — 71045 X-RAY EXAM CHEST 1 VIEW: CPT

## 2022-03-17 PROCEDURE — 99232 SBSQ HOSP IP/OBS MODERATE 35: CPT | Performed by: STUDENT IN AN ORGANIZED HEALTH CARE EDUCATION/TRAINING PROGRAM

## 2022-03-17 PROCEDURE — 99232 SBSQ HOSP IP/OBS MODERATE 35: CPT | Performed by: NURSE PRACTITIONER

## 2022-03-17 PROCEDURE — C9803 HOPD COVID-19 SPEC COLLECT: HCPCS | Performed by: NURSE PRACTITIONER

## 2022-03-17 RX ORDER — BUDESONIDE 0.5 MG/2ML
1 INHALANT ORAL
Status: DISCONTINUED | OUTPATIENT
Start: 2022-03-17 | End: 2022-03-24 | Stop reason: HOSPADM

## 2022-03-17 RX ORDER — ARFORMOTEROL TARTRATE 15 UG/2ML
15 SOLUTION RESPIRATORY (INHALATION)
Status: DISCONTINUED | OUTPATIENT
Start: 2022-03-17 | End: 2022-03-24 | Stop reason: HOSPADM

## 2022-03-17 RX ORDER — UREA 10 %
3 LOTION (ML) TOPICAL NIGHTLY PRN
Status: DISCONTINUED | OUTPATIENT
Start: 2022-03-17 | End: 2022-03-24 | Stop reason: HOSPADM

## 2022-03-17 RX ORDER — SODIUM CHLORIDE 9 MG/ML
75 INJECTION, SOLUTION INTRAVENOUS ONCE
Status: COMPLETED | OUTPATIENT
Start: 2022-03-18 | End: 2022-03-17

## 2022-03-17 RX ORDER — LOSARTAN POTASSIUM 50 MG/1
50 TABLET ORAL
Status: DISCONTINUED | OUTPATIENT
Start: 2022-03-18 | End: 2022-03-21

## 2022-03-17 RX ORDER — ONDANSETRON 2 MG/ML
4 INJECTION INTRAMUSCULAR; INTRAVENOUS EVERY 6 HOURS PRN
Status: DISCONTINUED | OUTPATIENT
Start: 2022-03-17 | End: 2022-03-24 | Stop reason: HOSPADM

## 2022-03-17 RX ORDER — AMIODARONE HYDROCHLORIDE 200 MG/1
200 TABLET ORAL 2 TIMES DAILY
Status: DISCONTINUED | OUTPATIENT
Start: 2022-03-17 | End: 2022-03-22

## 2022-03-17 RX ORDER — ACETAMINOPHEN 325 MG/1
650 TABLET ORAL EVERY 4 HOURS PRN
Status: DISCONTINUED | OUTPATIENT
Start: 2022-03-17 | End: 2022-03-18

## 2022-03-17 RX ORDER — LEVOTHYROXINE SODIUM 0.05 MG/1
50 TABLET ORAL
Status: DISCONTINUED | OUTPATIENT
Start: 2022-03-18 | End: 2022-03-24 | Stop reason: HOSPADM

## 2022-03-17 RX ORDER — CEFAZOLIN SODIUM 2 G/100ML
2 INJECTION, SOLUTION INTRAVENOUS ONCE
Status: CANCELLED | OUTPATIENT
Start: 2022-03-17 | End: 2022-03-17

## 2022-03-17 RX ORDER — MULTIPLE VITAMINS W/ MINERALS TAB 9MG-400MCG
1 TAB ORAL DAILY
Status: DISCONTINUED | OUTPATIENT
Start: 2022-03-18 | End: 2022-03-24 | Stop reason: HOSPADM

## 2022-03-17 RX ORDER — NITROGLYCERIN 0.4 MG/1
0.4 TABLET SUBLINGUAL
Status: DISCONTINUED | OUTPATIENT
Start: 2022-03-17 | End: 2022-03-24 | Stop reason: HOSPADM

## 2022-03-17 RX ORDER — SODIUM CHLORIDE 9 MG/ML
75 INJECTION, SOLUTION INTRAVENOUS ONCE
Status: CANCELLED | OUTPATIENT
Start: 2022-03-18 | End: 2022-03-18

## 2022-03-17 RX ORDER — MELATONIN
2000 DAILY
Status: DISCONTINUED | OUTPATIENT
Start: 2022-03-18 | End: 2022-03-24 | Stop reason: HOSPADM

## 2022-03-17 RX ORDER — ONDANSETRON 4 MG/1
4 TABLET, FILM COATED ORAL EVERY 6 HOURS PRN
Status: DISCONTINUED | OUTPATIENT
Start: 2022-03-17 | End: 2022-03-24 | Stop reason: HOSPADM

## 2022-03-17 RX ADMIN — ENOXAPARIN SODIUM 40 MG: 100 INJECTION SUBCUTANEOUS at 20:16

## 2022-03-17 RX ADMIN — INSULIN LISPRO 3 UNITS: 100 INJECTION, SOLUTION INTRAVENOUS; SUBCUTANEOUS at 09:24

## 2022-03-17 RX ADMIN — SENNOSIDES AND DOCUSATE SODIUM 2 TABLET: 50; 8.6 TABLET ORAL at 09:23

## 2022-03-17 RX ADMIN — PANTOPRAZOLE SODIUM 40 MG: 40 INJECTION, POWDER, FOR SOLUTION INTRAVENOUS at 09:24

## 2022-03-17 RX ADMIN — BUDESONIDE 1 MG: 0.5 INHALANT ORAL at 21:53

## 2022-03-17 RX ADMIN — Medication 1 TABLET: at 09:23

## 2022-03-17 RX ADMIN — AMIODARONE HYDROCHLORIDE 200 MG: 200 TABLET ORAL at 22:25

## 2022-03-17 RX ADMIN — INSULIN GLARGINE-YFGN 10 UNITS: 100 INJECTION, SOLUTION SUBCUTANEOUS at 22:25

## 2022-03-17 RX ADMIN — DILTIAZEM HYDROCHLORIDE 90 MG: 60 TABLET, FILM COATED ORAL at 23:11

## 2022-03-17 RX ADMIN — BUDESONIDE 0.5 MG: 0.5 INHALANT RESPIRATORY (INHALATION) at 10:15

## 2022-03-17 RX ADMIN — Medication 10 ML: at 09:24

## 2022-03-17 RX ADMIN — INSULIN LISPRO 3 UNITS: 100 INJECTION, SOLUTION INTRAVENOUS; SUBCUTANEOUS at 11:52

## 2022-03-17 RX ADMIN — SODIUM CHLORIDE 75 ML/HR: 9 INJECTION, SOLUTION INTRAVENOUS at 23:55

## 2022-03-17 RX ADMIN — HEPARIN SODIUM 5000 UNITS: 5000 INJECTION INTRAVENOUS; SUBCUTANEOUS at 09:23

## 2022-03-17 RX ADMIN — Medication 3 MG: at 21:40

## 2022-03-17 RX ADMIN — AMIODARONE HYDROCHLORIDE 200 MG: 200 TABLET ORAL at 05:57

## 2022-03-17 RX ADMIN — METOPROLOL TARTRATE 25 MG: 25 TABLET, FILM COATED ORAL at 09:23

## 2022-03-17 RX ADMIN — ASPIRIN 81 MG CHEWABLE TABLET 81 MG: 81 TABLET CHEWABLE at 09:24

## 2022-03-17 RX ADMIN — METOPROLOL TARTRATE 25 MG: 25 TABLET, FILM COATED ORAL at 22:25

## 2022-03-17 RX ADMIN — LEVOTHYROXINE SODIUM 50 MCG: 0.05 TABLET ORAL at 05:57

## 2022-03-17 RX ADMIN — DILTIAZEM HYDROCHLORIDE 90 MG: 30 TABLET, FILM COATED ORAL at 05:57

## 2022-03-17 NOTE — PLAN OF CARE
Goal Outcome Evaluation:    Problem: Adult Inpatient Plan of Care  Goal: Absence of Hospital-Acquired Illness or Injury  Intervention: Identify and Manage Fall Risk  Recent Flowsheet Documentation  Taken 3/17/2022 0415 by Vanessa Malave RN  Safety Promotion/Fall Prevention:   assistive device/personal items within reach   clutter free environment maintained   fall prevention program maintained   nonskid shoes/slippers when out of bed   room organization consistent   safety round/check completed  Taken 3/17/2022 0200 by Vanessa Malave RN  Safety Promotion/Fall Prevention:   assistive device/personal items within reach   clutter free environment maintained   fall prevention program maintained   nonskid shoes/slippers when out of bed   room organization consistent   safety round/check completed  Taken 3/17/2022 0045 by Vanessa Malave RN  Safety Promotion/Fall Prevention:   assistive device/personal items within reach   clutter free environment maintained   fall prevention program maintained   nonskid shoes/slippers when out of bed   room organization consistent   safety round/check completed  Taken 3/16/2022 2201 by Vanessa Malave RN  Safety Promotion/Fall Prevention:   assistive device/personal items within reach   clutter free environment maintained   fall prevention program maintained   nonskid shoes/slippers when out of bed   room organization consistent   safety round/check completed  Taken 3/16/2022 2130 by Vanessa Malave RN  Safety Promotion/Fall Prevention:   assistive device/personal items within reach   clutter free environment maintained   fall prevention program maintained   nonskid shoes/slippers when out of bed   room organization consistent   safety round/check completed   mobility aid in reach  Intervention: Prevent Skin Injury  Recent Flowsheet Documentation  Taken 3/17/2022 0415 by Vanessa Malave RN  Body Position: position changed independently  Skin Protection:    adhesive use limited   tubing/devices free from skin contact  Taken 3/17/2022 0045 by Vanessa Malave RN  Body Position: position changed independently  Skin Protection:   adhesive use limited   tubing/devices free from skin contact  Taken 3/16/2022 2130 by Vanessa Malave RN  Body Position: (sitting up in chair) other (see comments)  Skin Protection:   adhesive use limited   tubing/devices free from skin contact  Intervention: Prevent and Manage VTE (venous thromboembolism) Risk  Recent Flowsheet Documentation  Taken 3/17/2022 0415 by Vanessa Malave RN  VTE Prevention/Management:   bilateral   sequential compression devices on  Taken 3/17/2022 0045 by Vanessa Malave RN  VTE Prevention/Management:   bilateral   sequential compression devices on  Taken 3/16/2022 2130 by Vanessa Malave RN  VTE Prevention/Management: (heparin SQ) other (see comments)  Intervention: Prevent Infection  Recent Flowsheet Documentation  Taken 3/17/2022 0415 by Vanessa Malave RN  Infection Prevention:   hand hygiene promoted   personal protective equipment utilized   rest/sleep promoted  Taken 3/17/2022 0200 by Vanessa Malave RN  Infection Prevention:   hand hygiene promoted   personal protective equipment utilized   rest/sleep promoted  Taken 3/17/2022 0045 by Vnaessa Malave RN  Infection Prevention:   hand hygiene promoted   personal protective equipment utilized   rest/sleep promoted  Taken 3/16/2022 2201 by Vanessa Malave RN  Infection Prevention:   hand hygiene promoted   personal protective equipment utilized   rest/sleep promoted  Taken 3/16/2022 2130 by Vanessa Malave RN  Infection Prevention:   hand hygiene promoted   personal protective equipment utilized   rest/sleep promoted  Goal: Optimal Comfort and Wellbeing  Intervention: Provide Person-Centered Care  Recent Flowsheet Documentation  Taken 3/17/2022 0415 by Vanessa Malave RN  Trust Relationship/Rapport: care explained  Taken  3/17/2022 0045 by Vanessa Malave RN  Trust Relationship/Rapport: care explained  Taken 3/16/2022 2130 by Vanessa Malave RN  Trust Relationship/Rapport:   care explained   choices provided   emotional support provided   empathic listening provided   questions answered   thoughts/feelings acknowledged     Problem: Adult Inpatient Plan of Care  Goal: Plan of Care Review  Outcome: Ongoing, Progressing  Goal: Patient-Specific Goal (Individualized)  Outcome: Ongoing, Progressing  Goal: Absence of Hospital-Acquired Illness or Injury  Outcome: Ongoing, Progressing  Intervention: Identify and Manage Fall Risk  Recent Flowsheet Documentation  Taken 3/17/2022 0415 by Vanessa Malave RN  Safety Promotion/Fall Prevention:   assistive device/personal items within reach   clutter free environment maintained   fall prevention program maintained   nonskid shoes/slippers when out of bed   room organization consistent   safety round/check completed  Taken 3/17/2022 0200 by Vanessa Malave RN  Safety Promotion/Fall Prevention:   assistive device/personal items within reach   clutter free environment maintained   fall prevention program maintained   nonskid shoes/slippers when out of bed   room organization consistent   safety round/check completed  Taken 3/17/2022 0045 by Vanessa Malave RN  Safety Promotion/Fall Prevention:   assistive device/personal items within reach   clutter free environment maintained   fall prevention program maintained   nonskid shoes/slippers when out of bed   room organization consistent   safety round/check completed  Taken 3/16/2022 2201 by Vanessa Malave, RN  Safety Promotion/Fall Prevention:   assistive device/personal items within reach   clutter free environment maintained   fall prevention program maintained   nonskid shoes/slippers when out of bed   room organization consistent   safety round/check completed  Taken 3/16/2022 2130 by Vanessa Malave, MARICHUY  Safety  Promotion/Fall Prevention:   assistive device/personal items within reach   clutter free environment maintained   fall prevention program maintained   nonskid shoes/slippers when out of bed   room organization consistent   safety round/check completed   mobility aid in [facility]  Intervention: Prevent Skin Injury  Recent Flowsheet Documentation  Taken 3/17/2022 0415 by Vanessa Malave RN  Body Position: position changed independently  Skin Protection:   adhesive use limited   tubing/devices free from skin contact  Taken 3/17/2022 0045 by Vanessa Malave RN  Body Position: position changed independently  Skin Protection:   adhesive use limited   tubing/devices free from skin contact  Taken 3/16/2022 2130 by Vanessa Malave RN  Body Position: (sitting up in chair) other (see comments)  Skin Protection:   adhesive use limited   tubing/devices free from skin contact  Intervention: Prevent and Manage VTE (Venous Thromboembolism) Risk  Recent Flowsheet Documentation  Taken 3/17/2022 0415 by Vanessa Malave RN  Activity Management:   activity adjusted per tolerance   ambulated to bathroom  VTE Prevention/Management:   bilateral   sequential compression devices on  Taken 3/17/2022 0045 by Vanessa Malave RN  Activity Management: activity adjusted per tolerance  VTE Prevention/Management:   bilateral   sequential compression devices on  Taken 3/16/2022 2130 by Vanessa Malave RN  Activity Management:   activity adjusted per tolerance   ambulated to bathroom  VTE Prevention/Management: (heparin SQ) other (see comments)  Intervention: Prevent Infection  Recent Flowsheet Documentation  Taken 3/17/2022 0415 by Vanessa Malave RN  Infection Prevention:   hand hygiene promoted   personal protective equipment utilized   rest/sleep promoted  Taken 3/17/2022 0200 by Vanessa Malave RN  Infection Prevention:   hand hygiene promoted   personal protective equipment utilized   rest/sleep promoted  Taken  3/17/2022 0045 by Vanessa Malave RN  Infection Prevention:   hand hygiene promoted   personal protective equipment utilized   rest/sleep promoted  Taken 3/16/2022 2201 by Vanessa Malave RN  Infection Prevention:   hand hygiene promoted   personal protective equipment utilized   rest/sleep promoted  Taken 3/16/2022 2130 by Vanessa Malave RN  Infection Prevention:   hand hygiene promoted   personal protective equipment utilized   rest/sleep promoted  Goal: Optimal Comfort and Wellbeing  Outcome: Ongoing, Progressing  Intervention: Provide Person-Centered Care  Recent Flowsheet Documentation  Taken 3/17/2022 0415 by Vanessa Malave RN  Trust Relationship/Rapport: care explained  Taken 3/17/2022 0045 by Vanessa Malave RN  Trust Relationship/Rapport: care explained  Taken 3/16/2022 2130 by Vanessa Malave RN  Trust Relationship/Rapport:   care explained   choices provided   emotional support provided   empathic listening provided   questions answered   thoughts/feelings acknowledged  Goal: Readiness for Transition of Care  Outcome: Ongoing, Progressing

## 2022-03-17 NOTE — PROGRESS NOTES
Referring Provider: Jaja Artis DO    Reason for follow-up:  Status post CABG  Atrial fibrillation  Hypertension  Left pleural effusion     Patient Care Team:  Minerva Chatterjee MD as PCP - General (Internal Medicine)    Subjective .      ROS    Since I have last seen, the patient has been without any chest discomfort ,shortness of breath, palpitations, dizziness or syncope.  Denies having any headache ,abdominal pain ,nausea, vomiting , diarrhea constipation, loss of weight or loss of appetite.  Denies having any excessive bruising ,hematuria or blood in the stool.    Review of all systems negative except as indicated    History  Past Medical History:   Diagnosis Date   • Astigmatism, bilateral 2019   • Benign essential hypertension    • Bilateral presbyopia 2019   • CAD (coronary artery disease)    • Closed right ankle fracture    • COVID-19 vaccination refused    • Hyperlipidemia    • Hypothyroidism    • Influenza vaccine needed    • Myocardial infarction (HCC)    • Prediabetes    • Pseudophakia, both eyes 2019   • Thoracic back pain        Past Surgical History:   Procedure Laterality Date   • APPENDECTOMY     • CARDIAC CATHETERIZATION  2012    x3    • CORONARY ARTERY BYPASS GRAFT  2014   • CORONARY STENT PLACEMENT      x3   • HARDWARE REMOVAL Right 2020    Procedure: ANKLE/FOOT HARDWARE REMOVAL;  Surgeon: Lincoln Sibley MD;  Location: [facility];  Service: Orthopedics;  Laterality: Right;   • ORIF ANKLE FRACTURE Right 2019    Radha Vazquez Mem       Family History   Problem Relation Age of Onset   • Heart disease Mother    • Lung cancer Father    • Diabetes Other        Social History     Tobacco Use   • Smoking status: Former Smoker     Types: Cigarettes     Quit date: 1980     Years since quittin.2   • Smokeless tobacco: Never Used   • Tobacco comment: Social Drinker   Vaping Use   • Vaping Use: Never used   Substance Use Topics   • Alcohol use: Yes      Comment: mod    • Drug use: No        Medications Prior to Admission   Medication Sig Dispense Refill Last Dose   • aspirin 81 MG chewable tablet Chew 81 mg Daily.   3/2/2022 at Unknown time   • Cholecalciferol (VITAMIN D3) 2000 units tablet Take 1 tablet by mouth Daily.   3/2/2022 at Unknown time   • irbesartan (Avapro) 300 MG tablet Take 150 mg by mouth Daily.      • metoprolol tartrate (LOPRESSOR) 25 MG tablet Take 1 tablet by mouth twice daily 180 tablet 0 3/2/2022 at Unknown time   • Multiple Vitamins-Minerals (MULTIVITAMIN ADULT EXTRA C PO) Take 1 tablet by mouth Daily.   3/2/2022 at Unknown time   • levothyroxine (SYNTHROID, LEVOTHROID) 50 MCG tablet Take 50 mcg by mouth Every Morning.          Allergies  Prednisone    Scheduled Meds:custom intrapleural syringe builder, , Intrapleural, Once  amiodarone, 200 mg, Oral, Q12H   Followed by  [START ON 3/27/2022] amiodarone, 200 mg, Oral, Daily  arformoterol, 15 mcg, Nebulization, BID - RT  aspirin, 81 mg, Oral, Daily  budesonide, 1 mg, Nebulization, BID - RT  dilTIAZem, 90 mg, Oral, Q8H  heparin (porcine), 5,000 Units, Subcutaneous, Q12H  insulin glargine, 10 Units, Subcutaneous, Nightly  insulin lispro, 0-14 Units, Subcutaneous, TID AC  levothyroxine, 50 mcg, Oral, Q AM  melatonin, 5 mg, Oral, Nightly  metoprolol tartrate, 25 mg, Oral, BID  multivitamin with minerals, 1 tablet, Oral, Daily  pantoprazole, 40 mg, Intravenous, BID  senna-docusate sodium, 2 tablet, Oral, BID  sodium chloride, 10 mL, Intravenous, Q12H      Continuous Infusions:hold, 1 each      PRN Meds:.•  acetaminophen **OR** acetaminophen **OR** acetaminophen  •  aluminum-magnesium hydroxide-simethicone  •  senna-docusate sodium **AND** polyethylene glycol **AND** bisacodyl **AND** bisacodyl  •  dextrose  •  dextrose  •  glucagon (human recombinant)  •  hold  •  insulin lispro **AND** insulin lispro  •  magnesium sulfate **OR** magnesium sulfate in D5W 1g/100mL (PREMIX)  •  nitroglycerin  •   "ondansetron **OR** ondansetron  •  phenylephrine-mineral oil-petrolatum  •  potassium chloride **OR** potassium chloride **OR** potassium chloride  •  potassium phosphate infusion greater than 15 mMoles **OR** potassium phosphate infusion greater than 15 mMoles **OR** potassium phosphate **OR** sodium phosphate IVPB **OR** sodium phosphate IVPB **OR** sodium phosphate IVPB  •  sodium chloride    Objective     VITAL SIGNS  Vitals:    03/16/22 2201 03/17/22 0557 03/17/22 0600 03/17/22 0601   BP: 133/79 152/88  152/88   BP Location: Left arm   Left arm   Patient Position: Sitting   Lying   Pulse: 68 79  79   Resp: 26   25   Temp: 97.8 °F (36.6 °C)   98.1 °F (36.7 °C)   TempSrc: Oral   Oral   SpO2: 95%   97%   Weight:   86 kg (189 lb 11.2 oz)    Height:           Flowsheet Rows    Flowsheet Row First Filed Value   Admission Height 162.6 cm (64\") Documented at 03/02/2022 2218   Admission Weight 81 kg (178 lb 9.2 oz) Documented at 03/02/2022 2218            Intake/Output Summary (Last 24 hours) at 3/17/2022 0635  Last data filed at 3/17/2022 0600  Gross per 24 hour   Intake 240 ml   Output 20 ml   Net 220 ml        TELEMETRY: Atrial fibrillation    Physical Exam:  The patient is alert, oriented and in no distress.  Vital signs as noted above.  Head and neck revealed no carotid bruits or jugular venous distention.  No thyromegaly or lymphadenopathy is present  Lungs clear.  No wheezing.  Breath sounds are normal bilaterally.  Left chest tube in place.  Heart normal first and second heart sounds.  No murmur. No precordial rub is present.  No gallop is present.  Abdomen soft and nontender.  No organomegaly is present.  Extremities with good peripheral pulses without any pedal edema.  Skin warm and dry.  Musculoskeletal system is grossly normal  CNS grossly normal      Results Review:   I reviewed the patient's new clinical results.  Lab Results (last 24 hours)     Procedure Component Value Units Date/Time    Phosphorus " [[id number]]  (Normal) Collected: 03/16/22 2354    Specimen: Blood Updated: 03/17/22 0127     Phosphorus 3.0 mg/dL      Comment: Result checked        Magnesium [[id number]]  (Normal) Collected: 03/16/22 2354    Specimen: Blood Updated: 03/17/22 0122     Magnesium 2.0 mg/dL     CBC & Differential [[id number]]  (Abnormal) Collected: 03/16/22 2354    Specimen: Blood Updated: 03/17/22 0104    Narrative:      The following orders were created for panel order CBC & Differential.  Procedure                               Abnormality         Status                     ---------                               -----------         ------                     CBC Auto Differential[[id number]]        Abnormal            Final result                 Please view results for these tests on the individual orders.    CBC Auto Differential [[id number]]  (Abnormal) Collected: 03/16/22 2354    Specimen: Blood Updated: 03/17/22 0104     WBC 12.30 10*3/mm3      RBC 2.91 10*6/mm3      Hemoglobin 9.1 g/dL      Hematocrit 27.0 %      MCV 92.6 fL      MCH 31.2 pg      MCHC 33.6 g/dL      RDW 15.8 %      RDW-SD 51.6 fl      MPV 7.2 fL      Platelets 534 10*3/mm3      Neutrophil % 85.5 %      Lymphocyte % 5.7 %      Monocyte % 8.0 %      Eosinophil % 0.4 %      Basophil % 0.4 %      Neutrophils, Absolute 10.50 10*3/mm3      Lymphocytes, Absolute 0.70 10*3/mm3      Monocytes, Absolute 1.00 10*3/mm3      Eosinophils, Absolute 0.10 10*3/mm3      Basophils, Absolute 0.00 10*3/mm3      nRBC 0.0 /100 WBC     POC Glucose Once [[id number]]  (Abnormal) Collected: 03/16/22 2026    Specimen: Blood Updated: 03/16/22 2027     Glucose 141 mg/dL      Comment: Serial Number: [id number]:  [id number]       POC Glucose Once [[id number]]  (Abnormal) Collected: 03/16/22 1626    Specimen: Blood Updated: 03/16/22 1627     Glucose 145 mg/dL      Comment: Serial Number: [id number]:  [id number]       POC Glucose Once [[id number]]  (Abnormal) Collected: 03/16/22 1126     Specimen: Blood Updated: 03/16/22 1127     Glucose 112 mg/dL      Comment: Serial Number: [id number]:  [id number]       Comprehensive Metabolic Panel [[id number]]  (Abnormal) Collected: 03/16/22 0015    Specimen: Blood Updated: 03/16/22 0851     Glucose 178 mg/dL      BUN 13 mg/dL      Creatinine 0.59 mg/dL      Sodium 127 mmol/L      Potassium 3.8 mmol/L      Comment: Slight hemolysis detected by analyzer. Results may be affected.        Chloride 94 mmol/L      CO2 21.0 mmol/L      Calcium 8.1 mg/dL      Total Protein 5.4 g/dL      Albumin 2.60 g/dL      ALT (SGPT) 44 U/L      AST (SGOT) 35 U/L      Alkaline Phosphatase 100 U/L      Total Bilirubin 0.4 mg/dL      Globulin 2.8 gm/dL      A/G Ratio 0.9 g/dL      BUN/Creatinine Ratio 22.0     Anion Gap 12.0 mmol/L      eGFR 91.2 mL/min/1.73      Comment: National Kidney Foundation and American Society of Nephrology (ASN) Task Force recommended calculation based on the Chronic Kidney Disease Epidemiology Collaboration (CKD-EPI) equation refit without adjustment for race.       Narrative:      GFR Normal >60  Chronic Kidney Disease <60  Kidney Failure <15      POC Glucose Once [[id number]]  (Abnormal) Collected: 03/16/22 0741    Specimen: Blood Updated: 03/16/22 0741     Glucose 124 mg/dL      Comment: Serial Number: [id number]:  [id number]             Imaging Results (Last 24 Hours)     Procedure Component Value Units Date/Time    XR Chest 1 View [[id number]] Resulted: 03/17/22 0458     Updated: 03/17/22 0459    XR Chest 1 View [[id number]] Collected: 03/16/22 0758     Updated: 03/16/22 0801    Narrative:      EXAM: XR CHEST 1 VW-     DATE OF EXAM: 3/16/2022 2:49 AM     INDICATION: Chest tube; I48.91-Unspecified atrial fibrillation;  I50.9-Heart failure, unspecified; Z20.822-Contact with and (suspected)  exposure to covid-19; I50.21-Acute systolic (congestive) heart failure;  W63-Qxyfmry effusion, not elsewhere classified.       COMPARISONS: 03/15/2022       FINDINGS:     Unchanged left-sided pleural effusion which appears partially loculated.  Left-sided chest tubes unchanged. Unchanged left basilar atelectasis  adjacent to the pleural effusion. No evidence of acute process in the  right lung. No pneumothorax. Mediastinal shadows are unchanged.       Impression:         Stable chest over the past 24 hours. Unchanged loculated left pleural  effusion with chest tube in place and adjacent atelectasis     Electronically Signed By-Nain Mccauley On:3/16/2022 7:59 AM  This report was finalized on [id number] by  Nain Mccauley, .      LAB RESULTS (LAST 7 DAYS)    CBC  Results from last 7 days   Lab Units 03/16/22  2354 03/16/22  0015 03/15/22  0410 03/14/22  1040 03/13/22  0032 03/12/22  0020 03/11/22  1206   WBC 10*3/mm3 12.30* 12.30* 10.00 11.10* 14.10* 13.00* 13.10*   RBC 10*6/mm3 2.91* 2.99* 2.76* 2.98* 3.05* 2.87* 2.79*   HEMOGLOBIN g/dL 9.1* 9.4* 8.8* 9.3* 9.5* 9.1* 9.1*   HEMATOCRIT % 27.0* 28.1* 26.0* 28.0* 28.8* 27.2* 26.6*   MCV fL 92.6 93.8 94.2 94.0 94.4 94.7 95.5   PLATELETS 10*3/mm3 534* 503* 444 419 485* 383 294       BMP  Results from last 7 days   Lab Units 03/16/22  2354 03/16/22  0015 03/15/22  0410 03/14/22  1039 03/13/22  0032 03/12/22  0020 03/11/22  1206   SODIUM mmol/L  --  127* 128* 129* 129* 128* 126*   POTASSIUM mmol/L  --  3.8 3.7 4.0 4.1 4.0 4.1   CHLORIDE mmol/L  --  94* 95* 96* 94* 94* 93*   CO2 mmol/L  --  21.0* 22.0 23.0 24.0 22.0 23.0   BUN mg/dL  --  13 15 16 20 17 18   CREATININE mg/dL  --  0.59 0.62 0.72 0.80 0.76 0.72   GLUCOSE mg/dL  --  178* 123* 141* 184* 154* 161*   MAGNESIUM mg/dL 2.0 1.9 1.9 2.0 2.2 2.0 2.1   PHOSPHORUS mg/dL 3.0 2.4* 2.6 2.5 2.9 2.5 2.8       CMP   Results from last 7 days   Lab Units 03/16/22  0015 03/15/22  0410 03/14/22  1039 03/13/22  0032 03/12/22  0020 03/11/22  1206   SODIUM mmol/L 127* 128* 129* 129* 128* 126*   POTASSIUM mmol/L 3.8 3.7 4.0 4.1 4.0 4.1   CHLORIDE mmol/L 94* 95* 96* 94* 94* 93*   CO2 mmol/L  21.0* 22.0 23.0 24.0 22.0 23.0   BUN mg/dL 13 15 16 20 17 18   CREATININE mg/dL 0.59 0.62 0.72 0.80 0.76 0.72   GLUCOSE mg/dL 178* 123* 141* 184* 154* 161*   ALBUMIN g/dL 2.60*  --  2.70* 3.10* 2.80* 2.60*   BILIRUBIN mg/dL 0.4  --  0.4 0.4 0.5 0.4   ALK PHOS U/L 100  --  110 118* 89 87   AST (SGOT) U/L 35*  --  30 38* 28 14   ALT (SGPT) U/L 44*  --  43* 44* 30 19         BNP        TROPONIN        CoAg        Creatinine Clearance  Estimated Creatinine Clearance: 58.3 mL/min (by C-G formula based on SCr of 0.59 mg/dL).    ABG        Radiology  XR Chest 1 View    Result Date: 3/16/2022   Stable chest over the past 24 hours. Unchanged loculated left pleural effusion with chest tube in place and adjacent atelectasis  Electronically Signed By-Nain Mccauley On:3/16/2022 7:59 AM This report was finalized on [id number] by  Nain Mccauley, .              EKG                              I personally viewed and interpreted the patient's EKG/Telemetry data: Atrial fibrillation          ECHOCARDIOGRAM:    Results for orders placed during the hospital encounter of 03/02/22    Adult Transthoracic Echo Complete W/ Cont if Necessary Per Protocol    Interpretation Summary  LVE with severe global left ventricular hypokinesis, estimated LV ejection fraction of 30%.  Severe right ventricular enlargement and moderate right atrial enlargement seen  Severe left atrial enlargement seen.  Aortic valve, mitral valve, tricuspid valve appears structurally normal, moderate mitral and mild tricuspid regurgitation seen.  Calculated RV systolic pressure of 40 to 45 mmHg.  No pericardial effusion seen.  Large pleural effusion seen.  Proximal aorta appears normal in size.          STRESS MYOVIEW:    Cardiolite (Tc-99m Sestamibi) stress test    CARDIAC CATHETERIZATION:            OTHER:         Assessment/Plan     Principal Problem:    Atrial fibrillation with RVR (HCC)  Active Problems:    Other hyperlipidemia    Essential hypertension     Hypothyroidism    Coronary artery disease involving coronary bypass graft of native heart without angina pectoris    Acute CHF (congestive heart failure) (HCC)    Acute hyponatremia    Elevated LFTs    Elevated TSH    Acute hyperglycemia    Obesity (BMI 30-39.9)      Presented through emergency room 3/2/2022 with progressively worsening shortness of breath and dyspnea on exertion weight gain and leg edema. Was found to be in A. fib and congestive heart failure. Patient denies any chest pain. Patient lives in [place] and has a second home in [place] and see his cardiologist at [facility]. Patient has been having issues with alopecia and memory issues therefore stopped beta-blockers and statin on her own. Also has not been taking  thyroid replacement therapy since November because she ran out of medication. Patient has history of prediabetes. Does not check her sugars at home.  Work-up here revealed troponin x3 are negative. proBNP is 7717. CMP reveals a glucose of 242, hemoglobin A1c over 7.1. BUN/creatinine are 21/0.83. ALT elevated at 71, AST 35. TSH is 11.15  Chest x-ray 3/2/2022 reveals left pleural effusion, left basilar disease most likely atelectasis with possible pneumonia.  EKG 3/2/2022 reviewed/interpreted by me reveals A. fib with RVR at 126 bpm with PVCs      ]]]]]]]]]]]]]]]]]]]]  Impression  ==========  -Progressively worsening shortness of breath and leg edema.    -Congestive heart failure  Left ventricle dysfunction with ejection fraction of 30%.    -Significant biatrial enlargement    -Atrial fibrillation with RVR    -Premature ventricular contractions    -Status post CABG 12/2014 (performed in [place])    -Hypertension dyslipidemia prediabetes hypothyroidism elevation    -Status post ORIF    -Family history of coronary artery disease    -Intolerance to prednisone    -Former smoker    -Medical noncompliance.  Was not taking thyroid medicine replacement  properly.     =================  Plan  ===========  Atrial fibrillation with RVR.  Rate control.  Consider GABRIEL cardioversion.  Patient would benefit from long-term anticoagulation for atrial fibrillation because of high [id number] score due to female gender age over 75, hypertension, diabetes, CAD making it at least 6. We will start her on Eliquis or warfarin before discharge.  Continued IV diuresis.  Observe renal function closely.  Elevated LFTs due to passive congestion.    Large left pleural effusion.  Status post drainage and chest tube.  More than 400 cc of fluid was removed.  Patient is on Cardizem and amiodarone.  Chest tube was flushed with TPA yesterday by thoracic surgery.  Patient has loculated left pleural effusion.  Another round of TPA infusion through the chest tube is being planned.     Been started for left VATS with decortication for adequate reexpansion of the lung.  Patient to be transferred to [facility] when bed is available.    Renal dysfunction  BUN/creatinine-30/1.03    Echocardiogram showed severe right ventricle enlargement left atrial enlargement related enlargement and calculated pulmonary artery pressure of 45 mmHg.  Left ventricle dysfunction with ejection fraction of 30%.    Ischemic cardiomyopathy    Current medications include amiodarone aspirin Cardizem 90 mg every 8 hours subcu heparin levothyroxine metoprolol 25 mg twice a day.    Anticoagulation and possible cardioversion as an outpatient versus GABRIEL cardioversion to try to convert to sinus rhythm.  Patient is not on anticoagulation at this time due to presence of chest tube hemorrhagic left pleural effusion etc.  This was discussed with patient and patient's  at bedside.    Follow-up labs ordered.    Further plan will depend on patient's progress.  ]]]]]]]]]]]]]]]]]           Yordan Evans MD  03/17/22  06:35 EDT

## 2022-03-17 NOTE — PROGRESS NOTES
[facility] Clinical Pharmacy Services: Enoxaparin Consult    Jaquelin EVANS Valderrama has a pharmacy consult to dose prophylactic enoxaparin per Dr. Rodriguez's request.     Indication: VTE prophylaxis  Home Anticoagulation: none  Pt was on heparin 5000 SQ q12h for VTE ppx at [facility].      Relevant clinical data and objective history reviewed:  82 y.o. female       There is no height or weight on file to calculate BMI.  Estimated Creatinine Clearance: 58.3 mL/min (by C-G formula based on SCr of 0.69 mg/dL).    Assessment/Plan    Will start patient on 40mg subcutaneous every 24 hours, adjusted for renal function. Consult order will be discontinued but pharmacy will continue to follow.     Dia Hensley, PharmD  Clinical Pharmacist

## 2022-03-17 NOTE — PROGRESS NOTES
[facility] Medicine Services Daily Progress Note    Patient Name: Jaquelin Valderrama  : 1940  MRN: [id number]  Primary Care Physician:  Minerva Chatterjee MD  Date of admission: 3/2/2022      Subjective      Chief Complaint: Atrial fibrillation with RVR pleural effusion        Patient Reports states she did well overnight slept okay. Stayed in chair > 2 hours today.      03/15/2022  No overnight events.  CT chest shows worsening effusion, case discussed with CTS and she is to be transferred to [facility] for surgery on 3/16/2022.    2022  Patient stable and without complaint, surgery delayed until 3/18/2022    3/17/2022  No overnight events.  Patient to be transferred to [facility] today for VATS decortication planned 2022.  See discharge summary on 2022 for further information     ROS negative except as above    Objective      Vitals:   Temp:  [97.5 °F (36.4 °C)-98.1 °F (36.7 °C)] 97.5 °F (36.4 °C)  Heart Rate:  [64-81] 77  Resp:  [18-26] 26  BP: (129-152)/(68-88) 150/69  Flow (L/min):  [2] 2    Vitals reviewed.   Constitutional:       Comments: Left chest tube in place  at bedside  Patient is on room air. No distress  HENT:      Head: Normocephalic.      Nose: Nose normal.      Mouth/Throat:      Mouth: Mucous membranes are moist.   Eyes:      Pupils: Pupils are equal, round, and reactive to light.   Cardiovascular:      Rate and Rhythm: irregular rate and rhythm.      Pulses: Normal pulses.      Heart sounds: Normal heart sounds.   Pulmonary:      Effort: Pulmonary effort is normal.      Comments: Left chest tube catheter in place  Left side decreased breath sounds  Stable on room air  Abdominal:      General: Abdomen is flat.      Palpations: Abdomen is soft.   Musculoskeletal:         General: Normal range of motion.      Cervical back: Normal range of motion.   Skin:     General: Skin is warm.      Capillary Refill: Capillary refill takes less than 2  seconds.   Neurological:      General: No focal deficit present.      Mental Status: She is alert and oriented to person, place, and time.   Psychiatric:         Mood and Affect: Mood normal.         Behavior: Behavior normal.        Result Review    Result Review:  I have personally reviewed the results from the time of this admission to 3/17/2022 11:58 EDT and agree with these findings:  [x]  Laboratory  []  Microbiology  [x]  Radiology  []  EKG/Telemetry   []  Cardiology/Vascular   []  Pathology  []  Old records  []  Oth    Wounds (last 24 hours)              Assessment/Plan       Brief Patient Summary:  Jaquelin Valderrama is a 82 y.o. female who presented with A. fib RVR and pleural effusion status post chest tube        amiodarone, 200 mg, Oral, Q8H   Followed by  [START ON 3/13/2022] amiodarone, 200 mg, Oral, Q12H   Followed by  [START ON 3/27/2022] amiodarone, 200 mg, Oral, Daily  arformoterol, 15 mcg, Nebulization, BID - RT  aspirin, 81 mg, Oral, Daily  budesonide, 1 mg, Nebulization, BID - RT  cefepime, 2 g, Intravenous, Q12H  dilTIAZem, 90 mg, Oral, Q8H  heparin (porcine), 5,000 Units, Subcutaneous, Q12H  HYDROmorphone, 2 mg, Intravenous, Once  insulin glargine, 10 Units, Subcutaneous, Nightly  insulin lispro, 0-14 Units, Subcutaneous, TID AC  levothyroxine, 50 mcg, Oral, Q AM  melatonin, 5 mg, Oral, Nightly  metoprolol tartrate, 25 mg, Oral, BID  multivitamin with minerals, 1 tablet, Oral, Daily  senna-docusate sodium, 2 tablet, Oral, BID  sodium chloride, 10 mL, Intravenous, Q12H        hold, 1 each           Active Hospital Problems:          Active Hospital Problems     Diagnosis     • **Atrial fibrillation with RVR (HCC)     • Acute CHF (congestive heart failure) ([facility])     • Acute hyponatremia     • Elevated LFTs     • Elevated TSH     • Acute hyperglycemia     • Obesity (BMI 30-39.9)     • Coronary artery disease involving coronary bypass graft of native heart without angina pectoris     • Essential  hypertension     • Hypothyroidism     • Other hyperlipidemia           ASSESSMENT:  Possible hemothorax  Acute respiratory distress  COPD exacerbation  Atrial fibrillation with RVR (HCC), now resolved    Other hyperlipidemia    Essential hypertension    Hypothyroidism    Coronary artery disease involving coronary bypass graft of native heart without angina pectoris    Acute CHF (congestive heart failure) ([facility]) EFG 45%    Acute hyponatremia    Elevated LFTs    Elevated TSH    Acute hyperglycemia    Obesity (BMI 30-39.9)   ARF     PLAN:    #Acute hypoxic respiratory failure  #Possible hemothorax    - weaned to room air    -Hemoglobin stable back on aspirin and heparin    - chest tube remains on suction    - s/p TPA 03/13/22 (3x total)    - Pulm has reconsulted CTS to consider if tube needs to be moved/replaced    -Blood transfusion as needed    - Defer chest tube mgt to pulm and CTS    - CXR on 3/14 shows stable left pleural effusion    - stable large hemothorax on CT on 3/15    - Discussed with Thoracic surgery, patient to be transferred to [facility] with surgery planned on 3/18/2022    - see discharge summary on 03/16/2022 for further information    #COPD, chronic    -weaned to room air    - continue Brovana and Pulmicort    #DM    -ISS    - Lantus 10u SC QHS    #CAD    -s/p CABG    - continue aspirin daily    #HFrEF, chronic  # Ischemic cardiomyopathy    - appears euvolemic    -cardiology following    #A. Fib with RVR    - Continue PO Amiodarone 200mg PO q12h for 14 days then wean to 200mg daily    - Cardizem 90mg PO q8h    - Lopressor 25mg PO BID    - cardio following, recommends otpt GABRIEL cardioversion    #Hypothyroid    -continue home synthroid    #Obesity     - BMI 33.51    - weight loss encouraged    #DVT prophylaxis    -heparin       DVT prophylaxis:  Medical DVT prophylaxis orders are present.     CODE STATUS:    Code Status (Patient has no pulse and is not breathing): CPR (Attempt to Resuscitate)  Medical  Interventions (Patient has pulse or is breathing): Full Support        Disposition:  Patient to be transferred to [facility] [place], operated by [facility]     This patient has been examined wearing appropriate Personal Protective Equipment and discussed with  patient and nursing staff.03/17/22      Electronically signed by Jaja Artis DO, 03/17/22, 11:58 EDT.  [facility] Hospitalist Team

## 2022-03-17 NOTE — PLAN OF CARE
Problem: Adult Inpatient Plan of Care  Goal: Plan of Care Review  Outcome: Ongoing, Progressing  Flowsheets (Taken 3/17/2022 1702)  Progress: no change  Plan of Care Reviewed With: patient   Goal Outcome Evaluation:  Plan of Care Reviewed With: patient        Progress: no change       Pt transferred from [place]. Pt will be NPO at midnight for a L VAT and decortication. Pt consent is signed and in the chart. Pt has a left chest tube to  -20 LWS. Dressing was changed and is clean, dry, and intact. Pt well controlled. Pt is in a-fib but in a controlled rate. Pt getting IV fluids. VSS. Will continue to monitor.

## 2022-03-17 NOTE — PROGRESS NOTES
"PULMONARY CRITICAL CARE Progress  NOTE      PATIENT IDENTIFICATION:  Name: Jaquelin Valderrama  MRN: [id number]  :  1940     Age: 82 y.o.  Sex: female    DATE OF Note:  3/17/2022   Referring Physician: Jaja Artis DO                  Subjective:   On 2 L no SOB no chest pain,   Chest tube minimal drainage but chest x-ray is not showing improvement result discussed with the patient and family  Otherwise patient denies any chest pain  Good appetite  no nausea or vomiting, no change in bowel habit, no dysuria,  no new  skin rash or itching.    Objective:  tMax 24 hrs: Temp (24hrs), Av.8 °F (36.6 °C), Min:97.5 °F (36.4 °C), Max:98.1 °F (36.7 °C)      Vitals Ranges:   Temp:  [97.5 °F (36.4 °C)-98.1 °F (36.7 °C)] 98.1 °F (36.7 °C)  Heart Rate:  [56-81] 79  Resp:  [18-26] 25  BP: (129-152)/(68-88) 152/88    Intake and Output Last 3 Shifts:   I/O last 3 completed shifts:  In: 480 [P.O.:480]  Out: 80 [Chest Tube:80]    Exam:  /88 (BP Location: Left arm, Patient Position: Lying)   Pulse 79   Temp 98.1 °F (36.7 °C) (Oral)   Resp 25   Ht 160 cm (63\")   Wt 86 kg (189 lb 11.2 oz)   SpO2 97%   BMI 33.60 kg/m²     General Appearance:   AA  HEENT:  Normocephalic, without obvious abnormality, Conjunctiva/corneas clear,.  Normal external ear canals, Nares normal, no drainage     Neck:  Supple, symmetrical, trachea midline. No JVD.  Lungs /Chest wall: chest tube in place  No BS on left side    respirations unlabored symmetrical wall movement.     Heart:  Regular rate and rhythm, systolic murmur PMI left sternal border  Abdomen: Soft, non-tender, no masses, no organomegaly.    Extremities: Trace edema no clubbing or Cyanosis        Medications:    Current Facility-Administered Medications:   •  acetaminophen (TYLENOL) tablet 650 mg, 650 mg, Oral, Q4H PRN, 650 mg at 03/15/22 0902 **OR** acetaminophen (TYLENOL) 160 MG/5ML solution 650 mg, 650 mg, Oral, Q4H PRN **OR** acetaminophen (TYLENOL) suppository 650 mg, " 650 mg, Rectal, Q4H PRN, Bharat Montgomery MD  •  aluminum-magnesium hydroxide-simethicone (MAALOX MAX) 400-400-40 MG/5ML suspension 15 mL, 15 mL, Oral, Q6H PRN, Bharat Montgomery MD  •  [COMPLETED] amiodarone in dextrose 5% (NEXTERONE) loading dose 150mg/100mL, 150 mg, Intravenous, Once, 150 mg at 22 0525 **FOLLOWED BY** [] amiodarone 360 mg in 200 mL D5W infusion, 1 mg/min, Intravenous, Continuous, Last Rate: 33.3 mL/hr at 22 1100, 1 mg/min at 22 1100 **FOLLOWED BY** [] amiodarone 360 mg in 200 mL D5W infusion, 0.5 mg/min, Intravenous, Continuous, Stopped at 22 0232 **FOLLOWED BY** [COMPLETED] amiodarone (PACERONE) tablet 200 mg, 200 mg, Oral, Once, 200 mg at 22 0603 **FOLLOWED BY** [COMPLETED] amiodarone (PACERONE) tablet 200 mg, 200 mg, Oral, Q8H, 200 mg at 22 2135 **FOLLOWED BY** amiodarone (PACERONE) tablet 200 mg, 200 mg, Oral, Q12H, 200 mg at 22 0557 **FOLLOWED BY** [START ON 3/27/2022] amiodarone (PACERONE) tablet 200 mg, 200 mg, Oral, Daily, Sabra Roberts APRN  •  arformoterol (BROVANA) nebulizer solution 15 mcg, 15 mcg, Nebulization, BID - RT, Bhaart Montgomery MD, 15 mcg at 22  •  aspirin chewable tablet 81 mg, 81 mg, Oral, Daily, Bharat Montgomery MD, 81 mg at 22 0813  •  sennosides-docusate (PERICOLACE) 8.6-50 MG per tablet 2 tablet, 2 tablet, Oral, BID, 2 tablet at 22 0813 **AND** polyethylene glycol (MIRALAX) packet 17 g, 17 g, Oral, Daily PRN **AND** bisacodyl (DULCOLAX) EC tablet 5 mg, 5 mg, Oral, Daily PRN **AND** bisacodyl (DULCOLAX) suppository 10 mg, 10 mg, Rectal, Daily PRN, Bharat Montgomery MD  •  budesonide (PULMICORT) nebulizer solution 1 mg, 1 mg, Nebulization, BID - RT, Bharat Montgomery MD, 1 mg at 22  •  dextrose (D50W) (25 g/50 mL) IV injection 25 g, 25 g, Intravenous, Q15 Min PRN, Bharat Montgomery MD  •  dextrose (GLUTOSE) oral gel 15 g, 15 g, Oral, Q15 Min PRN, Bharat Montgomery MD  •  dilTIAZem  (CARDIZEM) tablet 90 mg, 90 mg, Oral, Q8H, Bharat Montgomery MD, 90 mg at 03/17/22 0557  •  glucagon (human recombinant) (GLUCAGEN DIAGNOSTIC) 1 mg in sterile water (preservative free) 1 mL injection, 1 mg, Subcutaneous, PRN, Bharat Montgomery MD  •  heparin (porcine) 5000 UNIT/ML injection 5,000 Units, 5,000 Units, Subcutaneous, Q12H, Bharat Montgomery MD, 5,000 Units at 03/16/22 0812  •  insulin glargine (LANTUS, SEMGLEE) injection 10 Units, 10 Units, Subcutaneous, Nightly, Bharat Montgomery MD, 10 Units at 03/16/22 2136  •  insulin lispro (ADMELOG) injection 0-14 Units, 0-14 Units, Subcutaneous, TID AC, 5 Units at 03/15/22 1150 **AND** insulin lispro (ADMELOG) injection 0-14 Units, 0-14 Units, Subcutaneous, PRN, Elina Adams APRN  •  levothyroxine (SYNTHROID, LEVOTHROID) tablet 50 mcg, 50 mcg, Oral, Q AM, Bharat Montgomery MD, 50 mcg at 03/17/22 0557  •  Magnesium Sulfate 2 gram infusion - Mg less than or equal to 1.5 mg/dL, 2 g, Intravenous, PRN **OR** Magnesium Sulfate 1 gram infusion - Mg 1.6-1.9 mg/dL, 1 g, Intravenous, PRN, Bharat Montgomery MD, Last Rate: 100 mL/hr at 03/04/22 0925, 1 g at 03/04/22 0925  •  melatonin tablet 5 mg, 5 mg, Oral, Nightly, Mp Galicia MD, 5 mg at 03/16/22 2127  •  metoprolol tartrate (LOPRESSOR) tablet 25 mg, 25 mg, Oral, BID, Bharat Montgomery MD, 25 mg at 03/16/22 2128  •  multivitamin with minerals 1 tablet, 1 tablet, Oral, Daily, Bharat Montgomery MD, 1 tablet at 03/16/22 0813  •  nitroglycerin (NITROSTAT) SL tablet 0.4 mg, 0.4 mg, Sublingual, Q5 Min PRN, Bharat Montgomery MD  •  ondansetron (ZOFRAN) tablet 4 mg, 4 mg, Oral, Q6H PRN, 4 mg at 03/13/22 0812 **OR** ondansetron (ZOFRAN) injection 4 mg, 4 mg, Intravenous, Q6H PRN, Bharat Montgomery MD, 4 mg at 03/05/22 0544  •  pantoprazole (PROTONIX) injection 40 mg, 40 mg, Intravenous, BID, Jaja Artis DO, 40 mg at 03/16/22 0813  •  phenylephrine-mineral oil-petrolatum (PREPARATION H) 0.25-14-74.9 % hemorhoidal ointment, , Rectal,  BID PRN, Lidia Covarrubias, APRN  •  potassium chloride (K-DUR,KLOR-CON) CR tablet 40 mEq, 40 mEq, Oral, PRN, 40 mEq at 03/04/22 0925 **OR** potassium chloride (KLOR-CON) packet 40 mEq, 40 mEq, Oral, PRN **OR** potassium chloride 10 mEq in 100 mL IVPB, 10 mEq, Intravenous, Q1H PRN, Bharat Montgomery MD  •  potassium phosphate 45 mmol in sodium chloride 0.9 % 500 mL infusion, 45 mmol, Intravenous, PRN **OR** potassium phosphate 30 mmol in sodium chloride 0.9 % 250 mL infusion, 30 mmol, Intravenous, PRN **OR** potassium phosphate 15 mmol in 0.9% sodium chloride 100 mL IVPB, 15 mmol, Intravenous, PRN **OR** sodium phosphates 45 mmol in sodium chloride 0.9 % 500 mL IVPB, 45 mmol, Intravenous, PRN **OR** sodium phosphates 30 mmol in sodium chloride 0.9 % 250 mL IVPB, 30 mmol, Intravenous, PRN **OR** sodium phosphates 15 mmol in sodium chloride 0.9 % 250 mL IVPB, 15 mmol, Intravenous, PRN, Jaja Artis,   •  sodium chloride 0.9 % flush 10 mL, 10 mL, Intravenous, Q12H, Bharat Montgomery MD, 10 mL at 03/16/22 2138  •  sodium chloride 0.9 % flush 10 mL, 10 mL, Intravenous, PRN, Bharat Mnotgomery MD, 10 mL at 03/08/22 2221    Data Review:  All labs (24hrs):   Recent Results (from the past 24 hour(s))   POC Glucose Once    Collection Time: 03/16/22 11:26 AM    Specimen: Blood   Result Value Ref Range    Glucose 112 (H) 70 - 105 mg/dL   POC Glucose Once    Collection Time: 03/16/22  4:26 PM    Specimen: Blood   Result Value Ref Range    Glucose 145 (H) 70 - 105 mg/dL   POC Glucose Once    Collection Time: 03/16/22  8:26 PM    Specimen: Blood   Result Value Ref Range    Glucose 141 (H) 70 - 105 mg/dL   Magnesium    Collection Time: 03/16/22 11:54 PM    Specimen: Blood   Result Value Ref Range    Magnesium 2.0 1.6 - 2.4 mg/dL   Phosphorus    Collection Time: 03/16/22 11:54 PM    Specimen: Blood   Result Value Ref Range    Phosphorus 3.0 2.5 - 4.5 mg/dL   CBC Auto Differential    Collection Time: 03/16/22 11:54 PM    Specimen: Blood    Result Value Ref Range    WBC 12.30 (H) 3.40 - 10.80 10*3/mm3    RBC 2.91 (L) 3.77 - 5.28 10*6/mm3    Hemoglobin 9.1 (L) 12.0 - 15.9 g/dL    Hematocrit 27.0 (L) 34.0 - 46.6 %    MCV 92.6 79.0 - 97.0 fL    MCH 31.2 26.6 - 33.0 pg    MCHC 33.6 31.5 - 35.7 g/dL    RDW 15.8 (H) 12.3 - 15.4 %    RDW-SD 51.6 37.0 - 54.0 fl    MPV 7.2 6.0 - 12.0 fL    Platelets 534 (H) 140 - 450 10*3/mm3    Neutrophil % 85.5 (H) 42.7 - 76.0 %    Lymphocyte % 5.7 (L) 19.6 - 45.3 %    Monocyte % 8.0 5.0 - 12.0 %    Eosinophil % 0.4 0.3 - 6.2 %    Basophil % 0.4 0.0 - 1.5 %    Neutrophils, Absolute 10.50 (H) 1.70 - 7.00 10*3/mm3    Lymphocytes, Absolute 0.70 0.70 - 3.10 10*3/mm3    Monocytes, Absolute 1.00 (H) 0.10 - 0.90 10*3/mm3    Eosinophils, Absolute 0.10 0.00 - 0.40 10*3/mm3    Basophils, Absolute 0.00 0.00 - 0.20 10*3/mm3    nRBC 0.0 0.0 - 0.2 /100 WBC   Basic Metabolic Panel    Collection Time: 03/16/22 11:54 PM    Specimen: Blood   Result Value Ref Range    Glucose 149 (H) 65 - 99 mg/dL    BUN 13 8 - 23 mg/dL    Creatinine 0.69 0.57 - 1.00 mg/dL    Sodium 129 (L) 136 - 145 mmol/L    Potassium 3.9 3.5 - 5.2 mmol/L    Chloride 95 (L) 98 - 107 mmol/L    CO2 21.0 (L) 22.0 - 29.0 mmol/L    Calcium 8.1 (L) 8.6 - 10.5 mg/dL    BUN/Creatinine Ratio 18.8 7.0 - 25.0    Anion Gap 13.0 5.0 - 15.0 mmol/L    eGFR 87.9 >60.0 mL/min/1.73   POC Glucose Once    Collection Time: 03/17/22  7:55 AM    Specimen: Blood   Result Value Ref Range    Glucose 154 (H) 70 - 105 mg/dL        Imaging:  XR Chest 1 View  Narrative: EXAM: XR CHEST 1 VW-     DATE OF EXAM: 3/17/2022 4:04 AM     INDICATION: Chest tube; I48.91-Unspecified atrial fibrillation;  I50.9-Heart failure, unspecified; Z20.822-Contact with and (suspected)  exposure to covid-19; I50.21-Acute systolic (congestive) heart failure;  H75-Onhdifq effusion, not elsewhere classified.       COMPARISONS: 03/16/2022      FINDINGS:     Unchanged loculated left pleural effusion with unchanged  left-sided  chest tube. No pneumothorax. No acute findings of the right lung.  Mediastinal shadows are unchanged.     Impression:    Stable chest over the past 24 hours. Unchanged loculated left pleural  effusion with chest tube in place, and adjacent atelectasis     Electronically Signed By-Nain Mccauley On:3/17/2022 7:41 AM  This report was finalized on [id number] by  Nain Mccauley, .       ASSESSMENT:  Possible hemothorax  Acute respiratory distress  COPD exacerbation  Atrial fibrillation with RVR ([facility])    Other hyperlipidemia    Essential hypertension    Hypothyroidism    Coronary artery disease involving coronary bypass graft of native heart without angina pectoris    Acute CHF (congestive heart failure) ([facility]) EFG 45%    Acute hyponatremia    Elevated LFTs    Elevated TSH    Acute hyperglycemia    Obesity (BMI 30-39.9)   ARF     PLAN:  Pending decortication   Chest tube management to suction  Encouraged to use I-S flutter valve  Heart rate control  Continue to monitor blood pressure  Bronchodilator  Inhaled corticosteroids  Electrolytes/ glycemic control    heparin sq prophylaxis   DVT and GI prophylaxis.    Total Critical care time in direct medical management (   ) minutes, This time specifically excludes time spent performing procedures.  Bharat Montgomery MD. D, ABSM.     3/17/2022  08:48 EDT

## 2022-03-17 NOTE — PROGRESS NOTES
"    Chief Complaint: loculated pleural effusion, right  S/P: Left thoracentesis on 3/4/22, chest tube insertion on 3/5/22, intrapleural TPA on 3/9/22, 3/10/22 and 3/12/22      Subjective:  Symptoms:  Stable.  She reports shortness of breath, cough, weakness and chest pressure.    Diet:  Adequate intake.  No nausea or vomiting.    Activity level: Impaired due to weakness.    Pain:  She complains of pain that is mild.  She reports pain is improving.  Pain is well controlled.    Anticipating transfer to [facility].  Eager for surgery.  No new complaints.    Vital Signs:  Temp:  [97.5 °F (36.4 °C)-98.1 °F (36.7 °C)] 98.1 °F (36.7 °C)  Heart Rate:  [56-81] 79  Resp:  [18-26] 25  BP: (129-152)/(68-88) 152/88    Intake & Output (last day)       03/16 0701  03/17 0700 03/17 0701  03/18 0700    P.O. 240     Total Intake(mL/kg) 240 (2.8)     Urine (mL/kg/hr)      Stool      Chest Tube 20     Total Output 20     Net +220                 Objective:  General Appearance:  Comfortable, in no acute distress and ill-appearing.    Vital signs: (most recent): Blood pressure 152/88, pulse 79, temperature 98.1 °F (36.7 °C), temperature source Oral, resp. rate 25, height 160 cm (63\"), weight 86 kg (189 lb 11.2 oz), SpO2 97 %, not currently breastfeeding.  Vital signs are normal.    HEENT: Normal HEENT exam.    Lungs:  Tachypnea and increased effort.  She is not in respiratory distress.  There are decreased breath sounds and wheezes.  No rhonchi.  (Conversational dyspnea)  Heart: Normal rate.    Chest: Chest wall tenderness (incisional) present.    Extremities: Decreased range of motion.  There is no dependent edema.    Neurological: Patient is alert and oriented to person, place and time.    Skin:  Warm and dry.              Chest tube:   Site: Left, Clean, Dry, Intact and Securement device intact  Suction: -20 cm  Air Leak: negative  24 Hour Total: 20cm    Results Review:     I reviewed the patient's new clinical " results.  I reviewed the patient's new imaging results and agree with the interpretation.  I reviewed the patient's other test results and agree with the interpretation  Discussed with patient, RN and Dr. Kuo'    Imaging Results (Last 24 Hours)     Procedure Component Value Units Date/Time    XR Chest 1 View [[id number]] Collected: 03/17/22 0740     Updated: 03/17/22 0743    Narrative:      EXAM: XR CHEST 1 VW-     DATE OF EXAM: 3/17/2022 4:04 AM     INDICATION: Chest tube; I48.91-Unspecified atrial fibrillation;  I50.9-Heart failure, unspecified; Z20.822-Contact with and (suspected)  exposure to covid-19; I50.21-Acute systolic (congestive) heart failure;  K20-Yjpflof effusion, not elsewhere classified.       COMPARISONS: 03/16/2022      FINDINGS:     Unchanged loculated left pleural effusion with unchanged left-sided  chest tube. No pneumothorax. No acute findings of the right lung.  Mediastinal shadows are unchanged.       Impression:         Stable chest over the past 24 hours. Unchanged loculated left pleural  effusion with chest tube in place, and adjacent atelectasis     Electronically Signed By-Nain Mccauley On:3/17/2022 7:41 AM  This report was finalized on [id number] by  Nain Mccauley, .          Lab Results:     Lab Results (last 24 hours)     Procedure Component Value Units Date/Time    Basic Metabolic Panel [[id number]]  (Abnormal) Collected: 03/16/22 2354    Specimen: Blood Updated: 03/17/22 0823     Glucose 149 mg/dL      BUN 13 mg/dL      Creatinine 0.69 mg/dL      Sodium 129 mmol/L      Potassium 3.9 mmol/L      Chloride 95 mmol/L      CO2 21.0 mmol/L      Calcium 8.1 mg/dL      BUN/Creatinine Ratio 18.8     Anion Gap 13.0 mmol/L      eGFR 87.9 mL/min/1.73      Comment: National Kidney Foundation and American Society of Nephrology (ASN) Task Force recommended calculation based on the Chronic Kidney Disease Epidemiology Collaboration (CKD-EPI) equation refit without adjustment for race.        Narrative:      GFR Normal >60  Chronic Kidney Disease <60  Kidney Failure <15      POC Glucose Once [[id number]]  (Abnormal) Collected: 03/17/22 0755    Specimen: Blood Updated: 03/17/22 0757     Glucose 154 mg/dL      Comment: Serial Number: [id number]:  [id number]       Phosphorus [[id number]]  (Normal) Collected: 03/16/22 2354    Specimen: Blood Updated: 03/17/22 0127     Phosphorus 3.0 mg/dL      Comment: Result checked        Magnesium [[id number]]  (Normal) Collected: 03/16/22 2354    Specimen: Blood Updated: 03/17/22 0122     Magnesium 2.0 mg/dL     CBC & Differential [[id number]]  (Abnormal) Collected: 03/16/22 2354    Specimen: Blood Updated: 03/17/22 0104    Narrative:      The following orders were created for panel order CBC & Differential.  Procedure                               Abnormality         Status                     ---------                               -----------         ------                     CBC Auto Differential[[id number]]        Abnormal            Final result                 Please view results for these tests on the individual orders.    CBC Auto Differential [[id number]]  (Abnormal) Collected: 03/16/22 2354    Specimen: Blood Updated: 03/17/22 0104     WBC 12.30 10*3/mm3      RBC 2.91 10*6/mm3      Hemoglobin 9.1 g/dL      Hematocrit 27.0 %      MCV 92.6 fL      MCH 31.2 pg      MCHC 33.6 g/dL      RDW 15.8 %      RDW-SD 51.6 fl      MPV 7.2 fL      Platelets 534 10*3/mm3      Neutrophil % 85.5 %      Lymphocyte % 5.7 %      Monocyte % 8.0 %      Eosinophil % 0.4 %      Basophil % 0.4 %      Neutrophils, Absolute 10.50 10*3/mm3      Lymphocytes, Absolute 0.70 10*3/mm3      Monocytes, Absolute 1.00 10*3/mm3      Eosinophils, Absolute 0.10 10*3/mm3      Basophils, Absolute 0.00 10*3/mm3      nRBC 0.0 /100 WBC     POC Glucose Once [[id number]]  (Abnormal) Collected: 03/16/22 2026    Specimen: Blood Updated: 03/16/22 2027     Glucose 141 mg/dL      Comment: Serial Number:  [id number]:  [id number]       POC Glucose Once [[id number]]  (Abnormal) Collected: 03/16/22 1626    Specimen: Blood Updated: 03/16/22 1627     Glucose 145 mg/dL      Comment: Serial Number: [id number]:  [id number]       POC Glucose Once [[id number]]  (Abnormal) Collected: 03/16/22 1126    Specimen: Blood Updated: 03/16/22 1127     Glucose 112 mg/dL      Comment: Serial Number: [id number]:  [id number]              Assessment/Plan       Atrial fibrillation with RVR (HCC)    Other hyperlipidemia    Essential hypertension    Hypothyroidism    Coronary artery disease involving coronary bypass graft of native heart without angina pectoris    Acute CHF (congestive heart failure) (HCC)    Acute hyponatremia    Elevated LFTs    Elevated TSH    Acute hyperglycemia    Obesity (BMI 30-39.9)       Assessment & Plan     Today's chest x-ray has been independently reviewed which demonstrates no significant interval change to left upper lobe aeration.  Persistent loculated pleural effusion on the left.  Chest tube in place.  No pneumothorax.    Loculated pleural effusion: Patient status post intrapleural Activase x3 with minimal improvement. CT of the chest does not demonstrate any significant improvement.  Patient is scheduled to be transferred to [facility] in [place] for VATS decortication with Dr. Kuo on Friday.  Transfer pending bed availability, likely later today. Continue chest tube to -20 cm of suction and recheck a chest x-ray in the morning.  Patient may transfer with chest tube on [facility].  Preoperative orders placed today.  N.p.o. at midnight.    Hypoxia: Remains intermittently on 2 L per nasal cannula.  Encourage incentive spirometry 10 times per hour and out of bed as much as possible.    Generalized weakness: Appreciate assistance from PT and nursing staff with increasing mobilization.  Anticipate patient will need rehab upon discharge.    A. fib: Cardiology note reviewed  Continue treatment  per their recommendations.   Please continue to hold oral anticoagulation until chest tube has been removed.    Discussed with patient, RN,  at bedside.    Montserrat Humphries DNP, APRN  [facility]  03/17/22  09:07 EDT      I wore protective equipment throughout this patient encounter including a face mask, gloves and protective eyewear.  Hand hygiene was performed before donning protective equipment and after removal when leaving the room.

## 2022-03-17 NOTE — CASE MANAGEMENT/SOCIAL WORK
Case Management Discharge Note      Final Note: Transfer to [facility]    Provided Post Acute Provider List?: Yes  Post Acute Provider List: Inpatient Rehab, Nursing Home  Provided Post Acute Provider Quality & Resource List?: Yes  Post Acute Provider Quality and Resource List: Inpatient Rehab, Nursing Home  Delivered To: Patient  Method of Delivery: In person              Final Discharge Disposition Code: 02 - [facility] for [facility]

## 2022-03-17 NOTE — PROGRESS NOTES
"PULMONARY CRITICAL CARE Progress  NOTE      PATIENT IDENTIFICATION:  Name: Jaquelin Valderrama  MRN: [id number]  :  1940     Age: 82 y.o.  Sex: female    DATE OF Note:  3/16/2022   Referring Physician: Jaja Artis DO                  Subjective:   On 2 L no SOB no chest pain,   Chest tube minimal drainage but chest x-ray is not showing improvement result discussed with the patient and family  Otherwise patient denies any chest pain  Good appetite  no nausea or vomiting, no change in bowel habit, no dysuria,  no new  skin rash or itching.    Objective:  tMax 24 hrs: Temp (24hrs), Av.7 °F (36.5 °C), Min:97.1 °F (36.2 °C), Max:98 °F (36.7 °C)      Vitals Ranges:   Temp:  [97.1 °F (36.2 °C)-98 °F (36.7 °C)] 97.8 °F (36.6 °C)  Heart Rate:  [56-86] 68  Resp:  [18-26] 26  BP: (125-155)/(64-79) 133/79    Intake and Output Last 3 Shifts:   I/O last 3 completed shifts:  In: 600 [P.O.:600]  Out: 120 [Chest Tube:120]    Exam:  /79 (BP Location: Left arm, Patient Position: Sitting)   Pulse 68   Temp 97.8 °F (36.6 °C) (Oral)   Resp 26   Ht 160 cm (63\")   Wt 83.1 kg (183 lb 3.2 oz)   SpO2 95%   BMI 32.45 kg/m²     General Appearance:   AA  HEENT:  Normocephalic, without obvious abnormality, Conjunctiva/corneas clear,.  Normal external ear canals, Nares normal, no drainage     Neck:  Supple, symmetrical, trachea midline. No JVD.  Lungs /Chest wall: chest tube in place  No BS on left side    respirations unlabored symmetrical wall movement.     Heart:  Regular rate and rhythm, systolic murmur PMI left sternal border  Abdomen: Soft, non-tender, no masses, no organomegaly.    Extremities: Trace edema no clubbing or Cyanosis        Medications:    Current Facility-Administered Medications:   •  acetaminophen (TYLENOL) tablet 650 mg, 650 mg, Oral, Q4H PRN, 650 mg at 03/15/22 0902 **OR** acetaminophen (TYLENOL) 160 MG/5ML solution 650 mg, 650 mg, Oral, Q4H PRN **OR** acetaminophen (TYLENOL) suppository 650 mg, " 650 mg, Rectal, Q4H PRN, Bharat Montgomery MD  •  alteplase (ACTIVASE) 10 mg in sterile water (preservative free) 25 mL Syringe, , Intrapleural, Once, Montserrat Humphries DNP, APRN  •  aluminum-magnesium hydroxide-simethicone (MAALOX MAX) 400-400-40 MG/5ML suspension 15 mL, 15 mL, Oral, Q6H PRN, Bharat Montgomery MD  •  [COMPLETED] amiodarone in dextrose 5% (NEXTERONE) loading dose 150mg/100mL, 150 mg, Intravenous, Once, 150 mg at 22 0525 **FOLLOWED BY** [] amiodarone 360 mg in 200 mL D5W infusion, 1 mg/min, Intravenous, Continuous, Last Rate: 33.3 mL/hr at 22 1100, 1 mg/min at 22 1100 **FOLLOWED BY** [] amiodarone 360 mg in 200 mL D5W infusion, 0.5 mg/min, Intravenous, Continuous, Stopped at 22 0232 **FOLLOWED BY** [COMPLETED] amiodarone (PACERONE) tablet 200 mg, 200 mg, Oral, Once, 200 mg at 22 0603 **FOLLOWED BY** [COMPLETED] amiodarone (PACERONE) tablet 200 mg, 200 mg, Oral, Q8H, 200 mg at 22 2135 **FOLLOWED BY** amiodarone (PACERONE) tablet 200 mg, 200 mg, Oral, Q12H, 200 mg at 22 1732 **FOLLOWED BY** [START ON 3/27/2022] amiodarone (PACERONE) tablet 200 mg, 200 mg, Oral, Daily, Sabra Roberts, APR[place]  •  arformoterol (BROVANA) nebulizer solution 15 mcg, 15 mcg, Nebulization, BID - RT, Bharat Montgomery MD, 15 mcg at 22 210  •  aspirin chewable tablet 81 mg, 81 mg, Oral, Daily, Bharat Montgomery MD, 81 mg at 22 0813  •  sennosides-docusate (PERICOLACE) 8.6-50 MG per tablet 2 tablet, 2 tablet, Oral, BID, 2 tablet at 22 0813 **AND** polyethylene glycol (MIRALAX) packet 17 g, 17 g, Oral, Daily PRN **AND** bisacodyl (DULCOLAX) EC tablet 5 mg, 5 mg, Oral, Daily PRN **AND** bisacodyl (DULCOLAX) suppository 10 mg, 10 mg, Rectal, Daily PRN, Bharat Montgomery MD  •  budesonide (PULMICORT) nebulizer solution 1 mg, 1 mg, Nebulization, BID - RT, Bharat Montgomery MD, 1 mg at 22  •  dextrose (D50W) (25 g/50 mL) IV injection 25 g, 25 g, Intravenous, Q15  Min PRN, Bharat Montgomery MD  •  dextrose (GLUTOSE) oral gel 15 g, 15 g, Oral, Q15 Min PRN, Bharat Montgomery MD  •  dilTIAZem (CARDIZEM) tablet 90 mg, 90 mg, Oral, Q8H, Bharat Montgomery MD, 90 mg at 03/16/22 2128  •  glucagon (human recombinant) (GLUCAGEN DIAGNOSTIC) 1 mg in sterile water (preservative free) 1 mL injection, 1 mg, Subcutaneous, PRN, Bharat Montgomery MD  •  heparin (porcine) 5000 UNIT/ML injection 5,000 Units, 5,000 Units, Subcutaneous, Q12H, Bharat Montgomery MD, 5,000 Units at 03/16/22 0812  •  Hold medication, 1 each, Does not apply, Continuous PRN, Bharat Montgomery MD  •  insulin glargine (LANTUS, SEMGLEE) injection 10 Units, 10 Units, Subcutaneous, Nightly, Bharat Montgomery MD, 10 Units at 03/16/22 2136  •  insulin lispro (ADMELOG) injection 0-14 Units, 0-14 Units, Subcutaneous, TID AC, 5 Units at 03/15/22 1150 **AND** insulin lispro (ADMELOG) injection 0-14 Units, 0-14 Units, Subcutaneous, PRN, Elina Adams APRN  •  levothyroxine (SYNTHROID, LEVOTHROID) tablet 50 mcg, 50 mcg, Oral, Q AM, Bharat Montgomery MD, 50 mcg at 03/16/22 0637  •  Magnesium Sulfate 2 gram infusion - Mg less than or equal to 1.5 mg/dL, 2 g, Intravenous, PRN **OR** Magnesium Sulfate 1 gram infusion - Mg 1.6-1.9 mg/dL, 1 g, Intravenous, PRN, Bharat Montgomery MD, Last Rate: 100 mL/hr at 03/04/22 0925, 1 g at 03/04/22 0925  •  melatonin tablet 5 mg, 5 mg, Oral, Nightly, Mp Galicia MD, 5 mg at 03/16/22 2127  •  metoprolol tartrate (LOPRESSOR) tablet 25 mg, 25 mg, Oral, BID, Bharat Montgomery MD, 25 mg at 03/16/22 2128  •  multivitamin with minerals 1 tablet, 1 tablet, Oral, Daily, Bharat Montgomery MD, 1 tablet at 03/16/22 0813  •  nitroglycerin (NITROSTAT) SL tablet 0.4 mg, 0.4 mg, Sublingual, Q5 Min PRN, Bharat Montgomery MD  •  ondansetron (ZOFRAN) tablet 4 mg, 4 mg, Oral, Q6H PRN, 4 mg at 03/13/22 0812 **OR** ondansetron (ZOFRAN) injection 4 mg, 4 mg, Intravenous, Q6H PRN, Bharat Montgomery MD, 4 mg at 03/05/22 0597  •  pantoprazole  (PROTONIX) injection 40 mg, 40 mg, Intravenous, BID, Jaja Artis DO, 40 mg at 03/16/22 0813  •  phenylephrine-mineral oil-petrolatum (PREPARATION H) 0.25-14-74.9 % hemorhoidal ointment, , Rectal, BID PRN, Lidia Covarrubias, APRN  •  potassium chloride (K-DUR,KLOR-CON) CR tablet 40 mEq, 40 mEq, Oral, PRN, 40 mEq at 03/04/22 0925 **OR** potassium chloride (KLOR-CON) packet 40 mEq, 40 mEq, Oral, PRN **OR** potassium chloride 10 mEq in 100 mL IVPB, 10 mEq, Intravenous, Q1H PRN, Bharat Montgomery MD  •  potassium phosphate 45 mmol in sodium chloride 0.9 % 500 mL infusion, 45 mmol, Intravenous, PRN **OR** potassium phosphate 30 mmol in sodium chloride 0.9 % 250 mL infusion, 30 mmol, Intravenous, PRN **OR** potassium phosphate 15 mmol in 0.9% sodium chloride 100 mL IVPB, 15 mmol, Intravenous, PRN **OR** sodium phosphates 45 mmol in sodium chloride 0.9 % 500 mL IVPB, 45 mmol, Intravenous, PRN **OR** sodium phosphates 30 mmol in sodium chloride 0.9 % 250 mL IVPB, 30 mmol, Intravenous, PRN **OR** sodium phosphates 15 mmol in sodium chloride 0.9 % 250 mL IVPB, 15 mmol, Intravenous, PRN, Jaja Artis DO  •  sodium chloride 0.9 % flush 10 mL, 10 mL, Intravenous, Q12H, Bharat Montgomery MD, 10 mL at 03/16/22 2138  •  sodium chloride 0.9 % flush 10 mL, 10 mL, Intravenous, PRN, Bharat Montgomery MD, 10 mL at 03/08/22 2221    Data Review:  All labs (24hrs):   Recent Results (from the past 24 hour(s))   Magnesium    Collection Time: 03/16/22 12:15 AM    Specimen: Blood   Result Value Ref Range    Magnesium 1.9 1.6 - 2.4 mg/dL   Phosphorus    Collection Time: 03/16/22 12:15 AM    Specimen: Blood   Result Value Ref Range    Phosphorus 2.4 (L) 2.5 - 4.5 mg/dL   CBC Auto Differential    Collection Time: 03/16/22 12:15 AM    Specimen: Blood   Result Value Ref Range    WBC 12.30 (H) 3.40 - 10.80 10*3/mm3    RBC 2.99 (L) 3.77 - 5.28 10*6/mm3    Hemoglobin 9.4 (L) 12.0 - 15.9 g/dL    Hematocrit 28.1 (L) 34.0 - 46.6 %    MCV 93.8 79.0 - 97.0  fL    MCH 31.5 26.6 - 33.0 pg    MCHC 33.6 31.5 - 35.7 g/dL    RDW 15.7 (H) 12.3 - 15.4 %    RDW-SD 51.6 37.0 - 54.0 fl    MPV 7.6 6.0 - 12.0 fL    Platelets 503 (H) 140 - 450 10*3/mm3    Neutrophil % 85.7 (H) 42.7 - 76.0 %    Lymphocyte % 5.2 (L) 19.6 - 45.3 %    Monocyte % 8.1 5.0 - 12.0 %    Eosinophil % 0.6 0.3 - 6.2 %    Basophil % 0.4 0.0 - 1.5 %    Neutrophils, Absolute 10.50 (H) 1.70 - 7.00 10*3/mm3    Lymphocytes, Absolute 0.60 (L) 0.70 - 3.10 10*3/mm3    Monocytes, Absolute 1.00 (H) 0.10 - 0.90 10*3/mm3    Eosinophils, Absolute 0.10 0.00 - 0.40 10*3/mm3    Basophils, Absolute 0.10 0.00 - 0.20 10*3/mm3    nRBC 0.0 0.0 - 0.2 /100 WBC   Comprehensive Metabolic Panel    Collection Time: 03/16/22 12:15 AM    Specimen: Blood   Result Value Ref Range    Glucose 178 (H) 65 - 99 mg/dL    BUN 13 8 - 23 mg/dL    Creatinine 0.59 0.57 - 1.00 mg/dL    Sodium 127 (L) 136 - 145 mmol/L    Potassium 3.8 3.5 - 5.2 mmol/L    Chloride 94 (L) 98 - 107 mmol/L    CO2 21.0 (L) 22.0 - 29.0 mmol/L    Calcium 8.1 (L) 8.6 - 10.5 mg/dL    Total Protein 5.4 (L) 6.0 - 8.5 g/dL    Albumin 2.60 (L) 3.50 - 5.20 g/dL    ALT (SGPT) 44 (H) 1 - 33 U/L    AST (SGOT) 35 (H) 1 - 32 U/L    Alkaline Phosphatase 100 39 - 117 U/L    Total Bilirubin 0.4 0.0 - 1.2 mg/dL    Globulin 2.8 gm/dL    A/G Ratio 0.9 g/dL    BUN/Creatinine Ratio 22.0 7.0 - 25.0    Anion Gap 12.0 5.0 - 15.0 mmol/L    eGFR 91.2 >60.0 mL/min/1.73   POC Glucose Once    Collection Time: 03/16/22  7:41 AM    Specimen: Blood   Result Value Ref Range    Glucose 124 (H) 70 - 105 mg/dL   POC Glucose Once    Collection Time: 03/16/22 11:26 AM    Specimen: Blood   Result Value Ref Range    Glucose 112 (H) 70 - 105 mg/dL   POC Glucose Once    Collection Time: 03/16/22  4:26 PM    Specimen: Blood   Result Value Ref Range    Glucose 145 (H) 70 - 105 mg/dL   POC Glucose Once    Collection Time: 03/16/22  8:26 PM    Specimen: Blood   Result Value Ref Range    Glucose 141 (H) 70 - 105 mg/dL         Imaging:  XR Chest 1 View  Narrative: EXAM: XR CHEST 1 VW-     DATE OF EXAM: 3/16/2022 2:49 AM     INDICATION: Chest tube; I48.91-Unspecified atrial fibrillation;  I50.9-Heart failure, unspecified; Z20.822-Contact with and (suspected)  exposure to covid-19; I50.21-Acute systolic (congestive) heart failure;  Q41-Sjbqnps effusion, not elsewhere classified.       COMPARISONS: 03/15/2022      FINDINGS:     Unchanged left-sided pleural effusion which appears partially loculated.  Left-sided chest tubes unchanged. Unchanged left basilar atelectasis  adjacent to the pleural effusion. No evidence of acute process in the  right lung. No pneumothorax. Mediastinal shadows are unchanged.     Impression:    Stable chest over the past 24 hours. Unchanged loculated left pleural  effusion with chest tube in place and adjacent atelectasis     Electronically Signed By-Nain Mccauley On:3/16/2022 7:59 AM  This report was finalized on [id number] by  Nain Mccauley, .       ASSESSMENT:  Possible hemothorax  Acute respiratory distress  COPD exacerbation  Atrial fibrillation with RVR (HCC)    Other hyperlipidemia    Essential hypertension    Hypothyroidism    Coronary artery disease involving coronary bypass graft of native heart without angina pectoris    Acute CHF (congestive heart failure) (HCC) EFG 45%    Acute hyponatremia    Elevated LFTs    Elevated TSH    Acute hyperglycemia    Obesity (BMI 30-39.9)   ARF     PLAN:  Pending decortication  Chest tube management to suction  Encouraged to use I-S flutter valve  Heart rate control  Continue to monitor blood pressure  Bronchodilator  Inhaled corticosteroids  Electrolytes/ glycemic control    heparin sq prophylaxis   DVT and GI prophylaxis.    Total Critical care time in direct medical management (   ) minutes, This time specifically excludes time spent performing procedures.  Bharat Montgomery MD. D, ABSM.     3/16/2022  22:42 EDT

## 2022-03-17 NOTE — CASE MANAGEMENT/SOCIAL WORK
Continued Stay Note  GUERLINE Vazquez     Patient Name: Jaquelin Valderrama  MRN: [id number]  Today's Date: 3/17/2022    Admit Date: 3/2/2022     Discharge Plan     Row Name 03/17/22 0913       Plan    Plan Comments Called [facility] Bed control spoke with Alphonso for bed update, no bed available currently they are hopeful will have a bed this afternoon and will call unit.              Phone communication or documentation only - no physical contact with patient or family.        Elina Barrios RN

## 2022-03-17 NOTE — PLAN OF CARE
Problem: Adult Inpatient Plan of Care  Goal: Absence of Hospital-Acquired Illness or Injury  Intervention: Identify and Manage Fall Risk  Recent Flowsheet Documentation  Taken 3/17/2022 1215 by Gloria Whitaker RN  Safety Promotion/Fall Prevention:   activity supervised   assistive device/personal items within reach   clutter free environment maintained   fall prevention program maintained   nonskid shoes/slippers when out of bed   safety round/check completed  Taken 3/17/2022 1000 by Gloria Whitaker RN  Safety Promotion/Fall Prevention:   activity supervised   assistive device/personal items within reach   clutter free environment maintained   fall prevention program maintained   nonskid shoes/slippers when out of bed   safety round/check completed  Taken 3/17/2022 0931 by Gloria Whitaker RN  Safety Promotion/Fall Prevention:   activity supervised   assistive device/personal items within reach   clutter free environment maintained   fall prevention program maintained   nonskid shoes/slippers when out of bed   safety round/check completed  Intervention: Prevent Skin Injury  Recent Flowsheet Documentation  Taken 3/17/2022 0931 by Gloria Whitaker RN  Skin Protection:   adhesive use limited   tubing/devices free from skin contact  Intervention: Prevent and Manage VTE (venous thromboembolism) Risk  Recent Flowsheet Documentation  Taken 3/17/2022 0931 by Gloria Whitaker RN  VTE Prevention/Management:   bilateral   sequential compression devices off  Intervention: Prevent Infection  Recent Flowsheet Documentation  Taken 3/17/2022 1215 by Gloria Whitaker RN  Infection Prevention: environmental surveillance performed  Taken 3/17/2022 1000 by Gloria Whitaker RN  Infection Prevention: environmental surveillance performed  Taken 3/17/2022 0931 by Gloria Whitaker RN  Infection Prevention: environmental surveillance performed  Goal: Optimal Comfort and Wellbeing  Intervention: Provide Person-Centered Care  Recent Flowsheet  Documentation  Taken 3/17/2022 0931 by Gloria Whitaker RN  Trust Relationship/Rapport:   care explained   thoughts/feelings acknowledged     Problem: Arrhythmia/Dysrhythmia (Acute Coronary Syndrome)  Goal: Normalized Cardiac Rhythm  Intervention: Monitor and Manage Cardiac Rhythm Effects  Recent Flowsheet Documentation  Taken 3/17/2022 0931 by Gloria Whitaker RN  VTE Prevention/Management:   bilateral   sequential compression devices off     Problem: Skin Injury Risk Increased  Goal: Skin Health and Integrity  Intervention: Promote and Optimize Oral Intake  Recent Flowsheet Documentation  Taken 3/17/2022 0931 by Gloria Whitaker RN  Oral Nutrition Promotion:   medicated   safe use of adaptive equipment encouraged  Intervention: Optimize Skin Protection  Recent Flowsheet Documentation  Taken 3/17/2022 0931 by Gloria Whitaker RN  Pressure Reduction Techniques:   frequent weight shift encouraged   weight shift assistance provided  Pressure Reduction Devices: pressure-redistributing mattress utilized  Skin Protection:   adhesive use limited   tubing/devices free from skin contact     Problem: Fall Injury Risk  Goal: Absence of Fall and Fall-Related Injury  Intervention: Identify and Manage Contributors  Recent Flowsheet Documentation  Taken 3/17/2022 1215 by Gloria Whitaker RN  Medication Review/Management: medications reviewed  Taken 3/17/2022 1000 by Gloria Whitaker RN  Medication Review/Management: medications reviewed  Taken 3/17/2022 0931 by Gloria Whitaker RN  Medication Review/Management: medications reviewed  Intervention: Promote Injury-Free Environment  Recent Flowsheet Documentation  Taken 3/17/2022 1215 by Gloria Whitaker RN  Safety Promotion/Fall Prevention:   activity supervised   assistive device/personal items within reach   clutter free environment maintained   fall prevention program maintained   nonskid shoes/slippers when out of bed   safety round/check completed  Taken 3/17/2022 1000 by Julianne  MARICHUY Castro  Safety Promotion/Fall Prevention:   activity supervised   assistive device/personal items within reach   clutter free environment maintained   fall prevention program maintained   nonskid shoes/slippers when out of bed   safety round/check completed  Taken 3/17/2022 0931 by Gloria Whitaker RN  Safety Promotion/Fall Prevention:   activity supervised   assistive device/personal items within reach   clutter free environment maintained   fall prevention program maintained   nonskid shoes/slippers when out of bed   safety round/check completed  Goal: Absence of Fall and Fall-Related Injury  Intervention: Identify and Manage Contributors  Recent Flowsheet Documentation  Taken 3/17/2022 1215 by Gloria Whitaker RN  Medication Review/Management: medications reviewed  Taken 3/17/2022 1000 by Gloria Whitaker RN  Medication Review/Management: medications reviewed  Taken 3/17/2022 0931 by Gloria Whitaker RN  Medication Review/Management: medications reviewed  Intervention: Promote Injury-Free Environment  Recent Flowsheet Documentation  Taken 3/17/2022 1215 by Gloria Whitaker RN  Safety Promotion/Fall Prevention:   activity supervised   assistive device/personal items within reach   clutter free environment maintained   fall prevention program maintained   nonskid shoes/slippers when out of bed   safety round/check completed  Taken 3/17/2022 1000 by Gloria Whitaker RN  Safety Promotion/Fall Prevention:   activity supervised   assistive device/personal items within reach   clutter free environment maintained   fall prevention program maintained   nonskid shoes/slippers when out of bed   safety round/check completed  Taken 3/17/2022 0931 by Gloria Whitaker RN  Safety Promotion/Fall Prevention:   activity supervised   assistive device/personal items within reach   clutter free environment maintained   fall prevention program maintained   nonskid shoes/slippers when out of bed   safety round/check completed     Problem:  Fall Injury Risk  Goal: Absence of Fall and Fall-Related Injury  Intervention: Identify and Manage Contributors  Recent Flowsheet Documentation  Taken 3/17/2022 1215 by Gloria Whitaker RN  Medication Review/Management: medications reviewed  Taken 3/17/2022 1000 by Gloria Whitaker RN  Medication Review/Management: medications reviewed  Taken 3/17/2022 0931 by Gloria Whitaker RN  Medication Review/Management: medications reviewed  Intervention: Promote Injury-Free Environment  Recent Flowsheet Documentation  Taken 3/17/2022 1215 by Gloria Whitaker RN  Safety Promotion/Fall Prevention:   activity supervised   assistive device/personal items within reach   clutter free environment maintained   fall prevention program maintained   nonskid shoes/slippers when out of bed   safety round/check completed  Taken 3/17/2022 1000 by Gloria Whitaker RN  Safety Promotion/Fall Prevention:   activity supervised   assistive device/personal items within reach   clutter free environment maintained   fall prevention program maintained   nonskid shoes/slippers when out of bed   safety round/check completed  Taken 3/17/2022 0931 by Gloria Whitaker RN  Safety Promotion/Fall Prevention:   activity supervised   assistive device/personal items within reach   clutter free environment maintained   fall prevention program maintained   nonskid shoes/slippers when out of bed   safety round/check completed  Goal: Absence of Fall and Fall-Related Injury  Intervention: Identify and Manage Contributors  Recent Flowsheet Documentation  Taken 3/17/2022 1215 by Gloria Whitaker RN  Medication Review/Management: medications reviewed  Taken 3/17/2022 1000 by Gloria Whitaker RN  Medication Review/Management: medications reviewed  Taken 3/17/2022 0931 by Gloria Whitaker RN  Medication Review/Management: medications reviewed  Intervention: Promote Injury-Free Environment  Recent Flowsheet Documentation  Taken 3/17/2022 1215 by Gloria Whitaker RN  Safety  Promotion/Fall Prevention:   activity supervised   assistive device/personal items within reach   clutter free environment maintained   fall prevention program maintained   nonskid shoes/slippers when out of bed   safety round/check completed  Taken 3/17/2022 1000 by Gloria Whitaker RN  Safety Promotion/Fall Prevention:   activity supervised   assistive device/personal items within reach   clutter free environment maintained   fall prevention program maintained   nonskid shoes/slippers when out of bed   safety round/check completed  Taken 3/17/2022 0931 by Gloria Whitaker RN  Safety Promotion/Fall Prevention:   activity supervised   assistive device/personal items within reach   clutter free environment maintained   fall prevention program maintained   nonskid shoes/slippers when out of bed   safety round/check completed     Problem: Breathing Pattern Ineffective  Goal: Effective Breathing Pattern  Intervention: Promote Improved Breathing Pattern  Recent Flowsheet Documentation  Taken 3/17/2022 0931 by Gloria Whitaker RN  Supportive Measures:   active listening utilized   verbalization of feelings encouraged  Airway/Ventilation Management: airway patency maintained     Problem: Dysrhythmia  Goal: Normalized Cardiac Rhythm  Intervention: Monitor and Manage Cardiac Rhythm Effect  Recent Flowsheet Documentation  Taken 3/17/2022 0931 by Gloria Whitaker RN  VTE Prevention/Management:   bilateral   sequential compression devices off     Problem: Adult Inpatient Plan of Care  Goal: Absence of Hospital-Acquired Illness or Injury  Intervention: Identify and Manage Fall Risk  Recent Flowsheet Documentation  Taken 3/17/2022 1215 by Gloria Whitaker RN  Safety Promotion/Fall Prevention:   activity supervised   assistive device/personal items within reach   clutter free environment maintained   fall prevention program maintained   nonskid shoes/slippers when out of bed   safety round/check completed  Taken 3/17/2022 1000 by  Julianne, Gloria, RN  Safety Promotion/Fall Prevention:   activity supervised   assistive device/personal items within reach   clutter free environment maintained   fall prevention program maintained   nonskid shoes/slippers when out of bed   safety round/check completed  Taken 3/17/2022 0931 by Gloria Whitaker RN  Safety Promotion/Fall Prevention:   activity supervised   assistive device/personal items within reach   clutter free environment maintained   fall prevention program maintained   nonskid shoes/slippers when out of bed   safety round/check completed  Intervention: Prevent Skin Injury  Recent Flowsheet Documentation  Taken 3/17/2022 0931 by Gloria Whitaker RN  Skin Protection:   adhesive use limited   tubing/devices free from skin contact  Intervention: Prevent and Manage VTE (Venous Thromboembolism) Risk  Recent Flowsheet Documentation  Taken 3/17/2022 1215 by Gloria Whitaker RN  Activity Management: activity adjusted per tolerance  Taken 3/17/2022 0931 by Gloria Whitaker RN  Activity Management: activity adjusted per tolerance  VTE Prevention/Management:   bilateral   sequential compression devices off  Range of Motion: active ROM (range of motion) encouraged  Intervention: Prevent Infection  Recent Flowsheet Documentation  Taken 3/17/2022 1215 by Gloria Whitaker RN  Infection Prevention: environmental surveillance performed  Taken 3/17/2022 1000 by Gloria Whitaker RN  Infection Prevention: environmental surveillance performed  Taken 3/17/2022 0931 by Gloria Whitaker RN  Infection Prevention: environmental surveillance performed  Goal: Optimal Comfort and Wellbeing  Intervention: Monitor Pain and Promote Comfort  Recent Flowsheet Documentation  Taken 3/17/2022 0931 by Gloria Whitaker RN  Pain Management Interventions: medication offered but refused  Intervention: Provide Person-Centered Care  Recent Flowsheet Documentation  Taken 3/17/2022 0931 by Gloria Whitaker RN  Trust Relationship/Rapport:   care  explained   thoughts/feelings acknowledged   Goal Outcome Evaluation:               Pt is A/Ox4. Pt is transferring to [facility] via ambulance for surgery in the Am. Will continue to monitor.

## 2022-03-18 ENCOUNTER — ANESTHESIA EVENT (OUTPATIENT)
Dept: PERIOP | Facility: HOSPITAL | Age: 82
End: 2022-03-18

## 2022-03-18 ENCOUNTER — APPOINTMENT (OUTPATIENT)
Dept: GENERAL RADIOLOGY | Facility: HOSPITAL | Age: 82
End: 2022-03-18

## 2022-03-18 ENCOUNTER — ANESTHESIA (OUTPATIENT)
Dept: PERIOP | Facility: HOSPITAL | Age: 82
End: 2022-03-18

## 2022-03-18 LAB
ABO GROUP BLD: NORMAL
ANION GAP SERPL CALCULATED.3IONS-SCNC: 8.7 MMOL/L (ref 5–15)
ANION GAP SERPL CALCULATED.3IONS-SCNC: 9 MMOL/L (ref 5–15)
BLD GP AB SCN SERPL QL: NEGATIVE
BUN SERPL-MCNC: 12 MG/DL (ref 8–23)
BUN SERPL-MCNC: 12 MG/DL (ref 8–23)
BUN/CREAT SERPL: 20.7 (ref 7–25)
BUN/CREAT SERPL: 21.1 (ref 7–25)
CALCIUM SPEC-SCNC: 7.8 MG/DL (ref 8.6–10.5)
CALCIUM SPEC-SCNC: 8.4 MG/DL (ref 8.6–10.5)
CHLORIDE SERPL-SCNC: 97 MMOL/L (ref 98–107)
CHLORIDE SERPL-SCNC: 99 MMOL/L (ref 98–107)
CO2 SERPL-SCNC: 23 MMOL/L (ref 22–29)
CO2 SERPL-SCNC: 23.3 MMOL/L (ref 22–29)
CREAT SERPL-MCNC: 0.57 MG/DL (ref 0.57–1)
CREAT SERPL-MCNC: 0.58 MG/DL (ref 0.57–1)
DEPRECATED RDW RBC AUTO: 48.5 FL (ref 37–54)
DEPRECATED RDW RBC AUTO: 49.4 FL (ref 37–54)
EGFRCR SERPLBLD CKD-EPI 2021: 91.6 ML/MIN/1.73
EGFRCR SERPLBLD CKD-EPI 2021: 92 ML/MIN/1.73
ERYTHROCYTE [DISTWIDTH] IN BLOOD BY AUTOMATED COUNT: 14.5 % (ref 12.3–15.4)
ERYTHROCYTE [DISTWIDTH] IN BLOOD BY AUTOMATED COUNT: 14.6 % (ref 12.3–15.4)
GLUCOSE BLDC GLUCOMTR-MCNC: 292 MG/DL (ref 70–130)
GLUCOSE SERPL-MCNC: 159 MG/DL (ref 65–99)
GLUCOSE SERPL-MCNC: 172 MG/DL (ref 65–99)
HCT VFR BLD AUTO: 24.5 % (ref 34–46.6)
HCT VFR BLD AUTO: 27.2 % (ref 34–46.6)
HGB BLD-MCNC: 8.4 G/DL (ref 12–15.9)
HGB BLD-MCNC: 8.8 G/DL (ref 12–15.9)
INR PPP: 1.16 (ref 0.9–1.1)
MCH RBC QN AUTO: 30.1 PG (ref 26.6–33)
MCH RBC QN AUTO: 31.3 PG (ref 26.6–33)
MCHC RBC AUTO-ENTMCNC: 32.4 G/DL (ref 31.5–35.7)
MCHC RBC AUTO-ENTMCNC: 34.3 G/DL (ref 31.5–35.7)
MCV RBC AUTO: 91.4 FL (ref 79–97)
MCV RBC AUTO: 93.2 FL (ref 79–97)
PLATELET # BLD AUTO: 492 10*3/MM3 (ref 140–450)
PLATELET # BLD AUTO: 505 10*3/MM3 (ref 140–450)
PMV BLD AUTO: 8.8 FL (ref 6–12)
PMV BLD AUTO: 8.9 FL (ref 6–12)
POTASSIUM SERPL-SCNC: 3.8 MMOL/L (ref 3.5–5.2)
POTASSIUM SERPL-SCNC: 4.2 MMOL/L (ref 3.5–5.2)
PROTHROMBIN TIME: 14.7 SECONDS (ref 11.7–14.2)
RBC # BLD AUTO: 2.68 10*6/MM3 (ref 3.77–5.28)
RBC # BLD AUTO: 2.92 10*6/MM3 (ref 3.77–5.28)
RH BLD: POSITIVE
SODIUM SERPL-SCNC: 129 MMOL/L (ref 136–145)
SODIUM SERPL-SCNC: 131 MMOL/L (ref 136–145)
T&S EXPIRATION DATE: NORMAL
WBC NRBC COR # BLD: 13.58 10*3/MM3 (ref 3.4–10.8)
WBC NRBC COR # BLD: 15.02 10*3/MM3 (ref 3.4–10.8)

## 2022-03-18 PROCEDURE — 80048 BASIC METABOLIC PNL TOTAL CA: CPT | Performed by: NURSE PRACTITIONER

## 2022-03-18 PROCEDURE — 25010000002 MAGNESIUM SULFATE PER 500 MG OF MAGNESIUM: Performed by: NURSE ANESTHETIST, CERTIFIED REGISTERED

## 2022-03-18 PROCEDURE — 94799 UNLISTED PULMONARY SVC/PX: CPT

## 2022-03-18 PROCEDURE — 25010000002 HYDROMORPHONE PER 4 MG: Performed by: THORACIC SURGERY (CARDIOTHORACIC VASCULAR SURGERY)

## 2022-03-18 PROCEDURE — P9041 ALBUMIN (HUMAN),5%, 50ML: HCPCS | Performed by: NURSE ANESTHETIST, CERTIFIED REGISTERED

## 2022-03-18 PROCEDURE — 87176 TISSUE HOMOGENIZATION CULTR: CPT | Performed by: THORACIC SURGERY (CARDIOTHORACIC VASCULAR SURGERY)

## 2022-03-18 PROCEDURE — 0BNG4ZZ RELEASE LEFT UPPER LUNG LOBE, PERCUTANEOUS ENDOSCOPIC APPROACH: ICD-10-PCS | Performed by: THORACIC SURGERY (CARDIOTHORACIC VASCULAR SURGERY)

## 2022-03-18 PROCEDURE — 25010000002 PROPOFOL 10 MG/ML EMULSION: Performed by: NURSE ANESTHETIST, CERTIFIED REGISTERED

## 2022-03-18 PROCEDURE — 87075 CULTR BACTERIA EXCEPT BLOOD: CPT | Performed by: THORACIC SURGERY (CARDIOTHORACIC VASCULAR SURGERY)

## 2022-03-18 PROCEDURE — 86901 BLOOD TYPING SEROLOGIC RH(D): CPT | Performed by: NURSE PRACTITIONER

## 2022-03-18 PROCEDURE — 99222 1ST HOSP IP/OBS MODERATE 55: CPT | Performed by: INTERNAL MEDICINE

## 2022-03-18 PROCEDURE — 94664 DEMO&/EVAL PT USE INHALER: CPT

## 2022-03-18 PROCEDURE — C9290 INJ, BUPIVACAINE LIPOSOME: HCPCS | Performed by: ANESTHESIOLOGY

## 2022-03-18 PROCEDURE — 94761 N-INVAS EAR/PLS OXIMETRY MLT: CPT

## 2022-03-18 PROCEDURE — 85610 PROTHROMBIN TIME: CPT | Performed by: NURSE PRACTITIONER

## 2022-03-18 PROCEDURE — 25010000002 ALBUMIN HUMAN 5% PER 50 ML: Performed by: NURSE ANESTHETIST, CERTIFIED REGISTERED

## 2022-03-18 PROCEDURE — 0W9B40Z DRAINAGE OF LEFT PLEURAL CAVITY WITH DRAINAGE DEVICE, PERCUTANEOUS ENDOSCOPIC APPROACH: ICD-10-PCS | Performed by: THORACIC SURGERY (CARDIOTHORACIC VASCULAR SURGERY)

## 2022-03-18 PROCEDURE — 25010000002 CEFAZOLIN IN DEXTROSE 2-4 GM/100ML-% SOLUTION: Performed by: NURSE PRACTITIONER

## 2022-03-18 PROCEDURE — 0BNJ4ZZ RELEASE LEFT LOWER LUNG LOBE, PERCUTANEOUS ENDOSCOPIC APPROACH: ICD-10-PCS | Performed by: THORACIC SURGERY (CARDIOTHORACIC VASCULAR SURGERY)

## 2022-03-18 PROCEDURE — 87205 SMEAR GRAM STAIN: CPT | Performed by: THORACIC SURGERY (CARDIOTHORACIC VASCULAR SURGERY)

## 2022-03-18 PROCEDURE — 86850 RBC ANTIBODY SCREEN: CPT | Performed by: NURSE PRACTITIONER

## 2022-03-18 PROCEDURE — 82962 GLUCOSE BLOOD TEST: CPT

## 2022-03-18 PROCEDURE — 87070 CULTURE OTHR SPECIMN AEROBIC: CPT | Performed by: THORACIC SURGERY (CARDIOTHORACIC VASCULAR SURGERY)

## 2022-03-18 PROCEDURE — 25010000002 DEXAMETHASONE PER 1 MG: Performed by: NURSE ANESTHETIST, CERTIFIED REGISTERED

## 2022-03-18 PROCEDURE — 32652 THORACOSCOPY REM TOTL CORTEX: CPT | Performed by: THORACIC SURGERY (CARDIOTHORACIC VASCULAR SURGERY)

## 2022-03-18 PROCEDURE — 71045 X-RAY EXAM CHEST 1 VIEW: CPT

## 2022-03-18 PROCEDURE — 25010000002 FENTANYL CITRATE (PF) 50 MCG/ML SOLUTION: Performed by: NURSE ANESTHETIST, CERTIFIED REGISTERED

## 2022-03-18 PROCEDURE — 25010000002 PHENYLEPHRINE 10 MG/ML SOLUTION: Performed by: NURSE ANESTHETIST, CERTIFIED REGISTERED

## 2022-03-18 PROCEDURE — 86900 BLOOD TYPING SEROLOGIC ABO: CPT | Performed by: NURSE PRACTITIONER

## 2022-03-18 PROCEDURE — 85027 COMPLETE CBC AUTOMATED: CPT | Performed by: NURSE PRACTITIONER

## 2022-03-18 PROCEDURE — 0 BUPIVACAINE LIPOSOME 1.3 % SUSPENSION: Performed by: ANESTHESIOLOGY

## 2022-03-18 PROCEDURE — 76942 ECHO GUIDE FOR BIOPSY: CPT | Performed by: THORACIC SURGERY (CARDIOTHORACIC VASCULAR SURGERY)

## 2022-03-18 PROCEDURE — 87102 FUNGUS ISOLATION CULTURE: CPT | Performed by: THORACIC SURGERY (CARDIOTHORACIC VASCULAR SURGERY)

## 2022-03-18 PROCEDURE — C1729 CATH, DRAINAGE: HCPCS | Performed by: THORACIC SURGERY (CARDIOTHORACIC VASCULAR SURGERY)

## 2022-03-18 PROCEDURE — 25010000002 ONDANSETRON PER 1 MG: Performed by: NURSE ANESTHETIST, CERTIFIED REGISTERED

## 2022-03-18 PROCEDURE — 80048 BASIC METABOLIC PNL TOTAL CA: CPT | Performed by: THORACIC SURGERY (CARDIOTHORACIC VASCULAR SURGERY)

## 2022-03-18 PROCEDURE — 85027 COMPLETE CBC AUTOMATED: CPT | Performed by: THORACIC SURGERY (CARDIOTHORACIC VASCULAR SURGERY)

## 2022-03-18 RX ORDER — GABAPENTIN 300 MG/1
300 CAPSULE ORAL EVERY 8 HOURS SCHEDULED
Status: DISCONTINUED | OUTPATIENT
Start: 2022-03-18 | End: 2022-03-24 | Stop reason: HOSPADM

## 2022-03-18 RX ORDER — HYDROMORPHONE HYDROCHLORIDE 1 MG/ML
0.25 INJECTION, SOLUTION INTRAMUSCULAR; INTRAVENOUS; SUBCUTANEOUS
Status: DISCONTINUED | OUTPATIENT
Start: 2022-03-18 | End: 2022-03-18 | Stop reason: HOSPADM

## 2022-03-18 RX ORDER — PHENYLEPHRINE HYDROCHLORIDE 10 MG/ML
INJECTION INTRAVENOUS AS NEEDED
Status: DISCONTINUED | OUTPATIENT
Start: 2022-03-18 | End: 2022-03-18 | Stop reason: SURG

## 2022-03-18 RX ORDER — LIDOCAINE HYDROCHLORIDE 20 MG/ML
INJECTION, SOLUTION INFILTRATION; PERINEURAL AS NEEDED
Status: DISCONTINUED | OUTPATIENT
Start: 2022-03-18 | End: 2022-03-18 | Stop reason: SURG

## 2022-03-18 RX ORDER — DEXAMETHASONE SODIUM PHOSPHATE 10 MG/ML
INJECTION INTRAMUSCULAR; INTRAVENOUS AS NEEDED
Status: DISCONTINUED | OUTPATIENT
Start: 2022-03-18 | End: 2022-03-18 | Stop reason: SURG

## 2022-03-18 RX ORDER — SODIUM CHLORIDE, SODIUM LACTATE, POTASSIUM CHLORIDE, CALCIUM CHLORIDE 600; 310; 30; 20 MG/100ML; MG/100ML; MG/100ML; MG/100ML
9 INJECTION, SOLUTION INTRAVENOUS CONTINUOUS PRN
Status: DISCONTINUED | OUTPATIENT
Start: 2022-03-18 | End: 2022-03-24 | Stop reason: HOSPADM

## 2022-03-18 RX ORDER — GLYCOPYRROLATE 0.2 MG/ML
INJECTION INTRAMUSCULAR; INTRAVENOUS AS NEEDED
Status: DISCONTINUED | OUTPATIENT
Start: 2022-03-18 | End: 2022-03-18 | Stop reason: SURG

## 2022-03-18 RX ORDER — POTASSIUM CHLORIDE 1.5 G/1.77G
20 POWDER, FOR SOLUTION ORAL DAILY
Status: DISCONTINUED | OUTPATIENT
Start: 2022-03-19 | End: 2022-03-21

## 2022-03-18 RX ORDER — LIDOCAINE HYDROCHLORIDE 10 MG/ML
0.5 INJECTION, SOLUTION EPIDURAL; INFILTRATION; INTRACAUDAL; PERINEURAL ONCE AS NEEDED
Status: DISCONTINUED | OUTPATIENT
Start: 2022-03-18 | End: 2022-03-18 | Stop reason: HOSPADM

## 2022-03-18 RX ORDER — MAGNESIUM SULFATE HEPTAHYDRATE 500 MG/ML
INJECTION, SOLUTION INTRAMUSCULAR; INTRAVENOUS AS NEEDED
Status: DISCONTINUED | OUTPATIENT
Start: 2022-03-18 | End: 2022-03-18 | Stop reason: SURG

## 2022-03-18 RX ORDER — DIPHENHYDRAMINE HYDROCHLORIDE 50 MG/ML
25 INJECTION INTRAMUSCULAR; INTRAVENOUS EVERY 4 HOURS PRN
Status: DISCONTINUED | OUTPATIENT
Start: 2022-03-18 | End: 2022-03-18 | Stop reason: HOSPADM

## 2022-03-18 RX ORDER — ALBUMIN, HUMAN INJ 5% 5 %
SOLUTION INTRAVENOUS CONTINUOUS PRN
Status: DISCONTINUED | OUTPATIENT
Start: 2022-03-18 | End: 2022-03-18 | Stop reason: SURG

## 2022-03-18 RX ORDER — BISACODYL 10 MG
10 SUPPOSITORY, RECTAL RECTAL DAILY PRN
Status: DISCONTINUED | OUTPATIENT
Start: 2022-03-18 | End: 2022-03-24 | Stop reason: HOSPADM

## 2022-03-18 RX ORDER — DEXTROSE, SODIUM CHLORIDE, AND POTASSIUM CHLORIDE 5; .45; .15 G/100ML; G/100ML; G/100ML
75 INJECTION INTRAVENOUS CONTINUOUS
Status: DISCONTINUED | OUTPATIENT
Start: 2022-03-18 | End: 2022-03-18

## 2022-03-18 RX ORDER — FENTANYL CITRATE 50 UG/ML
INJECTION, SOLUTION INTRAMUSCULAR; INTRAVENOUS AS NEEDED
Status: DISCONTINUED | OUTPATIENT
Start: 2022-03-18 | End: 2022-03-18 | Stop reason: SURG

## 2022-03-18 RX ORDER — CEFAZOLIN SODIUM 2 G/100ML
2 INJECTION, SOLUTION INTRAVENOUS ONCE
Status: COMPLETED | OUTPATIENT
Start: 2022-03-18 | End: 2022-03-18

## 2022-03-18 RX ORDER — MAGNESIUM HYDROXIDE 1200 MG/15ML
LIQUID ORAL AS NEEDED
Status: DISCONTINUED | OUTPATIENT
Start: 2022-03-18 | End: 2022-03-18 | Stop reason: HOSPADM

## 2022-03-18 RX ORDER — METOCLOPRAMIDE HYDROCHLORIDE 5 MG/ML
10 INJECTION INTRAMUSCULAR; INTRAVENOUS ONCE AS NEEDED
Status: DISCONTINUED | OUTPATIENT
Start: 2022-03-18 | End: 2022-03-18 | Stop reason: HOSPADM

## 2022-03-18 RX ORDER — ROCURONIUM BROMIDE 10 MG/ML
INJECTION, SOLUTION INTRAVENOUS AS NEEDED
Status: DISCONTINUED | OUTPATIENT
Start: 2022-03-18 | End: 2022-03-18 | Stop reason: SURG

## 2022-03-18 RX ORDER — PROPOFOL 10 MG/ML
VIAL (ML) INTRAVENOUS AS NEEDED
Status: DISCONTINUED | OUTPATIENT
Start: 2022-03-18 | End: 2022-03-18 | Stop reason: SURG

## 2022-03-18 RX ORDER — EPHEDRINE SULFATE 50 MG/ML
INJECTION, SOLUTION INTRAVENOUS AS NEEDED
Status: DISCONTINUED | OUTPATIENT
Start: 2022-03-18 | End: 2022-03-18 | Stop reason: SURG

## 2022-03-18 RX ORDER — BUPIVACAINE HYDROCHLORIDE 5 MG/ML
INJECTION, SOLUTION EPIDURAL; INTRACAUDAL
Status: COMPLETED | OUTPATIENT
Start: 2022-03-18 | End: 2022-03-18

## 2022-03-18 RX ORDER — IPRATROPIUM BROMIDE AND ALBUTEROL SULFATE 2.5; .5 MG/3ML; MG/3ML
3 SOLUTION RESPIRATORY (INHALATION)
Status: DISPENSED | OUTPATIENT
Start: 2022-03-18 | End: 2022-03-20

## 2022-03-18 RX ORDER — NALOXONE HCL 0.4 MG/ML
0.4 VIAL (ML) INJECTION
Status: DISCONTINUED | OUTPATIENT
Start: 2022-03-18 | End: 2022-03-18 | Stop reason: HOSPADM

## 2022-03-18 RX ORDER — ONDANSETRON 2 MG/ML
INJECTION INTRAMUSCULAR; INTRAVENOUS AS NEEDED
Status: DISCONTINUED | OUTPATIENT
Start: 2022-03-18 | End: 2022-03-18 | Stop reason: SURG

## 2022-03-18 RX ORDER — OXYCODONE HYDROCHLORIDE 5 MG/1
5 TABLET ORAL ONCE AS NEEDED
Status: COMPLETED | OUTPATIENT
Start: 2022-03-18 | End: 2022-03-18

## 2022-03-18 RX ORDER — AMOXICILLIN 250 MG
2 CAPSULE ORAL NIGHTLY
Status: DISCONTINUED | OUTPATIENT
Start: 2022-03-18 | End: 2022-03-24 | Stop reason: HOSPADM

## 2022-03-18 RX ORDER — OXYCODONE HYDROCHLORIDE 5 MG/1
5 TABLET ORAL EVERY 4 HOURS PRN
Status: DISCONTINUED | OUTPATIENT
Start: 2022-03-18 | End: 2022-03-24 | Stop reason: HOSPADM

## 2022-03-18 RX ORDER — ACETAMINOPHEN 500 MG
1000 TABLET ORAL 3 TIMES DAILY
Status: DISCONTINUED | OUTPATIENT
Start: 2022-03-18 | End: 2022-03-24 | Stop reason: HOSPADM

## 2022-03-18 RX ORDER — DIPHENHYDRAMINE HCL 25 MG
25 CAPSULE ORAL EVERY 4 HOURS PRN
Status: DISCONTINUED | OUTPATIENT
Start: 2022-03-18 | End: 2022-03-18 | Stop reason: HOSPADM

## 2022-03-18 RX ORDER — SODIUM CHLORIDE 0.9 % (FLUSH) 0.9 %
10 SYRINGE (ML) INJECTION AS NEEDED
Status: DISCONTINUED | OUTPATIENT
Start: 2022-03-18 | End: 2022-03-18 | Stop reason: HOSPADM

## 2022-03-18 RX ORDER — ONDANSETRON 2 MG/ML
4 INJECTION INTRAMUSCULAR; INTRAVENOUS ONCE AS NEEDED
Status: DISCONTINUED | OUTPATIENT
Start: 2022-03-18 | End: 2022-03-18 | Stop reason: HOSPADM

## 2022-03-18 RX ORDER — HYDROMORPHONE HYDROCHLORIDE 1 MG/ML
0.5 INJECTION, SOLUTION INTRAMUSCULAR; INTRAVENOUS; SUBCUTANEOUS
Status: DISCONTINUED | OUTPATIENT
Start: 2022-03-18 | End: 2022-03-24 | Stop reason: HOSPADM

## 2022-03-18 RX ORDER — METOPROLOL TARTRATE 50 MG/1
50 TABLET, FILM COATED ORAL 2 TIMES DAILY
Status: DISCONTINUED | OUTPATIENT
Start: 2022-03-18 | End: 2022-03-23

## 2022-03-18 RX ORDER — SODIUM CHLORIDE 0.9 % (FLUSH) 0.9 %
10 SYRINGE (ML) INJECTION EVERY 12 HOURS SCHEDULED
Status: DISCONTINUED | OUTPATIENT
Start: 2022-03-18 | End: 2022-03-18 | Stop reason: HOSPADM

## 2022-03-18 RX ORDER — SODIUM CHLORIDE 9 MG/ML
INJECTION, SOLUTION INTRAVENOUS CONTINUOUS PRN
Status: DISCONTINUED | OUTPATIENT
Start: 2022-03-18 | End: 2022-03-18 | Stop reason: SURG

## 2022-03-18 RX ORDER — CELECOXIB 100 MG/1
100 CAPSULE ORAL EVERY 12 HOURS SCHEDULED
Status: DISCONTINUED | OUTPATIENT
Start: 2022-03-18 | End: 2022-03-21

## 2022-03-18 RX ORDER — FUROSEMIDE 20 MG/1
20 TABLET ORAL DAILY
Status: DISCONTINUED | OUTPATIENT
Start: 2022-03-19 | End: 2022-03-20

## 2022-03-18 RX ADMIN — HYDROMORPHONE HYDROCHLORIDE 0.5 MG: 1 INJECTION, SOLUTION INTRAMUSCULAR; INTRAVENOUS; SUBCUTANEOUS at 15:47

## 2022-03-18 RX ADMIN — Medication 2000 UNITS: at 08:15

## 2022-03-18 RX ADMIN — DEXAMETHASONE SODIUM PHOSPHATE 8 MG: 10 INJECTION INTRAMUSCULAR; INTRAVENOUS at 12:09

## 2022-03-18 RX ADMIN — MAGNESIUM SULFATE HEPTAHYDRATE 1 G: 500 INJECTION, SOLUTION INTRAMUSCULAR; INTRAVENOUS at 13:51

## 2022-03-18 RX ADMIN — METOPROLOL TARTRATE 25 MG: 25 TABLET, FILM COATED ORAL at 08:15

## 2022-03-18 RX ADMIN — CEFAZOLIN SODIUM 2 G: 2 INJECTION, SOLUTION INTRAVENOUS at 11:46

## 2022-03-18 RX ADMIN — ACETAMINOPHEN 1000 MG: 500 TABLET ORAL at 17:37

## 2022-03-18 RX ADMIN — ROCURONIUM BROMIDE 40 MG: 50 INJECTION INTRAVENOUS at 12:01

## 2022-03-18 RX ADMIN — PROPOFOL 100 MG: 10 INJECTION, EMULSION INTRAVENOUS at 12:00

## 2022-03-18 RX ADMIN — POTASSIUM CHLORIDE, DEXTROSE MONOHYDRATE AND SODIUM CHLORIDE 75 ML/HR: 150; 5; 450 INJECTION, SOLUTION INTRAVENOUS at 17:38

## 2022-03-18 RX ADMIN — DILTIAZEM HYDROCHLORIDE 90 MG: 60 TABLET, FILM COATED ORAL at 17:37

## 2022-03-18 RX ADMIN — METOPROLOL TARTRATE 50 MG: 50 TABLET, FILM COATED ORAL at 20:18

## 2022-03-18 RX ADMIN — SODIUM CHLORIDE, POTASSIUM CHLORIDE, SODIUM LACTATE AND CALCIUM CHLORIDE 9 ML/HR: 600; 310; 30; 20 INJECTION, SOLUTION INTRAVENOUS at 10:19

## 2022-03-18 RX ADMIN — AMIODARONE HYDROCHLORIDE 200 MG: 200 TABLET ORAL at 20:18

## 2022-03-18 RX ADMIN — INSULIN GLARGINE-YFGN 10 UNITS: 100 INJECTION, SOLUTION SUBCUTANEOUS at 21:25

## 2022-03-18 RX ADMIN — AMIODARONE HYDROCHLORIDE 200 MG: 200 TABLET ORAL at 08:15

## 2022-03-18 RX ADMIN — EPHEDRINE SULFATE 10 MG: 50 INJECTION INTRAVENOUS at 12:23

## 2022-03-18 RX ADMIN — ALBUMIN HUMAN: 0.05 INJECTION, SOLUTION INTRAVENOUS at 13:32

## 2022-03-18 RX ADMIN — ONDANSETRON 4 MG: 2 INJECTION INTRAMUSCULAR; INTRAVENOUS at 14:31

## 2022-03-18 RX ADMIN — SODIUM CHLORIDE: 9 INJECTION, SOLUTION INTRAVENOUS at 12:36

## 2022-03-18 RX ADMIN — LEVOTHYROXINE SODIUM 50 MCG: 0.05 TABLET ORAL at 05:12

## 2022-03-18 RX ADMIN — HYDROMORPHONE HYDROCHLORIDE 0.5 MG: 1 INJECTION, SOLUTION INTRAMUSCULAR; INTRAVENOUS; SUBCUTANEOUS at 17:50

## 2022-03-18 RX ADMIN — FENTANYL CITRATE 50 MCG: 0.05 INJECTION, SOLUTION INTRAMUSCULAR; INTRAVENOUS at 12:00

## 2022-03-18 RX ADMIN — OXYCODONE HYDROCHLORIDE 5 MG: 5 TABLET ORAL at 15:51

## 2022-03-18 RX ADMIN — ARFORMOTEROL TARTRATE 15 MCG: 15 SOLUTION RESPIRATORY (INHALATION) at 19:19

## 2022-03-18 RX ADMIN — PHENYLEPHRINE HYDROCHLORIDE 50 MCG: 10 INJECTION, SOLUTION INTRAVENOUS at 12:51

## 2022-03-18 RX ADMIN — DILTIAZEM HYDROCHLORIDE 90 MG: 60 TABLET, FILM COATED ORAL at 05:12

## 2022-03-18 RX ADMIN — CELECOXIB 100 MG: 100 CAPSULE ORAL at 20:18

## 2022-03-18 RX ADMIN — FENTANYL CITRATE 25 MCG: 0.05 INJECTION, SOLUTION INTRAMUSCULAR; INTRAVENOUS at 14:31

## 2022-03-18 RX ADMIN — LIDOCAINE HYDROCHLORIDE 60 MG: 20 INJECTION, SOLUTION INFILTRATION; PERINEURAL at 12:00

## 2022-03-18 RX ADMIN — LOSARTAN POTASSIUM 50 MG: 50 TABLET, FILM COATED ORAL at 08:15

## 2022-03-18 RX ADMIN — FENTANYL CITRATE 25 MCG: 0.05 INJECTION, SOLUTION INTRAMUSCULAR; INTRAVENOUS at 14:33

## 2022-03-18 RX ADMIN — EPHEDRINE SULFATE 10 MG: 50 INJECTION INTRAVENOUS at 12:10

## 2022-03-18 RX ADMIN — BUDESONIDE 1 MG: 0.5 INHALANT ORAL at 08:53

## 2022-03-18 RX ADMIN — BUPIVACAINE 10 ML: 13.3 INJECTION, SUSPENSION, LIPOSOMAL INFILTRATION at 10:25

## 2022-03-18 RX ADMIN — MAGNESIUM SULFATE HEPTAHYDRATE 1 G: 500 INJECTION, SOLUTION INTRAMUSCULAR; INTRAVENOUS at 12:25

## 2022-03-18 RX ADMIN — BUPIVACAINE HYDROCHLORIDE 10 ML: 5 INJECTION, SOLUTION EPIDURAL; INTRACAUDAL; PERINEURAL at 10:25

## 2022-03-18 RX ADMIN — GABAPENTIN 300 MG: 300 CAPSULE ORAL at 17:37

## 2022-03-18 RX ADMIN — GLYCOPYRROLATE 0.1 MG: 0.2 INJECTION INTRAMUSCULAR; INTRAVENOUS at 12:00

## 2022-03-18 RX ADMIN — BUDESONIDE 1 MG: 0.5 INHALANT ORAL at 19:19

## 2022-03-18 RX ADMIN — MULTIPLE VITAMINS W/ MINERALS TAB 1 TABLET: TAB at 08:15

## 2022-03-18 RX ADMIN — SODIUM CHLORIDE, POTASSIUM CHLORIDE, SODIUM LACTATE AND CALCIUM CHLORIDE: 600; 310; 30; 20 INJECTION, SOLUTION INTRAVENOUS at 14:30

## 2022-03-18 RX ADMIN — EPHEDRINE SULFATE 10 MG: 50 INJECTION INTRAVENOUS at 12:44

## 2022-03-18 RX ADMIN — ARFORMOTEROL TARTRATE 15 MCG: 15 SOLUTION RESPIRATORY (INHALATION) at 08:48

## 2022-03-18 RX ADMIN — SUGAMMADEX 200 MG: 100 INJECTION, SOLUTION INTRAVENOUS at 14:39

## 2022-03-18 RX ADMIN — ROCURONIUM BROMIDE 10 MG: 50 INJECTION INTRAVENOUS at 12:40

## 2022-03-18 NOTE — ANESTHESIA PROCEDURE NOTES
Arterial Line      Patient reassessed immediately prior to procedure    Patient location during procedure: holding area  Start time: 3/18/2022 10:01 AM  Stop Time:3/18/2022 10:07 AM       Line placed for hemodynamic monitoring, ABGs/Labs/ISTAT and MD/Surgeon request.  Performed By   Anesthesiologist: Jacek Ledezma MD  Preanesthetic Checklist  Completed: patient identified, IV checked, site marked, risks and benefits discussed, surgical consent, monitors and equipment checked, pre-op evaluation and timeout performed  Arterial Line Prep   Sterile Tech: cap, gloves and mask  Prep: ChloraPrep  Patient monitoring: blood pressure monitoring, continuous pulse oximetry and EKG  Arterial Line Procedure   Laterality:right  Location:  radial artery  Catheter size: 20 G   Guidance: ultrasound guided and palpation technique  Number of attempts: 2  Successful placement: yes  Post Assessment   Dressing Type: occlusive dressing applied, secured with tape and wrist guard applied.   Complications no  Circ/Move/Sens Assessment: normal.   Patient Tolerance: patient tolerated the procedure well with no apparent complications  Additional Notes  Ultrasound guidance used for placement of needle/catheter into artery.

## 2022-03-18 NOTE — ANESTHESIA POSTPROCEDURE EVALUATION
Patient: Jaquelin Valderrama    Procedure Summary     Date: 03/18/22 Room / Location: [facility] OR [address] [place] [address] MAIN OR    Anesthesia Start: 1149 Anesthesia Stop: 1510    Procedure: LEFT THORACOSCOPY VIDEO ASSISTED WITH COMPLETE DECORTICATION (Left Chest) Diagnosis:       Loculated pleural effusion      (Loculated pleural effusion [J90])    Surgeons: Sabra Kuo MD Provider: Jacek Ledezma MD    Anesthesia Type: general with blockSarah ASA Status: 4          Anesthesia Type: general with block, Sarah    Vitals  Vitals Value Taken Time   /83 03/18/22 1546   Temp 36.4 °C (97.5 °F) 03/18/22 1505   Pulse 66 03/18/22 1557   Resp 16 03/18/22 1530   SpO2 95 % 03/18/22 1557   Vitals shown include unvalidated device data.        Post Anesthesia Care and Evaluation    Patient location during evaluation: bedside  Patient participation: complete - patient participated  Level of consciousness: awake  Pain management: adequate  Airway patency: patent  Anesthetic complications: No anesthetic complications  PONV Status: none  Cardiovascular status: acceptable  Respiratory status: acceptable  Hydration status: acceptable    Comments: /81   Pulse 64   Temp 36.4 °C (97.5 °F) (Oral)   Resp 16   Wt 81.6 kg (179 lb 14.4 oz)   SpO2 98%   BMI 31.87 kg/m²

## 2022-03-18 NOTE — ANESTHESIA PREPROCEDURE EVALUATION
Anesthesia Evaluation     Patient summary reviewed and Nursing notes reviewed   NPO Solid Status: > 8 hours  NPO Liquid Status: > 8 hours           Airway   Mallampati: II  TM distance: >3 FB  Neck ROM: full  No difficulty expected  Dental - normal exam     Pulmonary    (+) pleural effusion, a smoker Former, shortness of breath, decreased breath sounds,   Cardiovascular - normal exam  Exercise tolerance: good (4-7 METS)    ECG reviewed  Patient on routine beta blocker and Beta blocker given within 24 hours of surgery    (+) hypertension, past MI , CAD, CABG >6 Months, dysrhythmias Atrial Fib, CHF , hyperlipidemia,     ROS comment: ECHO 3/2022    LVE with severe global left ventricular hypokinesis, estimated LV ejection fraction of 30%.  Severe right ventricular enlargement and moderate right atrial enlargement seen  Severe left atrial enlargement seen.  Aortic valve, mitral valve, tricuspid valve appears structurally normal, moderate mitral and mild tricuspid regurgitation seen.  Calculated RV systolic pressure of 40 to 45 mmHg.  No pericardial effusion seen.  Large pleural effusion seen.  Proximal aorta appears normal in size.      Neuro/Psych- negative ROS  GI/Hepatic/Renal/Endo    (+) obesity,   thyroid problem hypothyroidism    Musculoskeletal (-) negative ROS    Abdominal    Substance History - negative use     OB/GYN          Other - negative ROS                       Anesthesia Plan    ASA 4     general with block and [place]     intravenous induction     Anesthetic plan, all risks, benefits, and alternatives have been provided, discussed and informed consent has been obtained with: patient.    Plan discussed with CRNA.        CODE STATUS:    Code Status (Patient has no pulse and is not breathing): CPR (Attempt to Resuscitate)  Medical Interventions (Patient has pulse or is breathing): Full

## 2022-03-18 NOTE — ANESTHESIA PROCEDURE NOTES
Peripheral Block      Patient reassessed immediately prior to procedure    Patient location during procedure: holding area  Start time: 3/18/2022 10:21 AM  Stop time: 3/18/2022 10:25 AM  Reason for block: at surgeon's request and post-op pain management  Performed by  Anesthesiologist: Jacek Ledezma MD  Preanesthetic Checklist  Completed: patient identified, IV checked, site marked, risks and benefits discussed, surgical consent, monitors and equipment checked, pre-op evaluation and timeout performed  Prep:  Pt Position: sitting  Sterile barriers:cap, gloves, mask and washed/disinfected hands  Prep: ChloraPrep  Patient monitoring: blood pressure monitoring, continuous pulse oximetry and EKG  Procedure    Guidance:ultrasound guided    ULTRASOUND INTERPRETATION. Using ultrasound guidance a 21 G gauge needle was placed in close proximity to the nerve, at which point, under ultrasound guidance anesthetic was injected in the area of the nerve and spread of the anesthesia was seen on ultrasound in close proximity thereto.  There were no abnormalities seen on ultrasound; a digital image was taken; and the patient tolerated the procedure with no complications. Images:still images obtained, printed/placed on chart    Laterality:left  Block Type:erector spinae block  Injection Technique:single-shot  Needle Type:echogenic  Resistance on Injection: less than 15 psi    Medications Used: bupivacaine liposome (EXPAREL) 1.3 % injection, 10 mL  bupivacaine (PF) (MARCAINE) 0.5 % injection, 10 mL  Med administered at 3/18/2022 10:25 AM      Post Assessment  Injection Assessment: negative aspiration for heme, no paresthesia on injection and incremental injection  Patient Tolerance:comfortable throughout block  Complications:no  Additional Notes  Ultrasound guidance utilized for placement of needle and visualization of medication dispersement.

## 2022-03-18 NOTE — OP NOTE
THORACOSCOPY VIDEO ASSISTED WITH DECORTICATION  Procedure Report    Patient Name:  Jaquelin Valderrama  YOB: 1940    Date of Surgery:  3/18/2022     Indications: Left pleural effusion    Pre-op Diagnosis:   Loculated pleural effusion [J90]       Post-Op Diagnosis Codes:     * Loculated pleural effusion [J90]    Procedure/CPT® Codes:      Procedure(s):  LEFT THORACOSCOPY VIDEO ASSISTED WITH COMPLETE DECORTICATION    Staff:  Surgeon(s):  Sabra Kuo MD    Assistant: Jesus Manuel Mckeon CSA was responsible for performing the following activities: Retraction, Suction, Suturing, Closing, Placing Dressing and Held/Positioned Camera and their skilled assistance was necessary for the success of this case.     Anesthesia: General with Block    Estimated Blood Loss: 300 mL    Implants:    Nothing was implanted during the procedure    Specimen:          Specimens     ID Source Type Tests Collected By Collected At Frozen?    1 Pleural Cavity Body Fluid · ANAEROBIC CULTURE  · FUNGAL CULTURE  · BODY FLUID CULTURE   Sabra Kuo MD 3/18/22 1232     Description: LEFT PLEURAL FLUID FOR AEROBIC, ANAEROBIC, AND FUNGAL CULTURE    This specimen was not marked as sent.    2 Pleura Tissue · ANAEROBIC CULTURE  · FUNGAL CULTURE  · TISSUE / BONE CULTURE   Sabra Kuo MD 3/18/22 1249     Description: LEFT PLEURAL PEEL FOR AEROBIC, ANAEROBIC, AND FUNGAL CULTURE    This specimen was not marked as sent.            Findings: Approximately 2.5 L of old blood and clot    Complications: None apparent    Description of Procedure: Jaquelin Valderrama was identified in the preoperative holding area and again her consent for the procedure was verified.  The patient was transported to the operating room and placed on the operating room table in supine position.  A general anesthetic was successfully administered and She was intubated with a double lumen endotracheal tube without difficulty.  Placement of the double lumen was  confirmed with a video bronchoscope examination.  A time out was performed to verify correct patient, correct procedure and the correct administration of IV antibiotics per [facility] protocol.     The patient was placed in right lateral decubitus position, left side up and all pressure points were padded.  The patient was prepped and draped in standard sterile fashion.  An approximately 2cm incision was made in the 8th intercostal space midaxillary line, dissection was carried down into the chest cavity using Bovie electrocautery under direct vision.  An ray wound protector was placed into the incision.  The camera was inserted into the chest and there was a large amount of old blood and clot located in the chest cavity.    A second approximately 2cm incision was made in the anterior axillary line 5th intercostal space and the chest was entered and the rib space was opened under direct visualization.     The lung was mobilized from the lateral chest wall to allow working room.  Copious amounts of clot and old blood were evacuated from the chest cavity, approximately 2.5 L.  This was densely adherent to the diaphragm and lung.  Once all of the clot was mobilized and removed, the entirety of the lung except for the anterior upper lobe was mobilized from the chest wall.  The anterior upper lobe was left in place secondary to her prior heart surgery.  The fissure was opened.  There was a very thick rind covering both the upper and lower lobes.  The rind was decorticated from the lung using combination of blunt and sharp dissection.  The apex was mobilized.  The diaphragm was cleared.  Once the lung was completely clean from the rind, it expanded nicely.    Three 28 [place] chest tubes were placed into the space. The lung was reexpanded under direct visualization.  The incisions were closed using deep vicryl sutures and Monocryl for the skin in standard fashion.  The chest tube was secured to the skin.      The patient  tolerated this procedure well and was extubated and transported to the recovery room in stable condition.  The counts were correct at the end of the case.           Sabra Kuo MD     Date: 3/18/2022  Time: 14:56 EDT

## 2022-03-18 NOTE — CONSULTS
[facility]  Consult Note                                                                              3/18/2022  Jaja Artis DO    Patient Identification:  Jaquelin Valderrama:   82 y.o.  female  1940     Date of Admission:3/17/2022    CC:  Atrial fibrillation, cardiomyopathy, coronary disease    History of Present Illness:  Patient is a 82-year-old female with history of cardiomyopathy with congestive heart failure, atrial fibrillation.  She has a history of CABG 2014.  He has been followed by [facility] physician and has a history of atrial fibrillation and cardiomyopathy.  She has not had much in the way of interim cardiovascular events.  Last echo on 3/2022 with an ejection fraction of 30% with severe RV enlargement and moderate RA enlargement.  Normal valve function with an RV systolic pressure of 40 to 45 mmHg.  Troponin was normal though proBNP was elevated at the 7700 range improved to 3500 patient patient had troubles with alopecia and had not been taking thyroid replacement since November.  It was felt this had contributed to findings of atrial fibrillation heart failure.  Troponin has been negative.  She was also found to have a large left pleural effusion.  She was placed on Cardizem and amiodarone.  Chest tube was placed and patient was referred here for VATS decortication.  There was also some discussion about GABRIEL assisted cardioversion versus outpatient cardioversion at a later date.  Patient however was not anticoagulated due to hemorrhagic left pleural effusion.    Patient was transferred here yesterday and underwent left thorascopic video decortication today.  Postoperatively resting quietly with heart rate 60s blood pressure stable.  Patient remains on amiodarone 200 mg twice daily, diltiazem 90 mg every 8 hourly, subcutaneous Lovenox, losartan.  She is currently resting comfortably and has incisional pain but overall feels well    Past Medical History:  Past Medical History:   Diagnosis  Date   • Astigmatism, bilateral 11/19/2019   • Benign essential hypertension    • Bilateral presbyopia 11/19/2019   • CAD (coronary artery disease)    • Closed right ankle fracture    • COVID-19 vaccination refused    • Hyperlipidemia    • Hypothyroidism    • Influenza vaccine needed    • Myocardial infarction (HCC)    • Prediabetes    • Pseudophakia, both eyes 11/19/2019   • Thoracic back pain        Past Surgical History:  Past Surgical History:   Procedure Laterality Date   • APPENDECTOMY     • CARDIAC CATHETERIZATION  2012    x3    • CORONARY ARTERY BYPASS GRAFT  12/2014   • CORONARY STENT PLACEMENT      x3   • HARDWARE REMOVAL Right 7/21/2020    Procedure: ANKLE/FOOT HARDWARE REMOVAL;  Surgeon: Lincoln Sibley MD;  Location: [facility];  Service: Orthopedics;  Laterality: Right;   • ORIF ANKLE FRACTURE Right 04/23/2019    Adryan- George Lizarraga       Allergies:  Allergies   Allergen Reactions   • Prednisone Other (See Comments)     Increased BP       Home Meds:  Medications Prior to Admission   Medication Sig Dispense Refill Last Dose   • amiodarone (PACERONE) 200 MG tablet Take 1 tablet by mouth Every 12 (Twelve) Hours for 22 doses. 22 tablet 0 3/16/2022 at Unknown time   • arformoterol (BROVANA) 15 MCG/2ML nebulizer solution Take 2 mL by nebulization 2 (Two) Times a Day. 120 mL 2 Past Week at Unknown time   • aspirin 81 MG chewable tablet Chew 81 mg Daily.      • budesonide (PULMICORT) 0.5 MG/2ML nebulizer solution Take 4 mL by nebulization 2 (Two) Times a Day.   Past Week at Unknown time   • Cholecalciferol (VITAMIN D3) 2000 units tablet Take 1 tablet by mouth Daily.      • dilTIAZem (CARDIZEM) 90 MG tablet Take 1 tablet by mouth Every 8 (Eight) Hours. 90 tablet 0 3/16/2022 at Unknown time   • insulin glargine (LANTUS, SEMGLEE) 100 UNIT/ML injection Inject 10 Units under the skin into the appropriate area as directed Every Night. 3 mL 3 3/17/2022 at Unknown time   • irbesartan (AVAPRO) 300 MG tablet  Take 150 mg by mouth Daily.      • levothyroxine (SYNTHROID, LEVOTHROID) 50 MCG tablet Take 50 mcg by mouth Every Morning.   3/16/2022 at Unknown time   • metoprolol tartrate (LOPRESSOR) 25 MG tablet Take 1 tablet by mouth twice daily 180 tablet 0 3/16/2022 at Unknown time   • Multiple Vitamins-Minerals (MULTIVITAMIN ADULT EXTRA C PO) Take 1 tablet by mouth Daily.          Current Meds  Scheduled Meds:acetaminophen, 1,000 mg, Oral, TID  amiodarone, 200 mg, Oral, BID  arformoterol, 15 mcg, Nebulization, BID - RT  budesonide, 1 mg, Nebulization, BID - RT  celecoxib, 100 mg, Oral, Q12H  cholecalciferol, 2,000 Units, Oral, Daily  dilTIAZem, 90 mg, Oral, Q8H  enoxaparin, 40 mg, Subcutaneous, Q24H  gabapentin, 300 mg, Oral, Q8H  insulin glargine, 10 Units, Subcutaneous, Nightly  ipratropium-albuterol, 3 mL, Nebulization, Q8H - RT  levothyroxine, 50 mcg, Oral, Q AM  losartan, 50 mg, Oral, Q24H  metoprolol tartrate, 25 mg, Oral, BID  multivitamin with minerals, 1 tablet, Oral, Daily  senna-docusate sodium, 2 tablet, Oral, Nightly      Continuous Infusions:dextrose 5 % and sodium chloride 0.45 % with KCl 20 mEq/L, 75 mL/hr  lactated ringers, 9 mL/hr, Last Rate: 9 mL/hr (22 1149)      Social History:   Social History     Socioeconomic History   • Marital status:    Tobacco Use   • Smoking status: Former Smoker     Types: Cigarettes     Quit date:      Years since quittin.2   • Smokeless tobacco: Never Used   • Tobacco comment: Social Drinker   Vaping Use   • Vaping Use: Never used   Substance and Sexual Activity   • Alcohol use: Yes     Comment: mod    • Drug use: No   • Sexual activity: Defer       Family History:  Family History   Problem Relation Age of Onset   • Heart disease Mother    • Lung cancer Father    • Diabetes Other        REVIEW OF SYSTEMS:   CONSTITUTIONAL: No weight loss, fever, chills  HEENT: Eyes: No visual loss, blurred vision, double vision or yellow sclerae. Ears, Nose, Throat: No  hearing loss, sneezing, congestion, runny nose or sore throat.   SKIN: No rash or itching.     RESPIRATORY: No hemoptysis, cough or sputum.   GASTROINTESTINAL: No anorexia, nausea, vomiting or diarrhea. No abdominal pain, bright red blood per rectum or melena.  GENITOURINARY: No burning on urination, hematuria or increased frequency.  NEUROLOGICAL: No headache, dizziness, syncope, paralysis, ataxia, numbness or tingling in the extremities. No change in bowel or bladder control.   MUSCULOSKELETAL: Postop pain present  ENDOCRINOLOGIC: No reports of sweating, cold or heat intolerance. No polyuria or polydipsia.     Physical Exam    /94 (BP Location: Left arm, Patient Position: Lying)   Pulse 61   Temp 97.5 °F (36.4 °C) (Oral)   Resp 16   Wt 81.6 kg (179 lb 14.4 oz)   SpO2 95%   BMI 31.87 kg/m²     General Appearance Well developed, cooperative and well nourished and no acute distress   Head Normocephalic, without abnormality, atraumatic   Ears Ears appear intact with no abnormalities noted   Throat No oral lesions, no thrush, oral mucosa moist   Neck No adenopathy, supple, trachea midline, no thyromegaly, no carotid bruit,   Back No skin lesions, erythema or scars, no tenderness to percussion or palpation,range of motion is normal   Lungs Clear to auscultation,respirations regular, even and unlabored   Heart Irregular rhythm and normal rate, normal S1 and S2, no murmur, no gallop, no rub, no click   Chest wall No abnormalities observed   Abdomen Normal bowel sounds, no masses, no hepatomegaly,    Extremities Moves all extremities well, no edema, no cyanosis, no redness   Pulses Pulses palpable and equal bilaterally. Normal radial, carotid pulses bilaterally   Skin No bleeding, bruising or rash   Psychiatric Alert and oriented x 3, normal mood and affect     Results from last 7 days   Lab Units 03/18/22  1518 03/16/22  2354 03/16/22  0015   SODIUM mmol/L 131*   < > 127*   POTASSIUM mmol/L 4.2   < > 3.8    CHLORIDE mmol/L 99   < > 94*   CO2 mmol/L 23.0   < > 21.0*   BUN mg/dL 12   < > 13   CREATININE mg/dL 0.58   < > 0.59   CALCIUM mg/dL 7.8*   < > 8.1*   BILIRUBIN mg/dL  --   --  0.4   ALK PHOS U/L  --   --  100   ALT (SGPT) U/L  --   --  44*   AST (SGOT) U/L  --   --  35*   GLUCOSE mg/dL 172*   < > 178*    < > = values in this interval not displayed.         Results from last 7 days   Lab Units 03/18/22  1518 03/18/22  0439 03/16/22  2354   WBC 10*3/mm3 15.02* 13.58* 12.30*   HEMOGLOBIN g/dL 8.4* 8.8* 9.1*   HEMATOCRIT % 24.5* 27.2* 27.0*   PLATELETS 10*3/mm3 492* 505* 534*     Results from last 7 days   Lab Units 03/18/22  0439   INR  1.16*     Results from last 7 days   Lab Units 03/16/22  2354   MAGNESIUM mg/dL 2.0     I personally viewed and interpreted the patient's EKG/Telemetry data  I have reviewed HPI and ROS above.    Assessment and Plan  1.  Left pleural effusion status post decortication earlier today.  Patient states dyspnea already better.  2.  Atrial flutter/fibrillation.  Rates controlled on amiodarone, low-dose metoprolol and Cardizem.  With given cardiomyopathy we will try to discontinue Cardizem and increase metoprolol.  Anticoagulation on hold for now would resume as soon as possible from a surgical standpoint.  3.  Cardiomyopathy with ejection fraction 30%.  As above.  We will transition Cardizem to metoprolol.  Will need to resume anticoagulation soon  4.  Coronary disease with history of CABG 2014.  5.  Congestive heart failure.  Continue with metoprolol and losartan.  Discontinue Cardizem.  Add diuretic.  6.  Hypothyroidism  7.  Anemia  8.  Hyponatremia    Mini George  3/18/2022  17:12 EDT    50min spent in reviewing records, discussion and examination of the patient and discussion with other members of the patient's medical team.     Dictated utilizing Dragon dictation

## 2022-03-18 NOTE — PROGRESS NOTES
Name: Jaquelin Valderrama ADMIT: 3/17/2022   : 1940  PCP: Minerva Chatterjee MD    MRN: [id number] LOS: 1 days   AGE/SEX: 82 y.o. female  ROOM: [facility]     Subjective   Subjective    Patient seen at bedside, she feels better.       Objective   Objective   Vital Signs  Temp:  [97.5 °F (36.4 °C)-98.5 °F (36.9 °C)] 97.5 °F (36.4 °C)  Heart Rate:  [57-79] 69  Resp:  [14-18] 16  BP: (106-172)/(53-94) 160/80  Arterial Line BP: (122-156)/(47-67) 156/66  SpO2:  [90 %-100 %] 96 %  on  Flow (L/min):  [2-6] 4;   Device (Oxygen Therapy): nasal cannula  Body mass index is 31.87 kg/m².  Physical Exam   General, awake and alert.  Head and ENT, normocephalic and atraumatic.  Lungs, symmetric expansion, equal air entry bilaterally.  Heart, regular rate and rhythm.  Abdomen, soft and nontender.  Extremities, no clubbing or cyanosis.  Neuro, no focal deficits.  Skin: Warm and no rash.  Psych, normal mood and affect.  Musculoskeletal, joint examination is grossly normal.    Results Review     I reviewed the patient's new clinical results.  Results from last 7 days   Lab Units 22  1518 22  0015   WBC 10*3/mm3 15.02* 13.58* 12.30* 12.30*   HEMOGLOBIN g/dL 8.4* 8.8* 9.1* 9.4*   PLATELETS 10*3/mm3 492* 505* 534* 503*     Results from last 7 days   Lab Units 22  1518 22  0439 22  0015   SODIUM mmol/L 131* 129* 129* 127*   POTASSIUM mmol/L 4.2 3.8 3.9 3.8   CHLORIDE mmol/L 99 97* 95* 94*   CO2 mmol/L 23.0 23.3 21.0* 21.0*   BUN mg/dL 12 12 13 13   CREATININE mg/dL 0.58 0.57 0.69 0.59   GLUCOSE mg/dL 172* 159* 149* 178*   EGFR mL/min/1.73 91.6 92.0 87.9 91.2     Results from last 7 days   Lab Units 22  0015 22  1039 22  0032 22  0020   ALBUMIN g/dL 2.60* 2.70* 3.10* 2.80*   BILIRUBIN mg/dL 0.4 0.4 0.4 0.5   ALK PHOS U/L 100 110 118* 89   AST (SGOT) U/L 35* 30 38* 28   ALT (SGPT) U/L 44* 43* 44* 30     Results from last 7 days   Lab Units  03/18/22  1518 03/18/22  0439 03/16/22  2354 03/16/22  0015 03/15/22  0410 03/14/22  1039 03/13/22  0032 03/12/22  0020   CALCIUM mg/dL 7.8* 8.4* 8.1* 8.1* 8.2* 8.5* 8.6 8.2*   ALBUMIN g/dL  --   --   --  2.60*  --  2.70* 3.10* 2.80*   MAGNESIUM mg/dL  --   --  2.0 1.9 1.9 2.0 2.2 2.0   PHOSPHORUS mg/dL  --   --  3.0 2.4* 2.6 2.5 2.9 2.5       Glucose   Date/Time Value Ref Range Status   03/17/2022 2047 219 (H) 70 - 130 mg/dL Final     Comment:     Meter: [id number] : [id number] Phil Munson NA   03/17/2022 1704 128 70 - 130 mg/dL Final     Comment:     Meter: [id number] : [id number] Phil Munson NA   03/17/2022 1100 179 (H) 70 - 105 mg/dL Final     Comment:     Serial Number: [id number]:  [id number]   03/17/2022 0755 154 (H) 70 - 105 mg/dL Final     Comment:     Serial Number: [id number]:  [id number]   03/16/2022 2026 141 (H) 70 - 105 mg/dL Final     Comment:     Serial Number: [id number]:  [id number]   03/16/2022 1626 145 (H) 70 - 105 mg/dL Final     Comment:     Serial Number: [id number]:  [id number]   03/16/2022 1126 112 (H) 70 - 105 mg/dL Final     Comment:     Serial Number: [id number]:  [id number]       XR Chest 1 View  ONE VIEW PORTABLE CHEST AT 3:15 PM     HISTORY: Recent left chest surgery. Chest tubes.     FINDINGS: The patient has had recent left chest surgery with 3 chest  tubes in place including one extending to the apex and there appears to  be a very tiny left apical lateral pneumothorax measuring 4 mm. There is  improvement in the previous large loculated left pleural effusion noted  on the preoperative exam. There remains some residual pleural fluid and  atelectasis in the lower left chest. There is also new parenchymal and  pleural density in the lower right chest laterally suspicious for a  combination of pleural fluid and atelectasis and possible developing  pneumonia and continued follow-up evaluation is recommended. The heart  remains mildly  enlarged.     This report was finalized on 3/18/2022 3:35 PM by Dr. Art Poon M.D.     Peripheral Block  Jacek Ledezma MD     3/18/2022 10:29 AM  Peripheral Block    Patient reassessed immediately prior to procedure    Patient location during procedure: holding area  Start time: 3/18/2022 10:21 AM  Stop time: 3/18/2022 10:25 AM  Reason for block: at surgeon's request and post-op pain management  Performed by  Anesthesiologist: Jacek Ledezma MD  Preanesthetic Checklist  Completed: patient identified, IV checked, site marked, risks and benefits   discussed, surgical consent, monitors and equipment checked, pre-op   evaluation and timeout performed  Prep:  Pt Position: sitting  Sterile barriers:cap, gloves, mask and washed/disinfected hands  Prep: ChloraPrep  Patient monitoring: blood pressure monitoring, continuous pulse oximetry   and EKG  Procedure    Guidance:ultrasound guided    ULTRASOUND INTERPRETATION. Using ultrasound guidance a 21 G gauge needle   was placed in close proximity to the nerve, at which point, under   ultrasound guidance anesthetic was injected in the area of the nerve and   spread of the anesthesia was seen on ultrasound in close proximity   thereto.  There were no abnormalities seen on ultrasound; a digital image   was taken; and the patient tolerated the procedure with no complications.   Images:still images obtained, printed/placed on chart    Laterality:left  Block Type:erector spinae block  Injection Technique:single-shot  Needle Type:echogenic  Resistance on Injection: less than 15 psi    Medications Used: bupivacaine liposome (EXPAREL) 1.3 % injection, 10 mL  bupivacaine (PF) (MARCAINE) 0.5 % injection, 10 mL  Med administered at 3/18/2022 10:25 AM    Post Assessment  Injection Assessment: negative aspiration for heme, no paresthesia on   injection and incremental injection  Patient Tolerance:comfortable throughout block  Complications:no  Additional Notes  Ultrasound  guidance utilized for placement of needle and visualization of   medication dispersement.  Arterial Line  Jacek Ledezma MD     3/18/2022 10:27 AM  Arterial Line    Patient reassessed immediately prior to procedure    Patient location during procedure: holding area  Start time: 3/18/2022 10:01 AM  Stop Time:3/18/2022 10:07 AM       Line placed for hemodynamic monitoring, ABGs/Labs/ISTAT and MD/Surgeon   request.  Performed By   Anesthesiologist: Jacek Ledezma MD  Preanesthetic Checklist  Completed: patient identified, IV checked, site marked, risks and benefits   discussed, surgical consent, monitors and equipment checked, pre-op   evaluation and timeout performed  Arterial Line Prep   Sterile Tech: cap, gloves and mask  Prep: ChloraPrep  Patient monitoring: blood pressure monitoring, continuous pulse oximetry   and EKG  Arterial Line Procedure   Laterality:right  Location:  radial artery  Catheter size: 20 G   Guidance: ultrasound guided and palpation technique  Number of attempts: 2  Successful placement: yes  Post Assessment   Dressing Type: occlusive dressing applied, secured with tape and wrist   guard applied.   Complications no  Circ/Move/Sens Assessment: normal.   Patient Tolerance: patient tolerated the procedure well with no apparent   complications  Additional Notes  Ultrasound guidance used for placement of needle/catheter into artery.  XR Chest 1 View  Narrative: Patient: MARCY WILKS  Time Out: 07:44  Exam(s): FILM CXR 1 VIEW     EXAM:    XR Chest, 1 View    CLINICAL HISTORY:     Reason for exam: chest tube management.    TECHNIQUE:    Frontal view of the chest.    COMPARISON:    No relevant prior studies available.    FINDINGS:    Lungs:  Unremarkable.  No consolidation.    Pleural space: Left-sided chest tube in place with large left pleural   effusion.  No pneumothorax.    Heart: Stable cardiac contour.  Median sternotomy wires in place.    Mediastinum:  Unremarkable.    Bones joints:   Unremarkable.    IMPRESSION:       Left sided chest tube in place terminates in the left mid hemithorax.  No   pneumothorax.  Impression: Electronically signed by Hakeem Keyes M.D. on 03-18-22 at 0744    Scheduled Medications  acetaminophen, 1,000 mg, Oral, TID  amiodarone, 200 mg, Oral, BID  arformoterol, 15 mcg, Nebulization, BID - RT  budesonide, 1 mg, Nebulization, BID - RT  celecoxib, 100 mg, Oral, Q12H  cholecalciferol, 2,000 Units, Oral, Daily  dilTIAZem, 90 mg, Oral, Q8H  enoxaparin, 40 mg, Subcutaneous, Q24H  gabapentin, 300 mg, Oral, Q8H  insulin glargine, 10 Units, Subcutaneous, Nightly  ipratropium-albuterol, 3 mL, Nebulization, Q8H - RT  levothyroxine, 50 mcg, Oral, Q AM  losartan, 50 mg, Oral, Q24H  metoprolol tartrate, 25 mg, Oral, BID  multivitamin with minerals, 1 tablet, Oral, Daily  senna-docusate sodium, 2 tablet, Oral, Nightly    Infusions  dextrose 5 % and sodium chloride 0.45 % with KCl 20 mEq/L, 75 mL/hr, Last Rate: 75 mL/hr (03/18/22 1738)  lactated ringers, 9 mL/hr, Last Rate: 9 mL/hr (03/18/22 1149)    Diet  Diet Clear Liquid       Assessment/Plan     Active Hospital Problems    Diagnosis  POA   • Loculated pleural effusion [J90]  Yes   • Acute CHF (congestive heart failure) ([facility]) [I50.9]  Yes   • Atrial fibrillation with RVR ([facility]) [I48.91]  Yes   • Obesity (BMI 30-39.9) [E66.9]  Yes   • Astigmatism, bilateral [H52.203]  Yes   • Essential hypertension [I10]  Yes   • Hypothyroidism [E03.9]  Yes   • Other hyperlipidemia [E78.49]  Yes      Resolved Hospital Problems   No resolved problems to display.       Assessment and plan:  1.  Loculated pleural effusion, patient underwent left sided VATS and decortication.  Cardiothoracic surgery on board.  Continue pain control.    2.  Atrial fibrillation, cardiology on board, continue to follow recommendations.  Patient is currently rate controlled.    3.  Hyponatremia, monitor sodium levels, if continues to remain low, will consult  nephrology.    4.  Anemia, hemoglobin trend noted to be stable, continue to recheck labs.    5.  Acute CHF, last known ejection fraction is 30%, cardiology on board, follow recommendations for management.    6.  Hypothyroidism, continue Synthroid.    7.  CODE STATUS is full code.  Further plans based on hospital course.      Darius Bernardo MD  [facility]  03/18/22  18:04 EDT

## 2022-03-18 NOTE — ANESTHESIA PROCEDURE NOTES
Airway  Urgency: elective    Date/Time: 3/18/2022 12:05 PM  Airway not difficult    General Information and Staff    Patient location during procedure: OR  Anesthesiologist: Jacek Ledezma MD  CRNA: Barbara Alaniz CRNA    Indications and Patient Condition  Indications for airway management: airway protection    Preoxygenated: yes  MILS maintained throughout  Mask difficulty assessment: 2 - vent by mask + OA or adjuvant +/- NMBA    Final Airway Details  Final airway type: endotracheal airway      Successful airway: EBT - double lumen left  Cuffed: yes   Successful intubation technique: video laryngoscopy  Facilitating devices/methods: intubating stylet and cricoid pressure  Endotracheal tube insertion site: oral  Blade: CMAC  Blade size: 3  EBT DL size (fr): 37  Cormack-Lehane Classification: grade I - full view of glottis  Placement verified by: chest auscultation, capnometry and single lung ventilation   Measured from: teeth  ETT/EBT  to teeth (cm): 27  Number of attempts at approach: 2  Assessment: lips, teeth, and gum same as pre-op and atraumatic intubation

## 2022-03-18 NOTE — H&P
HISTORY AND PHYSICAL   [facility]        Date of Admission: 3/17/2022  Patient Identification:  Name: Jaquelin Valderrama  Age: 82 y.o.  Sex: female  :  1940  MRN: [id number]                     Primary Care Physician: Minerva Chatterjee MD    Chief Complaint:  82 year old female who was transferred from [facility]; she has a hemothorax and transfer was requested for planned surgery tomorrow; she had a thoracentesis and a chest tube was placed; the effusion was loculated so thoracic surgery was consulted; she initially presented with  a fib and new onset chf;     History of Present Illness:   As above    Past Medical History:  Past Medical History:   Diagnosis Date   • Astigmatism, bilateral 2019   • Benign essential hypertension    • Bilateral presbyopia 2019   • CAD (coronary artery disease)    • Closed right ankle fracture    • COVID-19 vaccination refused    • Hyperlipidemia    • Hypothyroidism    • Influenza vaccine needed    • Myocardial infarction (HCC)    • Prediabetes    • Pseudophakia, both eyes 2019   • Thoracic back pain      Past Surgical History:  Past Surgical History:   Procedure Laterality Date   • APPENDECTOMY     • CARDIAC CATHETERIZATION  2012    x3    • CORONARY ARTERY BYPASS GRAFT  2014   • CORONARY STENT PLACEMENT      x3   • HARDWARE REMOVAL Right 2020    Procedure: ANKLE/FOOT HARDWARE REMOVAL;  Surgeon: Lincoln Sibley MD;  Location: [facility];  Service: Orthopedics;  Laterality: Right;   • ORIF ANKLE FRACTURE Right 2019    [facility] Meds:  Medications Prior to Admission   Medication Sig Dispense Refill Last Dose   • amiodarone (PACERONE) 200 MG tablet Take 1 tablet by mouth Every 12 (Twelve) Hours for 22 doses. 22 tablet 0 3/16/2022 at Unknown time   • arformoterol (BROVANA) 15 MCG/2ML nebulizer solution Take 2 mL by nebulization 2 (Two) Times a Day. 120 mL 2 Past Week at Unknown time   • aspirin 81 MG chewable  tablet Chew 81 mg Daily.      • budesonide (PULMICORT) 0.5 MG/2ML nebulizer solution Take 4 mL by nebulization 2 (Two) Times a Day.   Past Week at Unknown time   • Cholecalciferol (VITAMIN D3) 2000 units tablet Take 1 tablet by mouth Daily.      • dilTIAZem (CARDIZEM) 90 MG tablet Take 1 tablet by mouth Every 8 (Eight) Hours. 90 tablet 0 3/16/2022 at Unknown time   • insulin glargine (LANTUS, SEMGLEE) 100 UNIT/ML injection Inject 10 Units under the skin into the appropriate area as directed Every Night. 3 mL 3 3/17/2022 at Unknown time   • irbesartan (AVAPRO) 300 MG tablet Take 150 mg by mouth Daily.      • levothyroxine (SYNTHROID, LEVOTHROID) 50 MCG tablet Take 50 mcg by mouth Every Morning.   3/16/2022 at Unknown time   • metoprolol tartrate (LOPRESSOR) 25 MG tablet Take 1 tablet by mouth twice daily 180 tablet 0 3/16/2022 at Unknown time   • Multiple Vitamins-Minerals (MULTIVITAMIN ADULT EXTRA C PO) Take 1 tablet by mouth Daily.          Allergies:  Allergies   Allergen Reactions   • Prednisone Other (See Comments)     Increased BP     Immunizations:  Immunization History   Administered Date(s) Administered   • Pneumococcal Conjugate 13-Valent (PCV13) 2015   • Pneumococcal Polysaccharide (PPSV23) 2007     Social History:   Social History     Social History Narrative   • Not on file     Social History     Socioeconomic History   • Marital status:    Tobacco Use   • Smoking status: Former Smoker     Types: Cigarettes     Quit date: 1980     Years since quittin.2   • Smokeless tobacco: Never Used   • Tobacco comment: Social Drinker   Vaping Use   • Vaping Use: Never used   Substance and Sexual Activity   • Alcohol use: Yes     Comment: mod    • Drug use: No   • Sexual activity: Defer       Family History:  Family History   Problem Relation Age of Onset   • Heart disease Mother    • Lung cancer Father    • Diabetes Other         Review of Systems  See history of present illness and past  medical history.  Patient denies headache, dizziness, syncope, falls, trauma, change in vision, change in hearing, change in taste, changes in weight, changes in appetite, focal weakness, numbness, or paresthesia.  Patient denies chest pain,   sinus pressure, rhinorrhea, epistaxis, hemoptysis, nausea, vomiting,hematemesis, diarrhea, constipation or hematchezia.  Denies cold or heat intolerance, polydipsia, polyuria, polyphagia. Denies hematuria, pyuria, dysuria, hesitancy, frequency or urgency. Denies consumption of raw and under cooked meats foods or change in water source.  Denies fever, chills, sweats, night sweats.  Denies missing any routine medications. Remainder of ROS is negative.    Objective:  T Max 24 hrs: Temp (24hrs), Av.9 °F (36.6 °C), Min:97.5 °F (36.4 °C), Max:98.5 °F (36.9 °C)    Vitals Ranges:   Temp:  [97.5 °F (36.4 °C)-98.5 °F (36.9 °C)] 98.5 °F (36.9 °C)  Heart Rate:  [64-81] 79  Resp:  [16-26] 16  BP: (133-172)/() 172/85      Exam:  /85 (BP Location: Right arm, Patient Position: Lying)   Pulse 79   Temp 98.5 °F (36.9 °C) (Oral)   Resp 16   SpO2 96%     General Appearance:    Alert, cooperative, no distress, appears stated age   Head:    Normocephalic, without obvious abnormality, atraumatic   Eyes:    PERRL, conjunctivae/corneas clear, EOM's intact, both eyes   Ears:    Normal external ear canals, both ears   Nose:   Nares normal, septum midline, mucosa normal, no drainage    or sinus tenderness   Throat:   Lips, mucosa, and tongue normal   Neck:   Supple, symmetrical, trachea midline, no adenopathy;     thyroid:  no enlargement/tenderness/nodules; no carotid    bruit or JVD   Back:     Symmetric, no curvature, ROM normal, no CVA tenderness   Lungs:     Decreased breath sounds bilaterally, respirations unlabored   Chest Wall:    No tenderness or deformity; chest tube in place    Heart:    Regular rate and rhythm, S1 and S2 normal, no murmur, rub   or gallop   Abdomen:      Soft, nontender, bowel sounds active all four quadrants,     no masses, no hepatomegaly, no splenomegaly   Extremities:   Extremities normal, atraumatic, no cyanosis or edema   Pulses:   2+ and symmetric all extremities   Skin:   Skin color, texture, turgor normal, no rashes or lesions   Lymph nodes:   Cervical, supraclavicular, and axillary nodes normal   Neurologic:   CNII-XII intact, normal strength, sensation intact throughout      .    Data Review:  Labs in chart were reviewed.  WBC   Date Value Ref Range Status   03/16/2022 12.30 (H) 3.40 - 10.80 10*3/mm3 Final     Hemoglobin   Date Value Ref Range Status   03/16/2022 9.1 (L) 12.0 - 15.9 g/dL Final     Hematocrit   Date Value Ref Range Status   03/16/2022 27.0 (L) 34.0 - 46.6 % Final     Platelets   Date Value Ref Range Status   03/16/2022 534 (H) 140 - 450 10*3/mm3 Final     Sodium   Date Value Ref Range Status   03/16/2022 129 (L) 136 - 145 mmol/L Final     Potassium   Date Value Ref Range Status   03/16/2022 3.9 3.5 - 5.2 mmol/L Final     Chloride   Date Value Ref Range Status   03/16/2022 95 (L) 98 - 107 mmol/L Final     CO2   Date Value Ref Range Status   03/16/2022 21.0 (L) 22.0 - 29.0 mmol/L Final     BUN   Date Value Ref Range Status   03/16/2022 13 8 - 23 mg/dL Final     Creatinine   Date Value Ref Range Status   03/16/2022 0.69 0.57 - 1.00 mg/dL Final     Glucose   Date Value Ref Range Status   03/16/2022 149 (H) 65 - 99 mg/dL Final     Calcium   Date Value Ref Range Status   03/16/2022 8.1 (L) 8.6 - 10.5 mg/dL Final     Magnesium   Date Value Ref Range Status   03/16/2022 2.0 1.6 - 2.4 mg/dL Final     Phosphorus   Date Value Ref Range Status   03/16/2022 3.0 2.5 - 4.5 mg/dL Final     Comment:     Result checked      AST (SGOT)   Date Value Ref Range Status   03/16/2022 35 (H) 1 - 32 U/L Final     ALT (SGPT)   Date Value Ref Range Status   03/16/2022 44 (H) 1 - 33 U/L Final     Alkaline Phosphatase   Date Value Ref Range Status   03/16/2022 100 39 -  117 U/L Final     No results found for: APTT, INR             Imaging Results (All)     None        Patient Active Problem List   Diagnosis Code   • Other hyperlipidemia E78.49   • Essential hypertension I10   • Hypothyroidism E03.9   • Coronary artery disease involving coronary bypass graft of native heart without angina pectoris I25.810   • Astigmatism, bilateral H52.203   • Bilateral presbyopia H52.4   • Pseudophakia, both eyes Z96.1   • Atrial fibrillation with RVR (HCC) I48.91   • Acute CHF (congestive heart failure) ([facility]) I50.9   • Acute hyponatremia E87.1   • Elevated LFTs R79.89   • Elevated TSH R79.89   • Acute hyperglycemia R73.9   • Obesity (BMI 30-39.9) E66.9   • Loculated pleural effusion J90       Assessment:  Active Hospital Problems    Diagnosis  POA   • Loculated pleural effusion [J90]  Yes      Resolved Hospital Problems   No resolved problems to display.   hypertension  Hypothyroidism  A fib with rvr  Hyperglycemia  Anemia  Hyponatremia      Plan:  Surgery is planned for tomorrow  Trend hgb  Monitor on telemetry  D.w patient and transferring provider    Valorie Rodriguez MD  3/17/2022  21:38 EDT

## 2022-03-18 NOTE — PLAN OF CARE
Problem: Adult Inpatient Plan of Care  Goal: Plan of Care Review  Outcome: Ongoing, Progressing  Flowsheets (Taken 3/18/2022 1606)  Progress: no change  Plan of Care Reviewed With: patient  Outcome Evaluation: pt karey for surgery with thoracic. cardiology consult placed.   Goal Outcome Evaluation:  Plan of Care Reviewed With: patient        Progress: no change  Outcome Evaluation: pt karey for surgery with thoracic. cardiology consult placed.

## 2022-03-19 ENCOUNTER — APPOINTMENT (OUTPATIENT)
Dept: GENERAL RADIOLOGY | Facility: HOSPITAL | Age: 82
End: 2022-03-19

## 2022-03-19 LAB
ANION GAP SERPL CALCULATED.3IONS-SCNC: 9 MMOL/L (ref 5–15)
BASOPHILS # BLD AUTO: 0.01 10*3/MM3 (ref 0–0.2)
BASOPHILS NFR BLD AUTO: 0.1 % (ref 0–1.5)
BUN SERPL-MCNC: 15 MG/DL (ref 8–23)
BUN/CREAT SERPL: 17.2 (ref 7–25)
CALCIUM SPEC-SCNC: 7.9 MG/DL (ref 8.6–10.5)
CHLORIDE SERPL-SCNC: 99 MMOL/L (ref 98–107)
CO2 SERPL-SCNC: 23 MMOL/L (ref 22–29)
CREAT SERPL-MCNC: 0.87 MG/DL (ref 0.57–1)
DEPRECATED RDW RBC AUTO: 49.4 FL (ref 37–54)
EGFRCR SERPLBLD CKD-EPI 2021: 67.4 ML/MIN/1.73
EOSINOPHIL # BLD AUTO: 0 10*3/MM3 (ref 0–0.4)
EOSINOPHIL NFR BLD AUTO: 0 % (ref 0.3–6.2)
ERYTHROCYTE [DISTWIDTH] IN BLOOD BY AUTOMATED COUNT: 14.5 % (ref 12.3–15.4)
GLUCOSE BLDC GLUCOMTR-MCNC: 279 MG/DL (ref 70–130)
GLUCOSE BLDC GLUCOMTR-MCNC: 315 MG/DL (ref 70–130)
GLUCOSE SERPL-MCNC: 248 MG/DL (ref 65–99)
HCT VFR BLD AUTO: 24.4 % (ref 34–46.6)
HGB BLD-MCNC: 8 G/DL (ref 12–15.9)
IMM GRANULOCYTES # BLD AUTO: 0.13 10*3/MM3 (ref 0–0.05)
IMM GRANULOCYTES NFR BLD AUTO: 0.9 % (ref 0–0.5)
LYMPHOCYTES # BLD AUTO: 0.46 10*3/MM3 (ref 0.7–3.1)
LYMPHOCYTES NFR BLD AUTO: 3.2 % (ref 19.6–45.3)
MCH RBC QN AUTO: 31 PG (ref 26.6–33)
MCHC RBC AUTO-ENTMCNC: 32.8 G/DL (ref 31.5–35.7)
MCV RBC AUTO: 94.6 FL (ref 79–97)
MONOCYTES # BLD AUTO: 0.68 10*3/MM3 (ref 0.1–0.9)
MONOCYTES NFR BLD AUTO: 4.7 % (ref 5–12)
NEUTROPHILS NFR BLD AUTO: 13.21 10*3/MM3 (ref 1.7–7)
NEUTROPHILS NFR BLD AUTO: 91.1 % (ref 42.7–76)
NRBC BLD AUTO-RTO: 0 /100 WBC (ref 0–0.2)
PLATELET # BLD AUTO: 525 10*3/MM3 (ref 140–450)
PMV BLD AUTO: 9 FL (ref 6–12)
POTASSIUM SERPL-SCNC: 5.3 MMOL/L (ref 3.5–5.2)
RBC # BLD AUTO: 2.58 10*6/MM3 (ref 3.77–5.28)
SODIUM SERPL-SCNC: 131 MMOL/L (ref 136–145)
WBC NRBC COR # BLD: 14.49 10*3/MM3 (ref 3.4–10.8)

## 2022-03-19 PROCEDURE — 25010000002 ONDANSETRON PER 1 MG: Performed by: THORACIC SURGERY (CARDIOTHORACIC VASCULAR SURGERY)

## 2022-03-19 PROCEDURE — 94799 UNLISTED PULMONARY SVC/PX: CPT

## 2022-03-19 PROCEDURE — 71045 X-RAY EXAM CHEST 1 VIEW: CPT

## 2022-03-19 PROCEDURE — 94761 N-INVAS EAR/PLS OXIMETRY MLT: CPT

## 2022-03-19 PROCEDURE — 80048 BASIC METABOLIC PNL TOTAL CA: CPT | Performed by: THORACIC SURGERY (CARDIOTHORACIC VASCULAR SURGERY)

## 2022-03-19 PROCEDURE — 99024 POSTOP FOLLOW-UP VISIT: CPT | Performed by: NURSE PRACTITIONER

## 2022-03-19 PROCEDURE — 99232 SBSQ HOSP IP/OBS MODERATE 35: CPT | Performed by: NURSE PRACTITIONER

## 2022-03-19 PROCEDURE — 25010000002 ENOXAPARIN PER 10 MG: Performed by: THORACIC SURGERY (CARDIOTHORACIC VASCULAR SURGERY)

## 2022-03-19 PROCEDURE — 85025 COMPLETE CBC W/AUTO DIFF WBC: CPT | Performed by: THORACIC SURGERY (CARDIOTHORACIC VASCULAR SURGERY)

## 2022-03-19 PROCEDURE — 82962 GLUCOSE BLOOD TEST: CPT

## 2022-03-19 PROCEDURE — 25010000002 FUROSEMIDE PER 20 MG: Performed by: THORACIC SURGERY (CARDIOTHORACIC VASCULAR SURGERY)

## 2022-03-19 RX ORDER — FUROSEMIDE 10 MG/ML
20 INJECTION INTRAMUSCULAR; INTRAVENOUS ONCE
Status: COMPLETED | OUTPATIENT
Start: 2022-03-19 | End: 2022-03-19

## 2022-03-19 RX ORDER — POLYETHYLENE GLYCOL 3350 17 G/17G
17 POWDER, FOR SOLUTION ORAL DAILY
Status: DISCONTINUED | OUTPATIENT
Start: 2022-03-19 | End: 2022-03-24 | Stop reason: HOSPADM

## 2022-03-19 RX ADMIN — IPRATROPIUM BROMIDE AND ALBUTEROL SULFATE 3 ML: .5; 3 SOLUTION RESPIRATORY (INHALATION) at 14:55

## 2022-03-19 RX ADMIN — IPRATROPIUM BROMIDE AND ALBUTEROL SULFATE 3 ML: .5; 3 SOLUTION RESPIRATORY (INHALATION) at 07:46

## 2022-03-19 RX ADMIN — Medication 2000 UNITS: at 08:08

## 2022-03-19 RX ADMIN — ENOXAPARIN SODIUM 40 MG: 100 INJECTION SUBCUTANEOUS at 21:56

## 2022-03-19 RX ADMIN — METOPROLOL TARTRATE 50 MG: 50 TABLET, FILM COATED ORAL at 21:59

## 2022-03-19 RX ADMIN — LEVOTHYROXINE SODIUM 50 MCG: 0.05 TABLET ORAL at 05:27

## 2022-03-19 RX ADMIN — MULTIPLE VITAMINS W/ MINERALS TAB 1 TABLET: TAB at 08:08

## 2022-03-19 RX ADMIN — POLYETHYLENE GLYCOL 3350 17 G: 17 POWDER, FOR SOLUTION ORAL at 15:12

## 2022-03-19 RX ADMIN — DOCUSATE SODIUM 50MG AND SENNOSIDES 8.6MG 2 TABLET: 8.6; 5 TABLET, FILM COATED ORAL at 21:56

## 2022-03-19 RX ADMIN — ACETAMINOPHEN 1000 MG: 500 TABLET ORAL at 08:08

## 2022-03-19 RX ADMIN — ACETAMINOPHEN 1000 MG: 500 TABLET ORAL at 15:12

## 2022-03-19 RX ADMIN — GABAPENTIN 300 MG: 300 CAPSULE ORAL at 13:02

## 2022-03-19 RX ADMIN — ACETAMINOPHEN 1000 MG: 500 TABLET ORAL at 21:56

## 2022-03-19 RX ADMIN — CELECOXIB 100 MG: 100 CAPSULE ORAL at 21:56

## 2022-03-19 RX ADMIN — INSULIN GLARGINE-YFGN 10 UNITS: 100 INJECTION, SOLUTION SUBCUTANEOUS at 21:56

## 2022-03-19 RX ADMIN — IPRATROPIUM BROMIDE AND ALBUTEROL SULFATE 3 ML: .5; 3 SOLUTION RESPIRATORY (INHALATION) at 00:33

## 2022-03-19 RX ADMIN — BUDESONIDE 1 MG: 0.5 INHALANT ORAL at 10:25

## 2022-03-19 RX ADMIN — AMIODARONE HYDROCHLORIDE 200 MG: 200 TABLET ORAL at 21:59

## 2022-03-19 RX ADMIN — ARFORMOTEROL TARTRATE 15 MCG: 15 SOLUTION RESPIRATORY (INHALATION) at 10:26

## 2022-03-19 RX ADMIN — GABAPENTIN 300 MG: 300 CAPSULE ORAL at 05:27

## 2022-03-19 RX ADMIN — GABAPENTIN 300 MG: 300 CAPSULE ORAL at 21:56

## 2022-03-19 RX ADMIN — CELECOXIB 100 MG: 100 CAPSULE ORAL at 08:08

## 2022-03-19 RX ADMIN — FUROSEMIDE 20 MG: 10 INJECTION, SOLUTION INTRAMUSCULAR; INTRAVENOUS at 05:27

## 2022-03-19 RX ADMIN — ONDANSETRON 4 MG: 2 INJECTION INTRAMUSCULAR; INTRAVENOUS at 06:04

## 2022-03-19 RX ADMIN — FUROSEMIDE 20 MG: 20 TABLET ORAL at 08:09

## 2022-03-19 NOTE — PROGRESS NOTES
Name: Jaquelin Valderrama ADMIT: 3/17/2022   : 1940  PCP: Minerva Chatterjee MD    MRN: [id number] LOS: 2 days   AGE/SEX: 82 y.o. female  ROOM: [facility]     Subjective   Subjective    Patient seen at bedside.       Objective   Objective   Vital Signs  Temp:  [97.1 °F (36.2 °C)-98.2 °F (36.8 °C)] 97.2 °F (36.2 °C)  Heart Rate:  [46-82] 64  Resp:  [16-18] 16  BP: ()/(54-76) 122/65  Arterial Line BP: ()/(48-76) 91/76  SpO2:  [94 %-100 %] 100 %  on  Flow (L/min):  [1-3] 2;   Device (Oxygen Therapy): nasal cannula  Body mass index is 31.65 kg/m².  Physical Exam   General, awake and alert.  Head and ENT, normocephalic and atraumatic.  Lungs, symmetric expansion, equal air entry bilaterally.  Chest tubes in place.  Heart, regular rate and rhythm.  Abdomen, soft and nontender.  Extremities, no clubbing or cyanosis.  Neuro, no focal deficits.  Skin: Warm and no rash.  Psych, normal mood and affect.  Musculoskeletal, joint examination is grossly normal.      Results Review     I reviewed the patient's new clinical results.  Results from last 7 days   Lab Units 22  0404 22  2354   WBC 10*3/mm3 14.49* 15.02* 13.58* 12.30*   HEMOGLOBIN g/dL 8.0* 8.4* 8.8* 9.1*   PLATELETS 10*3/mm3 525* 492* 505* 534*     Results from last 7 days   Lab Units 22  0404 22  1518 22  04322  2354   SODIUM mmol/L 131* 131* 129* 129*   POTASSIUM mmol/L 5.3* 4.2 3.8 3.9   CHLORIDE mmol/L 99 99 97* 95*   CO2 mmol/L 23.0 23.0 23.3 21.0*   BUN mg/dL 15 12 12 13   CREATININE mg/dL 0.87 0.58 0.57 0.69   GLUCOSE mg/dL 248* 172* 159* 149*   EGFR mL/min/1.73 67.4 91.6 92.0 87.9     Results from last 7 days   Lab Units 22  0015 22  1039 22  0032   ALBUMIN g/dL 2.60* 2.70* 3.10*   BILIRUBIN mg/dL 0.4 0.4 0.4   ALK PHOS U/L 100 110 118*   AST (SGOT) U/L 35* 30 38*   ALT (SGPT) U/L 44* 43* 44*     Results from last 7 days   Lab Units 22  0404 22  4482  03/18/22  0439 03/16/22  2354 03/16/22  0015 03/15/22  0410 03/14/22  1039 03/13/22  0032   CALCIUM mg/dL 7.9* 7.8* 8.4* 8.1* 8.1* 8.2* 8.5* 8.6   ALBUMIN g/dL  --   --   --   --  2.60*  --  2.70* 3.10*   MAGNESIUM mg/dL  --   --   --  2.0 1.9 1.9 2.0 2.2   PHOSPHORUS mg/dL  --   --   --  3.0 2.4* 2.6 2.5 2.9       Glucose   Date/Time Value Ref Range Status   03/19/2022 0558 279 (H) 70 - 130 mg/dL Final     Comment:     Meter: [id number] : [id number] Evaristo Rahman CNA   03/18/2022 2100 292 (H) 70 - 130 mg/dL Final     Comment:     Meter: [id number] : [id number] Evaristo Rahman CNA   03/17/2022 2047 219 (H) 70 - 130 mg/dL Final     Comment:     Meter: [id number] : [id number] Phil Munson NA   03/17/2022 1704 128 70 - 130 mg/dL Final     Comment:     Meter: [id number] : [id number] Phil Munson NA   03/17/2022 1100 179 (H) 70 - 105 mg/dL Final     Comment:     Serial Number: [id number]:  [id number]   03/17/2022 0755 154 (H) 70 - 105 mg/dL Final     Comment:     Serial Number: [id number]:  [id number]   03/16/2022 2026 141 (H) 70 - 105 mg/dL Final     Comment:     Serial Number: [id number]:  [id number]       XR Chest 1 View  Narrative: SINGLE VIEW OF THE CHEST     HISTORY: Postop lung surgery     COMPARISON: 03/18/2022     FINDINGS:  Cardiomegaly is present. There is vascular congestion. Left-sided chest  tubes are stable in position. There is a stable left-sided  hydropneumothorax. Bibasilar consolidation and right pleural effusion  are again seen.     Impression: No significant interval change.     This report was finalized on 3/19/2022 5:48 AM by Dr. Trudy Gilliam M.D.       Scheduled Medications  acetaminophen, 1,000 mg, Oral, TID  amiodarone, 200 mg, Oral, BID  arformoterol, 15 mcg, Nebulization, BID - RT  budesonide, 1 mg, Nebulization, BID - RT  celecoxib, 100 mg, Oral, Q12H  cholecalciferol, 2,000 Units, Oral, Daily  enoxaparin, 40 mg, Subcutaneous,  Q24H  furosemide, 20 mg, Oral, Daily  gabapentin, 300 mg, Oral, Q8H  insulin glargine, 10 Units, Subcutaneous, Nightly  ipratropium-albuterol, 3 mL, Nebulization, Q8H - RT  levothyroxine, 50 mcg, Oral, Q AM  losartan, 50 mg, Oral, Q24H  metoprolol tartrate, 50 mg, Oral, BID  multivitamin with minerals, 1 tablet, Oral, Daily  polyethylene glycol, 17 g, Oral, Daily  potassium chloride, 20 mEq, Oral, Daily  senna-docusate sodium, 2 tablet, Oral, Nightly    Infusions  lactated ringers, 9 mL/hr, Last Rate: 9 mL/hr (03/18/22 1149)    Diet  Diet Regular       Assessment/Plan     Active Hospital Problems    Diagnosis  POA   • Loculated pleural effusion [J90]  Yes   • Acute CHF (congestive heart failure) (HCC) [I50.9]  Yes   • Atrial fibrillation with RVR (HCC) [I48.91]  Yes   • Obesity (BMI 30-39.9) [E66.9]  Yes   • Astigmatism, bilateral [H52.203]  Yes   • Essential hypertension [I10]  Yes   • Hypothyroidism [E03.9]  Yes   • Other hyperlipidemia [E78.49]  Yes      Resolved Hospital Problems   No resolved problems to display.      Assessment and plan:  1.  Loculated pleural effusion, patient underwent left sided VATS and decortication.  Cardiothoracic surgery on board.  Continue pain control.  Chest tubes in place.     2.  Atrial fibrillation, cardiology on board, continue to follow recommendations.  Patient is currently rate controlled.     3.  Hyponatremia, monitor sodium levels, if continues to remain low, will consult nephrology.     4.  Anemia, hemoglobin trend noted to be stable, continue to recheck labs.     5.  Acute CHF, last known ejection fraction is 30%, cardiology on board, follow recommendations for management.      6.  Hypothyroidism, continue Synthroid.     7.  CODE STATUS is full code.  Further plans based on hospital course.  Discussed with patient and her  at bedside.        Darius Bernardo MD  [facility]  03/19/22  17:37 EDT

## 2022-03-19 NOTE — PROGRESS NOTES
"    Patient Name: Jaquelin Valderrama  :1940  82 y.o.      Patient Care Team:  Minerva Chatterjee MD as PCP - General (Internal Medicine)    Chief Complaint: afib     Interval History: She remains in atrial fibrillation, rate controlled.  She cannot feel her heart racing or skipping.       Objective   Vital Signs  Temp:  [97.3 °F (36.3 °C)-98.2 °F (36.8 °C)] 97.3 °F (36.3 °C)  Heart Rate:  [46-82] 57  Resp:  [14-18] 18  BP: ()/(54-94) 91/64  Arterial Line BP: ()/(47-76) 91/76    Intake/Output Summary (Last 24 hours) at 3/19/2022 1032  Last data filed at 3/19/2022 0900  Gross per 24 hour   Intake 2130 ml   Output 1272 ml   Net 858 ml     Flowsheet Rows    Flowsheet Row First Filed Value   Admission Height 160 cm (62.99\") Documented at 2022 0254   Admission Weight 81.6 kg (179 lb 14.4 oz) Documented at 2022 0525          Physical Exam:   General Appearance:    Alert, cooperative, in no acute distress   Lungs:    Faint crackles left lower lobe. Normal respiratory effort and rate.  She has 3 chest tubes to drainage with bloody discharge.    Heart:    Irregular rhythm and normal rate, normal S1 and S2, no murmurs, gallops or rubs.     Chest Wall:    No abnormalities observed   Abdomen:     Soft, nontender, positive bowel sounds.     Extremities:   no cyanosis, clubbing.  +1 pedal and ankle edema.  Family states that this is continuing to improve.  No marked joint deformities.  Adequate musculoskeletal strength.       Results Review:    Results from last 7 days   Lab Units 22  0404   SODIUM mmol/L 131*   POTASSIUM mmol/L 5.3*   CHLORIDE mmol/L 99   CO2 mmol/L 23.0   BUN mg/dL 15   CREATININE mg/dL 0.87   GLUCOSE mg/dL 248*   CALCIUM mg/dL 7.9*         Results from last 7 days   Lab Units 22  0404   WBC 10*3/mm3 14.49*   HEMOGLOBIN g/dL 8.0*   HEMATOCRIT % 24.4*   PLATELETS 10*3/mm3 525*     Results from last 7 days   Lab Units 22  0439   INR  1.16*     Results from last 7 days "   Lab Units 03/16/22  2354   MAGNESIUM mg/dL 2.0                   Medication Review:   acetaminophen, 1,000 mg, Oral, TID  amiodarone, 200 mg, Oral, BID  arformoterol, 15 mcg, Nebulization, BID - RT  budesonide, 1 mg, Nebulization, BID - RT  celecoxib, 100 mg, Oral, Q12H  cholecalciferol, 2,000 Units, Oral, Daily  dilTIAZem, 30 mg, Oral, Q8H  enoxaparin, 40 mg, Subcutaneous, Q24H  furosemide, 20 mg, Oral, Daily  gabapentin, 300 mg, Oral, Q8H  insulin glargine, 10 Units, Subcutaneous, Nightly  ipratropium-albuterol, 3 mL, Nebulization, Q8H - RT  levothyroxine, 50 mcg, Oral, Q AM  losartan, 50 mg, Oral, Q24H  metoprolol tartrate, 50 mg, Oral, BID  multivitamin with minerals, 1 tablet, Oral, Daily  potassium chloride, 20 mEq, Oral, Daily  senna-docusate sodium, 2 tablet, Oral, Nightly         lactated ringers, 9 mL/hr, Last Rate: 9 mL/hr (03/18/22 1149)        Assessment/Plan     1.  Left pleural effusion status post decortication-she denies any shortness of breath at this time.  She has 3 chest tubes to drainage.  2.  Atrial flutter/fibrillation-she remains in atrial fibrillation with controlled rate.  She remains on amiodarone, low-dose metoprolol in place of Cardizem.  Anticoagulation on hold for now would resume as soon as possible from a surgical standpoint.  3.  Cardiomyopathy with ejection fraction 30%.  As above. Will need to resume anticoagulation soon  4.  Coronary disease with history of CABG 2014.-Denies angina  5.  Congestive heart failure.  Continue with metoprolol and losartan.  Discontinue Cardizem.  Lasix initiated.  She has chronic lower extremity edema but patient's family states it is improved.  1.2 L urine output since initiation yesterday  6.  Hypothyroidism  7.  Anemia  8.  Hyponatremia        VALENTINE Araujo  [facility]  03/19/22  10:32 EDT

## 2022-03-19 NOTE — PLAN OF CARE
Problem: Adult Inpatient Plan of Care  Goal: Plan of Care Review  3/19/2022 0629 by Elysia Ghosh, RN  Outcome: Ongoing, Progressing  Flowsheets (Taken 3/19/2022 0629)  Outcome Evaluation: Pt. Post op night one. VSS. CTx3 to -40sx #2 has an air leak. AOx 4 BP soft at low 100s-90s systolic. HR afib 40s-50s post metoprolol. IVF Dcd due to pt's coarse lung sounds and . Pt's urine output decreased significantly Dr. Kuo notified and Lasixs given IV. Pt has no c/o of pain meds. WCM  3/19/2022 0350 by Elysia Ghosh, RN  Flowsheets (Taken 3/19/2022 0256)  Progress: no change  Plan of Care Reviewed With: patient  Goal: Patient-Specific Goal (Individualized)  Outcome: Ongoing, Progressing  Goal: Absence of Hospital-Acquired Illness or Injury  Outcome: Ongoing, Progressing  Intervention: Identify and Manage Fall Risk  Recent Flowsheet Documentation  Taken 3/19/2022 0602 by Elysia Ghosh, RN  Safety Promotion/Fall Prevention:   activity supervised   assistive device/personal items within reach   clutter free environment maintained   fall prevention program maintained   lighting adjusted   mobility aid in reach   safety round/check completed   toileting scheduled   nonskid shoes/slippers when out of bed  Taken 3/19/2022 0400 by Elysia Ghosh, RN  Safety Promotion/Fall Prevention:   activity supervised   assistive device/personal items within reach   clutter free environment maintained   lighting adjusted   mobility aid in reach   nonskid shoes/slippers when out of bed   safety round/check completed   toileting scheduled  Taken 3/19/2022 0230 by Elysia Ghosh, RN  Safety Promotion/Fall Prevention:   activity supervised   assistive device/personal items within reach   clutter free environment maintained   fall prevention program maintained   mobility aid in reach   lighting adjusted   nonskid shoes/slippers when out of bed   safety round/check completed   toileting scheduled  Taken  3/19/2022 0006 by Elysia Ghosh, RN  Safety Promotion/Fall Prevention:   activity supervised   assistive device/personal items within reach   clutter free environment maintained   fall prevention program maintained   lighting adjusted   mobility aid in reach   nonskid shoes/slippers when out of bed   safety round/check completed   toileting scheduled  Taken 3/18/2022 2220 by Elysia Ghosh, RN  Safety Promotion/Fall Prevention:   activity supervised   assistive device/personal items within reach   clutter free environment maintained   fall prevention program maintained   lighting adjusted   mobility aid in reach   nonskid shoes/slippers when out of bed   safety round/check completed   toileting scheduled  Taken 3/18/2022 2000 by Elysia Ghosh, RN  Safety Promotion/Fall Prevention:   activity supervised   assistive device/personal items within reach   clutter free environment maintained   fall prevention program maintained   lighting adjusted   mobility aid in reach   nonskid shoes/slippers when out of bed   safety round/check completed   toileting scheduled  Intervention: Prevent Skin Injury  Recent Flowsheet Documentation  Taken 3/18/2022 2000 by Elysia Ghosh, RN  Skin Protection:   adhesive use limited   tubing/devices free from skin contact  Intervention: Prevent and Manage VTE (Venous Thromboembolism) Risk  Recent Flowsheet Documentation  Taken 3/19/2022 0602 by Elysia Ghosh, RN  Activity Management:   activity adjusted per tolerance   activity encouraged  Taken 3/19/2022 0400 by Elysia Ghosh, RN  Activity Management:   activity adjusted per tolerance   activity encouraged  Taken 3/19/2022 0230 by Elysia Ghosh, RN  Activity Management:   activity adjusted per tolerance   activity encouraged  Taken 3/19/2022 0006 by Elysia Ghosh, RN  Activity Management:   activity adjusted per tolerance   activity encouraged  Taken 3/18/2022 2220 by Elysia Ghosh  Ton[place], RN  Activity Management:   activity adjusted per tolerance   activity encouraged  Taken 3/18/2022 2000 by Elysia Gohsh, RN  Activity Management:   activity adjusted per tolerance   activity encouraged  VTE Prevention/Management:   bilateral   sequential compression devices on  Goal: Optimal Comfort and Wellbeing  Outcome: Ongoing, Progressing  Intervention: Provide Person-Centered Care  Recent Flowsheet Documentation  Taken 3/19/2022 0230 by Elysia Ghosh, RN  Trust Relationship/Rapport:   care explained   choices provided   emotional support provided   questions answered   reassurance provided   questions encouraged   thoughts/feelings acknowledged  Taken 3/18/2022 2000 by Elysia Ghosh, RN  Trust Relationship/Rapport:   care explained   choices provided   emotional support provided   questions answered   questions encouraged   reassurance provided   thoughts/feelings acknowledged  Goal: Readiness for Transition of Care  Outcome: Ongoing, Progressing   Goal Outcome Evaluation:  Plan of Care Reviewed With: patient        Progress: no change  Outcome Evaluation: Pt. Post op night one. VSS. CTx3 to -40sx #2 has an air leak. AOx 4 BP soft at low 100s-90s systolic. HR afib 40s-50s post metoprolol. IVF Dcd due to pt's coarse lung sounds and . Pt's urine output decreased significantly Dr. Kuo notified and Lasixs given IV. Pt has no c/o of pain meds. WCM

## 2022-03-19 NOTE — PROGRESS NOTES
"  POST-OPERATIVE NOTE     Chief Complaint: Loculated pleural effusion postoperative care  S/P: Left video-assisted thoracoscopy with complete decortication  POD # 1    Subjective:  Symptoms:  Improved.  She reports shortness of breath.  No chest pain.    Diet:  Poor intake.  No nausea or vomiting.    Activity level: Returning to normal.    Pain:  She complains of pain that is mild.  Pain is well controlled.        Objective:  General Appearance:  Comfortable and in no acute distress.    Vital signs: (most recent): Blood pressure 105/63, pulse 56, temperature 97.1 °F (36.2 °C), temperature source Oral, resp. rate 16, height 160 cm (62.99\"), weight 81 kg (178 lb 9.6 oz), SpO2 95 %, not currently breastfeeding.  Vital signs are normal.  No fever.    Output: Minimal urine output and no stool output.    HEENT: Normal HEENT exam.    Lungs:  Normal effort and normal respiratory rate.  She is not in respiratory distress.    Heart: Normal rate.  Regular rhythm.    Abdomen: Abdomen is soft.  There is no abdominal tenderness.     Neurological: Patient is alert and oriented to person, place and time.    Skin:  Warm and dry.              Chest tube:   Site: Left, Clean, Dry, Intact and Securement device intact  Suction: -40 cm  Air Leak: negative  24 Hour Total: 43/389/130cc    Results Review:     I reviewed the patient's new clinical results.  I reviewed the patient's new imaging results and agree with the interpretation.  I reviewed the patient's other test results and agree with the interpretation  Discussed with patient, RN and Dr. Kuo.    Assessment/Plan     I reviewed this morning's chest x-ray which demonstrates some vascular congestion.  Patient received 1 dose of IV Lasix this morning due to reduced urinary output.  Leave Alfonso catheter for now continue to watch. Cardiology has patient on scheduled p.o. Lasix.  Patient is tolerating regular diet.  Stop IV fluids.    She is in minimal pain and has been walking " frequently.  Continue to encourage good pulmonary hygiene, ambulation and incentive spirometry.  Patient's chest tubes will continue to -40 cm suction today, s/p decortication.  Recheck x-ray in a.m.  Check a.m. renal panel and CBC.    VALENTINE Lauren  [facility]  03/19/22  13:36 EDT    Patient was seen and assessed while wearing personal protective equipment including facemask, protective eyewear and gloves.  Hand hygiene performed prior to entering the room and upon exiting with doffing of gloves.

## 2022-03-19 NOTE — PLAN OF CARE
Goal Outcome Evaluation:  Plan of Care Reviewed With: patient        Progress: no change  Outcome Evaluation: VSS, remains on 2 L oxygen. Alfonso remains r/t low UOP, scheduled PO lasix. Chest tube 1,2,&3 on -40 suction. Up with assist: ambulated in poole, up in chair, IS in use. Will continue to provide supportive care.

## 2022-03-20 ENCOUNTER — APPOINTMENT (OUTPATIENT)
Dept: GENERAL RADIOLOGY | Facility: HOSPITAL | Age: 82
End: 2022-03-20

## 2022-03-20 LAB
GLUCOSE BLDC GLUCOMTR-MCNC: 217 MG/DL (ref 70–130)
GLUCOSE BLDC GLUCOMTR-MCNC: 278 MG/DL (ref 70–130)

## 2022-03-20 PROCEDURE — 99232 SBSQ HOSP IP/OBS MODERATE 35: CPT | Performed by: NURSE PRACTITIONER

## 2022-03-20 PROCEDURE — 71045 X-RAY EXAM CHEST 1 VIEW: CPT

## 2022-03-20 PROCEDURE — 25010000002 ENOXAPARIN PER 10 MG: Performed by: THORACIC SURGERY (CARDIOTHORACIC VASCULAR SURGERY)

## 2022-03-20 PROCEDURE — 94799 UNLISTED PULMONARY SVC/PX: CPT

## 2022-03-20 PROCEDURE — 94760 N-INVAS EAR/PLS OXIMETRY 1: CPT

## 2022-03-20 PROCEDURE — 82962 GLUCOSE BLOOD TEST: CPT

## 2022-03-20 PROCEDURE — 99024 POSTOP FOLLOW-UP VISIT: CPT | Performed by: THORACIC SURGERY (CARDIOTHORACIC VASCULAR SURGERY)

## 2022-03-20 PROCEDURE — 94664 DEMO&/EVAL PT USE INHALER: CPT

## 2022-03-20 PROCEDURE — 94761 N-INVAS EAR/PLS OXIMETRY MLT: CPT

## 2022-03-20 RX ORDER — FUROSEMIDE 20 MG/1
20 TABLET ORAL ONCE
Status: COMPLETED | OUTPATIENT
Start: 2022-03-20 | End: 2022-03-20

## 2022-03-20 RX ORDER — FUROSEMIDE 40 MG/1
40 TABLET ORAL DAILY
Status: DISCONTINUED | OUTPATIENT
Start: 2022-03-21 | End: 2022-03-21

## 2022-03-20 RX ADMIN — DOCUSATE SODIUM 50MG AND SENNOSIDES 8.6MG 2 TABLET: 8.6; 5 TABLET, FILM COATED ORAL at 20:22

## 2022-03-20 RX ADMIN — GABAPENTIN 300 MG: 300 CAPSULE ORAL at 15:16

## 2022-03-20 RX ADMIN — Medication 2000 UNITS: at 09:02

## 2022-03-20 RX ADMIN — METOPROLOL TARTRATE 50 MG: 50 TABLET, FILM COATED ORAL at 20:22

## 2022-03-20 RX ADMIN — BUDESONIDE 1 MG: 0.5 INHALANT ORAL at 00:13

## 2022-03-20 RX ADMIN — BUDESONIDE 1 MG: 0.5 INHALANT ORAL at 08:36

## 2022-03-20 RX ADMIN — AMIODARONE HYDROCHLORIDE 200 MG: 200 TABLET ORAL at 20:22

## 2022-03-20 RX ADMIN — LOSARTAN POTASSIUM 50 MG: 50 TABLET, FILM COATED ORAL at 09:02

## 2022-03-20 RX ADMIN — AMIODARONE HYDROCHLORIDE 200 MG: 200 TABLET ORAL at 09:02

## 2022-03-20 RX ADMIN — ENOXAPARIN SODIUM 40 MG: 100 INJECTION SUBCUTANEOUS at 20:23

## 2022-03-20 RX ADMIN — IPRATROPIUM BROMIDE AND ALBUTEROL SULFATE 3 ML: .5; 3 SOLUTION RESPIRATORY (INHALATION) at 06:57

## 2022-03-20 RX ADMIN — ACETAMINOPHEN 1000 MG: 500 TABLET ORAL at 09:02

## 2022-03-20 RX ADMIN — ACETAMINOPHEN 1000 MG: 500 TABLET ORAL at 20:21

## 2022-03-20 RX ADMIN — METOPROLOL TARTRATE 50 MG: 50 TABLET, FILM COATED ORAL at 09:02

## 2022-03-20 RX ADMIN — GABAPENTIN 300 MG: 300 CAPSULE ORAL at 06:20

## 2022-03-20 RX ADMIN — CELECOXIB 100 MG: 100 CAPSULE ORAL at 09:02

## 2022-03-20 RX ADMIN — ARFORMOTEROL TARTRATE 15 MCG: 15 SOLUTION RESPIRATORY (INHALATION) at 19:42

## 2022-03-20 RX ADMIN — ARFORMOTEROL TARTRATE 15 MCG: 15 SOLUTION RESPIRATORY (INHALATION) at 08:36

## 2022-03-20 RX ADMIN — FUROSEMIDE 20 MG: 20 TABLET ORAL at 09:02

## 2022-03-20 RX ADMIN — GABAPENTIN 300 MG: 300 CAPSULE ORAL at 21:55

## 2022-03-20 RX ADMIN — CELECOXIB 100 MG: 100 CAPSULE ORAL at 20:22

## 2022-03-20 RX ADMIN — BUDESONIDE 1 MG: 0.5 INHALANT ORAL at 19:42

## 2022-03-20 RX ADMIN — POTASSIUM CHLORIDE 20 MEQ: 1.5 POWDER, FOR SOLUTION ORAL at 09:02

## 2022-03-20 RX ADMIN — INSULIN GLARGINE-YFGN 20 UNITS: 100 INJECTION, SOLUTION SUBCUTANEOUS at 21:55

## 2022-03-20 RX ADMIN — FUROSEMIDE 20 MG: 20 TABLET ORAL at 15:59

## 2022-03-20 RX ADMIN — ACETAMINOPHEN 1000 MG: 500 TABLET ORAL at 15:17

## 2022-03-20 RX ADMIN — POLYETHYLENE GLYCOL 3350 17 G: 17 POWDER, FOR SOLUTION ORAL at 09:03

## 2022-03-20 RX ADMIN — MULTIPLE VITAMINS W/ MINERALS TAB 1 TABLET: TAB at 09:02

## 2022-03-20 RX ADMIN — IPRATROPIUM BROMIDE AND ALBUTEROL SULFATE 3 ML: .5; 3 SOLUTION RESPIRATORY (INHALATION) at 00:16

## 2022-03-20 RX ADMIN — LEVOTHYROXINE SODIUM 50 MCG: 0.05 TABLET ORAL at 06:20

## 2022-03-20 NOTE — PLAN OF CARE
Goal Outcome Evaluation:           Progress: improving  Outcome Evaluation: Pt A&Ox4, VSS, and no c/o pain. Pt's chest tubes were all placed to water seal. Pt's Lasix was increased to 40mg daily - UO today has been better, so far 400cc. Pt has ambulated in the poole and has been in the chair most of the day. Plan to be discharged to rehab. Will CTM.

## 2022-03-20 NOTE — PROGRESS NOTES
"  POST-OPERATIVE NOTE     Chief Complaint: Loculated pleural effusion postoperative care  S/P: Left video-assisted thoracoscopy with complete decortication  POD # 2    Subjective:  Symptoms:  Improved.  She reports shortness of breath.  No chest pain.    Diet:  Poor intake.  No nausea or vomiting.    Activity level: Returning to normal.    Pain:  She complains of pain that is mild.  Pain is well controlled.        Objective:  General Appearance:  Comfortable and in no acute distress.    Vital signs: (most recent): Blood pressure 143/88, pulse 63, temperature 97.9 °F (36.6 °C), temperature source Oral, resp. rate 18, height 160 cm (62.99\"), weight 80.7 kg (177 lb 14.4 oz), SpO2 90 %, not currently breastfeeding.  Vital signs are normal.  No fever.    Output: Minimal urine output and no stool output.    HEENT: Normal HEENT exam.    Lungs:  Normal effort and normal respiratory rate.  She is not in respiratory distress.    Heart: Normal rate.  Regular rhythm.    Abdomen: Abdomen is soft.  There is no abdominal tenderness.     Neurological: Patient is alert and oriented to person, place and time.    Skin:  Warm and dry.              Chest tube:   Site: Left, Clean, Dry, Intact and Securement device intact  Suction: -40 cm  Air Leak: negative  24 Hour Total: 28/238/68cc    Results Review:     I reviewed the patient's new clinical results.  I reviewed the patient's new imaging results and agree with the interpretation.  I reviewed the patient's other test results and agree with the interpretation.    Assessment/Plan   Ms. Valderrama is a pleasant 82-year-old lady postoperative day #2 status post decortication.  She is doing very well.  Her chest tubes have had minimal output.  We will plan to place her to waterseal today.  We will plan his chest x-ray in the morning and hopefully begin to discontinue tubes starting tomorrow.  I have ordered a BMP to continue to monitor her hyponatremia which is pending.  Sabra Kuo, " MD  [facility]  03/20/22  13:25 EDT    Patient was seen and assessed while wearing personal protective equipment including facemask, protective eyewear and gloves.  Hand hygiene performed prior to entering the room and upon exiting with doffing of gloves.

## 2022-03-20 NOTE — PROGRESS NOTES
Name: Jaquelin Valderrama ADMIT: 3/17/2022   : 1940  PCP: Minerva Chatterjee MD    MRN: [id number] LOS: 3 days   AGE/SEX: 82 y.o. female  ROOM: [facility]     Subjective   Subjective    Patient seen at bedside.       Objective   Objective   Vital Signs  Temp:  [97.3 °F (36.3 °C)-97.9 °F (36.6 °C)] 97.7 °F (36.5 °C)  Heart Rate:  [63-84] 65  Resp:  [16-18] 16  BP: (106-146)/(66-91) 123/84  SpO2:  [90 %-100 %] 100 %  on  Flow (L/min):  [2-3] 2;   Device (Oxygen Therapy): room air  Body mass index is 31.52 kg/m².  Physical Exam  General, awake and alert.  Head and ENT, normocephalic and atraumatic.  Lungs, symmetric expansion, equal air entry bilaterally.  Chest tubes in place.  Heart, regular rate and rhythm.  Abdomen, soft and nontender.  Extremities, no clubbing or cyanosis.  Bilateral lower extremity pitting edema  Neuro, no focal deficits.  Skin: Warm and no rash.  Psych, normal mood and affect.  Musculoskeletal, joint examination is grossly normal.    Results Review     I reviewed the patient's new clinical results.  Results from last 7 days   Lab Units 22  0404 22  1518 22  2354   WBC 10*3/mm3 14.49* 15.02* 13.58* 12.30*   HEMOGLOBIN g/dL 8.0* 8.4* 8.8* 9.1*   PLATELETS 10*3/mm3 525* 492* 505* 534*     Results from last 7 days   Lab Units 22  0404 22  1518 22  0439 22  2354   SODIUM mmol/L 131* 131* 129* 129*   POTASSIUM mmol/L 5.3* 4.2 3.8 3.9   CHLORIDE mmol/L 99 99 97* 95*   CO2 mmol/L 23.0 23.0 23.3 21.0*   BUN mg/dL 15 12 12 13   CREATININE mg/dL 0.87 0.58 0.57 0.69   GLUCOSE mg/dL 248* 172* 159* 149*   EGFR mL/min/1.73 67.4 91.6 92.0 87.9     Results from last 7 days   Lab Units 22  0015 22  1039   ALBUMIN g/dL 2.60* 2.70*   BILIRUBIN mg/dL 0.4 0.4   ALK PHOS U/L 100 110   AST (SGOT) U/L 35* 30   ALT (SGPT) U/L 44* 43*     Results from last 7 days   Lab Units 22  0404 22  1518 22  0439 22  2354 22  0015  03/15/22  0410 03/14/22  1039   CALCIUM mg/dL 7.9* 7.8* 8.4* 8.1* 8.1* 8.2* 8.5*   ALBUMIN g/dL  --   --   --   --  2.60*  --  2.70*   MAGNESIUM mg/dL  --   --   --  2.0 1.9 1.9 2.0   PHOSPHORUS mg/dL  --   --   --  3.0 2.4* 2.6 2.5       Glucose   Date/Time Value Ref Range Status   03/20/2022 0534 217 (H) 70 - 130 mg/dL Final     Comment:     Meter: [id number] : [id number] Evaristo Elamia CNA   03/19/2022 2042 315 (H) 70 - 130 mg/dL Final     Comment:     Meter: [id number] : [id number] Evaristo Becerraricia CNA   03/19/2022 0558 279 (H) 70 - 130 mg/dL Final     Comment:     Meter: [id number] : [id number] Evaristo Becerraricia CNA   03/18/2022 2100 292 (H) 70 - 130 mg/dL Final     Comment:     Meter: [id number] : [id number] Evaristo Elamia CNA   03/17/2022 2047 219 (H) 70 - 130 mg/dL Final     Comment:     Meter: [id number] : [id number] Phil Munson TAM       XR Chest 1 View  ONE VIEW PORTABLE CHEST AT 5:31 AM     HISTORY: Recent left chest surgery. Chest tubes.     FINDINGS: There are 3 chest tubes in place on the left including one  extending to the apex and these are unchanged from yesterday's exam.  There is no evidence of pneumothorax. There remains some loculated  pleural fluid and atelectasis at the left base. There has been nearly  complete resolution of some localized atelectasis at the right base  since yesterday. The heart remains mildly enlarged with sternal wires  from previous cardiac surgery.     This report was finalized on 3/20/2022 6:44 AM by Dr. Art Poon M.D.       Scheduled Medications  acetaminophen, 1,000 mg, Oral, TID  amiodarone, 200 mg, Oral, BID  arformoterol, 15 mcg, Nebulization, BID - RT  budesonide, 1 mg, Nebulization, BID - RT  celecoxib, 100 mg, Oral, Q12H  cholecalciferol, 2,000 Units, Oral, Daily  enoxaparin, 40 mg, Subcutaneous, Q24H  [START ON 3/21/2022] furosemide, 40 mg, Oral, Daily  gabapentin, 300 mg, Oral, Q8H  insulin glargine, 10 Units, Subcutaneous,  Nightly  levothyroxine, 50 mcg, Oral, Q AM  losartan, 50 mg, Oral, Q24H  metoprolol tartrate, 50 mg, Oral, BID  multivitamin with minerals, 1 tablet, Oral, Daily  polyethylene glycol, 17 g, Oral, Daily  potassium chloride, 20 mEq, Oral, Daily  senna-docusate sodium, 2 tablet, Oral, Nightly    Infusions  lactated ringers, 9 mL/hr, Last Rate: 9 mL/hr (03/18/22 1149)    Diet  Diet Regular       Assessment/Plan     Active Hospital Problems    Diagnosis  POA   • Loculated pleural effusion [J90]  Yes   • Acute CHF (congestive heart failure) (HCC) [I50.9]  Yes   • Atrial fibrillation with RVR (HCC) [I48.91]  Yes   • Obesity (BMI 30-39.9) [E66.9]  Yes   • Astigmatism, bilateral [H52.203]  Yes   • Essential hypertension [I10]  Yes   • Hypothyroidism [E03.9]  Yes   • Other hyperlipidemia [E78.49]  Yes      Resolved Hospital Problems   No resolved problems to display.       Assessment and plan:  1.  Loculated pleural effusion, patient underwent left sided VATS and decortication.  Cardiothoracic surgery on board.  Continue pain control.  Chest tubes in place.     2.  Atrial fibrillation, cardiology on board, continue to follow recommendations.  Patient is currently rate controlled.  Anticoagulation currently on hold.     3.  Hyponatremia, sodium levels have improved on lab review.  Continue to monitor.     4.  Anemia, mild drop in hemoglobin noted, continue to recheck labs.     5.  Acute CHF, last known ejection fraction is 30%, cardiology on board, follow recommendations for management.  Lasix dose has been increased today.     6.  Hypothyroidism, continue Synthroid.    7.  Diabetes mellitus, continue Accu-Cheks and sliding scale insulin coverage.  Increase Lantus dose, as blood glucose has remained in the high range.     8.  CODE STATUS is full code.  Further plans based on hospital course.  Discussed with patient and her  at bedside.     Darius Bernardo MD  [facility]  03/20/22  19:57 EDT

## 2022-03-20 NOTE — PLAN OF CARE
Goal Outcome Evaluation:           Progress: no change  Outcome Evaluation: VSS, improved urine output overnight, but still low. Good CT output. Continues to deny any pain. Good IS use. 2L O2. Continue supportive care.

## 2022-03-20 NOTE — PROGRESS NOTES
"    Patient Name: Jaquelin Valderrama  :1940  82 y.o.      Patient Care Team:  Minerva Chatterjee MD as PCP - General (Internal Medicine)    Chief Complaint: following afib    Interval History: Up in chair with family at bedside.  She states \"I think I am doing great\".  Denies shortness of breath.  Denies angina.       Objective   Vital Signs  Temp:  [97.1 °F (36.2 °C)-98 °F (36.7 °C)] 97.5 °F (36.4 °C)  Heart Rate:  [56-84] 81  Resp:  [16-18] 18  BP: ()/(63-91) 122/66    Intake/Output Summary (Last 24 hours) at 3/20/2022 1019  Last data filed at 3/20/2022 0415  Gross per 24 hour   Intake 720 ml   Output 659 ml   Net 61 ml     Flowsheet Rows    Flowsheet Row First Filed Value   Admission Height 160 cm (62.99\") Documented at 2022 0254   Admission Weight 81.6 kg (179 lb 14.4 oz) Documented at 2022 0525          Physical Exam:   General Appearance:    Alert, cooperative, in no acute distress   Lungs:     Right CTA.  Left with pleural tubes.  Normal respiratory effort and rate.      Heart:    Irregular rhythm and normal rate, normal S1 and S2, no murmurs, gallops or rubs.     Chest Wall:    3 Chest tubes to drainage   Abdomen:     Soft, nontender, positive bowel sounds.     Extremities:   no cyanosis, clubbing.  Yoan LE edema.  No marked joint deformities.  Adequate musculoskeletal strength.       Results Review:    Results from last 7 days   Lab Units 22  0404   SODIUM mmol/L 131*   POTASSIUM mmol/L 5.3*   CHLORIDE mmol/L 99   CO2 mmol/L 23.0   BUN mg/dL 15   CREATININE mg/dL 0.87   GLUCOSE mg/dL 248*   CALCIUM mg/dL 7.9*         Results from last 7 days   Lab Units 22  0404   WBC 10*3/mm3 14.49*   HEMOGLOBIN g/dL 8.0*   HEMATOCRIT % 24.4*   PLATELETS 10*3/mm3 525*     Results from last 7 days   Lab Units 22  0439   INR  1.16*     Results from last 7 days   Lab Units 22  2354   MAGNESIUM mg/dL 2.0                   Medication Review:   acetaminophen, 1,000 mg, Oral, " TID  amiodarone, 200 mg, Oral, BID  arformoterol, 15 mcg, Nebulization, BID - RT  budesonide, 1 mg, Nebulization, BID - RT  celecoxib, 100 mg, Oral, Q12H  cholecalciferol, 2,000 Units, Oral, Daily  enoxaparin, 40 mg, Subcutaneous, Q24H  furosemide, 20 mg, Oral, Daily  gabapentin, 300 mg, Oral, Q8H  insulin glargine, 10 Units, Subcutaneous, Nightly  ipratropium-albuterol, 3 mL, Nebulization, Q8H - RT  levothyroxine, 50 mcg, Oral, Q AM  losartan, 50 mg, Oral, Q24H  metoprolol tartrate, 50 mg, Oral, BID  multivitamin with minerals, 1 tablet, Oral, Daily  polyethylene glycol, 17 g, Oral, Daily  potassium chloride, 20 mEq, Oral, Daily  senna-docusate sodium, 2 tablet, Oral, Nightly         lactated ringers, 9 mL/hr, Last Rate: 9 mL/hr (03/18/22 1149)        Assessment/Plan       1.  Left pleural effusion status post decortication- 3 chest tubes to drainage. Breathing greatly improved  2.  Atrial flutter/fibrillation-she remains in atrial fibrillation with controlled rate.  She remains on amiodarone, low-dose metoprolol in place of Cardizem.  Anticoagulation on hold for now would resume as soon as possible from a surgical standpoint.  3.  Cardiomyopathy with ejection fraction 30%.  As above. Will need to resume anticoagulation soon  4.  Coronary disease with history of CABG 2014- Denies angina  5.  Congestive heart failure - Continuing to titrate GDMT.  She has chronic lower extremity edema but patient's family states it is improved. Increased lasix to 40 mg daily.  Extra 20 mg today.  6.  Hypothyroidism  7.  Anemia  8.  Hyponatremia - per primary, noted 131 today  9.  Hypernatremia - mild - per primary    VALENTINE Araujo  [facility]  03/20/22  10:19 EDT

## 2022-03-21 ENCOUNTER — APPOINTMENT (OUTPATIENT)
Dept: GENERAL RADIOLOGY | Facility: HOSPITAL | Age: 82
End: 2022-03-21

## 2022-03-21 LAB
ANION GAP SERPL CALCULATED.3IONS-SCNC: 8.2 MMOL/L (ref 5–15)
BACTERIA FLD CULT: NORMAL
BACTERIA SPEC AEROBE CULT: NORMAL
BACTERIA UR QL AUTO: ABNORMAL /HPF
BILIRUB UR QL STRIP: NEGATIVE
BUN SERPL-MCNC: 31 MG/DL (ref 8–23)
BUN/CREAT SERPL: 23 (ref 7–25)
CALCIUM SPEC-SCNC: 8.4 MG/DL (ref 8.6–10.5)
CHLORIDE SERPL-SCNC: 95 MMOL/L (ref 98–107)
CLARITY UR: CLEAR
CO2 SERPL-SCNC: 24.8 MMOL/L (ref 22–29)
COLOR UR: YELLOW
CREAT SERPL-MCNC: 1.35 MG/DL (ref 0.57–1)
EGFRCR SERPLBLD CKD-EPI 2021: 39.8 ML/MIN/1.73
GLUCOSE BLDC GLUCOMTR-MCNC: 149 MG/DL (ref 70–130)
GLUCOSE BLDC GLUCOMTR-MCNC: 161 MG/DL (ref 70–130)
GLUCOSE BLDC GLUCOMTR-MCNC: 208 MG/DL (ref 70–130)
GLUCOSE BLDC GLUCOMTR-MCNC: 210 MG/DL (ref 70–130)
GLUCOSE SERPL-MCNC: 132 MG/DL (ref 65–99)
GLUCOSE UR STRIP-MCNC: NEGATIVE MG/DL
GRAM STN SPEC: NORMAL
HGB UR QL STRIP.AUTO: ABNORMAL
HYALINE CASTS UR QL AUTO: ABNORMAL /LPF
KETONES UR QL STRIP: NEGATIVE
LEUKOCYTE ESTERASE UR QL STRIP.AUTO: NEGATIVE
NITRITE UR QL STRIP: NEGATIVE
NT-PROBNP SERPL-MCNC: 6905 PG/ML (ref 0–1800)
PH UR STRIP.AUTO: <=5 [PH] (ref 5–8)
POTASSIUM SERPL-SCNC: 4.5 MMOL/L (ref 3.5–5.2)
PROT UR QL STRIP: NEGATIVE
RBC # UR STRIP: ABNORMAL /HPF
REF LAB TEST METHOD: ABNORMAL
SODIUM SERPL-SCNC: 128 MMOL/L (ref 136–145)
SP GR UR STRIP: 1.01 (ref 1–1.03)
SQUAMOUS #/AREA URNS HPF: ABNORMAL /HPF
UROBILINOGEN UR QL STRIP: ABNORMAL
WBC # UR STRIP: ABNORMAL /HPF

## 2022-03-21 PROCEDURE — 83880 ASSAY OF NATRIURETIC PEPTIDE: CPT | Performed by: INTERNAL MEDICINE

## 2022-03-21 PROCEDURE — 82962 GLUCOSE BLOOD TEST: CPT

## 2022-03-21 PROCEDURE — 80048 BASIC METABOLIC PNL TOTAL CA: CPT | Performed by: THORACIC SURGERY (CARDIOTHORACIC VASCULAR SURGERY)

## 2022-03-21 PROCEDURE — 99024 POSTOP FOLLOW-UP VISIT: CPT | Performed by: NURSE PRACTITIONER

## 2022-03-21 PROCEDURE — 63710000001 INSULIN LISPRO (HUMAN) PER 5 UNITS: Performed by: STUDENT IN AN ORGANIZED HEALTH CARE EDUCATION/TRAINING PROGRAM

## 2022-03-21 PROCEDURE — 94761 N-INVAS EAR/PLS OXIMETRY MLT: CPT

## 2022-03-21 PROCEDURE — 71045 X-RAY EXAM CHEST 1 VIEW: CPT

## 2022-03-21 PROCEDURE — 94799 UNLISTED PULMONARY SVC/PX: CPT

## 2022-03-21 PROCEDURE — 81001 URINALYSIS AUTO W/SCOPE: CPT | Performed by: STUDENT IN AN ORGANIZED HEALTH CARE EDUCATION/TRAINING PROGRAM

## 2022-03-21 PROCEDURE — 94664 DEMO&/EVAL PT USE INHALER: CPT

## 2022-03-21 PROCEDURE — 99232 SBSQ HOSP IP/OBS MODERATE 35: CPT | Performed by: INTERNAL MEDICINE

## 2022-03-21 PROCEDURE — 25010000002 ENOXAPARIN PER 10 MG: Performed by: THORACIC SURGERY (CARDIOTHORACIC VASCULAR SURGERY)

## 2022-03-21 RX ORDER — INSULIN LISPRO 100 [IU]/ML
0-9 INJECTION, SOLUTION INTRAVENOUS; SUBCUTANEOUS
Status: DISCONTINUED | OUTPATIENT
Start: 2022-03-21 | End: 2022-03-24 | Stop reason: HOSPADM

## 2022-03-21 RX ORDER — DEXTROSE MONOHYDRATE 25 G/50ML
25 INJECTION, SOLUTION INTRAVENOUS
Status: DISCONTINUED | OUTPATIENT
Start: 2022-03-21 | End: 2022-03-24 | Stop reason: HOSPADM

## 2022-03-21 RX ORDER — NICOTINE POLACRILEX 4 MG
15 LOZENGE BUCCAL
Status: DISCONTINUED | OUTPATIENT
Start: 2022-03-21 | End: 2022-03-24 | Stop reason: HOSPADM

## 2022-03-21 RX ADMIN — INSULIN GLARGINE-YFGN 20 UNITS: 100 INJECTION, SOLUTION SUBCUTANEOUS at 22:42

## 2022-03-21 RX ADMIN — AMIODARONE HYDROCHLORIDE 200 MG: 200 TABLET ORAL at 08:59

## 2022-03-21 RX ADMIN — BUDESONIDE 1 MG: 0.5 INHALANT ORAL at 19:10

## 2022-03-21 RX ADMIN — POLYETHYLENE GLYCOL 3350 17 G: 17 POWDER, FOR SOLUTION ORAL at 08:59

## 2022-03-21 RX ADMIN — ARFORMOTEROL TARTRATE 15 MCG: 15 SOLUTION RESPIRATORY (INHALATION) at 07:47

## 2022-03-21 RX ADMIN — ACETAMINOPHEN 1000 MG: 500 TABLET ORAL at 20:21

## 2022-03-21 RX ADMIN — ENOXAPARIN SODIUM 40 MG: 100 INJECTION SUBCUTANEOUS at 20:21

## 2022-03-21 RX ADMIN — GABAPENTIN 300 MG: 300 CAPSULE ORAL at 22:42

## 2022-03-21 RX ADMIN — FUROSEMIDE 40 MG: 40 TABLET ORAL at 08:59

## 2022-03-21 RX ADMIN — DOCUSATE SODIUM 50MG AND SENNOSIDES 8.6MG 2 TABLET: 8.6; 5 TABLET, FILM COATED ORAL at 20:21

## 2022-03-21 RX ADMIN — CELECOXIB 100 MG: 100 CAPSULE ORAL at 08:59

## 2022-03-21 RX ADMIN — ACETAMINOPHEN 1000 MG: 500 TABLET ORAL at 16:01

## 2022-03-21 RX ADMIN — GABAPENTIN 300 MG: 300 CAPSULE ORAL at 16:01

## 2022-03-21 RX ADMIN — GABAPENTIN 300 MG: 300 CAPSULE ORAL at 06:52

## 2022-03-21 RX ADMIN — ACETAMINOPHEN 1000 MG: 500 TABLET ORAL at 08:59

## 2022-03-21 RX ADMIN — Medication 2000 UNITS: at 08:59

## 2022-03-21 RX ADMIN — AMIODARONE HYDROCHLORIDE 200 MG: 200 TABLET ORAL at 20:22

## 2022-03-21 RX ADMIN — LEVOTHYROXINE SODIUM 50 MCG: 0.05 TABLET ORAL at 06:52

## 2022-03-21 RX ADMIN — INSULIN LISPRO 4 UNITS: 100 INJECTION, SOLUTION INTRAVENOUS; SUBCUTANEOUS at 12:33

## 2022-03-21 RX ADMIN — METOPROLOL TARTRATE 50 MG: 50 TABLET, FILM COATED ORAL at 20:21

## 2022-03-21 RX ADMIN — INSULIN LISPRO 4 UNITS: 100 INJECTION, SOLUTION INTRAVENOUS; SUBCUTANEOUS at 17:59

## 2022-03-21 RX ADMIN — MULTIPLE VITAMINS W/ MINERALS TAB 1 TABLET: TAB at 08:59

## 2022-03-21 RX ADMIN — METOPROLOL TARTRATE 50 MG: 50 TABLET, FILM COATED ORAL at 08:59

## 2022-03-21 RX ADMIN — ARFORMOTEROL TARTRATE 15 MCG: 15 SOLUTION RESPIRATORY (INHALATION) at 19:10

## 2022-03-21 NOTE — PROGRESS NOTES
"CC: Atrial fibrillation    Interval History: No new acute events overnight      Vital Signs  Temp:  [97.3 °F (36.3 °C)-97.9 °F (36.6 °C)] 97.3 °F (36.3 °C)  Heart Rate:  [55-70] 55  Resp:  [16-20] 20  BP: (106-143)/(82-88) 139/82    Intake/Output Summary (Last 24 hours) at 3/21/2022 1022  Last data filed at 3/21/2022 0903  Gross per 24 hour   Intake 780 ml   Output 1608 ml   Net -828 ml     Flowsheet Rows    Flowsheet Row First Filed Value   Admission Height 160 cm (62.99\") Documented at 03/19/2022 0254   Admission Weight 81.6 kg (179 lb 14.4 oz) Documented at 03/18/2022 0525          PHYSICAL EXAM:  General: No acute distress  Resp:NL Rate, symmetric chest expansion,unlabored, clear  CV: Irregularly irregular, NL PMI, NL S1 and S2, no Murmur, no gallop, no rub, No JVD.   ABD:Nl sounds, no masses or tenderness, nondistended, no guarding or rebound  Neuro: alert,cooperative and oriented  Extr: Bilateral lower extremity pitting edema chest tube in place on left  Results Review:    Results from last 7 days   Lab Units 03/21/22  0736   SODIUM mmol/L 128*   POTASSIUM mmol/L 4.5   CHLORIDE mmol/L 95*   CO2 mmol/L 24.8   BUN mg/dL 31*   CREATININE mg/dL 1.35*   GLUCOSE mg/dL 132*   CALCIUM mg/dL 8.4*         Results from last 7 days   Lab Units 03/19/22  0404   WBC 10*3/mm3 14.49*   HEMOGLOBIN g/dL 8.0*   HEMATOCRIT % 24.4*   PLATELETS 10*3/mm3 525*     Results from last 7 days   Lab Units 03/18/22  0439   INR  1.16*         Results from last 7 days   Lab Units 03/16/22  2354   MAGNESIUM mg/dL 2.0         I reviewed the patient's new clinical results.  I personally viewed and interpreted the patient's EKG/Telemetry data        Medication Review:   Meds reviewed    lactated ringers, 9 mL/hr, Last Rate: 9 mL/hr (03/18/22 1149)        Assessment/Plan    1.  Persistent atrial fibrillation with rapid ventricular response-rate controlled  2.  Left pleural effusion status post decortication and chest tube in place  3.  Ischemic " cardiomyopathy with left ventricular ejection fraction of 30%  4.   Coronary artery disease status post CABG in 2014  5.  Hyponatremia  6.  Acute kidney injury    Current cardiac medications include amiodarone 200 twice a day, Lasix 40 mg p.o. daily, losartan 50 daily, metoprolol tartrate 50 mg p.o. twice daily  Blood pressure is normal  Heart rate is fairly controlled on current regimen.  Fairly diuresing on current regimen.  Bump in creatinine noted.  Monitor BUNs/creatinine. May hold diuresis/losartan with worsening Cr. May need to hold of NSAID with worsening Cr   Not on anticoagulation - resume when ok with surgery     Britton Buchanan MD  03/21/22  10:22 EDT

## 2022-03-21 NOTE — PROGRESS NOTES
"    Name: Jaquelin Valderrama ADMIT: 3/17/2022   : 1940  PCP: Minerva Chatterjee MD    MRN: [id number] LOS: 4 days   AGE/SEX: 82 y.o. female  ROOM: [facility]     Subjective   Subjective   Sitting up in chair, in good spirits.  Tells me all about her trips to the  with her  and about their Gold dogs.  Says she ate \"like a horse\".  Sleeping well    Review of Systems  Denies chest pain, abdominal pain, headache, lower extremity swelling     Objective   Objective   Vital Signs  Temp:  [97.1 °F (36.2 °C)-97.7 °F (36.5 °C)] 97.1 °F (36.2 °C)  Heart Rate:  [55-70] 60  Resp:  [16-20] 18  BP: (106-139)/(55-87) 114/55  SpO2:  [95 %-100 %] 98 %  on   ;   Device (Oxygen Therapy): room air  Body mass index is 31.52 kg/m².  Physical Exam  Constitutional:       General: She is not in acute distress.     Appearance: She is not ill-appearing.   HENT:      Mouth/Throat:      Mouth: Mucous membranes are moist.   Eyes:      General: No scleral icterus.  Cardiovascular:      Rate and Rhythm: Normal rate and regular rhythm.      Pulses: Normal pulses.      Heart sounds: No murmur heard.    No gallop.   Pulmonary:      Effort: Pulmonary effort is normal. No respiratory distress.      Breath sounds: No wheezing.      Comments: Chest tubes x3 in place  Abdominal:      Palpations: Abdomen is soft.      Tenderness: There is no abdominal tenderness. There is no guarding.   Skin:     General: Skin is warm and dry.   Neurological:      Mental Status: She is alert.      Cranial Nerves: No cranial nerve deficit.   Psychiatric:         Mood and Affect: Mood normal.         Results Review     I reviewed the patient's new clinical results.  Results from last 7 days   Lab Units 22  0404 22  1518 22  0439 22  2354   WBC 10*3/mm3 14.49* 15.02* 13.58* 12.30*   HEMOGLOBIN g/dL 8.0* 8.4* 8.8* 9.1*   PLATELETS 10*3/mm3 525* 492* 505* 534*     Results from last 7 days   Lab Units 22  0736 22  0404 22  1518 " 03/18/22  0439   SODIUM mmol/L 128* 131* 131* 129*   POTASSIUM mmol/L 4.5 5.3* 4.2 3.8   CHLORIDE mmol/L 95* 99 99 97*   CO2 mmol/L 24.8 23.0 23.0 23.3   BUN mg/dL 31* 15 12 12   CREATININE mg/dL 1.35* 0.87 0.58 0.57   GLUCOSE mg/dL 132* 248* 172* 159*   Estimated Creatinine Clearance: 33.4 mL/min (A) (by C-G formula based on SCr of 1.35 mg/dL (H)).  Results from last 7 days   Lab Units 03/16/22  0015   ALBUMIN g/dL 2.60*   BILIRUBIN mg/dL 0.4   ALK PHOS U/L 100   AST (SGOT) U/L 35*   ALT (SGPT) U/L 44*     Results from last 7 days   Lab Units 03/21/22  0736 03/19/22  0404 03/18/22  1518 03/18/22  0439 03/16/22  2354 03/16/22  0015 03/15/22  0410   CALCIUM mg/dL 8.4* 7.9* 7.8* 8.4* 8.1* 8.1* 8.2*   ALBUMIN g/dL  --   --   --   --   --  2.60*  --    MAGNESIUM mg/dL  --   --   --   --  2.0 1.9 1.9   PHOSPHORUS mg/dL  --   --   --   --  3.0 2.4* 2.6       COVID19   Date Value Ref Range Status   03/17/2022 Not Detected Not Detected - Ref. Range Final   03/02/2022 Not Detected Not Detected - Ref. Range Final     Glucose   Date/Time Value Ref Range Status   03/21/2022 1056 210 (H) 70 - 130 mg/dL Final     Comment:     Meter: [id number] : [id number] Tonny Lopez TAM   03/21/2022 0749 149 (H) 70 - 130 mg/dL Final     Comment:     Meter: [id number] : [id number] Tonny Lopez NA   03/20/2022 2132 278 (H) 70 - 130 mg/dL Final     Comment:     Meter: [id number] : [id number] Stewartbenedict Thomas    03/20/2022 0534 217 (H) 70 - 130 mg/dL Final     Comment:     Meter: [id number] : [id number] Loera Lacey CNA   03/19/2022 2042 315 (H) 70 - 130 mg/dL Final     Comment:     Meter: [id number] : [id number] Loera Lacey CNA   03/19/2022 0558 279 (H) 70 - 130 mg/dL Final     Comment:     Meter: [id number] : [id number] Loera Lacey CNA   03/18/2022 2100 292 (H) 70 - 130 mg/dL Final     Comment:     Meter: [id number] : [id number] Evaristo Becerraricia CNA       XR Chest 1 View  X-ray chest 1 view with  comparison 3/20/2022     Clinical: Chest tube management     FINDINGS: Left-sided chest tubes remain stable in position. No  pneumothorax. Stable cardiac enlargement. Mild persistent airspace  disease at the right lung base. Left lung base opacity not significantly  changed, suggesting atelectasis/infiltrate and/or small pleural  effusion. No pulmonary edema identified.     CONCLUSION: Stable chest     This report was finalized on 3/21/2022 9:21 AM by Dr. Shane Martin M.D.       I reviewed the patient's daily medications.  Scheduled Medications  acetaminophen, 1,000 mg, Oral, TID  amiodarone, 200 mg, Oral, BID  arformoterol, 15 mcg, Nebulization, BID - RT  budesonide, 1 mg, Nebulization, BID - RT  celecoxib, 100 mg, Oral, Q12H  cholecalciferol, 2,000 Units, Oral, Daily  enoxaparin, 40 mg, Subcutaneous, Q24H  furosemide, 40 mg, Oral, Daily  gabapentin, 300 mg, Oral, Q8H  insulin glargine, 20 Units, Subcutaneous, Nightly  insulin lispro, 0-9 Units, Subcutaneous, TID AC  levothyroxine, 50 mcg, Oral, Q AM  losartan, 50 mg, Oral, Q24H  metoprolol tartrate, 50 mg, Oral, BID  multivitamin with minerals, 1 tablet, Oral, Daily  polyethylene glycol, 17 g, Oral, Daily  potassium chloride, 20 mEq, Oral, Daily  senna-docusate sodium, 2 tablet, Oral, Nightly    Infusions  lactated ringers, 9 mL/hr, Last Rate: 9 mL/hr (03/18/22 1149)    Diet  Diet Regular       Assessment/Plan     Active Hospital Problems    Diagnosis  POA   • **Loculated pleural effusion [J90]  Yes   • Acute CHF (congestive heart failure) (HCC) [I50.9]  Yes   • Atrial fibrillation with RVR (HCC) [I48.91]  Yes   • Obesity (BMI 30-39.9) [E66.9]  Yes   • Astigmatism, bilateral [H52.203]  Yes   • Essential hypertension [I10]  Yes   • Hypothyroidism [E03.9]  Yes   • Other hyperlipidemia [E78.49]  Yes      Resolved Hospital Problems   No resolved problems to display.       82 y.o. female with history of coronary artery disease status post CABG, HFrEF (EF 30%) and A.  neptali who presented to an outside hospital with dyspnea on exertion.  She was found to have loculated left pleural effusion and was transferred to [facility] for VATS.  She had video-assisted thoracoscopy with decortication on 3/18.    DOMINGO: Hold losartan, celecoxib and Lasix.  Check urinalysis, monitor for urinary retention    Loculated left pleural effusion: Status post VATS and decortication.  Cardiothoracic surgery managing chest tubes.  Pain control with gabapentin, oxycodone and Dilaudid as needed     Atrial fibrillation: cardiology following, currently rate controlled.    Continue amiodarone and metoprolol.  Anticoagulation currently on hold, restart when okay with CT surgery     Hyponatremia: Worsening today, check urinalysis and BMP tomorrow     Anemia: mild drop in hemoglobin noted, continue to follow CBC     Acute CHF, last known ejection fraction is 30%, cardiology following.  Currently appears euvolemic, holding Lasix for now due to DOMINGO     Hypothyroidism: Stable, continue Synthroid.     Diabetes mellitus: Continue basal and sliding scale insulin    · Lovenox 40 mg SC daily for DVT prophylaxis.  · Full code.  · Discussed with patient, nursing staff and CCP.  · Anticipate discharge to SNU facility when cleared by consultants.      Katherine Fitzpatrick [facility]  03/21/22  11:05 EDT    Patient was placed in face mask on first look.  I wore protective equipment throughout this patient encounter including a face mask, gloves and protective eyewear.  Hand hygiene was performed before donning protective equipment and after removal when leaving the room.

## 2022-03-21 NOTE — CASE MANAGEMENT/SOCIAL WORK
Discharge Planning Assessment  [facility]     Patient Name: Jaquelin Valderrama  MRN: [id number]  Today's Date: 3/21/2022    Admit Date: 3/17/2022     Discharge Needs Assessment     Row Name 03/21/22 1452       Living Environment    People in Home spouse    Current Living Arrangements home    Primary Care Provided by self    Provides Primary Care For no one    Family Caregiver if Needed spouse    Quality of Family Relationships supportive       Resource/Environmental Concerns    Resource/Environmental Concerns none    Transportation Concerns none       Transition Planning    Patient/Family Anticipates Transition to home with family    Patient/Family Anticipated Services at Transition none    Transportation Anticipated family or friend will provide       Discharge Needs Assessment    Equipment Currently Used at Home glucometer    Equipment Needed After Discharge none               Discharge Plan     Row Name 03/21/22 1454       Plan    Plan Home    Patient/Family in Agreement with Plan yes    Plan Comments Met with pt at bedside. Introduced self, explained CCP role, facesheet verified.  Pt states she lives with spouse and is independent with ADLs.  No history of HH or SNF. Has walker at home but doesn't use.  Plans to return home at discharge, no identified needs. CCP will follow.  ANDRÉS Beauchamp RN              Continued Care and Services - Admitted Since 3/17/2022    Coordination has not been started for this encounter.     Selected Continued Care - Prior Encounters Includes selections from prior encounters from 12/17/2021 to 3/21/2022    Discharged on 3/17/2022 Admission date: 3/2/2022 - Discharge disposition: Short Term Hospital (DC - External)    Destination     Service Provider Selected Services Address Phone Fax Patient Preferred    [facility]  Inpatient [facility] [address] [address] [phone] --                       Demographic Summary     Row  Name 03/21/22 145       General Information    Admission Type inpatient    Arrived From home    Referral Source admission list    Reason for Consult discharge planning    Preferred Language English       Contact Information    Permission Granted to Share Info With family/designee  Rodney Valderrama [phone]               Functional Status    No documentation.                Psychosocial    No documentation.                Abuse/Neglect    No documentation.                Legal    No documentation.                Substance Abuse    No documentation.                Patient Forms    No documentation.                   Prema Beauchamp RN

## 2022-03-21 NOTE — PLAN OF CARE
Problem: Adult Inpatient Plan of Care  Goal: Plan of Care Review  Flowsheets (Taken 3/21/2022 1834)  Progress: improving  Plan of Care Reviewed With: patient  Outcome Evaluation: vitals stable, anterior ct removed, CT 2&3 remain on [id number] without airleaks, ta cath d/c'd, due to void, pain controlled with oral medications, pt favoring right side, neurologically intact, moderately edematous iram lower extremities, continue to monitor  Goal: Absence of Hospital-Acquired Illness or Injury  Intervention: Identify and Manage Fall Risk  Recent Flowsheet Documentation  Taken 3/21/2022 1810 by Mary Grace Meléndez RN  Safety Promotion/Fall Prevention:   safety round/check completed   room organization consistent   nonskid shoes/slippers when out of bed   lighting adjusted   fall prevention program maintained   assistive device/personal items within reach   clutter free environment maintained  Taken 3/21/2022 1643 by Mary Grace Meléndez RN  Safety Promotion/Fall Prevention:   safety round/check completed   room organization consistent   nonskid shoes/slippers when out of bed   lighting adjusted   fall prevention program maintained   clutter free environment maintained   assistive device/personal items within reach  Taken 3/21/2022 1230 by Mary Grace Meléndez RN  Safety Promotion/Fall Prevention:   safety round/check completed   room organization consistent   nonskid shoes/slippers when out of bed   lighting adjusted   fall prevention program maintained   clutter free environment maintained   assistive device/personal items within reach  Taken 3/21/2022 1045 by Mary Grace Meléndez RN  Safety Promotion/Fall Prevention:   safety round/check completed   room organization consistent   nonskid shoes/slippers when out of bed   lighting adjusted   fall prevention program maintained   clutter free environment maintained   assistive device/personal items within reach  Intervention: Prevent and Manage VTE (Venous Thromboembolism)  Risk  Recent Flowsheet Documentation  Taken 3/21/2022 1810 by Mary Grace Meléndez RN  Activity Management:   up in chair   back to bed  Taken 3/21/2022 1643 by Mary Grace Meléndez RN  Activity Management: up in chair  Taken 3/21/2022 1045 by Mary Grace Meléndez RN  Activity Management: up in chair  Goal: Optimal Comfort and Wellbeing  Intervention: Provide Person-Centered Care  Recent Flowsheet Documentation  Taken 3/21/2022 1500 by Mary Grace Meléndez RN  Trust Relationship/Rapport:   care explained   thoughts/feelings acknowledged  Taken 3/21/2022 0903 by Mary Grace Meléndez RN  Trust Relationship/Rapport:   care explained   thoughts/feelings acknowledged   reassurance provided   Goal Outcome Evaluation:  Plan of Care Reviewed With: patient        Progress: improving  Outcome Evaluation: vitals stable, anterior ct removed, CT 2&3 remain on [id number] without airleaks, ta cath d/c'd, due to void, pain controlled with oral medications, pt favoring right side, neurologically intact, moderately edematous iram lower extremities, continue to monitor

## 2022-03-21 NOTE — PROGRESS NOTES
"  POST-OPERATIVE NOTE     Chief Complaint: Loculated pleural effusion postoperative care  S/P: Left video-assisted thoracoscopy with complete decortication  POD # 3    Subjective:  Symptoms:  Improved.  She reports shortness of breath.  No chest pain.    Diet:  Poor intake.  No nausea or vomiting.    Activity level: Returning to normal.    Pain:  She complains of pain that is mild.  Pain is well controlled.        Objective:  General Appearance:  Comfortable and in no acute distress.    Vital signs: (most recent): Blood pressure 114/55, pulse 60, temperature 97.1 °F (36.2 °C), temperature source Oral, resp. rate 18, height 160 cm (62.99\"), weight 80.7 kg (177 lb 14.4 oz), SpO2 98 %, not currently breastfeeding.  Vital signs are normal.  No fever.    Output: Minimal urine output and no stool output.    HEENT: Normal HEENT exam.    Lungs:  Normal effort and normal respiratory rate.  She is not in respiratory distress.    Heart: Normal rate.  Regular rhythm.    Abdomen: Abdomen is soft.  There is no abdominal tenderness.     Neurological: Patient is alert and oriented to person, place and time.    Skin:  Warm and dry.              Chest tube:   Site: Left, Clean, Dry, Intact and Securement device intact  Suction: waterseal  Air Leak: negative  24 Hour Total: 28/160/120cc    Results Review:     I reviewed the patient's new clinical results.  I reviewed the patient's new imaging results and agree with the interpretation.  I reviewed the patient's other test results and agree with the interpretation  Discussed with patient, RN and Dr. Kuo.    Assessment/Plan     I reviewed this morning's chest x-ray which demonstrates no pneumothorax.  There is some persistent airspace disease at the right lung base and left lung opacity which is stable in appearance, likely atelectasis/pleural effusion.    Patient is tolerating regular diet.  Pain is well controlled.  Continue to encourage good pulmonary hygiene, ambulation and " incentive spirometry.  Patient's anterior chest tube was removed at the bedside today without difficulty.  [facility]  Recheck x-ray in the morning.   Patient is hyponatremic on this morning's labs.  Appreciate assistance from hospitalist.  They have ordered a UA.  Vasiliy to RADHA Alfonso from our standpoint.    VALENTINE Lauren  [facility]  03/21/22  14:14 EDT    Patient was seen and assessed while wearing personal protective equipment including facemask, protective eyewear and gloves.  Hand hygiene performed prior to entering the room and upon exiting with doffing of gloves.

## 2022-03-22 ENCOUNTER — APPOINTMENT (OUTPATIENT)
Dept: GENERAL RADIOLOGY | Facility: HOSPITAL | Age: 82
End: 2022-03-22

## 2022-03-22 LAB
ANION GAP SERPL CALCULATED.3IONS-SCNC: 10.7 MMOL/L (ref 5–15)
BASOPHILS # BLD AUTO: 0.02 10*3/MM3 (ref 0–0.2)
BASOPHILS NFR BLD AUTO: 0.2 % (ref 0–1.5)
BUN SERPL-MCNC: 25 MG/DL (ref 8–23)
BUN/CREAT SERPL: 28.4 (ref 7–25)
CALCIUM SPEC-SCNC: 8.4 MG/DL (ref 8.6–10.5)
CHLORIDE SERPL-SCNC: 94 MMOL/L (ref 98–107)
CO2 SERPL-SCNC: 25.3 MMOL/L (ref 22–29)
CREAT SERPL-MCNC: 0.88 MG/DL (ref 0.57–1)
DEPRECATED RDW RBC AUTO: 47.4 FL (ref 37–54)
EGFRCR SERPLBLD CKD-EPI 2021: 66.5 ML/MIN/1.73
EOSINOPHIL # BLD AUTO: 0.13 10*3/MM3 (ref 0–0.4)
EOSINOPHIL NFR BLD AUTO: 1.2 % (ref 0.3–6.2)
ERYTHROCYTE [DISTWIDTH] IN BLOOD BY AUTOMATED COUNT: 14.4 % (ref 12.3–15.4)
GLUCOSE BLDC GLUCOMTR-MCNC: 125 MG/DL (ref 70–130)
GLUCOSE BLDC GLUCOMTR-MCNC: 169 MG/DL (ref 70–130)
GLUCOSE BLDC GLUCOMTR-MCNC: 218 MG/DL (ref 70–130)
GLUCOSE BLDC GLUCOMTR-MCNC: 99 MG/DL (ref 70–130)
GLUCOSE SERPL-MCNC: 92 MG/DL (ref 65–99)
HCT VFR BLD AUTO: 26.2 % (ref 34–46.6)
HGB BLD-MCNC: 8.7 G/DL (ref 12–15.9)
IMM GRANULOCYTES # BLD AUTO: 0.45 10*3/MM3 (ref 0–0.05)
IMM GRANULOCYTES NFR BLD AUTO: 4 % (ref 0–0.5)
LYMPHOCYTES # BLD AUTO: 1.35 10*3/MM3 (ref 0.7–3.1)
LYMPHOCYTES NFR BLD AUTO: 12.1 % (ref 19.6–45.3)
MAGNESIUM SERPL-MCNC: 2 MG/DL (ref 1.6–2.4)
MCH RBC QN AUTO: 30.5 PG (ref 26.6–33)
MCHC RBC AUTO-ENTMCNC: 33.2 G/DL (ref 31.5–35.7)
MCV RBC AUTO: 91.9 FL (ref 79–97)
MONOCYTES # BLD AUTO: 0.69 10*3/MM3 (ref 0.1–0.9)
MONOCYTES NFR BLD AUTO: 6.2 % (ref 5–12)
NEUTROPHILS NFR BLD AUTO: 76.3 % (ref 42.7–76)
NEUTROPHILS NFR BLD AUTO: 8.52 10*3/MM3 (ref 1.7–7)
NRBC BLD AUTO-RTO: 0.6 /100 WBC (ref 0–0.2)
PHOSPHATE SERPL-MCNC: 3 MG/DL (ref 2.5–4.5)
PLATELET # BLD AUTO: 608 10*3/MM3 (ref 140–450)
PMV BLD AUTO: 9 FL (ref 6–12)
POTASSIUM SERPL-SCNC: 4.6 MMOL/L (ref 3.5–5.2)
RBC # BLD AUTO: 2.85 10*6/MM3 (ref 3.77–5.28)
SODIUM SERPL-SCNC: 130 MMOL/L (ref 136–145)
WBC NRBC COR # BLD: 11.16 10*3/MM3 (ref 3.4–10.8)

## 2022-03-22 PROCEDURE — 94799 UNLISTED PULMONARY SVC/PX: CPT

## 2022-03-22 PROCEDURE — 83735 ASSAY OF MAGNESIUM: CPT | Performed by: STUDENT IN AN ORGANIZED HEALTH CARE EDUCATION/TRAINING PROGRAM

## 2022-03-22 PROCEDURE — 25010000002 ENOXAPARIN PER 10 MG: Performed by: STUDENT IN AN ORGANIZED HEALTH CARE EDUCATION/TRAINING PROGRAM

## 2022-03-22 PROCEDURE — 99232 SBSQ HOSP IP/OBS MODERATE 35: CPT | Performed by: INTERNAL MEDICINE

## 2022-03-22 PROCEDURE — 99024 POSTOP FOLLOW-UP VISIT: CPT | Performed by: NURSE PRACTITIONER

## 2022-03-22 PROCEDURE — 97162 PT EVAL MOD COMPLEX 30 MIN: CPT

## 2022-03-22 PROCEDURE — 82962 GLUCOSE BLOOD TEST: CPT

## 2022-03-22 PROCEDURE — 71045 X-RAY EXAM CHEST 1 VIEW: CPT

## 2022-03-22 PROCEDURE — 84100 ASSAY OF PHOSPHORUS: CPT | Performed by: STUDENT IN AN ORGANIZED HEALTH CARE EDUCATION/TRAINING PROGRAM

## 2022-03-22 PROCEDURE — 85025 COMPLETE CBC W/AUTO DIFF WBC: CPT | Performed by: STUDENT IN AN ORGANIZED HEALTH CARE EDUCATION/TRAINING PROGRAM

## 2022-03-22 PROCEDURE — 97530 THERAPEUTIC ACTIVITIES: CPT

## 2022-03-22 PROCEDURE — 63710000001 INSULIN LISPRO (HUMAN) PER 5 UNITS: Performed by: STUDENT IN AN ORGANIZED HEALTH CARE EDUCATION/TRAINING PROGRAM

## 2022-03-22 PROCEDURE — 80048 BASIC METABOLIC PNL TOTAL CA: CPT | Performed by: STUDENT IN AN ORGANIZED HEALTH CARE EDUCATION/TRAINING PROGRAM

## 2022-03-22 PROCEDURE — 94664 DEMO&/EVAL PT USE INHALER: CPT

## 2022-03-22 PROCEDURE — 94761 N-INVAS EAR/PLS OXIMETRY MLT: CPT

## 2022-03-22 RX ORDER — FUROSEMIDE 40 MG/1
40 TABLET ORAL DAILY
Status: DISCONTINUED | OUTPATIENT
Start: 2022-03-22 | End: 2022-03-24 | Stop reason: HOSPADM

## 2022-03-22 RX ORDER — LOSARTAN POTASSIUM 25 MG/1
25 TABLET ORAL
Status: DISCONTINUED | OUTPATIENT
Start: 2022-03-22 | End: 2022-03-24 | Stop reason: HOSPADM

## 2022-03-22 RX ORDER — AMIODARONE HYDROCHLORIDE 200 MG/1
200 TABLET ORAL DAILY
Status: DISCONTINUED | OUTPATIENT
Start: 2022-03-23 | End: 2022-03-24 | Stop reason: HOSPADM

## 2022-03-22 RX ADMIN — ACETAMINOPHEN 1000 MG: 500 TABLET ORAL at 20:55

## 2022-03-22 RX ADMIN — MULTIPLE VITAMINS W/ MINERALS TAB 1 TABLET: TAB at 08:57

## 2022-03-22 RX ADMIN — POLYETHYLENE GLYCOL 3350 17 G: 17 POWDER, FOR SOLUTION ORAL at 08:57

## 2022-03-22 RX ADMIN — FUROSEMIDE 40 MG: 40 TABLET ORAL at 14:20

## 2022-03-22 RX ADMIN — METOPROLOL TARTRATE 50 MG: 50 TABLET, FILM COATED ORAL at 20:56

## 2022-03-22 RX ADMIN — LOSARTAN POTASSIUM 25 MG: 25 TABLET, FILM COATED ORAL at 15:51

## 2022-03-22 RX ADMIN — ACETAMINOPHEN 1000 MG: 500 TABLET ORAL at 08:56

## 2022-03-22 RX ADMIN — BUDESONIDE 1 MG: 0.5 INHALANT ORAL at 19:52

## 2022-03-22 RX ADMIN — ARFORMOTEROL TARTRATE 15 MCG: 15 SOLUTION RESPIRATORY (INHALATION) at 08:04

## 2022-03-22 RX ADMIN — INSULIN GLARGINE-YFGN 20 UNITS: 100 INJECTION, SOLUTION SUBCUTANEOUS at 21:16

## 2022-03-22 RX ADMIN — ACETAMINOPHEN 1000 MG: 500 TABLET ORAL at 15:51

## 2022-03-22 RX ADMIN — INSULIN LISPRO 4 UNITS: 100 INJECTION, SOLUTION INTRAVENOUS; SUBCUTANEOUS at 11:43

## 2022-03-22 RX ADMIN — GABAPENTIN 300 MG: 300 CAPSULE ORAL at 06:38

## 2022-03-22 RX ADMIN — METOPROLOL TARTRATE 50 MG: 50 TABLET, FILM COATED ORAL at 09:27

## 2022-03-22 RX ADMIN — ENOXAPARIN SODIUM 40 MG: 100 INJECTION SUBCUTANEOUS at 20:57

## 2022-03-22 RX ADMIN — GABAPENTIN 300 MG: 300 CAPSULE ORAL at 14:21

## 2022-03-22 RX ADMIN — Medication 2000 UNITS: at 08:56

## 2022-03-22 RX ADMIN — LEVOTHYROXINE SODIUM 50 MCG: 0.05 TABLET ORAL at 06:38

## 2022-03-22 RX ADMIN — BUDESONIDE 1 MG: 0.5 INHALANT ORAL at 08:05

## 2022-03-22 RX ADMIN — AMIODARONE HYDROCHLORIDE 200 MG: 200 TABLET ORAL at 08:57

## 2022-03-22 RX ADMIN — GABAPENTIN 300 MG: 300 CAPSULE ORAL at 20:55

## 2022-03-22 NOTE — PLAN OF CARE
Goal Outcome Evaluation:  Plan of Care Reviewed With: patient        Progress: no change  Outcome Evaluation: Trenton is an 83 yo female who was tx from [facility] ER on 3/17 and is now s/p L VATS decortication. PMHx includes afib, CHF, CABG. She lives with her  and is ind at baseline without use of AD. She has 2-3 CORNELIA and a flight to bedroom. She presents today with decreased activity tolerance, SOA with activity, decreased strength, and safety awareness. She completed bed mobility requiring SBA and STS to rwx with CGA. She ambulated 120ft using rwx requiring CGA and 1 standing rest break for 1min due to SOA. Cues with turns as pt attempted to prematurely sit and abandons walker. Education provided to not increase ambulation speed and decreased safety awareness to sit but to instead take a standing rest break if feeling SOA and faituged during ambulation. Pt will continue to benefit from skilled PT to address functional deficits. Anticipate acute rehab at discharge.

## 2022-03-22 NOTE — PROGRESS NOTES
Name: Jaquelin Valderrama ADMIT: 3/17/2022   : 1940  PCP: Minerva Chatterjee MD    MRN: [id number] LOS: 5 days   AGE/SEX: 82 y.o. female  ROOM: [facility]     Subjective   Subjective    at bedside today.  Today she tells me about how she grew up using an out house, and that she is 1 of 4 children.  Very pleasant as always    Review of Systems  Denies chest pain, abdominal pain, headache, lower extremity swelling     Objective   Objective   Vital Signs  Temp:  [97.1 °F (36.2 °C)-97.4 °F (36.3 °C)] 97.2 °F (36.2 °C)  Heart Rate:  [53-78] 53  Resp:  [16-20] 16  BP: (102-165)/(69-93) 122/69  SpO2:  [96 %-100 %] 97 %  on   ;   Device (Oxygen Therapy): room air  Body mass index is 31.89 kg/m².  Physical Exam  Constitutional:       General: She is not in acute distress.     Appearance: She is not ill-appearing.   HENT:      Mouth/Throat:      Mouth: Mucous membranes are moist.   Eyes:      General: No scleral icterus.  Cardiovascular:      Rate and Rhythm: Normal rate and regular rhythm.      Pulses: Normal pulses.      Heart sounds: No murmur heard.    No gallop.   Pulmonary:      Effort: Pulmonary effort is normal. No respiratory distress.      Breath sounds: No wheezing.      Comments: Chest tube in place  Abdominal:      Palpations: Abdomen is soft.      Tenderness: There is no abdominal tenderness. There is no guarding.   Skin:     General: Skin is warm and dry.   Neurological:      Mental Status: She is alert.      Cranial Nerves: No cranial nerve deficit.   Psychiatric:         Mood and Affect: Mood normal.         Results Review     I reviewed the patient's new clinical results.  Results from last 7 days   Lab Units 22  0610 22  0404 22  1518 22  0439   WBC 10*3/mm3 11.16* 14.49* 15.02* 13.58*   HEMOGLOBIN g/dL 8.7* 8.0* 8.4* 8.8*   PLATELETS 10*3/mm3 608* 525* 492* 505*     Results from last 7 days   Lab Units 22  0610 22  0736 22  0404 22  1518   SODIUM  mmol/L 130* 128* 131* 131*   POTASSIUM mmol/L 4.6 4.5 5.3* 4.2   CHLORIDE mmol/L 94* 95* 99 99   CO2 mmol/L 25.3 24.8 23.0 23.0   BUN mg/dL 25* 31* 15 12   CREATININE mg/dL 0.88 1.35* 0.87 0.58   GLUCOSE mg/dL 92 132* 248* 172*   Estimated Creatinine Clearance: 51.6 mL/min (by C-G formula based on SCr of 0.88 mg/dL).  Results from last 7 days   Lab Units 03/16/22  0015   ALBUMIN g/dL 2.60*   BILIRUBIN mg/dL 0.4   ALK PHOS U/L 100   AST (SGOT) U/L 35*   ALT (SGPT) U/L 44*     Results from last 7 days   Lab Units 03/22/22  0610 03/21/22  0736 03/19/22  0404 03/18/22  1518 03/18/22  0439 03/16/22  2354 03/16/22  0015   CALCIUM mg/dL 8.4* 8.4* 7.9* 7.8*   < > 8.1* 8.1*   ALBUMIN g/dL  --   --   --   --   --   --  2.60*   MAGNESIUM mg/dL 2.0  --   --   --   --  2.0 1.9   PHOSPHORUS mg/dL 3.0  --   --   --   --  3.0 2.4*    < > = values in this interval not displayed.       COVID19   Date Value Ref Range Status   03/17/2022 Not Detected Not Detected - Ref. Range Final   03/02/2022 Not Detected Not Detected - Ref. Range Final     Glucose   Date/Time Value Ref Range Status   03/22/2022 1102 218 (H) 70 - 130 mg/dL Final     Comment:     Meter: [id number] : [id number] [place] Jessica NA   03/22/2022 0545 125 70 - 130 mg/dL Final     Comment:     Meter: [id number] : [id number] Leodan Mendez TAM   03/21/2022 2110 161 (H) 70 - 130 mg/dL Final     Comment:     Meter: [id number] : [id number] Leodan Mendez NA   03/21/2022 1600 208 (H) 70 - 130 mg/dL Final     Comment:     [id number] [facility] [id number] [facility]   03/21/2022 1056 210 (H) 70 - 130 mg/dL Final     Comment:     Meter: [id number] : [id number] [facility]   03/21/2022 0749 149 (H) 70 - 130 mg/dL Final     Comment:     Meter: [id number] : [id number] [place] [facility]   03/20/2022 2132 278 (H) 70 - 130 mg/dL Final     Comment:     Meter: [id number] : [id number] Terry TURCIOS       XR Chest 1 View  X-RAY CHEST 1 VIEW      COMPARISON 3/21/2022     Clinical: Chest tube management     FINDINGS: One of the 3 left-sided chest tubes removed within the  interim, no pneumothorax. The cardiomediastinal silhouette is stable.  There is been no interval change in the airspace disease demonstrated at  the right lung base and also within the left mid and lower lung zone. No  new area of consolidation has developed. No pulmonary edema seen.     CONCLUSION: Interval removal of one of the 3 left-sided chest tubes, no  other interval change since 3/21/2022.     This report was finalized on 3/22/2022 6:31 AM by Dr. Shane Martin M.D.       I reviewed the patient's daily medications.  Scheduled Medications  acetaminophen, 1,000 mg, Oral, TID  amiodarone, 200 mg, Oral, BID  arformoterol, 15 mcg, Nebulization, BID - RT  budesonide, 1 mg, Nebulization, BID - RT  cholecalciferol, 2,000 Units, Oral, Daily  enoxaparin, 40 mg, Subcutaneous, Q24H  gabapentin, 300 mg, Oral, Q8H  insulin glargine, 20 Units, Subcutaneous, Nightly  insulin lispro, 0-9 Units, Subcutaneous, TID AC  levothyroxine, 50 mcg, Oral, Q AM  metoprolol tartrate, 50 mg, Oral, BID  multivitamin with minerals, 1 tablet, Oral, Daily  polyethylene glycol, 17 g, Oral, Daily  senna-docusate sodium, 2 tablet, Oral, Nightly    Infusions  lactated ringers, 9 mL/hr, Last Rate: 9 mL/hr (03/18/22 1149)    Diet  Diet Regular       Assessment/Plan     Active Hospital Problems    Diagnosis  POA   • **Loculated pleural effusion [J90]  Yes   • Acute CHF (congestive heart failure) (HCC) [I50.9]  Yes   • Atrial fibrillation with RVR (HCC) [I48.91]  Yes   • Obesity (BMI 30-39.9) [E66.9]  Yes   • Astigmatism, bilateral [H52.203]  Yes   • Essential hypertension [I10]  Yes   • Hypothyroidism [E03.9]  Yes   • Other hyperlipidemia [E78.49]  Yes      Resolved Hospital Problems   No resolved problems to display.       82 y.o. female with history of coronary artery disease status post CABG, HFrEF (EF 30%) and A. fib  who presented to an outside hospital with dyspnea on exertion.  She was found to have loculated left pleural effusion and was transferred to [facility] for VATS.  She had video-assisted thoracoscopy with decortication on 3/18. Had 3 chest tubes, only 1 remaining.    Today: DOMINGO resolved, can restart BP meds tomorrow if stable. Hopeful for last chest tube out tomorrow.   Patient and  requesting rehab, discussed with case management  She was not on anticoagulation for afib prior to admission- discussed with pharmacy and xarelto is cheapest option.  Discussed with CT surgery, okay to start full anticoagulation tomorrow after last chest tube is removed.    DOMINGO: resolved.  Continue to hold losartan, celecoxib and Lasix.  Blood pressure is acceptable acutely.  Can restart tomorrow if kidney function is stable    Loculated left pleural effusion: Status post VATS and decortication.  Cardiothoracic surgery managing chest tubes.  Pain control with gabapentin, oxycodone and Dilaudid as needed     Atrial fibrillation: cardiology following, currently rate controlled on amiodarone and metoprolol.  Anticoagulation currently on hold, restart when okay with CT surgery     Hyponatremia: Improving, suspect will continue to improve with better p.o. intake.    Thrombocytosis: Suspect reactive, continue to monitor daily CBC     Anemia:  No active bleeding noted.  Has been stable between 8-9 for the last 4 days     Acute CHF, last known ejection fraction is 30%, cardiology following.  Currently appears euvolemic, holding Lasix for now due to DOMINGO     Hypothyroidism: Stable, continue Synthroid.     Diabetes mellitus: Continue basal and sliding scale insulin    · Lovenox 40 mg SC daily for DVT prophylaxis.  · Full code.  · Discussed with patient, nursing staff and CCP.  · Anticipate discharge to SNU facility in 2-3 days      Katherine Fitzpatrick DO  [facility]  03/22/22  11:48 EDT    Patient was placed in  face mask on first look.  I wore protective equipment throughout this patient encounter including a face mask, gloves and protective eyewear.  Hand hygiene was performed before donning protective equipment and after removal when leaving the room.

## 2022-03-22 NOTE — PLAN OF CARE
Problem: Adult Inpatient Plan of Care  Goal: Absence of Hospital-Acquired Illness or Injury  Intervention: Identify and Manage Fall Risk  Recent Flowsheet Documentation  Taken 3/22/2022 1400 by Rosina Valdez RN  Safety Promotion/Fall Prevention:   activity supervised   assistive device/personal items within reach   clutter free environment maintained   fall prevention program maintained   safety round/check completed  Taken 3/22/2022 1205 by Rosina Valdez RN  Safety Promotion/Fall Prevention:   activity supervised   clutter free environment maintained   fall prevention program maintained   assistive device/personal items within reach   safety round/check completed  Taken 3/22/2022 1105 by Rosina Valdez RN  Safety Promotion/Fall Prevention:   clutter free environment maintained   activity supervised   assistive device/personal items within reach   fall prevention program maintained  Taken 3/22/2022 1000 by Rosina Valdez RN  Safety Promotion/Fall Prevention:   activity supervised   assistive device/personal items within reach   clutter free environment maintained   fall prevention program maintained  Taken 3/22/2022 0905 by Rosina Valdez RN  Safety Promotion/Fall Prevention:   activity supervised   assistive device/personal items within reach   fall prevention program maintained   clutter free environment maintained  Taken 3/22/2022 0800 by Rosina Valdez RN  Safety Promotion/Fall Prevention:   activity supervised   assistive device/personal items within reach   clutter free environment maintained   fall prevention program maintained  Intervention: Prevent and Manage VTE (Venous Thromboembolism) Risk  Recent Flowsheet Documentation  Taken 3/22/2022 1400 by Rosina Valdez, RN  Activity Management: activity encouraged  Taken 3/22/2022 1205 by Rosina Valdez RN  Activity Management: activity adjusted per tolerance  Taken 3/22/2022 1105 by Rosina Valdez RN  Activity Management: activity adjusted  per tolerance  Taken 3/22/2022 1000 by Rosina Valdez, RN  Activity Management: up in chair  Taken 3/22/2022 0905 by Rosnia Valdez RN  Activity Management: up in chair  Taken 3/22/2022 0902 by Rosina Valdez RN  VTE Prevention/Management: (up in chair)   bilateral   sequential compression devices off  Taken 3/22/2022 0800 by Rosina Valdez RN  Activity Management:   up in chair   ambulated outside room  Goal: Optimal Comfort and Wellbeing  Intervention: Provide Person-Centered Care  Recent Flowsheet Documentation  Taken 3/22/2022 0902 by Rosina Valdez, RN  Trust Relationship/Rapport:   care explained   thoughts/feelings acknowledged     Problem: Fall Injury Risk  Goal: Absence of Fall and Fall-Related Injury  Intervention: Promote Injury-Free Environment  Recent Flowsheet Documentation  Taken 3/22/2022 1400 by Rosina Valdez, RN  Safety Promotion/Fall Prevention:   activity supervised   assistive device/personal items within reach   clutter free environment maintained   fall prevention program maintained   safety round/check completed  Taken 3/22/2022 1205 by Rosina Valdez RN  Safety Promotion/Fall Prevention:   activity supervised   clutter free environment maintained   fall prevention program maintained   assistive device/personal items within reach   safety round/check completed  Taken 3/22/2022 1105 by Rosina Valdez RN  Safety Promotion/Fall Prevention:   clutter free environment maintained   activity supervised   assistive device/personal items within reach   fall prevention program maintained  Taken 3/22/2022 1000 by Rosina Valdez, RN  Safety Promotion/Fall Prevention:   activity supervised   assistive device/personal items within reach   clutter free environment maintained   fall prevention program maintained  Taken 3/22/2022 0905 by Rosina Valdez, RN  Safety Promotion/Fall Prevention:   activity supervised   assistive device/personal items within reach   fall prevention program  maintained   clutter free environment maintained  Taken 3/22/2022 0800 by Rosina Valdez, RN  Safety Promotion/Fall Prevention:   activity supervised   assistive device/personal items within reach   clutter free environment maintained   fall prevention program maintained   Goal Outcome Evaluate:    Chest tube #3 removed today. One remains on left side. Patient ambulating and up to bathroom with assist. Pain controlled via ERAS protocol. Encouraged IS, lots of productive sputum. Chest XRAY in AM.

## 2022-03-22 NOTE — PROGRESS NOTES
"  POST-OPERATIVE NOTE     Chief Complaint: Loculated pleural effusion postoperative care  S/P: Left video-assisted thoracoscopy with complete decortication  POD # 4    Subjective:  Symptoms:  Improved.  She reports shortness of breath.  No chest pain.    Diet:  Poor intake.  No nausea or vomiting.    Activity level: Returning to normal.    Pain:  She complains of pain that is mild.  Pain is well controlled.        Objective:  General Appearance:  Comfortable, in no acute distress and well-appearing.    Vital signs: (most recent): Blood pressure 122/69, pulse 53, temperature 97.2 °F (36.2 °C), temperature source Oral, resp. rate 16, height 160 cm (62.99\"), weight 81.6 kg (180 lb), SpO2 97 %, not currently breastfeeding.  Vital signs are normal.  No fever.    Output: Minimal urine output and no stool output.    HEENT: Normal HEENT exam.    Lungs:  Normal effort and normal respiratory rate.  She is not in respiratory distress.    Heart: Normal rate.  Regular rhythm.    Abdomen: Abdomen is soft.  There is no abdominal tenderness.     Extremities: There is dependent edema.    Neurological: Patient is alert and oriented to person, place and time.    Skin:  Warm and dry.  (Postoperative incisions are well approximated and intact)          Chest tube:   Site: Left, Clean, Dry, Intact and Securement device intact  Suction: waterseal  Air Leak: negative  24 Hour Total: 130/80ml    Results Review:     I reviewed the patient's new clinical results.  I reviewed the patient's new imaging results and agree with the interpretation.  I reviewed the patient's other test results and agree with the interpretation  Discussed with patient, RN and Dr. Kuo.    Assessment/Plan      Ms. Valderrama is POD #4 s/p left VATS with decortication.  She is doing well and her pain is well controlled.  No pneumothorax on today's chest x-ray.  Posterior chest tube was removed at the bedside today.  Patient tolerated well and the tube was intact.  Leave " remaining chest tube to waterseal.  Recheck chest x-ray in the morning.  Hyponatremia improved on today's labs.  Continue treatment per hospitalist recommendations.  Continue mobilization out of bed as much possible.  Appreciate assistance from PT and nursing staff.  Anticipate final chest tube to be removed tomorrow and patient should be stable for discharge to ?rehab later this week.      Montserrat Humphries DNP, APRN  [facility]  03/22/22  11:23 EDT    Patient was seen and assessed while wearing personal protective equipment including facemask, protective eyewear and gloves.  Hand hygiene performed prior to entering the room and upon exiting with doffing of gloves.

## 2022-03-22 NOTE — PROGRESS NOTES
"CC: afib     Interval History: No new acute events       Vital Signs  Temp:  [97.1 °F (36.2 °C)-97.4 °F (36.3 °C)] 97.3 °F (36.3 °C)  Heart Rate:  [56-78] 70  Resp:  [16-20] 20  BP: (102-165)/(55-93) 165/84    Intake/Output Summary (Last 24 hours) at 3/22/2022 0951  Last data filed at 3/22/2022 0902  Gross per 24 hour   Intake 900 ml   Output 1710 ml   Net -810 ml     Flowsheet Rows    Flowsheet Row First Filed Value   Admission Height 160 cm (62.99\") Documented at 03/19/2022 0254   Admission Weight 81.6 kg (179 lb 14.4 oz) Documented at 03/18/2022 0525          PHYSICAL EXAM:  General: No acute distress  Resp:NL Rate, symmetric chest expansion,unlabored, clear, chest tube in place on the left  CV:NL rate and rhythm, NL PMI, NL S1 and S2, no Murmur, no gallop, no rub, No JVD.   ABD:Nl sounds, no masses or tenderness, nondistended, no guarding or rebound  Neuro: alert,cooperative and oriented  Extr: Bilateral lower extremity pitting edema    Results Review:    Results from last 7 days   Lab Units 03/22/22  0610   SODIUM mmol/L 130*   POTASSIUM mmol/L 4.6   CHLORIDE mmol/L 94*   CO2 mmol/L 25.3   BUN mg/dL 25*   CREATININE mg/dL 0.88   GLUCOSE mg/dL 92   CALCIUM mg/dL 8.4*         Results from last 7 days   Lab Units 03/22/22  0610   WBC 10*3/mm3 11.16*   HEMOGLOBIN g/dL 8.7*   HEMATOCRIT % 26.2*   PLATELETS 10*3/mm3 608*     Results from last 7 days   Lab Units 03/18/22  0439   INR  1.16*         Results from last 7 days   Lab Units 03/22/22  0610   MAGNESIUM mg/dL 2.0         I reviewed the patient's new clinical results.  I personally viewed and interpreted the patient's EKG/Telemetry data        Medication Review:   Meds reviewed    lactated ringers, 9 mL/hr, Last Rate: 9 mL/hr (03/18/22 1149)        Assessment/Plan    1.  Persistent atrial fibrillation with rapid ventricular response-rate controlled  2.  Left pleural effusion status post decortication and chest tube in place  3.  Ischemic cardiomyopathy with left " ventricular ejection fraction of 30%  4.   Coronary artery disease status post CABG in 2014  5.  Hyponatremia  6.  Acute kidney injury-improving creatinine today     Current cardiac medications include amiodarone 200 twice a day, Lasix 40 mg p.o. daily, losartan 50 daily, metoprolol tartrate 50 mg p.o. twice daily  Blood pressure is normal  Some of her medications will yesterday including losartan in diuretic because of increased creatinine.  NSAIDs discontinued.  Blood pressure slightly on the higher side today.  I will restart low-dose losartan and continue diuretic with close monitoring of BUN/creatinine.      Britton Buchanan MD  03/22/22  09:51 EDT

## 2022-03-22 NOTE — THERAPY EVALUATION
Patient Name: Jaquelin Valderrama  : 1940    MRN: [id number]                              Today's Date: 3/22/2022       Admit Date: 3/17/2022    Visit Dx:     ICD-10-CM ICD-9-CM   1. Loculated pleural effusion  J90 511.9     Patient Active Problem List   Diagnosis   • Other hyperlipidemia   • Essential hypertension   • Hypothyroidism   • Coronary artery disease involving coronary bypass graft of native heart without angina pectoris   • Astigmatism, bilateral   • Bilateral presbyopia   • Pseudophakia, both eyes   • Atrial fibrillation with RVR ([facility])   • Acute CHF (congestive heart failure) ([facility])   • Acute hyponatremia   • Elevated LFTs   • Elevated TSH   • Acute hyperglycemia   • Obesity (BMI 30-39.9)   • Loculated pleural effusion     Past Medical History:   Diagnosis Date   • Astigmatism, bilateral 2019   • Benign essential hypertension    • Bilateral presbyopia 2019   • CAD (coronary artery disease)    • Closed right ankle fracture    • COVID-19 vaccination refused    • Hyperlipidemia    • Hypothyroidism    • Influenza vaccine needed    • Myocardial infarction ([facility])    • Prediabetes    • Pseudophakia, both eyes 2019   • Thoracic back pain      Past Surgical History:   Procedure Laterality Date   • APPENDECTOMY     • CARDIAC CATHETERIZATION  2012    x3    • CORONARY ARTERY BYPASS GRAFT  2014   • CORONARY STENT PLACEMENT      x3   • HARDWARE REMOVAL Right 2020    Procedure: ANKLE/FOOT HARDWARE REMOVAL;  Surgeon: Lincoln Sibley MD;  Location: [facility];  Service: Orthopedics;  Laterality: Right;   • ORIF ANKLE FRACTURE Right 2019    Radha Vazquez [facility]   • THORACOSCOPY WITH DECORTICATION Left 3/18/2022    Procedure: LEFT THORACOSCOPY VIDEO ASSISTED WITH COMPLETE DECORTICATION;  Surgeon: Sabar Kuo MD;  Location: [facility];  Service: Thoracic;  Laterality: Left;      General Information     Row Name 22 1556          Physical Therapy Time and Intention     Document Type evaluation  -CB     Mode of Treatment physical therapy  -CB     Row Name 03/22/22 1556          General Information    Patient Profile Reviewed yes  -CB     Prior Level of Function independent:;gait;transfer;bed mobility  -CB     Existing Precautions/Restrictions fall  -CB     Barriers to Rehab none identified  -CB     Row Name 03/22/22 1556          Living Environment    People in Home spouse  -CB     Row Name 03/22/22 1556          Home Main Entrance    Number of Stairs, Main Entrance two;three  -CB     Stair Railings, Main Entrance none  -CB     Row Name 03/22/22 1556          Stairs Within Home, Primary    Number of Stairs, Within Home, Primary twelve  -CB     Row Name 03/22/22 1556          Cognition    Orientation Status (Cognition) oriented x 4  -CB     Row Name 03/22/22 1556          Safety Issues, Functional Mobility    Safety Issues Affecting Function (Mobility) insight into deficits/self-awareness;safety precautions follow-through/compliance;positioning of assistive device;safety precaution awareness  -CB     Impairments Affecting Function (Mobility) endurance/activity tolerance;shortness of breath;range of motion (ROM);pain  -CB           User Key  (r) = Recorded By, (t) = Taken By, (c) = Cosigned By    Initials Name Provider Type    CB Cathy Gonzales PT Physical Therapist               Mobility     Row Name 03/22/22 1557          Bed Mobility    Bed Mobility supine-sit;sit-supine  -CB     Supine-Sit [place] (Bed Mobility) standby assist;verbal cues  -CB     Sit-Supine [place] (Bed Mobility) standby assist;verbal cues  -CB     Assistive Device (Bed Mobility) head of bed elevated;bed rails  -CB     Row Name 03/22/22 1557          Sit-Stand Transfer    Sit-Stand [place] (Transfers) contact guard;verbal cues  -CB     Assistive Device (Sit-Stand Transfers) walker, front-wheeled  -CB     Comment, (Sit-Stand Transfer) cues for UE placement  -CB     Row Name 03/22/22 1551           Gait/Stairs (Locomotion)    [place] Level (Gait) contact guard  -CB     Assistive Device (Gait) walker, front-wheeled  -CB     Distance in Feet (Gait) 120ft  -CB     Deviations/Abnormal Patterns (Gait) gait speed decreased;stride length decreased  -CB     Bilateral Gait Deviations forward flexed posture  -CB     Comment, (Gait/Stairs) cues for upright posture. 1min standing rest break due to SOA and fatigue. decreased safety awareness when turning to return to sitting EOB. pt rematurely attempts to sit and increased gait speed due to SOA instead of taking standing rest break. Education regarding need for rest breaks  -CB           User Key  (r) = Recorded By, (t) = Taken By, (c) = Cosigned By    Initials Name Provider Type    CB Cathy Gonzales, PT Physical Therapist               Obj/Interventions     Row Name 03/22/22 1558          Range of Motion Comprehensive    General Range of Motion bilateral lower extremity ROM WFL  -CB     Row Name 03/22/22 1558          Strength Comprehensive (MMT)    Comment, General Manual Muscle Testing (MMT) Assessment BLE 4+/5  -CB     Row Name 03/22/22 1558          Balance    Balance Assessment sitting static balance;sitting dynamic balance;standing static balance;standing dynamic balance  -CB     Static Sitting Balance supervision;verbal cues  -CB     Dynamic Sitting Balance supervision;verbal cues  -CB     Position, Sitting Balance unsupported;sitting edge of bed  -CB     Static Standing Balance contact guard;verbal cues;non-verbal cues (demo/gesture)  -CB     Dynamic Standing Balance contact guard;verbal cues;non-verbal cues (demo/gesture)  -CB     Position/Device Used, Standing Balance supported;walker, rolling  -CB     Row Name 03/22/22 1559          Sensory Assessment (Somatosensory)    Sensory Assessment (Somatosensory) LE sensation intact  -CB           User Key  (r) = Recorded By, (t) = Taken By, (c) = Cosigned By    Initials Name Provider Type    Cathy Richards, PT  Physical Therapist               Goals/Plan     Row Name 03/22/22 1603          Bed Mobility Goal 1 (PT)    Activity/Assistive Device (Bed Mobility Goal 1, PT) bed mobility activities, all  -CB     [place]/Cues Needed (Bed Mobility Goal 1, PT) modified independence  -CB     Time Frame (Bed Mobility Goal 1, PT) long term goal (LTG);1 week  -CB     Row Name 03/22/22 1603          Transfer Goal 1 (PT)    Activity/Assistive Device (Transfer Goal 1, PT) bed-to-chair/chair-to-bed;sit-to-stand/stand-to-sit  -CB     [place]/Cues Needed (Transfer Goal 1, PT) standby assist  -CB     Time Frame (Transfer Goal 1, PT) long term goal (LTG);1 week  -CB     Row Name 03/22/22 1603          Gait Training Goal 1 (PT)    Activity/Assistive Device (Gait Training Goal 1, PT) gait (walking locomotion);walker, rolling  -CB     [place] (Gait Training Goal 1, PT) standby assist  -CB     Distance (Gait Training Goal 1, PT) 150ft  -CB     Time Frame (Gait Training Goal 1, PT) long term goal (LTG);1 week  -CB     Row Name 03/22/22 1603          Stairs Goal 1 (PT)    Activity/Assistive Device (Stairs Goal 1, PT) stairs, all skills  -CB     [place]/Cues Needed (Stairs Goal 1, PT) contact guard required  -CB     Number of Stairs (Stairs Goal 1, PT) 5  -CB     Time Frame (Stairs Goal 1, PT) 1 week;long term goal (LTG)  -CB           User Key  (r) = Recorded By, (t) = Taken By, (c) = Cosigned By    Initials Name Provider Type    CB Cathy Gonzales, PT Physical Therapist               Clinical Impression     Row Name 03/22/22 5201          Pain    Pretreatment Pain Rating 2/10  -CB     Posttreatment Pain Rating 2/10  -CB     Pain Location - Side/Orientation Left  -CB     Pre/Posttreatment Pain Comment chest tube insertion  -CB     Pain Intervention(s) Repositioned;Rest;Ambulation/increased activity  -CB     Row Name 03/22/22 9773          Plan of Care Review    Plan of Care Reviewed With patient  -CB      Progress no change  -CB     Outcome Evaluation Trenton is an 83 yo female who was tx from [facility] on 3/17 and is now s/p L VATS decortication. PMHx includes afib, CHF, CABG. She lives with her  and is ind at baseline without use of AD. She has 2-3 CORNELIA and a flight to bedroom. She presents today with decreased activity tolerance, SOA with activity, decreased strength, and safety awareness. She completed bed mobility requiring SBA and STS to rwx with CGA. She ambulated 120ft using rwx requiring CGA and 1 standing rest break for 1min due to SOA. Cues with turns as pt attempted to prematurely sit and abandons walker. Education provided to not increase ambulation speed and decreased safety awareness to sit but to instead take a standing rest break if feeling SOA and faituged during ambulation. Pt will continue to benefit from skilled PT to address functional deficits. Anticipate acute rehab at discharge.  -CB     Row Name 03/22/22 1640          Therapy Assessment/Plan (PT)    Rehab Potential (PT) good, to achieve stated therapy goals  -CB     Criteria for Skilled Interventions Met (PT) yes;skilled treatment is necessary  -CB     Row Name 03/22/22 3998          Vital Signs    Pre SpO2 (%) 99  -CB     O2 Delivery Pre Treatment room air  -CB     Intra SpO2 (%) 98  -CB     O2 Delivery Intra Treatment room air  -CB     Post SpO2 (%) 99  -CB     O2 Delivery Post Treatment room air  -CB     Row Name 03/22/22 2270          Positioning and Restraints    Pre-Treatment Position in bed  -CB     Post Treatment Position bed  -CB     In Bed notified nsg;fowlers;call light within reach;encouraged to call for assist;exit alarm on;side rails up x2  chest tube line in tact  -CB           User Key  (r) = Recorded By, (t) = Taken By, (c) = Cosigned By    Initials Name Provider Type    CB Cathy Gonzales, PT Physical Therapist               Outcome Measures     Row Name 03/22/22 6979          How much help from another person do you  currently need...    Turning from your back to your side while in flat bed without using bedrails? 3  -CB     Moving from lying on back to sitting on the side of a flat bed without bedrails? 3  -CB     Moving to and from a bed to a chair (including a wheelchair)? 3  -CB     Standing up from a chair using your arms (e.g., wheelchair, bedside chair)? 3  -CB     Climbing 3-5 steps with a railing? 2  -CB     To walk in hospital room? 3  -CB     AM-PAC 6 Clicks Score (PT) 17  -CB     Row Name 03/22/22 1604          Functional Assessment    Outcome Measure Options AM-PAC 6 Clicks Basic Mobility (PT)  -CB           User Key  (r) = Recorded By, (t) = Taken By, (c) = Cosigned By    Initials Name Provider Type    CB Cathy Gonzales, PT Physical Therapist                             Physical Therapy Education                 Title: PT OT SLP Therapies (In Progress)     Topic: Physical Therapy (In Progress)     Point: Mobility training (Done)     Learning Progress Summary           Patient Acceptance, E,TB,D, VU,NR by CB at 3/22/2022 1604                   Point: Home exercise program (Not Started)     Learner Progress:  Not documented in this visit.          Point: Body mechanics (Done)     Learning Progress Summary           Patient Acceptance, E,TB,D, VU,NR by CB at 3/22/2022 1604                   Point: Precautions (Done)     Learning Progress Summary           Patient Acceptance, E,TB,D, VU,NR by CB at 3/22/2022 1604                               User Key     Initials Effective Dates Name Provider Type Discipline     10/22/21 -  Cathy Gonzales, JONATHAN Physical Therapist PT              PT Recommendation and Plan  Planned Therapy Interventions (PT): balance training, bed mobility training, gait training, home exercise program, patient/family education, strengthening, stair training, transfer training  Plan of Care Reviewed With: patient  Progress: no change  Outcome Evaluation: Trenton is an 81 yo female who was tx from   [facility] on 3/17 and is now s/p L VATS decortication. PMHx includes afib, CHF, CABG. She lives with her  and is ind at baseline without use of AD. She has 2-3 CORNELIA and a flight to bedroom. She presents today with decreased activity tolerance, SOA with activity, decreased strength, and safety awareness. She completed bed mobility requiring SBA and STS to rwx with CGA. She ambulated 120ft using rwx requiring CGA and 1 standing rest break for 1min due to SOA. Cues with turns as pt attempted to prematurely sit and abandons walker. Education provided to not increase ambulation speed and decreased safety awareness to sit but to instead take a standing rest break if feeling SOA and faituged during ambulation. Pt will continue to benefit from skilled PT to address functional deficits. Anticipate acute rehab at discharge.     Time Calculation:    PT Charges     Row Name 03/22/22 1605             Time Calculation    Start Time 1448  -CB      Stop Time 1505  -CB      Time Calculation (min) 17 min  -CB      PT Received On 03/22/22  -CB      PT - Next Appointment 03/23/22  -CB      PT Goal Re-Cert Due Date 03/29/22  -CB              Time Calculation- PT    Total Timed Code Minutes- PT 10 minute(s)  -CB              Timed Charges    31559 - PT Therapeutic Activity Minutes 10  -CB              Total Minutes    Timed Charges Total Minutes 10  -CB       Total Minutes 10  -CB            User Key  (r) = Recorded By, (t) = Taken By, (c) = Cosigned By    Initials Name Provider Type    CB Cathy Gonzales, PT Physical Therapist              Therapy Charges for Today     Code Description Service Date Service Provider Modifiers Qty    93821015945 HC PT THERAPEUTIC ACT EA 15 MIN 3/22/2022 Cathy Gonzales, PT GP 1    05298774003 HC PT EVAL MOD COMPLEXITY 3 3/22/2022 Cathy Gonzales, PT GP 1          PT G-Codes  Outcome Measure Options: AM-PAC 6 Clicks Basic Mobility (PT)  AM-PAC 6 Clicks Score (PT): 17    Cathy Gonzales PT  3/22/2022

## 2022-03-22 NOTE — DISCHARGE PLACEMENT REQUEST
"Marcy Valderrama (82 y.o. Female)             Date of Birth   1940    Social Security Number       Address   [address] [place] [address] [place] [address]    Home Phone   [phone]    MRN   [id number]       Faith   Jainism    Marital Status                               Admission Date   3/17/22    Admission Type   Urgent    Admitting Provider   Valorie Rodriguez MD    Attending Provider   Katherine Fitzpatrick DO    Department, Room/Bed   [address], E[address]/1       Discharge Date       Discharge Disposition       Discharge Destination                               Attending Provider: Katherine Fitzpatrick DO    Allergies: Prednisone    Isolation: None   Infection: None   Code Status: CPR   Advance Care Planning Activity    Ht: 160 cm (62.99\")   Wt: 81.6 kg (180 lb)    Admission Cmt: None   Principal Problem: Loculated pleural effusion [J90] More...                 Active Insurance as of 3/17/2022     Primary Coverage     Payor Plan Insurance Group Employer/Plan Group    MEDICARE MEDICARE A & B      Payor Plan Address Payor Plan Phone Number Payor Plan Fax Number Effective Dates    [address] [phone]  1/1/2007 - None Entered    [facility] [id number]       Subscriber Name Subscriber Birth Date Member ID       MARCY VALDERRAMA 1940 [id number]           Secondary Coverage     Payor Plan Insurance Group Employer/Plan Group    [facility] [employer]      Payor Plan Address Payor Plan Phone Number Payor Plan Fax Number Effective Dates    [facility] [phone]  1/1/2019 - None Entered    [address]       [facility] [address]       Subscriber Name Subscriber Birth Date Member ID       MARCY VALDERRAMA 1940 [id number]                 Emergency Contacts      (Rel.) Home Phone Work Phone Mobile Phone    MILLIE VALDERRAMA [phone] -- [phone]        "

## 2022-03-22 NOTE — PLAN OF CARE
Goal Outcome Evaluation:  Plan of Care Reviewed With: patient        Progress: improving  Outcome Evaluation: VSS, room air, Chest tubes 2&3 remain with minimal drainage. Voiding well- x1 assist to BR.  Pain controlled with ERAS protocol. Patient remains favoring right side still neurologically intact. BLE remain edematous. Possible additional tube removal today.  Will continue to monitor.

## 2022-03-22 NOTE — CASE MANAGEMENT/SOCIAL WORK
Continued Stay Note  [facility]     Patient Name: Jaquelin Valderrama  MRN: [id number]  Today's Date: 3/22/2022    Admit Date: 3/17/2022     Discharge Plan     Row Name 03/22/22 1140       Plan    Plan Home vs rehab    Patient/Family in Agreement with Plan yes    Plan Comments Met with pt and spouse at bedside who state pt would like to go to [facility] at discharge.  Referral placed in Epic and notified Ingrid/NOBLE beauchamp RN               Discharge Codes    No documentation.               Expected Discharge Date and Time     Expected Discharge Date Expected Discharge Time    Mar 23, 2022             Prema Beauchamp, RN

## 2022-03-23 ENCOUNTER — APPOINTMENT (OUTPATIENT)
Dept: GENERAL RADIOLOGY | Facility: HOSPITAL | Age: 82
End: 2022-03-23

## 2022-03-23 LAB
ANION GAP SERPL CALCULATED.3IONS-SCNC: 11.5 MMOL/L (ref 5–15)
BACTERIA SPEC ANAEROBE CULT: NORMAL
BACTERIA SPEC ANAEROBE CULT: NORMAL
BASOPHILS # BLD AUTO: 0.02 10*3/MM3 (ref 0–0.2)
BASOPHILS NFR BLD AUTO: 0.2 % (ref 0–1.5)
BUN SERPL-MCNC: 18 MG/DL (ref 8–23)
BUN/CREAT SERPL: 24 (ref 7–25)
CALCIUM SPEC-SCNC: 8.3 MG/DL (ref 8.6–10.5)
CHLORIDE SERPL-SCNC: 96 MMOL/L (ref 98–107)
CO2 SERPL-SCNC: 25.5 MMOL/L (ref 22–29)
CREAT SERPL-MCNC: 0.75 MG/DL (ref 0.57–1)
DEPRECATED RDW RBC AUTO: 50.9 FL (ref 37–54)
EGFRCR SERPLBLD CKD-EPI 2021: 80.6 ML/MIN/1.73
EOSINOPHIL # BLD AUTO: 0.11 10*3/MM3 (ref 0–0.4)
EOSINOPHIL NFR BLD AUTO: 0.9 % (ref 0.3–6.2)
ERYTHROCYTE [DISTWIDTH] IN BLOOD BY AUTOMATED COUNT: 14.8 % (ref 12.3–15.4)
GLUCOSE BLDC GLUCOMTR-MCNC: 102 MG/DL (ref 70–130)
GLUCOSE BLDC GLUCOMTR-MCNC: 127 MG/DL (ref 70–130)
GLUCOSE BLDC GLUCOMTR-MCNC: 148 MG/DL (ref 70–130)
GLUCOSE BLDC GLUCOMTR-MCNC: 205 MG/DL (ref 70–130)
GLUCOSE BLDC GLUCOMTR-MCNC: 71 MG/DL (ref 70–130)
GLUCOSE SERPL-MCNC: 58 MG/DL (ref 65–99)
HCT VFR BLD AUTO: 28.5 % (ref 34–46.6)
HGB BLD-MCNC: 9.1 G/DL (ref 12–15.9)
IMM GRANULOCYTES # BLD AUTO: 0.47 10*3/MM3 (ref 0–0.05)
IMM GRANULOCYTES NFR BLD AUTO: 4 % (ref 0–0.5)
LYMPHOCYTES # BLD AUTO: 1.35 10*3/MM3 (ref 0.7–3.1)
LYMPHOCYTES NFR BLD AUTO: 11.6 % (ref 19.6–45.3)
MAGNESIUM SERPL-MCNC: 1.9 MG/DL (ref 1.6–2.4)
MCH RBC QN AUTO: 30.1 PG (ref 26.6–33)
MCHC RBC AUTO-ENTMCNC: 31.9 G/DL (ref 31.5–35.7)
MCV RBC AUTO: 94.4 FL (ref 79–97)
MONOCYTES # BLD AUTO: 0.74 10*3/MM3 (ref 0.1–0.9)
MONOCYTES NFR BLD AUTO: 6.3 % (ref 5–12)
NEUTROPHILS NFR BLD AUTO: 77 % (ref 42.7–76)
NEUTROPHILS NFR BLD AUTO: 8.99 10*3/MM3 (ref 1.7–7)
NRBC BLD AUTO-RTO: 0.9 /100 WBC (ref 0–0.2)
PHOSPHATE SERPL-MCNC: 2.5 MG/DL (ref 2.5–4.5)
PLATELET # BLD AUTO: 643 10*3/MM3 (ref 140–450)
PMV BLD AUTO: 8.8 FL (ref 6–12)
POTASSIUM SERPL-SCNC: 3.8 MMOL/L (ref 3.5–5.2)
RBC # BLD AUTO: 3.02 10*6/MM3 (ref 3.77–5.28)
SODIUM SERPL-SCNC: 133 MMOL/L (ref 136–145)
WBC NRBC COR # BLD: 11.68 10*3/MM3 (ref 3.4–10.8)

## 2022-03-23 PROCEDURE — 97535 SELF CARE MNGMENT TRAINING: CPT

## 2022-03-23 PROCEDURE — 99024 POSTOP FOLLOW-UP VISIT: CPT | Performed by: NURSE PRACTITIONER

## 2022-03-23 PROCEDURE — 80048 BASIC METABOLIC PNL TOTAL CA: CPT | Performed by: STUDENT IN AN ORGANIZED HEALTH CARE EDUCATION/TRAINING PROGRAM

## 2022-03-23 PROCEDURE — 94799 UNLISTED PULMONARY SVC/PX: CPT

## 2022-03-23 PROCEDURE — 97165 OT EVAL LOW COMPLEX 30 MIN: CPT

## 2022-03-23 PROCEDURE — 97530 THERAPEUTIC ACTIVITIES: CPT

## 2022-03-23 PROCEDURE — 83735 ASSAY OF MAGNESIUM: CPT | Performed by: STUDENT IN AN ORGANIZED HEALTH CARE EDUCATION/TRAINING PROGRAM

## 2022-03-23 PROCEDURE — 85025 COMPLETE CBC W/AUTO DIFF WBC: CPT | Performed by: STUDENT IN AN ORGANIZED HEALTH CARE EDUCATION/TRAINING PROGRAM

## 2022-03-23 PROCEDURE — 84100 ASSAY OF PHOSPHORUS: CPT | Performed by: STUDENT IN AN ORGANIZED HEALTH CARE EDUCATION/TRAINING PROGRAM

## 2022-03-23 PROCEDURE — 99232 SBSQ HOSP IP/OBS MODERATE 35: CPT | Performed by: INTERNAL MEDICINE

## 2022-03-23 PROCEDURE — 71045 X-RAY EXAM CHEST 1 VIEW: CPT

## 2022-03-23 PROCEDURE — 94761 N-INVAS EAR/PLS OXIMETRY MLT: CPT

## 2022-03-23 PROCEDURE — 82962 GLUCOSE BLOOD TEST: CPT

## 2022-03-23 RX ORDER — METOPROLOL SUCCINATE 25 MG/1
25 TABLET, EXTENDED RELEASE ORAL 2 TIMES DAILY
Status: DISCONTINUED | OUTPATIENT
Start: 2022-03-23 | End: 2022-03-24 | Stop reason: HOSPADM

## 2022-03-23 RX ADMIN — LOSARTAN POTASSIUM 25 MG: 25 TABLET, FILM COATED ORAL at 08:40

## 2022-03-23 RX ADMIN — GABAPENTIN 300 MG: 300 CAPSULE ORAL at 16:43

## 2022-03-23 RX ADMIN — BUDESONIDE 1 MG: 0.5 INHALANT ORAL at 06:48

## 2022-03-23 RX ADMIN — LEVOTHYROXINE SODIUM 50 MCG: 0.05 TABLET ORAL at 05:53

## 2022-03-23 RX ADMIN — METOPROLOL TARTRATE 50 MG: 50 TABLET, FILM COATED ORAL at 08:40

## 2022-03-23 RX ADMIN — APIXABAN 5 MG: 5 TABLET, FILM COATED ORAL at 22:29

## 2022-03-23 RX ADMIN — FUROSEMIDE 40 MG: 40 TABLET ORAL at 08:40

## 2022-03-23 RX ADMIN — ACETAMINOPHEN 1000 MG: 500 TABLET ORAL at 08:40

## 2022-03-23 RX ADMIN — MULTIPLE VITAMINS W/ MINERALS TAB 1 TABLET: TAB at 08:40

## 2022-03-23 RX ADMIN — ACETAMINOPHEN 1000 MG: 500 TABLET ORAL at 22:29

## 2022-03-23 RX ADMIN — BUDESONIDE 1 MG: 0.5 INHALANT ORAL at 20:02

## 2022-03-23 RX ADMIN — ARFORMOTEROL TARTRATE 15 MCG: 15 SOLUTION RESPIRATORY (INHALATION) at 20:00

## 2022-03-23 RX ADMIN — Medication 2000 UNITS: at 08:40

## 2022-03-23 RX ADMIN — ARFORMOTEROL TARTRATE 15 MCG: 15 SOLUTION RESPIRATORY (INHALATION) at 06:48

## 2022-03-23 RX ADMIN — INSULIN GLARGINE-YFGN 10 UNITS: 100 INJECTION, SOLUTION SUBCUTANEOUS at 22:29

## 2022-03-23 RX ADMIN — GABAPENTIN 300 MG: 300 CAPSULE ORAL at 22:29

## 2022-03-23 RX ADMIN — GABAPENTIN 300 MG: 300 CAPSULE ORAL at 05:53

## 2022-03-23 RX ADMIN — AMIODARONE HYDROCHLORIDE 200 MG: 200 TABLET ORAL at 08:39

## 2022-03-23 RX ADMIN — METOPROLOL SUCCINATE 25 MG: 25 TABLET, EXTENDED RELEASE ORAL at 22:29

## 2022-03-23 RX ADMIN — ACETAMINOPHEN 1000 MG: 500 TABLET ORAL at 16:43

## 2022-03-23 NOTE — PROGRESS NOTES
Name: Jaquelin Valderrama ADMIT: 3/17/2022   : 1940  PCP: Minerva Chatterjee MD    MRN: [id number] LOS: 6 days   AGE/SEX: 82 y.o. female  ROOM: [facility]     Subjective   Subjective   Sitting up in chair, ate a good breakfast. Using incentive spirometer, walking around. Overall doing well.     Review of Systems  Denies chest pain, abdominal pain, headache, lower extremity swelling     Objective   Objective   Vital Signs  Temp:  [97.2 °F (36.2 °C)-97.6 °F (36.4 °C)] 97.3 °F (36.3 °C)  Heart Rate:  [57-97] 65  Resp:  [16-20] 16  BP: (116-166)/(82-97) 116/97  SpO2:  [94 %-100 %] 95 %  on   ;   Device (Oxygen Therapy): room air  Body mass index is 33.98 kg/m².  Physical Exam  Constitutional:       General: She is not in acute distress.     Appearance: She is not ill-appearing.   HENT:      Mouth/Throat:      Mouth: Mucous membranes are moist.   Eyes:      General: No scleral icterus.  Cardiovascular:      Rate and Rhythm: Normal rate and regular rhythm.      Pulses: Normal pulses.      Heart sounds: No murmur heard.    No gallop.   Pulmonary:      Effort: Pulmonary effort is normal. No respiratory distress.      Breath sounds: No wheezing.      Comments: Chest tube in place  Abdominal:      Palpations: Abdomen is soft.      Tenderness: There is no abdominal tenderness. There is no guarding.   Skin:     General: Skin is warm and dry.   Neurological:      Mental Status: She is alert.      Cranial Nerves: No cranial nerve deficit.   Psychiatric:         Mood and Affect: Mood normal.         Results Review     I reviewed the patient's new clinical results.  Results from last 7 days   Lab Units 22  0639 22  0610 22  0404 22  1518   WBC 10*3/mm3 11.68* 11.16* 14.49* 15.02*   HEMOGLOBIN g/dL 9.1* 8.7* 8.0* 8.4*   PLATELETS 10*3/mm3 643* 608* 525* 492*     Results from last 7 days   Lab Units 22  0639 22  0610 22  0736 22  0404   SODIUM mmol/L 133* 130* 128* 131*   POTASSIUM  mmol/L 3.8 4.6 4.5 5.3*   CHLORIDE mmol/L 96* 94* 95* 99   CO2 mmol/L 25.5 25.3 24.8 23.0   BUN mg/dL 18 25* 31* 15   CREATININE mg/dL 0.75 0.88 1.35* 0.87   GLUCOSE mg/dL 58* 92 132* 248*   Estimated Creatinine Clearance: 62.5 mL/min (by C-G formula based on SCr of 0.75 mg/dL).      Results from last 7 days   Lab Units 03/23/22  0639 03/22/22  0610 03/21/22  0736 03/19/22  0404 03/18/22  0439 03/16/22  2354   CALCIUM mg/dL 8.3* 8.4* 8.4* 7.9*   < > 8.1*   MAGNESIUM mg/dL 1.9 2.0  --   --   --  2.0   PHOSPHORUS mg/dL 2.5 3.0  --   --   --  3.0    < > = values in this interval not displayed.       COVID19   Date Value Ref Range Status   03/17/2022 Not Detected Not Detected - Ref. Range Final   03/02/2022 Not Detected Not Detected - Ref. Range Final     Glucose   Date/Time Value Ref Range Status   03/23/2022 1058 127 70 - 130 mg/dL Final     Comment:     Meter: [id number] : [id number] Evaristo Yost NA   03/23/2022 0825 102 70 - 130 mg/dL Final     Comment:     Meter: [id number] : [id number] Evaristo Yost NA   03/23/2022 0618 71 70 - 130 mg/dL Final     Comment:     Meter: [id number] : [id number] Tyrone Hermanbreonnajr LAUREN   03/22/2022 2112 169 (H) 70 - 130 mg/dL Final     Comment:     Meter: [id number] : [id number] Tyrone Hermanlucia LAUREN   03/22/2022 1606 99 70 - 130 mg/dL Final     Comment:     Meter: [id number] : [id number] Tonny Lopez NA   03/22/2022 1102 218 (H) 70 - 130 mg/dL Final     Comment:     Meter: [id number] : [id number] Tonny Lopez NA   03/22/2022 0545 125 70 - 130 mg/dL Final     Comment:     Meter: [id number] : [id number] Leodan Melendezbrendon TURCIOS       XR Chest 1 View  XR CHEST 1 VW-     Clinical: Chest tube removal     COMPARISON 3/22/2022     FINDINGS: One of the 2 left-sided chest tubes removed in the interim. No  pneumothorax. Decreasing airspace disease right base, nominal residual.  No interval change in the atelectasis/infiltrate left mid and lower lung  zone. Cardiomegaly  similar to the previous examination. No pulmonary  edema.     CONCLUSION: Left chest tube removal within the interim, decreasing  airspace disease right base.     This report was finalized on 3/23/2022 6:10 AM by Dr. Shane Martin M.D.       I reviewed the patient's daily medications.  Scheduled Medications  acetaminophen, 1,000 mg, Oral, TID  amiodarone, 200 mg, Oral, Daily  apixaban, 5 mg, Oral, Q12H  arformoterol, 15 mcg, Nebulization, BID - RT  budesonide, 1 mg, Nebulization, BID - RT  cholecalciferol, 2,000 Units, Oral, Daily  furosemide, 40 mg, Oral, Daily  gabapentin, 300 mg, Oral, Q8H  insulin glargine, 20 Units, Subcutaneous, Nightly  insulin lispro, 0-9 Units, Subcutaneous, TID AC  levothyroxine, 50 mcg, Oral, Q AM  losartan, 25 mg, Oral, Q24H  metoprolol tartrate, 50 mg, Oral, BID  multivitamin with minerals, 1 tablet, Oral, Daily  polyethylene glycol, 17 g, Oral, Daily  senna-docusate sodium, 2 tablet, Oral, Nightly    Infusions  lactated ringers, 9 mL/hr, Last Rate: 9 mL/hr (03/18/22 1149)    Diet  Diet Regular       Assessment/Plan     Active Hospital Problems    Diagnosis  POA   • **Loculated pleural effusion [J90]  Yes   • Acute CHF (congestive heart failure) (HCC) [I50.9]  Yes   • Atrial fibrillation with RVR (HCC) [I48.91]  Yes   • Obesity (BMI 30-39.9) [E66.9]  Yes   • Astigmatism, bilateral [H52.203]  Yes   • Essential hypertension [I10]  Yes   • Hypothyroidism [E03.9]  Yes   • Other hyperlipidemia [E78.49]  Yes      Resolved Hospital Problems   No resolved problems to display.       82 y.o. female with history of coronary artery disease status post CABG, HFrEF (EF 30%) and A. fib who presented to an outside hospital with dyspnea on exertion.  She was found to have loculated left pleural effusion and was transferred to [facility] for VATS.  She had video-assisted thoracoscopy with decortication on 3/18. Had 3 chest tubes, only 1 remaining.    Today: Decrease basal insulin due to  hypoglycemia this morning.  Hopeful to have last chest tube removed today.  Starting eliquis tonight if chest tube removed today    Loculated left pleural effusion: Status post VATS and decortication.  Cardiothoracic surgery managing chest tubes.  Pain control with gabapentin, oxycodone and Dilaudid as needed     Atrial fibrillation: cardiology following, currently rate controlled on amiodarone and metoprolol.  Starting eliquis tonight if chest tube removed today    DOMINGO: resolved.  Continue to hold celecoxib and Lasix.  Cardiology has restarted losartan     Hyponatremia: Improving, suspect will continue to improve with better p.o. intake.    Thrombocytosis: Suspect reactive, continue to monitor daily CBC     Anemia:  No active bleeding noted.  Has been stable between 8-9 for the last 4 days     Acute CHF, last known ejection fraction is 30%, cardiology following.  Currently appears euvolemic, holding Lasix for now due to DOMINGO     Hypothyroidism: Stable, continue Synthroid.     Diabetes mellitus: Continue basal and sliding scale insulin    · Eliquis for DVT prophylaxis.  · Full code.  · Discussed with patient, nursing staff and CCP.  · Anticipate discharge to SNU facility in 1-2 days      Katherine Fitzpatrick DO  [facility]  03/23/22  11:28 EDT    Patient was placed in face mask on first look.  I wore protective equipment throughout this patient encounter including a face mask, gloves and protective eyewear.  Hand hygiene was performed before donning protective equipment and after removal when leaving the room.

## 2022-03-23 NOTE — PLAN OF CARE
Goal Outcome Evaluation:  Plan of Care Reviewed With: patient        Progress: improving  Outcome Evaluation: Patient is agreeable to PT and continues to be motivated. She increased ambulation distance to 150ft using rwx requiring CGA. Cues for upright posture and safety with turns. She completed STS to rwx with CGA. Pt will continue to benefit from skilled PT.

## 2022-03-23 NOTE — PLAN OF CARE
Pt admitted to [facility] for SOA and weakness and now is s/p left VATS with decortication. Pt lives with her  and is independent with ADL's and mobility. She does not use a walker at baseline. Chest tubes removed prior to OT arrival. She was able to stand from the recliner with supervision and mobilize in her room with the same level of assist. CGA for a commode transfer/toileting due to the commode being low and cues for safety awareness/balance provided. Pt able to retrieve ADL items from various planes with no LOB. O2 after all OOB activity remained >98% on room air. No chest pain or SOA reported. Education provided to pt on safety in her home. Pt initially wanting acute rehab but she is feeling much better since the removal of her chest tubes. She would now like to d/c home with her husbands 24/7 assist and HH vs. OP ([facility]). No further skilled inpatient OT services indicated.     Patient was not wearing a face mask during this therapy encounter. Therapist used appropriate personal protective equipment including mask and gloves. Mask used was standard procedure mask. Appropriate PPE was worn during the entire therapy session. Hand hygiene was completed before and after therapy session. Patient is not in enhanced droplet precautions.

## 2022-03-23 NOTE — THERAPY TREATMENT NOTE
Patient Name: Jaquelin Valderrama  : 1940    MRN: [id number]                              Today's Date: 3/23/2022       Admit Date: 3/17/2022    Visit Dx:     ICD-10-CM ICD-9-CM   1. Loculated pleural effusion  J90 511.9     Patient Active Problem List   Diagnosis   • Other hyperlipidemia   • Essential hypertension   • Hypothyroidism   • Coronary artery disease involving coronary bypass graft of native heart without angina pectoris   • Astigmatism, bilateral   • Bilateral presbyopia   • Pseudophakia, both eyes   • Atrial fibrillation with RVR ([facility])   • Acute CHF (congestive heart failure) ([facility])   • Acute hyponatremia   • Elevated LFTs   • Elevated TSH   • Acute hyperglycemia   • Obesity (BMI 30-39.9)   • Loculated pleural effusion     Past Medical History:   Diagnosis Date   • Astigmatism, bilateral 2019   • Benign essential hypertension    • Bilateral presbyopia 2019   • CAD (coronary artery disease)    • Closed right ankle fracture    • COVID-19 vaccination refused    • Hyperlipidemia    • Hypothyroidism    • Influenza vaccine needed    • Myocardial infarction ([facility])    • Prediabetes    • Pseudophakia, both eyes 2019   • Thoracic back pain      Past Surgical History:   Procedure Laterality Date   • APPENDECTOMY     • CARDIAC CATHETERIZATION  2012    x3    • CORONARY ARTERY BYPASS GRAFT  2014   • CORONARY STENT PLACEMENT      x3   • HARDWARE REMOVAL Right 2020    Procedure: ANKLE/FOOT HARDWARE REMOVAL;  Surgeon: Lincoln Sibley MD;  [facility];  Service: Orthopedics;  Laterality: Right;   • ORIF ANKLE FRACTURE Right 2019    Radha Vazquez [facility]   • THORACOSCOPY WITH DECORTICATION Left 3/18/2022    Procedure: LEFT THORACOSCOPY VIDEO ASSISTED WITH COMPLETE DECORTICATION;  Surgeon: Sabra Kuo MD;  Location: [facility];  Service: Thoracic;  Laterality: Left;      General Information     Row Name 22 0847          Physical Therapy Time and Intention     Document Type therapy note (daily note)  -CB     Mode of Treatment individual therapy;physical therapy  -CB     Row Name 03/23/22 0847          General Information    Patient Profile Reviewed yes  -CB     Existing Precautions/Restrictions fall  -CB     Row Name 03/23/22 0847          Safety Issues, Functional Mobility    Safety Issues Affecting Function (Mobility) insight into deficits/self-awareness;awareness of need for assistance  -CB     Impairments Affecting Function (Mobility) endurance/activity tolerance;shortness of breath;range of motion (ROM);pain  -CB           User Key  (r) = Recorded By, (t) = Taken By, (c) = Cosigned By    Initials Name Provider Type    CB Cathy Gonzales, PT Physical Therapist               Mobility     Row Name 03/23/22 0848          Bed Mobility    Comment, (Bed Mobility) UIC  -CB     Row Name 03/23/22 0848          Sit-Stand Transfer    Sit-Stand [place] (Transfers) contact guard;verbal cues  -CB     Assistive Device (Sit-Stand Transfers) walker, front-wheeled  -CB     Comment, (Sit-Stand Transfer) cues for UE placement  -CB     Row Name 03/23/22 0848          Gait/Stairs (Locomotion)    [place] Level (Gait) contact guard  -CB     Assistive Device (Gait) walker, front-wheeled  -CB     Distance in Feet (Gait) 150ft  -CB     Deviations/Abnormal Patterns (Gait) gait speed decreased;stride length decreased  -CB     Bilateral Gait Deviations forward flexed posture  -CB     Comment, (Gait/Stairs) cues for upright posture and no standing rest breaks. decreased safety awareness with use of rwx during turns.  -CB           User Key  (r) = Recorded By, (t) = Taken By, (c) = Cosigned By    Initials Name Provider Type    CB Cathy Gonzales, JONATHAN Physical Therapist               Obj/Interventions     Row Name 03/23/22 0849          Motor Skills    Therapeutic Exercise --  seated DF/PF, LAQ x10 reps  -CB     Row Name 03/23/22 0849          Balance    Balance Assessment standing static  balance;standing dynamic balance  -CB     Static Standing Balance contact guard;verbal cues;non-verbal cues (demo/gesture)  -CB     Dynamic Standing Balance contact guard;verbal cues;non-verbal cues (demo/gesture)  -CB     Position/Device Used, Standing Balance supported;walker, rolling  -CB     Balance Interventions sitting;standing;sit to stand;supported;static;dynamic;minimal challenge  -CB           User Key  (r) = Recorded By, (t) = Taken By, (c) = Cosigned By    Initials Name Provider Type    Cathy Richards, PT Physical Therapist               Goals/Plan    No documentation.                Clinical Impression     Row Name 03/23/22 0849          Pain    Pretreatment Pain Rating 2/10  -CB     Posttreatment Pain Rating 2/10  -CB     Pain Location - Side/Orientation Left  -CB     Pre/Posttreatment Pain Comment chest tube insertion  -CB     Pain Intervention(s) Repositioned;Rest;Ambulation/increased activity  -CB     Row Name 03/23/22 0849          Plan of Care Review    Plan of Care Reviewed With patient  -CB     Progress improving  -CB     Outcome Evaluation Patient is agreeable to PT and continues to be motivated. She increased ambulation distance to 150ft using rwx requiring CGA. Cues for upright posture and safety with turns. She completed STS to rwx with CGA. Pt will continue to benefit from skilled PT.  -CB     Row Name 03/23/22 0849          Vital Signs    Intra SpO2 (%) 94  -CB     O2 Delivery Intra Treatment room air  -CB     Post SpO2 (%) 98  -CB     O2 Delivery Post Treatment room air  -CB     Row Name 03/23/22 0849          Positioning and Restraints    Pre-Treatment Position sitting in chair/recliner  -CB     Post Treatment Position chair  -CB     In Chair notified nsg;call light within reach;encouraged to call for assist;exit alarm on;legs elevated  -CB           User Key  (r) = Recorded By, (t) = Taken By, (c) = Cosigned By    Initials Name Provider Type    Cathy Richards, PT Physical  Therapist               Outcome Measures     Row Name 03/23/22 0852          How much help from another person do you currently need...    Turning from your back to your side while in flat bed without using bedrails? 3  -CB     Moving from lying on back to sitting on the side of a flat bed without bedrails? 3  -CB     Moving to and from a bed to a chair (including a wheelchair)? 3  -CB     Standing up from a chair using your arms (e.g., wheelchair, bedside chair)? 3  -CB     Climbing 3-5 steps with a railing? 2  -CB     To walk in hospital room? 3  -CB     AM-PAC 6 Clicks Score (PT) 17  -CB     Row Name 03/23/22 0852          Functional Assessment    Outcome Measure Options AM-PAC 6 Clicks Basic Mobility (PT)  -CB           User Key  (r) = Recorded By, (t) = Taken By, (c) = Cosigned By    Initials Name Provider Type    CB Cathy Gonzales, PT Physical Therapist                             Physical Therapy Education                 Title: PT OT SLP Therapies (Done)     Topic: Physical Therapy (Done)     Point: Mobility training (Done)     Learning Progress Summary           Patient Acceptance, E,TB, VU,NR by  at 3/23/2022 0852    Acceptance, E,TB,D, VU,NR by  at 3/22/2022 1604                   Point: Home exercise program (Done)     Learning Progress Summary           Patient Acceptance, E,TB, VU,NR by  at 3/23/2022 0852                   Point: Body mechanics (Done)     Learning Progress Summary           Patient Acceptance, E,TB, VU,NR by CB at 3/23/2022 0852    Acceptance, E,TB,D, VU,NR by  at 3/22/2022 1604                   Point: Precautions (Done)     Learning Progress Summary           Patient Acceptance, E,TB, VU,NR by  at 3/23/2022 0852    Acceptance, E,TB,D, VU,NR by  at 3/22/2022 1604                               User Key     Initials Effective Dates Name Provider Type Discipline    CB 10/22/21 -  Cathy Gonzales, PT Physical Therapist PT              PT Recommendation and Plan  Planned Therapy  Interventions (PT): balance training, bed mobility training, gait training, home exercise program, patient/family education, strengthening, stair training, transfer training  Plan of Care Reviewed With: patient  Progress: improving  Outcome Evaluation: Patient is agreeable to PT and continues to be motivated. She increased ambulation distance to 150ft using rwx requiring CGA. Cues for upright posture and safety with turns. She completed STS to rwx with CGA. Pt will continue to benefit from skilled PT.     Time Calculation:    PT Charges     Row Name 03/23/22 0853             Time Calculation    Start Time 0823  -CB      Stop Time 0838  -CB      Time Calculation (min) 15 min  -CB      PT Received On 03/23/22  -CB      PT - Next Appointment 03/24/22  -CB              Time Calculation- PT    Total Timed Code Minutes- PT 15 minute(s)  -CB              Timed Charges    78101 - PT Therapeutic Activity Minutes 15  -CB              Total Minutes    Timed Charges Total Minutes 15  -CB       Total Minutes 15  -CB            User Key  (r) = Recorded By, (t) = Taken By, (c) = Cosigned By    Initials Name Provider Type    CB Cathy Gonzales, PT Physical Therapist              Therapy Charges for Today     Code Description Service Date Service Provider Modifiers Qty    64932432789 HC PT THERAPEUTIC ACT EA 15 MIN 3/22/2022 Cathy Gonzales, PT GP 1    26691060838 HC PT EVAL MOD COMPLEXITY 3 3/22/2022 Cathy Gonzales, PT GP 1    99811116657 HC PT THERAPEUTIC ACT EA 15 MIN 3/23/2022 Cathy Gonzales, PT GP 1          PT G-Codes  Outcome Measure Options: AM-PAC 6 Clicks Basic Mobility (PT)  AM-PAC 6 Clicks Score (PT): 17    Cathy Gonzales PT  3/23/2022

## 2022-03-23 NOTE — PROGRESS NOTES
"  POST-OPERATIVE NOTE     Chief Complaint: Loculated pleural effusion postoperative care  S/P: Left video-assisted thoracoscopy with complete decortication  POD # 5    Subjective:  Symptoms:  Improved.  She reports shortness of breath.  No chest pain.    Diet:  Poor intake.  No nausea or vomiting.    Activity level: Returning to normal.    Pain:  She complains of pain that is mild.  Pain is well controlled.    Continues to improve. Up to chair. Walked this morning. No new complaints.     Objective:  General Appearance:  Comfortable, in no acute distress and well-appearing.    Vital signs: (most recent): Blood pressure 116/97, pulse 65, temperature 97.3 °F (36.3 °C), temperature source Oral, resp. rate 16, height 160 cm (62.99\"), weight 87 kg (191 lb 12.8 oz), SpO2 95 %, not currently breastfeeding.  Vital signs are normal.  No fever.    Output: Minimal urine output and no stool output.    HEENT: Normal HEENT exam.    Lungs:  Normal effort and normal respiratory rate.  She is not in respiratory distress.    Heart: Normal rate.  Regular rhythm.    Abdomen: Abdomen is soft.  There is no abdominal tenderness.     Extremities: There is dependent edema.    Neurological: Patient is alert and oriented to person, place and time.    Skin:  Warm and dry.  (Postoperative incisions are well approximated and intact)            Chest tube:   Site: Left, Clean, Dry, Intact and Securement device intact  Suction: waterseal  Air Leak: negative  24 Hour Total: 190ml    Results Review:     I reviewed the patient's new clinical results.  I reviewed the patient's new imaging results and agree with the interpretation.  I reviewed the patient's other test results and agree with the interpretation  Discussed with patient, RN and Dr. Kuo.    Assessment/Plan      Ms. Valderrama is POD #5 s/p left VATS with decortication.  She continues to do very well postoperatively.  Today's chest x-ray is stable without evidence of pneumothorax.  Final chest " tube was removed at the bedside without difficulty.  May initiate oral anticoagulation from our standpoint since all chest tubes have been removed.  Hyponatremia continues to improve.  Continue treatment per hospitalist recommendations.  Continue mobilization out of bed as much possible.  Appreciate assistance from PT and nursing staff.    We will recheck a final chest x-ray in the morning.  Anticipate patient to discharge to [facility] rehab tomorrow.  We will arrange a follow-up appointment with Dr. Kuo in her [place] office.      Montserrat Humphries DNP, APRN  [facility]  03/23/22  12:24 EDT    Patient was seen and assessed while wearing personal protective equipment including facemask, protective eyewear and gloves.  Hand hygiene performed prior to entering the room and upon exiting with doffing of gloves.

## 2022-03-23 NOTE — THERAPY DISCHARGE NOTE
Acute Care - Occupational Therapy Discharge  [facility]    Patient Name: Jaquelin Valderrama  : 1940    MRN: [id number]                              Today's Date: 3/23/2022       Admit Date: 3/17/2022    Visit Dx:     ICD-10-CM ICD-9-CM   1. Loculated pleural effusion  J90 511.9     Patient Active Problem List   Diagnosis   • Other hyperlipidemia   • Essential hypertension   • Hypothyroidism   • Coronary artery disease involving coronary bypass graft of native heart without angina pectoris   • Astigmatism, bilateral   • Bilateral presbyopia   • Pseudophakia, both eyes   • Atrial fibrillation with RVR ([facility]ly [facility])   • Acute CHF (congestive heart failure) (Formerly [facility])   • Acute hyponatremia   • Elevated LFTs   • Elevated TSH   • Acute hyperglycemia   • Obesity (BMI 30-39.9)   • Loculated pleural effusion     Past Medical History:   Diagnosis Date   • Astigmatism, bilateral 2019   • Benign essential hypertension    • Bilateral presbyopia 2019   • CAD (coronary artery disease)    • Closed right ankle fracture    • COVID-19 vaccination refused    • Hyperlipidemia    • Hypothyroidism    • Influenza vaccine needed    • Myocardial infarction ([facility])    • Prediabetes    • Pseudophakia, both eyes 2019   • Thoracic back pain      Past Surgical History:   Procedure Laterality Date   • APPENDECTOMY     • CARDIAC CATHETERIZATION  2012    x3    • CORONARY ARTERY BYPASS GRAFT  2014   • CORONARY STENT PLACEMENT      x3   • HARDWARE REMOVAL Right 2020    Procedure: ANKLE/FOOT HARDWARE REMOVAL;  Surgeon: Lincoln Sibley MD;  Location: [facility];  Service: Orthopedics;  Laterality: Right;   • ORIF ANKLE FRACTURE Right 2019    Radha Vazquez Mem   • THORACOSCOPY WITH DECORTICATION Left 3/18/2022    Procedure: LEFT THORACOSCOPY VIDEO ASSISTED WITH COMPLETE DECORTICATION;  Surgeon: Sabra Kuo MD;  [facility];  Service: Thoracic;  Laterality: Left;      General Information     Row Name  03/23/22 1435          OT Time and Intention    Document Type evaluation  -RB     Mode of Treatment individual therapy;occupational therapy  -RB     Row Name 03/23/22 1435          General Information    Patient Profile Reviewed yes  -RB     Prior Level of Function independent:;ADL's;transfer  -RB     Existing Precautions/Restrictions fall  -RB     Barriers to Rehab medically complex  -RB     Row Name 03/23/22 1435          Living Environment    People in Home spouse  -RB     Row Name 03/23/22 1435          Home Main Entrance    Number of Stairs, Main Entrance two  -RB     Stair Railings, Main Entrance none  -RB     Row Name 03/23/22 1435          Stairs Within Home, Primary    Stairs, Within Home, Primary second floor bed bath  -RB     Row Name 03/23/22 1435          Cognition    Orientation Status (Cognition) oriented x 4  -RB     Row Name 03/23/22 1435          Safety Issues, Functional Mobility    Impairments Affecting Function (Mobility) endurance/activity tolerance;strength  -RB           User Key  (r) = Recorded By, (t) = Taken By, (c) = Cosigned By    Initials Name Provider Type    RB Gabriela Garibay OT Occupational Therapist               Mobility/ADL's     Row Name 03/23/22 1435          Bed Mobility    Comment, (Bed Mobility) UIC  -RB     Row Name 03/23/22 1435          Transfers    Transfers sit-stand transfer;stand-sit transfer;toilet transfer  -RB     Sit-Stand [place] (Transfers) supervision  -RB     [place] Level (Toilet Transfer) contact guard;verbal cues  -RB     Row Name 03/23/22 1435          Functional Mobility    Functional Mobility- Ind. Level supervision required;verbal cues required  -RB     Row Name 03/23/22 1435          Activities of Daily Living    BADL Assessment/Intervention --  Pt is completing all self feeding, grooming, UB dressing and UB bathing after set-up of items.  -RB     Row Name 03/23/22 1435          Toileting Assessment/Training    Comment, (Toileting) CGA for  seated and standing transfer + clothing management  -RB           User Key  (r) = Recorded By, (t) = Taken By, (c) = Cosigned By    Initials Name Provider Type    Gabriela Stanley OT Occupational Therapist               Obj/Interventions     Row Name 03/23/22 1437          Sensory Assessment (Somatosensory)    Sensory Assessment (Somatosensory) sensation intact  -RB     Row Name 03/23/22 1437          Vision Assessment/Intervention    Visual Impairment/Limitations WFL  -RB     Row Name 03/23/22 1437          Range of Motion Comprehensive    Comment, General Range of Motion BUE WFL now that chest tubes removed. Distal BLE limited 2/2 to edema  -RB     Row Name 03/23/22 1437          Strength Comprehensive (MMT)    Comment, General Manual Muscle Testing (MMT) Assessment BUE WFL  -RB     Row Name 03/23/22 1437          Balance    Balance Assessment sitting static balance;sitting dynamic balance;standing static balance;standing dynamic balance  -RB     Static Sitting Balance supervision  -RB     Dynamic Sitting Balance supervision  -RB     Position, Sitting Balance sitting in chair  -RB     Static Standing Balance supervision  -RB     Dynamic Standing Balance contact guard;verbal cues  -RB     Balance Interventions occupation based/functional task  -RB           User Key  (r) = Recorded By, (t) = Taken By, (c) = Cosigned By    Initials Name Provider Type    Gabriela Stanley OT Occupational Therapist               Goals/Plan     Row Name 03/23/22 1439          Transfer Goal 1 (OT)    Activity/Assistive Device (Transfer Goal 1, OT) transfers, all  -RB     [place] Level/Cues Needed (Transfer Goal 1, OT) supervision required  -RB     Time Frame (Transfer Goal 1, OT) short term goal (STG);2 weeks  -RB     Progress/Outcome (Transfer Goal 1, OT) goal met  -RB     Row Name 03/23/22 1439          Dressing Goal 1 (OT)    Activity/Device (Dressing Goal 1, OT) dressing skills, all  -RB     [place]/Cues Needed  (Dressing Goal 1, OT) set-up required  -RB     Time Frame (Dressing Goal 1, OT) short term goal (STG);2 weeks  -RB     Strategies/Barriers (Dressing Goal 1, OT) pt's  assisting with LBD, able to complete proximal dressing with toileting  -RB     Row Name 03/23/22 1439          Toileting Goal 1 (OT)    Activity/Device (Toileting Goal 1, OT) toileting skills, all  -RB     [place] Level/Cues Needed (Toileting Goal 1, OT) minimum assist (75% or more patient effort)  -RB     Time Frame (Toileting Goal 1, OT) short term goal (STG);2 weeks  -RB     Progress/Outcome (Toileting Goal 1, OT) goal met  -RB     Row Name 03/23/22 1439          Therapy Assessment/Plan (OT)    Planned Therapy Interventions (OT) transfer/mobility retraining;BADL retraining;functional balance retraining;occupation/activity based interventions;patient/caregiver education/training  -RB           User Key  (r) = Recorded By, (t) = Taken By, (c) = Cosigned By    Initials Name Provider Type    RB Gabriela Garibay, ЕЛЕНА Occupational Therapist               Clinical Impression     Row Name 03/23/22 1438          Pain Assessment    Pretreatment Pain Rating 0/10 - no pain  -RB     Posttreatment Pain Rating 0/10 - no pain  -RB     Row Name 03/23/22 1438          Plan of Care Review    Plan of Care Reviewed With patient  -RB     Progress no change  -RB     Row Name 03/23/22 1438          Therapy Assessment/Plan (OT)    Criteria for Skilled Therapeutic Interventions Met (OT) no;no problems identified which require skilled intervention  -RB     Therapy Frequency (OT) evaluation only  -RB     Row Name 03/23/22 1438          Therapy Plan Review/Discharge Plan (OT)    Anticipated Discharge Disposition (OT) home with 24/7 care;home with outpatient therapy services;home with home health  -RB     Row Name 03/23/22 1438          Vital Signs    Pre SpO2 (%) 99  -RB     O2 Delivery Pre Treatment room air  -RB     Intra SpO2 (%) 99  -RB     O2 Delivery Intra  Treatment room air  -RB     Post SpO2 (%) 98  -RB     O2 Delivery Post Treatment room air  -RB     Pre Patient Position Sitting  -RB     Intra Patient Position Standing  -RB     Post Patient Position Sitting  -RB     Row Name 03/23/22 1438          Positioning and Restraints    Pre-Treatment Position sitting in chair/recliner  -RB     Post Treatment Position chair  -RB     In Chair notified nsg;reclined;sitting;call light within reach;encouraged to call for assist;exit alarm on;legs elevated  -RB           User Key  (r) = Recorded By, (t) = Taken By, (c) = Cosigned By    Initials Name Provider Type    RB Gabriela Garibay OT Occupational Therapist               Outcome Measures     Row Name 03/23/22 1441          How much help from another is currently needed...    Putting on and taking off regular lower body clothing? 3  -RB     Bathing (including washing, rinsing, and drying) 3  -RB     Toileting (which includes using toilet bed pan or urinal) 3  -RB     Putting on and taking off regular upper body clothing 4  -RB     Taking care of personal grooming (such as brushing teeth) 4  -RB     Eating meals 4  -RB     AM-PAC 6 Clicks Score (OT) 21  -RB     Row Name 03/23/22 0852          How much help from another person do you currently need...    Turning from your back to your side while in flat bed without using bedrails? 3  -CB     Moving from lying on back to sitting on the side of a flat bed without bedrails? 3  -CB     Moving to and from a bed to a chair (including a wheelchair)? 3  -CB     Standing up from a chair using your arms (e.g., wheelchair, bedside chair)? 3  -CB     Climbing 3-5 steps with a railing? 2  -CB     To walk in hospital room? 3  -CB     AM-PAC 6 Clicks Score (PT) 17  -CB     Row Name 03/23/22 1441          Modified Brantley Scale    Modified Danilo Scale 3 - Moderate disability.  Requiring some help, but able to walk without assistance.  -RB     Row Name 03/23/22 1441 03/23/22 0860        Functional Assessment    Outcome Measure Options AM-PAC 6 Clicks Daily Activity (OT);Modified [place]  -RB AM-PAC 6 Clicks Basic Mobility (PT)  -CB          User Key  (r) = Recorded By, (t) = Taken By, (c) = Cosigned By    Initials Name Provider Type    RB Gabriela Garibay, OT Occupational Therapist    CB Cathy Gonzales, PT Physical Therapist              Occupational Therapy Education                 Title: PT OT SLP Therapies (In Progress)     Topic: Occupational Therapy (Not Started)     Point: ADL training (Not Started)     Description:   Instruct learner(s) on proper safety adaptation and remediation techniques during self care or transfers.   Instruct in proper use of assistive devices.              Learner Progress:  Not documented in this visit.          Point: Home exercise program (Not Started)     Description:   Instruct learner(s) on appropriate technique for monitoring, assisting and/or progressing therapeutic exercises/activities.              Learner Progress:  Not documented in this visit.          Point: Precautions (Not Started)     Description:   Instruct learner(s) on prescribed precautions during self-care and functional transfers.              Learner Progress:  Not documented in this visit.          Point: Body mechanics (Not Started)     Description:   Instruct learner(s) on proper positioning and spine alignment during self-care, functional mobility activities and/or exercises.              Learner Progress:  Not documented in this visit.                          OT Recommendation and Plan  Planned Therapy Interventions (OT): transfer/mobility retraining, BADL retraining, functional balance retraining, occupation/activity based interventions, patient/caregiver education/training  Therapy Frequency (OT): evaluation only  Plan of Care Review  Plan of Care Reviewed With: patient  Progress: no change  Plan of Care Reviewed With: patient     Time Calculation:    Time Calculation- OT     Row Name  03/23/22 1434             Time Calculation- OT    OT Start Time 1349  -RB      OT Stop Time 1414  -RB      OT Time Calculation (min) 25 min  -RB      Total Timed Code Minutes- OT 15 minute(s)  -RB      OT Received On 03/23/22  -RB              Timed Charges    58375 - OT Self Care/Mgmt Minutes 15  -RB              Untimed Charges    OT Eval/Re-eval Minutes 10  -RB              Total Minutes    Timed Charges Total Minutes 15  -RB      Untimed Charges Total Minutes 10  -RB       Total Minutes 25  -RB            User Key  (r) = Recorded By, (t) = Taken By, (c) = Cosigned By    Initials Name Provider Type    RB Gabriela Garibay OT Occupational Therapist              Therapy Charges for Today     Code Description Service Date Service Provider Modifiers Qty    [id number]  OT EVAL LOW COMPLEXITY 2 3/23/2022 Gabriela Garibay OT GO 1    [id number]  OT SELF CARE/MGMT/TRAIN EA 15 MIN 3/23/2022 Gabriela Garibay OT GO 1             OT Discharge Summary  Anticipated Discharge Disposition (OT): home with 24/7 care, home with outpatient therapy services, home with home health    Gabriela Garibay OT  3/23/2022

## 2022-03-23 NOTE — CASE MANAGEMENT/SOCIAL WORK
Continued Stay Note  [facility]     Patient Name: Jaquelin Valderrama  MRN: [id number]  Today's Date: 3/23/2022    Admit Date: 3/17/2022     Discharge Plan     Row Name 03/23/22 1549       Plan    Plan Home    Patient/Family in Agreement with Plan yes    Plan Comments Pt states she plans to return home. No longer wishes to go to rehab and does not require HH either.  No further needs identified. Pt has walker at home.  CCP continues to follow.  ANDRÉS Beauchamp RN               Discharge Codes    No documentation.               Expected Discharge Date and Time     Expected Discharge Date Expected Discharge Time    Mar 24, 2022             Prema Beauchamp, RN

## 2022-03-23 NOTE — PLAN OF CARE
Goal Outcome Evaluation:  Plan of Care Reviewed With: patient        Progress: no change  Outcome Evaluation: VSS. Alert and oriented x4. Pt up x1 assist to toilet. No complaints of pain. Left chest tube to waterseal. Intermittent air leak. Will continue to provide supportive care.

## 2022-03-23 NOTE — PROGRESS NOTES
"CC: Atrial fibrillation    Interval History: No new acute events overnight      Vital Signs  Temp:  [97.2 °F (36.2 °C)-97.6 °F (36.4 °C)] 97.3 °F (36.3 °C)  Heart Rate:  [57-97] 65  Resp:  [16-20] 16  BP: (116-166)/(82-97) 116/97    Intake/Output Summary (Last 24 hours) at 3/23/2022 1142  Last data filed at 3/23/2022 0900  Gross per 24 hour   Intake 840 ml   Output 3190 ml   Net -2350 ml     Flowsheet Rows    Flowsheet Row First Filed Value   Admission Height 160 cm (62.99\") Documented at 03/19/2022 0254   Admission Weight 81.6 kg (179 lb 14.4 oz) Documented at 03/18/2022 0525          PHYSICAL EXAM:  General: No acute distress  Resp:NL Rate, symmetric chest expansion,unlabored, left chest tube  CV: Irregularly irregular, NL PMI, NL S1 and S2, no Murmur, no gallop, no rub, No JVD.   ABD:Nl sounds, no masses or tenderness, nondistended, no guarding or rebound  Neuro: alert,cooperative and oriented  Extr: Bilateral lower extremity pitting edema      Results Review:    Results from last 7 days   Lab Units 03/23/22  0639   SODIUM mmol/L 133*   POTASSIUM mmol/L 3.8   CHLORIDE mmol/L 96*   CO2 mmol/L 25.5   BUN mg/dL 18   CREATININE mg/dL 0.75   GLUCOSE mg/dL 58*   CALCIUM mg/dL 8.3*         Results from last 7 days   Lab Units 03/23/22  0639   WBC 10*3/mm3 11.68*   HEMOGLOBIN g/dL 9.1*   HEMATOCRIT % 28.5*   PLATELETS 10*3/mm3 643*     Results from last 7 days   Lab Units 03/18/22  0439   INR  1.16*         Results from last 7 days   Lab Units 03/23/22  0639   MAGNESIUM mg/dL 1.9         I reviewed the patient's new clinical results.  I personally viewed and interpreted the patient's EKG/Telemetry data        Medication Review:   Meds reviewed    lactated ringers, 9 mL/hr, Last Rate: 9 mL/hr (03/18/22 1149)        Assessment/Plan    1.  Persistent atrial fibrillation with rapid ventricular response-rate controlled.  On amiodarone, metoprolol and  apixaban 5 mg p.o. bid  2.  Left pleural effusion status post decortication " and chest tube in place  3.  Ischemic cardiomyopathy with left ventricular ejection fraction of 30%  4.   Coronary artery disease status post CABG in 2014  5.  Hyponatremia  6.  Acute kidney injury-improving creatinine today    Patient diuresing well and stable BUN/creatinine.  Switch metoprolol to long-acting because of cardiomyopathy.  Continue losartan and Lasix.  Follow-up with her cardiologist after discharge.    Cardiology will sign off for now but call with any questions.      Britton Buchanan MD  03/23/22  11:42 EDT

## 2022-03-23 NOTE — DISCHARGE INSTRUCTIONS
Post-op VAT / Thoracotomy Discharge Instructions    1. Activity:  · Climb stairs as tolerated  · May drive car after 2 weeks or as directed by Physician  · Walk at least 3-4 times a day  · Limit lifting for first 6 weeks. No lifting, pushing, or pulling greater than 10 pounds till seen by MD.  · Continue to use your incentive spirometer at least 4 times per day.    2. Nutrition:  · Resume previous diet  · Eat well balanced meals  · Avoid constipation by eating fresh fruits, vegetables, whole grain foods and increasing fluid intake.    3. Incision (wound) Care:  · Remove dressing after 48 hours  · If continued drainage, change dressing every 24 hours and as needed.  · May shower after discharge.  · Wash around incision area with soap and water daily. May also cleanse with hydrogen peroxide  · No lotions or creams on incision area. It is normal to have some drainage from your chest tube site. Please keep the area clean and dry    4. When to call for Medical Advise:  · Fever greater than 101 degrees  · Unusual or severe pain  · Difficulty breathing  · Incision changes (redness, swelling, or increased drainage)  · Any questions or problems    5. Medication Instructions:  · Take Pain medications as directed to stay comfortable  · No driving or drinking alcohol when taking prescribed pain meds  · Laxative of choice if needed for constipation.    6. Medication and dosages:  · See discharge medication instruction form

## 2022-03-24 ENCOUNTER — READMISSION MANAGEMENT (OUTPATIENT)
Dept: CALL CENTER | Facility: HOSPITAL | Age: 82
End: 2022-03-24

## 2022-03-24 ENCOUNTER — NURSE TRIAGE (OUTPATIENT)
Dept: CALL CENTER | Facility: HOSPITAL | Age: 82
End: 2022-03-24

## 2022-03-24 ENCOUNTER — APPOINTMENT (OUTPATIENT)
Dept: GENERAL RADIOLOGY | Facility: HOSPITAL | Age: 82
End: 2022-03-24

## 2022-03-24 VITALS
WEIGHT: 189.9 LBS | RESPIRATION RATE: 18 BRPM | HEIGHT: 63 IN | SYSTOLIC BLOOD PRESSURE: 165 MMHG | DIASTOLIC BLOOD PRESSURE: 98 MMHG | TEMPERATURE: 97.5 F | HEART RATE: 77 BPM | BODY MASS INDEX: 33.65 KG/M2 | OXYGEN SATURATION: 99 %

## 2022-03-24 LAB
ANION GAP SERPL CALCULATED.3IONS-SCNC: 8 MMOL/L (ref 5–15)
BASOPHILS # BLD AUTO: 0.01 10*3/MM3 (ref 0–0.2)
BASOPHILS NFR BLD AUTO: 0.1 % (ref 0–1.5)
BUN SERPL-MCNC: 16 MG/DL (ref 8–23)
BUN/CREAT SERPL: 22.5 (ref 7–25)
CALCIUM SPEC-SCNC: 7.8 MG/DL (ref 8.6–10.5)
CHLORIDE SERPL-SCNC: 98 MMOL/L (ref 98–107)
CO2 SERPL-SCNC: 27 MMOL/L (ref 22–29)
CREAT SERPL-MCNC: 0.71 MG/DL (ref 0.57–1)
DEPRECATED RDW RBC AUTO: 49.4 FL (ref 37–54)
EGFRCR SERPLBLD CKD-EPI 2021: 86.1 ML/MIN/1.73
EOSINOPHIL # BLD AUTO: 0.08 10*3/MM3 (ref 0–0.4)
EOSINOPHIL NFR BLD AUTO: 0.8 % (ref 0.3–6.2)
ERYTHROCYTE [DISTWIDTH] IN BLOOD BY AUTOMATED COUNT: 15.2 % (ref 12.3–15.4)
GLUCOSE BLDC GLUCOMTR-MCNC: 126 MG/DL (ref 70–130)
GLUCOSE BLDC GLUCOMTR-MCNC: 78 MG/DL (ref 70–130)
GLUCOSE SERPL-MCNC: 68 MG/DL (ref 65–99)
HCT VFR BLD AUTO: 25.9 % (ref 34–46.6)
HGB BLD-MCNC: 8.6 G/DL (ref 12–15.9)
IMM GRANULOCYTES # BLD AUTO: 0.33 10*3/MM3 (ref 0–0.05)
IMM GRANULOCYTES NFR BLD AUTO: 3.2 % (ref 0–0.5)
LYMPHOCYTES # BLD AUTO: 0.79 10*3/MM3 (ref 0.7–3.1)
LYMPHOCYTES NFR BLD AUTO: 7.7 % (ref 19.6–45.3)
MAGNESIUM SERPL-MCNC: 2.2 MG/DL (ref 1.6–2.4)
MCH RBC QN AUTO: 30.5 PG (ref 26.6–33)
MCHC RBC AUTO-ENTMCNC: 33.2 G/DL (ref 31.5–35.7)
MCV RBC AUTO: 91.8 FL (ref 79–97)
MONOCYTES # BLD AUTO: 0.55 10*3/MM3 (ref 0.1–0.9)
MONOCYTES NFR BLD AUTO: 5.4 % (ref 5–12)
NEUTROPHILS NFR BLD AUTO: 8.51 10*3/MM3 (ref 1.7–7)
NEUTROPHILS NFR BLD AUTO: 82.8 % (ref 42.7–76)
NRBC BLD AUTO-RTO: 0.3 /100 WBC (ref 0–0.2)
PHOSPHATE SERPL-MCNC: 2.5 MG/DL (ref 2.5–4.5)
PLATELET # BLD AUTO: 551 10*3/MM3 (ref 140–450)
PMV BLD AUTO: 8.8 FL (ref 6–12)
POTASSIUM SERPL-SCNC: 3.8 MMOL/L (ref 3.5–5.2)
RBC # BLD AUTO: 2.82 10*6/MM3 (ref 3.77–5.28)
SODIUM SERPL-SCNC: 133 MMOL/L (ref 136–145)
WBC NRBC COR # BLD: 10.27 10*3/MM3 (ref 3.4–10.8)

## 2022-03-24 PROCEDURE — 85025 COMPLETE CBC W/AUTO DIFF WBC: CPT | Performed by: STUDENT IN AN ORGANIZED HEALTH CARE EDUCATION/TRAINING PROGRAM

## 2022-03-24 PROCEDURE — 99024 POSTOP FOLLOW-UP VISIT: CPT | Performed by: NURSE PRACTITIONER

## 2022-03-24 PROCEDURE — 71045 X-RAY EXAM CHEST 1 VIEW: CPT

## 2022-03-24 PROCEDURE — 83735 ASSAY OF MAGNESIUM: CPT | Performed by: STUDENT IN AN ORGANIZED HEALTH CARE EDUCATION/TRAINING PROGRAM

## 2022-03-24 PROCEDURE — 94799 UNLISTED PULMONARY SVC/PX: CPT

## 2022-03-24 PROCEDURE — 82962 GLUCOSE BLOOD TEST: CPT

## 2022-03-24 PROCEDURE — 84100 ASSAY OF PHOSPHORUS: CPT | Performed by: STUDENT IN AN ORGANIZED HEALTH CARE EDUCATION/TRAINING PROGRAM

## 2022-03-24 PROCEDURE — 80048 BASIC METABOLIC PNL TOTAL CA: CPT | Performed by: STUDENT IN AN ORGANIZED HEALTH CARE EDUCATION/TRAINING PROGRAM

## 2022-03-24 PROCEDURE — 94761 N-INVAS EAR/PLS OXIMETRY MLT: CPT

## 2022-03-24 RX ORDER — AMIODARONE HYDROCHLORIDE 200 MG/1
200 TABLET ORAL DAILY
Qty: 30 TABLET | Refills: 0 | Status: SHIPPED | OUTPATIENT
Start: 2022-03-24 | End: 2022-04-25 | Stop reason: SDUPTHER

## 2022-03-24 RX ORDER — GABAPENTIN 100 MG/1
300 CAPSULE ORAL 3 TIMES DAILY
Qty: 270 CAPSULE | Refills: 0 | Status: SHIPPED | OUTPATIENT
Start: 2022-03-24 | End: 2022-03-24 | Stop reason: HOSPADM

## 2022-03-24 RX ORDER — METOPROLOL SUCCINATE 25 MG/1
25 TABLET, EXTENDED RELEASE ORAL 2 TIMES DAILY
Qty: 60 TABLET | Refills: 0 | Status: SHIPPED | OUTPATIENT
Start: 2022-03-24 | End: 2022-04-25 | Stop reason: SDUPTHER

## 2022-03-24 RX ORDER — ACETAMINOPHEN 500 MG
1000 TABLET ORAL 3 TIMES DAILY
Qty: 60 TABLET | Refills: 0 | Status: SHIPPED | OUTPATIENT
Start: 2022-03-24 | End: 2022-03-29 | Stop reason: HOSPADM

## 2022-03-24 RX ORDER — FUROSEMIDE 40 MG/1
40 TABLET ORAL DAILY
Qty: 30 TABLET | Refills: 0 | Status: ON HOLD | OUTPATIENT
Start: 2022-03-24 | End: 2022-03-29 | Stop reason: SDUPTHER

## 2022-03-24 RX ORDER — OXYCODONE HYDROCHLORIDE 5 MG/1
5 TABLET ORAL EVERY 4 HOURS PRN
Qty: 18 TABLET | Refills: 0 | Status: SHIPPED | OUTPATIENT
Start: 2022-03-24 | End: 2022-03-29 | Stop reason: HOSPADM

## 2022-03-24 RX ADMIN — APIXABAN 5 MG: 5 TABLET, FILM COATED ORAL at 09:41

## 2022-03-24 RX ADMIN — MULTIPLE VITAMINS W/ MINERALS TAB 1 TABLET: TAB at 09:41

## 2022-03-24 RX ADMIN — Medication 2000 UNITS: at 09:41

## 2022-03-24 RX ADMIN — AMIODARONE HYDROCHLORIDE 200 MG: 200 TABLET ORAL at 09:41

## 2022-03-24 RX ADMIN — BUDESONIDE 1 MG: 0.5 INHALANT ORAL at 07:35

## 2022-03-24 RX ADMIN — LEVOTHYROXINE SODIUM 50 MCG: 0.05 TABLET ORAL at 05:19

## 2022-03-24 RX ADMIN — GABAPENTIN 300 MG: 300 CAPSULE ORAL at 05:19

## 2022-03-24 RX ADMIN — LOSARTAN POTASSIUM 25 MG: 25 TABLET, FILM COATED ORAL at 09:41

## 2022-03-24 RX ADMIN — METOPROLOL SUCCINATE 25 MG: 25 TABLET, EXTENDED RELEASE ORAL at 09:41

## 2022-03-24 RX ADMIN — FUROSEMIDE 40 MG: 40 TABLET ORAL at 09:42

## 2022-03-24 RX ADMIN — ARFORMOTEROL TARTRATE 15 MCG: 15 SOLUTION RESPIRATORY (INHALATION) at 07:35

## 2022-03-24 NOTE — PROGRESS NOTES
"  POST-OPERATIVE NOTE     Chief Complaint: Loculated pleural effusion postoperative care  S/P: Left video-assisted thoracoscopy with complete decortication  POD # 6    Subjective:  Symptoms:  Improved.  No shortness of breath or chest pain.    Diet:  Poor intake.  No nausea or vomiting.    Activity level: Returning to normal.    Pain:  She complains of pain that is mild.  Pain is well controlled.    Continued improvement.  Eager to discharge home.    Objective:  General Appearance:  Comfortable, in no acute distress and well-appearing.    Vital signs: (most recent): Blood pressure 165/98, pulse 77, temperature 97.5 °F (36.4 °C), temperature source Oral, resp. rate 18, height 160 cm (62.99\"), weight 86.1 kg (189 lb 14.4 oz), SpO2 99 %, not currently breastfeeding.  Vital signs are normal.  No fever.    Output: Producing urine and producing stool.    HEENT: Normal HEENT exam.    Lungs:  Normal effort and normal respiratory rate.  She is not in respiratory distress.  No wheezes or rhonchi.    Heart: Normal rate.  Regular rhythm.    Chest: (Surgical incisions are clean and dry with Dermabond.  Chest tube site covered with gauze and DuoDERM.  Minimal drainage.)  Abdomen: Abdomen is soft.  There is no abdominal tenderness.     Neurological: Patient is alert and oriented to person, place and time.    Skin:  Warm and dry.          Results Review:     I reviewed the patient's new clinical results.  I reviewed the patient's new imaging results and agree with the interpretation.  I reviewed the patient's other test results and agree with the interpretation  Discussed with patient, RN and Dr. Kuo.    Assessment/Plan      Ms. Valderrama is POD #6 s/p left VATS with decortication.  She is made great strides in her recovery.  She is eager to discharge home.  Okay to resume home oral anticoagulation.  Chest x-ray is stable.  Patient may discharge home today and we will arrange for follow-up to see Dr. Kuo in our [place] office " in approximately 2 weeks.  Pain medication has been sent to the pharmacy.    VALENTINE Lauren  [facility]  03/24/22  11:37 EDT    Patient was seen and assessed while wearing personal protective equipment including facemask, protective eyewear and gloves.  Hand hygiene performed prior to entering the room and upon exiting with doffing of gloves.

## 2022-03-24 NOTE — PLAN OF CARE
Goal Outcome Evaluation:  Plan of Care Reviewed With: patient        Progress: no change  Outcome Evaluation: VSS. Alert and oriented x4. No complaints of pain this shift. Up to toilet with stand-by assist. Plans to d/c home today. Will continue to provide supportive care.

## 2022-03-24 NOTE — DISCHARGE SUMMARY
Patient Name: Jaquelin Valderrama  : 1940  MRN: [id number]    Date of Admission: 3/17/2022  Date of Discharge:  3/24/2022  Primary Care Physician: Minerva Chatterjee MD      Chief Complaint:   No chief complaint on file.      Discharge Diagnoses     Active Hospital Problems    Diagnosis  POA   • **Loculated pleural effusion [J90]  Yes   • Acute CHF (congestive heart failure) (HCC) [I50.9]  Yes   • Atrial fibrillation with RVR (HCC) [I48.91]  Yes   • Obesity (BMI 30-39.9) [E66.9]  Yes   • Astigmatism, bilateral [H52.203]  Yes   • Essential hypertension [I10]  Yes   • Hypothyroidism [E03.9]  Yes   • Other hyperlipidemia [E78.49]  Yes      Resolved Hospital Problems   No resolved problems to display.        Hospital Course     Ms. Valderrama is a 82 y.o. female with history of coronary artery disease status post CABG who presented to an outside hospital with dyspnea on exertion.  She was found to have new A. fib with RVR, HFrEF (EF30%) and left pleural effusion.  She had thoracentesis but effusion recurred requiring chest tube placement.  She was transferred to [facility] for VATS.  She had video-assisted thoracoscopy with decortication on 3/18. Had 3 chest tubes post operatively, doing well after removal.      Loculated left pleural effusion: Status post VATS with decortication by cardiothoracic surgery.  Fluid cytology from 3/4 is negative for malignancy.  She has no pain since chest tube removal and is on room air.  She will have follow-up with Dr. Kuo in [place] office in approximately 2 weeks.     Atrial fibrillation with RVR: cardiology evaluated. Rate controlled on amiodarone and metoprolol.  Initiated Eliquis 5 mg twice daily.    Heart failure with reduced ejection fraction (EF 30%): Evaluated by cardiology.  Euvolemic on day of discharge.  Continue Lasix, irbesartan and metoprolol     DOMINGO: resolved. Okay to resume irbesartan and Lasix.  Avoid NSAIDs.     Hyponatremia: clair at 128, improved 133 on  day of discharge     Thrombocytosis: Suspect reactive, needs PCP follow up to recheck CBC.     Diabetes mellitus: A1c 7.1.  Diet controlled.  Follow-up with primary care for further medication titration    At the time of discharge patient was told to take all medications as prescribed, keep all follow-up appointments, and call their doctor or return to the hospital with any worsening or concerning symptoms.    Day of Discharge     Subjective:  Resting comfortably in bed.  Having any pain now that all chest tubes are removed.  She is ambulating well and has supportive family to help at home.  Feels comfortable going home as opposed to rehab.    Review of Systems   Constitutional: Negative for chills and fever.   Respiratory: Negative for cough and shortness of breath.    Cardiovascular: Negative for chest pain, palpitations and leg swelling.   Gastrointestinal: Negative for abdominal pain, blood in stool, nausea and vomiting.   Genitourinary: Negative for dysuria and hematuria.       Physical Exam:  Temp:  [97 °F (36.1 °C)-98 °F (36.7 °C)] 97.5 °F (36.4 °C)  Heart Rate:  [60-77] 77  Resp:  [16-18] 18  BP: (127-165)/(69-98) 165/98  Body mass index is 33.65 kg/m².  Physical Exam  Constitutional:       General: She is not in acute distress.     Appearance: She is not ill-appearing.   HENT:      Mouth/Throat:      Mouth: Mucous membranes are moist.   Eyes:      General: No scleral icterus.  Cardiovascular:      Rate and Rhythm: Normal rate and regular rhythm.      Pulses: Normal pulses.      Heart sounds: No murmur heard.    No gallop.   Pulmonary:      Effort: Pulmonary effort is normal. No respiratory distress.      Breath sounds: No wheezing.      Comments: Chest tube in place  Abdominal:      Palpations: Abdomen is soft.      Tenderness: There is no abdominal tenderness. There is no guarding.   Skin:     General: Skin is warm and dry.   Neurological:      Mental Status: She is alert.      Cranial Nerves: No cranial  nerve deficit.   Psychiatric:         Mood and Affect: Mood normal.         Consultants     Consult Orders (all) (From admission, onward)     Start     Ordered    03/18/22 1516  Inpatient Cardiology Consult  Once        Specialty:  Cardiology  Provider:  Christina Conklin MD    03/18/22 1516              Procedures     Imaging Results (All)     Procedure Component Value Units Date/Time    XR Chest 1 View [[id number]] Collected: 03/24/22 0556     Updated: 03/24/22 0600    Narrative:      SINGLE VIEW OF THE CHEST     HISTORY: Chest tube removal     COMPARISON: 03/23/2022     FINDINGS:  Cardiomegaly is present. There is no vascular congestion. Left basilar  consolidation and left pleural effusion are noted. No pneumothorax is  seen. There is some mild right basilar atelectasis.       Impression:      Persistent left basilar consolidation and left pleural effusion.     This report was finalized on 3/24/2022 5:57 AM by Dr. Trudy Gilliam M.D.       XR Chest 1 View [[id number]] Collected: 03/23/22 0608     Updated: 03/23/22 0613    Narrative:      XR CHEST 1 VW-     Clinical: Chest tube removal     COMPARISON 3/22/2022     FINDINGS: One of the 2 left-sided chest tubes removed in the interim. No  pneumothorax. Decreasing airspace disease right base, nominal residual.  No interval change in the atelectasis/infiltrate left mid and lower lung  zone. Cardiomegaly similar to the previous examination. No pulmonary  edema.     CONCLUSION: Left chest tube removal within the interim, decreasing  airspace disease right base.     This report was finalized on 3/23/2022 6:10 AM by Dr. Shane Martin M.D.       XR Chest 1 View [[id number]] Collected: 03/22/22 0628     Updated: 03/22/22 0634    Narrative:      X-RAY CHEST 1 VIEW     COMPARISON 3/21/2022     Clinical: Chest tube management     FINDINGS: One of the 3 left-sided chest tubes removed within the  interim, no pneumothorax. The cardiomediastinal silhouette is stable.  There is  been no interval change in the airspace disease demonstrated at  the right lung base and also within the left mid and lower lung zone. No  new area of consolidation has developed. No pulmonary edema seen.     CONCLUSION: Interval removal of one of the 3 left-sided chest tubes, no  other interval change since 3/21/2022.     This report was finalized on 3/22/2022 6:31 AM by Dr. Shane Martin M.D.       XR Chest 1 View [[id number]] Collected: 03/21/22 0919     Updated: 03/21/22 0924    Narrative:      X-ray chest 1 view with comparison 3/20/2022     Clinical: Chest tube management     FINDINGS: Left-sided chest tubes remain stable in position. No  pneumothorax. Stable cardiac enlargement. Mild persistent airspace  disease at the right lung base. Left lung base opacity not significantly  changed, suggesting atelectasis/infiltrate and/or small pleural  effusion. No pulmonary edema identified.     CONCLUSION: Stable chest     This report was finalized on 3/21/2022 9:21 AM by Dr. Shane Martin M.D.       XR Chest 1 View [[id number]] Collected: 03/20/22 0641     Updated: 03/20/22 0647    Narrative:      ONE VIEW PORTABLE CHEST AT 5:31 AM     HISTORY: Recent left chest surgery. Chest tubes.     FINDINGS: There are 3 chest tubes in place on the left including one  extending to the apex and these are unchanged from yesterday's exam.  There is no evidence of pneumothorax. There remains some loculated  pleural fluid and atelectasis at the left base. There has been nearly  complete resolution of some localized atelectasis at the right base  since yesterday. The heart remains mildly enlarged with sternal wires  from previous cardiac surgery.     This report was finalized on 3/20/2022 6:44 AM by Dr. Art Poon M.D.       XR Chest 1 View [[id number]] Collected: 03/19/22 0547     Updated: 03/19/22 0551    Narrative:      SINGLE VIEW OF THE CHEST     HISTORY: Postop lung surgery     COMPARISON: 03/18/2022      FINDINGS:  Cardiomegaly is present. There is vascular congestion. Left-sided chest  tubes are stable in position. There is a stable left-sided  hydropneumothorax. Bibasilar consolidation and right pleural effusion  are again seen.       Impression:      No significant interval change.     This report was finalized on 3/19/2022 5:48 AM by Dr. Trudy Gilliam M.D.       XR Chest 1 View [[id number]] Collected: 03/18/22 1531     Updated: 03/18/22 1538    Narrative:      ONE VIEW PORTABLE CHEST AT 3:15 PM     HISTORY: Recent left chest surgery. Chest tubes.     FINDINGS: The patient has had recent left chest surgery with 3 chest  tubes in place including one extending to the apex and there appears to  be a very tiny left apical lateral pneumothorax measuring 4 mm. There is  improvement in the previous large loculated left pleural effusion noted  on the preoperative exam. There remains some residual pleural fluid and  atelectasis in the lower left chest. There is also new parenchymal and  pleural density in the lower right chest laterally suspicious for a  combination of pleural fluid and atelectasis and possible developing  pneumonia and continued follow-up evaluation is recommended. The heart  remains mildly enlarged.     This report was finalized on 3/18/2022 3:35 PM by Dr. Art Poon M.D.       XR Chest 1 View [[id number]] Collected: 03/18/22 0745     Updated: 03/18/22 0745    Narrative:        Patient: MARCY WILKS  Time Out: 07:44  Exam(s): FILM CXR 1 VIEW     EXAM:    XR Chest, 1 View    CLINICAL HISTORY:     Reason for exam: chest tube management.    TECHNIQUE:    Frontal view of the chest.    COMPARISON:    No relevant prior studies available.    FINDINGS:    Lungs:  Unremarkable.  No consolidation.    Pleural space: Left-sided chest tube in place with large left pleural   effusion.  No pneumothorax.    Heart: Stable cardiac contour.  Median sternotomy wires in place.    Mediastinum:  Unremarkable.     Bones joints:  Unremarkable.    IMPRESSION:       Left sided chest tube in place terminates in the left mid hemithorax.  No   pneumothorax.    Impression:          Electronically signed by Hakeem Keyes M.D. on 03-18-22 at 0744          Pertinent Labs     Results from last 7 days   Lab Units 03/24/22 0458 03/23/22  0639 03/22/22  0610 03/19/22  0404   WBC 10*3/mm3 10.27 11.68* 11.16* 14.49*   HEMOGLOBIN g/dL 8.6* 9.1* 8.7* 8.0*   PLATELETS 10*3/mm3 551* 643* 608* 525*     Results from last 7 days   Lab Units 03/24/22  0458 03/23/22  0639 03/22/22  0610 03/21/22  0736   SODIUM mmol/L 133* 133* 130* 128*   POTASSIUM mmol/L 3.8 3.8 4.6 4.5   CHLORIDE mmol/L 98 96* 94* 95*   CO2 mmol/L 27.0 25.5 25.3 24.8   BUN mg/dL 16 18 25* 31*   CREATININE mg/dL 0.71 0.75 0.88 1.35*   GLUCOSE mg/dL 68 58* 92 132*   Estimated Creatinine Clearance: 65.7 mL/min (by C-G formula based on SCr of 0.71 mg/dL).    Results from last 7 days   Lab Units 03/24/22 0458 03/23/22  0639 03/22/22  0610 03/21/22  0736   CALCIUM mg/dL 7.8* 8.3* 8.4* 8.4*   MAGNESIUM mg/dL 2.2 1.9 2.0  --    PHOSPHORUS mg/dL 2.5 2.5 3.0  --        Results from last 7 days   Lab Units 03/21/22  1138   PROBNP pg/mL 6,905.0*           Invalid input(s): LDLCALC  Results from last 7 days   Lab Units 03/18/22  1232   BODYFLDCX  No growth at 3 days       Test Results Pending at Discharge     Pending Labs     Order Current Status    Fungus Culture - Body Fluid, Pleural Cavity Preliminary result    Fungus Culture - Tissue, Pleura Preliminary result          Discharge Details        Discharge Medications      New Medications      Instructions Start Date   Acetaminophen Extra Strength 500 MG tablet  Generic drug: acetaminophen   1,000 mg, Oral, 3 Times Daily      Eliquis 5 MG tablet tablet  Generic drug: apixaban   5 mg, Oral, Every 12 Hours Scheduled      furosemide 40 MG tablet  Commonly known as: LASIX   40 mg, Oral, Daily      metoprolol succinate XL 25 MG 24 hr  tablet  Commonly known as: TOPROL-XL   25 mg, Oral, 2 Times Daily      oxyCODONE 5 MG immediate release tablet  Commonly known as: ROXICODONE   Take 1 tablet by mouth Every 4 (Four) Hours As Needed for Moderate Pain.         Changes to Medications      Instructions Start Date   amiodarone 200 MG tablet  Commonly known as: PACERONE  What changed: when to take this   200 mg, Oral, Daily         Continue These Medications      Instructions Start Date   arformoterol 15 MCG/2ML nebulizer solution  Commonly known as: BROVANA   15 mcg, Nebulization, 2 Times Daily - RT      budesonide 0.5 MG/2ML nebulizer solution  Commonly known as: PULMICORT   1 mg, Nebulization, 2 Times Daily - RT      irbesartan 300 MG tablet  Commonly known as: AVAPRO   150 mg, Oral, Daily      levothyroxine 50 MCG tablet  Commonly known as: SYNTHROID, LEVOTHROID   50 mcg, Oral, Every Early Morning      MULTIVITAMIN ADULT EXTRA C PO   1 tablet, Oral, Daily      Vitamin D3 50 MCG (2000 UT) tablet   1 tablet, Oral, Daily         Stop These Medications    aspirin 81 MG chewable tablet     dilTIAZem 90 MG tablet  Commonly known as: CARDIZEM     insulin glargine 100 UNIT/ML injection  Commonly known as: LANTUS, SEMGLEE     metoprolol tartrate 25 MG tablet  Commonly known as: LOPRESSOR            Allergies   Allergen Reactions   • Prednisone Other (See Comments)     Increased BP         Discharge Disposition:  Home or Self Care    Discharge Diet:  Diet Order   Procedures   • Diet Regular       Discharge Activity:   As tolerated    CODE STATUS:    Code Status and Medical Interventions:   Ordered at: 03/17/22 1934     Code Status (Patient has no pulse and is not breathing):    CPR (Attempt to Resuscitate)     Medical Interventions (Patient has pulse or is breathing):    Full       No future appointments.  Additional Instructions for the Follow-ups that You Need to Schedule     XR Chest 2 View    Apr 06, 2022 (Approximate)      Exam reason: post-op             Follow-up Information     Sabra Kuo MD. Schedule an appointment as soon as possible for a visit in 2 week(s).    Specialty: Thoracic Surgery  Contact information:  [address] [facility] [address] [facility] [address]  [place] [address] [phone]             Minerva Chatterjee MD. Schedule an appointment as soon as possible for a visit in 1 week(s).    Specialty: Internal Medicine  Contact information:  [facility] [place] [id number] [phone]                         Additional Instructions for the Follow-ups that You Need to Schedule     XR Chest 2 View    Apr 06, 2022 (Approximate)      Exam reason: post-op           Time Spent on Discharge:  Greater than 30 minutes      Katherine Fitzpatrick DO  [facility]  03/24/22  07:35 EDT

## 2022-03-24 NOTE — PLAN OF CARE
Problem: Adult Inpatient Plan of Care  Goal: Plan of Care Review  Outcome: Ongoing, Progressing  Flowsheets (Taken 3/24/2022 1018)  Progress: improving  Plan of Care Reviewed With: patient  Outcome Evaluation: VSS, pt going home with .  DC teach completed.  Meds to beds for dischrge medications  Goal: Patient-Specific Goal (Individualized)  Outcome: Ongoing, Progressing  Goal: Absence of Hospital-Acquired Illness or Injury  Outcome: Ongoing, Progressing  Intervention: Identify and Manage Fall Risk  Recent Flowsheet Documentation  Taken 3/24/2022 0845 by Lyn Samuels RN  Safety Promotion/Fall Prevention: activity supervised  Intervention: Prevent Skin Injury  Recent Flowsheet Documentation  Taken 3/24/2022 0845 by Lyn Samuels RN  Body Position: position changed independently  Skin Protection: adhesive use limited  Intervention: Prevent and Manage VTE (Venous Thromboembolism) Risk  Recent Flowsheet Documentation  Taken 3/24/2022 0845 by Lyn Samuels RN  Activity Management: activity adjusted per tolerance  VTE Prevention/Management:   bilateral   dorsiflexion/plantar flexion performed  Goal: Optimal Comfort and Wellbeing  Outcome: Ongoing, Progressing  Intervention: Provide Person-Centered Care  Recent Flowsheet Documentation  Taken 3/24/2022 0845 by Lyn Samuels RN  Trust Relationship/Rapport: care explained  Goal: Readiness for Transition of Care  Outcome: Ongoing, Progressing     Problem: Fall Injury Risk  Goal: Absence of Fall and Fall-Related Injury  Outcome: Ongoing, Progressing  Intervention: Identify and Manage Contributors  Recent Flowsheet Documentation  Taken 3/24/2022 0845 by Lyn Samuels RN  Medication Review/Management: medications reviewed  Intervention: Promote Injury-Free Environment  Recent Flowsheet Documentation  Taken 3/24/2022 0845 by Lyn Samuels RN  Safety Promotion/Fall Prevention: activity supervised   Goal Outcome Evaluation:  Plan of Care  Reviewed With: patient        Progress: improving  Outcome Evaluation: VSS, pt going home with .  DC teach completed.  Meds to beds for dischrge medications

## 2022-03-24 NOTE — OUTREACH NOTE
Prep Survey    Flowsheet Row Responses   Orthodox facility patient discharged from? [place]   Is LACE score < 7 ? No   Emergency Room discharge w/ pulse ox? No   Eligibility [facility]   Date of Admission 03/17/22   Date of Discharge 03/24/22   Discharge Disposition Home or Self Care   Discharge diagnosis THORACOSCOPY WITH DECORTICATION     Does the patient have one of the following disease processes/diagnoses(primary or secondary)? General Surgery   Does the patient have Home health ordered? No   Is there a DME ordered? No   Prep survey completed? Yes          DALI MONTAÑO - Registered Nurse

## 2022-03-25 ENCOUNTER — APPOINTMENT (OUTPATIENT)
Dept: GENERAL RADIOLOGY | Facility: HOSPITAL | Age: 82
End: 2022-03-25

## 2022-03-25 ENCOUNTER — TRANSITIONAL CARE MANAGEMENT TELEPHONE ENCOUNTER (OUTPATIENT)
Dept: CALL CENTER | Facility: HOSPITAL | Age: 82
End: 2022-03-25

## 2022-03-25 ENCOUNTER — NURSE TRIAGE (OUTPATIENT)
Dept: CALL CENTER | Facility: HOSPITAL | Age: 82
End: 2022-03-25

## 2022-03-25 ENCOUNTER — HOSPITAL ENCOUNTER (INPATIENT)
Facility: HOSPITAL | Age: 82
LOS: 4 days | Discharge: NURSING, REHAB OR LONG-TERM CARE | End: 2022-03-29
Attending: EMERGENCY MEDICINE | Admitting: INTERNAL MEDICINE

## 2022-03-25 ENCOUNTER — ANESTHESIA EVENT (OUTPATIENT)
Dept: PERIOP | Facility: HOSPITAL | Age: 82
End: 2022-03-25

## 2022-03-25 ENCOUNTER — APPOINTMENT (OUTPATIENT)
Dept: CT IMAGING | Facility: HOSPITAL | Age: 82
End: 2022-03-25

## 2022-03-25 ENCOUNTER — ANESTHESIA (OUTPATIENT)
Dept: PERIOP | Facility: HOSPITAL | Age: 82
End: 2022-03-25

## 2022-03-25 DIAGNOSIS — N13.9 ACUTE URINARY OBSTRUCTION: Primary | ICD-10-CM

## 2022-03-25 DIAGNOSIS — R31.0 GROSS HEMATURIA: ICD-10-CM

## 2022-03-25 DIAGNOSIS — T50.905A DRUG REACTION, INITIAL ENCOUNTER: ICD-10-CM

## 2022-03-25 PROBLEM — I48.91 ATRIAL FIBRILLATION: Chronic | Status: ACTIVE | Noted: 2022-03-03

## 2022-03-25 PROBLEM — I50.20 SYSTOLIC HF (HEART FAILURE): Chronic | Status: ACTIVE | Noted: 2022-03-25

## 2022-03-25 PROBLEM — I95.9 HYPOTENSION: Status: ACTIVE | Noted: 2022-03-25

## 2022-03-25 PROBLEM — D72.829 LEUKOCYTOSIS: Status: ACTIVE | Noted: 2022-03-25

## 2022-03-25 PROBLEM — E66.9 OBESITY (BMI 30-39.9): Chronic | Status: ACTIVE | Noted: 2022-03-03

## 2022-03-25 PROBLEM — N39.0 ACUTE UTI (URINARY TRACT INFECTION): Status: ACTIVE | Noted: 2022-03-25

## 2022-03-25 LAB
ABO GROUP BLD: NORMAL
ALBUMIN SERPL-MCNC: 2.6 G/DL (ref 3.5–5.2)
ALBUMIN/GLOB SERPL: 1.1 G/DL
ALP SERPL-CCNC: 99 U/L (ref 39–117)
ALT SERPL W P-5'-P-CCNC: 33 U/L (ref 1–33)
ANION GAP SERPL CALCULATED.3IONS-SCNC: 11 MMOL/L (ref 5–15)
ANISOCYTOSIS BLD QL: ABNORMAL
APTT PPP: 29.1 SECONDS (ref 61–76.5)
AST SERPL-CCNC: 16 U/L (ref 1–32)
BACTERIA UR QL AUTO: ABNORMAL /HPF
BILIRUB SERPL-MCNC: 0.2 MG/DL (ref 0–1.2)
BILIRUB UR QL STRIP: ABNORMAL
BLD GP AB SCN SERPL QL: NEGATIVE
BUN SERPL-MCNC: 18 MG/DL (ref 8–23)
BUN/CREAT SERPL: 24 (ref 7–25)
CALCIUM SPEC-SCNC: 8.4 MG/DL (ref 8.6–10.5)
CHLORIDE SERPL-SCNC: 96 MMOL/L (ref 98–107)
CLARITY UR: ABNORMAL
CO2 SERPL-SCNC: 27 MMOL/L (ref 22–29)
COLOR UR: ABNORMAL
CREAT SERPL-MCNC: 0.75 MG/DL (ref 0.57–1)
D-LACTATE SERPL-SCNC: 3.1 MMOL/L (ref 0.5–2)
DEPRECATED RDW RBC AUTO: 58.6 FL (ref 37–54)
EGFRCR SERPLBLD CKD-EPI 2021: 80.6 ML/MIN/1.73
ERYTHROCYTE [DISTWIDTH] IN BLOOD BY AUTOMATED COUNT: 17.6 % (ref 12.3–15.4)
GLOBULIN UR ELPH-MCNC: 2.3 GM/DL
GLUCOSE BLDC GLUCOMTR-MCNC: 183 MG/DL (ref 70–105)
GLUCOSE SERPL-MCNC: 212 MG/DL (ref 65–99)
GLUCOSE UR STRIP-MCNC: ABNORMAL MG/DL
HCT VFR BLD AUTO: 21 % (ref 34–46.6)
HCT VFR BLD AUTO: 29.5 % (ref 34–46.6)
HGB BLD-MCNC: 6.8 G/DL (ref 12–15.9)
HGB BLD-MCNC: 9.9 G/DL (ref 12–15.9)
HGB UR QL STRIP.AUTO: ABNORMAL
HYALINE CASTS UR QL AUTO: ABNORMAL /LPF
INR PPP: 1.12 (ref 0.93–1.1)
KETONES UR QL STRIP: NEGATIVE
LEUKOCYTE ESTERASE UR QL STRIP.AUTO: ABNORMAL
LYMPHOCYTES # BLD MANUAL: 0.15 10*3/MM3 (ref 0.7–3.1)
LYMPHOCYTES NFR BLD MANUAL: 2 % (ref 5–12)
MCH RBC QN AUTO: 31.1 PG (ref 26.6–33)
MCHC RBC AUTO-ENTMCNC: 32.5 G/DL (ref 31.5–35.7)
MCV RBC AUTO: 95.6 FL (ref 79–97)
METAMYELOCYTES NFR BLD MANUAL: 3 % (ref 0–0)
MONOCYTES # BLD: 0.3 10*3/MM3 (ref 0.1–0.9)
MYELOCYTES NFR BLD MANUAL: 1 % (ref 0–0)
NEUTROPHILS # BLD AUTO: 14.04 10*3/MM3 (ref 1.7–7)
NEUTROPHILS NFR BLD MANUAL: 79 % (ref 42.7–76)
NEUTS BAND NFR BLD MANUAL: 14 % (ref 0–5)
NITRITE UR QL STRIP: POSITIVE
NT-PROBNP SERPL-MCNC: 4019 PG/ML (ref 0–1800)
PH UR STRIP.AUTO: <=5 [PH] (ref 5–8)
PLATELET # BLD AUTO: 603 10*3/MM3 (ref 140–450)
PMV BLD AUTO: 6.8 FL (ref 6–12)
POIKILOCYTOSIS BLD QL SMEAR: ABNORMAL
POTASSIUM SERPL-SCNC: 4.1 MMOL/L (ref 3.5–5.2)
PROT SERPL-MCNC: 4.9 G/DL (ref 6–8.5)
PROT UR QL STRIP: ABNORMAL
PROTHROMBIN TIME: 12.3 SECONDS (ref 9.6–11.7)
QT INTERVAL: 359 MS
RBC # BLD AUTO: 2.19 10*6/MM3 (ref 3.77–5.28)
RBC # UR STRIP: ABNORMAL /HPF
REF LAB TEST METHOD: ABNORMAL
RH BLD: POSITIVE
SARS-COV-2 RNA PNL SPEC NAA+PROBE: NOT DETECTED
SCAN SLIDE: NORMAL
SMALL PLATELETS BLD QL SMEAR: ABNORMAL
SODIUM SERPL-SCNC: 134 MMOL/L (ref 136–145)
SP GR UR STRIP: 1.03 (ref 1–1.03)
SQUAMOUS #/AREA URNS HPF: ABNORMAL /HPF
STOMATOCYTES BLD QL SMEAR: ABNORMAL
T&S EXPIRATION DATE: NORMAL
UROBILINOGEN UR QL STRIP: ABNORMAL
VARIANT LYMPHS NFR BLD MANUAL: 1 % (ref 19.6–45.3)
WBC # UR STRIP: ABNORMAL /HPF
WBC MORPH BLD: NORMAL
WBC NRBC COR # BLD: 15.1 10*3/MM3 (ref 3.4–10.8)

## 2022-03-25 PROCEDURE — 36430 TRANSFUSION BLD/BLD COMPNT: CPT

## 2022-03-25 PROCEDURE — 87086 URINE CULTURE/COLONY COUNT: CPT | Performed by: EMERGENCY MEDICINE

## 2022-03-25 PROCEDURE — 25010000002 FENTANYL CITRATE (PF) 100 MCG/2ML SOLUTION: Performed by: NURSE ANESTHETIST, CERTIFIED REGISTERED

## 2022-03-25 PROCEDURE — 71045 X-RAY EXAM CHEST 1 VIEW: CPT

## 2022-03-25 PROCEDURE — 86900 BLOOD TYPING SEROLOGIC ABO: CPT | Performed by: EMERGENCY MEDICINE

## 2022-03-25 PROCEDURE — 93005 ELECTROCARDIOGRAM TRACING: CPT | Performed by: NURSE PRACTITIONER

## 2022-03-25 PROCEDURE — P9016 RBC LEUKOCYTES REDUCED: HCPCS

## 2022-03-25 PROCEDURE — 99223 1ST HOSP IP/OBS HIGH 75: CPT | Performed by: HOSPITALIST

## 2022-03-25 PROCEDURE — 85014 HEMATOCRIT: CPT | Performed by: NURSE PRACTITIONER

## 2022-03-25 PROCEDURE — 25010000002 CEFTRIAXONE PER 250 MG: Performed by: EMERGENCY MEDICINE

## 2022-03-25 PROCEDURE — 83605 ASSAY OF LACTIC ACID: CPT | Performed by: NURSE PRACTITIONER

## 2022-03-25 PROCEDURE — 0TCB8ZZ EXTIRPATION OF MATTER FROM BLADDER, VIA NATURAL OR ARTIFICIAL OPENING ENDOSCOPIC: ICD-10-PCS | Performed by: UROLOGY

## 2022-03-25 PROCEDURE — 0 IOHEXOL 300 MG/ML SOLUTION: Performed by: UROLOGY

## 2022-03-25 PROCEDURE — 85610 PROTHROMBIN TIME: CPT | Performed by: EMERGENCY MEDICINE

## 2022-03-25 PROCEDURE — 86850 RBC ANTIBODY SCREEN: CPT | Performed by: EMERGENCY MEDICINE

## 2022-03-25 PROCEDURE — 93010 ELECTROCARDIOGRAM REPORT: CPT | Performed by: INTERNAL MEDICINE

## 2022-03-25 PROCEDURE — 0W3R8ZZ CONTROL BLEEDING IN GENITOURINARY TRACT, VIA NATURAL OR ARTIFICIAL OPENING ENDOSCOPIC: ICD-10-PCS | Performed by: UROLOGY

## 2022-03-25 PROCEDURE — 86923 COMPATIBILITY TEST ELECTRIC: CPT

## 2022-03-25 PROCEDURE — 74176 CT ABD & PELVIS W/O CONTRAST: CPT

## 2022-03-25 PROCEDURE — 85018 HEMOGLOBIN: CPT | Performed by: NURSE PRACTITIONER

## 2022-03-25 PROCEDURE — 80053 COMPREHEN METABOLIC PANEL: CPT | Performed by: EMERGENCY MEDICINE

## 2022-03-25 PROCEDURE — 86901 BLOOD TYPING SEROLOGIC RH(D): CPT | Performed by: EMERGENCY MEDICINE

## 2022-03-25 PROCEDURE — 86900 BLOOD TYPING SEROLOGIC ABO: CPT

## 2022-03-25 PROCEDURE — 51702 INSERT TEMP BLADDER CATH: CPT

## 2022-03-25 PROCEDURE — 83880 ASSAY OF NATRIURETIC PEPTIDE: CPT | Performed by: NURSE PRACTITIONER

## 2022-03-25 PROCEDURE — 81001 URINALYSIS AUTO W/SCOPE: CPT | Performed by: EMERGENCY MEDICINE

## 2022-03-25 PROCEDURE — 36415 COLL VENOUS BLD VENIPUNCTURE: CPT | Performed by: EMERGENCY MEDICINE

## 2022-03-25 PROCEDURE — 25010000002 DIPHENHYDRAMINE PER 50 MG: Performed by: EMERGENCY MEDICINE

## 2022-03-25 PROCEDURE — 99223 1ST HOSP IP/OBS HIGH 75: CPT | Performed by: INTERNAL MEDICINE

## 2022-03-25 PROCEDURE — BT101ZZ FLUOROSCOPY OF BLADDER USING LOW OSMOLAR CONTRAST: ICD-10-PCS | Performed by: UROLOGY

## 2022-03-25 PROCEDURE — 85007 BL SMEAR W/DIFF WBC COUNT: CPT | Performed by: EMERGENCY MEDICINE

## 2022-03-25 PROCEDURE — 85025 COMPLETE CBC W/AUTO DIFF WBC: CPT | Performed by: EMERGENCY MEDICINE

## 2022-03-25 PROCEDURE — 85730 THROMBOPLASTIN TIME PARTIAL: CPT | Performed by: EMERGENCY MEDICINE

## 2022-03-25 PROCEDURE — 87635 SARS-COV-2 COVID-19 AMP PRB: CPT | Performed by: HOSPITALIST

## 2022-03-25 PROCEDURE — 99285 EMERGENCY DEPT VISIT HI MDM: CPT

## 2022-03-25 PROCEDURE — 25010000002 FUROSEMIDE PER 20 MG: Performed by: EMERGENCY MEDICINE

## 2022-03-25 PROCEDURE — 82962 GLUCOSE BLOOD TEST: CPT

## 2022-03-25 RX ORDER — POTASSIUM CHLORIDE 7.45 MG/ML
10 INJECTION INTRAVENOUS
Status: DISCONTINUED | OUTPATIENT
Start: 2022-03-25 | End: 2022-03-29 | Stop reason: HOSPADM

## 2022-03-25 RX ORDER — BUDESONIDE 0.5 MG/2ML
1 INHALANT ORAL
Status: DISCONTINUED | OUTPATIENT
Start: 2022-03-25 | End: 2022-03-29 | Stop reason: HOSPADM

## 2022-03-25 RX ORDER — CHOLECALCIFEROL (VITAMIN D3) 125 MCG
5 CAPSULE ORAL NIGHTLY PRN
Status: DISCONTINUED | OUTPATIENT
Start: 2022-03-25 | End: 2022-03-29 | Stop reason: HOSPADM

## 2022-03-25 RX ORDER — LIDOCAINE HYDROCHLORIDE 10 MG/ML
INJECTION, SOLUTION EPIDURAL; INFILTRATION; INTRACAUDAL; PERINEURAL AS NEEDED
Status: DISCONTINUED | OUTPATIENT
Start: 2022-03-25 | End: 2022-03-25 | Stop reason: SURG

## 2022-03-25 RX ORDER — FENTANYL CITRATE 50 UG/ML
INJECTION, SOLUTION INTRAMUSCULAR; INTRAVENOUS AS NEEDED
Status: DISCONTINUED | OUTPATIENT
Start: 2022-03-25 | End: 2022-03-25 | Stop reason: SURG

## 2022-03-25 RX ORDER — MULTIPLE VITAMINS W/ MINERALS TAB 9MG-400MCG
1 TAB ORAL DAILY
Status: DISCONTINUED | OUTPATIENT
Start: 2022-03-26 | End: 2022-03-29 | Stop reason: HOSPADM

## 2022-03-25 RX ORDER — OXYCODONE HYDROCHLORIDE 5 MG/1
5 TABLET ORAL ONCE
Status: COMPLETED | OUTPATIENT
Start: 2022-03-25 | End: 2022-03-25

## 2022-03-25 RX ORDER — AMIODARONE HYDROCHLORIDE 200 MG/1
200 TABLET ORAL DAILY
Status: DISCONTINUED | OUTPATIENT
Start: 2022-03-26 | End: 2022-03-29 | Stop reason: HOSPADM

## 2022-03-25 RX ORDER — SODIUM CHLORIDE 0.9 % (FLUSH) 0.9 %
10 SYRINGE (ML) INJECTION AS NEEDED
Status: DISCONTINUED | OUTPATIENT
Start: 2022-03-25 | End: 2022-03-29 | Stop reason: HOSPADM

## 2022-03-25 RX ORDER — POTASSIUM CHLORIDE 1.5 G/1.77G
40 POWDER, FOR SOLUTION ORAL AS NEEDED
Status: DISCONTINUED | OUTPATIENT
Start: 2022-03-25 | End: 2022-03-29 | Stop reason: HOSPADM

## 2022-03-25 RX ORDER — MEPERIDINE HYDROCHLORIDE 25 MG/ML
12.5 INJECTION INTRAMUSCULAR; INTRAVENOUS; SUBCUTANEOUS
Status: DISCONTINUED | OUTPATIENT
Start: 2022-03-25 | End: 2022-03-25 | Stop reason: HOSPADM

## 2022-03-25 RX ORDER — ACETAMINOPHEN 160 MG/5ML
650 SOLUTION ORAL EVERY 4 HOURS PRN
Status: DISCONTINUED | OUTPATIENT
Start: 2022-03-25 | End: 2022-03-29 | Stop reason: HOSPADM

## 2022-03-25 RX ORDER — MAGNESIUM HYDROXIDE 1200 MG/15ML
LIQUID ORAL AS NEEDED
Status: DISCONTINUED | OUTPATIENT
Start: 2022-03-25 | End: 2022-03-25 | Stop reason: HOSPADM

## 2022-03-25 RX ORDER — ONDANSETRON 2 MG/ML
4 INJECTION INTRAMUSCULAR; INTRAVENOUS EVERY 6 HOURS PRN
Status: DISCONTINUED | OUTPATIENT
Start: 2022-03-25 | End: 2022-03-29 | Stop reason: HOSPADM

## 2022-03-25 RX ORDER — FUROSEMIDE 10 MG/ML
80 INJECTION INTRAMUSCULAR; INTRAVENOUS ONCE
Status: COMPLETED | OUTPATIENT
Start: 2022-03-25 | End: 2022-03-25

## 2022-03-25 RX ORDER — HYDROMORPHONE HCL 110MG/55ML
0.25 PATIENT CONTROLLED ANALGESIA SYRINGE INTRAVENOUS
Status: DISCONTINUED | OUTPATIENT
Start: 2022-03-25 | End: 2022-03-25 | Stop reason: HOSPADM

## 2022-03-25 RX ORDER — ACETAMINOPHEN 650 MG/1
650 SUPPOSITORY RECTAL EVERY 4 HOURS PRN
Status: DISCONTINUED | OUTPATIENT
Start: 2022-03-25 | End: 2022-03-29 | Stop reason: HOSPADM

## 2022-03-25 RX ORDER — ETOMIDATE 2 MG/ML
INJECTION INTRAVENOUS AS NEEDED
Status: DISCONTINUED | OUTPATIENT
Start: 2022-03-25 | End: 2022-03-25 | Stop reason: SURG

## 2022-03-25 RX ORDER — DIPHENHYDRAMINE HYDROCHLORIDE 50 MG/ML
12.5 INJECTION INTRAMUSCULAR; INTRAVENOUS
Status: DISCONTINUED | OUTPATIENT
Start: 2022-03-25 | End: 2022-03-25 | Stop reason: HOSPADM

## 2022-03-25 RX ORDER — OXYCODONE HYDROCHLORIDE 5 MG/1
5 TABLET ORAL EVERY 4 HOURS PRN
Status: DISPENSED | OUTPATIENT
Start: 2022-03-25 | End: 2022-03-27

## 2022-03-25 RX ORDER — METOPROLOL SUCCINATE 25 MG/1
25 TABLET, EXTENDED RELEASE ORAL 2 TIMES DAILY
Status: DISCONTINUED | OUTPATIENT
Start: 2022-03-25 | End: 2022-03-29 | Stop reason: HOSPADM

## 2022-03-25 RX ORDER — FUROSEMIDE 10 MG/ML
40 INJECTION INTRAMUSCULAR; INTRAVENOUS EVERY 12 HOURS
Status: DISPENSED | OUTPATIENT
Start: 2022-03-25 | End: 2022-03-26

## 2022-03-25 RX ORDER — PHENYLEPHRINE HCL IN 0.9% NACL 1 MG/10 ML
SYRINGE (ML) INTRAVENOUS AS NEEDED
Status: DISCONTINUED | OUTPATIENT
Start: 2022-03-25 | End: 2022-03-25 | Stop reason: SURG

## 2022-03-25 RX ORDER — SODIUM CHLORIDE, SODIUM LACTATE, POTASSIUM CHLORIDE, CALCIUM CHLORIDE 600; 310; 30; 20 MG/100ML; MG/100ML; MG/100ML; MG/100ML
INJECTION, SOLUTION INTRAVENOUS CONTINUOUS PRN
Status: DISCONTINUED | OUTPATIENT
Start: 2022-03-25 | End: 2022-03-25 | Stop reason: SURG

## 2022-03-25 RX ORDER — MORPHINE SULFATE 4 MG/ML
1 INJECTION, SOLUTION INTRAMUSCULAR; INTRAVENOUS
Status: DISCONTINUED | OUTPATIENT
Start: 2022-03-25 | End: 2022-03-25 | Stop reason: HOSPADM

## 2022-03-25 RX ORDER — ALUMINA, MAGNESIA, AND SIMETHICONE 2400; 2400; 240 MG/30ML; MG/30ML; MG/30ML
15 SUSPENSION ORAL EVERY 6 HOURS PRN
Status: DISCONTINUED | OUTPATIENT
Start: 2022-03-25 | End: 2022-03-29 | Stop reason: HOSPADM

## 2022-03-25 RX ORDER — SODIUM CHLORIDE 0.9 % (FLUSH) 0.9 %
10 SYRINGE (ML) INJECTION EVERY 12 HOURS SCHEDULED
Status: DISCONTINUED | OUTPATIENT
Start: 2022-03-25 | End: 2022-03-29 | Stop reason: HOSPADM

## 2022-03-25 RX ORDER — ACETAMINOPHEN 500 MG
1000 TABLET ORAL ONCE
Status: COMPLETED | OUTPATIENT
Start: 2022-03-25 | End: 2022-03-25

## 2022-03-25 RX ORDER — ACETAMINOPHEN 325 MG/1
650 TABLET ORAL EVERY 4 HOURS PRN
Status: DISCONTINUED | OUTPATIENT
Start: 2022-03-25 | End: 2022-03-29 | Stop reason: HOSPADM

## 2022-03-25 RX ORDER — ONDANSETRON 2 MG/ML
4 INJECTION INTRAMUSCULAR; INTRAVENOUS ONCE AS NEEDED
Status: DISCONTINUED | OUTPATIENT
Start: 2022-03-25 | End: 2022-03-25 | Stop reason: HOSPADM

## 2022-03-25 RX ORDER — ONDANSETRON 4 MG/1
4 TABLET, FILM COATED ORAL EVERY 6 HOURS PRN
Status: DISCONTINUED | OUTPATIENT
Start: 2022-03-25 | End: 2022-03-29 | Stop reason: HOSPADM

## 2022-03-25 RX ORDER — ARFORMOTEROL TARTRATE 15 UG/2ML
15 SOLUTION RESPIRATORY (INHALATION)
Status: DISCONTINUED | OUTPATIENT
Start: 2022-03-25 | End: 2022-03-29 | Stop reason: HOSPADM

## 2022-03-25 RX ORDER — POTASSIUM CHLORIDE 20 MEQ/1
40 TABLET, EXTENDED RELEASE ORAL AS NEEDED
Status: DISCONTINUED | OUTPATIENT
Start: 2022-03-25 | End: 2022-03-29 | Stop reason: HOSPADM

## 2022-03-25 RX ORDER — CEFTRIAXONE 1 G/50ML
1 INJECTION, SOLUTION INTRAVENOUS DAILY
Status: DISCONTINUED | OUTPATIENT
Start: 2022-03-26 | End: 2022-03-29 | Stop reason: HOSPADM

## 2022-03-25 RX ORDER — DIPHENHYDRAMINE HYDROCHLORIDE 50 MG/ML
25 INJECTION INTRAMUSCULAR; INTRAVENOUS ONCE
Status: COMPLETED | OUTPATIENT
Start: 2022-03-25 | End: 2022-03-25

## 2022-03-25 RX ORDER — LEVOTHYROXINE SODIUM 0.05 MG/1
50 TABLET ORAL
Status: DISCONTINUED | OUTPATIENT
Start: 2022-03-25 | End: 2022-03-29 | Stop reason: HOSPADM

## 2022-03-25 RX ADMIN — Medication 400 MCG: at 14:58

## 2022-03-25 RX ADMIN — ETOMIDATE 30 MG: 40 INJECTION, SOLUTION INTRAVENOUS at 14:36

## 2022-03-25 RX ADMIN — SODIUM CHLORIDE, SODIUM LACTATE, POTASSIUM CHLORIDE, AND CALCIUM CHLORIDE: .6; .31; .03; .02 INJECTION, SOLUTION INTRAVENOUS at 14:36

## 2022-03-25 RX ADMIN — OXYCODONE HYDROCHLORIDE 5 MG: 5 TABLET ORAL at 06:54

## 2022-03-25 RX ADMIN — Medication 200 MCG: at 14:45

## 2022-03-25 RX ADMIN — FENTANYL CITRATE 25 MCG: 50 INJECTION, SOLUTION INTRAMUSCULAR; INTRAVENOUS at 14:36

## 2022-03-25 RX ADMIN — Medication 200 MCG: at 14:47

## 2022-03-25 RX ADMIN — LIDOCAINE HYDROCHLORIDE 40 MG: 10 INJECTION, SOLUTION EPIDURAL; INFILTRATION; INTRACAUDAL; PERINEURAL at 14:36

## 2022-03-25 RX ADMIN — FENTANYL CITRATE 25 MCG: 50 INJECTION, SOLUTION INTRAMUSCULAR; INTRAVENOUS at 15:33

## 2022-03-25 RX ADMIN — Medication 300 MCG: at 14:55

## 2022-03-25 RX ADMIN — Medication 200 MCG: at 15:21

## 2022-03-25 RX ADMIN — Medication 10 ML: at 11:58

## 2022-03-25 RX ADMIN — ACETAMINOPHEN 1000 MG: 500 TABLET, FILM COATED ORAL at 08:12

## 2022-03-25 RX ADMIN — CEFTRIAXONE 1 G: 10 INJECTION, POWDER, FOR SOLUTION INTRAVENOUS at 11:58

## 2022-03-25 RX ADMIN — DIPHENHYDRAMINE HYDROCHLORIDE 25 MG: 50 INJECTION, SOLUTION INTRAMUSCULAR; INTRAVENOUS at 08:12

## 2022-03-25 RX ADMIN — Medication 200 MCG: at 15:26

## 2022-03-25 RX ADMIN — FUROSEMIDE 80 MG: 10 INJECTION, SOLUTION INTRAMUSCULAR; INTRAVENOUS at 08:14

## 2022-03-25 RX ADMIN — Medication 100 MCG: at 14:36

## 2022-03-25 NOTE — ANESTHESIA POSTPROCEDURE EVALUATION
Patient: Jaquelin Valderrama    Procedure Summary     Date: 03/25/22 Room / Location: [facility] OR 01 / [facility] MAIN OR    Anesthesia Start: 1421 Anesthesia Stop: 1538    Procedure: CYSTOSCOPY WITH CLOT EVACUATION AND FULGURATION  (N/A Bladder) Diagnosis:       Acute urinary obstruction      (Acute urinary obstruction [N13.9])    Surgeons: Sukhdev Poole MD Provider: Eli Andino MD    Anesthesia Type: [place], general ASA Status: 4          Anesthesia Type: [place], general    Vitals  Vitals Value Taken Time   /61 03/25/22 1643   Temp 99.9 °F (37.7 °C) 03/25/22 1535   Pulse 90 03/25/22 1643   Resp 10 03/25/22 1550   SpO2 98 % 03/25/22 1643   Vitals shown include unvalidated device data.        Post Anesthesia Care and Evaluation    Patient location during evaluation: PACU  Patient participation: complete - patient participated  Level of consciousness: awake and alert  Pain management: adequate  Airway patency: patent  Anesthetic complications: No anesthetic complications  PONV Status: none  Cardiovascular status: acceptable  Respiratory status: acceptable  Hydration status: acceptable

## 2022-03-25 NOTE — TELEPHONE ENCOUNTER
"    Reason for Disposition  • MODERATE rectal bleeding (small blood clots, passing blood without stool, or toilet water turns red)    Additional Information  • Negative: Shock suspected (e.g., cold/pale/clammy skin, too weak to stand, low BP, rapid pulse)  • Negative: Difficult to awaken or acting confused (e.g., disoriented, slurred speech)  • Negative: Passed out (i.e., lost consciousness, collapsed and was not responding)  • Negative: [1] Vomiting AND [2] contains red blood or black (\"coffee ground\") material  (Exception: few red streaks in vomit that only happened once)  • Negative: Sounds like a life-threatening emergency to the triager  • Negative: Diarrhea is main symptom  • Negative: Stool color other than brown or tan is main concern  (no bleeding and no melena)  • Negative: SEVERE rectal bleeding (large blood clots; on and off, or constant bleeding)  • Negative: SEVERE dizziness (e.g., unable to stand, requires support to walk, feels like passing out now)  • Negative: [1] MODERATE rectal bleeding (small blood clots, passing blood without stool, or toilet water turns red) AND [2] more than once a day  • Negative: Pale skin (pallor) of new-onset or worsening  • Negative: Black or tarry bowel movements (Exception: chronic-unchanged black-grey bowel movements AND is taking iron pills or Pepto-bismol)  • Negative: [1] Constant abdominal pain AND [2] present > 2 hours  • Negative: Rectal foreign body (i.e., now or within past week;  inserted or swallowed)  • Negative: High-risk adult (e.g., prior surgery on aorta, abdominal aortic aneurysm)  • Negative: Taking Coumadin (warfarin) or other strong blood thinner, or known bleeding disorder (e.g., thrombocytopenia)  • Negative: Known cirrhosis of the liver (or history of liver failure or ascites)  • Negative: [1] Colonoscopy AND [2] in past 72 hours  • Negative: Patient sounds very sick or weak to the triager    Answer Assessment - Initial Assessment Questions  1. " "APPEARANCE of BLOOD: \"What color is it?\" \"Is it passed separately, on the surface of the stool, or mixed in with the stool?\"       Bright red  2. AMOUNT: \"How much blood was passed?\"       Looks like a lot   3. FREQUENCY: \"How many times has blood been passed with the stools?\"       once  4. ONSET: \"When was the blood first seen in the stools?\" (Days or weeks)      Just once  5. DIARRHEA: \"Is there also some diarrhea?\" If Yes, ask: \"How many diarrhea stools were passed in past 24 hours?\"       no  6. CONSTIPATION: \"Do you have constipation?\" If Yes, ask: \"How bad is it?\"     no  7. RECURRENT SYMPTOMS: \"Have you had blood in your stools before?\" If Yes, ask: \"When was the last time?\" and \"What happened that time?\"    yes  8. BLOOD THINNERS: \"Do you take any blood thinners?\" (e.g., Coumadin/warfarin, Pradaxa/dabigatran, aspirin)      yes  9. OTHER SYMPTOMS: \"Do you have any other symptoms?\"  (e.g., abdominal pain, vomiting, dizziness, fever)      no  10. PREGNANCY: \"Is there any chance you are pregnant?\" \"When was your last menstrual period?\"        na    Protocols used: RECTAL BLEEDING-ADULT-AH      "

## 2022-03-25 NOTE — ANESTHESIA PROCEDURE NOTES
Airway  Urgency: elective    Date/Time: 3/25/2022 2:37 PM  Airway not difficult    General Information and Staff    Patient location during procedure: OR    Indications and Patient Condition  Indications for airway management: airway protection    Preoxygenated: yes  MILS maintained throughout  Mask difficulty assessment: 0 - not attempted    Final Airway Details  Final airway type: supraglottic airway      Successful airway: LMA  Size 3    Number of attempts at approach: 1  Assessment: lips, teeth, and gum same as pre-op

## 2022-03-25 NOTE — CASE MANAGEMENT/SOCIAL WORK
Case Management Discharge Note      Final Note: Pt discharged home with spouse, no known needs. ANDRÉS Beauchamp RN         Selected Continued Care - Discharged on 3/24/2022 Admission date: 3/17/2022 - Discharge disposition: Home or Self Care    Destination    No services have been selected for the patient.              Durable Medical Equipment    No services have been selected for the patient.              Dialysis/Infusion    No services have been selected for the patient.              Home Medical Care    No services have been selected for the patient.              Therapy    No services have been selected for the patient.              Community Resources    No services have been selected for the patient.              Community & DME    No services have been selected for the patient.                Selected Continued Care - Prior Encounters Includes selections from prior encounters from 12/17/2021 to 3/24/2022    Discharged on 3/17/2022 Admission date: 3/2/2022 - Discharge disposition: Short Term Hospital (DC - External)    Destination     Service Provider Selected Services Address Phone Fax Patient Preferred    [facility]  Inpatient Rehabilitation [address] [facility] [address] [phone] --                    Transportation Services  Private: Car    Final Discharge Disposition Code: 01 - home or self-care

## 2022-03-25 NOTE — ANESTHESIA PREPROCEDURE EVALUATION
Anesthesia Evaluation     Patient summary reviewed and Nursing notes reviewed   NPO Solid Status: > 8 hours  NPO Liquid Status: > 8 hours           Airway   Mallampati: II  TM distance: >3 FB  Neck ROM: full  No difficulty expected  Dental - normal exam     Pulmonary    (+) pleural effusion, a smoker Former, shortness of breath, decreased breath sounds,   Cardiovascular - normal exam  Exercise tolerance: good (4-7 METS)    ECG reviewed  Patient on routine beta blocker and Beta blocker given within 24 hours of surgery    (+) hypertension, past MI , CAD, CABG >6 Months, dysrhythmias Atrial Fib, CHF , hyperlipidemia,     ROS comment: ECHO 3/2022    LVE with severe global left ventricular hypokinesis, estimated LV ejection fraction of 30%.  Severe right ventricular enlargement and moderate right atrial enlargement seen  Severe left atrial enlargement seen.  Aortic valve, mitral valve, tricuspid valve appears structurally normal, moderate mitral and mild tricuspid regurgitation seen.  Calculated RV systolic pressure of 40 to 45 mmHg.  No pericardial effusion seen.  Large pleural effusion seen.  Proximal aorta appears normal in size.      Neuro/Psych- negative ROS  GI/Hepatic/Renal/Endo    (+) obesity,   thyroid problem hypothyroidism    Musculoskeletal (-) negative ROS    Abdominal    Substance History - negative use     OB/GYN          Other - negative ROS       ROS/Med Hx Other: 3/2022  LVE with severe global left ventricular hypokinesis, estimated LV ejection fraction of 30%.  Severe right ventricular enlargement and moderate right atrial enlargement seen  Severe left atrial enlargement seen.  Aortic valve, mitral valve, tricuspid valve appears structurally normal, moderate mitral and mild tricuspid regurgitation seen.  Calculated RV systolic pressure of 40 to 45 mmHg.  No pericardial effusion seen.  Large pleural effusion seen.  Proximal aorta appears normal in size.                      Anesthesia Plan    ASA 4      Sarah and general     intravenous induction     Anesthetic plan, all risks, benefits, and alternatives have been provided, discussed and informed consent has been obtained with: patient.    Plan discussed with CRNA and CAA.        CODE STATUS:

## 2022-03-25 NOTE — OUTREACH NOTE
Call Center TCM Note    Flowsheet Row Responses   [facility] patient discharged from? [facility]   Does the patient have one of the following disease processes/diagnoses(primary or secondary)? General Surgery   TCM attempt successful? No   Revoked Reason Other   Change in Health Status Readmitted          Mariah Childers LPN    3/25/2022, 08:07 EDT

## 2022-03-25 NOTE — TELEPHONE ENCOUNTER
"D/C'd today from [facility] for plueral effusion; c/o abdominal cramping and rectal bleeding since being home and after taking stool softener.  HX of hemorrhoids and started on Eliquis today.  Instruction provided per protocol.    Reason for Disposition  • Taking Coumadin (warfarin) or other strong blood thinner, or known bleeding disorder (e.g., thrombocytopenia)    Additional Information  • Negative: Shock suspected (e.g., cold/pale/clammy skin, too weak to stand, low BP, rapid pulse)  • Negative: Difficult to awaken or acting confused (e.g., disoriented, slurred speech)  • Negative: Passed out (i.e., lost consciousness, collapsed and was not responding)  • Negative: [1] Vomiting AND [2] contains red blood or black (\"coffee ground\") material  (Exception: few red streaks in vomit that only happened once)  • Negative: Sounds like a life-threatening emergency to the triager  • Negative: Diarrhea is main symptom  • Negative: Stool color other than brown or tan is main concern  (no bleeding and no melena)  • Negative: SEVERE rectal bleeding (large blood clots; on and off, or constant bleeding)  • Negative: SEVERE dizziness (e.g., unable to stand, requires support to walk, feels like passing out now)  • Negative: [1] MODERATE rectal bleeding (small blood clots, passing blood without stool, or toilet water turns red) AND [2] more than once a day  • Negative: Pale skin (pallor) of new-onset or worsening  • Negative: Black or tarry bowel movements (Exception: chronic-unchanged black-grey bowel movements AND is taking iron pills or Pepto-bismol)  • Negative: [1] Constant abdominal pain AND [2] present > 2 hours  • Negative: Rectal foreign body (i.e., now or within past week;  inserted or swallowed)  • Negative: High-risk adult (e.g., prior surgery on aorta, abdominal aortic aneurysm)  • Negative: Known cirrhosis of the liver (or history of liver failure or ascites)    Answer Assessment - Initial Assessment Questions  1. " "APPEARANCE of BLOOD: \"What color is it?\" \"Is it passed separately, on the surface of the stool, or mixed in with the stool?\"       Bright red mixed with stool  2. AMOUNT: \"How much blood was passed?\"       Unsure  3. FREQUENCY: \"How many times has blood been passed with the stools?\"       3x since being discharged from inpatient today  4. ONSET: \"When was the blood first seen in the stools?\" (Days or weeks)       Today  5. DIARRHEA: \"Is there also some diarrhea?\" If Yes, ask: \"How many diarrhea stools were passed in past 24 hours?\"       Diarrhea after taking stool softner  6. CONSTIPATION: \"Do you have constipation?\" If Yes, ask: \"How bad is it?\"      At times  7. RECURRENT SYMPTOMS: \"Have you had blood in your stools before?\" If Yes, ask: \"When was the last time?\" and \"What happened that time?\"       Denies  8. BLOOD THINNERS: \"Do you take any blood thinners?\" (e.g., Coumadin/warfarin, Pradaxa/dabigatran, aspirin)      Eliquis started today  9. OTHER SYMPTOMS: \"Do you have any other symptoms?\"  (e.g., abdominal pain, vomiting, dizziness, fever)      Abdominal cramping  10. PREGNANCY: \"Is there any chance you are pregnant?\" \"When was your last menstrual period?\"        NA    Protocols used: RECTAL BLEEDING-ADULT-AH      "

## 2022-03-26 ENCOUNTER — APPOINTMENT (OUTPATIENT)
Dept: CT IMAGING | Facility: HOSPITAL | Age: 82
End: 2022-03-26

## 2022-03-26 ENCOUNTER — READMISSION MANAGEMENT (OUTPATIENT)
Dept: CALL CENTER | Facility: HOSPITAL | Age: 82
End: 2022-03-26

## 2022-03-26 LAB
ANION GAP SERPL CALCULATED.3IONS-SCNC: 10 MMOL/L (ref 5–15)
ANISOCYTOSIS BLD QL: ABNORMAL
BACTERIA SPEC AEROBE CULT: NO GROWTH
BH BB BLOOD EXPIRATION DATE: NORMAL
BH BB BLOOD TYPE BARCODE: 6200
BH BB DISPENSE STATUS: NORMAL
BH BB PRODUCT CODE: NORMAL
BH BB UNIT NUMBER: NORMAL
BUN SERPL-MCNC: 20 MG/DL (ref 8–23)
BUN/CREAT SERPL: 23.5 (ref 7–25)
CALCIUM SPEC-SCNC: 8.1 MG/DL (ref 8.6–10.5)
CHLORIDE SERPL-SCNC: 95 MMOL/L (ref 98–107)
CO2 SERPL-SCNC: 26 MMOL/L (ref 22–29)
CREAT SERPL-MCNC: 0.85 MG/DL (ref 0.57–1)
CROSSMATCH INTERPRETATION: NORMAL
D-LACTATE SERPL-SCNC: 1.5 MMOL/L (ref 0.5–2)
DEPRECATED RDW RBC AUTO: 52.1 FL (ref 37–54)
EGFRCR SERPLBLD CKD-EPI 2021: 69.4 ML/MIN/1.73
ERYTHROCYTE [DISTWIDTH] IN BLOOD BY AUTOMATED COUNT: 16.6 % (ref 12.3–15.4)
GLUCOSE BLDC GLUCOMTR-MCNC: 198 MG/DL (ref 70–105)
GLUCOSE BLDC GLUCOMTR-MCNC: 198 MG/DL (ref 70–105)
GLUCOSE BLDC GLUCOMTR-MCNC: 207 MG/DL (ref 70–105)
GLUCOSE BLDC GLUCOMTR-MCNC: 220 MG/DL (ref 70–105)
GLUCOSE BLDC GLUCOMTR-MCNC: 257 MG/DL (ref 70–105)
GLUCOSE SERPL-MCNC: 172 MG/DL (ref 65–99)
HCT VFR BLD AUTO: 18.1 % (ref 34–46.6)
HCT VFR BLD AUTO: 19.5 % (ref 34–46.6)
HCT VFR BLD AUTO: 19.7 % (ref 34–46.6)
HGB BLD-MCNC: 6.3 G/DL (ref 12–15.9)
HGB BLD-MCNC: 6.7 G/DL (ref 12–15.9)
HGB BLD-MCNC: 6.8 G/DL (ref 12–15.9)
LYMPHOCYTES # BLD MANUAL: 1.53 10*3/MM3 (ref 0.7–3.1)
LYMPHOCYTES NFR BLD MANUAL: 1 % (ref 5–12)
MCH RBC QN AUTO: 31.8 PG (ref 26.6–33)
MCHC RBC AUTO-ENTMCNC: 35 G/DL (ref 31.5–35.7)
MCV RBC AUTO: 90.9 FL (ref 79–97)
MONOCYTES # BLD: 0.15 10*3/MM3 (ref 0.1–0.9)
NEUTROPHILS # BLD AUTO: 13.62 10*3/MM3 (ref 1.7–7)
NEUTROPHILS NFR BLD MANUAL: 89 % (ref 42.7–76)
NRBC SPEC MANUAL: 1 /100 WBC (ref 0–0.2)
PLATELET # BLD AUTO: 445 10*3/MM3 (ref 140–450)
PMV BLD AUTO: 7 FL (ref 6–12)
POIKILOCYTOSIS BLD QL SMEAR: ABNORMAL
POTASSIUM SERPL-SCNC: 4.7 MMOL/L (ref 3.5–5.2)
RBC # BLD AUTO: 1.99 10*6/MM3 (ref 3.77–5.28)
SCAN SLIDE: NORMAL
SMALL PLATELETS BLD QL SMEAR: ABNORMAL
SODIUM SERPL-SCNC: 131 MMOL/L (ref 136–145)
UNIT  ABO: NORMAL
UNIT  RH: NORMAL
VARIANT LYMPHS NFR BLD MANUAL: 10 % (ref 19.6–45.3)
WBC MORPH BLD: NORMAL
WBC NRBC COR # BLD: 15.3 10*3/MM3 (ref 3.4–10.8)

## 2022-03-26 PROCEDURE — 94640 AIRWAY INHALATION TREATMENT: CPT

## 2022-03-26 PROCEDURE — 83605 ASSAY OF LACTIC ACID: CPT | Performed by: NURSE PRACTITIONER

## 2022-03-26 PROCEDURE — 85014 HEMATOCRIT: CPT | Performed by: HOSPITALIST

## 2022-03-26 PROCEDURE — 82962 GLUCOSE BLOOD TEST: CPT

## 2022-03-26 PROCEDURE — P9016 RBC LEUKOCYTES REDUCED: HCPCS

## 2022-03-26 PROCEDURE — 71250 CT THORAX DX C-: CPT

## 2022-03-26 PROCEDURE — 74176 CT ABD & PELVIS W/O CONTRAST: CPT

## 2022-03-26 PROCEDURE — 85018 HEMOGLOBIN: CPT | Performed by: HOSPITALIST

## 2022-03-26 PROCEDURE — 99232 SBSQ HOSP IP/OBS MODERATE 35: CPT | Performed by: HOSPITALIST

## 2022-03-26 PROCEDURE — 99232 SBSQ HOSP IP/OBS MODERATE 35: CPT | Performed by: INTERNAL MEDICINE

## 2022-03-26 PROCEDURE — 36430 TRANSFUSION BLD/BLD COMPNT: CPT

## 2022-03-26 PROCEDURE — 94799 UNLISTED PULMONARY SVC/PX: CPT

## 2022-03-26 PROCEDURE — 85025 COMPLETE CBC W/AUTO DIFF WBC: CPT | Performed by: UROLOGY

## 2022-03-26 PROCEDURE — 85007 BL SMEAR W/DIFF WBC COUNT: CPT | Performed by: UROLOGY

## 2022-03-26 PROCEDURE — 25010000002 CEFTRIAXONE SODIUM-DEXTROSE 1-3.74 GM-%(50ML) RECONSTITUTED SOLUTION: Performed by: UROLOGY

## 2022-03-26 PROCEDURE — 25010000002 FUROSEMIDE PER 20 MG: Performed by: INTERNAL MEDICINE

## 2022-03-26 PROCEDURE — 25010000002 FUROSEMIDE PER 20 MG: Performed by: UROLOGY

## 2022-03-26 PROCEDURE — 86900 BLOOD TYPING SEROLOGIC ABO: CPT

## 2022-03-26 PROCEDURE — 80048 BASIC METABOLIC PNL TOTAL CA: CPT | Performed by: UROLOGY

## 2022-03-26 RX ORDER — DOCUSATE SODIUM 100 MG/1
100 CAPSULE, LIQUID FILLED ORAL 2 TIMES DAILY PRN
Status: DISCONTINUED | OUTPATIENT
Start: 2022-03-26 | End: 2022-03-29 | Stop reason: HOSPADM

## 2022-03-26 RX ORDER — FUROSEMIDE 10 MG/ML
40 INJECTION INTRAMUSCULAR; INTRAVENOUS ONCE
Status: COMPLETED | OUTPATIENT
Start: 2022-03-26 | End: 2022-03-26

## 2022-03-26 RX ORDER — POLYETHYLENE GLYCOL 3350 17 G/17G
17 POWDER, FOR SOLUTION ORAL DAILY
Status: DISCONTINUED | OUTPATIENT
Start: 2022-03-26 | End: 2022-03-29 | Stop reason: HOSPADM

## 2022-03-26 RX ADMIN — ACETAMINOPHEN 650 MG: 325 TABLET ORAL at 20:47

## 2022-03-26 RX ADMIN — Medication 10 ML: at 09:32

## 2022-03-26 RX ADMIN — CEFTRIAXONE 1 G: 1 INJECTION, SOLUTION INTRAVENOUS at 09:31

## 2022-03-26 RX ADMIN — FUROSEMIDE 40 MG: 10 INJECTION, SOLUTION INTRAMUSCULAR; INTRAVENOUS at 23:19

## 2022-03-26 RX ADMIN — ARFORMOTEROL TARTRATE 15 MCG: 15 SOLUTION RESPIRATORY (INHALATION) at 20:11

## 2022-03-26 RX ADMIN — LEVOTHYROXINE SODIUM 50 MCG: 0.05 TABLET ORAL at 13:54

## 2022-03-26 RX ADMIN — MULTIPLE VITAMINS W/ MINERALS TAB 1 TABLET: TAB at 13:54

## 2022-03-26 RX ADMIN — FUROSEMIDE 40 MG: 10 INJECTION, SOLUTION INTRAMUSCULAR; INTRAVENOUS at 06:16

## 2022-03-26 RX ADMIN — Medication 10 ML: at 21:36

## 2022-03-26 RX ADMIN — OXYCODONE 5 MG: 5 TABLET ORAL at 12:26

## 2022-03-26 RX ADMIN — OXYCODONE 5 MG: 5 TABLET ORAL at 06:27

## 2022-03-26 RX ADMIN — POLYETHYLENE GLYCOL 3350 17 G: 17 POWDER, FOR SOLUTION ORAL at 13:54

## 2022-03-26 RX ADMIN — METOPROLOL SUCCINATE 25 MG: 25 TABLET, EXTENDED RELEASE ORAL at 09:31

## 2022-03-26 RX ADMIN — BUDESONIDE 1 MG: 0.5 INHALANT RESPIRATORY (INHALATION) at 20:11

## 2022-03-26 RX ADMIN — OXYCODONE 5 MG: 5 TABLET ORAL at 17:21

## 2022-03-26 RX ADMIN — AMIODARONE HYDROCHLORIDE 200 MG: 200 TABLET ORAL at 09:31

## 2022-03-26 RX ADMIN — METOPROLOL SUCCINATE 25 MG: 25 TABLET, EXTENDED RELEASE ORAL at 20:47

## 2022-03-26 NOTE — OUTREACH NOTE
General Surgery Week 2 Survey    Flowsheet Row Responses   [facility] patient discharged from? [facility]   Does the patient have one of the following disease processes/diagnoses(primary or secondary)? General Surgery   Week 2 attempt successful? No   Revoke Readmitted          KEVIN QUIROS - Registered Nurse

## 2022-03-27 LAB
ANION GAP SERPL CALCULATED.3IONS-SCNC: 10 MMOL/L (ref 5–15)
BASOPHILS # BLD AUTO: 0.1 10*3/MM3 (ref 0–0.2)
BASOPHILS NFR BLD AUTO: 0.5 % (ref 0–1.5)
BH BB BLOOD EXPIRATION DATE: NORMAL
BH BB BLOOD TYPE BARCODE: 6200
BH BB DISPENSE STATUS: NORMAL
BH BB PRODUCT CODE: NORMAL
BH BB UNIT NUMBER: NORMAL
BUN SERPL-MCNC: 17 MG/DL (ref 8–23)
BUN/CREAT SERPL: 20.7 (ref 7–25)
CALCIUM SPEC-SCNC: 7.9 MG/DL (ref 8.6–10.5)
CHLORIDE SERPL-SCNC: 90 MMOL/L (ref 98–107)
CO2 SERPL-SCNC: 30 MMOL/L (ref 22–29)
CREAT SERPL-MCNC: 0.82 MG/DL (ref 0.57–1)
CROSSMATCH INTERPRETATION: NORMAL
DEPRECATED RDW RBC AUTO: 53.4 FL (ref 37–54)
EGFRCR SERPLBLD CKD-EPI 2021: 72.4 ML/MIN/1.73
EOSINOPHIL # BLD AUTO: 0.1 10*3/MM3 (ref 0–0.4)
EOSINOPHIL NFR BLD AUTO: 0.7 % (ref 0.3–6.2)
ERYTHROCYTE [DISTWIDTH] IN BLOOD BY AUTOMATED COUNT: 17.4 % (ref 12.3–15.4)
GLUCOSE SERPL-MCNC: 210 MG/DL (ref 65–99)
HCT VFR BLD AUTO: 22.9 % (ref 34–46.6)
HCT VFR BLD AUTO: 23.2 % (ref 34–46.6)
HGB BLD-MCNC: 8.1 G/DL (ref 12–15.9)
HGB BLD-MCNC: 8.3 G/DL (ref 12–15.9)
LYMPHOCYTES # BLD AUTO: 0.5 10*3/MM3 (ref 0.7–3.1)
LYMPHOCYTES NFR BLD AUTO: 4.3 % (ref 19.6–45.3)
MCH RBC QN AUTO: 32.2 PG (ref 26.6–33)
MCHC RBC AUTO-ENTMCNC: 35.9 G/DL (ref 31.5–35.7)
MCV RBC AUTO: 89.6 FL (ref 79–97)
MONOCYTES # BLD AUTO: 0.5 10*3/MM3 (ref 0.1–0.9)
MONOCYTES NFR BLD AUTO: 4.2 % (ref 5–12)
NEUTROPHILS NFR BLD AUTO: 11.5 10*3/MM3 (ref 1.7–7)
NEUTROPHILS NFR BLD AUTO: 90.3 % (ref 42.7–76)
NRBC BLD AUTO-RTO: 0.6 /100 WBC (ref 0–0.2)
PLATELET # BLD AUTO: 301 10*3/MM3 (ref 140–450)
PMV BLD AUTO: 6.6 FL (ref 6–12)
POTASSIUM SERPL-SCNC: 4.1 MMOL/L (ref 3.5–5.2)
RBC # BLD AUTO: 2.59 10*6/MM3 (ref 3.77–5.28)
SODIUM SERPL-SCNC: 130 MMOL/L (ref 136–145)
UNIT  ABO: NORMAL
UNIT  RH: NORMAL
WBC NRBC COR # BLD: 12.8 10*3/MM3 (ref 3.4–10.8)

## 2022-03-27 PROCEDURE — 82962 GLUCOSE BLOOD TEST: CPT

## 2022-03-27 PROCEDURE — 86900 BLOOD TYPING SEROLOGIC ABO: CPT

## 2022-03-27 PROCEDURE — 97162 PT EVAL MOD COMPLEX 30 MIN: CPT

## 2022-03-27 PROCEDURE — 99232 SBSQ HOSP IP/OBS MODERATE 35: CPT | Performed by: HOSPITALIST

## 2022-03-27 PROCEDURE — 25010000002 CEFTRIAXONE SODIUM-DEXTROSE 1-3.74 GM-%(50ML) RECONSTITUTED SOLUTION: Performed by: UROLOGY

## 2022-03-27 PROCEDURE — 94799 UNLISTED PULMONARY SVC/PX: CPT

## 2022-03-27 PROCEDURE — 25010000002 FUROSEMIDE PER 20 MG: Performed by: HOSPITALIST

## 2022-03-27 PROCEDURE — 85014 HEMATOCRIT: CPT | Performed by: HOSPITALIST

## 2022-03-27 PROCEDURE — 85018 HEMOGLOBIN: CPT | Performed by: HOSPITALIST

## 2022-03-27 PROCEDURE — 36430 TRANSFUSION BLD/BLD COMPNT: CPT

## 2022-03-27 PROCEDURE — 85025 COMPLETE CBC W/AUTO DIFF WBC: CPT | Performed by: HOSPITALIST

## 2022-03-27 PROCEDURE — 80048 BASIC METABOLIC PNL TOTAL CA: CPT | Performed by: HOSPITALIST

## 2022-03-27 PROCEDURE — P9016 RBC LEUKOCYTES REDUCED: HCPCS

## 2022-03-27 RX ORDER — ATROPA BELLADONNA AND OPIUM 16.2; 3 MG/1; MG/1
30 SUPPOSITORY RECTAL EVERY 8 HOURS PRN
Status: DISCONTINUED | OUTPATIENT
Start: 2022-03-27 | End: 2022-03-29 | Stop reason: HOSPADM

## 2022-03-27 RX ORDER — OXYBUTYNIN CHLORIDE 5 MG/1
5 TABLET ORAL 3 TIMES DAILY
Status: DISCONTINUED | OUTPATIENT
Start: 2022-03-27 | End: 2022-03-29 | Stop reason: HOSPADM

## 2022-03-27 RX ORDER — FUROSEMIDE 10 MG/ML
20 INJECTION INTRAMUSCULAR; INTRAVENOUS ONCE
Status: COMPLETED | OUTPATIENT
Start: 2022-03-27 | End: 2022-03-27

## 2022-03-27 RX ADMIN — OXYBUTYNIN CHLORIDE 5 MG: 5 TABLET ORAL at 17:00

## 2022-03-27 RX ADMIN — MULTIPLE VITAMINS W/ MINERALS TAB 1 TABLET: TAB at 09:40

## 2022-03-27 RX ADMIN — CEFTRIAXONE 1 G: 1 INJECTION, SOLUTION INTRAVENOUS at 09:40

## 2022-03-27 RX ADMIN — Medication 10 ML: at 20:54

## 2022-03-27 RX ADMIN — ARFORMOTEROL TARTRATE 15 MCG: 15 SOLUTION RESPIRATORY (INHALATION) at 08:22

## 2022-03-27 RX ADMIN — METOPROLOL SUCCINATE 25 MG: 25 TABLET, EXTENDED RELEASE ORAL at 20:54

## 2022-03-27 RX ADMIN — LEVOTHYROXINE SODIUM 50 MCG: 0.05 TABLET ORAL at 07:03

## 2022-03-27 RX ADMIN — ACETAMINOPHEN 650 MG: 325 TABLET ORAL at 20:53

## 2022-03-27 RX ADMIN — POLYETHYLENE GLYCOL 3350 17 G: 17 POWDER, FOR SOLUTION ORAL at 09:40

## 2022-03-27 RX ADMIN — OXYCODONE 5 MG: 5 TABLET ORAL at 03:01

## 2022-03-27 RX ADMIN — BUDESONIDE 0.5 MG: 0.5 INHALANT RESPIRATORY (INHALATION) at 19:00

## 2022-03-27 RX ADMIN — ATROPA BELLADONNA AND OPIUM 30 MG: 16.2; 3 SUPPOSITORY RECTAL at 17:29

## 2022-03-27 RX ADMIN — Medication 10 ML: at 09:41

## 2022-03-27 RX ADMIN — AMIODARONE HYDROCHLORIDE 200 MG: 200 TABLET ORAL at 09:40

## 2022-03-27 RX ADMIN — ARFORMOTEROL TARTRATE 15 MCG: 15 SOLUTION RESPIRATORY (INHALATION) at 19:00

## 2022-03-27 RX ADMIN — Medication 5 MG: at 20:53

## 2022-03-27 RX ADMIN — FUROSEMIDE 20 MG: 10 INJECTION INTRAMUSCULAR; INTRAVENOUS at 11:47

## 2022-03-27 RX ADMIN — BUDESONIDE 0.5 MG: 0.5 INHALANT RESPIRATORY (INHALATION) at 08:27

## 2022-03-27 RX ADMIN — METOPROLOL SUCCINATE 25 MG: 25 TABLET, EXTENDED RELEASE ORAL at 09:40

## 2022-03-27 RX ADMIN — OXYBUTYNIN CHLORIDE 5 MG: 5 TABLET ORAL at 20:53

## 2022-03-28 LAB
BH BB BLOOD EXPIRATION DATE: NORMAL
BH BB BLOOD TYPE BARCODE: 6200
BH BB DISPENSE STATUS: NORMAL
BH BB PRODUCT CODE: NORMAL
BH BB UNIT NUMBER: NORMAL
CROSSMATCH INTERPRETATION: NORMAL
GLUCOSE BLDC GLUCOMTR-MCNC: 199 MG/DL (ref 70–105)
UNIT  ABO: NORMAL
UNIT  RH: NORMAL

## 2022-03-28 PROCEDURE — 97535 SELF CARE MNGMENT TRAINING: CPT

## 2022-03-28 PROCEDURE — 25010000002 CEFTRIAXONE SODIUM-DEXTROSE 1-3.74 GM-%(50ML) RECONSTITUTED SOLUTION: Performed by: UROLOGY

## 2022-03-28 PROCEDURE — 63710000001 INSULIN LISPRO (HUMAN) PER 5 UNITS: Performed by: INTERNAL MEDICINE

## 2022-03-28 PROCEDURE — 94799 UNLISTED PULMONARY SVC/PX: CPT

## 2022-03-28 PROCEDURE — 82962 GLUCOSE BLOOD TEST: CPT

## 2022-03-28 PROCEDURE — 63710000001 ONDANSETRON PER 8 MG: Performed by: UROLOGY

## 2022-03-28 PROCEDURE — 99232 SBSQ HOSP IP/OBS MODERATE 35: CPT | Performed by: INTERNAL MEDICINE

## 2022-03-28 PROCEDURE — 99233 SBSQ HOSP IP/OBS HIGH 50: CPT | Performed by: INTERNAL MEDICINE

## 2022-03-28 PROCEDURE — 97166 OT EVAL MOD COMPLEX 45 MIN: CPT

## 2022-03-28 RX ORDER — INSULIN LISPRO 100 [IU]/ML
0-7 INJECTION, SOLUTION INTRAVENOUS; SUBCUTANEOUS AS NEEDED
Status: DISCONTINUED | OUTPATIENT
Start: 2022-03-28 | End: 2022-03-29 | Stop reason: HOSPADM

## 2022-03-28 RX ORDER — NICOTINE POLACRILEX 4 MG
15 LOZENGE BUCCAL
Status: DISCONTINUED | OUTPATIENT
Start: 2022-03-28 | End: 2022-03-29 | Stop reason: HOSPADM

## 2022-03-28 RX ORDER — INSULIN LISPRO 100 [IU]/ML
0-7 INJECTION, SOLUTION INTRAVENOUS; SUBCUTANEOUS
Status: DISCONTINUED | OUTPATIENT
Start: 2022-03-28 | End: 2022-03-29 | Stop reason: HOSPADM

## 2022-03-28 RX ORDER — DEXTROSE MONOHYDRATE 25 G/50ML
25 INJECTION, SOLUTION INTRAVENOUS
Status: DISCONTINUED | OUTPATIENT
Start: 2022-03-28 | End: 2022-03-29 | Stop reason: HOSPADM

## 2022-03-28 RX ORDER — OLANZAPINE 10 MG/2ML
1 INJECTION, POWDER, LYOPHILIZED, FOR SOLUTION INTRAMUSCULAR
Status: DISCONTINUED | OUTPATIENT
Start: 2022-03-28 | End: 2022-03-29 | Stop reason: HOSPADM

## 2022-03-28 RX ADMIN — CEFTRIAXONE 1 G: 1 INJECTION, SOLUTION INTRAVENOUS at 08:38

## 2022-03-28 RX ADMIN — OXYBUTYNIN CHLORIDE 5 MG: 5 TABLET ORAL at 08:38

## 2022-03-28 RX ADMIN — Medication 5 MG: at 21:17

## 2022-03-28 RX ADMIN — Medication 10 ML: at 08:39

## 2022-03-28 RX ADMIN — AMIODARONE HYDROCHLORIDE 200 MG: 200 TABLET ORAL at 08:38

## 2022-03-28 RX ADMIN — POLYETHYLENE GLYCOL 3350 17 G: 17 POWDER, FOR SOLUTION ORAL at 08:38

## 2022-03-28 RX ADMIN — BUDESONIDE 1 MG: 0.5 INHALANT RESPIRATORY (INHALATION) at 19:15

## 2022-03-28 RX ADMIN — ARFORMOTEROL TARTRATE 15 MCG: 15 SOLUTION RESPIRATORY (INHALATION) at 19:15

## 2022-03-28 RX ADMIN — BUDESONIDE 0.5 MG: 0.5 INHALANT RESPIRATORY (INHALATION) at 08:06

## 2022-03-28 RX ADMIN — INSULIN LISPRO 2 UNITS: 100 INJECTION, SOLUTION INTRAVENOUS; SUBCUTANEOUS at 17:43

## 2022-03-28 RX ADMIN — ARFORMOTEROL TARTRATE 15 MCG: 15 SOLUTION RESPIRATORY (INHALATION) at 08:03

## 2022-03-28 RX ADMIN — LEVOTHYROXINE SODIUM 50 MCG: 0.05 TABLET ORAL at 05:50

## 2022-03-28 RX ADMIN — METOPROLOL SUCCINATE 25 MG: 25 TABLET, EXTENDED RELEASE ORAL at 21:17

## 2022-03-28 RX ADMIN — OXYBUTYNIN CHLORIDE 5 MG: 5 TABLET ORAL at 21:17

## 2022-03-28 RX ADMIN — ONDANSETRON HYDROCHLORIDE 4 MG: 4 TABLET, FILM COATED ORAL at 16:12

## 2022-03-28 RX ADMIN — Medication 10 ML: at 21:28

## 2022-03-28 RX ADMIN — INSULIN LISPRO 2 UNITS: 100 INJECTION, SOLUTION INTRAVENOUS; SUBCUTANEOUS at 11:45

## 2022-03-28 RX ADMIN — ACETAMINOPHEN 650 MG: 325 TABLET ORAL at 21:17

## 2022-03-28 RX ADMIN — METOPROLOL SUCCINATE 25 MG: 25 TABLET, EXTENDED RELEASE ORAL at 08:38

## 2022-03-28 RX ADMIN — MULTIPLE VITAMINS W/ MINERALS TAB 1 TABLET: TAB at 08:38

## 2022-03-28 RX ADMIN — OXYBUTYNIN CHLORIDE 5 MG: 5 TABLET ORAL at 17:43

## 2022-03-29 VITALS
HEART RATE: 87 BPM | RESPIRATION RATE: 16 BRPM | HEIGHT: 60 IN | SYSTOLIC BLOOD PRESSURE: 91 MMHG | DIASTOLIC BLOOD PRESSURE: 51 MMHG | BODY MASS INDEX: 36.05 KG/M2 | OXYGEN SATURATION: 96 % | WEIGHT: 183.64 LBS | TEMPERATURE: 98.6 F

## 2022-03-29 LAB
ANION GAP SERPL CALCULATED.3IONS-SCNC: 12 MMOL/L (ref 5–15)
BASOPHILS # BLD AUTO: 0.1 10*3/MM3 (ref 0–0.2)
BASOPHILS NFR BLD AUTO: 0.8 % (ref 0–1.5)
BUN SERPL-MCNC: 12 MG/DL (ref 8–23)
BUN/CREAT SERPL: 16 (ref 7–25)
CALCIUM SPEC-SCNC: 8.3 MG/DL (ref 8.6–10.5)
CHLORIDE SERPL-SCNC: 91 MMOL/L (ref 98–107)
CO2 SERPL-SCNC: 28 MMOL/L (ref 22–29)
CREAT SERPL-MCNC: 0.75 MG/DL (ref 0.57–1)
DEPRECATED RDW RBC AUTO: 55.1 FL (ref 37–54)
EGFRCR SERPLBLD CKD-EPI 2021: 80.6 ML/MIN/1.73
EOSINOPHIL # BLD AUTO: 0 10*3/MM3 (ref 0–0.4)
EOSINOPHIL NFR BLD AUTO: 0.4 % (ref 0.3–6.2)
ERYTHROCYTE [DISTWIDTH] IN BLOOD BY AUTOMATED COUNT: 17.5 % (ref 12.3–15.4)
GLUCOSE BLDC GLUCOMTR-MCNC: 124 MG/DL (ref 70–105)
GLUCOSE BLDC GLUCOMTR-MCNC: 161 MG/DL (ref 70–105)
GLUCOSE BLDC GLUCOMTR-MCNC: 170 MG/DL (ref 70–105)
GLUCOSE BLDC GLUCOMTR-MCNC: 177 MG/DL (ref 70–105)
GLUCOSE BLDC GLUCOMTR-MCNC: 190 MG/DL (ref 70–105)
GLUCOSE SERPL-MCNC: 129 MG/DL (ref 65–99)
HCT VFR BLD AUTO: 24.4 % (ref 34–46.6)
HGB BLD-MCNC: 8.6 G/DL (ref 12–15.9)
LYMPHOCYTES # BLD AUTO: 0.7 10*3/MM3 (ref 0.7–3.1)
LYMPHOCYTES NFR BLD AUTO: 7.8 % (ref 19.6–45.3)
MCH RBC QN AUTO: 32.2 PG (ref 26.6–33)
MCHC RBC AUTO-ENTMCNC: 35.2 G/DL (ref 31.5–35.7)
MCV RBC AUTO: 91.3 FL (ref 79–97)
MONOCYTES # BLD AUTO: 0.6 10*3/MM3 (ref 0.1–0.9)
MONOCYTES NFR BLD AUTO: 6.5 % (ref 5–12)
NEUTROPHILS NFR BLD AUTO: 7.3 10*3/MM3 (ref 1.7–7)
NEUTROPHILS NFR BLD AUTO: 84.5 % (ref 42.7–76)
NRBC BLD AUTO-RTO: 0.1 /100 WBC (ref 0–0.2)
PLATELET # BLD AUTO: 273 10*3/MM3 (ref 140–450)
PMV BLD AUTO: 6.7 FL (ref 6–12)
POTASSIUM SERPL-SCNC: 3.9 MMOL/L (ref 3.5–5.2)
RBC # BLD AUTO: 2.67 10*6/MM3 (ref 3.77–5.28)
SODIUM SERPL-SCNC: 131 MMOL/L (ref 136–145)
WBC NRBC COR # BLD: 8.6 10*3/MM3 (ref 3.4–10.8)

## 2022-03-29 PROCEDURE — 80048 BASIC METABOLIC PNL TOTAL CA: CPT | Performed by: INTERNAL MEDICINE

## 2022-03-29 PROCEDURE — 97116 GAIT TRAINING THERAPY: CPT

## 2022-03-29 PROCEDURE — 25010000002 FUROSEMIDE PER 20 MG: Performed by: INTERNAL MEDICINE

## 2022-03-29 PROCEDURE — 94799 UNLISTED PULMONARY SVC/PX: CPT

## 2022-03-29 PROCEDURE — 85025 COMPLETE CBC W/AUTO DIFF WBC: CPT | Performed by: INTERNAL MEDICINE

## 2022-03-29 PROCEDURE — 97530 THERAPEUTIC ACTIVITIES: CPT

## 2022-03-29 PROCEDURE — 82962 GLUCOSE BLOOD TEST: CPT

## 2022-03-29 PROCEDURE — 99233 SBSQ HOSP IP/OBS HIGH 50: CPT | Performed by: INTERNAL MEDICINE

## 2022-03-29 PROCEDURE — 99239 HOSP IP/OBS DSCHRG MGMT >30: CPT | Performed by: INTERNAL MEDICINE

## 2022-03-29 PROCEDURE — 25010000002 CEFTRIAXONE SODIUM-DEXTROSE 1-3.74 GM-%(50ML) RECONSTITUTED SOLUTION: Performed by: UROLOGY

## 2022-03-29 PROCEDURE — 63710000001 INSULIN LISPRO (HUMAN) PER 5 UNITS: Performed by: INTERNAL MEDICINE

## 2022-03-29 RX ORDER — FUROSEMIDE 10 MG/ML
40 INJECTION INTRAMUSCULAR; INTRAVENOUS ONCE
Status: COMPLETED | OUTPATIENT
Start: 2022-03-29 | End: 2022-03-29

## 2022-03-29 RX ORDER — CEFDINIR 300 MG/1
300 CAPSULE ORAL 2 TIMES DAILY
Qty: 8 CAPSULE | Refills: 0 | Status: SHIPPED | OUTPATIENT
Start: 2022-03-29 | End: 2022-04-02

## 2022-03-29 RX ORDER — OXYBUTYNIN CHLORIDE 5 MG/1
5 TABLET ORAL 3 TIMES DAILY
Start: 2022-03-29 | End: 2022-04-14

## 2022-03-29 RX ORDER — FUROSEMIDE 20 MG/1
20 TABLET ORAL DAILY
Start: 2022-03-29 | End: 2022-04-25 | Stop reason: SDUPTHER

## 2022-03-29 RX ORDER — SPIRONOLACTONE 25 MG/1
25 TABLET ORAL DAILY
Status: DISCONTINUED | OUTPATIENT
Start: 2022-03-29 | End: 2022-03-29 | Stop reason: HOSPADM

## 2022-03-29 RX ORDER — FUROSEMIDE 40 MG/1
40 TABLET ORAL DAILY
Status: DISCONTINUED | OUTPATIENT
Start: 2022-03-30 | End: 2022-03-29 | Stop reason: HOSPADM

## 2022-03-29 RX ORDER — SPIRONOLACTONE 25 MG/1
25 TABLET ORAL DAILY
Start: 2022-03-30 | End: 2022-04-25 | Stop reason: SDUPTHER

## 2022-03-29 RX ADMIN — SPIRONOLACTONE 25 MG: 25 TABLET ORAL at 08:45

## 2022-03-29 RX ADMIN — INSULIN LISPRO 2 UNITS: 100 INJECTION, SOLUTION INTRAVENOUS; SUBCUTANEOUS at 11:33

## 2022-03-29 RX ADMIN — Medication 10 ML: at 08:46

## 2022-03-29 RX ADMIN — FUROSEMIDE 40 MG: 10 INJECTION, SOLUTION INTRAMUSCULAR; INTRAVENOUS at 08:45

## 2022-03-29 RX ADMIN — LEVOTHYROXINE SODIUM 50 MCG: 0.05 TABLET ORAL at 05:34

## 2022-03-29 RX ADMIN — BUDESONIDE 1 MG: 0.5 INHALANT RESPIRATORY (INHALATION) at 07:20

## 2022-03-29 RX ADMIN — MULTIPLE VITAMINS W/ MINERALS TAB 1 TABLET: TAB at 08:46

## 2022-03-29 RX ADMIN — CEFTRIAXONE 1 G: 1 INJECTION, SOLUTION INTRAVENOUS at 08:45

## 2022-03-29 RX ADMIN — OXYBUTYNIN CHLORIDE 5 MG: 5 TABLET ORAL at 08:45

## 2022-03-29 RX ADMIN — METOPROLOL SUCCINATE 25 MG: 25 TABLET, EXTENDED RELEASE ORAL at 08:46

## 2022-03-29 RX ADMIN — AMIODARONE HYDROCHLORIDE 200 MG: 200 TABLET ORAL at 08:45

## 2022-03-29 RX ADMIN — ARFORMOTEROL TARTRATE 15 MCG: 15 SOLUTION RESPIRATORY (INHALATION) at 07:15

## 2022-03-30 ENCOUNTER — TELEPHONE (OUTPATIENT)
Dept: SURGERY | Facility: CLINIC | Age: 82
End: 2022-03-30

## 2022-03-31 ENCOUNTER — TELEPHONE (OUTPATIENT)
Dept: INTERNAL MEDICINE | Facility: CLINIC | Age: 82
End: 2022-03-31

## 2022-03-31 NOTE — TELEPHONE ENCOUNTER
Caller: MILLIE WILKS    Relationship: Emergency Contact    Best call back number: [phone]    What was the call regarding: PATIENT'S  WAS CALLING IN TO UPDATE DR VÁZQUEZ THAT PATIENT IS NOW IN REHAB AT [facility] IN [place]. PATIENT'S  STATED THAT HE DOES NOT KNOW WHEN SHE WILL BE OUT. PATIENT'S  STATED THAT SHE HAS CHEST XRAYS SET UP AT [facility] [place].     PATIENT'S  STATED THAT SHE IS IN ROOM 229 AND SHE CAN BE REACHED [phone].    Do you require a callback: IF ANY QUESTIONS

## 2022-04-01 ENCOUNTER — TELEPHONE (OUTPATIENT)
Dept: SURGERY | Facility: CLINIC | Age: 82
End: 2022-04-01

## 2022-04-01 NOTE — TELEPHONE ENCOUNTER
Nicola's  returned my call and left a message with Nicola's number. I called nicola at [phone]. She is in [facility] facility in [facility]. She says she is doing well and doesn't have any questions. I confirmed her appt on 4-14-22 and instructed on her CXR. She wanted to do it on 4-11-22 but that is too early for appt. I stressed that it needed to be done 4--13-22 or 4-14-22 before office visit. She also informed me a lot about her bladder issues after leaving the hospital. She said she did understand that I could not help or advise on any of that, that I only take care of her chest and lungs. Someone from the facility entered her room while we were on the call and she seemed distracted after that.

## 2022-04-01 NOTE — TELEPHONE ENCOUNTER
I LMTRC. I am needing to reach patient or  to check on her PO. I can see she is in a facility but not sure which one. I am just wanting to make sure patient is aware of follow up and CXR and doesn't have questions for surgeon.

## 2022-04-04 ENCOUNTER — TELEPHONE (OUTPATIENT)
Dept: INTERNAL MEDICINE | Facility: CLINIC | Age: 82
End: 2022-04-04

## 2022-04-04 NOTE — TELEPHONE ENCOUNTER
Caller: Marcy Valderrama    Relationship: Self    Best call back number: [phone] (M)    What is the best time to reach you: ANYTIME, ASAP    Who are you requesting to speak with (clinical staff, provider,  specific staff member): CLINICAL STAFF/ ASSISTANT LEATHA SPECIFICALLY    Do you know the name of the person who called: MARCY VALDERRAMA    What was the call regarding: PATIENT IS INREHAB NOW BUT HAS BEEN BACK AND FORTH BETWEEN [facility] AND [facility] BECAUSE OF AFIB ISSUES AND OTHER ISSUES AND WOULD LIKE DR VÁZQUEZ' ASSISTANT LEATHA TO CALL HER BACK ASAP REGARDING THIS, PLEASE CALL PATIENT BACK ASAP    Do you require a callback: YES, ASAP

## 2022-04-07 ENCOUNTER — OFFICE VISIT (OUTPATIENT)
Dept: INTERNAL MEDICINE | Facility: CLINIC | Age: 82
End: 2022-04-07

## 2022-04-07 VITALS
DIASTOLIC BLOOD PRESSURE: 68 MMHG | SYSTOLIC BLOOD PRESSURE: 132 MMHG | HEIGHT: 60 IN | TEMPERATURE: 97.3 F | HEART RATE: 84 BPM | BODY MASS INDEX: 35.87 KG/M2

## 2022-04-07 DIAGNOSIS — I50.41 ACUTE COMBINED SYSTOLIC AND DIASTOLIC CONGESTIVE HEART FAILURE: Primary | ICD-10-CM

## 2022-04-07 DIAGNOSIS — J90 LOCULATED PLEURAL EFFUSION: ICD-10-CM

## 2022-04-07 DIAGNOSIS — R73.9 ACUTE HYPERGLYCEMIA: ICD-10-CM

## 2022-04-07 DIAGNOSIS — Z96.0 INDWELLING URINARY CATHETER PRESENT: ICD-10-CM

## 2022-04-07 PROBLEM — E87.1 ACUTE HYPONATREMIA: Status: RESOLVED | Noted: 2022-03-03 | Resolved: 2022-04-07

## 2022-04-07 PROBLEM — I95.9 HYPOTENSION: Status: RESOLVED | Noted: 2022-03-25 | Resolved: 2022-04-07

## 2022-04-07 PROBLEM — N13.9 ACUTE URINARY OBSTRUCTION: Status: RESOLVED | Noted: 2022-03-25 | Resolved: 2022-04-07

## 2022-04-07 PROBLEM — R31.0 GROSS HEMATURIA: Status: RESOLVED | Noted: 2022-03-25 | Resolved: 2022-04-07

## 2022-04-07 PROBLEM — N39.0 ACUTE UTI (URINARY TRACT INFECTION): Status: RESOLVED | Noted: 2022-03-25 | Resolved: 2022-04-07

## 2022-04-07 PROCEDURE — 1111F DSCHRG MED/CURRENT MED MERGE: CPT | Performed by: INTERNAL MEDICINE

## 2022-04-07 PROCEDURE — 99495 TRANSJ CARE MGMT MOD F2F 14D: CPT | Performed by: INTERNAL MEDICINE

## 2022-04-07 RX ORDER — TRAZODONE HYDROCHLORIDE 50 MG/1
50 TABLET ORAL NIGHTLY
COMMUNITY
End: 2022-04-14 | Stop reason: HOSPADM

## 2022-04-07 NOTE — PROGRESS NOTES
"Jaquelin Valderrama is a 82 y.o. female admitted to [facility] and  is seen for follow up of CHF, a fib, DM.  Prolonged hospitalizations recently- started with CHF, pleural effusion, new onset a fib, etc.  Treated with VATS- did really well, went home and then had gross hematuria - bladder perf, tx surgically with f/u Dr. Poole next week.  She has refused home health and PT- feels comfortable with .  Eating and drinking well.    Admission date 3/3/2022 D/C date 3/30/2022  Discharge summary is available.  Risk for Readmission (LACE) Score: 10 (3/29/2022  6:01 AM)         Current outpatient and discharge medications have been reconciled for the patient.  Reviewed by: Minerva Chatterjee MD      She was admitted for the following reason(s):  Primary admitting diagnosis at discharge was pleural effusion.  Pertinent secondary diagnoses include bladder perforation, DM.  Course During Hospital Stay:  Reviewed in detail with patient and before appt.  Procedures Performed in Hospital: please see EPIC record for further details of results and finding  Pending tests: none  Pain Control:  well controlled  Diet: DM  Activity after Discharge: activity as tolerated    Items to be addressed at first follow up visit include:  1. Medication changes: furosemide  2.     She reports her overall condition are improving since discharge.  No specific complaints.  Social support is said to be adequate to meet her needs. .     Objective   Vitals:    04/07/22 1030   BP: 132/68   Pulse: 84   Temp: 97.3 °F (36.3 °C)   Height: 152.4 cm (60\")     Body mass index is 35.87 kg/m².    Physical Exam  Constitutional:       Appearance: Normal appearance.   Cardiovascular:      Rate and Rhythm: Normal rate. Rhythm irregular.   Pulmonary:      Effort: Pulmonary effort is normal. No respiratory distress.      Breath sounds: Normal breath sounds.   Musculoskeletal:      Right lower leg: Edema present.      Left lower leg: Edema present.        "   Assessment/Plan    Problem List Items Addressed This Visit        Cardiac and Vasculature    Acute CHF (congestive heart failure) (HCC) - Primary       Endocrine and Metabolic    Acute hyperglycemia       Pulmonary and Pneumonias    Loculated pleural effusion    Overview     Added automatically from request for surgery [id number]             Other Visit Diagnoses     Indwelling urinary catheter present        reviewed records, urine clear today           {Followup: 23]    This post hospital patient care was coordinated using transitional care management strategy:  I certify that the following are true:  1. Communication was made within two business days of discharge.  2. Complexity of MDM is HIGH  3. A Face to Face visit occurred within within 14 days

## 2022-04-12 ENCOUNTER — OFFICE VISIT (OUTPATIENT)
Dept: CARDIOLOGY | Facility: CLINIC | Age: 82
End: 2022-04-12

## 2022-04-12 VITALS
WEIGHT: 175 LBS | HEIGHT: 60 IN | OXYGEN SATURATION: 97 % | HEART RATE: 93 BPM | DIASTOLIC BLOOD PRESSURE: 88 MMHG | BODY MASS INDEX: 34.36 KG/M2 | SYSTOLIC BLOOD PRESSURE: 139 MMHG

## 2022-04-12 DIAGNOSIS — Z95.1 HX OF CABG: ICD-10-CM

## 2022-04-12 DIAGNOSIS — I50.9 ACUTE CONGESTIVE HEART FAILURE, UNSPECIFIED HEART FAILURE TYPE: ICD-10-CM

## 2022-04-12 DIAGNOSIS — Z79.01 CHRONIC ANTICOAGULATION: ICD-10-CM

## 2022-04-12 DIAGNOSIS — I48.91 ATRIAL FIBRILLATION WITH RVR: Primary | ICD-10-CM

## 2022-04-12 PROCEDURE — 99214 OFFICE O/P EST MOD 30 MIN: CPT | Performed by: INTERNAL MEDICINE

## 2022-04-12 NOTE — PROGRESS NOTES
Encounter Date:04/12/2022  Last seen 3/29/2022      Patient ID: Jaquelin Valderrama is a 82 y.o. female.    Chief Complaint:  Status post CABG  Atrial fibrillation  Hypertension  Dyslipidemia  Hypothyroidism      History of Present Illness  Patient recently was admitted to [facility] with abdominal pain and had a large burden of clot in the urinary bladder and possible anterior bladder perforation.  Patient had Alfonso catheter insertion.  Patient was taken off anticoagulation due to significant problems with the urinary bladder.    Since I have last seen, the patient has been without any chest discomfort ,shortness of breath, palpitations, dizziness or syncope.  Denies having any headache ,abdominal pain ,nausea, vomiting , diarrhea constipation, loss of weight or loss of appetite.  Denies having any excessive bruising ,hematuria or blood in the stool.    Review of all systems negative except as indicated.    Reviewed ROS.    Assessment and Plan       ]]]]]]]]]]]]]]]]]]]]  Impression  ==========  -Recent severe hematuria.-Improved.  CT scan of the abdomen showed significant clot burden in the bladder and probable anterior bladder perforation.     -Progressively worsening shortness of breath and leg edema-better.  Recurrent left pleural effusion which was loculated.  Patient had chest tube and subsequently had video-assisted thoracoscopy with complete decortication on the left side at [facility].  (March 2022.)     -Congestive heart failure  Left ventricle dysfunction with ejection fraction of 30%.     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  Was not taking  thyroid medicine replacement properly.     =================  Plan  ===========  Recent abdominal pain  Work-up showed a CT scan with massive clot burden in the bladder and possible anterior bladder perforation.  Patient is being taken for urological surgery.    Patient had on 3/25/2022  Cystoscopy, clot evacuation and fulguration, cystogram.  No further hematuria.  Patient has Alfonso catheter.    Chronic atrial fibrillation-rate is controlled now.  Consider GABRIEL cardioversion for adequate anticoagulation and cardioversion patient has persistent atrial fibrillation.    Anticoagulation  Eliquis is on hold due to recent hematuria.  Patient has an appointment to see urologist tomorrow.  Would like to start her back on Eliquis 2.5 mg twice a day.  Patient was given prescription for Eliquis 5 mg twice a day and she can start with half a tablet twice a day.     History of significant anemia with hemoglobin of 6.8 due to hematuria and significant blood clot in the bladder.  Hemoglobin 6.3-3/26/2022  Hemoglobin 8.3-8/27/2022      Renal dysfunction  BUN/creatinine-30/1.03     Recent echocardiogram showed severe right ventricle enlargement left atrial enlargement related enlargement and calculated pulmonary artery pressure of 45 mmHg.  Left ventricle dysfunction with ejection fraction of 30%.     Ischemic cardiomyopathy     Current medications include amiodarone aspirin Cardizem 90 mg every 8 hours levothyroxine metoprolol 25 mg twice a day.  Continue Lasix 40 mg p.o. nightly and spironolactone 25 mg a day.     Follow-up labs ordered.    Follow-up in the office in 6 weeks with EKG.     Further plan will depend on patient's progress.  ]]]]]]]]]]]]]]]]]            Diagnosis Plan   1. Atrial fibrillation with RVR (HCC)     2. Acute congestive heart failure, unspecified heart failure type (HCC)     3. Hx of CABG     4. Chronic anticoagulation     LAB RESULTS (LAST 7 DAYS)    CBC        BMP        CMP         BNP        TROPONIN         CoAg        Creatinine Clearance  Estimated Creatinine Clearance: 55.8 mL/min (by C-G formula based on SCr of 0.75 mg/dL).    ABG        Radiology  No radiology results for the last day                The following portions of the patient's history were reviewed and updated as appropriate: allergies, current medications, past family history, past medical history, past social history, past surgical history and problem list.    Review of Systems   Constitutional: Negative for malaise/fatigue.   Cardiovascular: Negative for chest pain, leg swelling, palpitations and syncope.   Respiratory: Negative for shortness of breath.    Skin: Negative for rash.   Gastrointestinal: Negative for nausea and vomiting.   Neurological: Negative for dizziness, light-headedness and numbness.   All other systems reviewed and are negative.        Current Outpatient Medications:   •  amiodarone (PACERONE) 200 MG tablet, Take 1 tablet by mouth Daily., Disp: 30 tablet, Rfl: 0  •  arformoterol (BROVANA) 15 MCG/2ML nebulizer solution, Take 2 mL by nebulization 2 (Two) Times a Day., Disp: 120 mL, Rfl: 2  •  budesonide (PULMICORT) 0.5 MG/2ML nebulizer solution, Take 4 mL by nebulization 2 (Two) Times a Day., Disp: , Rfl:   •  Cholecalciferol (VITAMIN D3) 2000 units tablet, Take 1 tablet by mouth Daily., Disp: , Rfl:   •  furosemide (LASIX) 20 MG tablet, Take 1 tablet by mouth Daily., Disp: , Rfl:   •  Homeopathic Products (EARACHE DROPS OT), Administer  into ear(s) as directed by provider., Disp: , Rfl:   •  levothyroxine (SYNTHROID, LEVOTHROID) 50 MCG tablet, Take 50 mcg by mouth Every Morning., Disp: , Rfl:   •  metoprolol succinate XL (TOPROL-XL) 25 MG 24 hr tablet, Take 1 tablet by mouth 2 (Two) Times a Day., Disp: 60 tablet, Rfl: 0  •  Multiple Vitamins-Minerals (MULTIVITAMIN ADULT EXTRA C PO), Take 1 tablet by mouth Daily., Disp: , Rfl:   •  oxybutynin (DITROPAN) 5 MG tablet, Take 1 tablet by mouth 3 (Three) Times a Day., Disp: , Rfl:    •  spironolactone (ALDACTONE) 25 MG tablet, Take 1 tablet by mouth Daily., Disp: , Rfl:   •  traZODone (DESYREL) 50 MG tablet, Take 50 mg by mouth Every Night., Disp: , Rfl:     Allergies   Allergen Reactions   • Prednisone Other (See Comments)     Increased BP       Family History   Problem Relation Age of Onset   • Heart disease Mother    • Lung cancer Father    • Diabetes Other        Past Surgical History:   Procedure Laterality Date   • APPENDECTOMY     • CARDIAC CATHETERIZATION  2012    x3    • CORONARY ARTERY BYPASS GRAFT  12/2014   • CORONARY STENT PLACEMENT      x3   • CYSTOSCOPY WITH CLOT EVACUATION N/A 3/25/2022    Procedure: CYSTOSCOPY WITH CLOT EVACUATION AND FULGURATION ;  Surgeon: Sukhdev Poole MD;  Location: [facility] OR;  Service: Urology;  Laterality: N/A;   • HARDWARE REMOVAL Right 7/21/2020    Procedure: ANKLE/FOOT HARDWARE REMOVAL;  Surgeon: Lincoln Sibley MD;  Location: [facility];  Service: Orthopedics;  Laterality: Right;   • ORIF ANKLE FRACTURE Right 04/23/2019    [facility]   • THORACOSCOPY WITH DECORTICATION Left 3/18/2022    Procedure: LEFT THORACOSCOPY VIDEO ASSISTED WITH COMPLETE DECORTICATION;  Surgeon: Sabra Kuo MD;  Location: [facility];  Service: Thoracic;  Laterality: Left;       Past Medical History:   Diagnosis Date   • Astigmatism, bilateral 11/19/2019   • Benign essential hypertension    • Bilateral presbyopia 11/19/2019   • CAD (coronary artery disease)    • Closed right ankle fracture    • COVID-19 vaccination refused    • Hyperlipidemia    • Hypothyroidism    • Influenza vaccine needed    • Myocardial infarction (HCC)    • Prediabetes    • Pseudophakia, both eyes 11/19/2019   • Thoracic back pain        Family History   Problem Relation Age of Onset   • Heart disease Mother    • Lung cancer Father    • Diabetes Other        Social History     Socioeconomic History   • Marital status:    Tobacco Use   • Smoking status: Former Smoker  "    Types: Cigarettes     Quit date:      Years since quittin.3   • Smokeless tobacco: Never Used   • Tobacco comment: Social Drinker   Vaping Use   • Vaping Use: Never used   Substance and Sexual Activity   • Alcohol use: Yes     Comment: mod    • Drug use: No   • Sexual activity: Defer         Procedures      Objective:       Physical Exam    /88 (BP Location: Right arm, Patient Position: Sitting, Cuff Size: Adult)   Pulse 93   Ht 152.4 cm (60\")   Wt 79.4 kg (175 lb)   SpO2 97%   BMI 34.18 kg/m²   The patient is alert, oriented and in no distress.    Vital signs as noted above.    Head and neck revealed no carotid bruits or jugular venous distension.  No thyromegaly or lymphadenopathy is present.    Lungs clear.  No wheezing.  Breath sounds are normal bilaterally.    Heart normal first and second heart sounds.  No murmur..  No pericardial rub is present.  No gallop is present.    Abdomen soft and nontender.  No organomegaly is present.    Extremities revealed good peripheral pulses without any pedal edema.    Skin warm and dry.    Musculoskeletal system is grossly normal.    CNS grossly normal.    Reviewed and updated.        "

## 2022-04-13 ENCOUNTER — HOSPITAL ENCOUNTER (OUTPATIENT)
Dept: GENERAL RADIOLOGY | Facility: HOSPITAL | Age: 82
Discharge: HOME | End: 2022-04-13
Admitting: NURSE PRACTITIONER

## 2022-04-13 DIAGNOSIS — J90 LOCULATED PLEURAL EFFUSION: ICD-10-CM

## 2022-04-13 PROCEDURE — 71046 X-RAY EXAM CHEST 2 VIEWS: CPT

## 2022-04-14 ENCOUNTER — HOME HEALTH ADMISSION (OUTPATIENT)
Dept: HOME HEALTH SERVICES | Facility: HOME HEALTHCARE | Age: 82
End: 2022-04-14

## 2022-04-14 ENCOUNTER — OFFICE VISIT (OUTPATIENT)
Dept: SURGERY | Facility: CLINIC | Age: 82
End: 2022-04-14

## 2022-04-14 VITALS
TEMPERATURE: 97.5 F | WEIGHT: 175 LBS | DIASTOLIC BLOOD PRESSURE: 83 MMHG | HEIGHT: 60 IN | HEART RATE: 91 BPM | OXYGEN SATURATION: 98 % | SYSTOLIC BLOOD PRESSURE: 151 MMHG | BODY MASS INDEX: 34.36 KG/M2

## 2022-04-14 DIAGNOSIS — J90 LOCULATED PLEURAL EFFUSION: Primary | ICD-10-CM

## 2022-04-14 PROCEDURE — 99024 POSTOP FOLLOW-UP VISIT: CPT | Performed by: THORACIC SURGERY (CARDIOTHORACIC VASCULAR SURGERY)

## 2022-04-14 NOTE — PROGRESS NOTES
Chief Complaint  Post-op Follow-up (Lt VATS complete decort w/ cxr 3/18/22)    Subjective          Jaquelin Valderrama presents to [facility]AC[facility] SURGERY  History of Present Illness  The patient, Jaquelin Valderrama, is an 82-year-old female, who is here today for her first post-op visit status post left VATS-decortication on 3/18/2022. She is accompanied by her .    The patient reports that she was given Lasix while in the hospital, and after she was discharged from the hospital in 03/2022, when she got home, she needed to urinate very badly, so her  was helping her out of the van, and her legs just folded down, and she fell on top of her . She goes on to say that she tore her bladder, and she had to go back to see Dr. Oleary. She says that they removed 2 liters of blood from her bladder, and she had a catheter inserted, and she has had that in for over 3 weeks. After all this, she says she just got weaker and weaker, so she went to rehab for 1 week, and she has now been home for about 1 week. She reports that she has not done any of her exercises, and she knows she needs to get started on them. She goes on to say that she has no energy, and no appetite. She has taken a few of the pain medications she was given, to help her sleep. She reports that she had a terrible metallic taste in her mouth, so nothing tastes good to her. She says that she did not take Lasix this morning because she was afraid she would have an accident coming to this appointment, but she will take it when she gets home. This was a concern to her because she just had her catheter removed yesterday. She does say that she has some leg swelling. The patient states that she has discontinued using the medication for overactive bladder and the antidepressant she was taking. She does not feel the need for either any longer.  Objective   Vital Signs:   /83 (BP Location: Left arm, Patient Position: Sitting, Cuff Size:  "Adult)   Pulse 91   Temp 97.5 °F (36.4 °C) (Infrared)   Ht 152.4 cm (60\")   Wt 79.4 kg (175 lb)   SpO2 98%   BMI 34.18 kg/m²     Physical Exam  Vitals and nursing note reviewed.   Constitutional:       Appearance: She is well-developed.   HENT:      Head: Normocephalic and atraumatic.      Nose: Nose normal.   Eyes:      Conjunctiva/sclera: Conjunctivae normal.   Cardiovascular:      Rate and Rhythm: Normal rate.   Pulmonary:      Effort: Pulmonary effort is normal.   Abdominal:      Palpations: Abdomen is soft.   Musculoskeletal:      Cervical back: Neck supple.   Skin:     General: Skin is warm and dry.   Neurological:      Mental Status: She is alert and oriented to person, place, and time.   Psychiatric:         Behavior: Behavior normal.         Thought Content: Thought content normal.         Judgment: Judgment normal.          Result Review :           I have independently reviewed the chest x-ray performed on 4/13/2022, which demonstrates good expansion of bilateral lungs. There is a very small left pleural effusion. No pneumothorax.           Assessment and Plan      Miss Valderrama is a very pleasant 82-year-old lady status post decortication she presents today for a first postoperative visit. We will plan a CT of the chest in 3 months for follow-up. I am ordering home health and physical therapy. I also recommended she start taking Ensure or Boost, to give her some energy, so she can begin doing her exercises and moving about. I also advised the patient to continue using Pulmicort.     Diagnoses and all orders for this visit:    1. Loculated pleural effusion (Primary)  -     Ambulatory Referral to Home Health (Outpatient)  -     CT Chest Without Contrast; Future        Follow Up   No follow-ups on file.  Patient was given instructions and counseling regarding her condition or for health maintenance advice. Please see specific information pulled into the AVS if appropriate.       Transcribed from ambient " dictation for Sabra Kuo MD by Deysi Palafox.  04/14/22   19:44 EDT    Patient verbalized consent to the visit recording.

## 2022-04-15 LAB
FUNGUS WND CULT: NORMAL
FUNGUS WND CULT: NORMAL

## 2022-04-17 ENCOUNTER — HOME CARE VISIT (OUTPATIENT)
Dept: HOME HEALTH SERVICES | Facility: HOME HEALTHCARE | Age: 82
End: 2022-04-17

## 2022-04-18 ENCOUNTER — TELEPHONE (OUTPATIENT)
Dept: INTERNAL MEDICINE | Facility: CLINIC | Age: 82
End: 2022-04-18

## 2022-04-19 ENCOUNTER — HOME CARE VISIT (OUTPATIENT)
Dept: HOME HEALTH SERVICES | Facility: HOME HEALTHCARE | Age: 82
End: 2022-04-19

## 2022-04-20 ENCOUNTER — HOME CARE VISIT (OUTPATIENT)
Dept: HOME HEALTH SERVICES | Facility: HOME HEALTHCARE | Age: 82
End: 2022-04-20

## 2022-04-25 RX ORDER — LEVOTHYROXINE SODIUM 0.05 MG/1
50 TABLET ORAL
Qty: 90 TABLET | Refills: 1 | Status: SHIPPED | OUTPATIENT
Start: 2022-04-25 | End: 2022-11-11

## 2022-04-25 RX ORDER — FUROSEMIDE 20 MG/1
20 TABLET ORAL DAILY
Qty: 90 TABLET | Refills: 1 | Status: SHIPPED | OUTPATIENT
Start: 2022-04-25 | End: 2022-10-05 | Stop reason: SDUPTHER

## 2022-04-25 RX ORDER — AMIODARONE HYDROCHLORIDE 200 MG/1
200 TABLET ORAL DAILY
Qty: 90 TABLET | Refills: 1 | Status: SHIPPED | OUTPATIENT
Start: 2022-04-25 | End: 2023-01-17 | Stop reason: ALTCHOICE

## 2022-04-25 RX ORDER — METOPROLOL SUCCINATE 25 MG/1
25 TABLET, EXTENDED RELEASE ORAL 2 TIMES DAILY
Qty: 180 TABLET | Refills: 1 | Status: SHIPPED | OUTPATIENT
Start: 2022-04-25 | End: 2022-11-11

## 2022-04-25 RX ORDER — SPIRONOLACTONE 25 MG/1
25 TABLET ORAL DAILY
Qty: 90 TABLET | Refills: 1 | Status: SHIPPED | OUTPATIENT
Start: 2022-04-25 | End: 2022-09-29 | Stop reason: SDUPTHER

## 2022-04-27 ENCOUNTER — TELEPHONE (OUTPATIENT)
Dept: CARDIAC REHAB | Facility: HOSPITAL | Age: 82
End: 2022-04-27

## 2022-04-27 NOTE — TELEPHONE ENCOUNTER
Does not want to participate in rehab at this time(phase 2 ) but asked that we call her back in about a month

## 2022-05-09 ENCOUNTER — TELEPHONE (OUTPATIENT)
Dept: CARDIOLOGY | Facility: CLINIC | Age: 82
End: 2022-05-09

## 2022-05-09 ENCOUNTER — OFFICE VISIT (OUTPATIENT)
Dept: INTERNAL MEDICINE | Facility: CLINIC | Age: 82
End: 2022-05-09

## 2022-05-09 VITALS — TEMPERATURE: 97.3 F | WEIGHT: 167 LBS | HEIGHT: 60 IN | BODY MASS INDEX: 32.79 KG/M2

## 2022-05-09 DIAGNOSIS — R60.0 BILATERAL LOWER EXTREMITY EDEMA: Primary | ICD-10-CM

## 2022-05-09 DIAGNOSIS — I10 ESSENTIAL HYPERTENSION: Chronic | ICD-10-CM

## 2022-05-09 PROBLEM — I50.9 ACUTE CHF (CONGESTIVE HEART FAILURE): Status: RESOLVED | Noted: 2022-03-03 | Resolved: 2022-05-09

## 2022-05-09 PROCEDURE — 99214 OFFICE O/P EST MOD 30 MIN: CPT | Performed by: INTERNAL MEDICINE

## 2022-05-09 NOTE — TELEPHONE ENCOUNTER
----- Message from Elina Llanos MA sent at 5/9/2022  8:26 AM EDT -----  Regarding: FW: Referral    ----- Message -----  From: Elina Llanos MA  Sent: 5/9/2022   8:26 AM EDT  To: Yordan Evans MD  Subject: FW: Referral                                       ----- Message -----  From: Jaquelin Valderrama  Sent: 5/6/2022   7:52 PM EDT  To: [facility]  Subject: Referral                                         Dr. Evans    Could you please refer me to a Pulmonologist who might be able to help me with the edema in my feet and legs?  I have had a lot of swelling with pain and itching of my legs and feet.  I have been keeping them elevated on my wedge which helps some.  I have not been able to sleep well for weeks.      Thank you for your help.    Jaquelin Valderrama

## 2022-05-09 NOTE — TELEPHONE ENCOUNTER
Pt contacted us again today and decided to see PCP for issues. Says her symptoms are better today.   No need for referral at this time.

## 2022-05-09 NOTE — PROGRESS NOTES
"Chief Complaint  Edema    Subjective          Jaquelin Valderrama presents to [facility] PRIMARY CARE  History of Present Illness  Here with  as work in to discuss edema- it is getting a lot better but the top of her feet are getting very deep red, worse over the course of the day.  She is using an external compression machine and just started with compression stockings.    BP readings at home 124-145/70-80s.  Very occ higher.     Objective   Vital Signs:  Temp 97.3 °F (36.3 °C)   Ht 152.4 cm (60\")   Wt 75.8 kg (167 lb)   BMI 32.61 kg/m²           Physical Exam  Constitutional:       General: She is not in acute distress.     Appearance: Normal appearance.   Cardiovascular:      Rate and Rhythm: Normal rate.      Pulses: Normal pulses.   Pulmonary:      Effort: Pulmonary effort is normal.      Breath sounds: Normal breath sounds.   Musculoskeletal:      Right lower leg: Edema present.      Left lower leg: Edema present.      Comments: Some hyperemia on top of feet without warmth        Result Review :                 Assessment and Plan    Diagnoses and all orders for this visit:    1. Bilateral lower extremity edema (Primary)  Comments:  much improved- agree with compression stockings, 1 weeks of increased Lasix to 40 mg daily.  Recheck 1 week.     2. Essential hypertension  Comments:  better at home but not great, will recheck wiht higher dose Lasix.              Follow Up   Return in about 1 week (around 5/16/2022) for Recheck.  Patient was given instructions and counseling regarding her condition or for health maintenance advice. Please see specific information pulled into the AVS if appropriate.       "

## 2022-05-16 ENCOUNTER — OFFICE VISIT (OUTPATIENT)
Dept: INTERNAL MEDICINE | Facility: CLINIC | Age: 82
End: 2022-05-16

## 2022-05-16 VITALS
HEIGHT: 60 IN | TEMPERATURE: 97.5 F | SYSTOLIC BLOOD PRESSURE: 126 MMHG | HEART RATE: 80 BPM | BODY MASS INDEX: 31.02 KG/M2 | WEIGHT: 158 LBS | DIASTOLIC BLOOD PRESSURE: 92 MMHG

## 2022-05-16 DIAGNOSIS — R73.9 HYPERGLYCEMIA: ICD-10-CM

## 2022-05-16 DIAGNOSIS — D72.829 LEUKOCYTOSIS, UNSPECIFIED TYPE: ICD-10-CM

## 2022-05-16 DIAGNOSIS — I10 ESSENTIAL HYPERTENSION: ICD-10-CM

## 2022-05-16 DIAGNOSIS — R60.0 BILATERAL LOWER EXTREMITY EDEMA: Primary | ICD-10-CM

## 2022-05-16 DIAGNOSIS — E78.49 OTHER HYPERLIPIDEMIA: ICD-10-CM

## 2022-05-16 PROCEDURE — 99213 OFFICE O/P EST LOW 20 MIN: CPT | Performed by: INTERNAL MEDICINE

## 2022-05-16 NOTE — PROGRESS NOTES
"Chief Complaint  Hypertension    Subjective          Jaquelin Valderrama presents to [facility] PRIMARY [facility]ARE  History of Present Illness  Has been taking the Lasix 40 mg for a week, she feels better.  Weight is down.  She is using pneumatic compression device several hours a day at home.  They feel much better.   Breathing is better, able to lie flat in the bed.   Appetite is good.   Finished her Pulmicort last night     Objective   Vital Signs:  /92   Pulse 80   Temp 97.5 °F (36.4 °C)   Ht 152.4 cm (60\")   Wt 71.7 kg (158 lb)   BMI 30.86 kg/m²           Physical Exam  Constitutional:       Appearance: Normal appearance.   Cardiovascular:      Rate and Rhythm: Normal rate.   Musculoskeletal:      Comments: B lower ext edema is much improved.         Result Review :                 Assessment and Plan    Diagnoses and all orders for this visit:    1. Bilateral lower extremity edema (Primary)  Comments:  would like to go back to furosemide 20 mg - check weights, increase to 40 mg if needed for 1-2 lbs at a time. check BMP today  Orders:  -     Basic Metabolic Panel    2. Essential hypertension  Comments:  controlled.   Orders:  -     TSH; Future    3. Other hyperlipidemia  -     Comprehensive Metabolic Panel; Future  -     Lipid Panel With / Chol / HDL Ratio; Future    4. Hyperglycemia  Comments:  controlled - recheck at /fu  Orders:  -     Hemoglobin A1c; Future    5. Leukocytosis, unspecified type  Comments:  check labs at f/u  Orders:  -     CBC & Differential; Future             Follow Up   Return for Lab Today, Medicare Wellness, Lab Before FUP, Keep previously scheduled appointment.  Patient was given instructions and counseling regarding her condition or for health maintenance advice. Please see specific information pulled into the AVS if appropriate.       "

## 2022-05-17 LAB
BUN SERPL-MCNC: 21 MG/DL (ref 8–27)
BUN/CREAT SERPL: 18 (ref 12–28)
CALCIUM SERPL-MCNC: 9.5 MG/DL (ref 8.7–10.3)
CHLORIDE SERPL-SCNC: 89 MMOL/L (ref 96–106)
CO2 SERPL-SCNC: 29 MMOL/L (ref 20–29)
CREAT SERPL-MCNC: 1.17 MG/DL (ref 0.57–1)
EGFRCR SERPLBLD CKD-EPI 2021: 47 ML/MIN/1.73
GLUCOSE SERPL-MCNC: 194 MG/DL (ref 65–99)
POTASSIUM SERPL-SCNC: 3.4 MMOL/L (ref 3.5–5.2)
SODIUM SERPL-SCNC: 139 MMOL/L (ref 134–144)

## 2022-05-24 ENCOUNTER — OFFICE VISIT (OUTPATIENT)
Dept: CARDIOLOGY | Facility: CLINIC | Age: 82
End: 2022-05-24

## 2022-05-24 VITALS
HEIGHT: 60 IN | WEIGHT: 165 LBS | HEART RATE: 80 BPM | DIASTOLIC BLOOD PRESSURE: 105 MMHG | OXYGEN SATURATION: 99 % | SYSTOLIC BLOOD PRESSURE: 166 MMHG | BODY MASS INDEX: 32.39 KG/M2

## 2022-05-24 DIAGNOSIS — I50.9 ACUTE CONGESTIVE HEART FAILURE, UNSPECIFIED HEART FAILURE TYPE: ICD-10-CM

## 2022-05-24 DIAGNOSIS — I48.91 ATRIAL FIBRILLATION WITH RVR: Primary | ICD-10-CM

## 2022-05-24 DIAGNOSIS — Z95.1 HX OF CABG: ICD-10-CM

## 2022-05-24 DIAGNOSIS — Z79.01 CHRONIC ANTICOAGULATION: ICD-10-CM

## 2022-05-24 PROCEDURE — 93000 ELECTROCARDIOGRAM COMPLETE: CPT | Performed by: INTERNAL MEDICINE

## 2022-05-24 PROCEDURE — 99214 OFFICE O/P EST MOD 30 MIN: CPT | Performed by: INTERNAL MEDICINE

## 2022-05-24 NOTE — PROGRESS NOTES
Encounter Date:05/24/2022  Last seen 4/12/2022      Patient ID: Jaquelin Valderrama is a 82 y.o. female.    Chief Complaint:  Status post CABG  Atrial fibrillation  Hypertension  Dyslipidemia  Hypothyroidism        History of Present Illness  Patient recently was admitted to [facility] with abdominal pain and had a large burden of clot in the urinary bladder and possible anterior bladder perforation.  Patient had Alfonso catheter insertion.  Patient was taken off anticoagulation due to significant problems with the urinary bladder.     Since I have last seen, the patient has been without any chest discomfort ,shortness of breath, palpitations, dizziness or syncope.  Denies having any headache ,abdominal pain ,nausea, vomiting , diarrhea constipation, loss of weight or loss of appetite.  Denies having any excessive bruising ,hematuria or blood in the stool.     Review of all systems negative except as indicated.     Reviewed ROS.     Assessment and Plan         ]]]]]]]]]]]]]]]]]]]]  Impression  ==========  -Recent severe hematuria.-Improved.  CT scan of the abdomen showed significant clot burden in the bladder and probable anterior bladder perforation.     -Progressively worsening shortness of breath and leg edema-better.  Recurrent left pleural effusion which was loculated.  Patient had chest tube and subsequently had video-assisted thoracoscopy with complete decortication on the left side at [facility].  (March 2022.)     -Congestive heart failure  Left ventricle dysfunction with ejection fraction of 30%.     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  Was not  taking thyroid medicine replacement properly.     =================  Plan  ===========  Recent abdominal pain  Work-up showed a CT scan with massive clot burden in the bladder and possible anterior bladder perforation.  Patient is being taken for urological surgery.    Patient had on 3/25/2022  Cystoscopy, clot evacuation and fulguration, cystogram.  No further hematuria although patient had noticed mild blood around the urethra.     Chronic atrial fibrillation-rate is controlled now.  Consider GABRIEL cardioversion for adequate anticoagulation and cardioversion patient has persistent atrial fibrillation.     Anticoagulation  Eliquis is on hold due to recent hematuria.  Patient has restarted Eliquis but it is on hold at this time due to recent possible hematuria  Patient has an appointment to see urologist.    History of significant anemia with hemoglobin of 6.8 due to hematuria and significant blood clot in the bladder.  Hemoglobin 6.3-3/26/2022  Hemoglobin 8.3-8/27/2022      Renal dysfunction  BUN/creatinine-30/1.03     Recent echocardiogram showed severe right ventricle enlargement left atrial enlargement related enlargement and calculated pulmonary artery pressure of 45 mmHg.  Left ventricle dysfunction with ejection fraction of 30%.     Ischemic cardiomyopathy     Current medications include amiodarone aspirin Cardizem 90 mg every 8 hours levothyroxine metoprolol 25 mg twice a day.  Continue Lasix 40 mg p.o. nightly and spironolactone 25 mg a day.     Patient was encouraged to see urologist.  Hopefully Eliquis can be restarted soon.  EKG showed atrial fibrillation with controlled ventricular response.  Consideration for watchman procedure.     Follow-up in the office in 4 weeks with EKG.     Further plan will depend on patient's progress.  ]]]]]]]]]]]]]]]]]                  Diagnosis Plan   1. Atrial fibrillation with RVR (HCC)  ECG 12 Lead   2. Hx of CABG  ECG 12 Lead   3. Chronic anticoagulation  ECG 12 Lead   4.  Acute congestive heart failure, unspecified heart failure type (HCC)  ECG 12 Lead   LAB RESULTS (LAST 7 DAYS)    CBC        BMP        CMP         BNP        TROPONIN        CoAg        Creatinine Clearance  Estimated Creatinine Clearance: 34.6 mL/min (A) (by C-G formula based on SCr of 1.17 mg/dL (H)).    ABG        Radiology  No radiology results for the last day                The following portions of the patient's history were reviewed and updated as appropriate: allergies, current medications, past family history, past medical history, past social history, past surgical history and problem list.    Review of Systems   Constitutional: Positive for malaise/fatigue.   Cardiovascular: Negative for chest pain, leg swelling, palpitations and syncope.   Respiratory: Positive for shortness of breath.    Skin: Negative for rash.   Gastrointestinal: Positive for nausea. Negative for vomiting.   Neurological: Positive for weakness. Negative for dizziness, light-headedness and numbness.   All other systems reviewed and are negative.        Current Outpatient Medications:   •  amiodarone (PACERONE) 200 MG tablet, Take 1 tablet by mouth Daily., Disp: 90 tablet, Rfl: 1  •  apixaban (ELIQUIS) 5 MG tablet tablet, Take 5 mg by mouth 2 (Two) Times a Day., Disp: , Rfl:   •  arformoterol (BROVANA) 15 MCG/2ML nebulizer solution, Take 2 mL by nebulization 2 (Two) Times a Day., Disp: 120 mL, Rfl: 2  •  budesonide (PULMICORT) 0.5 MG/2ML nebulizer solution, Take 4 mL by nebulization 2 (Two) Times a Day., Disp: , Rfl:   •  Cholecalciferol (VITAMIN D3) 2000 units tablet, Take 1 tablet by mouth Daily., Disp: , Rfl:   •  Homeopathic Products (EARACHE DROPS OT), Administer  into ear(s) as directed by provider., Disp: , Rfl:   •  levothyroxine (SYNTHROID, LEVOTHROID) 50 MCG tablet, Take 1 tablet by mouth Every Morning., Disp: 90 tablet, Rfl: 1  •  metoprolol succinate XL (TOPROL-XL) 25 MG 24 hr tablet, Take 1 tablet by mouth 2 (Two) Times a  Day., Disp: 180 tablet, Rfl: 1  •  Multiple Vitamins-Minerals (MULTIVITAMIN ADULT EXTRA C PO), Take 1 tablet by mouth Daily., Disp: , Rfl:   •  spironolactone (ALDACTONE) 25 MG tablet, Take 1 tablet by mouth Daily., Disp: 90 tablet, Rfl: 1  •  furosemide (LASIX) 20 MG tablet, Take 1 tablet by mouth Daily. (Patient taking differently: Take 40 mg by mouth Daily.), Disp: 90 tablet, Rfl: 1    Allergies   Allergen Reactions   • Prednisone Other (See Comments)     Increased BP       Family History   Problem Relation Age of Onset   • Heart disease Mother    • Lung cancer Father    • Diabetes Other        Past Surgical History:   Procedure Laterality Date   • APPENDECTOMY     • CARDIAC CATHETERIZATION  2012    x3    • CORONARY ARTERY BYPASS GRAFT  12/2014   • CORONARY STENT PLACEMENT      x3   • CYSTOSCOPY WITH CLOT EVACUATION N/A 3/25/2022    Procedure: CYSTOSCOPY WITH CLOT EVACUATION AND FULGURATION ;  Surgeon: Sukhdev Poole MD;  Location: [facility];  Service: Urology;  Laterality: N/A;   • HARDWARE REMOVAL Right 7/21/2020    Procedure: ANKLE/FOOT HARDWARE REMOVAL;  Surgeon: Lincoln Sibley MD;  Location: [facility];  Service: Orthopedics;  Laterality: Right;   • ORIF ANKLE FRACTURE Right 04/23/2019    [facility]- [facility] Lizarraga   • THORACOSCOPY WITH DECORTICATION Left 3/18/2022    Procedure: LEFT THORACOSCOPY VIDEO ASSISTED WITH COMPLETE DECORTICATION;  Surgeon: Sabra Kuo MD;  Location: [facility];  Service: Thoracic;  Laterality: Left;       Past Medical History:   Diagnosis Date   • Astigmatism, bilateral 11/19/2019   • Benign essential hypertension    • Bilateral presbyopia 11/19/2019   • CAD (coronary artery disease)    • Closed right ankle fracture    • COVID-19 vaccination refused    • Hyperlipidemia    • Hypothyroidism    • Influenza vaccine needed    • Myocardial infarction (HCC)    • Prediabetes    • Pseudophakia, both eyes 11/19/2019   • Thoracic back pain        Family History   Problem  "Relation Age of Onset   • Heart disease Mother    • Lung cancer Father    • Diabetes Other        Social History     Socioeconomic History   • Marital status:    Tobacco Use   • Smoking status: Former Smoker     Types: Cigarettes     Quit date: 1980     Years since quittin.4   • Smokeless tobacco: Never Used   • Tobacco comment: Social Drinker   Vaping Use   • Vaping Use: Never used   Substance and Sexual Activity   • Alcohol use: Yes     Comment: mod    • Drug use: No   • Sexual activity: Defer           ECG 12 Lead    Date/Time: 2022 5:42 PM  Performed by: Yordan Evans MD  Authorized by: Yordan Evans MD   Comparison: compared with previous ECG   Similar to previous ECG  Comparison to previous ECG: Atrial fibrillation with controlled ventricular response 84/min nonspecific ST-T changes normal axis normal intervals no ectopy no significant change from 3/25/2022                Objective:       Physical Exam    BP (!) 166/105 (BP Location: Left arm, Patient Position: Sitting, Cuff Size: Adult) Comment: recheck  Pulse 80   Ht 152.4 cm (60\")   Wt 74.8 kg (165 lb)   SpO2 99%   BMI 32.22 kg/m²   The patient is alert, oriented and in no distress.    Vital signs as noted above.    Head and neck revealed no carotid bruits or jugular venous distension.  No thyromegaly or lymphadenopathy is present.    Lungs clear.  No wheezing.  Breath sounds are normal bilaterally.    Heart normal first and second heart sounds.  No murmur..  No pericardial rub is present.  No gallop is present.    Abdomen soft and nontender.  No organomegaly is present.    Extremities revealed good peripheral pulses without any pedal edema.    Skin warm and dry.    Musculoskeletal system is grossly normal.    CNS grossly normal.    Reviewed and updated.        "

## 2022-05-26 ENCOUNTER — TELEPHONE (OUTPATIENT)
Dept: INTERNAL MEDICINE | Facility: CLINIC | Age: 82
End: 2022-05-26

## 2022-05-26 NOTE — TELEPHONE ENCOUNTER
Hub staff attempted to follow warm transfer process and was unsuccessful     Caller: Jaquelin Valderrama    Relationship to patient: Self    Best call back number: [phone]    Patient is needing: PATIENT RETURNING PHONE CALL TO LEATHA

## 2022-05-26 NOTE — TELEPHONE ENCOUNTER
Spoke with Jaquelin she is concerned about her recent labs.  She ants to know if you suggest she see nephrologist and if so can we refer her to someone?    2nd pt said last Friday she went to urinate and after while wiping she had some blood.  She told her Cardio Dr.and he told her to contact you.  She has had not issues since

## 2022-05-26 NOTE — TELEPHONE ENCOUNTER
PATIENTS  MILLIE IS CALLING IN STATING THAT THE PATIENT HAS HAD 3 MAJOR SURGERIES AND IS EXPERIENCING SOME ISSUES.  MILLIE SAYS THAT THEY CALLED IN THIS MORNING TO PUT A MESSAGE BACK TO DR VÁZQUEZ (I INFORMED THAT THE STAFF HAS 24-48 HOURS TO RESPOND) AND WANT TO BE CALLED TO DISCUSS SOME ISSUES SO THAT THEY CAN GET SOME ANSWERS FOR THE PATIENT    MILLIE CALL BACK  [phone]

## 2022-05-26 NOTE — TELEPHONE ENCOUNTER
Tell her that I see no need for a nephrologist at this time.  Her kidney has returned to normal after her hospitalization.   Would examine her if blood returns.

## 2022-06-02 ENCOUNTER — TELEPHONE (OUTPATIENT)
Dept: INTERNAL MEDICINE | Facility: CLINIC | Age: 82
End: 2022-06-02

## 2022-06-02 NOTE — TELEPHONE ENCOUNTER
PT declined appointment at this time understood to go to ER is she gets worse.  She will follow up with me on Monday

## 2022-06-02 NOTE — TELEPHONE ENCOUNTER
Patient has some concerns: wheezing, yellow phlem, no appetite, feels like she is not breathing well, having a hard time going to sleep-afraid she won't wake up, No exposure to anyone with Covid, this has been going on for 1 1/2 weeks, [phone]

## 2022-06-06 ENCOUNTER — TELEPHONE (OUTPATIENT)
Dept: INTERNAL MEDICINE | Facility: CLINIC | Age: 82
End: 2022-06-06

## 2022-06-06 NOTE — TELEPHONE ENCOUNTER
Msg Correction:  Appointment with for Jaquelin Valderrama not Ms Reynoso.  Patient scheduled for 6/6/2022 at 10:30.

## 2022-06-06 NOTE — TELEPHONE ENCOUNTER
Caller: Jaquelin Valderrama    Relationship to patient: Self    Best call back number: [phone]    Requested date: 6/7    If rescheduling, when is the original appointment: PATIENT STATES THAT SHE WAS SPEAKING WITH LEATHA LAST WEEK AND SHE WAS GOING TO WORK HER IN TODAY WITH DR. VÁZQUEZ BUT SHE WILL HAVE A RIDE TOMORROW AND WOULD LIKE A CALL TO SCHEDULE FOR THEN INSTEAD.     Additional notes: YES

## 2022-06-07 ENCOUNTER — LAB (OUTPATIENT)
Dept: LAB | Facility: HOSPITAL | Age: 82
End: 2022-06-07

## 2022-06-07 ENCOUNTER — HOSPITAL ENCOUNTER (OUTPATIENT)
Dept: GENERAL RADIOLOGY | Facility: HOSPITAL | Age: 82
Discharge: HOME | End: 2022-06-07

## 2022-06-07 ENCOUNTER — HOSPITAL ENCOUNTER (INPATIENT)
Facility: HOSPITAL | Age: 82
LOS: 2 days | Discharge: LEFT BEFORE DISCHARGE | End: 2022-06-10
Attending: EMERGENCY MEDICINE | Admitting: STUDENT IN AN ORGANIZED HEALTH CARE EDUCATION/TRAINING PROGRAM

## 2022-06-07 ENCOUNTER — OFFICE VISIT (OUTPATIENT)
Dept: INTERNAL MEDICINE | Facility: CLINIC | Age: 82
End: 2022-06-07

## 2022-06-07 ENCOUNTER — APPOINTMENT (OUTPATIENT)
Dept: CT IMAGING | Facility: HOSPITAL | Age: 82
End: 2022-06-07

## 2022-06-07 VITALS
BODY MASS INDEX: 30.23 KG/M2 | HEART RATE: 82 BPM | RESPIRATION RATE: 10 BRPM | OXYGEN SATURATION: 97 % | TEMPERATURE: 97.4 F | HEIGHT: 60 IN | SYSTOLIC BLOOD PRESSURE: 154 MMHG | DIASTOLIC BLOOD PRESSURE: 106 MMHG | WEIGHT: 154 LBS

## 2022-06-07 DIAGNOSIS — U07.1 COVID-19 VIRUS INFECTION: ICD-10-CM

## 2022-06-07 DIAGNOSIS — I50.31 ACUTE DIASTOLIC CHF (CONGESTIVE HEART FAILURE): Primary | ICD-10-CM

## 2022-06-07 DIAGNOSIS — J18.9 PNEUMONIA OF BOTH LUNGS DUE TO INFECTIOUS ORGANISM, UNSPECIFIED PART OF LUNG: Primary | ICD-10-CM

## 2022-06-07 LAB
ALBUMIN SERPL-MCNC: 3.9 G/DL (ref 3.5–5.2)
ALBUMIN/GLOB SERPL: 1.6 G/DL
ALP SERPL-CCNC: 118 U/L (ref 39–117)
ALT SERPL W P-5'-P-CCNC: 30 U/L (ref 1–33)
ANION GAP SERPL CALCULATED.3IONS-SCNC: 13 MMOL/L (ref 5–15)
AST SERPL-CCNC: 28 U/L (ref 1–32)
BASOPHILS # BLD AUTO: 0.01 10*3/MM3 (ref 0–0.2)
BASOPHILS NFR BLD AUTO: 0.2 % (ref 0–1.5)
BILIRUB SERPL-MCNC: 0.8 MG/DL (ref 0–1.2)
BUN SERPL-MCNC: 14 MG/DL (ref 8–23)
BUN/CREAT SERPL: 15.9 (ref 7–25)
CALCIUM SPEC-SCNC: 9 MG/DL (ref 8.6–10.5)
CHLORIDE SERPL-SCNC: 88 MMOL/L (ref 98–107)
CO2 SERPL-SCNC: 31 MMOL/L (ref 22–29)
CREAT SERPL-MCNC: 0.88 MG/DL (ref 0.57–1)
DEPRECATED RDW RBC AUTO: 48.7 FL (ref 37–54)
EGFRCR SERPLBLD CKD-EPI 2021: 66.5 ML/MIN/1.73
EOSINOPHIL # BLD AUTO: 0.01 10*3/MM3 (ref 0–0.4)
EOSINOPHIL NFR BLD AUTO: 0.2 % (ref 0.3–6.2)
ERYTHROCYTE [DISTWIDTH] IN BLOOD BY AUTOMATED COUNT: 14.9 % (ref 12.3–15.4)
GLOBULIN UR ELPH-MCNC: 2.4 GM/DL
GLUCOSE SERPL-MCNC: 190 MG/DL (ref 65–99)
HCT VFR BLD AUTO: 46.8 % (ref 34–46.6)
HGB BLD-MCNC: 15.5 G/DL (ref 12–15.9)
IMM GRANULOCYTES # BLD AUTO: 0.02 10*3/MM3 (ref 0–0.05)
IMM GRANULOCYTES NFR BLD AUTO: 0.4 % (ref 0–0.5)
LYMPHOCYTES # BLD AUTO: 0.67 10*3/MM3 (ref 0.7–3.1)
LYMPHOCYTES NFR BLD AUTO: 12.2 % (ref 19.6–45.3)
MCH RBC QN AUTO: 30.4 PG (ref 26.6–33)
MCHC RBC AUTO-ENTMCNC: 33.1 G/DL (ref 31.5–35.7)
MCV RBC AUTO: 91.8 FL (ref 79–97)
MONOCYTES # BLD AUTO: 0.31 10*3/MM3 (ref 0.1–0.9)
MONOCYTES NFR BLD AUTO: 5.6 % (ref 5–12)
NEUTROPHILS NFR BLD AUTO: 4.49 10*3/MM3 (ref 1.7–7)
NEUTROPHILS NFR BLD AUTO: 81.4 % (ref 42.7–76)
NRBC BLD AUTO-RTO: 0 /100 WBC (ref 0–0.2)
NT-PROBNP SERPL-MCNC: ABNORMAL PG/ML (ref 0–1800)
PLATELET # BLD AUTO: 251 10*3/MM3 (ref 140–450)
PMV BLD AUTO: 9.3 FL (ref 6–12)
POTASSIUM SERPL-SCNC: 3.1 MMOL/L (ref 3.5–5.2)
PROT SERPL-MCNC: 6.3 G/DL (ref 6–8.5)
RBC # BLD AUTO: 5.1 10*6/MM3 (ref 3.77–5.28)
SODIUM SERPL-SCNC: 132 MMOL/L (ref 136–145)
WBC NRBC COR # BLD: 5.51 10*3/MM3 (ref 3.4–10.8)

## 2022-06-07 PROCEDURE — 99215 OFFICE O/P EST HI 40 MIN: CPT | Performed by: INTERNAL MEDICINE

## 2022-06-07 PROCEDURE — 99284 EMERGENCY DEPT VISIT MOD MDM: CPT

## 2022-06-07 PROCEDURE — 71046 X-RAY EXAM CHEST 2 VIEWS: CPT

## 2022-06-07 PROCEDURE — 80053 COMPREHEN METABOLIC PANEL: CPT | Performed by: INTERNAL MEDICINE

## 2022-06-07 PROCEDURE — U0003 INFECTIOUS AGENT DETECTION BY NUCLEIC ACID (DNA OR RNA); SEVERE ACUTE RESPIRATORY SYNDROME CORONAVIRUS 2 (SARS-COV-2) (CORONAVIRUS DISEASE [COVID-19]), AMPLIFIED PROBE TECHNIQUE, MAKING USE OF HIGH THROUGHPUT TECHNOLOGIES AS DESCRIBED BY CMS-2020-01-R: HCPCS | Performed by: EMERGENCY MEDICINE

## 2022-06-07 PROCEDURE — 71275 CT ANGIOGRAPHY CHEST: CPT

## 2022-06-07 PROCEDURE — 83880 ASSAY OF NATRIURETIC PEPTIDE: CPT | Performed by: INTERNAL MEDICINE

## 2022-06-07 PROCEDURE — U0005 INFEC AGEN DETEC AMPLI PROBE: HCPCS | Performed by: EMERGENCY MEDICINE

## 2022-06-07 PROCEDURE — 85025 COMPLETE CBC W/AUTO DIFF WBC: CPT | Performed by: INTERNAL MEDICINE

## 2022-06-07 PROCEDURE — 36415 COLL VENOUS BLD VENIPUNCTURE: CPT | Performed by: INTERNAL MEDICINE

## 2022-06-07 RX ORDER — POTASSIUM CHLORIDE 750 MG/1
10 TABLET, FILM COATED, EXTENDED RELEASE ORAL 2 TIMES DAILY
Qty: 60 TABLET | Refills: 0 | Status: SHIPPED | OUTPATIENT
Start: 2022-06-07 | End: 2022-08-18

## 2022-06-07 RX ORDER — DOXYCYCLINE HYCLATE 100 MG/1
100 CAPSULE ORAL 2 TIMES DAILY
Qty: 14 CAPSULE | Refills: 0 | Status: SHIPPED | OUTPATIENT
Start: 2022-06-07 | End: 2023-01-17

## 2022-06-07 NOTE — ED TRIAGE NOTES
Coughing up blood today.  She reports she has chf.  She feels nauseated    Patient was placed in face mask during first look triage.  Patient was wearing a face mask throughout encounter.  I wore personal protective equipment throughout the encounter.  Hand hygiene was performed before and after patient encounter.

## 2022-06-07 NOTE — PROGRESS NOTES
"Chief Complaint  Cough    Subjective        Jaquelin Valderrama presents to [facility]  History of Present Illness she has developed a deep cough, was dry for a while but now no longer.  She is taking Mucinex that helps but she feels so dried out she can't tolerate much.  Sinuses aren't bad.  She is having pain in her back and can't lay down in bed due to pain and SOB.   She is not eating well. -weight back down.  Brought patient back after stat labs and xray done- shows worsening pulmonary vascular congestion.  ? Atypical pneumonia but less likely.  I question this because her  has a similar cough.   Reviewed echo from recent hosp. EF 30% with severe atrial enlargement.   Objective   Vital Signs:  BP (!) 154/106   Pulse 82   Temp 97.4 °F (36.3 °C)   Resp 10   Ht 152.4 cm (60\")   Wt 69.9 kg (154 lb)   SpO2 97%   BMI 30.08 kg/m²            Physical Exam  Constitutional:       General: She is not in acute distress.     Appearance: She is ill-appearing. She is not toxic-appearing.   Cardiovascular:      Rate and Rhythm: Normal rate and regular rhythm.   Pulmonary:      Breath sounds: Decreased air movement present. Examination of the right-upper field reveals decreased breath sounds. Examination of the left-upper field reveals decreased breath sounds. Examination of the right-lower field reveals decreased breath sounds. Examination of the left-lower field reveals decreased breath sounds. Decreased breath sounds present.      Comments: Increased work of breathing  Musculoskeletal:      Right lower leg: Right lower leg edema: trace.      Left lower leg: Left lower leg edema: trace.   Lymphadenopathy:      Cervical: No cervical adenopathy.   Neurological:      General: No focal deficit present.   Psychiatric:         Mood and Affect: Mood normal.        Result Review :  The following data was reviewed by: Minerva Chatterjee MD on 06/07/2022:  Common labs    Common Labsle 3/29/22 3/29/22 " 5/16/22 6/7/22    0428 0428     Glucose  129 (A) 194 (A)    BUN  12 21    Creatinine  0.75 1.17 (A)    Sodium  131 (A) 139    Potassium  3.9 3.4 (A)    Chloride  91 (A) 89 (A)    Calcium  8.3 (A) 9.5    WBC 8.60   5.51   Hemoglobin 8.6 (A)   15.5   Hematocrit 24.4 (A)   46.8 (A)   Platelets 273   251   (A) Abnormal value            Data reviewed: Radiologic studies CXR          Assessment and Plan   Diagnoses and all orders for this visit:    1. Acute diastolic CHF (congestive heart failure) (HCC) (Primary)  Comments:  lab/XRay reviewed-- flourid CHF, refuses hospitalization, double diuretic, add abx.  Recheck 24 hours or sooner if worsens.   in agreement.   Orders:  -     CBC & Differential  -     BNP  -     Comprehensive Metabolic Panel  -     XR Chest 2 View    Other orders  -     doxycycline (VIBRAMYCIN) 100 MG capsule; Take 1 capsule by mouth 2 (Two) Times a Day.  Dispense: 14 capsule; Refill: 0           I spent 60 minutes caring for Jaquelin on this date of service. This time includes time spent by me in the following activities:performing a medically appropriate examination and/or evaluation , counseling and educating the patient/family/caregiver and ordering medications, tests, or procedures  Follow Up   Return in about 1 day (around 6/8/2022) for Recheck.  Patient was given instructions and counseling regarding her condition or for health maintenance advice. Please see specific information pulled into the AVS if appropriate.

## 2022-06-08 PROBLEM — U07.1 COVID-19 VIRUS DETECTED: Status: ACTIVE | Noted: 2022-06-08

## 2022-06-08 PROBLEM — J18.9 PNEUMONIA OF BOTH LUNGS DUE TO INFECTIOUS ORGANISM, UNSPECIFIED PART OF LUNG: Status: ACTIVE | Noted: 2022-06-08

## 2022-06-08 LAB
D-LACTATE SERPL-SCNC: 1.6 MMOL/L (ref 0.5–2)
SARS-COV-2 RNA RESP QL NAA+PROBE: DETECTED

## 2022-06-08 PROCEDURE — 25010000002 CEFEPIME PER 500 MG: Performed by: EMERGENCY MEDICINE

## 2022-06-08 PROCEDURE — 83605 ASSAY OF LACTIC ACID: CPT | Performed by: EMERGENCY MEDICINE

## 2022-06-08 PROCEDURE — 25010000002 FUROSEMIDE PER 20 MG: Performed by: STUDENT IN AN ORGANIZED HEALTH CARE EDUCATION/TRAINING PROGRAM

## 2022-06-08 PROCEDURE — 25010000002 VANCOMYCIN 10 G RECONSTITUTED SOLUTION: Performed by: EMERGENCY MEDICINE

## 2022-06-08 PROCEDURE — 87040 BLOOD CULTURE FOR BACTERIA: CPT | Performed by: EMERGENCY MEDICINE

## 2022-06-08 PROCEDURE — 0 IOPAMIDOL PER 1 ML: Performed by: EMERGENCY MEDICINE

## 2022-06-08 PROCEDURE — 25010000002 ENOXAPARIN PER 10 MG: Performed by: NURSE PRACTITIONER

## 2022-06-08 RX ORDER — SODIUM CHLORIDE 0.9 % (FLUSH) 0.9 %
10 SYRINGE (ML) INJECTION AS NEEDED
Status: DISCONTINUED | OUTPATIENT
Start: 2022-06-08 | End: 2022-06-10 | Stop reason: HOSPADM

## 2022-06-08 RX ORDER — ACETAMINOPHEN 160 MG/5ML
650 SOLUTION ORAL EVERY 4 HOURS PRN
Status: DISCONTINUED | OUTPATIENT
Start: 2022-06-08 | End: 2022-06-10 | Stop reason: HOSPADM

## 2022-06-08 RX ORDER — POTASSIUM CHLORIDE 750 MG/1
10 TABLET, FILM COATED, EXTENDED RELEASE ORAL 2 TIMES DAILY
Refills: 0 | Status: DISCONTINUED | OUTPATIENT
Start: 2022-06-08 | End: 2022-06-10 | Stop reason: HOSPADM

## 2022-06-08 RX ORDER — DEXTROMETHORPHAN POLISTIREX 30 MG/5ML
30 SUSPENSION ORAL EVERY 12 HOURS PRN
Status: DISCONTINUED | OUTPATIENT
Start: 2022-06-08 | End: 2022-06-10 | Stop reason: HOSPADM

## 2022-06-08 RX ORDER — SODIUM CHLORIDE 0.9 % (FLUSH) 0.9 %
10 SYRINGE (ML) INJECTION EVERY 12 HOURS SCHEDULED
Status: DISCONTINUED | OUTPATIENT
Start: 2022-06-08 | End: 2022-06-10 | Stop reason: HOSPADM

## 2022-06-08 RX ORDER — FUROSEMIDE 20 MG/1
20 TABLET ORAL DAILY
Status: DISCONTINUED | OUTPATIENT
Start: 2022-06-08 | End: 2022-06-08

## 2022-06-08 RX ORDER — ACETAMINOPHEN 325 MG/1
650 TABLET ORAL EVERY 4 HOURS PRN
Status: DISCONTINUED | OUTPATIENT
Start: 2022-06-08 | End: 2022-06-10 | Stop reason: HOSPADM

## 2022-06-08 RX ORDER — ACETAMINOPHEN 650 MG/1
650 SUPPOSITORY RECTAL EVERY 4 HOURS PRN
Status: DISCONTINUED | OUTPATIENT
Start: 2022-06-08 | End: 2022-06-10 | Stop reason: HOSPADM

## 2022-06-08 RX ORDER — CALCIUM CARBONATE 200(500)MG
2 TABLET,CHEWABLE ORAL 2 TIMES DAILY PRN
Status: DISCONTINUED | OUTPATIENT
Start: 2022-06-08 | End: 2022-06-10 | Stop reason: HOSPADM

## 2022-06-08 RX ORDER — LEVOTHYROXINE SODIUM 0.05 MG/1
50 TABLET ORAL
Status: DISCONTINUED | OUTPATIENT
Start: 2022-06-08 | End: 2022-06-10 | Stop reason: HOSPADM

## 2022-06-08 RX ORDER — ONDANSETRON 4 MG/1
4 TABLET, FILM COATED ORAL EVERY 6 HOURS PRN
Status: DISCONTINUED | OUTPATIENT
Start: 2022-06-08 | End: 2022-06-10 | Stop reason: HOSPADM

## 2022-06-08 RX ORDER — METOPROLOL SUCCINATE 25 MG/1
25 TABLET, EXTENDED RELEASE ORAL 2 TIMES DAILY
Status: DISCONTINUED | OUTPATIENT
Start: 2022-06-08 | End: 2022-06-10 | Stop reason: HOSPADM

## 2022-06-08 RX ORDER — AMIODARONE HYDROCHLORIDE 200 MG/1
200 TABLET ORAL DAILY
Status: DISCONTINUED | OUTPATIENT
Start: 2022-06-08 | End: 2022-06-10 | Stop reason: HOSPADM

## 2022-06-08 RX ORDER — ONDANSETRON 2 MG/ML
4 INJECTION INTRAMUSCULAR; INTRAVENOUS EVERY 6 HOURS PRN
Status: DISCONTINUED | OUTPATIENT
Start: 2022-06-08 | End: 2022-06-10 | Stop reason: HOSPADM

## 2022-06-08 RX ORDER — NITROGLYCERIN 0.4 MG/1
0.4 TABLET SUBLINGUAL
Status: DISCONTINUED | OUTPATIENT
Start: 2022-06-08 | End: 2022-06-10 | Stop reason: HOSPADM

## 2022-06-08 RX ORDER — ENOXAPARIN SODIUM 100 MG/ML
40 INJECTION SUBCUTANEOUS EVERY 24 HOURS
Status: DISCONTINUED | OUTPATIENT
Start: 2022-06-08 | End: 2022-06-08

## 2022-06-08 RX ORDER — FUROSEMIDE 10 MG/ML
40 INJECTION INTRAMUSCULAR; INTRAVENOUS ONCE
Status: COMPLETED | OUTPATIENT
Start: 2022-06-08 | End: 2022-06-08

## 2022-06-08 RX ADMIN — CEFEPIME HYDROCHLORIDE 2 G: 2 INJECTION, POWDER, FOR SOLUTION INTRAVENOUS at 01:44

## 2022-06-08 RX ADMIN — VANCOMYCIN HYDROCHLORIDE 1500 MG: 10 INJECTION, POWDER, LYOPHILIZED, FOR SOLUTION INTRAVENOUS at 04:30

## 2022-06-08 RX ADMIN — Medication 10 ML: at 02:51

## 2022-06-08 RX ADMIN — APIXABAN 5 MG: 5 TABLET, FILM COATED ORAL at 20:18

## 2022-06-08 RX ADMIN — POTASSIUM CHLORIDE 10 MEQ: 750 TABLET, EXTENDED RELEASE ORAL at 12:18

## 2022-06-08 RX ADMIN — Medication 10 ML: at 20:19

## 2022-06-08 RX ADMIN — POTASSIUM CHLORIDE 10 MEQ: 750 TABLET, EXTENDED RELEASE ORAL at 20:18

## 2022-06-08 RX ADMIN — AMIODARONE HYDROCHLORIDE 200 MG: 200 TABLET ORAL at 12:18

## 2022-06-08 RX ADMIN — LEVOTHYROXINE SODIUM 50 MCG: 0.05 TABLET ORAL at 12:18

## 2022-06-08 RX ADMIN — Medication 10 ML: at 09:35

## 2022-06-08 RX ADMIN — APIXABAN 5 MG: 5 TABLET, FILM COATED ORAL at 12:18

## 2022-06-08 RX ADMIN — IOPAMIDOL 95 ML: 755 INJECTION, SOLUTION INTRAVENOUS at 00:02

## 2022-06-08 RX ADMIN — METOPROLOL SUCCINATE 25 MG: 25 TABLET, EXTENDED RELEASE ORAL at 20:18

## 2022-06-08 RX ADMIN — FUROSEMIDE 40 MG: 10 INJECTION, SOLUTION INTRAMUSCULAR; INTRAVENOUS at 12:18

## 2022-06-08 RX ADMIN — METOPROLOL SUCCINATE 25 MG: 25 TABLET, EXTENDED RELEASE ORAL at 12:18

## 2022-06-08 RX ADMIN — ENOXAPARIN SODIUM 40 MG: 100 INJECTION SUBCUTANEOUS at 09:34

## 2022-06-08 NOTE — PROGRESS NOTES
[facility] Clinical Pharmacy Services: Enoxaparin Consult    Jaquelin Valderrama has a pharmacy consult to dose prophylactic enoxaparin per JOEY DIAZ's request.     Indication: Prophylaxis of Venous Thromboembolism  Home Anticoagulation: Apixaban  5mg BID last dose taken yesterday afternoon    Relevant clinical data and objective history reviewed:  82 y.o. female       There is no height or weight on file to calculate BMI.   Results from last 7 days   Lab Units 06/07/22  1129   PLATELETS 10*3/mm3 251     Estimated Creatinine Clearance: 44.7 mL/min (by C-G formula based on SCr of 0.88 mg/dL).    Assessment/Plan    Will start patient on 40mg subcutaneous every 24 hours, adjusted for renal function. Consult order will be discontinued but pharmacy will continue to follow.     Lit Tobin [facility]  Clinical Pharmacist

## 2022-06-08 NOTE — ED NOTES
Pt ok to drink per MD, pt provided ice chips and ice water per request.  Warm blankets provided at this time for comfort.  Call light in reach and pt spouse at bedside.

## 2022-06-08 NOTE — PLAN OF CARE
Goal Outcome Evaluation:  Plan of Care Reviewed With: patient        Progress: no change  Outcome Evaluation: Patient came to ER with complaints of shortness of breath for the past several months that has continued worsened throughout the day today.  She also complains of hemoptysis that developed shortly thereafter.  She reports that she is coughing up bright red blood with each cough.  She has had the shortness of breath and has a history of atrial fibrillation with congestive heart failure.  She does take Eliquis for anticoagulation. Patient remains afib on the monitor, SATs in low to mid 90s on RA. Patient is receiving IV antibiotics, will continue to monitor.

## 2022-06-08 NOTE — ED PROVIDER NOTES
EMERGENCY DEPARTMENT ENCOUNTER    CHIEF COMPLAINT  Chief Complaint: Shortness of breath/hemoptysis  History given by: Patient  History limited by: None  Room Number: N625/1  PMD: Minerva Chatterjee MD      HPI:  Pt is a 82 y.o. female who presents complaining of shortness of breath that has been present for the past several months that steadily worsened throughout the day today.  She also complains of hemoptysis that developed shortly thereafter.  She reports that she is coughing up bright red blood with each cough.  She has had the shortness of breath and has a history of atrial fibrillation with congestive heart failure.  She does take Eliquis for anticoagulation.  She denies chest pain, fever/chills, nausea/vomiting, or abdominal pain.  She reports that she saw her primary care provider today who sent her here for further work-up and admission.    Duration: Shortness of breath for months, hemoptysis today  Onset: Gradual  Location: Pulmonary  Radiation: None  Quality: Hemoptysis/dyspnea  Intensity/Severity: Moderate  Progression: Worsening  Associated Symptoms: Cough  Aggravating Factors: Exertion  Alleviating Factors: None  Previous Episodes: Yes, history of CHF  Treatment before arrival: None    PAST MEDICAL HISTORY  Active Ambulatory Problems     Diagnosis Date Noted   • Other hyperlipidemia 05/07/2019   • Essential hypertension 05/07/2019   • Hypothyroidism 05/07/2019   • Coronary artery disease involving coronary bypass graft of native heart without angina pectoris 05/07/2019   • Astigmatism, bilateral 11/19/2019   • Bilateral presbyopia 11/19/2019   • Pseudophakia, both eyes 11/19/2019   • Atrial fibrillation (HCC) 03/03/2022   • Elevated LFTs 03/03/2022   • Elevated TSH 03/03/2022   • Acute hyperglycemia 03/03/2022   • Obesity (BMI 30-39.9) 03/03/2022   • Loculated pleural effusion 03/15/2022   • Systolic HF (heart failure) (HCC) 03/25/2022   • Leukocytosis 03/25/2022     Resolved Ambulatory Problems      Diagnosis Date Noted   • Pain in right ankle 2019   • Painful orthopaedic hardware ([facility]) 07/10/2020   • Acute CHF (congestive heart failure) ([facility]) 2022   • Acute hyponatremia 2022   • Acute urinary obstruction 2022   • Gross hematuria 2022   • Acute UTI (urinary tract infection) 2022   • Hypotension 2022     Past Medical History:   Diagnosis Date   • Benign essential hypertension    • CAD (coronary artery disease)    • Closed right ankle fracture    • COVID-19 vaccination refused    • Hyperlipidemia    • Influenza vaccine needed    • Myocardial infarction ([facility])    • Prediabetes    • Thoracic back pain        PAST SURGICAL HISTORY  Past Surgical History:   Procedure Laterality Date   • APPENDECTOMY     • CARDIAC CATHETERIZATION  2012    x3    • CORONARY ARTERY BYPASS GRAFT  2014   • CORONARY STENT PLACEMENT      x3   • CYSTOSCOPY WITH CLOT EVACUATION N/A 3/25/2022    Procedure: CYSTOSCOPY WITH CLOT EVACUATION AND FULGURATION ;  Surgeon: Sukhdev Poole MD;  Location: [facility];  Service: Urology;  Laterality: N/A;   • HARDWARE REMOVAL Right 2020    Procedure: ANKLE/FOOT HARDWARE REMOVAL;  Surgeon: Lincoln Sibley MD;  Location: [facility];  Service: Orthopedics;  Laterality: Right;   • ORIF ANKLE FRACTURE Right 2019    Radha Vazquez Mem   • THORACOSCOPY WITH DECORTICATION Left 3/18/2022    Procedure: LEFT THORACOSCOPY VIDEO ASSISTED WITH COMPLETE DECORTICATION;  Surgeon: Sabra Kuo MD;  Location: [facility];  Service: Thoracic;  Laterality: Left;       FAMILY HISTORY  Family History   Problem Relation Age of Onset   • Heart disease Mother    • Lung cancer Father    • Diabetes Other        SOCIAL HISTORY  Social History     Socioeconomic History   • Marital status:    Tobacco Use   • Smoking status: Former Smoker     Types: Cigarettes     Quit date: 1980     Years since quittin.4   • Smokeless tobacco: Never Used   • Tobacco  comment: Social Drinker   Vaping Use   • Vaping Use: Never used   Substance and Sexual Activity   • Alcohol use: Yes     Comment: mod    • Drug use: No   • Sexual activity: Defer       ALLERGIES  Prednisone    REVIEW OF SYSTEMS  Review of Systems   Constitutional: Negative for fever.   HENT: Negative for sore throat.    Eyes: Negative.    Respiratory: Positive for cough and shortness of breath.    Cardiovascular: Negative for chest pain.   Gastrointestinal: Negative for abdominal pain, diarrhea and vomiting.   Genitourinary: Negative for dysuria.   Musculoskeletal: Negative for neck pain.   Skin: Negative for rash.   Allergic/Immunologic: Negative.    Neurological: Negative for weakness, numbness and headaches.   Hematological: Negative.    Psychiatric/Behavioral: Negative.    All other systems reviewed and are negative.      PHYSICAL EXAM  ED Triage Vitals [06/07/22 1825]   Temp Heart Rate Resp BP SpO2   97.7 °F (36.5 °C) (!) 134 16 -- 90 %      Temp src Heart Rate Source Patient Position BP Location FiO2 (%)   Tympanic Monitor -- -- --       Physical Exam  Vitals and nursing note reviewed.   Constitutional:       General: She is not in acute distress.  HENT:      Head: Normocephalic and atraumatic.   Eyes:      Pupils: Pupils are equal, round, and reactive to light.   Cardiovascular:      Rate and Rhythm: Normal rate and regular rhythm.      Heart sounds: Normal heart sounds.   Pulmonary:      Effort: Pulmonary effort is normal. No respiratory distress.      Breath sounds: Examination of the right-middle field reveals rhonchi. Examination of the left-middle field reveals rhonchi. Examination of the right-lower field reveals rhonchi. Examination of the left-lower field reveals rhonchi. Rhonchi present.   Abdominal:      Palpations: Abdomen is soft.      Tenderness: There is no abdominal tenderness. There is no guarding or rebound.   Musculoskeletal:         General: Normal range of motion.      Cervical back:  Normal range of motion and neck supple.   Skin:     General: Skin is warm and dry.      Findings: No rash.   Neurological:      Mental Status: She is alert and oriented to person, place, and time.      Sensory: Sensation is intact.   Psychiatric:         Mood and Affect: Mood and affect normal.         LAB RESULTS  Lab Results (last 24 hours)     Procedure Component Value Units Date/Time    CBC Auto Differential [[id number]]  (Abnormal) Collected: 06/07/22 1129    Specimen: Blood Updated: 06/07/22 1217     WBC 5.51 10*3/mm3      RBC 5.10 10*6/mm3      Hemoglobin 15.5 g/dL      Hematocrit 46.8 %      MCV 91.8 fL      MCH 30.4 pg      MCHC 33.1 g/dL      RDW 14.9 %      RDW-SD 48.7 fl      MPV 9.3 fL      Platelets 251 10*3/mm3      Neutrophil % 81.4 %      Lymphocyte % 12.2 %      Monocyte % 5.6 %      Eosinophil % 0.2 %      Basophil % 0.2 %      Immature Grans % 0.4 %      Neutrophils, Absolute 4.49 10*3/mm3      Lymphocytes, Absolute 0.67 10*3/mm3      Monocytes, Absolute 0.31 10*3/mm3      Eosinophils, Absolute 0.01 10*3/mm3      Basophils, Absolute 0.01 10*3/mm3      Immature Grans, Absolute 0.02 10*3/mm3      nRBC 0.0 /100 WBC     CBC & Differential [[id number]]  (Abnormal) Collected: 06/07/22 1129    Specimen: Blood Updated: 06/07/22 1217    Narrative:      The following orders were created for panel order CBC & Differential.  Procedure                               Abnormality         Status                     ---------                               -----------         ------                     CBC Auto Differential[[id number]]        Abnormal            Final result                 Please view results for these tests on the individual orders.    BNP [[id number]]  (Abnormal) Collected: 06/07/22 1129    Specimen: Blood Updated: 06/07/22 1318     proBNP 10,544.0 pg/mL     Narrative:      Among patients with dyspnea, NT-proBNP is highly sensitive for the detection of acute congestive heart failure. In addition  NT-proBNP of <300 pg/ml effectively rules out acute congestive heart failure with 99% negative predictive value.    Results may be falsely decreased if patient taking Biotin.      Comprehensive Metabolic Panel [[id number]]  (Abnormal) Collected: 06/07/22 1129    Specimen: Blood Updated: 06/07/22 1338     Glucose 190 mg/dL      BUN 14 mg/dL      Creatinine 0.88 mg/dL      Sodium 132 mmol/L      Potassium 3.1 mmol/L      Chloride 88 mmol/L      CO2 31.0 mmol/L      Calcium 9.0 mg/dL      Total Protein 6.3 g/dL      Albumin 3.90 g/dL      ALT (SGPT) 30 U/L      AST (SGOT) 28 U/L      Alkaline Phosphatase 118 U/L      Total Bilirubin 0.8 mg/dL      Globulin 2.4 gm/dL      A/G Ratio 1.6 g/dL      BUN/Creatinine Ratio 15.9     Anion Gap 13.0 mmol/L      eGFR 66.5 mL/min/1.73      Comment: National Kidney Foundation and American Society of Nephrology (ASN) Task Force recommended calculation based on the Chronic Kidney Disease Epidemiology Collaboration (CKD-EPI) equation refit without adjustment for race.       Narrative:      GFR Normal >60  Chronic Kidney Disease <60  Kidney Failure <15      COVID PRE-OP / PRE-PROCEDURE SCREENING ORDER (NO ISOLATION) - Swab, Nasopharynx [[id number]]  (Abnormal) Collected: 06/07/22 2338    Specimen: Swab from Nasopharynx Updated: 06/08/22 0059    Narrative:      The following orders were created for panel order COVID PRE-OP / PRE-PROCEDURE SCREENING ORDER (NO ISOLATION) - Swab, Nasopharynx.  Procedure                               Abnormality         Status                     ---------                               -----------         ------                     COVID-19,[facility]...[[id number]]  Abnormal            Final result                 Please view results for these tests on the individual orders.    COVID-19,[facility] CEPHEID/NGOZI NP SWAB IN TRANSPORT MEDIA 8-12 HR TAT - Swab, Nasopharynx [[id number]]  (Abnormal) Collected: 06/07/22 2338    Specimen: Swab from Nasopharynx  Updated: 06/08/22 0059     COVID19 Detected    Narrative:      Fact sheet for providers: https://www.fda.gov/media/788460/download     Fact sheet for patients: https://www.fda.gov/media/670280/download    Blood Culture - Blood, Arm, Left [[id number]] Collected: 06/08/22 0101    Specimen: Blood from Arm, Left Updated: 06/08/22 0114    Blood Culture - Blood, Arm, Left [[id number]] Collected: 06/08/22 0101    Specimen: Blood from Arm, Left Updated: 06/08/22 0114    Lactic Acid, Plasma [[id number]]  (Normal) Collected: 06/08/22 0101    Specimen: Blood Updated: 06/08/22 0134     Lactate 1.6 mmol/L           I ordered the above labs and reviewed the results    RADIOLOGY  CT Angiogram Chest   Final Result   There is no CT evidence of acute pulmonary thromboembolism.   There are patchy airspace opacities in the right middle lobe and lingula   of the left upper lobe consistent with pneumonia. Chronic bronchitic   changes are evident within the left lower lobe producing significant   volume loss and also peribronchial thickening and peripheral mucous   plugging. Patchy reticulonodular opacities are also present in the right   lower lobe. There are tiny bilateral pleural effusions.       This report was finalized on 6/8/2022 12:46 AM by Dr. Darin Krishna M.D.               I ordered the above noted radiological studies. Interpreted by radiologist.  Reviewed by me in PACS.       PROCEDURES  Procedures        PROGRESS AND CONSULTS     The patient was wearing a facemask upon entrance into the room and remained in such throughout their visit.  I was wearing PPE including a facemask, eye protection, as well as gloves at any point entering the room and throughout the visit    0105  On reevaluation, the patient continues to have a significant cough but oxygen saturations are 92% on room air.  I did inform the patient that it does appear that she has bilateral pneumonia on her CT scan.  She is well did test positive for the COVID-19  virus.  Antibiotics have been begun with a diagnosis of bilateral pneumonia and we will admit her to the hospital for further evaluation and treatment.  The patient is in agreement with the plan.    0120  Case discussed with VALENTINE Sweet for [facility], who agrees to admit the patient to the hospital for Dr. Connell.      MEDICAL DECISION MAKING  Results were reviewed/discussed with the patient and they were also made aware of online access. Pt also made aware that some labs, such as cultures, will not be resulted during ER visit and follow up with PMD is necessary.     MDM  Number of Diagnoses or Management Options     Amount and/or Complexity of Data Reviewed  Clinical lab tests: ordered and reviewed  Tests in the radiology section of CPT®: ordered and reviewed  Tests in the medicine section of CPT®: ordered and reviewed  Review and summarize past medical records: yes (Upon medical records review, the patient was last seen and evaluated on 3/25/2022 secondary to hematuria with urinary obstruction)  Discuss the patient with other providers: yes (VALENTINE Sweet for [facility], who will admit the patient to the hospital for Dr. Connell)  Independent visualization of images, tracings, or specimens: yes (CTA chest showing bilateral infiltrates with small effusions, no thromboembolism)           DIAGNOSIS  Final diagnoses:   Pneumonia of both lungs due to infectious organism, unspecified part of lung   COVID-19 virus infection       DISPOSITION  ADMISSION    Discussed treatment plan and reason for admission with pt/family and admitting physician.  Pt/family voiced understanding of the plan for admission for further testing/treatment as needed.         Latest Documented Vital Signs:  As of 02:47 EDT  BP- (!) 151/109 HR- 92 Temp- 97.7 °F (36.5 °C) (Tympanic) O2 sat- 94%         John Lancaster MD  06/08/22 7280

## 2022-06-08 NOTE — H&P
Patient Name:  Jaquelin Valderrama  YOB: 1940  MRN:  [id number]  Admit Date:  6/7/2022  Patient Care Team:  Minerva Chatterjee MD as PCP - General (Internal Medicine)  Yordan Evans MD as Consulting Physician (Cardiology)      Subjective   History Present Illness     Chief Complaint   Patient presents with   • Coughing Up Blood         82-year-old woman with a past medical history of coronary artery disease status post CABG, A. fib with RVR, heart failure with reduced action fraction of 30% who came to the hospital for weakness and SOA.    She reports her shortness of air has been ongoing for the last several months but they worsened on the day of admission.  Also complains of some scant hemoptysis.  She went to her primary care doctor who subsequently called and told her to go to the ER for further evaluation.    Of note, she had a previous admission in March 2022 during which she was found to have a left pleural effusion that recurred after thoracentesis requiring chest tube placement.  She had VATS and decortication on 3/18.  Cytology was negative for malignancy.    BNP 10.5K.  In the ER CT angiogram was done that was negative for acute PE.  There were patchy airspace opacities consistent with pneumonia as well as chronic bronchitic changes within the left lower lobe.    The patient does not believe in COVID.  She refuses to take any COVID medications.    Review of Systems   Constitutional: Negative for chills and fever.   Respiratory: Positive for cough and shortness of breath.    Cardiovascular: Positive for leg swelling. Negative for chest pain and palpitations.   Gastrointestinal: Negative for abdominal pain and blood in stool.   Genitourinary: Negative for difficulty urinating.   Musculoskeletal: Negative for joint swelling and myalgias.   Skin: Negative for color change and pallor.   Neurological: Negative for dizziness and facial asymmetry.        Personal History     Past Medical History:    Diagnosis Date   • Astigmatism, bilateral 2019   • Benign essential hypertension    • Bilateral presbyopia 2019   • CAD (coronary artery disease)    • Closed right ankle fracture    • COVID-19 vaccination refused    • Hyperlipidemia    • Hypothyroidism    • Influenza vaccine needed    • Myocardial infarction (HCC)    • Prediabetes    • Pseudophakia, both eyes 2019   • Thoracic back pain      Past Surgical History:   Procedure Laterality Date   • APPENDECTOMY     • CARDIAC CATHETERIZATION  2012    x3    • CORONARY ARTERY BYPASS GRAFT  2014   • CORONARY STENT PLACEMENT      x3   • CYSTOSCOPY WITH CLOT EVACUATION N/A 3/25/2022    Procedure: CYSTOSCOPY WITH CLOT EVACUATION AND FULGURATION ;  Surgeon: Sukhdev Poole MD;  Location: [facility] OR;  Service: Urology;  Laterality: N/A;   • HARDWARE REMOVAL Right 2020    Procedure: ANKLE/FOOT HARDWARE REMOVAL;  Surgeon: Lincoln Sibley MD;  Location: [facility] OR;  Service: Orthopedics;  Laterality: Right;   • ORIF ANKLE FRACTURE Right 2019    Abelen- George Mem   • THORACOSCOPY WITH DECORTICATION Left 3/18/2022    Procedure: LEFT THORACOSCOPY VIDEO ASSISTED WITH COMPLETE DECORTICATION;  Surgeon: Sabra Kuo MD;  Location: [facility] OR;  Service: Thoracic;  Laterality: Left;     Family History   Problem Relation Age of Onset   • Heart disease Mother    • Lung cancer Father    • Diabetes Other      Social History     Tobacco Use   • Smoking status: Former Smoker     Types: Cigarettes     Quit date:      Years since quittin.4   • Smokeless tobacco: Never Used   • Tobacco comment: Social Drinker   Vaping Use   • Vaping Use: Never used   Substance Use Topics   • Alcohol use: Yes     Comment: mod    • Drug use: No     No current facility-administered medications on file prior to encounter.     Current Outpatient Medications on File Prior to Encounter   Medication Sig Dispense Refill   • amiodarone (PACERONE) 200 MG tablet  Take 1 tablet by mouth Daily. 90 tablet 1   • apixaban (ELIQUIS) 5 MG tablet tablet Take 5 mg by mouth 2 (Two) Times a Day.     • Cholecalciferol (VITAMIN D3) 2000 units tablet Take 1 tablet by mouth Daily.     • doxycycline (VIBRAMYCIN) 100 MG capsule Take 1 capsule by mouth 2 (Two) Times a Day. 14 capsule 0   • furosemide (LASIX) 20 MG tablet Take 1 tablet by mouth Daily. (Patient taking differently: Take 20 mg by mouth 2 (Two) Times a Day.) 90 tablet 1   • levothyroxine (SYNTHROID, LEVOTHROID) 50 MCG tablet Take 1 tablet by mouth Every Morning. 90 tablet 1   • metoprolol succinate XL (TOPROL-XL) 25 MG 24 hr tablet Take 1 tablet by mouth 2 (Two) Times a Day. 180 tablet 1   • Multiple Vitamins-Minerals (MULTIVITAMIN ADULT EXTRA C PO) Take 1 tablet by mouth Daily.     • potassium chloride 10 MEQ CR tablet Take 1 tablet by mouth 2 (Two) Times a Day. 60 tablet 0   • spironolactone (ALDACTONE) 25 MG tablet Take 1 tablet by mouth Daily. 90 tablet 1   • arformoterol (BROVANA) 15 MCG/2ML nebulizer solution Take 2 mL by nebulization 2 (Two) Times a Day. 120 mL 2   • budesonide (PULMICORT) 0.5 MG/2ML nebulizer solution Take 4 mL by nebulization 2 (Two) Times a Day.     • Homeopathic Products (EARACHE DROPS OT) Administer  into ear(s) as directed by provider.     • [DISCONTINUED] gabapentin (NEURONTIN) 100 MG capsule Take 3 capsules by mouth 3 (Three) Times a Day. 270 capsule 0     Allergies   Allergen Reactions   • Prednisone Other (See Comments)     Increased BP       Objective    Objective     Vital Signs  Temp:  [96.7 °F (35.9 °C)-97.7 °F (36.5 °C)] 96.7 °F (35.9 °C)  Heart Rate:  [] 81  Resp:  [14-22] 16  BP: (126-168)/() 150/108  SpO2:  [90 %-95 %] 93 %  on   ;   Device (Oxygen Therapy): room air  Body mass index is 30.33 kg/m².    Physical Exam  Constitutional:       Appearance: Normal appearance.   HENT:      Head: Normocephalic and atraumatic.   Cardiovascular:      Rate and Rhythm: Normal rate. Rhythm  irregular.      Pulses: Normal pulses.      Heart sounds: No murmur heard.  Pulmonary:      Effort: Pulmonary effort is normal. No respiratory distress.      Breath sounds: Rhonchi and rales present.   Abdominal:      General: There is no distension.      Palpations: Abdomen is soft.      Tenderness: There is no abdominal tenderness.   Musculoskeletal:      Right lower leg: Edema present.      Left lower leg: Edema present.   Skin:     General: Skin is warm and dry.   Neurological:      General: No focal deficit present.      Mental Status: She is alert and oriented to person, place, and time.         Results Review:  I reviewed the patient's new clinical results.  I reviewed the patient's new imaging results and agree with the interpretation.  I reviewed the patient's other test results and agree with the interpretation  I personally viewed and interpreted the patient's EKG/Telemetry data  Discussed with ED provider.    Lab Results (last 24 hours)     Procedure Component Value Units Date/Time    COVID PRE-OP / PRE-PROCEDURE SCREENING ORDER (NO ISOLATION) - Swab, Nasopharynx [[id number]]  (Abnormal) Collected: 06/07/22 2338    Specimen: Swab from Nasopharynx Updated: 06/08/22 0059    Narrative:      The following orders were created for panel order COVID PRE-OP / PRE-PROCEDURE SCREENING ORDER (NO ISOLATION) - Swab, Nasopharynx.  Procedure                               Abnormality         Status                     ---------                               -----------         ------                     COVID-19,[facility]...[[id number]]  Abnormal            Final result                 Please view results for these tests on the individual orders.    COVID-19,[facility] CEPHEID/NGOZI NP SWAB IN TRANSPORT MEDIA 8-12 HR TAT - Swab, Nasopharynx [[id number]]  (Abnormal) Collected: 06/07/22 2338    Specimen: Swab from Nasopharynx Updated: 06/08/22 0059     COVID19 Detected    Narrative:      Fact sheet for providers:  https://www.fda.gov/media/200646/download     Fact sheet for patients: https://www.fda.gov/media/468635/download    Blood Culture - Blood, Arm, Left [[id number]] Collected: 06/08/22 0101    Specimen: Blood from Arm, Left Updated: 06/08/22 0114    Blood Culture - Blood, Arm, Left [[id number]] Collected: 06/08/22 0101    Specimen: Blood from Arm, Left Updated: 06/08/22 0114    Lactic Acid, Plasma [[id number]]  (Normal) Collected: 06/08/22 0101    Specimen: Blood Updated: 06/08/22 0134     Lactate 1.6 mmol/L           Imaging Results (Last 24 Hours)     Procedure Component Value Units Date/Time    CT Angiogram Chest [[id number]] Collected: 06/08/22 0032     Updated: 06/08/22 0049    Narrative:      CT ANGIOGRAM CHEST-     CLINICAL HISTORY: Hemoptysis     TECHNIQUE: Spiral CT images were obtained through the chest during rapid  IV injection of contrast and were reconstructed in 2 mm thick axial  slices. Multiple coronal and sagittal and 3-D reconstructions were  produced.     Radiation dose reduction techniques were utilized, including automated  exposure control and exposure modulation based on body size.     COMPARISON: None     FINDINGS: The main pulmonary arteries and their lobar and segmental  branches are well opacified, and demonstrate no filling defects. There  is no CT evidence of acute pulmonary thromboembolism. Lung window images  demonstrate fairly extensive patchy airspace infiltrate in the inferior  aspect of the right middle lobe consistent with pneumonia. Patchy  groundglass airspace infiltrate is also evident within the lingula of  the left upper lobe consistent with pneumonia. Tiny reticulonodular  opacities are also noted in the left upper lobe. These are likely due to  bronchiolitis. There is significant volume loss in the left lower lobe,  and there are multiple mildly dilated bronchi that show peribronchial  thickening and also distal areas of mucous plugging. Patchy  reticulonodular opacities are also  present in the inferior and medial  aspect of the right lower lobe. There are no discrete lung masses. There  is no mediastinal or hilar or axillary lymphadenopathy. There is tiny  bilateral pleural effusions.       Impression:      There is no CT evidence of acute pulmonary thromboembolism.  There are patchy airspace opacities in the right middle lobe and lingula  of the left upper lobe consistent with pneumonia. Chronic bronchitic  changes are evident within the left lower lobe producing significant  volume loss and also peribronchial thickening and peripheral mucous  plugging. Patchy reticulonodular opacities are also present in the right  lower lobe. There are tiny bilateral pleural effusions.     This report was finalized on 6/8/2022 12:46 AM by Dr. Darin Krishna M.D.             Results for orders placed during the hospital encounter of 03/02/22    Adult Transthoracic Echo Complete W/ Cont if Necessary Per Protocol    Interpretation Summary  LVE with severe global left ventricular hypokinesis, estimated LV ejection fraction of 30%.  Severe right ventricular enlargement and moderate right atrial enlargement seen  Severe left atrial enlargement seen.  Aortic valve, mitral valve, tricuspid valve appears structurally normal, moderate mitral and mild tricuspid regurgitation seen.  Calculated RV systolic pressure of 40 to 45 mmHg.  No pericardial effusion seen.  Large pleural effusion seen.  Proximal aorta appears normal in size.      No orders to display        Assessment/Plan     Active Hospital Problems    Diagnosis  POA   • Pneumonia of both lungs due to infectious organism, unspecified part of lung [J18.9]  Yes   • COVID-19 virus detected [U07.1]  Yes   • Atrial fibrillation (HCC) [I48.91]  Yes   • Essential hypertension [I10]  Yes      Resolved Hospital Problems   No resolved problems to display.       Assessment plan    COVID-19 pneumonia  Refusing prednisone, she is a COVID denier     Heart failure with  reduced ejection fraction  Ejection fraction 30% most recent echo  Continue Lasix, and metoprolol  Holding Spironolactone and ibersertan so I can assess what her blood pressure is likely while she is getting diuresed. Add back on blood pressure permitting.    Atrial fibrillation  HTN  Rate controlled with amiodarone, metoprolol  Continue Eliquis 5 mg twice daily    Hypothyroidism  Continue levothyroxine    · I discussed the patient's findings and my recommendations with patient.    VTE Prophylaxis - Eliquis (home med).  Code Status - Full code.       Vincent Trevizo MD  [facility]  06/08/22  12:33 EDT

## 2022-06-08 NOTE — PLAN OF CARE
Goal Outcome Evaluation:      Alert and oriented x 4. Diuresing with Lasix. Home meds continued. Up ad jonathan to bathroom. Strict I&O's. Afib on monitor. Eliquis restarted. Room air.

## 2022-06-09 LAB
ALBUMIN SERPL-MCNC: 3.5 G/DL (ref 3.5–5.2)
ALBUMIN/GLOB SERPL: 1.1 G/DL
ALP SERPL-CCNC: 105 U/L (ref 39–117)
ALT SERPL W P-5'-P-CCNC: 28 U/L (ref 1–33)
ANION GAP SERPL CALCULATED.3IONS-SCNC: 13.7 MMOL/L (ref 5–15)
AST SERPL-CCNC: 28 U/L (ref 1–32)
BILIRUB SERPL-MCNC: 0.8 MG/DL (ref 0–1.2)
BUN SERPL-MCNC: 17 MG/DL (ref 8–23)
BUN/CREAT SERPL: 19.5 (ref 7–25)
CALCIUM SPEC-SCNC: 9.1 MG/DL (ref 8.6–10.5)
CHLORIDE SERPL-SCNC: 90 MMOL/L (ref 98–107)
CO2 SERPL-SCNC: 27.3 MMOL/L (ref 22–29)
CREAT SERPL-MCNC: 0.87 MG/DL (ref 0.57–1)
CRP SERPL-MCNC: 14.12 MG/DL (ref 0–0.5)
D DIMER PPP FEU-MCNC: 0.97 MCGFEU/ML (ref 0–0.49)
DEPRECATED RDW RBC AUTO: 48.6 FL (ref 37–54)
EGFRCR SERPLBLD CKD-EPI 2021: 67.4 ML/MIN/1.73
ERYTHROCYTE [DISTWIDTH] IN BLOOD BY AUTOMATED COUNT: 14.6 % (ref 12.3–15.4)
GLOBULIN UR ELPH-MCNC: 3.2 GM/DL
GLUCOSE SERPL-MCNC: 222 MG/DL (ref 65–99)
HCT VFR BLD AUTO: 46.3 % (ref 34–46.6)
HGB BLD-MCNC: 14.9 G/DL (ref 12–15.9)
MAGNESIUM SERPL-MCNC: 1.9 MG/DL (ref 1.6–2.4)
MCH RBC QN AUTO: 29.7 PG (ref 26.6–33)
MCHC RBC AUTO-ENTMCNC: 32.2 G/DL (ref 31.5–35.7)
MCV RBC AUTO: 92.2 FL (ref 79–97)
PLATELET # BLD AUTO: 282 10*3/MM3 (ref 140–450)
PMV BLD AUTO: 10 FL (ref 6–12)
POTASSIUM SERPL-SCNC: 3.7 MMOL/L (ref 3.5–5.2)
PROCALCITONIN SERPL-MCNC: 0.97 NG/ML (ref 0–0.25)
PROT SERPL-MCNC: 6.7 G/DL (ref 6–8.5)
RBC # BLD AUTO: 5.02 10*6/MM3 (ref 3.77–5.28)
SODIUM SERPL-SCNC: 131 MMOL/L (ref 136–145)
WBC NRBC COR # BLD: 6.62 10*3/MM3 (ref 3.4–10.8)

## 2022-06-09 PROCEDURE — 80053 COMPREHEN METABOLIC PANEL: CPT | Performed by: NURSE PRACTITIONER

## 2022-06-09 PROCEDURE — 85379 FIBRIN DEGRADATION QUANT: CPT | Performed by: NURSE PRACTITIONER

## 2022-06-09 PROCEDURE — 25010000002 FUROSEMIDE PER 20 MG: Performed by: STUDENT IN AN ORGANIZED HEALTH CARE EDUCATION/TRAINING PROGRAM

## 2022-06-09 PROCEDURE — 36415 COLL VENOUS BLD VENIPUNCTURE: CPT | Performed by: NURSE PRACTITIONER

## 2022-06-09 PROCEDURE — 83735 ASSAY OF MAGNESIUM: CPT | Performed by: STUDENT IN AN ORGANIZED HEALTH CARE EDUCATION/TRAINING PROGRAM

## 2022-06-09 PROCEDURE — 85027 COMPLETE CBC AUTOMATED: CPT | Performed by: STUDENT IN AN ORGANIZED HEALTH CARE EDUCATION/TRAINING PROGRAM

## 2022-06-09 PROCEDURE — 84145 PROCALCITONIN (PCT): CPT | Performed by: NURSE PRACTITIONER

## 2022-06-09 PROCEDURE — 86140 C-REACTIVE PROTEIN: CPT | Performed by: NURSE PRACTITIONER

## 2022-06-09 RX ORDER — POTASSIUM CHLORIDE 750 MG/1
40 TABLET, FILM COATED, EXTENDED RELEASE ORAL AS NEEDED
Status: DISCONTINUED | OUTPATIENT
Start: 2022-06-09 | End: 2022-06-10 | Stop reason: HOSPADM

## 2022-06-09 RX ORDER — MAGNESIUM SULFATE HEPTAHYDRATE 40 MG/ML
4 INJECTION, SOLUTION INTRAVENOUS AS NEEDED
Status: DISCONTINUED | OUTPATIENT
Start: 2022-06-09 | End: 2022-06-10 | Stop reason: HOSPADM

## 2022-06-09 RX ORDER — MAGNESIUM SULFATE HEPTAHYDRATE 40 MG/ML
2 INJECTION, SOLUTION INTRAVENOUS AS NEEDED
Status: DISCONTINUED | OUTPATIENT
Start: 2022-06-09 | End: 2022-06-10 | Stop reason: HOSPADM

## 2022-06-09 RX ORDER — FUROSEMIDE 10 MG/ML
40 INJECTION INTRAMUSCULAR; INTRAVENOUS EVERY 12 HOURS
Status: DISCONTINUED | OUTPATIENT
Start: 2022-06-09 | End: 2022-06-10 | Stop reason: HOSPADM

## 2022-06-09 RX ORDER — LOSARTAN POTASSIUM 50 MG/1
50 TABLET ORAL
Status: DISCONTINUED | OUTPATIENT
Start: 2022-06-09 | End: 2022-06-10 | Stop reason: HOSPADM

## 2022-06-09 RX ORDER — POTASSIUM CHLORIDE 1.5 G/1.77G
40 POWDER, FOR SOLUTION ORAL AS NEEDED
Status: DISCONTINUED | OUTPATIENT
Start: 2022-06-09 | End: 2022-06-10 | Stop reason: HOSPADM

## 2022-06-09 RX ADMIN — METOPROLOL SUCCINATE 25 MG: 25 TABLET, EXTENDED RELEASE ORAL at 21:20

## 2022-06-09 RX ADMIN — METOPROLOL SUCCINATE 25 MG: 25 TABLET, EXTENDED RELEASE ORAL at 08:21

## 2022-06-09 RX ADMIN — APIXABAN 5 MG: 5 TABLET, FILM COATED ORAL at 21:20

## 2022-06-09 RX ADMIN — POTASSIUM CHLORIDE 10 MEQ: 750 TABLET, EXTENDED RELEASE ORAL at 08:21

## 2022-06-09 RX ADMIN — Medication 10 ML: at 21:20

## 2022-06-09 RX ADMIN — APIXABAN 5 MG: 5 TABLET, FILM COATED ORAL at 08:21

## 2022-06-09 RX ADMIN — Medication 10 ML: at 08:22

## 2022-06-09 RX ADMIN — FUROSEMIDE 40 MG: 10 INJECTION, SOLUTION INTRAMUSCULAR; INTRAVENOUS at 12:30

## 2022-06-09 RX ADMIN — AMIODARONE HYDROCHLORIDE 200 MG: 200 TABLET ORAL at 08:21

## 2022-06-09 RX ADMIN — LOSARTAN POTASSIUM 50 MG: 50 TABLET, FILM COATED ORAL at 21:22

## 2022-06-09 RX ADMIN — LEVOTHYROXINE SODIUM 50 MCG: 0.05 TABLET ORAL at 05:20

## 2022-06-09 RX ADMIN — POTASSIUM CHLORIDE 10 MEQ: 750 TABLET, EXTENDED RELEASE ORAL at 21:20

## 2022-06-09 NOTE — SIGNIFICANT NOTE
06/09/22 1631   OTHER   Discipline physical therapist   Rehab Time/Intention   Session Not Performed other (see comments)  (MARICHUY Cifuentes reports pt does not have any acute care PT needs at this time as pt has been getting up in room independently without difficulty. Pt will sign off at this time.)

## 2022-06-09 NOTE — SIGNIFICANT NOTE
06/09/22 1038   OTHER   Discipline occupational therapist   Rehab Time/Intention   Session Not Performed other (see comments)  (OT spoke with RN, pt (I) with all ADLs, went to bathroom and completing all task (I), up ad jonathan. OT will sign off this date.)

## 2022-06-09 NOTE — PROGRESS NOTES
Discharge Planning Assessment  [facility]     Patient Name: Jaquelin Valderrama  MRN: [id number]  Today's Date: 6/9/2022    Admit Date: 6/7/2022     Discharge Needs Assessment    No documentation.                Discharge Plan     Row Name 06/09/22 1249       Plan    Plan Home with family; denies any discharge needs    Plan Comments Spoke with patient via phone due to Covid Isolation, face sheet verified. Patient lives with her spouse Rodney Carmona [phone] in a 2 story home she stated she has no trouble with the steps. Patient stated she is independent with ADL's and denies using any medical equipment. Patient plans to return home with spouse and denies any discharge needs. Glenis Bhat RN              Continued Care and Services - Admitted Since 6/7/2022    Coordination has not been started for this encounter.     Selected Continued Care - Prior Encounters Includes selections from prior encounters from 3/9/2022 to 6/9/2022    Discharged on 3/29/2022 Admission date: 3/25/2022 - Discharge disposition: Rehab Facility or Unit (DC - External)    Destination     Service Provider Selected Services Address Phone Fax Patient Preferred    [facility]  Inpatient Rehabilitation [address] [place] [address] [phone] --                Discharged on 3/17/2022 Admission date: 3/2/2022 - Discharge disposition: Short Term Hospital (DC - External)    Destination     Service Provider Selected Services Address Phone Fax Patient Preferred    [facility]  Inpatient Rehabilitation [address] [facility] [place] [id number] [phone] --                    Expected Discharge Date and Time     Expected Discharge Date Expected Discharge Time    Waldo 10, 2022          Demographic Summary     Row Name 06/09/22 1247       General Information    Admission Type inpatient    Arrived From emergency department    Referral Source admission list    Reason  for Consult discharge planning    Preferred Language English               Functional Status     Row Name 06/09/22 1249       Functional Status, IADL    Medications independent    Meal Preparation independent    Housekeeping independent    Laundry independent    Shopping independent               Psychosocial    No documentation.                Abuse/Neglect    No documentation.                Legal    No documentation.                Substance Abuse    No documentation.                Patient Forms    No documentation.                   Glenis Bhat, RN

## 2022-06-09 NOTE — NURSING NOTE
Patient's blood pressure has been elevated. Discussed with physician during huddle. Aware of patient's blood pressure trends.

## 2022-06-09 NOTE — PLAN OF CARE
Goal Outcome Evaluation:            Continue on diuresing patient with IV lasix. BP consistently high; MD aware. Cardiology consult. Afib on monitor. Room Air. Possible discharge tomorrow. Strict I&O's.

## 2022-06-10 VITALS
DIASTOLIC BLOOD PRESSURE: 97 MMHG | OXYGEN SATURATION: 96 % | HEART RATE: 78 BPM | BODY MASS INDEX: 30.33 KG/M2 | TEMPERATURE: 97.3 F | SYSTOLIC BLOOD PRESSURE: 163 MMHG | RESPIRATION RATE: 18 BRPM | WEIGHT: 155.3 LBS

## 2022-06-10 LAB
ALBUMIN SERPL-MCNC: 3.8 G/DL (ref 3.5–5.2)
ALBUMIN/GLOB SERPL: 1.4 G/DL
ALP SERPL-CCNC: 114 U/L (ref 39–117)
ALT SERPL W P-5'-P-CCNC: 34 U/L (ref 1–33)
ANION GAP SERPL CALCULATED.3IONS-SCNC: 14 MMOL/L (ref 5–15)
AST SERPL-CCNC: 26 U/L (ref 1–32)
BILIRUB SERPL-MCNC: 0.8 MG/DL (ref 0–1.2)
BUN SERPL-MCNC: 18 MG/DL (ref 8–23)
BUN/CREAT SERPL: 21.7 (ref 7–25)
CALCIUM SPEC-SCNC: 9 MG/DL (ref 8.6–10.5)
CHLORIDE SERPL-SCNC: 90 MMOL/L (ref 98–107)
CO2 SERPL-SCNC: 28 MMOL/L (ref 22–29)
CREAT SERPL-MCNC: 0.83 MG/DL (ref 0.57–1)
CRP SERPL-MCNC: 8.6 MG/DL (ref 0–0.5)
EGFRCR SERPLBLD CKD-EPI 2021: 71.4 ML/MIN/1.73
GLOBULIN UR ELPH-MCNC: 2.8 GM/DL
GLUCOSE SERPL-MCNC: 214 MG/DL (ref 65–99)
MAGNESIUM SERPL-MCNC: 3.2 MG/DL (ref 1.6–2.4)
NT-PROBNP SERPL-MCNC: ABNORMAL PG/ML (ref 0–1800)
POTASSIUM SERPL-SCNC: 3.4 MMOL/L (ref 3.5–5.2)
PROT SERPL-MCNC: 6.6 G/DL (ref 6–8.5)
SODIUM SERPL-SCNC: 132 MMOL/L (ref 136–145)

## 2022-06-10 PROCEDURE — 86140 C-REACTIVE PROTEIN: CPT | Performed by: NURSE PRACTITIONER

## 2022-06-10 PROCEDURE — 83735 ASSAY OF MAGNESIUM: CPT | Performed by: STUDENT IN AN ORGANIZED HEALTH CARE EDUCATION/TRAINING PROGRAM

## 2022-06-10 PROCEDURE — 83880 ASSAY OF NATRIURETIC PEPTIDE: CPT | Performed by: STUDENT IN AN ORGANIZED HEALTH CARE EDUCATION/TRAINING PROGRAM

## 2022-06-10 PROCEDURE — 25010000002 FUROSEMIDE PER 20 MG: Performed by: STUDENT IN AN ORGANIZED HEALTH CARE EDUCATION/TRAINING PROGRAM

## 2022-06-10 PROCEDURE — 80053 COMPREHEN METABOLIC PANEL: CPT | Performed by: NURSE PRACTITIONER

## 2022-06-10 RX ADMIN — AMIODARONE HYDROCHLORIDE 200 MG: 200 TABLET ORAL at 09:35

## 2022-06-10 RX ADMIN — FUROSEMIDE 40 MG: 10 INJECTION, SOLUTION INTRAMUSCULAR; INTRAVENOUS at 11:56

## 2022-06-10 RX ADMIN — APIXABAN 5 MG: 5 TABLET, FILM COATED ORAL at 09:35

## 2022-06-10 RX ADMIN — FUROSEMIDE 40 MG: 10 INJECTION, SOLUTION INTRAMUSCULAR; INTRAVENOUS at 00:27

## 2022-06-10 RX ADMIN — LEVOTHYROXINE SODIUM 50 MCG: 0.05 TABLET ORAL at 06:12

## 2022-06-10 RX ADMIN — Medication 10 ML: at 09:08

## 2022-06-10 RX ADMIN — DEXTROMETHORPHAN POLISTIREX 30 MG: 30 SUSPENSION, EXTENDED RELEASE ORAL at 09:34

## 2022-06-10 RX ADMIN — POTASSIUM CHLORIDE 10 MEQ: 750 TABLET, EXTENDED RELEASE ORAL at 09:35

## 2022-06-10 RX ADMIN — LOSARTAN POTASSIUM 50 MG: 50 TABLET, FILM COATED ORAL at 09:35

## 2022-06-10 RX ADMIN — METOPROLOL SUCCINATE 25 MG: 25 TABLET, EXTENDED RELEASE ORAL at 09:35

## 2022-06-10 NOTE — PROGRESS NOTES
Name: Jaquelin Valderrama ADMIT: 2022   : 1940  PCP: Minerva Chatterjee MD    MRN: [id number] LOS: 2 days   AGE/SEX: 82 y.o. female  ROOM: [facility]     Subjective   Subjective   Doing well. Legs still swollen. Remains on room air. D-dimer elevated yesterday.   BNP up-trending     Intake/Output Summary (Last 24 hours) at 6/10/2022 0943  Last data filed at 6/10/2022 0935  Gross per 24 hour   Intake 880 ml   Output 2400 ml   Net -1520 ml         Review of Systems   Constitutional: Negative for chills and fever.   Respiratory: Negative for chest tightness and shortness of breath.    Cardiovascular: Negative for chest pain and palpitations.   Gastrointestinal: Negative for abdominal distention and constipation.        Objective   Objective   Vital Signs  Temp:  [97.1 °F (36.2 °C)-97.8 °F (36.6 °C)] 97.3 °F (36.3 °C)  Heart Rate:  [72-80] 78  Resp:  [18] 18  BP: (140-163)/() 163/97  SpO2:  [90 %-100 %] 96 %  on   ;   Device (Oxygen Therapy): room air  Body mass index is 30.33 kg/m².  Physical Exam  Constitutional:       Appearance: Normal appearance.   Cardiovascular:      Rate and Rhythm: Normal rate. Rhythm irregular.   Pulmonary:      Effort: Pulmonary effort is normal. No respiratory distress.   Abdominal:      General: There is no distension.      Palpations: Abdomen is soft.      Tenderness: There is no abdominal tenderness.   Musculoskeletal:      Right lower leg: Edema present.      Left lower leg: Edema present.   Neurological:      Mental Status: She is alert.       Results Review     I reviewed the patient's new clinical results.  Results from last 7 days   Lab Units 22  0901 22  1129   WBC 10*3/mm3 6.62 5.51   HEMOGLOBIN g/dL 14.9 15.5   PLATELETS 10*3/mm3 282 251     Results from last 7 days   Lab Units 06/10/22  0617 22  0901 22  1129   SODIUM mmol/L 132* 131* 132*   POTASSIUM mmol/L 3.4* 3.7 3.1*   CHLORIDE mmol/L 90* 90* 88*   CO2 mmol/L 28.0 27.3 31.0*   BUN mg/dL 18 17  14   CREATININE mg/dL 0.83 0.87 0.88   GLUCOSE mg/dL 214* 222* 190*   Estimated Creatinine Clearance: 47.4 mL/min (by C-G formula based on SCr of 0.83 mg/dL).  Results from last 7 days   Lab Units 06/10/22  0617 06/09/22  0901 06/07/22  1129   ALBUMIN g/dL 3.80 3.50 3.90   BILIRUBIN mg/dL 0.8 0.8 0.8   ALK PHOS U/L 114 105 118*   AST (SGOT) U/L 26 28 28   ALT (SGPT) U/L 34* 28 30     Results from last 7 days   Lab Units 06/10/22  0617 06/09/22  0901 06/07/22  1129   CALCIUM mg/dL 9.0 9.1 9.0   ALBUMIN g/dL 3.80 3.50 3.90   MAGNESIUM mg/dL 3.2* 1.9  --      Results from last 7 days   Lab Units 06/09/22  0901 06/08/22  0101   PROCALCITONIN ng/mL 0.97*  --    LACTATE mmol/L  --  1.6     COVID19   Date Value Ref Range Status   06/07/2022 Detected (C) Not Detected - Ref. Range Final   03/25/2022 Not Detected Not Detected - Ref. Range Final     No results found for: HGBA1C, POCGLU    CT Angiogram Chest  Narrative: CT ANGIOGRAM CHEST-     CLINICAL HISTORY: Hemoptysis     TECHNIQUE: Spiral CT images were obtained through the chest during rapid  IV injection of contrast and were reconstructed in 2 mm thick axial  slices. Multiple coronal and sagittal and 3-D reconstructions were  produced.     Radiation dose reduction techniques were utilized, including automated  exposure control and exposure modulation based on body size.     COMPARISON: None     FINDINGS: The main pulmonary arteries and their lobar and segmental  branches are well opacified, and demonstrate no filling defects. There  is no CT evidence of acute pulmonary thromboembolism. Lung window images  demonstrate fairly extensive patchy airspace infiltrate in the inferior  aspect of the right middle lobe consistent with pneumonia. Patchy  groundglass airspace infiltrate is also evident within the lingula of  the left upper lobe consistent with pneumonia. Tiny reticulonodular  opacities are also noted in the left upper lobe. These are likely due to  bronchiolitis.  There is significant volume loss in the left lower lobe,  and there are multiple mildly dilated bronchi that show peribronchial  thickening and also distal areas of mucous plugging. Patchy  reticulonodular opacities are also present in the inferior and medial  aspect of the right lower lobe. There are no discrete lung masses. There  is no mediastinal or hilar or axillary lymphadenopathy. There is tiny  bilateral pleural effusions.     Impression: There is no CT evidence of acute pulmonary thromboembolism.  There are patchy airspace opacities in the right middle lobe and lingula  of the left upper lobe consistent with pneumonia. Chronic bronchitic  changes are evident within the left lower lobe producing significant  volume loss and also peribronchial thickening and peripheral mucous  plugging. Patchy reticulonodular opacities are also present in the right  lower lobe. There are tiny bilateral pleural effusions.     This report was finalized on 6/8/2022 12:46 AM by Dr. Darin Krishna M.D.       Scheduled Medications  amiodarone, 200 mg, Oral, Daily  apixaban, 5 mg, Oral, BID  furosemide, 40 mg, Intravenous, Q12H  levothyroxine, 50 mcg, Oral, Q AM  losartan, 50 mg, Oral, Q24H  metoprolol succinate XL, 25 mg, Oral, BID  potassium chloride, 10 mEq, Oral, BID  sodium chloride, 10 mL, Intravenous, Q12H    Infusions   Diet  Diet Regular       Assessment/Plan     Active Hospital Problems    Diagnosis  POA   • Pneumonia of both lungs due to infectious organism, unspecified part of lung [J18.9]  Yes   • COVID-19 virus detected [U07.1]  Yes   • Atrial fibrillation (HCC) [I48.91]  Yes   • Essential hypertension [I10]  Yes      Resolved Hospital Problems   No resolved problems to display.       82 y.o. female admitted with <principal problem not specified>.    COVID-19 pneumonia  Refusing treatment, she is a COVID denier   Spoke with  about this, he will have a conservation with her.       Heart failure with reduced  ejection fraction  Ejection fraction 30% most recent echo  Got one dose of IV lasix yesterday with stable blood pressure and renal funciton and metoprolol  Resume sipronolactone and ibersartan as well  Given 40mg IV lasix BID yesterday. Will do the same again today   Consult cardiology  Strict I/O  Check AM BNP    Atrial fibrillation  HTN  Rate controlled with amiodarone, metoprolol  Continue Eliquis 5 mg twice daily     Hypothyroidism  Continue levothyroxine    Hyponatremia  Chronic issue- around baseline   Continue to monitor     · Eliquis (home med) for DVT prophylaxis.  · Full code.  · Discussed with patient and nursing staff.  · Anticipate discharge home in 2-3 days.    Vincent Trevizo MD  [facility]  06/10/22  09:43 EDT    Patient was wearing facemask when I entered the room and throughout our encounter.  I wore protective equipment throughout this patient encounter including a face mask, gloves and protective eyewear.  Hand hygiene was performed before donning protective equipment and after removal when leaving the room.

## 2022-06-10 NOTE — PLAN OF CARE
Goal Outcome Evaluation:  Plan of Care Reviewed With: patient        Progress: no change  Outcome Evaluation: Pt A&Ox4, productive cough continues- no evidence of hemoptysis, room air, reg HH low sodium diet, strict I&O with patient being very conservative of liquid consumption, calls for assist when needed- up ad jonathan, falls education reviewed with pt, daily wt,reviewed quality of care for CHF, BP elevated at start of shift, afib on monitor, vitals as charted, patient expressed wishes to be discharged in am

## 2022-06-10 NOTE — NURSING NOTE
PATIENT DECIDED SHE WANTS TO LEAVE AMA. NOTIFIED ATTENDING PHYSICIAN AND AMA PAPERWORK SIGNED BY PATIENT AND IN CHART.

## 2022-06-10 NOTE — PROGRESS NOTES
Discharge Planning Assessment  [facility]     Patient Name: Jaquelin Vadlerrama  MRN: [id number]  Today's Date: 6/10/2022    Admit Date: 6/7/2022     Discharge Needs Assessment    No documentation.                Discharge Plan     Row Name 06/10/22 1510       Plan    Plan left AMA    Final Discharge Disposition Code 07 - left AMA    Final Note left AMA              Continued Care and Services - Discharged on 6/10/2022 Admission date: 6/7/2022 - Discharge disposition: Left Against Medical Advice   Coordination has not been started for this encounter.     Selected Continued Care - Prior Encounters Includes selections from prior encounters from 3/9/2022 to 6/10/2022    Discharged on 3/29/2022 Admission date: 3/25/2022 - Discharge disposition: Rehab Facility or Unit (DC - External)    Destination     Service Provider Selected Services Address Phone Fax Patient Preferred    [facility]  Inpatient Rehabilitation [address] [facility] [address] [facility] [place] [address] [phone] --                Discharged on 3/17/2022 Admission date: 3/2/2022 - Discharge disposition: Short Term Hospital (DC - External)    Destination     Service Provider Selected Services Address Phone Fax Patient Preferred    [facility]  Inpatient Rehabilitation [address] [facility] [place] [address] [phone] --                    Expected Discharge Date and Time     Expected Discharge Date Expected Discharge Time    Waldo 10, 2022          Demographic Summary    No documentation.                Functional Status    No documentation.                Psychosocial    No documentation.                Abuse/Neglect    No documentation.                Legal    No documentation.                Substance Abuse    No documentation.                Patient Forms    No documentation.                   Glenis Bhat RN

## 2022-06-11 NOTE — PROGRESS NOTES
Enter Query Response Below      Query Response: acute on chronic systolic CHF             If applicable, please update the problem list.        Patient: Jaquelin Valderrama        : 1940  Account: [id number]           Admit Date:         Options to Respond to Query:    1. Access the Encounter     a. From the To-Do Side bar, click Respond With Note.     b. Click New Note     c. Answer query within the yellow box.                d. Update the Problem List if applicable.     Dr. Trevizo,    Patient admitted with COVID, pneumonia, history of systolic CHF.  CXR shows--There appears to be new pulmonary vascular congestion with superimpose interstitial thickening within the bilateral upper lungs and right mid and lower lung most concerning for developing edema in the appropriate clinical context. Atypical pneumonia is less likely.  ECHO 3/2022--EF 30%.  Treatment includes IV Lasix, metoprolol, resuming spironolactone.     Please clarify the acuity of the systolic CHF being treated/monitored:  -acute on chronic systolic CHF  -other________  -unable to clinically determine     By submitting this query, we are merely seeking further clarification of documentation to accurately reflect all conditions that you are monitoring, evaluating, treating or that extend the hospitalization or utilize additional resources of care. Please utilize your independent clinical judgment when addressing the question(s) above.     This query and your response, once completed, will be entered into the legal medical record.    Sincerely,  [email or web address]  Clinical Documentation Integrity Program

## 2022-06-13 LAB
BACTERIA SPEC AEROBE CULT: NORMAL
BACTERIA SPEC AEROBE CULT: NORMAL

## 2022-07-11 ENCOUNTER — OFFICE VISIT (OUTPATIENT)
Dept: CARDIOLOGY | Facility: CLINIC | Age: 82
End: 2022-07-11

## 2022-07-11 VITALS
BODY MASS INDEX: 31.8 KG/M2 | SYSTOLIC BLOOD PRESSURE: 174 MMHG | HEART RATE: 65 BPM | DIASTOLIC BLOOD PRESSURE: 116 MMHG | WEIGHT: 162 LBS | HEIGHT: 60 IN | OXYGEN SATURATION: 98 %

## 2022-07-11 DIAGNOSIS — Z95.1 HX OF CABG: Primary | ICD-10-CM

## 2022-07-11 DIAGNOSIS — I42.0 CARDIOMYOPATHY, DILATED: ICD-10-CM

## 2022-07-11 DIAGNOSIS — I48.91 ATRIAL FIBRILLATION WITH RVR: ICD-10-CM

## 2022-07-11 DIAGNOSIS — I50.9 ACUTE CONGESTIVE HEART FAILURE, UNSPECIFIED HEART FAILURE TYPE: ICD-10-CM

## 2022-07-11 DIAGNOSIS — Z79.01 CHRONIC ANTICOAGULATION: ICD-10-CM

## 2022-07-11 PROCEDURE — 99214 OFFICE O/P EST MOD 30 MIN: CPT | Performed by: INTERNAL MEDICINE

## 2022-07-11 PROCEDURE — 93000 ELECTROCARDIOGRAM COMPLETE: CPT | Performed by: INTERNAL MEDICINE

## 2022-07-11 NOTE — PROGRESS NOTES
Encounter Date:07/11/2022  Last seen 5/24/2022      Patient ID: Jaquelin Valderrama is a 82 y.o. female.    Chief Complaint:  Status post CABG  Atrial fibrillation  Hypertension  Dyslipidemia  Hypothyroidism        History of Present Illness  Since I have last seen, the patient has been without any chest discomfort ,shortness of breath, palpitations, dizziness or syncope.  Denies having any headache ,abdominal pain ,nausea, vomiting , diarrhea constipation, loss of weight or loss of appetite.  Denies having any excessive bruising ,hematuria or blood in the stool.    Review of all systems negative except as indicated.    Reviewed ROS.  Assessment and Plan         ]]]]]]]]]]]]]]]]]]]]  Impression  ==========  -History of severe hematuria.-Improved.  CT scan of the abdomen showed significant clot burden in the bladder and probable anterior bladder perforation.     -Progressively worsening shortness of breath and leg edema-better.  Recurrent left pleural effusion which was loculated.   Patient had chest tube and subsequently had video-assisted thoracoscopy with complete decortication on the left side at [facility].  (March 2022.)     -Congestive heart failure-compensated    - Ischemic cardiomyopathy  Left ventricle dysfunction with ejection fraction of 30%.    Echocardiogram 3/3/2022  LVE with severe global left ventricular hypokinesis, estimated LV ejection fraction of 30%.  Severe right ventricular enlargement and moderate right atrial enlargement seen  Severe left atrial enlargement seen.  Aortic valve, mitral valve, tricuspid valve appears structurally normal, moderate mitral and mild tricuspid regurgitation seen.  Calculated RV systolic pressure of 40 to 45 mmHg.  No pericardial effusion seen.  Large pleural effusion seen.  Proximal aorta appears normal in size.    -Significant biatrial enlargement     -History of chronic atrial fibrillation.     -Premature ventricular contractions     -Status post CABG  12/2014 (performed in [place])     -Hypertension dyslipidemia prediabetes hypothyroidism elevation     -Status post ORIF     -Family history of coronary artery disease     -Intolerance to prednisone     -Former smoker     -Medical noncompliance.  Was not taking thyroid medicine replacement properly.     =================  Plan  ===========  Status post CABG  Patient is not having any angina pectoris or congestive heart failure.    Chronic atrial fibrillation-rate is well controlled.  EKG showed atrial fibrillation with controlled ventricular response.  Consideration for cardioversion versus GABRIEL cardioversion in the future.    Anticoagulation status reviewed  Continue Eliquis 5 mg twice a day.  Observe for toxic effects.    Ischemic cardiomyopathy.  Ejection fraction 30%.    History of hematuria an abdominal pain-improved  Work-up showed a CT scan with massive clot burden in the bladder and possible anterior bladder perforation.  Patient is being taken for urological surgery.    Patient had on 3/25/2022  Cystoscopy, clot evacuation and fulguration, cystogram.  No further hematuria .      History of significant anemia with hemoglobin of 6.8 due to hematuria and significant blood clot in the bladder.  Hemoglobin 6.3-3/26/2022  Hemoglobin 8.3-8/27/2022      Renal dysfunction  BUN/creatinine-30/1.03      Current medications include amiodarone aspirin Eliquis 5 mg twice a day Cardizem 90 mg every 8 hours levothyroxine metoprolol 25 mg twice a day.  Continue Lasix 40 mg p.o. nightly and spironolactone 25 mg a day.     Follow-up in the office in 3 months with echocardiogram.    Further plan will depend on patient's progress.  ]]]]]]]]]]]]]]]]]                         Diagnosis Plan   1. Hx of CABG  Adult Transthoracic Echo Complete W/ Cont if Necessary Per Protocol   2. Chronic anticoagulation  Adult Transthoracic Echo Complete W/ Cont if Necessary Per Protocol    ECG 12 Lead   3. Atrial fibrillation with RVR (HCC)  Adult  Transthoracic Echo Complete W/ Cont if Necessary Per Protocol    ECG 12 Lead   4. Acute congestive heart failure, unspecified heart failure type (HCC)  Adult Transthoracic Echo Complete W/ Cont if Necessary Per Protocol    ECG 12 Lead   5. Cardiomyopathy, dilated (HCC)  Adult Transthoracic Echo Complete W/ Cont if Necessary Per Protocol   LAB RESULTS (LAST 7 DAYS)    CBC        BMP        CMP         BNP        TROPONIN        CoAg        Creatinine Clearance  CrCl cannot be calculated (Patient's most recent lab result is older than the maximum 30 days allowed.).    ABG        Radiology  No radiology results for the last day                The following portions of the patient's history were reviewed and updated as appropriate: allergies, current medications, past family history, past medical history, past social history, past surgical history and problem list.    Review of Systems   Constitutional: Negative for fever and malaise/fatigue.   Cardiovascular: Positive for leg swelling. Negative for chest pain, dyspnea on exertion and palpitations.   Respiratory: Positive for shortness of breath. Negative for cough.    Skin: Negative for rash.   Musculoskeletal: Positive for back pain.   Gastrointestinal: Negative for abdominal pain, nausea and vomiting.   Neurological: Negative for focal weakness and headaches.   All other systems reviewed and are negative.        Current Outpatient Medications:   •  amiodarone (PACERONE) 200 MG tablet, Take 1 tablet by mouth Daily., Disp: 90 tablet, Rfl: 1  •  apixaban (ELIQUIS) 5 MG tablet tablet, Take 5 mg by mouth 2 (Two) Times a Day., Disp: , Rfl:   •  arformoterol (BROVANA) 15 MCG/2ML nebulizer solution, Take 2 mL by nebulization 2 (Two) Times a Day., Disp: 120 mL, Rfl: 2  •  budesonide (PULMICORT) 0.5 MG/2ML nebulizer solution, Take 4 mL by nebulization 2 (Two) Times a Day., Disp: , Rfl:   •  Cholecalciferol (VITAMIN D3) 2000 units tablet, Take 1 tablet by mouth Daily., Disp: ,  Rfl:   •  doxycycline (VIBRAMYCIN) 100 MG capsule, Take 1 capsule by mouth 2 (Two) Times a Day., Disp: 14 capsule, Rfl: 0  •  furosemide (LASIX) 20 MG tablet, Take 1 tablet by mouth Daily. (Patient taking differently: Take 20 mg by mouth 2 (Two) Times a Day.), Disp: 90 tablet, Rfl: 1  •  Homeopathic Products (EARACHE DROPS OT), Administer  into ear(s) as directed by provider., Disp: , Rfl:   •  levothyroxine (SYNTHROID, LEVOTHROID) 50 MCG tablet, Take 1 tablet by mouth Every Morning., Disp: 90 tablet, Rfl: 1  •  metoprolol succinate XL (TOPROL-XL) 25 MG 24 hr tablet, Take 1 tablet by mouth 2 (Two) Times a Day., Disp: 180 tablet, Rfl: 1  •  Multiple Vitamins-Minerals (MULTIVITAMIN ADULT EXTRA C PO), Take 1 tablet by mouth Daily., Disp: , Rfl:   •  potassium chloride 10 MEQ CR tablet, Take 1 tablet by mouth 2 (Two) Times a Day., Disp: 60 tablet, Rfl: 0  •  spironolactone (ALDACTONE) 25 MG tablet, Take 1 tablet by mouth Daily., Disp: 90 tablet, Rfl: 1    Allergies   Allergen Reactions   • Prednisone Other (See Comments)     Increased BP       Family History   Problem Relation Age of Onset   • Heart disease Mother    • Lung cancer Father    • Diabetes Other        Past Surgical History:   Procedure Laterality Date   • APPENDECTOMY     • CARDIAC CATHETERIZATION  2012    x3    • CORONARY ARTERY BYPASS GRAFT  12/2014   • CORONARY STENT PLACEMENT      x3   • CYSTOSCOPY WITH CLOT EVACUATION N/A 3/25/2022    Procedure: CYSTOSCOPY WITH CLOT EVACUATION AND FULGURATION ;  Surgeon: Sukhdev Poole MD;  Location: [facility] OR;  Service: Urology;  Laterality: N/A;   • HARDWARE REMOVAL Right 7/21/2020    Procedure: ANKLE/FOOT HARDWARE REMOVAL;  Surgeon: Lincoln Sibley MD;  Location: [facility] OR;  Service: Orthopedics;  Laterality: Right;   • ORIF ANKLE FRACTURE Right 04/23/2019    Radha Vazquez Mem   • THORACOSCOPY WITH DECORTICATION Left 3/18/2022    Procedure: LEFT THORACOSCOPY VIDEO ASSISTED WITH COMPLETE  "DECORTICATION;  Surgeon: Sabra Kuo MD;  Location: [facility];  Service: Thoracic;  Laterality: Left;       Past Medical History:   Diagnosis Date   • Astigmatism, bilateral 2019   • Benign essential hypertension    • Bilateral presbyopia 2019   • CAD (coronary artery disease)    • Closed right ankle fracture    • COVID-19 vaccination refused    • Hyperlipidemia    • Hypothyroidism    • Influenza vaccine needed    • Myocardial infarction (HCC)    • Prediabetes    • Pseudophakia, both eyes 2019   • Thoracic back pain        Family History   Problem Relation Age of Onset   • Heart disease Mother    • Lung cancer Father    • Diabetes Other        Social History     Socioeconomic History   • Marital status:    Tobacco Use   • Smoking status: Former Smoker     Types: Cigarettes     Quit date: 1980     Years since quittin.5   • Smokeless tobacco: Never Used   • Tobacco comment: Social Drinker   Vaping Use   • Vaping Use: Never used   Substance and Sexual Activity   • Alcohol use: Yes     Comment: mod    • Drug use: No   • Sexual activity: Defer           ECG 12 Lead    Date/Time: 2022 12:49 PM  Performed by: Yordan Evans MD  Authorized by: Yordan Evans MD   Comparison: compared with previous ECG   Similar to previous ECG  Comparison to previous ECG: Atrial fibrillation with controlled ventricular response nonspecific ST-T wave changes 79/min normal axis normal intervals no ectopy no change from 2022                Objective:       Physical Exam    BP (!) 174/116   Pulse 65   Ht 152.4 cm (60\")   Wt 73.5 kg (162 lb)   SpO2 98%   BMI 31.64 kg/m²   The patient is alert, oriented and in no distress.    Vital signs as noted above.    Head and neck revealed no carotid bruits or jugular venous distension.  No thyromegaly or lymphadenopathy is present.    Lungs clear.  No wheezing.  Breath sounds are normal bilaterally.    Heart normal first and second heart sounds.  " No murmur..  No pericardial rub is present.  No gallop is present.    Abdomen soft and nontender.  No organomegaly is present.    Extremities revealed good peripheral pulses without any pedal edema.    Skin warm and dry.    Musculoskeletal system is grossly normal.    CNS grossly normal.    Reviewed and updated.

## 2022-07-13 LAB
ALBUMIN SERPL-MCNC: 4.2 G/DL (ref 3.7–4.7)
ALBUMIN/GLOB SERPL: 1.7 {RATIO} (ref 1.2–2.2)
ALP SERPL-CCNC: 129 IU/L (ref 44–121)
ALT SERPL-CCNC: 19 IU/L (ref 0–32)
AST SERPL-CCNC: 21 IU/L (ref 0–40)
BASOPHILS # BLD AUTO: 0 X10E3/UL (ref 0–0.2)
BASOPHILS NFR BLD AUTO: 0 %
BILIRUB SERPL-MCNC: 1 MG/DL (ref 0–1.2)
BNP SERPL-MCNC: 1739.9 PG/ML (ref 0–100)
BUN SERPL-MCNC: 23 MG/DL (ref 8–27)
BUN/CREAT SERPL: 20 (ref 12–28)
CALCIUM SERPL-MCNC: 9.4 MG/DL (ref 8.7–10.3)
CHLORIDE SERPL-SCNC: 96 MMOL/L (ref 96–106)
CO2 SERPL-SCNC: 25 MMOL/L (ref 20–29)
CREAT SERPL-MCNC: 1.13 MG/DL (ref 0.57–1)
EGFRCR SERPLBLD CKD-EPI 2021: 49 ML/MIN/1.73
EOSINOPHIL # BLD AUTO: 0 X10E3/UL (ref 0–0.4)
EOSINOPHIL NFR BLD AUTO: 0 %
ERYTHROCYTE [DISTWIDTH] IN BLOOD BY AUTOMATED COUNT: 14.9 % (ref 11.7–15.4)
GLOBULIN SER CALC-MCNC: 2.5 G/DL (ref 1.5–4.5)
GLUCOSE SERPL-MCNC: 166 MG/DL (ref 65–99)
HCT VFR BLD AUTO: 49 % (ref 34–46.6)
HGB BLD-MCNC: 16.4 G/DL (ref 11.1–15.9)
IMM GRANULOCYTES # BLD AUTO: 0 X10E3/UL (ref 0–0.1)
IMM GRANULOCYTES NFR BLD AUTO: 0 %
LYMPHOCYTES # BLD AUTO: 0.9 X10E3/UL (ref 0.7–3.1)
LYMPHOCYTES NFR BLD AUTO: 18 %
MCH RBC QN AUTO: 30.8 PG (ref 26.6–33)
MCHC RBC AUTO-ENTMCNC: 33.5 G/DL (ref 31.5–35.7)
MCV RBC AUTO: 92 FL (ref 79–97)
MONOCYTES # BLD AUTO: 0.4 X10E3/UL (ref 0.1–0.9)
MONOCYTES NFR BLD AUTO: 9 %
NEUTROPHILS # BLD AUTO: 3.4 X10E3/UL (ref 1.4–7)
NEUTROPHILS NFR BLD AUTO: 73 %
PLATELET # BLD AUTO: 197 X10E3/UL (ref 150–450)
POTASSIUM SERPL-SCNC: 4.2 MMOL/L (ref 3.5–5.2)
PROT SERPL-MCNC: 6.7 G/DL (ref 6–8.5)
RBC # BLD AUTO: 5.33 X10E6/UL (ref 3.77–5.28)
SODIUM SERPL-SCNC: 137 MMOL/L (ref 134–144)
WBC # BLD AUTO: 4.7 X10E3/UL (ref 3.4–10.8)

## 2022-07-14 ENCOUNTER — OFFICE VISIT (OUTPATIENT)
Dept: INTERNAL MEDICINE | Facility: CLINIC | Age: 82
End: 2022-07-14

## 2022-07-14 ENCOUNTER — APPOINTMENT (OUTPATIENT)
Dept: CT IMAGING | Facility: HOSPITAL | Age: 82
End: 2022-07-14

## 2022-07-14 VITALS
SYSTOLIC BLOOD PRESSURE: 140 MMHG | HEIGHT: 60 IN | HEART RATE: 78 BPM | WEIGHT: 167 LBS | DIASTOLIC BLOOD PRESSURE: 100 MMHG | TEMPERATURE: 97.1 F | OXYGEN SATURATION: 100 % | BODY MASS INDEX: 32.79 KG/M2

## 2022-07-14 DIAGNOSIS — I50.22 CHRONIC SYSTOLIC HEART FAILURE: Chronic | ICD-10-CM

## 2022-07-14 DIAGNOSIS — I25.810 CORONARY ARTERY DISEASE INVOLVING CORONARY BYPASS GRAFT OF NATIVE HEART WITHOUT ANGINA PECTORIS: Primary | Chronic | ICD-10-CM

## 2022-07-14 DIAGNOSIS — U07.1 COVID-19 VIRUS DETECTED: ICD-10-CM

## 2022-07-14 DIAGNOSIS — Z00.00 MEDICARE ANNUAL WELLNESS VISIT, SUBSEQUENT: ICD-10-CM

## 2022-07-14 DIAGNOSIS — I10 ESSENTIAL HYPERTENSION: Chronic | ICD-10-CM

## 2022-07-14 PROBLEM — J18.9 PNEUMONIA OF BOTH LUNGS DUE TO INFECTIOUS ORGANISM, UNSPECIFIED PART OF LUNG: Status: RESOLVED | Noted: 2022-06-08 | Resolved: 2022-07-14

## 2022-07-14 PROCEDURE — G0439 PPPS, SUBSEQ VISIT: HCPCS | Performed by: INTERNAL MEDICINE

## 2022-07-14 PROCEDURE — 1160F RVW MEDS BY RX/DR IN RCRD: CPT | Performed by: INTERNAL MEDICINE

## 2022-07-14 PROCEDURE — 1170F FXNL STATUS ASSESSED: CPT | Performed by: INTERNAL MEDICINE

## 2022-07-14 NOTE — PROGRESS NOTES
The ABCs of the Annual Wellness Visit  Subsequent Medicare Wellness Visit    Chief Complaint   Patient presents with   • Medicare Wellness-subsequent      Subjective    History of Present Illness:  Jaquelin Valderrama is a 82 y.o. female who presents for a Subsequent Medicare Wellness Visit. Feels much closer to baseline- more active.  Saw Dr. Evans earlier this week- didn't change anything. He is repeating her echo in Sept.  She had a positive Covid test in the hospital in June- refused treatment, went home and has improved.   Checking BP at home- her usual 120-150/70-100s.        The following portions of the patient's history were reviewed and   updated as appropriate: allergies, current medications, past family history, past medical history, past social history, past surgical history and problem list.    Compared to one year ago, the patient feels her physical   health is better.    Compared to one year ago, the patient feels her mental   health is the same.    Recent Hospitalizations:  This patient has had a [facility] admission record on file within the last 365 days.    Current Medical Providers:  Patient Care Team:  Minerva Chatterjee MD as PCP - General (Internal Medicine)  Yordan Evans MD as Consulting Physician (Cardiology)    Outpatient Medications Prior to Visit   Medication Sig Dispense Refill   • amiodarone (PACERONE) 200 MG tablet Take 1 tablet by mouth Daily. 90 tablet 1   • apixaban (ELIQUIS) 5 MG tablet tablet Take 5 mg by mouth 2 (Two) Times a Day.     • arformoterol (BROVANA) 15 MCG/2ML nebulizer solution Take 2 mL by nebulization 2 (Two) Times a Day. 120 mL 2   • budesonide (PULMICORT) 0.5 MG/2ML nebulizer solution Take 4 mL by nebulization 2 (Two) Times a Day.     • Cholecalciferol (VITAMIN D3) 2000 units tablet Take 1 tablet by mouth Daily.     • doxycycline (VIBRAMYCIN) 100 MG capsule Take 1 capsule by mouth 2 (Two) Times a Day. 14 capsule 0   • furosemide (LASIX) 20 MG tablet Take 1  tablet by mouth Daily. (Patient taking differently: Take 20 mg by mouth 2 (Two) Times a Day.) 90 tablet 1   • Homeopathic Products (EARACHE DROPS OT) Administer  into ear(s) as directed by provider.     • levothyroxine (SYNTHROID, LEVOTHROID) 50 MCG tablet Take 1 tablet by mouth Every Morning. 90 tablet 1   • metoprolol succinate XL (TOPROL-XL) 25 MG 24 hr tablet Take 1 tablet by mouth 2 (Two) Times a Day. 180 tablet 1   • Multiple Vitamins-Minerals (MULTIVITAMIN ADULT EXTRA C PO) Take 1 tablet by mouth Daily.     • potassium chloride 10 MEQ CR tablet Take 1 tablet by mouth 2 (Two) Times a Day. 60 tablet 0   • spironolactone (ALDACTONE) 25 MG tablet Take 1 tablet by mouth Daily. 90 tablet 1     No facility-administered medications prior to visit.       No opioid medication identified on active medication list. I have reviewed chart for other potential  high risk medication/s and harmful drug interactions in the elderly.          Aspirin is not on active medication list.  Aspirin use is not indicated based on review of current medical condition/s. Risk of harm outweighs potential benefits.  .    Patient Active Problem List   Diagnosis   • Other hyperlipidemia   • Essential hypertension   • Hypothyroidism   • Coronary artery disease involving coronary bypass graft of native heart without angina pectoris   • Astigmatism, bilateral   • Bilateral presbyopia   • Pseudophakia, both eyes   • Atrial fibrillation (HCC)   • Elevated LFTs   • Elevated TSH   • Acute hyperglycemia   • Obesity (BMI 30-39.9)   • Loculated pleural effusion   • Systolic HF (heart failure) (HCC)   • Leukocytosis   • Pneumonia of both lungs due to infectious organism, unspecified part of lung   • COVID-19 virus detected     Advance Care Planning  Advance Directive is on file.  ACP discussion was held with the patient during this visit. Patient has an advance directive in EMR which is still valid.     Review of Systems   HENT: Negative for congestion  "and trouble swallowing.    Respiratory: Negative for cough and shortness of breath (denies).    Cardiovascular: Negative for leg swelling.   Gastrointestinal: Negative for abdominal pain.   Genitourinary: Negative for difficulty urinating.   Musculoskeletal: Negative for arthralgias.   Psychiatric/Behavioral: Negative for agitation and dysphoric mood.        Objective    Vitals:    22 1000   Temp: 97.1 °F (36.2 °C)   Weight: 75.8 kg (167 lb)   Height: 152.4 cm (60\")     Estimated body mass index is 32.61 kg/m² as calculated from the following:    Height as of this encounter: 152.4 cm (60\").    Weight as of this encounter: 75.8 kg (167 lb).    BMI is >= 30 and <35. (Class 1 Obesity). The following options were offered after discussion;: none (medical contraindication)      Does the patient have evidence of cognitive impairment? No    Physical Exam  Constitutional:       General: She is not in acute distress.     Appearance: Normal appearance.   Cardiovascular:      Rate and Rhythm: Normal rate. Rhythm irregular.   Pulmonary:      Effort: Pulmonary effort is normal.      Breath sounds: Normal breath sounds.   Musculoskeletal:      Right lower leg: No edema.      Left lower leg: No edema.   Lymphadenopathy:      Cervical: No cervical adenopathy.   Psychiatric:         Mood and Affect: Mood normal.         Thought Content: Thought content normal.                 HEALTH RISK ASSESSMENT    Smoking Status:  Social History     Tobacco Use   Smoking Status Former Smoker   • Types: Cigarettes   • Quit date:    • Years since quittin.5   Smokeless Tobacco Never Used   Tobacco Comment    Social Drinker     Alcohol Consumption:  Social History     Substance and Sexual Activity   Alcohol Use Yes    Comment: mod      Fall Risk Screen:    STEADI Fall Risk Assessment was completed, and patient is at LOW risk for falls.Assessment completed on:2022    Depression Screening:  PHQ-2/PHQ-9 Depression Screening 2022 "   Retired Total Score -   Little Interest or Pleasure in Doing Things 0-->not at all   Feeling Down, Depressed or Hopeless 0-->not at all   PHQ-9: Brief Depression Severity Measure Score 0       Health Habits and Functional and Cognitive Screening:  Functional & Cognitive Status 7/14/2022   Do you have difficulty preparing food and eating? No   Do you have difficulty bathing yourself, getting dressed or grooming yourself? No   Do you have difficulty using the toilet? No   Do you have difficulty moving around from place to place? No   Do you have trouble with steps or getting out of a bed or a chair? No   Current Diet Well Balanced Diet   Dental Exam -   Eye Exam Up to date   Exercise (times per week) 0 times per week   Current Exercises Include No Regular Exercise   Do you need help using the phone?  No   Are you deaf or do you have serious difficulty hearing?  No   Do you need help with transportation? No   Do you need help shopping? No   Do you need help preparing meals?  No   Do you need help with housework?  No   Do you need help with laundry? No   Do you need help taking your medications? No   Do you need help managing money? No   Do you ever drive or ride in a car without wearing a seat belt? No   Have you felt unusual stress, anger or loneliness in the last month? No   Who do you live with? Spouse   If you need help, do you have trouble finding someone available to you? No   Have you been bothered in the last four weeks by sexual problems? No   Do you have difficulty concentrating, remembering or making decisions? No       Age-appropriate Screening Schedule:  Refer to the list below for future screening recommendations based on patient's age, sex and/or medical conditions. Orders for these recommended tests are listed in the plan section. The patient has been provided with a written plan.    Health Maintenance   Topic Date Due   • URINE MICROALBUMIN  Never done   • TDAP/TD VACCINES (1 - Tdap) Never done   •  ZOSTER VACCINE (1 of 2) Never done   • DXA SCAN  01/03/2016   • DIABETIC EYE EXAM  10/09/2020   • HEMOGLOBIN A1C  09/03/2022   • INFLUENZA VACCINE  10/01/2022   • LIPID PANEL  03/05/2023              Assessment & Plan   CMS Preventative Services Quick Reference  Risk Factors Identified During Encounter  refused by patient  The above risks/problems have been discussed with the patient.  Follow up actions/plans if indicated are seen below in the Assessment/Plan Section.  Pertinent information has been shared with the patient in the After Visit Summary.    There are no diagnoses linked to this encounter.    Follow Up:   No follow-ups on file.     An After Visit Summary and PPPS were made available to the patient.

## 2022-08-11 DIAGNOSIS — Z12.31 SCREENING MAMMOGRAM, ENCOUNTER FOR: Primary | ICD-10-CM

## 2022-08-12 ENCOUNTER — HOSPITAL ENCOUNTER (OUTPATIENT)
Dept: CT IMAGING | Facility: HOSPITAL | Age: 82
Discharge: HOME | End: 2022-08-12
Admitting: THORACIC SURGERY (CARDIOTHORACIC VASCULAR SURGERY)

## 2022-08-12 DIAGNOSIS — J90 LOCULATED PLEURAL EFFUSION: ICD-10-CM

## 2022-08-12 PROCEDURE — 71250 CT THORAX DX C-: CPT

## 2022-08-16 ENCOUNTER — HOSPITAL ENCOUNTER (OUTPATIENT)
Dept: MAMMOGRAPHY | Facility: HOSPITAL | Age: 82
Discharge: HOME | End: 2022-08-16
Admitting: INTERNAL MEDICINE

## 2022-08-16 DIAGNOSIS — Z12.31 SCREENING MAMMOGRAM, ENCOUNTER FOR: ICD-10-CM

## 2022-08-16 PROCEDURE — 77063 BREAST TOMOSYNTHESIS BI: CPT

## 2022-08-16 PROCEDURE — 77067 SCR MAMMO BI INCL CAD: CPT

## 2022-08-18 ENCOUNTER — OFFICE VISIT (OUTPATIENT)
Dept: SURGERY | Facility: CLINIC | Age: 82
End: 2022-08-18

## 2022-08-18 ENCOUNTER — NURSE NAVIGATOR (OUTPATIENT)
Dept: ONCOLOGY | Facility: CLINIC | Age: 82
End: 2022-08-18

## 2022-08-18 VITALS
DIASTOLIC BLOOD PRESSURE: 98 MMHG | TEMPERATURE: 96.8 F | HEART RATE: 66 BPM | WEIGHT: 170 LBS | BODY MASS INDEX: 33.38 KG/M2 | OXYGEN SATURATION: 97 % | HEIGHT: 60 IN | SYSTOLIC BLOOD PRESSURE: 156 MMHG

## 2022-08-18 DIAGNOSIS — R18.8 OTHER ASCITES: Primary | ICD-10-CM

## 2022-08-18 PROCEDURE — 99213 OFFICE O/P EST LOW 20 MIN: CPT | Performed by: THORACIC SURGERY (CARDIOTHORACIC VASCULAR SURGERY)

## 2022-08-18 NOTE — PROGRESS NOTES
"Chief Complaint  Pleural Effusion (3 month follow up CT chest pleural effusion)    Subjective          Jaquelin Valderrama presents to [facility] THORACIC SURGERY  History of Present Illness  Ms. Valderrama is a pleasant 82-year-old lady status post decortication. She presents with a CT of the chest.   Her family reports the patient is picking up a lot of weight. She notes cardiologist Dr. Chatterjee is who prescribes her Lasix medication. The patient adds she will be contacting Dr. Evans office to see what he says about the fluid in her abdomen. She denies she has a gastroenterologist. The unknown male states the patient is having back pain up and down her back in the middle, especially along her shoulder blade or just below it and occasionally above it.  The male reports the patient does not sleep well, she wakes up thinking she is not breathing. She states she sleeps on two pillows and has another slanted upwards.    Objective   Vital Signs:   /98 (BP Location: Right arm, Patient Position: Sitting, Cuff Size: Adult)   Pulse 66   Temp 96.8 °F (36 °C) (Infrared)   Ht 152.4 cm (60\")   Wt 77.1 kg (170 lb)   SpO2 97%   BMI 33.20 kg/m²     Physical Exam  Vitals and nursing note reviewed.   Constitutional:       Appearance: She is well-developed.   HENT:      Head: Normocephalic and atraumatic.      Nose: Nose normal.   Eyes:      Conjunctiva/sclera: Conjunctivae normal.   Cardiovascular:      Rate and Rhythm: Normal rate.   Pulmonary:      Effort: Pulmonary effort is normal.   Abdominal:      Palpations: Abdomen is soft.   Musculoskeletal:         General: Swelling present.      Cervical back: Neck supple.   Skin:     General: Skin is warm and dry.   Neurological:      Mental Status: She is alert and oriented to person, place, and time.   Psychiatric:         Behavior: Behavior normal.         Thought Content: Thought content normal.         Judgment: Judgment normal.          Result Review :        "         I have independently reviewed the CT of the chest performed on 8/12/2022 which demonstrates near complete resolution of the prior left-sided pleural effusion with some scarring associated with her prior surgery. There is a small right-sided pleural effusion that is unchanged in size. She does have some ascites and anasarca. There are no new nodules, no mediastinal or hilar lymphadenopathy, no pericardial effusion.     Assessment and Plan    Diagnoses and all orders for this visit:    1. Other ascites (Primary)  -     Ambulatory Referral to Gastroenterology    Ms. Valderrama is a very pleasant 82-year-old lady with status post decortication who presents today in follow-up. Her lung is continuing to improve.  She has worsening bilateral lower extremity edema and significant weight gain with ascites.  She will need to follow-up with her cardiologist as well as referral for gastroenterology for management of her ascites.  I will plan to see her as needed basis.    I spent 20 minutes caring for Jaquelin on this date of service. This time includes time spent by me in the following activities:preparing for the visit, reviewing tests, obtaining and/or reviewing a separately obtained history, performing a medically appropriate examination and/or evaluation , counseling and educating the patient/family/caregiver, ordering medications, tests, or procedures, documenting information in the medical record and independently interpreting results and communicating that information with the patient/family/caregiver  Follow Up   No follow-ups on file.  Patient was given instructions and counseling regarding her condition or for health maintenance advice. Please see specific information pulled into the AVS if appropriate.       Transcribed from ambient dictation for Sabra Kuo MD by Kelvin Funez.  08/18/22   14:38 EDT    Patient verbalized consent to the visit recording.  I have personally performed the services described  in this document as transcribed by the above individual, and it is both accurate and complete.  Sabra Kuo MD  8/31/2022  11:42 EDT

## 2022-08-18 NOTE — PROGRESS NOTES
I accompanied patient and spouse to Dr. Kuo's office visit.     Dr. Kuo review scan with patient. Patient is gaining fluid weight. Plan referral to GI, move up Dr. Evans appointment. Patient will call and see Dr. Kuo as needed.

## 2022-08-22 ENCOUNTER — OFFICE (AMBULATORY)
Dept: URBAN - METROPOLITAN AREA CLINIC 64 | Facility: CLINIC | Age: 82
End: 2022-08-22

## 2022-08-22 ENCOUNTER — TRANSCRIBE ORDERS (OUTPATIENT)
Dept: ADMINISTRATIVE | Facility: HOSPITAL | Age: 82
End: 2022-08-22

## 2022-08-22 VITALS
HEART RATE: 62 BPM | DIASTOLIC BLOOD PRESSURE: 97 MMHG | HEIGHT: 64 IN | WEIGHT: 171 LBS | SYSTOLIC BLOOD PRESSURE: 182 MMHG

## 2022-08-22 DIAGNOSIS — R18.8 OTHER ASCITES: ICD-10-CM

## 2022-08-22 DIAGNOSIS — R18.8 OTHER ASCITES: Primary | ICD-10-CM

## 2022-08-22 PROCEDURE — 99204 OFFICE O/P NEW MOD 45 MIN: CPT | Performed by: NURSE PRACTITIONER

## 2022-08-24 NOTE — PROGRESS NOTES
Encounter Date:08/25/2022    Last seen-5/24/2022      Patient ID: Jaquelin Valderrama is a 82 y.o. female.    Chief Complaint:    Status post CABG  Atrial fibrillation  Hypertension  Dyslipidemia  Hypothyroidism        History of Present Illness  Patient is having weight gain and ascites.  Patient is going to have removal of ascitic fluid.      Patient recently was admitted to [facility] with abdominal pain and had a large burden of clot in the urinary bladder and possible anterior bladder perforation.  Patient had Alfonso catheter insertion.  Patient was taken off anticoagulation due to significant problems with the urinary bladder.     Since I have last seen, the patient has been without any chest discomfort ,shortness of breath, palpitations, dizziness or syncope.  Denies having any headache ,abdominal pain ,nausea, vomiting , diarrhea constipation, loss of weight or loss of appetite.  Denies having any excessive bruising ,hematuria or blood in the stool.     Review of all systems negative except as indicated.     Reviewed ROS.     Assessment and Plan         ]]]]]]]]]]]]]]]]]]]]  Impression  ==========  -Recent severe hematuria.-Improved.  CT scan of the abdomen showed significant clot burden in the bladder and probable anterior bladder perforation.     -Progressively worsening shortness of breath and leg edema-better.  Recurrent left pleural effusion which was loculated.  Patient had chest tube and subsequently had video-assisted thoracoscopy with complete decortication on the left side at [facility].  (March 2022.)     -Congestive heart failure  Left ventricle dysfunction with ejection fraction of 30%.    Echocardiogram 8/25/2022 revealed   Moderate mitral and tricuspid regurgitation.  Biventricular enlargement and dysfunction with estimated left ventricle ejection fraction of 30%.    -Significant biatrial enlargement     -History of atrial fibrillation with RVR     -Premature ventricular  contractions     -Status post CABG 12/2014 (performed in [place])     -Hypertension dyslipidemia prediabetes hypothyroidism elevation     -Status post ORIF     -Family history of coronary artery disease     -Intolerance to prednisone     -Former smoker     -Medical noncompliance.  Was not taking thyroid medicine replacement properly.     =================  Plan  ===========  History of abdominal pain  Work-up showed a CT scan with massive clot burden in the bladder and possible anterior bladder perforation.  Patient is being taken for urological surgery.    Patient had on 3/25/2022  Cystoscopy, clot evacuation and fulguration, cystogram.  No further hematuria although patient had noticed mild blood around the urethra.     Chronic atrial fibrillation-rate is controlled now.  Consider GABRIEL cardioversion for adequate anticoagulation and cardioversion patient has persistent atrial fibrillation.     Anticoagulation  Patient is on Eliquis 5 mg twice a day.  Patient is not having any bleeding problems.    History of significant anemia with hemoglobin of 6.8 due to hematuria and significant blood clot in the bladder.  Hemoglobin 6.3-3/26/2022  Hemoglobin 8.3-8/27/2022      Renal dysfunction  BUN/creatinine-30/1.03      Ischemic cardiomyopathy  Echocardiogram 8/25/2022 revealed  Moderate mitral and tricuspid regurgitation.  Biventricular enlargement and dysfunction with estimated left ventricle ejection fraction of 30%.    Current medications include amiodarone aspirin Cardizem 90 mg every 8 hours levothyroxine metoprolol 25 mg twice a day.  Continue Lasix 40 mg p.o. nightly and spironolactone 25 mg a day.     Consideration for watchman procedure if patient has any further bleeding problems.    .  Ascites probably due to left ventricular dysfunction.  Patient to have paracentesis next week.  Increase Lasix and spironolactone to twice daily.    Follow-up in the office in 6 weeks with CMP CBC TSH magnesium level and  EKG.  Consideration for ICD (single-chamber versus biventricular ICD)     Further plan will depend on patient's progress.  ]]]]]]]]]]]]]]]]]                    Diagnosis Plan   1. Hx of CABG     2. Chronic anticoagulation     3. Atrial fibrillation with RVR (HCC)     4. Cardiomyopathy, dilated (HCC)     5. Acute congestive heart failure, unspecified heart failure type (HCC)     LAB RESULTS (LAST 7 DAYS)    CBC        BMP        CMP         BNP        TROPONIN        CoAg        Creatinine Clearance  CrCl cannot be calculated (Patient's most recent lab result is older than the maximum 30 days allowed.).    ABG        Radiology  No radiology results for the last day                The following portions of the patient's history were reviewed and updated as appropriate: allergies, current medications, past family history, past medical history, past social history, past surgical history and problem list.    Review of Systems   Constitutional: Negative for malaise/fatigue.   Cardiovascular: Negative for chest pain, leg swelling, palpitations and syncope.   Respiratory: Positive for shortness of breath.    Skin: Negative for rash.   Gastrointestinal: Negative for nausea and vomiting.   Neurological: Negative for dizziness, light-headedness and numbness.   All other systems reviewed and are negative.        Current Outpatient Medications:   •  amiodarone (PACERONE) 200 MG tablet, Take 1 tablet by mouth Daily., Disp: 90 tablet, Rfl: 1  •  apixaban (ELIQUIS) 5 MG tablet tablet, Take 5 mg by mouth 2 (Two) Times a Day., Disp: , Rfl:   •  Cholecalciferol (VITAMIN D3) 2000 units tablet, Take 1 tablet by mouth Daily., Disp: , Rfl:   •  doxycycline (VIBRAMYCIN) 100 MG capsule, Take 1 capsule by mouth 2 (Two) Times a Day., Disp: 14 capsule, Rfl: 0  •  furosemide (LASIX) 20 MG tablet, Take 1 tablet by mouth Daily. (Patient taking differently: Take 20 mg by mouth 2 (Two) Times a Day.), Disp: 90 tablet, Rfl: 1  •  levothyroxine  (SYNTHROID, LEVOTHROID) 50 MCG tablet, Take 1 tablet by mouth Every Morning., Disp: 90 tablet, Rfl: 1  •  metoprolol succinate XL (TOPROL-XL) 25 MG 24 hr tablet, Take 1 tablet by mouth 2 (Two) Times a Day., Disp: 180 tablet, Rfl: 1  •  Multiple Vitamins-Minerals (MULTIVITAMIN ADULT EXTRA C PO), Take 1 tablet by mouth Daily., Disp: , Rfl:   •  spironolactone (ALDACTONE) 25 MG tablet, Take 1 tablet by mouth Daily., Disp: 90 tablet, Rfl: 1    Allergies   Allergen Reactions   • Prednisone Other (See Comments)     Increased BP       Family History   Problem Relation Age of Onset   • Heart disease Mother    • Lung cancer Father    • Diabetes Other        Past Surgical History:   Procedure Laterality Date   • APPENDECTOMY     • CARDIAC CATHETERIZATION  2012    x3    • CORONARY ARTERY BYPASS GRAFT  12/2014   • CORONARY STENT PLACEMENT      x3   • CYSTOSCOPY WITH CLOT EVACUATION N/A 3/25/2022    Procedure: CYSTOSCOPY WITH CLOT EVACUATION AND FULGURATION ;  Surgeon: Sukhdev Poole MD;  Location[facility];  Service: Urology;  Laterality: N/A;   • HARDWARE REMOVAL Right 7/21/2020    Procedure: ANKLE/FOOT HARDWARE REMOVAL;  Surgeon: Lincoln Sibley MD;  Location: [facility];  Service: Orthopedics;  Laterality: Right;   • ORIF ANKLE FRACTURE Right 04/23/2019    Radha Vazquez Mem   • THORACOSCOPY WITH DECORTICATION Left 3/18/2022    Procedure: LEFT THORACOSCOPY VIDEO ASSISTED WITH COMPLETE DECORTICATION;  Surgeon: Sabra Kuo MD;  Location: [facility];  Service: Thoracic;  Laterality: Left;       Past Medical History:   Diagnosis Date   • Astigmatism, bilateral 11/19/2019   • Benign essential hypertension    • Bilateral presbyopia 11/19/2019   • CAD (coronary artery disease)    • Closed right ankle fracture    • COVID-19 vaccination refused    • Hyperlipidemia    • Hypothyroidism    • Influenza vaccine needed    • Myocardial infarction (HCC)    • Prediabetes    • Pseudophakia, both eyes 11/19/2019   •  "Thoracic back pain        Family History   Problem Relation Age of Onset   • Heart disease Mother    • Lung cancer Father    • Diabetes Other        Social History     Socioeconomic History   • Marital status:    Tobacco Use   • Smoking status: Former Smoker     Types: Cigarettes     Quit date:      Years since quittin.6   • Smokeless tobacco: Never Used   • Tobacco comment: Social Drinker   Vaping Use   • Vaping Use: Never used   Substance and Sexual Activity   • Alcohol use: Yes     Comment: mod    • Drug use: No   • Sexual activity: Defer         Procedures      Objective:       Physical Exam    BP (!) 179/111 (BP Location: Left arm, Patient Position: Sitting, Cuff Size: Adult) Comment: RECHECK  Pulse 75   Ht 152.4 cm (60\")   Wt 78.5 kg (173 lb)   SpO2 90%   BMI 33.79 kg/m²   The patient is alert, oriented and in no distress.    Vital signs as noted above.    Head and neck revealed no carotid bruits or jugular venous distension.  No thyromegaly or lymphadenopathy is present.    Lungs clear.  No wheezing.  Breath sounds are normal bilaterally.    Heart normal first and second heart sounds.  No murmur..  No pericardial rub is present.  No gallop is present.    Abdomen soft and nontender.  No organomegaly is present.  Ascites.    Extremities revealed good peripheral pulses.  2+ edema.    Skin warm and dry.    Musculoskeletal system is grossly normal.    CNS grossly normal.    Reviewed and updated.        "

## 2022-08-25 ENCOUNTER — HOSPITAL ENCOUNTER (OUTPATIENT)
Dept: CARDIOLOGY | Facility: HOSPITAL | Age: 82
Discharge: HOME | End: 2022-08-25

## 2022-08-25 ENCOUNTER — OFFICE VISIT (OUTPATIENT)
Dept: CARDIOLOGY | Facility: CLINIC | Age: 82
End: 2022-08-25

## 2022-08-25 VITALS — BODY MASS INDEX: 33.38 KG/M2 | WEIGHT: 170 LBS | HEIGHT: 60 IN

## 2022-08-25 VITALS
DIASTOLIC BLOOD PRESSURE: 111 MMHG | SYSTOLIC BLOOD PRESSURE: 179 MMHG | OXYGEN SATURATION: 90 % | HEIGHT: 60 IN | BODY MASS INDEX: 33.96 KG/M2 | WEIGHT: 173 LBS | HEART RATE: 75 BPM

## 2022-08-25 DIAGNOSIS — Z95.1 HX OF CABG: Primary | ICD-10-CM

## 2022-08-25 DIAGNOSIS — I50.9 ACUTE CONGESTIVE HEART FAILURE, UNSPECIFIED HEART FAILURE TYPE: ICD-10-CM

## 2022-08-25 DIAGNOSIS — Z79.01 CHRONIC ANTICOAGULATION: ICD-10-CM

## 2022-08-25 DIAGNOSIS — I48.91 ATRIAL FIBRILLATION WITH RVR: ICD-10-CM

## 2022-08-25 DIAGNOSIS — I42.0 CARDIOMYOPATHY, DILATED: ICD-10-CM

## 2022-08-25 DIAGNOSIS — Z95.1 HX OF CABG: ICD-10-CM

## 2022-08-25 LAB
BH CV ECHO MEAS - ACS: 1.77 CM
BH CV ECHO MEAS - AO MAX PG: 4.5 MMHG
BH CV ECHO MEAS - AO MEAN PG: 2.6 MMHG
BH CV ECHO MEAS - AO ROOT DIAM: 3 CM
BH CV ECHO MEAS - AO V2 MAX: 106.3 CM/SEC
BH CV ECHO MEAS - AO V2 VTI: 19.4 CM
BH CV ECHO MEAS - AVA(I,D): 0.78 CM2
BH CV ECHO MEAS - EDV(CUBED): 81.7 ML
BH CV ECHO MEAS - EDV(MOD-SP4): 51 ML
BH CV ECHO MEAS - EF(MOD-SP4): 37.9 %
BH CV ECHO MEAS - ESV(CUBED): 58.5 ML
BH CV ECHO MEAS - ESV(MOD-SP4): 31.7 ML
BH CV ECHO MEAS - FS: 10.5 %
BH CV ECHO MEAS - IVS/LVPW: 2.46 CM
BH CV ECHO MEAS - IVSD: 1.72 CM
BH CV ECHO MEAS - LA DIMENSION: 4.5 CM
BH CV ECHO MEAS - LV MASS(C)D: 190.1 GRAMS
BH CV ECHO MEAS - LV MAX PG: 0.66 MMHG
BH CV ECHO MEAS - LV MEAN PG: 0.46 MMHG
BH CV ECHO MEAS - LV V1 MAX: 40.6 CM/SEC
BH CV ECHO MEAS - LV V1 VTI: 5.8 CM
BH CV ECHO MEAS - LVIDD: 4.3 CM
BH CV ECHO MEAS - LVIDS: 3.9 CM
BH CV ECHO MEAS - LVOT AREA: 2.6 CM2
BH CV ECHO MEAS - LVOT DIAM: 1.81 CM
BH CV ECHO MEAS - LVPWD: 0.7 CM
BH CV ECHO MEAS - MR MAX PG: 95.9 MMHG
BH CV ECHO MEAS - MR MAX VEL: 489.7 CM/SEC
BH CV ECHO MEAS - MV E MAX VEL: 73.8 CM/SEC
BH CV ECHO MEAS - MV MAX PG: 2.04 MMHG
BH CV ECHO MEAS - MV MEAN PG: 1.19 MMHG
BH CV ECHO MEAS - MV V2 VTI: 12.2 CM
BH CV ECHO MEAS - MVA(VTI): 1.24 CM2
BH CV ECHO MEAS - PA V2 MAX: 59 CM/SEC
BH CV ECHO MEAS - RAP SYSTOLE: 8 MMHG
BH CV ECHO MEAS - RVDD: 3.8 CM
BH CV ECHO MEAS - RVSP: 30.6 MMHG
BH CV ECHO MEAS - SV(LVOT): 15.1 ML
BH CV ECHO MEAS - SV(MOD-SP4): 19.3 ML
BH CV ECHO MEAS - TR MAX PG: 22.6 MMHG
BH CV ECHO MEAS - TR MAX VEL: 235.6 CM/SEC
LV EF 2D ECHO EST: 30 %
MAXIMAL PREDICTED HEART RATE: 140 BPM
STRESS TARGET HR: 119 BPM

## 2022-08-25 PROCEDURE — 93306 TTE W/DOPPLER COMPLETE: CPT

## 2022-08-25 PROCEDURE — 99214 OFFICE O/P EST MOD 30 MIN: CPT | Performed by: INTERNAL MEDICINE

## 2022-08-25 PROCEDURE — 93306 TTE W/DOPPLER COMPLETE: CPT | Performed by: INTERNAL MEDICINE

## 2022-08-26 ENCOUNTER — TELEPHONE (OUTPATIENT)
Dept: CARDIOLOGY | Facility: CLINIC | Age: 82
End: 2022-08-26

## 2022-08-26 DIAGNOSIS — R18.8 OTHER ASCITES: Primary | ICD-10-CM

## 2022-08-26 RX ORDER — ALBUMIN (HUMAN) 12.5 G/50ML
25 SOLUTION INTRAVENOUS DAILY PRN
Status: CANCELLED | OUTPATIENT
Start: 2022-08-30

## 2022-08-26 RX ORDER — ALBUMIN (HUMAN) 12.5 G/50ML
12.5 SOLUTION INTRAVENOUS DAILY PRN
Status: CANCELLED | OUTPATIENT
Start: 2022-08-30

## 2022-08-26 NOTE — TELEPHONE ENCOUNTER
Caller: Jaquelin Valderrama    Relationship: Self    Best call back number: [phone]    What is the best time to reach you: ANYTIME    Who are you requesting to speak with (clinical staff, provider,  specific staff member): ANYONE         What was the call regarding: PT IS PERSCRIBED PACER 1 200MG, SHE DOES NOT FEEL COMFORTABLE TAKING IT AFTER LOOKING UP SIDE EFFECTS SHE HAD NO ENERGY, AND IT CAN CAUSE ABNORMAL LIVER FUNCTION. PT DID NOT TAKE IT TODAY AND WANTED DR COTTON TO ADVISE HER    Do you require a callback: YES

## 2022-08-29 ENCOUNTER — PATIENT ROUNDING (BHMG ONLY) (OUTPATIENT)
Dept: SURGERY | Facility: CLINIC | Age: 82
End: 2022-08-29

## 2022-08-30 ENCOUNTER — HOSPITAL ENCOUNTER (OUTPATIENT)
Dept: INFUSION THERAPY | Facility: HOSPITAL | Age: 82
Discharge: HOME | End: 2022-08-30
Admitting: NURSE PRACTITIONER

## 2022-08-30 VITALS
DIASTOLIC BLOOD PRESSURE: 93 MMHG | RESPIRATION RATE: 20 BRPM | WEIGHT: 173 LBS | HEIGHT: 60 IN | SYSTOLIC BLOOD PRESSURE: 166 MMHG | BODY MASS INDEX: 33.96 KG/M2 | HEART RATE: 57 BPM | OXYGEN SATURATION: 96 %

## 2022-08-30 DIAGNOSIS — R18.8 OTHER ASCITES: ICD-10-CM

## 2022-08-30 LAB
DEPRECATED RDW RBC AUTO: 61.3 FL (ref 37–54)
ERYTHROCYTE [DISTWIDTH] IN BLOOD BY AUTOMATED COUNT: 18.5 % (ref 12.3–15.4)
HCT VFR BLD AUTO: 53.5 % (ref 34–46.6)
HGB BLD-MCNC: 16.9 G/DL (ref 12–15.9)
INR PPP: 1.19 (ref 0.93–1.1)
MCH RBC QN AUTO: 30.1 PG (ref 26.6–33)
MCHC RBC AUTO-ENTMCNC: 31.6 G/DL (ref 31.5–35.7)
MCV RBC AUTO: 95.1 FL (ref 79–97)
PLATELET # BLD AUTO: 196 10*3/MM3 (ref 140–450)
PMV BLD AUTO: 8 FL (ref 6–12)
PROTHROMBIN TIME: 12.1 SECONDS (ref 9.6–11.7)
RBC # BLD AUTO: 5.62 10*6/MM3 (ref 3.77–5.28)
WBC NRBC COR # BLD: 4.4 10*3/MM3 (ref 3.4–10.8)

## 2022-08-30 PROCEDURE — 85027 COMPLETE CBC AUTOMATED: CPT | Performed by: NURSE PRACTITIONER

## 2022-08-30 PROCEDURE — G0463 HOSPITAL OUTPT CLINIC VISIT: HCPCS

## 2022-08-30 PROCEDURE — 85610 PROTHROMBIN TIME: CPT | Performed by: NURSE PRACTITIONER

## 2022-09-01 ENCOUNTER — TELEPHONE (OUTPATIENT)
Dept: INTERNAL MEDICINE | Facility: CLINIC | Age: 82
End: 2022-09-01

## 2022-09-01 NOTE — TELEPHONE ENCOUNTER
Spoke with PT she is going to call cardio and if they have a problem doing this she will let us know

## 2022-09-01 NOTE — TELEPHONE ENCOUNTER
I have her down as taking furosemide and spironolactone.  I am happy to fill but think it should be managed by her cardiologist since it is cardiac in origin.

## 2022-09-01 NOTE — TELEPHONE ENCOUNTER
Called spoke with pt she said she went to [place] yesterday to have paracentesis done but was told by Dr. Velázquez she did not need one at this time and she should be treated with lasix.  PT 's question is will you prescribe this or her cardio Dr?

## 2022-09-01 NOTE — TELEPHONE ENCOUNTER
Caller: Jaquelin Valderrama    Relationship: Self    Best call back number: [phone]       What is the best time to reach you: ANYTIME     Who are you requesting to speak with (clinical staff, provider,  specific staff member): CLINICAL STAFF     What was the call regarding: PATIENT IS REQUESTING A CALL BACK TO DISCUSS TEST RESULTS.     PLEASE ADVISE     Do you require a callback: YES

## 2022-09-07 ENCOUNTER — TELEPHONE (OUTPATIENT)
Dept: CARDIOLOGY | Facility: CLINIC | Age: 82
End: 2022-09-07

## 2022-09-07 NOTE — TELEPHONE ENCOUNTER
Patient wanted to let you know she increased her Lasix from 40mg ot 60mg QD   She was up 15# from her normal weight over the weekend, additional lasix has helped her start losing weight.   Patient had CBC drawn through [facility] and wanted your opinion on those results.   Patient also has a call to gastro MD who ordered labs for results.     CBC (No Diff) (08/30/2022 11:45)

## 2022-09-08 NOTE — TELEPHONE ENCOUNTER
Called and spoke with patient - advised CBC ok, not anemic. Ok to continue increased lasix since it is helping.   Understood

## 2022-09-29 ENCOUNTER — TELEPHONE (OUTPATIENT)
Dept: CARDIOLOGY | Facility: CLINIC | Age: 82
End: 2022-09-29

## 2022-09-29 RX ORDER — SPIRONOLACTONE 25 MG/1
25 TABLET ORAL DAILY
Qty: 90 TABLET | Refills: 0 | Status: SHIPPED | OUTPATIENT
Start: 2022-09-29 | End: 2022-10-05 | Stop reason: SDUPTHER

## 2022-09-29 NOTE — TELEPHONE ENCOUNTER
Incoming Refill Request      Medication requested (name and dose): spironalactone 25 mg, lasix 20 mg    Pharmacy where request should be sent: [facility]  [phone]  [phone]    Additional details provided by patient: would like rx's sent in today as going out of town tomorrow    Best call back number: [phone]    Does the patient have less than a 3 day supply:  [x] Yes  [] No    Nayana Case Rep  09/29/22, 13:34 EDT

## 2022-10-05 ENCOUNTER — TELEPHONE (OUTPATIENT)
Dept: INTERNAL MEDICINE | Facility: CLINIC | Age: 82
End: 2022-10-05

## 2022-10-05 RX ORDER — FUROSEMIDE 20 MG/1
20 TABLET ORAL 2 TIMES DAILY
Qty: 180 TABLET | Refills: 0 | Status: SHIPPED | OUTPATIENT
Start: 2022-10-05 | End: 2023-01-16

## 2022-10-05 RX ORDER — SPIRONOLACTONE 25 MG/1
25 TABLET ORAL DAILY
Qty: 90 TABLET | Refills: 0 | Status: SHIPPED | OUTPATIENT
Start: 2022-10-05 | End: 2022-12-06

## 2022-10-05 NOTE — TELEPHONE ENCOUNTER
Patient is needing a refill on her Furosemide 20 mg and Spironolactone 25 mg her cardiologist has increased the Furosemide, please send to [facility] in [place] store [id number] at [address] phone# [phone] and fax # [phone], please and thank you.

## 2022-11-08 ENCOUNTER — OFFICE (AMBULATORY)
Dept: URBAN - METROPOLITAN AREA CLINIC 64 | Facility: CLINIC | Age: 82
End: 2022-11-08

## 2022-11-08 VITALS — HEIGHT: 64 IN | WEIGHT: 165 LBS

## 2022-11-08 DIAGNOSIS — R18.8 OTHER ASCITES: ICD-10-CM

## 2022-11-08 PROCEDURE — 99213 OFFICE O/P EST LOW 20 MIN: CPT | Performed by: NURSE PRACTITIONER

## 2022-11-09 ENCOUNTER — OFFICE VISIT (OUTPATIENT)
Dept: CARDIOLOGY | Facility: CLINIC | Age: 82
End: 2022-11-09

## 2022-11-09 DIAGNOSIS — I10 ESSENTIAL HYPERTENSION: Chronic | ICD-10-CM

## 2022-11-09 DIAGNOSIS — I48.21 PERMANENT ATRIAL FIBRILLATION: Chronic | ICD-10-CM

## 2022-11-09 DIAGNOSIS — I50.22 CHRONIC SYSTOLIC HEART FAILURE: Chronic | ICD-10-CM

## 2022-11-09 DIAGNOSIS — I25.810 CORONARY ARTERY DISEASE INVOLVING CORONARY BYPASS GRAFT OF NATIVE HEART WITHOUT ANGINA PECTORIS: Primary | Chronic | ICD-10-CM

## 2022-11-09 PROCEDURE — 99215 OFFICE O/P EST HI 40 MIN: CPT | Performed by: INTERNAL MEDICINE

## 2022-11-10 NOTE — PROGRESS NOTES
CARDIOLOGY    Tyler Dill MD    ENCOUNTER DATE:  11/09/2022    Jaquelin Valderrama / 82 y.o. / female        CHIEF COMPLAINT / REASON FOR OFFICE VISIT   Atrial fibrillation  Hypertension  CHF    HISTORY OF PRESENT ILLNESS       HPI  Jaquelin Valderrama is a 82 y.o. female who presents today for a second opinion.  Patient actually turns out she was seen by Dr. Garza in the hospital.  Patient did not feel like she was getting good information from another cardiologist.  She therefore made an appointment to see me today.  Patient does not feel like she is getting enough information.  She is having increasing shortness of breath.  Her history really started back last February when she started feeling weak and could not climb a set of stairs in her house.  She went to the emergency room was diagnosed with heart failure as well as atrial fibrillation.  She had quite a complicated course since then including chest tubes what appears to excess fluid.  Patient at one point was started on Lasix but she would home and when she got home she tripped and fell in the trauma actually tore her bladder.  That later rehospitalized for another episode.  All in all she was actually hospitalized for total of 5 weeks.  Patient was also placed on amiodarone for the atrial fibrillation.  Comes to find out she stopped it about 3 weeks ago.      The following portions of the patient's history were reviewed and updated as appropriate: allergies, current medications, past family history, past medical history, past social history, past surgical history and problem list.      VITAL SIGNS     There were no vitals taken for this visit.      Wt Readings from Last 3 Encounters:   11/09/22 73 kg (161 lb)   08/30/22 78.5 kg (173 lb)   08/25/22 77.1 kg (170 lb)     There is no height or weight on file to calculate BMI.      REVIEW OF SYSTEMS   ROS        PHYSICAL EXAMINATION     Vitals reviewed.   Constitutional:       Appearance: Healthy  appearance.   Pulmonary:      Breath sounds: Normal breath sounds.   Cardiovascular:      Normal rate. Irregularly irregular rhythm. Normal S1. Normal S2.      Murmurs: There is no murmur.      No gallop. No click. No rub.   Pulses:     Intact distal pulses.   Edema:     Peripheral edema present.     Pretibial: bilateral 1+ edema of the pretibial area.     Ankle: bilateral 1+ edema of the ankle.     Feet: bilateral 1+ edema of the feet.  Abdominal:      General: There is distension.   Neurological:      Mental Status: Alert and oriented to person, place and time.           REVIEWED DATA     Procedures    Cardiac Procedures:  1.     Lipid Panel    Lipid Panel 3/5/22   Total Cholesterol 154   Triglycerides 117   HDL Cholesterol 40   VLDL Cholesterol 21   LDL Cholesterol  93   LDL/HDL Ratio 2.27               ASSESSMENT & PLAN      Diagnosis Plan   1. Coronary artery disease involving coronary bypass graft of native heart without angina pectoris        2. Essential hypertension        3. Permanent atrial fibrillation (HCC)              SUMMARY/DISCUSSION  1. Systolic heart failure.  Patient's last known echo her ejection fraction was 30%.  Patient remains in heart failure today.  With this we obviously need more diuresis.  I told patient increase her Lasix to 60 mg a day.  Going to have her follow back up in 1 week.  I think she could also benefit from Entresto however will start with just increasing diuretic first.  2. Atrial fibrillation patient is anticoagulated.  She is only taking Eliquis daily I informed her to take it twice a day.  3. Hypertension obviously markedly elevated in the setting of an EF of 30%.  Hoping that increasing her diuretics will help some but again I think Entresto will have a better option.  4. Moderate mitral and tricuspid regurgitation.  5. Patient is to reassess in 1 week.  We will see how she responds and go from there.    Total time spent 45 minutes            MEDICATIONS          Discharge Medications          Accurate as of November 9, 2022 11:59 PM. If you have any questions, ask your nurse or doctor.            Changes to Medications      Instructions Start Date   spironolactone 25 MG tablet  Commonly known as: ALDACTONE  What changed: when to take this   25 mg, Oral, Daily         Continue These Medications      Instructions Start Date   amiodarone 200 MG tablet  Commonly known as: PACERONE   200 mg, Oral, Daily      apixaban 5 MG tablet tablet  Commonly known as: ELIQUIS   5 mg, Oral, Once, PT IS TAKING 1/2 TABLET TWICE A DAY      cefdinir 300 MG capsule  Commonly known as: OMNICEF   300 mg, Oral, 2 Times Daily      doxycycline 100 MG capsule  Commonly known as: VIBRAMYCIN   100 mg, Oral, 2 Times Daily      furosemide 20 MG tablet  Commonly known as: LASIX   20 mg, Oral, 2 Times Daily      levothyroxine 50 MCG tablet  Commonly known as: SYNTHROID, LEVOTHROID   50 mcg, Oral, Every Early Morning      methocarbamol 500 MG tablet  Commonly known as: ROBAXIN   500 mg, Oral, 4 Times Daily      metoprolol succinate XL 25 MG 24 hr tablet  Commonly known as: TOPROL-XL   25 mg, Oral, 2 Times Daily      MULTIVITAMIN ADULT EXTRA C PO   1 tablet, Oral, Daily      traMADol 50 MG tablet  Commonly known as: ULTRAM   50 mg, Oral, Every 6 Hours PRN      Vitamin D3 50 MCG (2000 UT) tablet   1 tablet, Oral, Daily                 **Dragon Disclaimer:   Much of this encounter note is an electronic transcription/translation of spoken language to printed text. The electronic translation of spoken language may permit erroneous, or at times, nonsensical words or phrases to be inadvertently transcribed. Although I have reviewed the note for such errors, some may still exist.

## 2022-11-11 RX ORDER — LEVOTHYROXINE SODIUM 0.05 MG/1
TABLET ORAL
Qty: 90 TABLET | Refills: 0 | Status: SHIPPED | OUTPATIENT
Start: 2022-11-11 | End: 2023-01-17 | Stop reason: DRUGHIGH

## 2022-11-11 RX ORDER — METOPROLOL SUCCINATE 25 MG/1
TABLET, EXTENDED RELEASE ORAL
Qty: 180 TABLET | Refills: 0 | Status: SHIPPED | OUTPATIENT
Start: 2022-11-11 | End: 2023-02-09

## 2022-11-16 ENCOUNTER — OFFICE VISIT (OUTPATIENT)
Dept: CARDIOLOGY | Facility: CLINIC | Age: 82
End: 2022-11-16

## 2022-11-16 VITALS
DIASTOLIC BLOOD PRESSURE: 84 MMHG | BODY MASS INDEX: 28.86 KG/M2 | SYSTOLIC BLOOD PRESSURE: 132 MMHG | WEIGHT: 147 LBS | HEART RATE: 78 BPM | OXYGEN SATURATION: 98 % | HEIGHT: 60 IN

## 2022-11-16 DIAGNOSIS — I25.810 CORONARY ARTERY DISEASE INVOLVING CORONARY BYPASS GRAFT OF NATIVE HEART WITHOUT ANGINA PECTORIS: Chronic | ICD-10-CM

## 2022-11-16 DIAGNOSIS — I10 ESSENTIAL HYPERTENSION: Chronic | ICD-10-CM

## 2022-11-16 DIAGNOSIS — I48.21 PERMANENT ATRIAL FIBRILLATION: Primary | Chronic | ICD-10-CM

## 2022-11-16 PROCEDURE — 93000 ELECTROCARDIOGRAM COMPLETE: CPT | Performed by: INTERNAL MEDICINE

## 2022-11-16 PROCEDURE — 99214 OFFICE O/P EST MOD 30 MIN: CPT | Performed by: INTERNAL MEDICINE

## 2022-11-16 NOTE — PROGRESS NOTES
"      CARDIOLOGY    Tyler Dill MD    ENCOUNTER DATE:  11/16/2022    Jaquelin Valderrama / 82 y.o. / female        CHIEF COMPLAINT / REASON FOR OFFICE VISIT     Coronary Artery Disease (11/09/2022 Follow up)  Atrial fibrillation  Systolic heart failure    HISTORY OF PRESENT ILLNESS       HPI  Jaquelin Valdrerama is a 82 y.o. female who presents today for reevaluation.  Patient is doing significantly better.  Patient says she is breathing better she is able ambulate she is having minimal swelling in her feet and she is lost 15 pounds.      The following portions of the patient's history were reviewed and updated as appropriate: allergies, current medications, past family history, past medical history, past social history, past surgical history and problem list.      VITAL SIGNS     Visit Vitals  /84 (BP Location: Left arm)   Pulse 78   Ht 152.4 cm (60\")   Wt 66.7 kg (147 lb)   SpO2 98%   BMI 28.71 kg/m²         Wt Readings from Last 3 Encounters:   11/16/22 66.7 kg (147 lb)   11/09/22 73 kg (161 lb)   08/30/22 78.5 kg (173 lb)     Body mass index is 28.71 kg/m².      REVIEW OF SYSTEMS   ROS        PHYSICAL EXAMINATION     Vitals reviewed.   Cardiovascular:      Normal rate. Irregularly irregular rhythm. Normal S1. Normal S2.      Murmurs: There is no murmur.      No gallop. No click. No rub.   Pulses:     Intact distal pulses.   Edema:     Peripheral edema absent.           REVIEWED DATA       ECG 12 Lead    Date/Time: 11/16/2022 2:15 PM  Performed by: Tyler Dill MD  Authorized by: Tyler Dill MD   Comparison: compared with previous ECG from 11/9/2022  Similar to previous ECG  Rhythm: atrial fibrillation  Other findings: non-specific ST-T wave changes    Clinical impression: abnormal EKG            Cardiac Procedures:  1.     Lipid Panel    Lipid Panel 3/5/22   Total Cholesterol 154   Triglycerides 117   HDL Cholesterol 40   VLDL Cholesterol 21   LDL Cholesterol  93   LDL/HDL Ratio 2.27       "         ASSESSMENT & PLAN      Diagnosis Plan   1. Permanent atrial fibrillation (HCC)        2. Essential hypertension        3. Coronary artery disease involving coronary bypass graft of native heart without angina pectoris              SUMMARY/DISCUSSION  1. Atrial fibrillation.  Patient remains in atrial fibrillation.  At this point I would discontinue her amiodarone.  I want to make sure she did not convert back to its one reason I repeated her ECG today.  2. Hypertension blood pressure is much better  3. CHF I told her to go back to 40 mg daily watch her weight if it picks up over 3 pounds to increase her diuretics.  4. Will follow-up in 3 months for reassessment.        MEDICATIONS         Discharge Medications          Accurate as of November 16, 2022  2:11 PM. If you have any questions, ask your nurse or doctor.            Changes to Medications      Instructions Start Date   spironolactone 25 MG tablet  Commonly known as: ALDACTONE  What changed: when to take this   25 mg, Oral, Daily         Continue These Medications      Instructions Start Date   amiodarone 200 MG tablet  Commonly known as: PACERONE   200 mg, Oral, Daily      apixaban 5 MG tablet tablet  Commonly known as: ELIQUIS   5 mg, Oral, Once, PT IS TAKING 1/2 TABLET TWICE A DAY      cefdinir 300 MG capsule  Commonly known as: OMNICEF   300 mg, Oral, 2 Times Daily      doxycycline 100 MG capsule  Commonly known as: VIBRAMYCIN   100 mg, Oral, 2 Times Daily      furosemide 20 MG tablet  Commonly known as: LASIX   20 mg, Oral, 2 Times Daily      levothyroxine 50 MCG tablet  Commonly known as: SYNTHROID, LEVOTHROID   TAKE 1 TABLET BY MOUTH ONCE DAILY IN THE MORNING      methocarbamol 500 MG tablet  Commonly known as: ROBAXIN   500 mg, Oral, 4 Times Daily      metoprolol succinate XL 25 MG 24 hr tablet  Commonly known as: TOPROL-XL   Take 1 tablet by mouth twice daily      MULTIVITAMIN ADULT EXTRA C PO   1 tablet, Oral, Daily      traMADol 50 MG  tablet  Commonly known as: ULTRAM   50 mg, Oral, Every 6 Hours PRN      Vitamin D3 50 MCG (2000 UT) tablet   1 tablet, Oral, Daily                 **Dragon Disclaimer:   Much of this encounter note is an electronic transcription/translation of spoken language to printed text. The electronic translation of spoken language may permit erroneous, or at times, nonsensical words or phrases to be inadvertently transcribed. Although I have reviewed the note for such errors, some may still exist.

## 2022-12-06 RX ORDER — SPIRONOLACTONE 25 MG/1
TABLET ORAL
Qty: 90 TABLET | Refills: 0 | Status: SHIPPED | OUTPATIENT
Start: 2022-12-06 | End: 2022-12-20 | Stop reason: SDUPTHER

## 2022-12-20 RX ORDER — SPIRONOLACTONE 25 MG/1
25 TABLET ORAL DAILY
Qty: 90 TABLET | Refills: 3 | Status: ON HOLD | OUTPATIENT
Start: 2022-12-20 | End: 2023-03-24 | Stop reason: SDUPTHER

## 2022-12-20 NOTE — TELEPHONE ENCOUNTER
Caller: Jaquelin Valderrama    Relationship: Self    Best call back number: [phone]    Requested Prescriptions:   Requested Prescriptions     Pending Prescriptions Disp Refills   • spironolactone (ALDACTONE) 25 MG tablet 90 tablet 0     Sig: Take 1 tablet by mouth Daily.        Pharmacy where request should be sent: [facility] [address] [place] [address] [phone] [facility] [phone] [facility]     Additional details provided by patient: THE PATIENT WILL NEED AUTHORIZATION FOR THIS MEDICATION SENT TO THE ABOVE PHARMACY.     Does the patient have less than a 3 day supply:  [x] Yes  [] No    Would you like a call back once the refill request has been completed: [x] Yes [] No    If the office needs to give you a call back, can they leave a voicemail: [x] Yes [] No    Nayana Casas Rep   12/20/22 10:13 EST

## 2023-01-16 RX ORDER — FUROSEMIDE 20 MG/1
TABLET ORAL
Qty: 90 TABLET | Refills: 1 | Status: SHIPPED | OUTPATIENT
Start: 2023-01-16 | End: 2023-03-07 | Stop reason: SDUPTHER

## 2023-01-17 ENCOUNTER — OFFICE VISIT (OUTPATIENT)
Dept: INTERNAL MEDICINE | Facility: CLINIC | Age: 83
End: 2023-01-17
Payer: MEDICARE

## 2023-01-17 ENCOUNTER — TELEPHONE (OUTPATIENT)
Dept: INTERNAL MEDICINE | Facility: CLINIC | Age: 83
End: 2023-01-17

## 2023-01-17 VITALS
BODY MASS INDEX: 28.27 KG/M2 | WEIGHT: 144 LBS | SYSTOLIC BLOOD PRESSURE: 144 MMHG | DIASTOLIC BLOOD PRESSURE: 90 MMHG | HEIGHT: 60 IN | HEART RATE: 68 BPM | OXYGEN SATURATION: 91 %

## 2023-01-17 DIAGNOSIS — E03.8 HYPOTHYROIDISM DUE TO HASHIMOTO'S THYROIDITIS: Primary | Chronic | ICD-10-CM

## 2023-01-17 DIAGNOSIS — D58.2 ELEVATED HEMOGLOBIN: ICD-10-CM

## 2023-01-17 DIAGNOSIS — E11.649 UNCONTROLLED TYPE 2 DIABETES MELLITUS WITH HYPOGLYCEMIA WITHOUT COMA: ICD-10-CM

## 2023-01-17 DIAGNOSIS — R09.02 HYPOXEMIA: ICD-10-CM

## 2023-01-17 DIAGNOSIS — R21 RASH OF BOTH FEET: ICD-10-CM

## 2023-01-17 DIAGNOSIS — E11.649 UNCONTROLLED TYPE 2 DIABETES MELLITUS WITH HYPOGLYCEMIA WITHOUT COMA: Primary | ICD-10-CM

## 2023-01-17 DIAGNOSIS — E06.3 HYPOTHYROIDISM DUE TO HASHIMOTO'S THYROIDITIS: Primary | Chronic | ICD-10-CM

## 2023-01-17 DIAGNOSIS — I10 ESSENTIAL HYPERTENSION: ICD-10-CM

## 2023-01-17 DIAGNOSIS — I50.41 ACUTE COMBINED SYSTOLIC AND DIASTOLIC CONGESTIVE HEART FAILURE: ICD-10-CM

## 2023-01-17 PROBLEM — E66.9 OBESITY (BMI 30-39.9): Chronic | Status: RESOLVED | Noted: 2022-03-03 | Resolved: 2023-01-17

## 2023-01-17 PROBLEM — U07.1 COVID-19 VIRUS DETECTED: Status: RESOLVED | Noted: 2022-06-08 | Resolved: 2023-01-17

## 2023-01-17 PROBLEM — D72.829 LEUKOCYTOSIS: Status: RESOLVED | Noted: 2022-03-25 | Resolved: 2023-01-17

## 2023-01-17 PROBLEM — J90 LOCULATED PLEURAL EFFUSION: Status: RESOLVED | Noted: 2022-03-15 | Resolved: 2023-01-17

## 2023-01-17 PROBLEM — R79.89 ELEVATED TSH: Status: RESOLVED | Noted: 2022-03-03 | Resolved: 2023-01-17

## 2023-01-17 PROBLEM — R79.89 ELEVATED LFTS: Status: RESOLVED | Noted: 2022-03-03 | Resolved: 2023-01-17

## 2023-01-17 PROBLEM — R73.9 ACUTE HYPERGLYCEMIA: Status: RESOLVED | Noted: 2022-03-03 | Resolved: 2023-01-17

## 2023-01-17 PROCEDURE — 99215 OFFICE O/P EST HI 40 MIN: CPT | Performed by: INTERNAL MEDICINE

## 2023-01-17 RX ORDER — LEVOTHYROXINE SODIUM 0.1 MG/1
100 TABLET ORAL DAILY
Qty: 90 TABLET | Refills: 1 | Status: ON HOLD | OUTPATIENT
Start: 2023-01-17 | End: 2023-03-24 | Stop reason: SDUPTHER

## 2023-01-17 RX ORDER — MUPIROCIN CALCIUM 20 MG/G
1 CREAM TOPICAL 3 TIMES DAILY
Qty: 30 G | Refills: 0 | Status: SHIPPED | OUTPATIENT
Start: 2023-01-17 | End: 2023-01-18 | Stop reason: SDUPTHER

## 2023-01-17 NOTE — TELEPHONE ENCOUNTER
Tell her (and Mr. Valderrama) I spoke to cardiologist and he agrees with her being on Jardiance for both the diabetes and the heart issues.  I have sent in.

## 2023-01-17 NOTE — PROGRESS NOTES
"Chief Complaint  Hypertension    Subjective        Jaquelin Valderrama presents to [facility]  History of Present Illness Got a second opinion about her heart failure- Dr. Domínguez has made good adjustments in her medications- she feels much better, weighing herself regularly, etc.   Has been taking the levothyroxine 50 mcg but we increased to 100 when labs returned last week. She had been out of it for a few months.    Over the course of this last year when she got acutely ill, she has not been watching her diet- she has been checking her BS at home- has been @ 250 consistently.  States there have been a few readings of 140s in the am.    Skin is itchy,  says she has been shedding skin, cleaning pjs and sheets every day. Her feet, in particular, are bothersome- itchy, scaly in patches with some pain.   She has foot pain but seems to be related to rash and not claudication.   Checks O2 at home- %    Objective   Vital Signs:  /90   Pulse 68   Ht 152.4 cm (60\")   Wt 65.3 kg (144 lb)   SpO2 91%   BMI 28.12 kg/m²   Estimated body mass index is 28.12 kg/m² as calculated from the following:    Height as of this encounter: 152.4 cm (60\").    Weight as of this encounter: 65.3 kg (144 lb).             Physical Exam  Constitutional:       General: She is not in acute distress.  Cardiovascular:      Rate and Rhythm: Normal rate. Rhythm irregular.      Pulses: Decreased pulses.           Dorsalis pedis pulses are 1+ on the right side and 1+ on the left side.        Posterior tibial pulses are 1+ on the right side and 1+ on the left side.   Pulmonary:      Effort: Pulmonary effort is normal.      Breath sounds: Normal breath sounds.   Musculoskeletal:      Comments: Minimal B edema   Feet:      Right foot:      Skin integrity: Erythema and dry skin present.      Toenail Condition: Fungal disease present.     Left foot:      Skin integrity: Erythema and dry skin present.      " Toenail Condition: Fungal disease present.     Comments: Both feet are cold to touch  Lymphadenopathy:      Cervical: No cervical adenopathy.   Skin:     General: Skin is cool.      Findings: Scaling rash: R foot.      Nails: There is no clubbing.        Result Review :  The following data was reviewed by: Minerva Chatterjee MD on 01/17/2023:  Common labs    Common Labs 7/12/22 7/12/22 8/30/22 1/10/23 1/10/23 1/10/23 1/10/23    1027 1027  0957 0957 0957 0957   Glucose  166 (A)   175 (A)     BUN  23   33 (A)     Creatinine  1.13 (A)   1.16 (A)     Sodium  137   138     Potassium  4.2   4.4     Chloride  96   95 (A)     Calcium  9.4   10.0     Total Protein  6.7   6.1     Albumin  4.2   4.4     Total Bilirubin  1.0   1.6 (A)     Alkaline Phosphatase  129 (A)   166 (A)     AST (SGOT)  21   22     ALT (SGPT)  19   17     WBC 4.7  4.40 5.27      Hemoglobin 16.4 (A)  16.9 (A) 18.2 (A)      Hematocrit 49.0 (A)  53.5 (A) 54.6 (A)      Platelets 197  196 190      Total Cholesterol       216 (A)   Triglycerides       108   HDL Cholesterol       55   LDL Cholesterol        142 (A)   Hemoglobin A1C      9.40 (A)    (A) Abnormal value       Comments are available for some flowsheets but are not being displayed.           Data reviewed: Consultant notes cardiology             Assessment and Plan   Diagnoses and all orders for this visit:    1. Hypothyroidism due to Hashimoto's thyroiditis (Primary)  -     levothyroxine (Synthroid) 100 MCG tablet; Take 1 tablet by mouth Daily.  Dispense: 90 tablet; Refill: 1  -     TSH; Future    2. Rash of both feet  Comments:  keep moisturized, will try Bactroban 1-2 x daily to see if helps.      3. Elevated hemoglobin (HCC)  Comments:  ? chronic hypoxemia- check WALTER-2 to r/o.  Refer to pulm for further hypoxic determination.   Orders:  -     JAK2 EXON 12-15 MUTATION ANALYSIS; Future  -     CBC & Differential; Future  -     Ambulatory Referral to Pulmonology    4. Essential  hypertension  Comments:  stable- no change.   Orders:  -     Comprehensive Metabolic Panel; Future    5. Uncontrolled type 2 diabetes mellitus with hypoglycemia without coma (HCC)  Comments:  difficult getting her to have insight into this- consider Jardiance- refuses most medications- discussed diet, etc will recheck 2 months.  Orders:  -     Hemoglobin A1c; Future    6. Hypoxemia  -     Ambulatory Referral to Pulmonology    Other orders  -     mupirocin (Bactroban) 2 % cream; Apply 1 application topically to the appropriate area as directed 3 (Three) Times a Day.  Dispense: 30 g; Refill: 0           I spent 55 minutes caring for Jaquelin on this date of service. This time includes time spent by me in the following activities:preparing for the visit, reviewing tests, performing a medically appropriate examination and/or evaluation , counseling and educating the patient/family/caregiver and ordering medications, tests, or procedures  Follow Up   Return in about 2 months (around 3/17/2023) for Recheck, Lab Before FUP.  Patient was given instructions and counseling regarding her condition or for health maintenance advice. Please see specific information pulled into the AVS if appropriate.

## 2023-01-18 RX ORDER — MUPIROCIN CALCIUM 20 MG/G
1 CREAM TOPICAL 3 TIMES DAILY
Qty: 30 G | Refills: 0 | Status: SHIPPED | OUTPATIENT
Start: 2023-01-18 | End: 2023-01-26 | Stop reason: CLARIF

## 2023-01-26 ENCOUNTER — TELEPHONE (OUTPATIENT)
Dept: INTERNAL MEDICINE | Facility: CLINIC | Age: 83
End: 2023-01-26
Payer: MEDICARE

## 2023-01-26 NOTE — TELEPHONE ENCOUNTER
Caller: Jaquelin Valderrama    Relationship to patient: Self    Best call back number: [phone]     Patient is needing: PATIENT IS CALLING TO STATE THE FOLLOWING MEDICATION IS GOING TO COST HER OVER $200.  SHE STATES THE PHARMACIST TOLD HER THAT IF DR VÁZQUEZ WOULD SEND A PRESCRIPTION FOR THE OINTMENT WOULD BE A LOT LESS.     mupirocin (Bactroban) 2 % cream         [address] - [phone] [place] [phone] [phone]    PLEASE ADVISE.

## 2023-02-03 ENCOUNTER — OFFICE VISIT (OUTPATIENT)
Dept: CARDIOLOGY | Facility: CLINIC | Age: 83
End: 2023-02-03
Payer: MEDICARE

## 2023-02-03 VITALS
HEIGHT: 60 IN | OXYGEN SATURATION: 95 % | BODY MASS INDEX: 29.06 KG/M2 | SYSTOLIC BLOOD PRESSURE: 138 MMHG | HEART RATE: 80 BPM | DIASTOLIC BLOOD PRESSURE: 84 MMHG | WEIGHT: 148 LBS

## 2023-02-03 DIAGNOSIS — I48.21 PERMANENT ATRIAL FIBRILLATION: Chronic | ICD-10-CM

## 2023-02-03 DIAGNOSIS — I10 ESSENTIAL HYPERTENSION: Chronic | ICD-10-CM

## 2023-02-03 DIAGNOSIS — I25.810 CORONARY ARTERY DISEASE INVOLVING CORONARY BYPASS GRAFT OF NATIVE HEART WITHOUT ANGINA PECTORIS: Chronic | ICD-10-CM

## 2023-02-03 DIAGNOSIS — I50.23 ACUTE ON CHRONIC SYSTOLIC HEART FAILURE: Primary | Chronic | ICD-10-CM

## 2023-02-03 PROCEDURE — 99214 OFFICE O/P EST MOD 30 MIN: CPT | Performed by: INTERNAL MEDICINE

## 2023-02-03 NOTE — PROGRESS NOTES
"      CARDIOLOGY    Tyler Dill MD    ENCOUNTER DATE:  02/03/2023    Jaquelin Valderrama / 83 y.o. / female        CHIEF COMPLAINT / REASON FOR OFFICE VISIT     Permanent atrial fibrillation  (11/16/2022 Follow up)  Shortness of breath  Diastolic heart failure  Coronary artery disease  HISTORY OF PRESENT ILLNESS       HPI  Jaquelin Valderrama is a 83 y.o. female who presents today for reevaluation.  Patient saw her pulmonologist and had noted to have an increasing size of her pleural effusion.  Patient says she has noted some increasing shortness of breath both at rest as well as with exertion.  With that he told her to increase her diuretics from 40 mg to 60 mg of Lasix.  She just did this a day ago and said she lost a pound with that.  She is not really sure if her breathing is changed yet.  She denies chest pains palpitations lightheadedness.  She does say she has a chronic dry mouth.      The following portions of the patient's history were reviewed and updated as appropriate: allergies, current medications, past family history, past medical history, past social history, past surgical history and problem list.      VITAL SIGNS     Visit Vitals  /84 (BP Location: Left arm)   Pulse 80   Ht 152.4 cm (60\")   Wt 67.1 kg (148 lb)   SpO2 95%   BMI 28.90 kg/m²         Wt Readings from Last 3 Encounters:   02/03/23 67.1 kg (148 lb)   01/17/23 65.3 kg (144 lb)   11/16/22 66.7 kg (147 lb)     Body mass index is 28.9 kg/m².      REVIEW OF SYSTEMS   ROS        PHYSICAL EXAMINATION     Vitals reviewed.   Constitutional:       Appearance: Chronically ill-appearing.   Pulmonary:      Effort: Pulmonary effort is normal.   Cardiovascular:      Normal rate. Irregularly irregular rhythm. Normal S1. Normal S2.      Murmurs: There is no murmur.      No gallop. No click. No rub.   Pulses:     Intact distal pulses.   Edema:     Peripheral edema absent.   Neurological:      Mental Status: Alert and oriented to person, place and " time.           REVIEWED DATA     Procedures    Cardiac Procedures:  1.     Lipid Panel    Lipid Panel 3/5/22 1/10/23   Total Cholesterol 154    Total Cholesterol  216 (A)   Triglycerides 117 108   HDL Cholesterol 40 55   VLDL Cholesterol 21 19   LDL Cholesterol  93 142 (A)   LDL/HDL Ratio 2.27    (A) Abnormal value       Comments are available for some flowsheets but are not being displayed.               ASSESSMENT & PLAN      Diagnosis Plan   1. Acute on chronic systolic heart failure (HCC)  Basic Metabolic Panel      2. Coronary artery disease involving coronary bypass graft of native heart without angina pectoris        3. Essential hypertension        4. Permanent atrial fibrillation (HCC)              SUMMARY/DISCUSSION  1. Hypertension blood pressures good  2. Patient does have symptoms of acute on chronic diastolic heart failure.  She does weigh herself every single day and her weight has not changed.  However with the increased pleural effusion it is very reasonable to increase her diuretics and I agree with what her pulmonologist did.  I also told her to contact me on Monday to see how she is doing clinically.  I also set her up for a BMP next week to make sure her kidney function and potassium stays stable.  3. Chronic A-fib patient remains on apixaban heart rate controlled.  4. She may need a new dry weight which is what were going to strive for.  Depending how her clinical course is in the next few weeks we will determine that I did review signs and symptoms to look for.  5. Physically follow-up in 1 month again get a BMP next week and they are to contact me on Monday for a clinical update.        MEDICATIONS         Discharge Medications          Accurate as of February 3, 2023 11:44 AM. If you have any questions, ask your nurse or doctor.            Continue These Medications      Instructions Start Date   apixaban 5 MG tablet tablet  Commonly known as: ELIQUIS   5 mg, Oral, Once, PT IS TAKING 1/2  TABLET TWICE A DAY      empagliflozin 25 MG tablet tablet  Commonly known as: JARDIANCE   25 mg, Oral, Daily      furosemide 20 MG tablet  Commonly known as: LASIX   Take 1 tablet by mouth once daily      levothyroxine 100 MCG tablet  Commonly known as: Synthroid   100 mcg, Oral, Daily      methocarbamol 500 MG tablet  Commonly known as: ROBAXIN   500 mg, Oral, 4 Times Daily      metoprolol succinate XL 25 MG 24 hr tablet  Commonly known as: TOPROL-XL   Take 1 tablet by mouth twice daily      MULTIVITAMIN ADULT EXTRA C PO   1 tablet, Oral, Daily      mupirocin 2 % ointment  Commonly known as: BACTROBAN   1 application, Topical, 3 Times Daily, Rash on both feet      spironolactone 25 MG tablet  Commonly known as: ALDACTONE   25 mg, Oral, Daily      traMADol 50 MG tablet  Commonly known as: ULTRAM   50 mg, Oral, Every 6 Hours PRN      Vitamin D3 50 MCG (2000 UT) tablet   1 tablet, Oral, Daily                 **Dragon Disclaimer:   Much of this encounter note is an electronic transcription/translation of spoken language to printed text. The electronic translation of spoken language may permit erroneous, or at times, nonsensical words or phrases to be inadvertently transcribed. Although I have reviewed the note for such errors, some may still exist.

## 2023-02-06 ENCOUNTER — TELEPHONE (OUTPATIENT)
Dept: CARDIOLOGY | Facility: CLINIC | Age: 83
End: 2023-02-06
Payer: MEDICARE

## 2023-02-06 NOTE — TELEPHONE ENCOUNTER
Pt left msg with an update on how she is.  She said her SOA is much better.  At the LOV she kiah she was just feeling very overwhelmed with her new living situation but went to [employer] yesterday and feels better about things.  She's trying to push herself a bit more.   She rode her stationary bike for 10 mins today and hasn't done that in a long time.    Thanks,  Courtney

## 2023-02-07 ENCOUNTER — LAB (OUTPATIENT)
Dept: LAB | Facility: HOSPITAL | Age: 83
End: 2023-02-07
Payer: MEDICARE

## 2023-02-07 DIAGNOSIS — I50.23 ACUTE ON CHRONIC SYSTOLIC HEART FAILURE: Chronic | ICD-10-CM

## 2023-02-07 LAB
ANION GAP SERPL CALCULATED.3IONS-SCNC: 9.8 MMOL/L (ref 5–15)
BUN SERPL-MCNC: 34 MG/DL (ref 8–23)
BUN/CREAT SERPL: 31.8 (ref 7–25)
CALCIUM SPEC-SCNC: 9.5 MG/DL (ref 8.6–10.5)
CHLORIDE SERPL-SCNC: 97 MMOL/L (ref 98–107)
CO2 SERPL-SCNC: 35.2 MMOL/L (ref 22–29)
CREAT SERPL-MCNC: 1.07 MG/DL (ref 0.57–1)
EGFRCR SERPLBLD CKD-EPI 2021: 52.3 ML/MIN/1.73
GLUCOSE SERPL-MCNC: 199 MG/DL (ref 65–99)
POTASSIUM SERPL-SCNC: 3.9 MMOL/L (ref 3.5–5.2)
SODIUM SERPL-SCNC: 142 MMOL/L (ref 136–145)

## 2023-02-07 PROCEDURE — 36415 COLL VENOUS BLD VENIPUNCTURE: CPT

## 2023-02-07 PROCEDURE — 80048 BASIC METABOLIC PNL TOTAL CA: CPT

## 2023-02-09 RX ORDER — METOPROLOL SUCCINATE 25 MG/1
TABLET, EXTENDED RELEASE ORAL
Qty: 180 TABLET | Refills: 3 | Status: SHIPPED | OUTPATIENT
Start: 2023-02-09 | End: 2023-03-24 | Stop reason: HOSPADM

## 2023-03-06 ENCOUNTER — LAB (OUTPATIENT)
Dept: LAB | Facility: HOSPITAL | Age: 83
End: 2023-03-06
Payer: MEDICARE

## 2023-03-06 PROCEDURE — 80053 COMPREHEN METABOLIC PANEL: CPT | Performed by: INTERNAL MEDICINE

## 2023-03-06 PROCEDURE — 81279 JAK2 GENE TRGT SEQUENCE ALYS: CPT | Performed by: INTERNAL MEDICINE

## 2023-03-06 PROCEDURE — 85025 COMPLETE CBC W/AUTO DIFF WBC: CPT | Performed by: INTERNAL MEDICINE

## 2023-03-06 PROCEDURE — 83036 HEMOGLOBIN GLYCOSYLATED A1C: CPT | Performed by: INTERNAL MEDICINE

## 2023-03-06 PROCEDURE — 84443 ASSAY THYROID STIM HORMONE: CPT | Performed by: INTERNAL MEDICINE

## 2023-03-07 ENCOUNTER — OFFICE VISIT (OUTPATIENT)
Dept: INTERNAL MEDICINE | Facility: CLINIC | Age: 83
End: 2023-03-07
Payer: MEDICARE

## 2023-03-07 VITALS
BODY MASS INDEX: 29.64 KG/M2 | SYSTOLIC BLOOD PRESSURE: 134 MMHG | DIASTOLIC BLOOD PRESSURE: 76 MMHG | OXYGEN SATURATION: 97 % | HEIGHT: 60 IN | WEIGHT: 151 LBS | HEART RATE: 87 BPM

## 2023-03-07 DIAGNOSIS — E11.649 UNCONTROLLED TYPE 2 DIABETES MELLITUS WITH HYPOGLYCEMIA WITHOUT COMA: ICD-10-CM

## 2023-03-07 DIAGNOSIS — E06.3 HYPOTHYROIDISM DUE TO HASHIMOTO'S THYROIDITIS: ICD-10-CM

## 2023-03-07 DIAGNOSIS — E03.8 HYPOTHYROIDISM DUE TO HASHIMOTO'S THYROIDITIS: ICD-10-CM

## 2023-03-07 DIAGNOSIS — I50.41 ACUTE COMBINED SYSTOLIC AND DIASTOLIC CONGESTIVE HEART FAILURE: Primary | Chronic | ICD-10-CM

## 2023-03-07 DIAGNOSIS — D58.2 ELEVATED HEMOGLOBIN: ICD-10-CM

## 2023-03-07 PROCEDURE — 99215 OFFICE O/P EST HI 40 MIN: CPT | Performed by: INTERNAL MEDICINE

## 2023-03-07 RX ORDER — FUROSEMIDE 20 MG/1
60 TABLET ORAL DAILY
Qty: 270 TABLET | Refills: 1 | Status: SHIPPED | OUTPATIENT
Start: 2023-03-07 | End: 2023-03-24 | Stop reason: HOSPADM

## 2023-03-07 NOTE — PROGRESS NOTES
"Chief Complaint  Diabetes    Subjective        Jaquelin Valderrama presents to [facility]  History of Present Illness was doing better early Feb but went on a long car trip and doesn't feel as strong.   She didn't take the Jardiance.  Expensive and was afraid of the potential side effects.   Dr. Sevilla increased her furosemide to 60 mg- up from 20 mg. Now, takes it just 60 mg prn weight increase.   Saw pulmonologist who thought she needed repeat thoracentesis but she decided not to do it.     says she sleeps a lot, even gets fatigued with just eating a meal.   Better about taking her levothyroxine.   Chews a lot of ice.   Checks BS at home, runs @ 165 per her reports- checks mainly first thing in am.     Objective   Vital Signs:  /76   Pulse 87   Ht 152.4 cm (60\")   Wt 68.5 kg (151 lb)   SpO2 97%   BMI 29.49 kg/m²   Estimated body mass index is 29.49 kg/m² as calculated from the following:    Height as of this encounter: 152.4 cm (60\").    Weight as of this encounter: 68.5 kg (151 lb).       BMI is >= 25 and <30. (Overweight) The following options were offered after discussion;: none (medical contraindication)      Physical Exam  Constitutional:       General: She is not in acute distress.     Appearance: She is ill-appearing.   Cardiovascular:      Rate and Rhythm: Normal rate. Rhythm irregular.   Pulmonary:      Breath sounds: No wheezing.      Comments: Dullness in R base  Lymphadenopathy:      Cervical: No cervical adenopathy.        Result Review :  The following data was reviewed by: Minerva Chatterjee MD on 03/07/2023:  Common labs    Common Labs 1/10/23 1/10/23 1/10/23 1/10/23 2/7/23 3/6/23 3/6/23 3/6/23    0957 0957 0957 0957  0918 0918 0918   Glucose  175 (A)   199 (A)   206 (A)   BUN  33 (A)   34 (A)   43 (A)   Creatinine  1.16 (A)   1.07 (A)   1.19 (A)   Sodium  138   142   137   Potassium  4.4   3.9   4.3   Chloride  95 (A)   97 (A)   92 (A)   Calcium  10.0   9.5 "   10.5   Total Protein  6.1         Albumin  4.4      3.9   Total Bilirubin  1.6 (A)      1.4 (A)   Alkaline Phosphatase  166 (A)      169 (A)   AST (SGOT)  22      27   ALT (SGPT)  17      19   WBC 5.27      6.70    Hemoglobin 18.2 (A)      19.7 (A)    Hematocrit 54.6 (A)      59.7 (A)    Platelets 190      225    Total Cholesterol    216 (A)       Triglycerides    108       HDL Cholesterol    55       LDL Cholesterol     142 (A)       Hemoglobin A1C   9.40 (A)   9.20 (A)     (A) Abnormal value       Comments are available for some flowsheets but are not being displayed.           Data reviewed: Consultant notes cardiology, pulm             Assessment and Plan   Diagnoses and all orders for this visit:    1. Acute combined systolic and diastolic congestive heart failure (HCC) (Primary)  Comments:  switch to furosemide 60 mg qd, continue am wt checks - d/w Dr. Sevilla, he will give her IV furosemide tomorrow likely.   Orders:  -     furosemide (LASIX) 20 MG tablet; Take 3 tablets by mouth Daily.  Dispense: 270 tablet; Refill: 1    2. Hypothyroidism due to Hashimoto's thyroiditis  Comments:  TSH better, no change in meds, recheck at f/u  Orders:  -     TSH; Future    3. Elevated hemoglobin (HCC)  Comments:  O2 has been ok, KAREN pending. Following  Orders:  -     CBC & Differential; Future    4. Uncontrolled type 2 diabetes mellitus with hypoglycemia without coma (HCC)  Comments:  refuses to treat because she doesn't think BS is bad- 2 week CGM given to allow her to monitor more closely.   Orders:  -     Comprehensive Metabolic Panel; Future  -     Hemoglobin A1c; Future    tried to review the seriousness of her issues- I do not think she understands fully.  D/w Dr. Dill, he will review with her again tomorrow.        I spent 65 minutes caring for Jaquelin on this date of service. This time includes time spent by me in the following activities:preparing for the visit, reviewing tests, performing a medically appropriate  examination and/or evaluation , counseling and educating the patient/family/caregiver and care coordination  Follow Up   Return in about 4 months (around 7/7/2023) for Medicare Wellness, Lab Before FUP.  Patient was given instructions and counseling regarding her condition or for health maintenance advice. Please see specific information pulled into the AVS if appropriate.

## 2023-03-08 ENCOUNTER — OFFICE VISIT (OUTPATIENT)
Dept: CARDIOLOGY | Facility: CLINIC | Age: 83
End: 2023-03-08
Payer: MEDICARE

## 2023-03-08 VITALS
BODY MASS INDEX: 29.45 KG/M2 | DIASTOLIC BLOOD PRESSURE: 80 MMHG | WEIGHT: 150 LBS | HEART RATE: 82 BPM | HEIGHT: 60 IN | SYSTOLIC BLOOD PRESSURE: 138 MMHG

## 2023-03-08 DIAGNOSIS — I10 ESSENTIAL HYPERTENSION: Chronic | ICD-10-CM

## 2023-03-08 DIAGNOSIS — I50.22 CHRONIC SYSTOLIC HEART FAILURE: Chronic | ICD-10-CM

## 2023-03-08 DIAGNOSIS — I48.21 PERMANENT ATRIAL FIBRILLATION: Chronic | ICD-10-CM

## 2023-03-08 DIAGNOSIS — I25.810 CORONARY ARTERY DISEASE INVOLVING CORONARY BYPASS GRAFT OF NATIVE HEART WITHOUT ANGINA PECTORIS: Primary | Chronic | ICD-10-CM

## 2023-03-08 PROCEDURE — 99214 OFFICE O/P EST MOD 30 MIN: CPT | Performed by: INTERNAL MEDICINE

## 2023-03-08 NOTE — PROGRESS NOTES
"      CARDIOLOGY    Tyler Dill MD    ENCOUNTER DATE:  03/08/2023    Jaquelin Valderrama / 83 y.o. / female        CHIEF COMPLAINT / REASON FOR OFFICE VISIT     Acute on chronic systolic heart failure  (02/03/2023 Follow up)      HISTORY OF PRESENT ILLNESS       HPI  Jaquelin Valderrama is a 83 y.o. female who presents today for reevaluation.  Patient is complaining of being extremely weak.  Patient has been very short of breath.  As you well know she was on the road for about a month put over 1000 miles on her car.  With that she also ran out of her diuretics showing put her into more heart failure.  As you told me her blood sugars also been running very very high.  She was recommended to have a thoracentesis before but she thought I would not approve it so she decided not to do it.  We had a very long discussion about this.  If she does not start following our recommendations there is not much more I can do for her.  I also was quite upset the fact that she thought that I would not improve the thoracentesis and she never contacted my office she put this in her own mind that that is what I would say.  I explained her how very inappropriate is to do something like that and moreover it can be very life threatening to make such choices on her own.  Her  commented that sometimes she is breathing harder.      The following portions of the patient's history were reviewed and updated as appropriate: allergies, current medications, past family history, past medical history, past social history, past surgical history and problem list.      VITAL SIGNS     Visit Vitals  /80 (BP Location: Left arm)   Pulse 82   Ht 152.4 cm (60\")   Wt 68 kg (150 lb)   BMI 29.29 kg/m²         Wt Readings from Last 3 Encounters:   03/08/23 68 kg (150 lb)   03/07/23 68.5 kg (151 lb)   02/03/23 67.1 kg (148 lb)     Body mass index is 29.29 kg/m².      REVIEW OF SYSTEMS   ROS        PHYSICAL EXAMINATION     Vitals reviewed. "   Constitutional:       Appearance: Healthy appearance.   Pulmonary:      Effort: Increased respiratory effort.      Breath sounds: Examination of the left-middle field reveals decreased breath sounds. Examination of the left-lower field reveals decreased breath sounds. Decreased breath sounds present.   Cardiovascular:      Normal rate. Regular rhythm. Normal S1. Normal S2.      Murmurs: There is no murmur.      No gallop. No click. No rub.   Pulses:     Intact distal pulses.   Edema:     Peripheral edema absent.   Neurological:      Mental Status: Alert.           REVIEWED DATA     Procedures    Cardiac Procedures:  1.     Lipid Panel    Lipid Panel 1/10/23   Total Cholesterol 216 (A)   Triglycerides 108   HDL Cholesterol 55   VLDL Cholesterol 19   LDL Cholesterol  142 (A)   (A) Abnormal value       Comments are available for some flowsheets but are not being displayed.               ASSESSMENT & PLAN      Diagnosis Plan   1. Coronary artery disease involving coronary bypass graft of native heart without angina pectoris        2. Permanent atrial fibrillation (HCC)        3. Chronic systolic heart failure (HCC)        4. Essential hypertension              SUMMARY/DISCUSSION  1. Coronary artery bypass grafting.  Stable  2. Acute on chronic systolic heart failure.  This is multifactorial including running out of her diuretic as well as just generally not listening to the advice.  I did send her back to the pulmonologist to get set up for a thoracentesis it would significantly help her in the B1 may we can get rid of the fluid relatively quickly.  She did have some swelling in her legs they were terrible I do not think that giving her IV diuretics will be helpful at this point again the key is to start listening to what were telling her.  Her  was present during the entire interview and I really also stressed it to him.  3. Hypertension blood pressure was fine  4. Atrial fibrillation.  5. Diabetes.  I  stressed the importance of getting her diabetes under control this is only a contributing factor.  6. Patient said she had an advanced directive.  She had no questions.        MEDICATIONS         Discharge Medications          Accurate as of March 8, 2023  2:40 PM. If you have any questions, ask your nurse or doctor.            Continue These Medications      Instructions Start Date   apixaban 5 MG tablet tablet  Commonly known as: ELIQUIS   5 mg, Oral, Once, PT IS TAKING 1/2 TABLET TWICE A DAY      furosemide 20 MG tablet  Commonly known as: LASIX   60 mg, Oral, Daily      levothyroxine 100 MCG tablet  Commonly known as: Synthroid   100 mcg, Oral, Daily      methocarbamol 500 MG tablet  Commonly known as: ROBAXIN   500 mg, Oral, 4 Times Daily      metoprolol succinate XL 25 MG 24 hr tablet  Commonly known as: TOPROL-XL   Take 1 tablet by mouth twice daily      MULTIVITAMIN ADULT EXTRA C PO   1 tablet, Oral, Daily      mupirocin 2 % ointment  Commonly known as: BACTROBAN   1 application, Topical, 3 Times Daily, Rash on both feet      spironolactone 25 MG tablet  Commonly known as: ALDACTONE   25 mg, Oral, Daily      traMADol 50 MG tablet  Commonly known as: ULTRAM   50 mg, Oral, Every 6 Hours PRN      Vitamin D3 50 MCG (2000 UT) tablet   1 tablet, Oral, Daily                 **Dragon Disclaimer:   Much of this encounter note is an electronic transcription/translation of spoken language to printed text. The electronic translation of spoken language may permit erroneous, or at times, nonsensical words or phrases to be inadvertently transcribed. Although I have reviewed the note for such errors, some may still exist.

## 2023-03-09 ENCOUNTER — TRANSCRIBE ORDERS (OUTPATIENT)
Dept: GENERAL RADIOLOGY | Facility: HOSPITAL | Age: 83
End: 2023-03-09
Payer: MEDICARE

## 2023-03-09 ENCOUNTER — TELEPHONE (OUTPATIENT)
Dept: INTERNAL MEDICINE | Facility: CLINIC | Age: 83
End: 2023-03-09
Payer: MEDICARE

## 2023-03-09 DIAGNOSIS — J90 PLEURAL EFFUSION: Primary | ICD-10-CM

## 2023-03-09 DIAGNOSIS — E11.649 UNCONTROLLED TYPE 2 DIABETES MELLITUS WITH HYPOGLYCEMIA WITHOUT COMA: Primary | ICD-10-CM

## 2023-03-09 RX ORDER — INSULIN ASPART 100 [IU]/ML
10 INJECTION, SUSPENSION SUBCUTANEOUS 2 TIMES DAILY WITH MEALS
Qty: 10 ML | Refills: 1 | Status: SHIPPED | OUTPATIENT
Start: 2023-03-09 | End: 2023-03-20

## 2023-03-09 RX ORDER — SYRINGE-NEEDLE,INSULIN,0.5 ML 27GX1/2"
1 SYRINGE, EMPTY DISPOSABLE MISCELLANEOUS 2 TIMES DAILY
Qty: 100 EACH | Refills: 1 | Status: SHIPPED | OUTPATIENT
Start: 2023-03-09 | End: 2023-03-20

## 2023-03-09 NOTE — TELEPHONE ENCOUNTER
Lets start her on the same 70/30 insulin that her  takes- need to send in.  She has a CGM on from earlier in the week- would like her sugars < 200. Call on Monday with some readings and we can adjust. Sooner if she has any problems.

## 2023-03-09 NOTE — TELEPHONE ENCOUNTER
Rx sent in, spoke with PT's  he will check her BS and let us know if it stays above 200.  He also said that they are trying to get in with pulm Dr.

## 2023-03-09 NOTE — TELEPHONE ENCOUNTER
Caller: MILLIE WILKS    Relationship: Emergency Contact    Best call back number: [phone]    What medication are you requesting: INSULIN    What are your current symptoms: TOLD SHE SHOULD CONSIDER GOING ON INSULIN BY DR. VÁZQUEZ AND CARDIOLOGY    How long have you been experiencing symptoms: SOMETIME    Have you had these symptoms before:    [] Yes  [x] No    Have you been treated for these symptoms before:   [] Yes  [x] No    If a prescription is needed, what is your preferred pharmacy and phone number:  [address] - [phone] [facility] [phone] FX  P: [phone]  F: [phone]      Additional notes :PLEASE CALL TO ADVISE AND DISCUSS    PATIENT'S SPOUSE GIVES HIMSELF INSULIN SO HE IS FAMILIAR WITH THE PROCESS.

## 2023-03-13 ENCOUNTER — HOSPITAL ENCOUNTER (OUTPATIENT)
Dept: GENERAL RADIOLOGY | Facility: HOSPITAL | Age: 83
Discharge: HOME | End: 2023-03-13
Admitting: NURSE PRACTITIONER
Payer: MEDICARE

## 2023-03-13 ENCOUNTER — TELEPHONE (OUTPATIENT)
Dept: INTERNAL MEDICINE | Facility: CLINIC | Age: 83
End: 2023-03-13
Payer: MEDICARE

## 2023-03-13 DIAGNOSIS — J90 LOCULATED PLEURAL EFFUSION: Primary | ICD-10-CM

## 2023-03-13 DIAGNOSIS — J90 LOCULATED PLEURAL EFFUSION: ICD-10-CM

## 2023-03-13 PROCEDURE — 71046 X-RAY EXAM CHEST 2 VIEWS: CPT

## 2023-03-13 NOTE — TELEPHONE ENCOUNTER
I wish he would take her here because Dr. Dill is here but they have access to her records and should be able to do an excellent job.  Keep me posted.

## 2023-03-13 NOTE — TELEPHONE ENCOUNTER
Mr Valderrama/, wife is in CHF, weeping edema below the knees, gasping for breath (states he is going to take her to [facility]), please advise [phone]

## 2023-03-14 ENCOUNTER — OFFICE VISIT (OUTPATIENT)
Dept: SURGERY | Facility: CLINIC | Age: 83
End: 2023-03-14
Payer: MEDICARE

## 2023-03-14 VITALS
HEIGHT: 60 IN | SYSTOLIC BLOOD PRESSURE: 128 MMHG | TEMPERATURE: 96.9 F | HEART RATE: 84 BPM | DIASTOLIC BLOOD PRESSURE: 84 MMHG | BODY MASS INDEX: 30.04 KG/M2 | OXYGEN SATURATION: 94 % | WEIGHT: 153 LBS

## 2023-03-14 DIAGNOSIS — J90 PLEURAL EFFUSION ON RIGHT: Primary | ICD-10-CM

## 2023-03-14 PROCEDURE — 1159F MED LIST DOCD IN RCRD: CPT | Performed by: NURSE PRACTITIONER

## 2023-03-14 PROCEDURE — 3079F DIAST BP 80-89 MM HG: CPT | Performed by: NURSE PRACTITIONER

## 2023-03-14 PROCEDURE — 99215 OFFICE O/P EST HI 40 MIN: CPT | Performed by: NURSE PRACTITIONER

## 2023-03-14 PROCEDURE — 3074F SYST BP LT 130 MM HG: CPT | Performed by: NURSE PRACTITIONER

## 2023-03-14 PROCEDURE — 1160F RVW MEDS BY RX/DR IN RCRD: CPT | Performed by: NURSE PRACTITIONER

## 2023-03-14 RX ORDER — ACETAMINOPHEN 500 MG
500 TABLET ORAL EVERY 6 HOURS PRN
COMMUNITY

## 2023-03-14 NOTE — PROGRESS NOTES
"Chief Complaint  Follow-up, pleural effusions    Subjective        Jaquelin Valderrama presents to [facility] THORACIC SURGERY  History of Present Illness    Ms. Valderrama is an 83-year-old lady status post left VATS decortication for empyema on 3/18/2022 by Dr. Kuo.  She was last seen in our office in August 2022, seemed to be doing well at the time and advised to follow-up as needed.  Her  called our office earlier this week with increased shortness of breath.  She was seen by Dr. Chao of pulmonary medicine on 1/31/2023 and a right thoracentesis was ordered for a right pleural effusion. The patient mistakenly canceled this procedure, but was advised to proceed with thoracentesis by her cardiologist, Dr. Dill, whom she saw last week due to her significant congestive heart failure.   A right thoracentesis is scheduled for 3/28/2023 at [facility].      She presents today with worsening shortness of breath.  Her  reports extreme fatigue over the last several weeks as well as lower extremity swelling and weeping.  She is unable to lay down at night and has been sleeping in a recliner due to shortness of breath.  She is unable to perform her daily activities without assistance.  Of note, they have recently returned from a prolonged trip where she was in the car for extended period of time and ran out of her diuretic while they were gone.        Objective   Vital Signs:  /84 (BP Location: Right arm, Patient Position: Sitting, Cuff Size: Adult)   Pulse 84   Temp 96.9 °F (36.1 °C) (Infrared)   Ht 152.4 cm (60\")   Wt 69.4 kg (153 lb)   SpO2 94%   BMI 29.88 kg/m²   Estimated body mass index is 29.88 kg/m² as calculated from the following:    Height as of this encounter: 152.4 cm (60\").    Weight as of this encounter: 69.4 kg (153 lb).             Physical Exam  Constitutional:       Appearance: She is ill-appearing.   HENT:      Head: Normocephalic and atraumatic. "   Cardiovascular:      Rate and Rhythm: Normal rate.      Pulses: Normal pulses.   Pulmonary:      Breath sounds: Examination of the right-lower field reveals decreased breath sounds. Decreased breath sounds present.      Comments: Conversational dyspnea  Musculoskeletal:      Cervical back: Normal range of motion.      Right lower leg: Edema present.      Left lower leg: Edema present.      Comments: Decreased ROM secondary to weakness, presents today in wheelchair   Skin:     General: Skin is warm.      Findings: Erythema present.      Comments: Ace bandages wrapped to bilateral lower extremities   Neurological:      General: No focal deficit present.      Mental Status: She is alert.   Psychiatric:         Mood and Affect: Mood normal.        Result Review :          I have independently reviewed the chest x-ray performed on 3/13/2023 which demonstrates bilateral pleural effusions, right greater than left.  Left basilar opacity likely related to atelectasis/pneumonia.  No pneumothorax.          Assessment and Plan   Diagnoses and all orders for this visit:    1. Pleural effusion on right (Primary)  -     US Thoracentesis Right; Future  -     XR Chest 1 View; Future    Other orders  -     Glucose, Body Fluid - Pleural Fluid, Pleural Cavity; Standing  -     Protein, Body Fluid - Pleural Fluid, Pleural Cavity; Standing  -     pH, Body Fluid - Pleural Fluid, Pleural Cavity; Standing  -     Non-gynecologic Cytology; Standing  -     Body Fluid Culture - Body Fluid, Pleural Cavity; Standing    Ms. Valderrama most recent chest x-ray demonstrates a moderate right-sided pleural effusion that is likely related to her congestive heart failure.  She is currently scheduled for a right thoracentesis in the [place] on 3/28/2022.  However, they are able to perform this procedure earlier at [facility], which we will request to be done in the next week or so.  Fluid studies have been ordered as well.  Recommend to remain  compliant with her diuretic and keep her blood sugars under control.  This was discussed with her and her  at length.  She is scheduled to follow-up with cardiology in early April.    We will plan to follow-up with her in 1 month with a chest x-ray.  She is advised to go to the ER for further evaluation should her shortness of breath continue to worsen before her thoracentesis.       I spent 56 minutes caring for Jaquelin on this date of service. This time includes time spent by me in the following activities:preparing for the visit, reviewing tests, performing a medically appropriate examination and/or evaluation , counseling and educating the patient/family/caregiver, ordering medications, tests, or procedures, documenting information in the medical record, independently interpreting results and communicating that information with the patient/family/caregiver and care coordination     Follow Up   Return in about 1 month (around 4/14/2023) for Next scheduled follow up.  Patient was given instructions and counseling regarding her condition or for health maintenance advice. Please see specific information pulled into the AVS if appropriate.

## 2023-03-14 NOTE — PROGRESS NOTES
03/28/23 0001   Pre-Procedure Phone Call   Procedure Time Verified Yes   Arrival Time 1130   Procedure Location Verified Yes   Medical History Reviewed No   NPO Status Reinforced Yes   Ride and Caregiver Arranged Yes   Patient Knows to Bring Current Medications No   Bring Outside Films Requested No

## 2023-03-20 ENCOUNTER — APPOINTMENT (OUTPATIENT)
Dept: CARDIOLOGY | Facility: HOSPITAL | Age: 83
DRG: 291 | End: 2023-03-20
Payer: MEDICARE

## 2023-03-20 ENCOUNTER — APPOINTMENT (OUTPATIENT)
Dept: CT IMAGING | Facility: HOSPITAL | Age: 83
DRG: 291 | End: 2023-03-20
Payer: MEDICARE

## 2023-03-20 ENCOUNTER — HOSPITAL ENCOUNTER (INPATIENT)
Facility: HOSPITAL | Age: 83
LOS: 4 days | Discharge: HOME | DRG: 291 | End: 2023-03-24
Attending: EMERGENCY MEDICINE | Admitting: HOSPITALIST
Payer: MEDICARE

## 2023-03-20 DIAGNOSIS — E03.8 HYPOTHYROIDISM DUE TO HASHIMOTO'S THYROIDITIS: Chronic | ICD-10-CM

## 2023-03-20 DIAGNOSIS — L03.115 CELLULITIS OF RIGHT LEG: Primary | ICD-10-CM

## 2023-03-20 DIAGNOSIS — E06.3 HYPOTHYROIDISM DUE TO HASHIMOTO'S THYROIDITIS: Chronic | ICD-10-CM

## 2023-03-20 LAB
ALBUMIN SERPL-MCNC: 3 G/DL (ref 3.5–5.2)
ALBUMIN/GLOB SERPL: 1 G/DL
ALP SERPL-CCNC: 204 U/L (ref 39–117)
ALT SERPL W P-5'-P-CCNC: 44 U/L (ref 1–33)
ANION GAP SERPL CALCULATED.3IONS-SCNC: 12 MMOL/L (ref 5–15)
APTT PPP: 132.9 SECONDS (ref 61–76.5)
AST SERPL-CCNC: 54 U/L (ref 1–32)
BASOPHILS # BLD AUTO: 0.1 10*3/MM3 (ref 0–0.2)
BASOPHILS NFR BLD AUTO: 0.6 % (ref 0–1.5)
BH CV LOWER ARTERIAL LEFT ABI RATIO: 1.1
BH CV LOWER ARTERIAL LEFT DORSALIS PEDIS SYS MAX: 130
BH CV LOWER ARTERIAL LEFT GREAT TOE SYS MAX: 0
BH CV LOWER ARTERIAL LEFT POST TIBIAL SYS MAX: 158
BH CV LOWER ARTERIAL LEFT TBI RATIO: 0
BH CV LOWER ARTERIAL RIGHT ABI RATIO: 1.06
BH CV LOWER ARTERIAL RIGHT DORSALIS PEDIS SYS MAX: 129
BH CV LOWER ARTERIAL RIGHT GREAT TOE SYS MAX: 0
BH CV LOWER ARTERIAL RIGHT POST TIBIAL SYS MAX: 151
BH CV LOWER ARTERIAL RIGHT TBI RATIO: 0
BH CV LOWER VASCULAR LEFT COMMON FEMORAL AUGMENT: NORMAL
BH CV LOWER VASCULAR LEFT COMMON FEMORAL COMPETENT: NORMAL
BH CV LOWER VASCULAR LEFT COMMON FEMORAL COMPRESS: NORMAL
BH CV LOWER VASCULAR LEFT COMMON FEMORAL PHASIC: NORMAL
BH CV LOWER VASCULAR LEFT COMMON FEMORAL SPONT: NORMAL
BH CV LOWER VASCULAR LEFT DISTAL FEMORAL COMPRESS: NORMAL
BH CV LOWER VASCULAR LEFT GASTRONEMIUS COMPRESS: NORMAL
BH CV LOWER VASCULAR LEFT GREATER SAPH BK COMPRESS: NORMAL
BH CV LOWER VASCULAR LEFT LESSER SAPH COMPRESS: NORMAL
BH CV LOWER VASCULAR LEFT MID FEMORAL AUGMENT: NORMAL
BH CV LOWER VASCULAR LEFT MID FEMORAL COMPETENT: NORMAL
BH CV LOWER VASCULAR LEFT MID FEMORAL COMPRESS: NORMAL
BH CV LOWER VASCULAR LEFT MID FEMORAL PHASIC: NORMAL
BH CV LOWER VASCULAR LEFT MID FEMORAL SPONT: NORMAL
BH CV LOWER VASCULAR LEFT PERONEAL COMPRESS: NORMAL
BH CV LOWER VASCULAR LEFT POPLITEAL AUGMENT: NORMAL
BH CV LOWER VASCULAR LEFT POPLITEAL COMPETENT: NORMAL
BH CV LOWER VASCULAR LEFT POPLITEAL COMPRESS: NORMAL
BH CV LOWER VASCULAR LEFT POPLITEAL PHASIC: NORMAL
BH CV LOWER VASCULAR LEFT POPLITEAL SPONT: NORMAL
BH CV LOWER VASCULAR LEFT POSTERIOR TIBIAL COMPRESS: NORMAL
BH CV LOWER VASCULAR LEFT PROXIMAL FEMORAL COMPRESS: NORMAL
BH CV LOWER VASCULAR LEFT SAPHENOFEMORAL JUNCTION COMPRESS: NORMAL
BH CV LOWER VASCULAR RIGHT COMMON FEMORAL AUGMENT: NORMAL
BH CV LOWER VASCULAR RIGHT COMMON FEMORAL COMPETENT: NORMAL
BH CV LOWER VASCULAR RIGHT COMMON FEMORAL COMPRESS: NORMAL
BH CV LOWER VASCULAR RIGHT COMMON FEMORAL PHASIC: NORMAL
BH CV LOWER VASCULAR RIGHT COMMON FEMORAL SPONT: NORMAL
BH CV LOWER VASCULAR RIGHT DISTAL FEMORAL COMPRESS: NORMAL
BH CV LOWER VASCULAR RIGHT GASTRONEMIUS COMPRESS: NORMAL
BH CV LOWER VASCULAR RIGHT GREATER SAPH AK COMPRESS: NORMAL
BH CV LOWER VASCULAR RIGHT GREATER SAPH BK COMPRESS: NORMAL
BH CV LOWER VASCULAR RIGHT LESSER SAPH COMPRESS: NORMAL
BH CV LOWER VASCULAR RIGHT MID FEMORAL AUGMENT: NORMAL
BH CV LOWER VASCULAR RIGHT MID FEMORAL COMPETENT: NORMAL
BH CV LOWER VASCULAR RIGHT MID FEMORAL COMPRESS: NORMAL
BH CV LOWER VASCULAR RIGHT MID FEMORAL PHASIC: NORMAL
BH CV LOWER VASCULAR RIGHT MID FEMORAL SPONT: NORMAL
BH CV LOWER VASCULAR RIGHT PERONEAL COMPRESS: NORMAL
BH CV LOWER VASCULAR RIGHT POPLITEAL AUGMENT: NORMAL
BH CV LOWER VASCULAR RIGHT POPLITEAL COMPETENT: NORMAL
BH CV LOWER VASCULAR RIGHT POPLITEAL COMPRESS: NORMAL
BH CV LOWER VASCULAR RIGHT POPLITEAL PHASIC: NORMAL
BH CV LOWER VASCULAR RIGHT POPLITEAL SPONT: NORMAL
BH CV LOWER VASCULAR RIGHT POSTERIOR TIBIAL COMPRESS: NORMAL
BH CV LOWER VASCULAR RIGHT PROXIMAL FEMORAL COMPRESS: NORMAL
BH CV LOWER VASCULAR RIGHT SAPHENOFEMORAL JUNCTION COMPRESS: NORMAL
BH CV UPPER ARTERIAL LEFT 2ND DIGIT SYS MAX: 95
BH CV UPPER ARTERIAL LEFT FBI 2ND DIGIT RATIO: 0.75
BH CV UPPER ARTERIAL LEFT WBI RATIO: 1.04
BH CV UPPER ARTERIAL RIGHT 2ND DIGIT SYS MAX: 104
BH CV UPPER ARTERIAL RIGHT FBI 2ND DIGIT RATIO: 0.82
BH CV UPPER ARTERIAL RIGHT WBI RATIO: 1.04
BILIRUB SERPL-MCNC: 1.4 MG/DL (ref 0–1.2)
BUN SERPL-MCNC: 41 MG/DL (ref 8–23)
BUN/CREAT SERPL: 44.6 (ref 7–25)
CALCIUM SPEC-SCNC: 9 MG/DL (ref 8.6–10.5)
CHLORIDE SERPL-SCNC: 100 MMOL/L (ref 98–107)
CK SERPL-CCNC: 43 U/L (ref 20–180)
CO2 SERPL-SCNC: 26 MMOL/L (ref 22–29)
CREAT SERPL-MCNC: 0.92 MG/DL (ref 0.57–1)
DEPRECATED RDW RBC AUTO: 56.9 FL (ref 37–54)
EGFRCR SERPLBLD CKD-EPI 2021: 62.7 ML/MIN/1.73
EOSINOPHIL # BLD AUTO: 0 10*3/MM3 (ref 0–0.4)
EOSINOPHIL NFR BLD AUTO: 0.1 % (ref 0.3–6.2)
ERYTHROCYTE [DISTWIDTH] IN BLOOD BY AUTOMATED COUNT: 16 % (ref 12.3–15.4)
GLOBULIN UR ELPH-MCNC: 3 GM/DL
GLUCOSE BLDC GLUCOMTR-MCNC: 115 MG/DL (ref 70–105)
GLUCOSE BLDC GLUCOMTR-MCNC: 123 MG/DL (ref 70–105)
GLUCOSE SERPL-MCNC: 115 MG/DL (ref 65–99)
HCT VFR BLD AUTO: 55.3 % (ref 34–46.6)
HGB BLD-MCNC: 17.5 G/DL (ref 12–15.9)
INR PPP: 1.29 (ref 0.93–1.1)
LYMPHOCYTES # BLD AUTO: 0.4 10*3/MM3 (ref 0.7–3.1)
LYMPHOCYTES NFR BLD AUTO: 4 % (ref 19.6–45.3)
MAXIMAL PREDICTED HEART RATE: 139 BPM
MCH RBC QN AUTO: 32.1 PG (ref 26.6–33)
MCHC RBC AUTO-ENTMCNC: 31.7 G/DL (ref 31.5–35.7)
MCV RBC AUTO: 101.3 FL (ref 79–97)
MONOCYTES # BLD AUTO: 0.6 10*3/MM3 (ref 0.1–0.9)
MONOCYTES NFR BLD AUTO: 5.1 % (ref 5–12)
MRSA DNA SPEC QL NAA+PROBE: NORMAL
NEUTROPHILS NFR BLD AUTO: 10 10*3/MM3 (ref 1.7–7)
NEUTROPHILS NFR BLD AUTO: 90.2 % (ref 42.7–76)
NRBC BLD AUTO-RTO: 0 /100 WBC (ref 0–0.2)
PLATELET # BLD AUTO: 180 10*3/MM3 (ref 140–450)
PMV BLD AUTO: 8.3 FL (ref 6–12)
POTASSIUM SERPL-SCNC: 4.2 MMOL/L (ref 3.5–5.2)
PROT SERPL-MCNC: 6 G/DL (ref 6–8.5)
PROTHROMBIN TIME: 13.1 SECONDS (ref 9.6–11.7)
RBC # BLD AUTO: 5.46 10*6/MM3 (ref 3.77–5.28)
SODIUM SERPL-SCNC: 138 MMOL/L (ref 136–145)
STRESS TARGET HR: 118 BPM
UPPER ARTERIAL LEFT ARM BRACHIAL SYS MAX: 115 MMHG
UPPER ARTERIAL LEFT ARM BRACHIAL SYS MAX: 135 MMHG
UPPER ARTERIAL LEFT ARM RADIAL SYS MAX: 132 MMHG
UPPER ARTERIAL LEFT ARM ULNAR SYS MAX: 126 MMHG
UPPER ARTERIAL RIGHT ARM BRACHIAL SYS MAX: 127 MMHG
UPPER ARTERIAL RIGHT ARM BRACHIAL SYS MAX: 143 MMHG
UPPER ARTERIAL RIGHT ARM RADIAL SYS MAX: 132 MMHG
UPPER ARTERIAL RIGHT ARM ULNAR SYS MAX: 129 MMHG
WBC NRBC COR # BLD: 11.1 10*3/MM3 (ref 3.4–10.8)

## 2023-03-20 PROCEDURE — 93005 ELECTROCARDIOGRAM TRACING: CPT | Performed by: INTERNAL MEDICINE

## 2023-03-20 PROCEDURE — 25010000002 VANCOMYCIN HCL 1.25 G RECONSTITUTED SOLUTION 1 EACH VIAL: Performed by: EMERGENCY MEDICINE

## 2023-03-20 PROCEDURE — 93922 UPR/L XTREMITY ART 2 LEVELS: CPT

## 2023-03-20 PROCEDURE — 93923 UPR/LXTR ART STDY 3+ LVLS: CPT

## 2023-03-20 PROCEDURE — 25010000002 MORPHINE PER 10 MG: Performed by: EMERGENCY MEDICINE

## 2023-03-20 PROCEDURE — 99285 EMERGENCY DEPT VISIT HI MDM: CPT

## 2023-03-20 PROCEDURE — 87186 SC STD MICRODIL/AGAR DIL: CPT

## 2023-03-20 PROCEDURE — 25510000001 IOPAMIDOL PER 1 ML: Performed by: HOSPITALIST

## 2023-03-20 PROCEDURE — 85610 PROTHROMBIN TIME: CPT | Performed by: NURSE PRACTITIONER

## 2023-03-20 PROCEDURE — 99232 SBSQ HOSP IP/OBS MODERATE 35: CPT | Performed by: INTERNAL MEDICINE

## 2023-03-20 PROCEDURE — 87150 DNA/RNA AMPLIFIED PROBE: CPT | Performed by: EMERGENCY MEDICINE

## 2023-03-20 PROCEDURE — 85025 COMPLETE CBC W/AUTO DIFF WBC: CPT | Performed by: EMERGENCY MEDICINE

## 2023-03-20 PROCEDURE — 25010000002 ONDANSETRON PER 1 MG: Performed by: EMERGENCY MEDICINE

## 2023-03-20 PROCEDURE — 80053 COMPREHEN METABOLIC PANEL: CPT | Performed by: EMERGENCY MEDICINE

## 2023-03-20 PROCEDURE — 82962 GLUCOSE BLOOD TEST: CPT

## 2023-03-20 PROCEDURE — 75635 CT ANGIO ABDOMINAL ARTERIES: CPT

## 2023-03-20 PROCEDURE — 25010000002 CEFAZOLIN PER 500 MG: Performed by: HOSPITALIST

## 2023-03-20 PROCEDURE — 36415 COLL VENOUS BLD VENIPUNCTURE: CPT

## 2023-03-20 PROCEDURE — 82550 ASSAY OF CK (CPK): CPT | Performed by: EMERGENCY MEDICINE

## 2023-03-20 PROCEDURE — 87641 MR-STAPH DNA AMP PROBE: CPT | Performed by: HOSPITALIST

## 2023-03-20 PROCEDURE — 87040 BLOOD CULTURE FOR BACTERIA: CPT | Performed by: EMERGENCY MEDICINE

## 2023-03-20 PROCEDURE — 87077 CULTURE AEROBIC IDENTIFY: CPT

## 2023-03-20 PROCEDURE — 93970 EXTREMITY STUDY: CPT

## 2023-03-20 PROCEDURE — 71275 CT ANGIOGRAPHY CHEST: CPT

## 2023-03-20 PROCEDURE — 25010000002 HEPARIN (PORCINE) PER 1000 UNITS: Performed by: NURSE PRACTITIONER

## 2023-03-20 PROCEDURE — 93010 ELECTROCARDIOGRAM REPORT: CPT | Performed by: INTERNAL MEDICINE

## 2023-03-20 PROCEDURE — 25010000002 FUROSEMIDE PER 20 MG: Performed by: HOSPITALIST

## 2023-03-20 PROCEDURE — 85730 THROMBOPLASTIN TIME PARTIAL: CPT | Performed by: NURSE PRACTITIONER

## 2023-03-20 PROCEDURE — 25010000002 HEPARIN (PORCINE) PER 1000 UNITS

## 2023-03-20 PROCEDURE — 25010000002 CEFEPIME PER 500 MG: Performed by: EMERGENCY MEDICINE

## 2023-03-20 RX ORDER — POLYETHYLENE GLYCOL 3350 17 G/17G
17 POWDER, FOR SOLUTION ORAL DAILY PRN
Status: DISCONTINUED | OUTPATIENT
Start: 2023-03-20 | End: 2023-03-24 | Stop reason: HOSPADM

## 2023-03-20 RX ORDER — BISACODYL 5 MG/1
5 TABLET, DELAYED RELEASE ORAL DAILY PRN
Status: DISCONTINUED | OUTPATIENT
Start: 2023-03-20 | End: 2023-03-24 | Stop reason: HOSPADM

## 2023-03-20 RX ORDER — NICOTINE POLACRILEX 4 MG
15 LOZENGE BUCCAL
Status: DISCONTINUED | OUTPATIENT
Start: 2023-03-20 | End: 2023-03-24 | Stop reason: HOSPADM

## 2023-03-20 RX ORDER — AMOXICILLIN 250 MG
2 CAPSULE ORAL 2 TIMES DAILY PRN
Status: DISCONTINUED | OUTPATIENT
Start: 2023-03-20 | End: 2023-03-24 | Stop reason: HOSPADM

## 2023-03-20 RX ORDER — ONDANSETRON 2 MG/ML
4 INJECTION INTRAMUSCULAR; INTRAVENOUS ONCE
Status: COMPLETED | OUTPATIENT
Start: 2023-03-20 | End: 2023-03-20

## 2023-03-20 RX ORDER — HEPARIN SODIUM 10000 [USP'U]/100ML
18 INJECTION, SOLUTION INTRAVENOUS
Status: DISCONTINUED | OUTPATIENT
Start: 2023-03-20 | End: 2023-03-21

## 2023-03-20 RX ORDER — METOPROLOL SUCCINATE 25 MG/1
25 TABLET, EXTENDED RELEASE ORAL 2 TIMES DAILY
Status: DISCONTINUED | OUTPATIENT
Start: 2023-03-20 | End: 2023-03-23

## 2023-03-20 RX ORDER — SODIUM CHLORIDE 0.9 % (FLUSH) 0.9 %
10 SYRINGE (ML) INJECTION AS NEEDED
Status: DISCONTINUED | OUTPATIENT
Start: 2023-03-20 | End: 2023-03-23

## 2023-03-20 RX ORDER — HYDROCODONE BITARTRATE AND ACETAMINOPHEN 5; 325 MG/1; MG/1
1 TABLET ORAL EVERY 8 HOURS PRN
Status: DISCONTINUED | OUTPATIENT
Start: 2023-03-20 | End: 2023-03-24 | Stop reason: HOSPADM

## 2023-03-20 RX ORDER — LEVOTHYROXINE SODIUM 0.1 MG/1
100 TABLET ORAL
Status: DISCONTINUED | OUTPATIENT
Start: 2023-03-21 | End: 2023-03-24 | Stop reason: HOSPADM

## 2023-03-20 RX ORDER — CHOLECALCIFEROL (VITAMIN D3) 125 MCG
5 CAPSULE ORAL NIGHTLY PRN
Status: DISCONTINUED | OUTPATIENT
Start: 2023-03-20 | End: 2023-03-24 | Stop reason: HOSPADM

## 2023-03-20 RX ORDER — MORPHINE SULFATE 2 MG/ML
2 INJECTION, SOLUTION INTRAMUSCULAR; INTRAVENOUS ONCE
Status: COMPLETED | OUTPATIENT
Start: 2023-03-20 | End: 2023-03-20

## 2023-03-20 RX ORDER — SODIUM CHLORIDE 0.9 % (FLUSH) 0.9 %
10 SYRINGE (ML) INJECTION AS NEEDED
Status: DISCONTINUED | OUTPATIENT
Start: 2023-03-20 | End: 2023-03-24 | Stop reason: HOSPADM

## 2023-03-20 RX ORDER — SODIUM CHLORIDE 0.9 % (FLUSH) 0.9 %
10 SYRINGE (ML) INJECTION EVERY 12 HOURS SCHEDULED
Status: DISCONTINUED | OUTPATIENT
Start: 2023-03-20 | End: 2023-03-23

## 2023-03-20 RX ORDER — FUROSEMIDE 10 MG/ML
40 INJECTION INTRAMUSCULAR; INTRAVENOUS ONCE
Status: COMPLETED | OUTPATIENT
Start: 2023-03-20 | End: 2023-03-20

## 2023-03-20 RX ORDER — OLANZAPINE 10 MG/2ML
1 INJECTION, POWDER, LYOPHILIZED, FOR SOLUTION INTRAMUSCULAR
Status: DISCONTINUED | OUTPATIENT
Start: 2023-03-20 | End: 2023-03-24 | Stop reason: HOSPADM

## 2023-03-20 RX ORDER — INSULIN LISPRO 100 [IU]/ML
2-9 INJECTION, SOLUTION INTRAVENOUS; SUBCUTANEOUS
Status: DISCONTINUED | OUTPATIENT
Start: 2023-03-20 | End: 2023-03-24 | Stop reason: HOSPADM

## 2023-03-20 RX ORDER — ATORVASTATIN CALCIUM 40 MG/1
40 TABLET, FILM COATED ORAL NIGHTLY
Status: DISCONTINUED | OUTPATIENT
Start: 2023-03-20 | End: 2023-03-24 | Stop reason: HOSPADM

## 2023-03-20 RX ORDER — ONDANSETRON 2 MG/ML
4 INJECTION INTRAMUSCULAR; INTRAVENOUS EVERY 6 HOURS PRN
Status: DISCONTINUED | OUTPATIENT
Start: 2023-03-20 | End: 2023-03-24 | Stop reason: HOSPADM

## 2023-03-20 RX ORDER — BISACODYL 10 MG
10 SUPPOSITORY, RECTAL RECTAL DAILY PRN
Status: DISCONTINUED | OUTPATIENT
Start: 2023-03-20 | End: 2023-03-24 | Stop reason: HOSPADM

## 2023-03-20 RX ORDER — FUROSEMIDE 10 MG/ML
40 INJECTION INTRAMUSCULAR; INTRAVENOUS EVERY 12 HOURS
Status: DISCONTINUED | OUTPATIENT
Start: 2023-03-21 | End: 2023-03-22

## 2023-03-20 RX ORDER — HEPARIN SODIUM 5000 [USP'U]/ML
5000 INJECTION, SOLUTION INTRAVENOUS; SUBCUTANEOUS EVERY 12 HOURS SCHEDULED
Status: DISCONTINUED | OUTPATIENT
Start: 2023-03-20 | End: 2023-03-20

## 2023-03-20 RX ORDER — ACETAMINOPHEN 325 MG/1
650 TABLET ORAL EVERY 4 HOURS PRN
Status: DISCONTINUED | OUTPATIENT
Start: 2023-03-20 | End: 2023-03-24 | Stop reason: HOSPADM

## 2023-03-20 RX ORDER — DEXTROSE MONOHYDRATE 25 G/50ML
25 INJECTION, SOLUTION INTRAVENOUS
Status: DISCONTINUED | OUTPATIENT
Start: 2023-03-20 | End: 2023-03-24 | Stop reason: HOSPADM

## 2023-03-20 RX ORDER — SODIUM CHLORIDE 9 MG/ML
40 INJECTION, SOLUTION INTRAVENOUS AS NEEDED
Status: DISCONTINUED | OUTPATIENT
Start: 2023-03-20 | End: 2023-03-24 | Stop reason: HOSPADM

## 2023-03-20 RX ORDER — INSULIN ASPART 100 [IU]/ML
10 INJECTION, SUSPENSION SUBCUTANEOUS 2 TIMES DAILY PRN
COMMUNITY

## 2023-03-20 RX ADMIN — HEPARIN SODIUM 18 UNITS/KG/HR: 10000 INJECTION, SOLUTION INTRAVENOUS at 15:03

## 2023-03-20 RX ADMIN — Medication 10 ML: at 12:52

## 2023-03-20 RX ADMIN — MORPHINE SULFATE 2 MG: 2 INJECTION, SOLUTION INTRAMUSCULAR; INTRAVENOUS at 09:59

## 2023-03-20 RX ADMIN — HEPARIN SODIUM 5000 UNITS: 5000 INJECTION INTRAVENOUS; SUBCUTANEOUS at 12:35

## 2023-03-20 RX ADMIN — FUROSEMIDE 40 MG: 10 INJECTION, SOLUTION INTRAMUSCULAR; INTRAVENOUS at 16:06

## 2023-03-20 RX ADMIN — METOPROLOL SUCCINATE 25 MG: 25 TABLET, EXTENDED RELEASE ORAL at 22:18

## 2023-03-20 RX ADMIN — ATORVASTATIN CALCIUM 40 MG: 40 TABLET, FILM COATED ORAL at 22:18

## 2023-03-20 RX ADMIN — CEFAZOLIN 2 G: 2 INJECTION, POWDER, FOR SOLUTION INTRAMUSCULAR; INTRAVENOUS at 22:18

## 2023-03-20 RX ADMIN — ONDANSETRON 4 MG: 2 INJECTION INTRAMUSCULAR; INTRAVENOUS at 09:59

## 2023-03-20 RX ADMIN — VANCOMYCIN HYDROCHLORIDE 1250 MG: 1.25 INJECTION, POWDER, LYOPHILIZED, FOR SOLUTION INTRAVENOUS at 13:28

## 2023-03-20 RX ADMIN — CEFEPIME 2 G: 2 INJECTION, POWDER, FOR SOLUTION INTRAVENOUS at 12:35

## 2023-03-20 RX ADMIN — IOPAMIDOL 150 ML: 755 INJECTION, SOLUTION INTRAVENOUS at 18:44

## 2023-03-20 RX ADMIN — Medication 10 ML: at 22:19

## 2023-03-20 NOTE — ED PROVIDER NOTES
Subjective   History of Present Illness  Chief complaint swelling and redness to right leg    History of present illness this is an 83-year-old female who has several health problems who has had some chronic edema since about March and her legs has become worse over the last few days with some weeping last night her right foot was hurting her and today her foot and leg is red hot and swollen.  No fever chills no injury no recent long car ride plane or immobilization foreign travels patient has been off her Eliquis for a few days getting ready to have some fluid drained out of her lungs.  Nothing makes it better or worse ongoing continuous since last night.  Patient was up most of the night.        Review of Systems   Constitutional: Negative for chills and fever.   Respiratory: Negative for chest tightness and shortness of breath.    Cardiovascular: Positive for leg swelling. Negative for chest pain.   Gastrointestinal: Negative for abdominal pain and vomiting.   Genitourinary: Negative for difficulty urinating and dysuria.   Musculoskeletal: Negative for back pain and neck pain.   Skin: Negative for rash and wound.   Psychiatric/Behavioral: Negative for agitation and confusion.       Past Medical History:   Diagnosis Date   • Astigmatism, bilateral 11/19/2019   • Benign essential hypertension    • Bilateral presbyopia 11/19/2019   • CAD (coronary artery disease)    • Closed right ankle fracture    • COVID-19 vaccination refused    • Hyperlipidemia    • Hypothyroidism    • Influenza vaccine needed    • Myocardial infarction (HCC)    • Prediabetes    • Pseudophakia, both eyes 11/19/2019   • Thoracic back pain        Allergies   Allergen Reactions   • Prednisone Other (See Comments)     Increased BP       Past Surgical History:   Procedure Laterality Date   • APPENDECTOMY     • CARDIAC CATHETERIZATION  2012    x3    • CORONARY ARTERY BYPASS GRAFT  12/2014   • CORONARY STENT PLACEMENT      x3   • CYSTOSCOPY WITH CLOT  EVACUATION N/A 3/25/2022    Procedure: CYSTOSCOPY WITH CLOT EVACUATION AND FULGURATION ;  Surgeon: Sukhdev Poole MD;  Location: [facility] MAIN OR;  Service: Urology;  Laterality: N/A;   • HARDWARE REMOVAL Right 2020    Procedure: ANKLE/FOOT HARDWARE REMOVAL;  Surgeon: Lincoln Sibley MD;  Location: [facility] MAIN OR;  Service: Orthopedics;  Laterality: Right;   • ORIF ANKLE FRACTURE Right 2019    Abelen- [facility]   • THORACOSCOPY WITH DECORTICATION Left 3/18/2022    Procedure: LEFT THORACOSCOPY VIDEO ASSISTED WITH COMPLETE DECORTICATION;  Surgeon: Sabra Kuo MD;  Location: [facility] MAIN OR;  Service: Thoracic;  Laterality: Left;       Family History   Problem Relation Age of Onset   • Hypertension Mother    • Stroke Mother    • Heart disease Mother    • Lung cancer Father    • Diabetes Brother    • Diabetes Other        Social History     Socioeconomic History   • Marital status:    Tobacco Use   • Smoking status: Former     Packs/day: 1.50     Types: Cigarettes     Quit date:      Years since quittin.2     Passive exposure: Past   • Smokeless tobacco: Never   • Tobacco comments:     CAFFEINE USE ICED TEA, OCCAS COFFEE   Vaping Use   • Vaping Use: Never used   Substance and Sexual Activity   • Alcohol use: Not Currently   • Drug use: No   • Sexual activity: Defer     Prior to Admission medications    Medication Sig Start Date End Date Taking? Authorizing Provider   acetaminophen (TYLENOL) 500 MG tablet Take 1 tablet by mouth Every 6 (Six) Hours As Needed for Mild Pain.    ProviderMalinda MD   apixaban (ELIQUIS) 5 MG tablet tablet Take 1 tablet by mouth 1 (One) Time. PT IS TAKING 1/2 TABLET TWICE A DAY    ProviderMalinda MD   Cholecalciferol (VITAMIN D3) 2000 units tablet Take 1 tablet by mouth Daily.    ProviderMalinda MD   furosemide (LASIX) 20 MG tablet Take 3 tablets by mouth Daily. 3/7/23   Minerva Chatterjee MD   insulin aspart prot-insulin aspart (NovoLOG Mix  "70/30) (70-30) 100 UNIT/ML injection Inject 10 Units under the skin into the appropriate area as directed 2 (Two) Times a Day With Meals. 3/9/23   Minerva Chatterjee MD   Insulin Syringe 27G X 1/2\" 0.5 ML misc 1 syringe 2 (Two) Times a Day. 3/9/23   Minerva Chatterjee MD   Krill Oil 300 MG capsule Take  by mouth.    Malinda Long MD   levothyroxine (Synthroid) 100 MCG tablet Take 1 tablet by mouth Daily. 1/17/23   Minerva Chatterjee MD   methocarbamol (ROBAXIN) 500 MG tablet Take 1 tablet by mouth 4 (Four) Times a Day.  Patient not taking: Reported on 3/14/2023    Malinda Long MD   metoprolol succinate XL (TOPROL-XL) 25 MG 24 hr tablet Take 1 tablet by mouth twice daily 2/9/23   Minerva Chatterjee MD   Multiple Vitamins-Minerals (MULTIVITAMIN ADULT EXTRA C PO) Take 1 tablet by mouth Daily. 12/31/12   Malinda Long MD   mupirocin (BACTROBAN) 2 % ointment Apply 1 application topically to the appropriate area as directed 3 (Three) Times a Day. Rash on both feet 1/30/23   Minerva Chatterjee MD   spironolactone (ALDACTONE) 25 MG tablet Take 1 tablet by mouth Daily. 12/20/22   Minerva Chatterjee MD   traMADol (ULTRAM) 50 MG tablet Take 1 tablet by mouth Every 6 (Six) Hours As Needed for Moderate Pain.  Patient not taking: Reported on 3/14/2023    Malinda Long MD   gabapentin (NEURONTIN) 100 MG capsule Take 3 capsules by mouth 3 (Three) Times a Day. 3/24/22 3/24/22  Yesi Fitzpatrick APRN           Objective   Physical Exam  Constitutional this is a 83-year-old female awake alert chronically ill-appearing but no acute distress triage vital signs reviewed.  HEENT extraocular muscles are intact pupils equal round reactive sclera clear neck supple no adenopathy no JVD no bruits lungs decreased breath sounds in the base on the right no retraction no use of accessories heart regular without murmur abdomen soft nontender good bowel sounds no peritoneal findings or pulsatile masses.  Extremities the patient has " some edema in her legs the right leg is red hot and fevers from the foot up to the knee.  There is no crepitus obtains air she can move her toes.  Is somewhat mottled.  But it is warm she has a palpable dorsalis pedis and posterior tibialis pulse.  The left foot is cool but has palpable pulses.  There is no palpable cords or Homans' sign or evidence of DVT on clinical exam.  The compartments are soft there is no pain on proportion to exam no pain with passive stretching.  Neurologic awake alert and follows commands monitorings normal without focal weakness  Procedures           ED Course      Results for orders placed or performed during the hospital encounter of 03/20/23   Comprehensive Metabolic Panel    Specimen: Blood   Result Value Ref Range    Glucose 115 (H) 65 - 99 mg/dL    BUN 41 (H) 8 - 23 mg/dL    Creatinine 0.92 0.57 - 1.00 mg/dL    Sodium 138 136 - 145 mmol/L    Potassium 4.2 3.5 - 5.2 mmol/L    Chloride 100 98 - 107 mmol/L    CO2 26.0 22.0 - 29.0 mmol/L    Calcium 9.0 8.6 - 10.5 mg/dL    Total Protein 6.0 6.0 - 8.5 g/dL    Albumin 3.0 (L) 3.5 - 5.2 g/dL    ALT (SGPT) 44 (H) 1 - 33 U/L    AST (SGOT) 54 (H) 1 - 32 U/L    Alkaline Phosphatase 204 (H) 39 - 117 U/L    Total Bilirubin 1.4 (H) 0.0 - 1.2 mg/dL    Globulin 3.0 gm/dL    A/G Ratio 1.0 g/dL    BUN/Creatinine Ratio 44.6 (H) 7.0 - 25.0    Anion Gap 12.0 5.0 - 15.0 mmol/L    eGFR 62.7 >60.0 mL/min/1.73   CK    Specimen: Blood   Result Value Ref Range    Creatine Kinase 43 20 - 180 U/L   CBC Auto Differential    Specimen: Blood   Result Value Ref Range    WBC 11.10 (H) 3.40 - 10.80 10*3/mm3    RBC 5.46 (H) 3.77 - 5.28 10*6/mm3    Hemoglobin 17.5 (H) 12.0 - 15.9 g/dL    Hematocrit 55.3 (H) 34.0 - 46.6 %    .3 (H) 79.0 - 97.0 fL    MCH 32.1 26.6 - 33.0 pg    MCHC 31.7 31.5 - 35.7 g/dL    RDW 16.0 (H) 12.3 - 15.4 %    RDW-SD 56.9 (H) 37.0 - 54.0 fl    MPV 8.3 6.0 - 12.0 fL    Platelets 180 140 - 450 10*3/mm3    Neutrophil % 90.2 (H) 42.7 - 76.0 %     Lymphocyte % 4.0 (L) 19.6 - 45.3 %    Monocyte % 5.1 5.0 - 12.0 %    Eosinophil % 0.1 (L) 0.3 - 6.2 %    Basophil % 0.6 0.0 - 1.5 %    Neutrophils, Absolute 10.00 (H) 1.70 - 7.00 10*3/mm3    Lymphocytes, Absolute 0.40 (L) 0.70 - 3.10 10*3/mm3    Monocytes, Absolute 0.60 0.10 - 0.90 10*3/mm3    Eosinophils, Absolute 0.00 0.00 - 0.40 10*3/mm3    Basophils, Absolute 0.10 0.00 - 0.20 10*3/mm3    nRBC 0.0 0.0 - 0.2 /100 WBC   Duplex Venous Lower Extremity - Bilateral   Result Value Ref Range    Target HR (85%) 118 bpm    Max. Pred. HR (100%) 139 bpm    Right Common Femoral Spont Y     Right Common Femoral Competent Y     Right Common Femoral Phasic N     Right Common Femoral Compress C     Right Common Femoral Augment Y     Right Saphenofemoral Junction Compress C     Right Proximal Femoral Compress C     Right Mid Femoral Spont Y     Right Mid Femoral Competent Y     Right Mid Femoral Phasic N     Right Mid Femoral Compress C     Right Mid Femoral Augment Y     Right Distal Femoral Compress C     Right Popliteal Spont Y     Right Popliteal Competent Y     Right Popliteal Phasic N     Right Popliteal Compress C     Right Popliteal Augment Y     Right Posterior Tibial Compress C     Right Peroneal Compress C     Right Gastronemius Compress C     Right Greater Saph AK Compress C     Right Greater Saph BK Compress C     Right Lesser Saph Compress C     Left Common Femoral Spont Y     Left Common Femoral Competent Y     Left Common Femoral Phasic N     Left Common Femoral Compress C     Left Common Femoral Augment Y     Left Saphenofemoral Junction Compress C     Left Proximal Femoral Compress C     Left Mid Femoral Spont Y     Left Mid Femoral Competent Y     Left Mid Femoral Phasic Y     Left Mid Femoral Compress C     Left Mid Femoral Augment Y     Left Distal Femoral Compress C     Left Popliteal Spont Y     Left Popliteal Competent Y     Left Popliteal Phasic Y     Left Popliteal Compress C     Left Popliteal Augment  Y     Left Posterior Tibial Compress C     Left Peroneal Compress C     Left Gastronemius Compress C     Left Greater Saph BK Compress C     Left Lesser Saph Compress C    Doppler Ankle Brachial Index Single Level CAR   Result Value Ref Range    Target HR (85%) 118 bpm    Max. Pred. HR (100%) 139 bpm    Upper arterial right arm brachial sys max 143 mmHg    Upper arterial left arm brachial sys max 135 mmHg    RIGHT POST TIBIAL SYS      LEFT POST TIBIAL SYS      RIGHT DORSALIS PEDIS SYS      LEFT DORSALIS PEDIS SYS      RIGHT GREAT TOE SYS MAX 0     LEFT GREAT TOE SYS MAX 0     RIGHT NADEGE RATIO 1.06     LEFT NADEGE RATIO 1.10     RIGHT TBI RATIO 0     LEFT TBI RATIO 0      No radiology results for the last day  Medications   sodium chloride 0.9 % flush 10 mL (has no administration in time range)   cefepime 2 gm IVPB in 100 ml NS (MBP) (has no administration in time range)   Vancomycin HCl 1,250 mg in sodium chloride 0.9 % 250 mL IVPB (has no administration in time range)   morphine injection 2 mg (2 mg Intravenous Given 3/20/23 0959)   ondansetron (ZOFRAN) injection 4 mg (4 mg Intravenous Given 3/20/23 0959)          Record review office visit thoracic surgery 3/14/2023  right pleural effusion scheduled for thoracentesis echocardiogram in August 2022 ejection fraction of 30%                                  Medical Decision Making  Medical decision making.  IV established placed on the monitor with a sinus rhythm on my review.  The patient had labs obtained independently reviewed by me comprehensive metabolic panel for unremarkable an ALT of 42 AST of 54 alk phos of 204 total bili 1.4 BUN of 41 and a glucose of 115 CK was 43 CBC white count 1.1 hemoglobin 17.5 patient had a ultrasound of her legs there was no reported DVT in this report reviewed.  Patient's ankle-brachial indexes were reported normal.  This report reviewed.  Review of her records shows that she was in the thoracic surgery's  office on 14 March this year she has a large pleural effusion on the right is scheduled for thoracentesis she had an echocardiogram in August 2022 which showed ejection fraction of 30% the patient had cultures obtained have been started on cefepime and vancomycin she is a diabetic.  On clinical exam I see no evidence of DVT based on her history physical and clinical exam and her ultrasound findings no evidence of arterial compromise at this point based on her ABIs.  Although have a suspicion this could be underlying with some peripheral vascular disease she has been off of her Eliquis for the last few days awaiting his thoracentesis.  I do not see evidence of compartment syndrome no evidence of necrotizing fasciitis or septic joint.  No evidence of gangrene currently.  This all based on history physical clinical exam I talked to the  I talked to the patient I have talked to the hospitalist nurse practitioner discussed the case and the patient will be admitted for further care stable otherwise unremarkable ER course.    Cellulitis of right leg: acute illness or injury  Amount and/or Complexity of Data Reviewed  Labs: ordered. Decision-making details documented in ED Course.  ECG/medicine tests: ordered and independent interpretation performed. Decision-making details documented in ED Course.      Risk  Decision regarding hospitalization.          Final diagnoses:   Cellulitis of right leg       ED Disposition  ED Disposition     ED Disposition   Decision to Admit    Condition   --    Comment   Level of Care: Telemetry [5]   Diagnosis: Cellulitis of right leg [249519]   Admitting Physician: PRIYANKA SANTAMARIA [[id number]]   Attending Physician: PRIYANKA SANTAMARIA [[id number]]   Certification: I Certify That Inpatient Hospital Services Are Medically Necessary For Greater Than 2 Midnights               No follow-up provider specified.       Medication List      ASK your doctor about these medications    insulin aspart  prot-insulin aspart (70-30) 100 UNIT/ML injection  Commonly known as: novoLOG 70/30  Ask about: Which instructions should I use?             Tim Wright MD  03/20/23 3583

## 2023-03-20 NOTE — PLAN OF CARE
Problem: Fall Injury Risk  Goal: Absence of Fall and Fall-Related Injury  Outcome: Ongoing, Progressing  Intervention: Promote Injury-Free Environment  Recent Flowsheet Documentation  Taken 3/20/2023 1400 by Steff Kan RN  Safety Promotion/Fall Prevention: safety round/check completed  Taken 3/20/2023 1200 by Steff Kan RN  Safety Promotion/Fall Prevention: safety round/check completed     Problem: Heart Failure Comorbidity  Goal: Maintenance of Heart Failure Symptom Control  Outcome: Ongoing, Progressing     Problem: Osteoarthritis Comorbidity  Goal: Maintenance of Osteoarthritis Symptom Control  Outcome: Ongoing, Progressing     Problem: Pain Chronic (Persistent) (Comorbidity Management)  Goal: Acceptable Pain Control and Functional Ability  Outcome: Ongoing, Progressing  Intervention: Manage Persistent Pain  Recent Flowsheet Documentation  Taken 3/20/2023 1200 by Steff Kan RN  Bowel Elimination Promotion: privacy promoted     Problem: Skin Injury Risk Increased  Goal: Skin Health and Integrity  Outcome: Ongoing, Progressing   Goal Outcome Evaluation:         Pt arrived from ER with lower  right extremitie purple and cold, MD aware appropriate consults placed. Heparin drip started this shift, per team, thoracentesis planned for 3/21. 3L of oxygen via nasal canula

## 2023-03-20 NOTE — CONSULTS
Referring Provider: Lily Nesbitt MD  Reason for Consultation:  Lower extremity edema and cellulitis    Patient Care Team:  Minerva Chatterjee MD as PCP - General (Internal Medicine)  Yordan Evans MD as Consulting Physician (Cardiology)    Chief complaint  Lower extremity swelling    Subjective .     History of present illness:  Jaquelin Valderrama is a 83 y.o. female who presents with history of multiple cardiac and noncardiac problems was admitted to the hospital with worsening of lower extremity edema and erythema with bilateral toe discoloration.  Patient has been gradually declining with her activity level recently.  Patient would walk short distances and would get short of breath.  Patient has been using wheelchair.  Patient apparently has not been taking her diuretics appropriately.  Patient is supposed to have thoracentesis and patient is on hold with Eliquis.  Patient is somnolent most of the history is obtained from patient's  at bedside.  No other associated aggravating or alleviating factors..   Duplex scan of the lower extremities is negative for DVT.  ABIs revealed severe digital ischemia.        ROS      The patient has been without any chest discomfort, shortness of breath, palpitations, dizziness or syncope.  Denies having any headache, abdominal pain, nausea, vomiting, diarrhea, constipation, loss of weight or loss of appetite.  Denies having any excessive bruising, hematuria or blood in the stool.    Review of all systems negative except as indicated      History  Past Medical History:   Diagnosis Date   • Astigmatism, bilateral 11/19/2019   • Benign essential hypertension    • Bilateral presbyopia 11/19/2019   • CAD (coronary artery disease)    • CHF (congestive heart failure) (HCC)    • Closed right ankle fracture    • COVID-19 vaccination refused    • Hyperlipidemia    • Hypothyroidism    • Influenza vaccine needed    • Myocardial infarction (HCC)    • Prediabetes    • Pseudophakia,  both eyes 2019   • Sleep apnea    • Thoracic back pain        Past Surgical History:   Procedure Laterality Date   • APPENDECTOMY     • CARDIAC CATHETERIZATION  2012    x3    • CORONARY ARTERY BYPASS GRAFT  2014   • CORONARY STENT PLACEMENT      x3   • CYSTOSCOPY WITH CLOT EVACUATION N/A 3/25/2022    Procedure: CYSTOSCOPY WITH CLOT EVACUATION AND FULGURATION ;  Surgeon: Sukhdev Poole MD;  Location: [facility] MAIN OR;  Service: Urology;  Laterality: N/A;   • HARDWARE REMOVAL Right 2020    Procedure: ANKLE/FOOT HARDWARE REMOVAL;  Surgeon: Lincoln Sibley MD;  Location: [facility] MAIN OR;  Service: Orthopedics;  Laterality: Right;   • ORIF ANKLE FRACTURE Right 2019    [facility]- [facility]   • THORACOSCOPY WITH DECORTICATION Left 3/18/2022    Procedure: LEFT THORACOSCOPY VIDEO ASSISTED WITH COMPLETE DECORTICATION;  Surgeon: Sabra Kuo MD;  Location: [facility] MAIN OR;  Service: Thoracic;  Laterality: Left;       Family History   Problem Relation Age of Onset   • Hypertension Mother    • Stroke Mother    • Heart disease Mother    • Lung cancer Father    • Diabetes Brother    • Diabetes Other        Social History     Tobacco Use   • Smoking status: Former     Packs/day: 1.50     Types: Cigarettes     Quit date:      Years since quittin.2     Passive exposure: Past   • Smokeless tobacco: Never   • Tobacco comments:     CAFFEINE USE ICED TEA, OCCAS COFFEE   Vaping Use   • Vaping Use: Never used   Substance Use Topics   • Alcohol use: Not Currently   • Drug use: No        Medications Prior to Admission   Medication Sig Dispense Refill Last Dose   • acetaminophen (TYLENOL) 500 MG tablet Take 1 tablet by mouth Every 6 (Six) Hours As Needed for Mild Pain.   Past Week   • apixaban (ELIQUIS) 5 MG tablet tablet Take 1 tablet by mouth Daily. Only QD per Cardiologist   Past Week   • Cholecalciferol (VITAMIN D3) 2000 units tablet Take 1 tablet by mouth Daily.   3/20/2023   • furosemide (LASIX) 20 MG  tablet Take 3 tablets by mouth Daily. 270 tablet 1 3/20/2023   • insulin aspart prot-insulin aspart (novoLOG 70/30) (70-30) 100 UNIT/ML injection Inject 10 Units under the skin into the appropriate area as directed 2 (Two) Times a Day As Needed (elevated blood sugar).   Past Week   • Krill Oil 300 MG capsule Take 1 capsule by mouth Daily.   3/20/2023   • levothyroxine (Synthroid) 100 MCG tablet Take 1 tablet by mouth Daily. 90 tablet 1 3/20/2023   • metoprolol succinate XL (TOPROL-XL) 25 MG 24 hr tablet Take 1 tablet by mouth twice daily 180 tablet 3 3/20/2023   • Multiple Vitamins-Minerals (MULTIVITAMIN ADULT EXTRA C PO) Take 1 tablet by mouth Daily.   3/20/2023   • mupirocin (BACTROBAN) 2 % ointment Apply 1 application topically to the appropriate area as directed 3 (Three) Times a Day. Rash on both feet 30 g 1 3/20/2023   • spironolactone (ALDACTONE) 25 MG tablet Take 1 tablet by mouth Daily. 90 tablet 3 3/20/2023         Prednisone    Scheduled Meds:atorvastatin, 40 mg, Oral, Nightly  ceFAZolin, 2 g, Intravenous, Q8H  furosemide, 40 mg, Intravenous, Once  insulin lispro, 2-9 Units, Subcutaneous, 4x Daily With Meals & Nightly  [START ON 3/21/2023] levothyroxine, 100 mcg, Oral, Q AM  metoprolol succinate XL, 25 mg, Oral, BID  sodium chloride, 10 mL, Intravenous, Q12H      Continuous Infusions:heparin, 18 Units/kg/hr, Last Rate: 18 Units/kg/hr (03/20/23 1503)  hold, 1 each      PRN Meds:.•  acetaminophen  •  senna-docusate sodium **AND** polyethylene glycol **AND** bisacodyl **AND** bisacodyl  •  dextrose  •  dextrose  •  glucagon (human recombinant)  •  heparin  •  heparin  •  hold  •  HYDROcodone-acetaminophen  •  melatonin  •  ondansetron  •  [COMPLETED] Insert Peripheral IV **AND** sodium chloride  •  sodium chloride  •  sodium chloride    Objective     VITAL SIGNS  Vitals:    03/20/23 1001 03/20/23 1018 03/20/23 1116 03/20/23 1230   BP: 137/90  136/90 119/73   BP Location:    Right arm   Patient Position:     "Lying   Pulse: 87  86 87   Resp:    22   Temp:    97.3 °F (36.3 °C)   TempSrc:    Axillary   SpO2: 94% (!) 3% 90% 94%   Weight:       Height:           Flowsheet Rows    Flowsheet Row First Filed Value   Admission Height 160 cm (63\") Documented at 03/20/2023 0850   Admission Weight 67.9 kg (149 lb 11.1 oz) Documented at 03/20/2023 0850            Intake/Output Summary (Last 24 hours) at 3/20/2023 1520  Last data filed at 3/20/2023 1300  Gross per 24 hour   Intake --   Output 200 ml   Net -200 ml        TELEMETRY: Atrial fibrillation    Physical Exam:  The patient is alert, oriented and in no distress.  Vital signs as noted above.  Head and neck revealed no carotid bruits or jugular venous distention.  No thyromegaly or lymphadenopathy is present  Lungs clear.  No wheezing.  Breath sounds are normal bilaterally.  Heart normal first and second heart sounds. No murmur.  No precordial rub is present.  No gallop is present.  Abdomen soft and nontender.  No organomegaly is present.  Extremities with decreased peripheral pulses.  Bilateral lower extremity edema and erythema.  Skin warm and dry.  Musculoskeletal system is grossly normal  CNS grossly normal    Reviewed and updated.      Results Review:   I reviewed the patient's new clinical results.  Lab Results (last 24 hours)     Procedure Component Value Units Date/Time    MRSA Screen, PCR (Inpatient) - Swab, Nares [[id number]] Collected: 03/20/23 1349    Specimen: Swab from Nares Updated: 03/20/23 1459    Blood Culture - Blood, Hand, Right [[id number]] Collected: 03/20/23 1116    Specimen: Blood from Hand, Right Updated: 03/20/23 1124    Comprehensive Metabolic Panel [[id number]]  (Abnormal) Collected: 03/20/23 0915    Specimen: Blood Updated: 03/20/23 0953     Glucose 115 mg/dL      BUN 41 mg/dL      Creatinine 0.92 mg/dL      Sodium 138 mmol/L      Potassium 4.2 mmol/L      Comment: Slight hemolysis detected by analyzer. Results may be affected.        Chloride 100 " mmol/L      CO2 26.0 mmol/L      Calcium 9.0 mg/dL      Total Protein 6.0 g/dL      Albumin 3.0 g/dL      ALT (SGPT) 44 U/L      AST (SGOT) 54 U/L      Alkaline Phosphatase 204 U/L      Total Bilirubin 1.4 mg/dL      Globulin 3.0 gm/dL      A/G Ratio 1.0 g/dL      BUN/Creatinine Ratio 44.6     Anion Gap 12.0 mmol/L      eGFR 62.7 mL/min/1.73     Narrative:      GFR Normal >60  Chronic Kidney Disease <60  Kidney Failure <15    The GFR formula is only valid for adults with stable renal function between ages 18 and 70.    CK [[id number]]  (Normal) Collected: 03/20/23 0915    Specimen: Blood Updated: 03/20/23 0953     Creatine Kinase 43 U/L     Blood Culture - Blood, Arm, Left [[id number]] Collected: 03/20/23 0921    Specimen: Blood from Arm, Left Updated: 03/20/23 0925    CBC & Differential [[id number]]  (Abnormal) Collected: 03/20/23 0915    Specimen: Blood Updated: 03/20/23 0923    Narrative:      The following orders were created for panel order CBC & Differential.  Procedure                               Abnormality         Status                     ---------                               -----------         ------                     CBC Auto Differential[[id number]]        Abnormal            Final result                 Please view results for these tests on the individual orders.    CBC Auto Differential [[id number]]  (Abnormal) Collected: 03/20/23 0915    Specimen: Blood Updated: 03/20/23 0923     WBC 11.10 10*3/mm3      RBC 5.46 10*6/mm3      Hemoglobin 17.5 g/dL      Hematocrit 55.3 %      .3 fL      MCH 32.1 pg      MCHC 31.7 g/dL      RDW 16.0 %      RDW-SD 56.9 fl      MPV 8.3 fL      Platelets 180 10*3/mm3      Neutrophil % 90.2 %      Lymphocyte % 4.0 %      Monocyte % 5.1 %      Eosinophil % 0.1 %      Basophil % 0.6 %      Neutrophils, Absolute 10.00 10*3/mm3      Lymphocytes, Absolute 0.40 10*3/mm3      Monocytes, Absolute 0.60 10*3/mm3      Eosinophils, Absolute 0.00 10*3/mm3      Basophils,  Absolute 0.10 10*3/mm3      nRBC 0.0 /100 WBC           Imaging Results (Last 24 Hours)     ** No results found for the last 24 hours. **      LAB RESULTS (LAST 7 DAYS)    CBC  Results from last 7 days   Lab Units 03/20/23  0915   WBC 10*3/mm3 11.10*   RBC 10*6/mm3 5.46*   HEMOGLOBIN g/dL 17.5*   HEMATOCRIT % 55.3*   MCV fL 101.3*   PLATELETS 10*3/mm3 180       BMP  Results from last 7 days   Lab Units 03/20/23  0915   SODIUM mmol/L 138   POTASSIUM mmol/L 4.2   CHLORIDE mmol/L 100   CO2 mmol/L 26.0   BUN mg/dL 41*   CREATININE mg/dL 0.92   GLUCOSE mg/dL 115*       CMP   Results from last 7 days   Lab Units 03/20/23  0915   SODIUM mmol/L 138   POTASSIUM mmol/L 4.2   CHLORIDE mmol/L 100   CO2 mmol/L 26.0   BUN mg/dL 41*   CREATININE mg/dL 0.92   GLUCOSE mg/dL 115*   ALBUMIN g/dL 3.0*   BILIRUBIN mg/dL 1.4*   ALK PHOS U/L 204*   AST (SGOT) U/L 54*   ALT (SGPT) U/L 44*         BNP        TROPONIN  Results from last 7 days   Lab Units 03/20/23  0915   CK TOTAL U/L 43       CoAg        Creatinine Clearance  Estimated Creatinine Clearance: 44.4 mL/min (by C-G formula based on SCr of 0.92 mg/dL).    ABG        Radiology  No radiology results for the last day      EKG      I personally viewed and interpreted the patient's EKG/Telemetry data:    ECHOCARDIOGRAM:    Results for orders placed during the hospital encounter of 08/25/22    Adult Transthoracic Echo Complete W/ Cont if Necessary Per Protocol    Interpretation Summary  · Estimated left ventricular EF = 30% Left ventricular systolic function is moderately decreased.    Indications  Status post CABG    Technically satisfactory study.  Mitral valve is structurally normal.  Tricuspid valve is structurally normal.  Aortic valve is structurally normal.  Pulmonic valve could not be well visualized.  Moderate mitral and tricuspid regurgitation is present.  Left ventricle is enlarged with diffuse hypocontractility with ejection fraction of 30%.  Right ventricle enlargement is  present.  Atrial septum is intact.  Aorta is normal.  No pericardial effusion or intracardiac thrombus is seen.    Impression  Moderate mitral and tricuspid regurgitation.  Biventricular enlargement and dysfunction with estimated left ventricle ejection fraction of 30%.              Cardiolite (Tc-99m sestamibi) stress test      OTHER:     Assessment & Plan     Principal Problem:    Cellulitis of right leg       Assessment and Plan         ]]]]]]]]]]]]]]]]]]]]  Impression  ==========  - Significant lower extremity edema and erythema.  Significant digital ischemia.    -Past history of severe hematuria.-Improved.  CT scan of the abdomen showed significant clot burden in the bladder and probable anterior bladder perforation.     -Progressively worsening shortness of breath and leg edema-better.  Recurrent left pleural effusion which was loculated.  Patient had chest tube and subsequently had video-assisted thoracoscopy with complete decortication on the left side at [facility].  (March 2022.)     -Congestive heart failure  Left ventricle dysfunction with ejection fraction of 30%.     Echocardiogram 8/25/2022 revealed   Moderate mitral and tricuspid regurgitation.  Biventricular enlargement and dysfunction with estimated left ventricle ejection fraction of 30%.     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  Was not taking thyroid medicine replacement properly.     =================  Plan  ===========  Patient has significant lower extremity edema and erythema.  Patient has significant distal ischemia.  Appreciated vascular and thoracic consultation.    Patient was on  Eliquis.  Eliquis is on hold at this time in anticipation of thoracentesis.     Chronic atrial fibrillation-rate is controlled now.       Renal dysfunction  BUN/creatinine-30/1.03      Ischemic cardiomyopathy  Echocardiogram 8/25/2022 revealed  Moderate mitral and tricuspid regurgitation.  Biventricular enlargement and dysfunction with estimated left ventricle ejection fraction of 30%.     Intravenous diuresis with close observation of renal function.    Echocardiogram.  EKG  Follow-up labs ordered.    Further plan will depend on patient's progress.  ]]]]]]]]]]]]]]]]]       Yordan Evans MD  03/20/23  15:20 EDT

## 2023-03-20 NOTE — CONSULTS
Inpatient Thoracic Surgery Consult  Consult performed by: Montserrat Humphries, SANDEEP, APRN  Consult ordered by: Marie Marina PA-C  Reason for consult: Pleural effusion          Patient Care Team:  Minerva Chatterjee MD as PCP - General (Internal Medicine)  Yordan Evans MD as Consulting Physician (Cardiology)  Amilcar Smallwood MD as Consulting Physician (Hematology and Oncology)    Chief Complaint   Patient presents with   • Foot Swelling     Bilateral feet swelling, painful, warm to touch on right leg.         Subjective     History of Present Illness    Jaquelin Valderrama is an 83 year-old lady who is well-known to our service after undergoing a left VAT with decortication for empyema on 3/18/2022 by Dr. Sabra Kuo.  She was most recently seen in our office on 3/14/2023.  At that time she was reporting progressive shortness of air.  The patient has recently been seen by pulmonary medicine and they have ordered a right thoracentesis due to what appeared to be a moderate to large right pleural effusion seen in their intraoffice chest x-ray film.  She mistakenly canceled the procedure but was advised by her cardiologist that she needed to pursue this.  The patient has a history of heart failure and has significant lower extremity edema with weeping.  The patient reports orthopnea, having to sleep in a recliner due to severe shortness of air.  She is unable to perform activities of daily living without assistance.  Of note, the patient and her spouse recently returned from a prolonged trip while she was in the car for an extended period of time.  She reportedly ran out of her diuretic while she was out of town.    When she was seen in our office last week I reordered her right thoracentesis but recommended she report to the emergency department if her shortness of air worsened prior to scheduled procedure next week.    She presented to the ER yesterday and we have been asked to see her during this admission  regarding her pleural effusions.    Review of Systems   Constitutional: Negative.    HENT: Negative.    Eyes: Negative.    Respiratory: Positive for shortness of breath.    Cardiovascular: Positive for leg swelling.   Endocrine: Negative.    Genitourinary: Negative.    Skin: Negative.    Allergic/Immunologic: Negative.    Neurological: Negative.    Hematological: Negative.    Psychiatric/Behavioral: Negative.         Patient Active Problem List   Diagnosis   • Other hyperlipidemia   • Essential hypertension   • Hypothyroidism   • Coronary artery disease involving coronary bypass graft of native heart without angina pectoris   • Astigmatism, bilateral   • Bilateral presbyopia   • Pseudophakia, both eyes   • Atrial fibrillation (HCC)   • Systolic HF (heart failure) ([facility])   • Pseudophakia   • Cellulitis of right leg     Past Medical History:   Diagnosis Date   • Astigmatism, bilateral 11/19/2019   • Benign essential hypertension    • Bilateral presbyopia 11/19/2019   • CAD (coronary artery disease)    • CHF (congestive heart failure) ([facility])    • Closed right ankle fracture    • COVID-19 vaccination refused    • Hyperlipidemia    • Hypothyroidism    • Influenza vaccine needed    • Myocardial infarction ([facility])    • Prediabetes    • Pseudophakia, both eyes 11/19/2019   • Sleep apnea    • Thoracic back pain      Past Surgical History:   Procedure Laterality Date   • APPENDECTOMY     • CARDIAC CATHETERIZATION  2012    x3    • CORONARY ARTERY BYPASS GRAFT  12/2014   • CORONARY STENT PLACEMENT      x3   • CYSTOSCOPY WITH CLOT EVACUATION N/A 3/25/2022    Procedure: CYSTOSCOPY WITH CLOT EVACUATION AND FULGURATION ;  Surgeon: Sukhdev Poole MD;  Location: [facility];  Service: Urology;  Laterality: N/A;   • HARDWARE REMOVAL Right 7/21/2020    Procedure: ANKLE/FOOT HARDWARE REMOVAL;  Surgeon: Lincoln Sibley MD;  Location: [facility] OR;  Service: Orthopedics;  Laterality: Right;   • ORIF ANKLE FRACTURE Right  2019    [facility]   • THORACOSCOPY WITH DECORTICATION Left 3/18/2022    Procedure: LEFT THORACOSCOPY VIDEO ASSISTED WITH COMPLETE DECORTICATION;  Surgeon: Sabra Kuo MD;  Location: [facility];  Service: Thoracic;  Laterality: Left;     Family History   Problem Relation Age of Onset   • Hypertension Mother    • Stroke Mother    • Heart disease Mother    • Lung cancer Father    • Diabetes Brother    • Diabetes Other      Social History     Socioeconomic History   • Marital status:    Tobacco Use   • Smoking status: Former     Packs/day: 1.50     Types: Cigarettes     Quit date:      Years since quittin.2     Passive exposure: Past   • Smokeless tobacco: Never   • Tobacco comments:     CAFFEINE USE ICED TEA, OCCAS COFFEE   Vaping Use   • Vaping Use: Never used   Substance and Sexual Activity   • Alcohol use: Not Currently   • Drug use: No   • Sexual activity: Defer     Medications Prior to Admission   Medication Sig Dispense Refill Last Dose   • acetaminophen (TYLENOL) 500 MG tablet Take 1 tablet by mouth Every 6 (Six) Hours As Needed for Mild Pain.   Past Week   • apixaban (ELIQUIS) 5 MG tablet tablet Take 1 tablet by mouth Daily. Only QD per Cardiologist   Past Week   • Cholecalciferol (VITAMIN D3) 2000 units tablet Take 1 tablet by mouth Daily.   3/20/2023   • furosemide (LASIX) 20 MG tablet Take 3 tablets by mouth Daily. 270 tablet 1 3/20/2023   • insulin aspart prot-insulin aspart (novoLOG 70/30) (70-30) 100 UNIT/ML injection Inject 10 Units under the skin into the appropriate area as directed 2 (Two) Times a Day As Needed (elevated blood sugar).   Past Week   • Krill Oil 300 MG capsule Take 1 capsule by mouth Daily.   3/20/2023   • levothyroxine (Synthroid) 100 MCG tablet Take 1 tablet by mouth Daily. 90 tablet 1 3/20/2023   • metoprolol succinate XL (TOPROL-XL) 25 MG 24 hr tablet Take 1 tablet by mouth twice daily 180 tablet 3 3/20/2023   • Multiple Vitamins-Minerals  (MULTIVITAMIN ADULT EXTRA C PO) Take 1 tablet by mouth Daily.   3/20/2023   • mupirocin (BACTROBAN) 2 % ointment Apply 1 application topically to the appropriate area as directed 3 (Three) Times a Day. Rash on both feet 30 g 1 3/20/2023   • spironolactone (ALDACTONE) 25 MG tablet Take 1 tablet by mouth Daily. 90 tablet 3 3/20/2023     Allergies   Allergen Reactions   • Prednisone Other (See Comments)     Increased BP       Objective      Vital Signs  Temp:  [97 °F (36.1 °C)-97.9 °F (36.6 °C)] 97.9 °F (36.6 °C)  Heart Rate:  [73-87] 81  Resp:  [14-22] 21  BP: (119-136)/(73-90) 131/88    Intake & Output (last day)       03/20 0701  03/21 0700 03/21 0701  03/22 0700    Urine (mL/kg/hr) 1050     Total Output 1050     Net -1050           Urine Unmeasured Occurrence 2 x           Physical Exam  Constitutional:       General: She is not in acute distress.     Appearance: Normal appearance. She is ill-appearing.   HENT:      Head: Normocephalic and atraumatic.      Comments: Nasal cannula  Cardiovascular:      Rate and Rhythm: Normal rate.   Pulmonary:      Effort: Tachypnea present. No respiratory distress.      Breath sounds: Examination of the right-lower field reveals decreased breath sounds. Decreased breath sounds present.   Musculoskeletal:      Cervical back: Normal range of motion and neck supple.      Right lower leg: Edema present.      Left lower leg: Edema present.   Skin:     General: Skin is warm.      Findings: Erythema (BLE) present.      Comments: Blisters to RLE   Neurological:      General: No focal deficit present.      Mental Status: She is alert.      Motor: Weakness present.   Psychiatric:         Mood and Affect: Mood normal.             Results Review:    I reviewed the patient's new clinical results.  I reviewed the patient's new imaging results and agree with the interpretation.  I reviewed the patient's other test results and agree with the interpretation  Discussed with Dr. Moody, patient and  RN    Imaging Results (Last 24 Hours)     Procedure Component Value Units Date/Time    US Thoracentesis [[id number]] Resulted: 03/21/23 1009    Specimen: Body Fluid Updated: 03/21/23 1009    XR Chest 1 View [[id number]] Collected: 03/21/23 0853     Updated: 03/21/23 0857    Narrative:      XR CHEST 1 VW    Date of Exam: 3/21/2023 6:14 AM EDT    Indication: right effusion.    Comparison: 3/13/2023    Findings:  Sternotomy wires overlie the midline. There is a small right pleural effusion which appears decreased as compared to prior. There is probable fluid loculated within the minor fissure and major fissure. Small left effusion persists. There is overlying   consolidation in the bases which may be due to atelectasis or infiltrate. Increasing haziness in the right perihilar region which may be due to pneumonia or asymmetric edema. Cardiomegaly is again noted. No pneumothorax is seen.      Impression:      Impression:    1. Interval decrease in right pleural effusion. There appears to be some loculated fluid within the fissures now present.  2. Increasing airspace opacity in the right perihilar region suggesting pneumonia or asymmetric edema.  3. Bibasilar consolidations suggesting atelectasis or infiltrate. Small left effusion.  4. Cardiomegaly.    Electronically Signed: Balwinder Klein    3/21/2023 8:55 AM EDT    Workstation ID: [id number]    CT Angiogram Chest [[id number]] Collected: 03/20/23 1958     Updated: 03/20/23 2020    Narrative:      CT ANGIOGRAM CHEST, CT ANGIO ABDOMINAL AORTA BILAT ILIOFEM RUNOFF    Date of Exam: 3/20/2023 6:41 PM EDT    Indication: bilateral lower extremity ischemia, assess for possible chest source of emboli.    Comparison: Chest x-ray 3/13/2023.. CT chest 8/12/2022.    Technique: CTA of the chest was performed after the uneventful intravenous administration of iodinated contrast. Reconstructed coronal and sagittal images were also obtained. In addition, a 3-D volume rendered image was created  for interpretation.   Automated exposure control and iterative reconstruction methods were used.     Findings:    VASCULATURE:  *No acute intramural hematoma, dissection, aneurysm or injury.  *No acute pulmonary emboli.  *Bilateral renal arteries, celiac artery, SMA, MARINA, and iliac arteries are patent.  *Bilateral common femoral arteries are patent.  *Bilateral superficial femoral arteries are patent.  *Right popliteal artery, trifurcation, anterior tibial, posterior tibial, and fibular arteries are patent.  *Left popliteal artery is patent.  *Proximal left trifurcation is patent.  *Remainder of the left leg arteries including anterior tibial, posterior tibial, fibular arteries demonstrate markedly diminutive flow with no flow in left foot.    CHEST:  *Lymphadenopathy: No thoracic lymphadenopathy.  *Lungs: Large right and trace left pleural effusions. Near complete atelectasis of right lower lobe. Bibasilar and right middle lobe opacities.  *Pneumothorax: None.  *Heart: Cardiomegaly with reflux of contrast and IVC and hepatic veins suggest right heart failure.  *  ABDOMEN/PELVIS:  *Liver: Unremarkable.  *Gallbladder: Gallbladder wall thickening. No radiopaque gallstones.  *Pancreas: Normal.  *Spleen: Normal.  *Adrenal Glands: Normal.  *Kidneys: No renal stones or hydronephrosis bilaterally.  *Stomach:Gastric wall thickening.  *Bowel: Nonobstructive bowel gas pattern. No bowel wall inflammation.  *Appendix: Appendectomy.   *Peritoneal Cavity: No pneumoperitoneum.  No lymphadenopathy. Mild ascites throughout abdomen and pelvis.  *Urinary Bladder: Unremarkable.  *Pelvis: No free pelvic fluid.  *  BONES:  *No acute fractures or dislocations. No osseous destructive lesions. Multilevel spondylosis. Generalized osteopenia.      Impression:      1.No acute aortic dissection, aneurysm or aortic injury. No acute pulmonary emboli.  2.Large right and trace left pleural effusions. Near complete atelectasis of right lower lobe.  Bibasilar and right middle lobe opacities may represent atelectasis or infiltrates. Follow-up recommended.  3.Cardiomegaly with reflux of contrast and IVC and hepatic veins suggest right heart failure.  4.Gallbladder wall thickening is a nonspecific finding can be seen in multiple inflammatory process including acute cholecystitis and volume overload. If there is clinical concern for acute cholecystitis, ultrasound can be obtained for further   evaluation.  5.Mild ascites throughout abdomen and pelvis.  6.Anasarca.  7.Proximal left trifurcation is patent. Remainder of the left leg arteries including anterior tibial, posterior tibial, fibular arteries demonstrate markedly diminutive flow with no flow in left foot.      Case discussed with nurse alin boo @ 3/20/2023 8:17 PM EDT.    Electronically Signed: Yoni Max    3/20/2023 8:18 PM EDT    Workstation ID: [id number]    CT Angio Abdominal Aorta Bilateral Iliofem Runoff [[id number]] Collected: 03/20/23 1958     Updated: 03/20/23 2020    Narrative:      CT ANGIOGRAM CHEST, CT ANGIO ABDOMINAL AORTA BILAT ILIOFEM RUNOFF    Date of Exam: 3/20/2023 6:41 PM EDT    Indication: bilateral lower extremity ischemia, assess for possible chest source of emboli.    Comparison: Chest x-ray 3/13/2023.. CT chest 8/12/2022.    Technique: CTA of the chest was performed after the uneventful intravenous administration of iodinated contrast. Reconstructed coronal and sagittal images were also obtained. In addition, a 3-D volume rendered image was created for interpretation.   Automated exposure control and iterative reconstruction methods were used.     Findings:    VASCULATURE:  *No acute intramural hematoma, dissection, aneurysm or injury.  *No acute pulmonary emboli.  *Bilateral renal arteries, celiac artery, SMA, MARINA, and iliac arteries are patent.  *Bilateral common femoral arteries are patent.  *Bilateral superficial femoral arteries are patent.  *Right popliteal artery,  trifurcation, anterior tibial, posterior tibial, and fibular arteries are patent.  *Left popliteal artery is patent.  *Proximal left trifurcation is patent.  *Remainder of the left leg arteries including anterior tibial, posterior tibial, fibular arteries demonstrate markedly diminutive flow with no flow in left foot.    CHEST:  *Lymphadenopathy: No thoracic lymphadenopathy.  *Lungs: Large right and trace left pleural effusions. Near complete atelectasis of right lower lobe. Bibasilar and right middle lobe opacities.  *Pneumothorax: None.  *Heart: Cardiomegaly with reflux of contrast and IVC and hepatic veins suggest right heart failure.  *  ABDOMEN/PELVIS:  *Liver: Unremarkable.  *Gallbladder: Gallbladder wall thickening. No radiopaque gallstones.  *Pancreas: Normal.  *Spleen: Normal.  *Adrenal Glands: Normal.  *Kidneys: No renal stones or hydronephrosis bilaterally.  *Stomach:Gastric wall thickening.  *Bowel: Nonobstructive bowel gas pattern. No bowel wall inflammation.  *Appendix: Appendectomy.   *Peritoneal Cavity: No pneumoperitoneum.  No lymphadenopathy. Mild ascites throughout abdomen and pelvis.  *Urinary Bladder: Unremarkable.  *Pelvis: No free pelvic fluid.  *  BONES:  *No acute fractures or dislocations. No osseous destructive lesions. Multilevel spondylosis. Generalized osteopenia.      Impression:      1.No acute aortic dissection, aneurysm or aortic injury. No acute pulmonary emboli.  2.Large right and trace left pleural effusions. Near complete atelectasis of right lower lobe. Bibasilar and right middle lobe opacities may represent atelectasis or infiltrates. Follow-up recommended.  3.Cardiomegaly with reflux of contrast and IVC and hepatic veins suggest right heart failure.  4.Gallbladder wall thickening is a nonspecific finding can be seen in multiple inflammatory process including acute cholecystitis and volume overload. If there is clinical concern for acute cholecystitis, ultrasound can be  obtained for further   evaluation.  5.Mild ascites throughout abdomen and pelvis.  6.Anasarca.  7.Proximal left trifurcation is patent. Remainder of the left leg arteries including anterior tibial, posterior tibial, fibular arteries demonstrate markedly diminutive flow with no flow in left foot.      Case discussed with nurse alin boo @ 3/20/2023 8:17 PM EDT.    Electronically Signed: Yoni Mansoor    3/20/2023 8:18 PM EDT    Workstation ID: [id number]          Lab Results:  Lab Results (last 24 hours)     Procedure Component Value Units Date/Time    aPTT [[id number]]  (Abnormal) Collected: 03/21/23 0528    Specimen: Blood from Arm, Right Updated: 03/21/23 0643     PTT 58.7 seconds     Blood Culture - Blood, Arm, Left [[id number]]  (Abnormal) Collected: 03/20/23 0921    Specimen: Blood from Arm, Left Updated: 03/21/23 0641     Blood Culture Abnormal Stain     Gram Stain Anaerobic Bottle Gram negative bacilli    Narrative:      Less than seven (7) mL's of blood was collected.  Insufficient quantity may yield false negative results.    Blood Culture ID, PCR - Blood, Arm, Left [[id number]] Collected: 03/20/23 0921    Specimen: Blood from Arm, Left Updated: 03/21/23 0639     BCID, PCR Negative by BCID PCR. Culture to Follow.     BOTTLE TYPE Anaerobic Bottle    Protime-INR [[id number]]  (Abnormal) Collected: 03/20/23 1902    Specimen: Blood Updated: 03/20/23 1959     Protime 13.1 Seconds      INR 1.29    aPTT [[id number]]  (Abnormal) Collected: 03/20/23 1902    Specimen: Blood Updated: 03/20/23 1959     .9 seconds     MRSA Screen, PCR (Inpatient) - Swab, Nares [[id number]]  (Normal) Collected: 03/20/23 1349    Specimen: Swab from Nares Updated: 03/20/23 1756     MRSA PCR No MRSA Detected    Narrative:      The negative predictive value of this diagnostic test is high and should only be used to consider de-escalating anti-MRSA therapy. A positive result may indicate colonization with MRSA and must be correlated  clinically.    POC Glucose Once [[id number]]  (Abnormal) Collected: 03/20/23 1747    Specimen: Blood Updated: 03/20/23 1748     Glucose 115 mg/dL      Comment: Serial Number: [id number]:  [id number]       POC Glucose Once [[id number]]  (Abnormal) Collected: 03/20/23 1617    Specimen: Blood Updated: 03/20/23 1618     Glucose 123 mg/dL      Comment: Serial Number: [id number]:  [id number]       Blood Culture - Blood, Hand, Right [[id number]] Collected: 03/20/23 1116    Specimen: Blood from Hand, Right Updated: 03/20/23 1124              Assessment & Plan       Cellulitis of right leg      Assessment & Plan    Chart and labs reviewed in detail.  Chest x-ray from today demonstrate improved right pleural effusion compared to previous imaging.  Loculated fluid within the fissures.  Patchy airspace disease to right lung.  No pneumothorax    Right pleural effusion: Likely heart failure etiology.  Plan for ultrasound-guided thoracentesis with studies today in Interventional radiology.  Heparin drip will need to be held for 90 min prior to thoracentesis.  Discussed with Dr. Watson.  Repeat chest x-ray in a.m.    Heart failure with cardiomyopathy: Patient follows with cardiology but reportedly has not been compliant with her diuretic.  Cardiology has been consulted.    Bilateral lower extremity swelling with erythema: Continue heparin drip per vascular surgery.  No plan for surgical intervention.  Discussed with Dr. Peter and Minal GRIJALVA.     Right lower extremity cellulitis: Antibiotics (vancomycin and cefepime) initiated and infectious disease has been consulted.    Cyanosis of bilateral upper extremity digits: Plan for wrist brachial indices to obtain baseline.    I discussed the patients findings and our recommendations with patient, nursing staff and Dr. Moody as well as Dr. Peter of vascular surgery and Dr. Watson of interventional radiology.    Thank you for this consult and allowing us to  participate in the care of your patient.  We will follow along with you during this hospitalization.       Montserrat Humphries DNP, APRN  [facility]  03/21/23  10:10 EDT    Greater than 76 minutes was present reviewing the patient's chart, radiographic imaging, labs, evaluating the patient and developing a plan of care which was discussed with the patient and Dr. Moody.

## 2023-03-20 NOTE — CASE MANAGEMENT/SOCIAL WORK
Discharge Planning Assessment  [facility]     Patient Name: Jaquelin Valderrama  MRN: [id number]  Today's Date: 3/20/2023    Admit Date: 3/20/2023    Plan: Home with , Watch for Oxygen needs andd IV ATB   Discharge Needs Assessment     Row Name 03/20/23 1143       Living Environment    People in Home spouse    Current Living Arrangements home    Potentially Unsafe Housing Conditions none    Primary Care Provided by self    Provides Primary Care For no one       Resource/Environmental Concerns    Resource/Environmental Concerns none    Transportation Concerns none       Food Insecurity    Within the past 12 months, you worried that your food would run out before you got the money to buy more. Never true    Within the past 12 months, the food you bought just didn't last and you didn't have money to get more. Never true       Transition Planning    Patient/Family Anticipates Transition to home with family    Patient/Family Anticipated Services at Transition none    Transportation Anticipated family or friend will provide       Discharge Needs Assessment    Readmission Within the Last 30 Days no previous admission in last 30 days    Equipment Currently Used at Home wheelchair    Concerns to be Addressed denies needs/concerns at this time    Anticipated Changes Related to Illness none    Equipment Needed After Discharge none    Discharge Facility/Level of Care Needs --  pending clinical Course               Discharge Plan     Row Name 03/20/23 1143       Plan    Plan Home with , Watch for Oxygen needs andd IV ATB    Plan Comments Met with Patient and  at bedside. Lives at home with  who is her primary caregiver and provides transportation for patient. PCP and PHarmacy verified, able to afford medications. Per  Plan for patient is to return home with him at [place]. If oxygen is needed they are agreeable with [place]. D/C ABrriers: Thoracic Surgery KENDRA ponce ATB              Continued Care  and Services - Admitted Since 3/20/2023    Coordination has not been started for this encounter.          Demographic Summary     Row Name 03/20/23 1142       General Information    Admission Type other (see comments)  pending status order    Arrived From emergency department    Referral Source admission list    Reason for Consult discharge planning    Preferred Language English               Functional Status     Row Name 03/20/23 1142       Functional Status    Usual Activity Tolerance fair    Current Activity Tolerance fair       Functional Status, IADL    Medications assistive person    Meal Preparation assistive person    Housekeeping assistive person    Laundry assistive person    Shopping assistive person       Mental Status    General Appearance WDL WDL       Mental Status Summary    Recent Changes in Mental Status/Cognitive Functioning no changes              Met with patient at bedside wearing mask and goggles, Spent less than 15 minutes in room at greater than 6 feet distance.       Elina Barrios RN

## 2023-03-20 NOTE — PROGRESS NOTES
Unable to see patient today since she was down having vascular studies.  We we will attempt to see the patient tomorrow.  She appears to be scheduled for right-sided thoracentesis tomorrow also

## 2023-03-20 NOTE — Clinical Note
Level of Care: Med/Surg [1]   Admitting Physician: PRIYANKA SANTAMARIA [[id number]]   Attending Physician: PIRYANKA SANTAMARIA [[id number]]

## 2023-03-20 NOTE — PROGRESS NOTES
Consult for thoracic surgery received for bilateral pleural effusions, right greater than left.  Patient is status post left-sided VATS decortication by Dr. Kuo in March 2022.  It appears she has a right-sided effusion on chest x-ray.    Patient in vascular lab during rounds today.  A right-sided thoracentesis has been ordered and scheduled for tomorrow.  We will follow-up with a chest x-ray after procedure.  Formal consult to follow.

## 2023-03-20 NOTE — ED NOTES
"Nursing report ED to floor  Jaquelin Valderrama  83 y.o.  female    HPI:   Chief Complaint   Patient presents with   • Foot Swelling     Bilateral feet swelling, painful, warm to touch on right leg.         Admitting doctor:   Lily Nesbitt MD    Admitting diagnosis:   The encounter diagnosis was Cellulitis of right leg.    Code status:   Current Code Status     Date Active Code Status Order ID Comments User Context       Prior          Allergies:   Prednisone    Isolation:  No active isolations     Fall Risk:  Fall Risk Assessment was completed, and patient is at high risk for falls.   Predictive Model Details         30 (Low) Factor Value    Calculated 3/20/2023 10:12 Age 83    Risk of Fall Model Musculoskeletal Assessment WDL     Active Peripheral IV Present     Respiratory Rate 18     Skin Assessment WDL     Magnesium not on file     Financial Class Medicare     Albumin 3 g/dL     Drug Use No     Tobacco Use Quit     Diastolic BP 71     Ady Scale not on file     Total Bilirubin 1.4 mg/dL     Number of Distinct Medication Classes administered 2     Peripheral Vascular Assessment WDL     Chloride 100 mmol/L     Calcium 9 mg/dL     ALT 44 U/L     Gastrointestinal Assessment WDL     Number of administrations of Analgesic Narcotics 1     Cardiac Assessment WDL     Creatinine 0.92 mg/dL     Potassium 4.2 mmol/L     Days after Admission 0.055         Weight:       03/20/23  0850   Weight: 67.9 kg (149 lb 11.1 oz)       Intake and Output  No intake or output data in the 24 hours ending 03/20/23 1118    Diet:        Most recent vitals:   Vitals:    03/20/23 0850 03/20/23 0852 03/20/23 1001 03/20/23 1018   BP:  131/71 137/90    Pulse:  96 87    Resp:  18     Temp:  97.6 °F (36.4 °C)     TempSrc:  Temporal     SpO2:  92% 94% (!) 3%   Weight: 67.9 kg (149 lb 11.1 oz)      Height: 160 cm (63\")          Active LDAs/IV Access:   Lines, Drains & Airways     Active LDAs     Name Placement date Placement time Site Days    " Peripheral IV 06/10/22 0006 Right;Anterior Forearm 06/10/22  0006  Forearm  283                Skin Condition:   Skin Assessments (last day)     None           Labs (abnormal labs have a star):   Labs Reviewed   COMPREHENSIVE METABOLIC PANEL - Abnormal; Notable for the following components:       Result Value    Glucose 115 (*)     BUN 41 (*)     Albumin 3.0 (*)     ALT (SGPT) 44 (*)     AST (SGOT) 54 (*)     Alkaline Phosphatase 204 (*)     Total Bilirubin 1.4 (*)     BUN/Creatinine Ratio 44.6 (*)     All other components within normal limits    Narrative:     GFR Normal >60  Chronic Kidney Disease <60  Kidney Failure <15    The GFR formula is only valid for adults with stable renal function between ages 18 and 70.   CBC WITH AUTO DIFFERENTIAL - Abnormal; Notable for the following components:    WBC 11.10 (*)     RBC 5.46 (*)     Hemoglobin 17.5 (*)     Hematocrit 55.3 (*)     .3 (*)     RDW 16.0 (*)     RDW-SD 56.9 (*)     Neutrophil % 90.2 (*)     Lymphocyte % 4.0 (*)     Eosinophil % 0.1 (*)     Neutrophils, Absolute 10.00 (*)     Lymphocytes, Absolute 0.40 (*)     All other components within normal limits   CK - Normal   BLOOD CULTURE   BLOOD CULTURE   CBC AND DIFFERENTIAL    Narrative:     The following orders were created for panel order CBC & Differential.  Procedure                               Abnormality         Status                     ---------                               -----------         ------                     CBC Auto Differential[[id number]]        Abnormal            Final result                 Please view results for these tests on the individual orders.       LOC: Person, Place, Time and Situation    Telemetry:  Med/Surg    Cardiac Monitoring Ordered: yes    EKG:   No orders to display       Medications Given in the ED:   Medications   sodium chloride 0.9 % flush 10 mL (has no administration in time range)   cefepime 2 gm IVPB in 100 ml NS (MBP) (has no administration in time  range)   Vancomycin HCl 1,250 mg in sodium chloride 0.9 % 250 mL IVPB (has no administration in time range)   morphine injection 2 mg (2 mg Intravenous Given 3/20/23 0959)   ondansetron (ZOFRAN) injection 4 mg (4 mg Intravenous Given 3/20/23 0959)       Imaging results:  No radiology results for the last day    Social issues:   Social History     Socioeconomic History   • Marital status:    Tobacco Use   • Smoking status: Former     Packs/day: 1.50     Types: Cigarettes     Quit date:      Years since quittin.2     Passive exposure: Past   • Smokeless tobacco: Never   • Tobacco comments:     CAFFEINE USE ICED TEA, OCCAS COFFEE   Vaping Use   • Vaping Use: Never used   Substance and Sexual Activity   • Alcohol use: Not Currently   • Drug use: No   • Sexual activity: Defer       NIH Stroke Scale:  Interval: (not recorded)  1a. Level of Consciousness: (not recorded)  1b. LOC Questions: (not recorded)  1c. LOC Commands: (not recorded)  2. Best Gaze: (not recorded)  3. Visual: (not recorded)  4. Facial Palsy: (not recorded)  5a. Motor Arm, Left: (not recorded)  5b. Motor Arm, Right: (not recorded)  6a. Motor Leg, Left: (not recorded)  6b. Motor Leg, Right: (not recorded)  7. Limb Ataxia: (not recorded)  8. Sensory: (not recorded)  9. Best Language: (not recorded)  10. Dysarthria: (not recorded)  11. Extinction and Inattention (formerly Neglect): (not recorded)    Total (NIH Stroke Scale): (not recorded)     Additional notable assessment information:     Nursing report ED to floor:  MARICHUY Artis RN   23 11:18 EDT

## 2023-03-20 NOTE — H&P
[facility] Medicine Services  History & Physical    Patient Name: Jaquelin Valderrama  : 1940  MRN: [id number]  Primary Care Physician:  Minerva Chatterjee MD  Date of admission: 3/20/2023  Date and Time of Service:     Subjective      Chief Complaint: RLE swelling and redness/pain     History of Present Illness: Jaquelin Valderrama is a 83 y.o. female who presented to [facility] on 3/20/2023 complaining of Patient is an 83-year-old female with history of hypertension, dyslipidemia, hypothyroidism, diabetes, CAD status post CABG's status post stent, persistent A-fib on Eliquis, chronic systolic heart failure with ejection fraction 30% who presented emergency room with chief complaint of right lower extremity swelling and redness and tenderness.  She says chronically her both her feet look purple but the right one was looking worse.  She has been following up with thoracic surgery underwent decortication and has been dealing with pleural effusions and was taken off Eliquis her last dose was Saturday night for planned thoracentesis.  She has been dealing with worsening lower extremity edema and her Lasix was increased to 60 mg daily by her cardiologist.  She also has been complaining of shortness of breath but denies any chest pain or lightheadedness.  Has also been complaining of some sweats and chills.  From the edema in her right foot she had an open weeping sore that is currently eschared.    In the emergency room patient's blood pressure was 131/71 with heart rate of 96 temperature is 97.6.  Labs showed mild leukocytosis of 11.10.  Hemoglobin is elevated at 17.5.  Patient had chest x-ray which showed moderate right and small left pleural effusion bibasilar opacities with right greater than left basilar consolidation could be due to low pneumonia atelectasis or infiltrate.  Duplex venous was negative for DVT.  Arterial duplex showed severe digital ischemia.  Patient will be admitted for further  work-up and management          Personal History     Past Medical History:   Diagnosis Date   • Astigmatism, bilateral 11/19/2019   • Benign essential hypertension    • Bilateral presbyopia 11/19/2019   • CAD (coronary artery disease)    • CHF (congestive heart failure) ([facility])    • Closed right ankle fracture    • COVID-19 vaccination refused    • Hyperlipidemia    • Hypothyroidism    • Influenza vaccine needed    • Myocardial infarction (HCC)    • Prediabetes    • Pseudophakia, both eyes 11/19/2019   • Sleep apnea    • Thoracic back pain        Past Surgical History:   Procedure Laterality Date   • APPENDECTOMY     • CARDIAC CATHETERIZATION  2012    x3    • CORONARY ARTERY BYPASS GRAFT  12/2014   • CORONARY STENT PLACEMENT      x3   • CYSTOSCOPY WITH CLOT EVACUATION N/A 3/25/2022    Procedure: CYSTOSCOPY WITH CLOT EVACUATION AND FULGURATION ;  Surgeon: Sukhdev Poole MD;  Location: [facility];  Service: Urology;  Laterality: N/A;   • HARDWARE REMOVAL Right 7/21/2020    Procedure: ANKLE/FOOT HARDWARE REMOVAL;  Surgeon: Lincoln Sibley MD;  Location: [facility];  Service: Orthopedics;  Laterality: Right;   • [facility]   • THORACOSCOPY WITH DECORTICATION Left 3/18/2022    Procedure: LEFT THORACOSCOPY VIDEO ASSISTED WITH COMPLETE DECORTICATION;  Surgeon: Sabra Kuo MD;  Location: [facility];  Service: Thoracic;  Laterality: Left;       Family History: family history includes Diabetes in her brother and another family member; Heart disease in her mother; Hypertension in her mother; Lung cancer in her father; Stroke in her mother. Otherwise pertinent FHx was reviewed and not pertinent to current issue.    Social History:  reports that she quit smoking about 43 years ago. Her smoking use included cigarettes. She smoked an average of 1.5 packs per day. She has been exposed to tobacco smoke. She has never used smokeless tobacco. She reports that she  does not currently use alcohol. She reports that she does not use drugs.    Home Medications:  Prior to Admission Medications     Prescriptions Last Dose Informant Patient Reported? Taking?    apixaban (ELIQUIS) 5 MG tablet tablet Past Week  Yes Yes    Take 1 tablet by mouth Daily. Only QD per Cardiologist    Cholecalciferol (VITAMIN D3) 2000 units tablet 3/20/2023  Yes Yes    Take 1 tablet by mouth Daily.    furosemide (LASIX) 20 MG tablet 3/20/2023  No Yes    Take 3 tablets by mouth Daily.    levothyroxine (Synthroid) 100 MCG tablet 3/20/2023  No Yes    Take 1 tablet by mouth Daily.    metoprolol succinate XL (TOPROL-XL) 25 MG 24 hr tablet 3/20/2023  No Yes    Take 1 tablet by mouth twice daily    Multiple Vitamins-Minerals (MULTIVITAMIN ADULT EXTRA C PO) 3/20/2023  Yes Yes    Take 1 tablet by mouth Daily.    mupirocin (BACTROBAN) 2 % ointment 3/20/2023  No Yes    Apply 1 application topically to the appropriate area as directed 3 (Three) Times a Day. Rash on both feet    spironolactone (ALDACTONE) 25 MG tablet 3/20/2023  No Yes    Take 1 tablet by mouth Daily.    acetaminophen (TYLENOL) 500 MG tablet Past Week  Yes Yes    Take 1 tablet by mouth Every 6 (Six) Hours As Needed for Mild Pain.    insulin aspart prot-insulin aspart (novoLOG 70/30) (70-30) 100 UNIT/ML injection Past Week  Yes Yes    Inject 10 Units under the skin into the appropriate area as directed 2 (Two) Times a Day As Needed (elevated blood sugar).    Krill Oil 300 MG capsule 3/20/2023  Yes Yes    Take 1 capsule by mouth Daily.            Allergies:  Allergies   Allergen Reactions   • Prednisone Other (See Comments)     Increased BP       Objective      Vitals:   Temp:  [97.3 °F (36.3 °C)-97.6 °F (36.4 °C)] 97.3 °F (36.3 °C)  Heart Rate:  [86-96] 87  Resp:  [18-22] 22  BP: (119-137)/(71-90) 119/73  Flow (L/min):  [3] 3    Physical Exam   General: No acute distress  HEENT: neck supple, normal oral mucosa, no masses, no lymphadenopathy. + JVD  Lungs:  diminished breath sounds right base. No crackles, No Rhonchi. Equal excursions.   CV - Normal S1/S2, no murmur, Iregular rhythm, rate controlled   Abdomin - Soft, non-tender, non-distended, normal bowel sounds  Extremities - bilateral LE pitting edema with some wrinkles present  Neuro - No focal weakness, normal sensation  Psych - Alert and oriented x3  Skin - Right LE erythema and tenderness and warmth. Bilateral feet cold to touch. Right foot discoloration >L       Result Review    Result Review:  I have personally reviewed the results from the time of this admission to 3/20/2023 14:37 EDT and agree with these findings:  [x]  Laboratory  [x]  Microbiology  [x]  Radiology  [x]  EKG/Telemetry   [x]  Cardiology/Vascular   []  Pathology  [x]  Old records  []  Other:  Most notable findings include: *      Assessment & Plan        Active Hospital Problems:  Active Hospital Problems    Diagnosis    • **Cellulitis of right leg      Plan:     RLE Cellulitis  - s/p vanc and cefepime in ED  - Will consult Cefazolin for now.   - MRSA screen. If possible will consider continuing Vanc vs Doxy  - monitor response. Follow blood cultures    Right foot discoloration  - concern for acute ischemia in setting of a fib and holding eliquis  - Vascular consulted. Heparin gtt started  - CT pending.     Acute on chronic systolic heart failure with low output state  - Echo 8/2022 with EF 30% - will repeat  - Will give Lasix IV and monitor  - not in ACEI/Arb, unsure why. Hold Aldactone for now   - Cardiology consult. Needs Optimization of her HF meds.     Pleural effusion R>L  - h/o Decortication  - Thoracic surgeon consulted     Persistent A fib  - Continue Metoprolol  - Heparin gtt    CAD s/p CABG  - Continue Statin and BB  - Defer Antiplatelet to Cardiology    DM-2 - SSI. Monitor    Erythrocytosis - check B12 and folate and TSH  - Need OP follow up with Hematology     Hypothyroidism - Levothyroxine     DVT prophylaxis:  Medical DVT  prophylaxis orders are present.    CODE STATUS:       Admission Status:  I believe this patient meets inpatient  status.    I discussed the patient's findings and my recommendations with .    This patient has been  and discussed with  03/20/23      Signature: Electronically signed by Lily Nesbitt MD, 03/20/23, 14:37 EDT.  [facility]ist Team

## 2023-03-20 NOTE — CONSULTS
Name: Jaquelin Valderrama ADMIT: 3/20/2023   : 1940  PCP: Minerva Chatterjee MD    MRN: [id number] LOS: 0 days   AGE/SEX: 83 y.o. female  ROOM: [address] [place] [address]      Patient Care Team:  Minerva Chatterjee MD as PCP - General (Internal Medicine)  Yordan vEans MD as Consulting Physician (Cardiology)  Chief Complaint   Patient presents with   • Foot Swelling     Bilateral feet swelling, painful, warm to touch on right leg.       CC: cellulitis, BLE ischemia     Subjective     Inpatient Vascular Surgery Consult  Consult performed by: Minal Vidal APRN  Consult ordered by: Lily Nesbitt MD  Reason for consult: cellulitis, BLE ischemia        History of Present Illness   Patient is an 83-year-old female with history of hypertension, hyperlipidemia, CAD/CABG/stents, insulin-dependent diabetes mellitus, A. Fib on Eliquis, and congestive heart failure who presented to the hospital today with complaints of worsening bilateral lower extremity edema as well as RLE erythema and bilateral toe discoloration. Patient underwent video-assisted thoracoscopy with complete  decortication in 3/2022 and patient and  report gradual decline in health since this time.  Patient was previously walking up to 3.5 miles several times per week prior to this procedure last year, but has since been struggling with general weakness, intermittent shortness of breath, and fatigue.  Her  reports that over the past 2-3 months, she has had even further decline in her ambulatory status and now only ambulates very short distances around her house, otherwise uses a wheelchair to get around.  She has been struggling with BLE edema for the past year but edema has been worse for the past week and they have noticed weeping of fluid from her right lower leg.  Over the past 2 days her right lower leg from the knee down has been erythematous and they have gradually noticed bluish discoloration of the toes of both of her feet, right  worse than left as well.  She has mild pain in her feet at rest and this is worse when she tries to ambulate.  She recently saw thoracic surgery who was planning thoracentesis for pleural effusions this coming Wednesday.  Eliquis has been on hold since Sunday for this upcoming procedure. Motor and sensory function of her lower extremities are intact and she has good capillary refill.  Of note, patient also struggles with abdominal pain after she eats.  She has not had any major unintentional weight loss.    BLE venous duplex negative for DVT.  ABIs performed today are 1.06 on the right and 1.1 on the left with severe digital ischemia bilaterally with toe pressures 0.    Review of Systems   Constitutional: Positive for fatigue. Negative for chills and fever.   Respiratory: Positive for shortness of breath. Negative for cough.    Cardiovascular: Positive for leg swelling. Negative for chest pain.   Musculoskeletal: Positive for gait problem.   Skin: Positive for color change.   Neurological: Positive for weakness. Negative for dizziness, facial asymmetry and numbness.   Psychiatric/Behavioral: Negative for agitation and confusion.     Past Medical History:   Diagnosis Date   • Astigmatism, bilateral 11/19/2019   • Benign essential hypertension    • Bilateral presbyopia 11/19/2019   • CAD (coronary artery disease)    • CHF (congestive heart failure) (HCC)    • Closed right ankle fracture    • COVID-19 vaccination refused    • Hyperlipidemia    • Hypothyroidism    • Influenza vaccine needed    • Myocardial infarction (HCC)    • Prediabetes    • Pseudophakia, both eyes 11/19/2019   • Sleep apnea    • Thoracic back pain      Past Surgical History:   Procedure Laterality Date   • APPENDECTOMY     • CARDIAC CATHETERIZATION  2012    x3    • CORONARY ARTERY BYPASS GRAFT  12/2014   • CORONARY STENT PLACEMENT      x3   • CYSTOSCOPY WITH CLOT EVACUATION N/A 3/25/2022    Procedure: CYSTOSCOPY WITH CLOT EVACUATION AND FULGURATION  ;  Surgeon: Sukhdev Poole MD;  Location: [facility] MAIN OR;  Service: Urology;  Laterality: N/A;   • HARDWARE REMOVAL Right 2020    Procedure: ANKLE/FOOT HARDWARE REMOVAL;  Surgeon: Lincoln Sibley MD;  Location: [facility] MAIN OR;  Service: Orthopedics;  Laterality: Right;   • ORIF ANKLE FRACTURE Right 2019    Abelen- George Mem   • THORACOSCOPY WITH DECORTICATION Left 3/18/2022    Procedure: LEFT THORACOSCOPY VIDEO ASSISTED WITH COMPLETE DECORTICATION;  Surgeon: Sabra Kuo MD;  Location: [facility] MAIN OR;  Service: Thoracic;  Laterality: Left;     Family History   Problem Relation Age of Onset   • Hypertension Mother    • Stroke Mother    • Heart disease Mother    • Lung cancer Father    • Diabetes Brother    • Diabetes Other      Social History     Tobacco Use   • Smoking status: Former     Packs/day: 1.50     Types: Cigarettes     Quit date:      Years since quittin.2     Passive exposure: Past   • Smokeless tobacco: Never   • Tobacco comments:     CAFFEINE USE ICED TEA, OCCAS COFFEE   Vaping Use   • Vaping Use: Never used   Substance Use Topics   • Alcohol use: Not Currently   • Drug use: No     Medications Prior to Admission   Medication Sig Dispense Refill Last Dose   • acetaminophen (TYLENOL) 500 MG tablet Take 1 tablet by mouth Every 6 (Six) Hours As Needed for Mild Pain.   Past Week   • apixaban (ELIQUIS) 5 MG tablet tablet Take 1 tablet by mouth Daily. Only QD per Cardiologist   Past Week   • Cholecalciferol (VITAMIN D3) 2000 units tablet Take 1 tablet by mouth Daily.   3/20/2023   • furosemide (LASIX) 20 MG tablet Take 3 tablets by mouth Daily. 270 tablet 1 3/20/2023   • insulin aspart prot-insulin aspart (novoLOG 70/30) (70-30) 100 UNIT/ML injection Inject 10 Units under the skin into the appropriate area as directed 2 (Two) Times a Day As Needed (elevated blood sugar).   Past Week   • Krill Oil 300 MG capsule Take 1 capsule by mouth Daily.   3/20/2023   • levothyroxine  (Synthroid) 100 MCG tablet Take 1 tablet by mouth Daily. 90 tablet 1 3/20/2023   • metoprolol succinate XL (TOPROL-XL) 25 MG 24 hr tablet Take 1 tablet by mouth twice daily 180 tablet 3 3/20/2023   • Multiple Vitamins-Minerals (MULTIVITAMIN ADULT EXTRA C PO) Take 1 tablet by mouth Daily.   3/20/2023   • mupirocin (BACTROBAN) 2 % ointment Apply 1 application topically to the appropriate area as directed 3 (Three) Times a Day. Rash on both feet 30 g 1 3/20/2023   • spironolactone (ALDACTONE) 25 MG tablet Take 1 tablet by mouth Daily. 90 tablet 3 3/20/2023     ceFAZolin, 2 g, Intravenous, Q8H  furosemide, 40 mg, Intravenous, Once  heparin, 80 Units/kg, Intravenous, Once  [START ON 3/21/2023] levothyroxine, 100 mcg, Oral, Q AM  metoprolol succinate XL, 25 mg, Oral, BID  sodium chloride, 10 mL, Intravenous, Q12H      heparin, 18 Units/kg/hr      •  acetaminophen  •  senna-docusate sodium **AND** polyethylene glycol **AND** bisacodyl **AND** bisacodyl  •  heparin  •  heparin  •  HYDROcodone-acetaminophen  •  melatonin  •  ondansetron  •  [COMPLETED] Insert Peripheral IV **AND** sodium chloride  •  sodium chloride  •  sodium chloride  Prednisone    Objective  resting in bed, no acute distress, has been at bedside    Physical Exam:  Physical Exam  Vitals and nursing note reviewed.   Constitutional:       General: She is not in acute distress.  HENT:      Head: Normocephalic.   Eyes:      Extraocular Movements: Extraocular movements intact.   Cardiovascular:      Rate and Rhythm: Normal rate.      Pulses:           Dorsalis pedis pulses are detected w/ Doppler on the left side.        Posterior tibial pulses are detected w/ Doppler on the right side and detected w/ Doppler on the left side.      Comments: Palpable femoral pulses.Doppler signals to left DP and PT and right PT in peroneal signals present.  Bilateral radial, brachial, and ulnar doppler signals present.  Abdominal:      Palpations: Abdomen is soft.  "  Musculoskeletal:      Right lower leg: Edema present.      Left lower leg: Edema present.   Skin:     Capillary Refill: Capillary refill takes 2 to 3 seconds.      Comments: Diagnosis to distal fingertips on both hands.  Erythema, edema, and warmth of right lower extremity consistent with cellulitis. Cyanosis noted to bilateral feet, right >  Left.  She has good capillary refill.  Motor and sensory function intact. Scar noted from prior left leg GSV harvest. Multiple scars to right ankle following extensive orthopedic surgeries.   Neurological:      General: No focal deficit present.      Mental Status: She is alert and oriented to person, place, and time. Mental status is at baseline.   Psychiatric:         Mood and Affect: Mood normal.         Behavior: Behavior normal.         Thought Content: Thought content normal.              Vital Signs and Labs:  Vital Signs Patient Vitals for the past 24 hrs:   BP Temp Temp src Pulse Resp SpO2 Height Weight   03/20/23 1230 119/73 97.3 °F (36.3 °C) Axillary 87 22 94 % -- --   03/20/23 1116 136/90 -- -- 86 -- 90 % -- --   03/20/23 1018 -- -- -- -- -- (!) 3 % -- --   03/20/23 1001 137/90 -- -- 87 -- 94 % -- --   03/20/23 0852 131/71 97.6 °F (36.4 °C) Temporal 96 18 92 % -- --   03/20/23 0850 -- -- -- -- -- -- 160 cm (63\") 67.9 kg (149 lb 11.1 oz)     I/O:  No intake/output data recorded.    CBC    Results from last 7 days   Lab Units 03/20/23  0915   WBC 10*3/mm3 11.10*   HEMOGLOBIN g/dL 17.5*   PLATELETS 10*3/mm3 180     BMP   Results from last 7 days   Lab Units 03/20/23  0915   SODIUM mmol/L 138   POTASSIUM mmol/L 4.2   CHLORIDE mmol/L 100   CO2 mmol/L 26.0   BUN mg/dL 41*   CREATININE mg/dL 0.92   GLUCOSE mg/dL 115*     Coag     HbA1C   Lab Results   Component Value Date    HGBA1C 9.20 (H) 03/06/2023    HGBA1C 9.40 (H) 01/10/2023    HGBA1C 7.1 (H) 03/03/2022     Infection     Radiology(recent) No radiology results for the last day    Active Hospital Problems    " Diagnosis  POA   • **Cellulitis of right leg [L03.115]  Yes     Priority: Low      Resolved Hospital Problems   No resolved problems to display.     [email or web address]  [id number]    Assessment & Plan       Cellulitis of right leg      83 y.o. female with history of HTN,  HLD, CAD/CABG/stents, IDDM, A. Fib on Eliquis, and CHF who presented to the hospital today with complaints of worsening BLE extremity edema as well as RLE erythema and bilateral toe discoloration. Patient underwent video-assisted thoracoscopy with complete  decortication in 3/2022 and patient and  report gradual decline in health since this time.  Over the past 2-3 months, she has only been able to ambulate very short distances around her house, otherwise uses a wheelchair to get around.  She was recently seen by thoracic surgery with increasing shortness of breath and was planned to have thoracentesis this coming Wednesday.  Her Eliquis has been held since Sunday in preparation for this procedure. Over the past 2 days patient has experienced bluish discoloration of the toes of both of her feet, right worse than left along with RLE erythema and warmth.   On exam, BLE motor and sensory function intact.  She has RLE edema and erythema consistent with cellulitis along with ischemia of bilateral feet, R>L. Motor and sensory function are intact and she has good capillary refill.  She is also noted to have cyanosis of several of her fingertips as well.  We were able to Doppler left PT and DP signals as well as right PT and peroneal.  Bilateral ulnar, radial, and brachial signals present.   BLE venous duplex negative for DVT.  ABIs performed today are 1.06 on the right and 1.1 on the left with severe digital ischemia bilaterally with toe pressures 0.    -Given patient's normal ABIs with toe pressure is 0 bilaterally along with evidence of bilateral foot ischemia, R>L, embolization may have occured. Eliquis has been held since Sunday for upcoming  thoracentesis.  We are checking CTA with runoff for further evaluation of peripheral vasculature along with CTA of the chest to assess for central source.  Will consult cardiology given her significant history of CHF, cardiomyopathy, and A. Fib.  Echocardiogram has already been ordered.  Begin heparin drip. Add statin. Thoracic surgery consult pending.   -In regards to cyanosis of BUE digits, will also check wrist brachial indices to obtain baseline. This may also be secondary to severe heart failure.    -She is on vancomycin and cefepime for RLE cellulitis.  Will consult ID.  -No immediate plans for any vascular surgery intervention at this time. Further plans to follow imaging studies. Okay for diet today from our standpoint.     I discussed the patients findings and my recommendations with patient, family and nursing staff.    Minal Vidal, APRN  03/20/23  13:54 EDT    Please call my office with any question: [phone]

## 2023-03-21 ENCOUNTER — APPOINTMENT (OUTPATIENT)
Dept: CARDIOLOGY | Facility: HOSPITAL | Age: 83
DRG: 291 | End: 2023-03-21
Payer: MEDICARE

## 2023-03-21 ENCOUNTER — APPOINTMENT (OUTPATIENT)
Dept: INTERVENTIONAL RADIOLOGY/VASCULAR | Facility: HOSPITAL | Age: 83
DRG: 291 | End: 2023-03-21
Payer: MEDICARE

## 2023-03-21 ENCOUNTER — APPOINTMENT (OUTPATIENT)
Dept: GENERAL RADIOLOGY | Facility: HOSPITAL | Age: 83
DRG: 291 | End: 2023-03-21
Payer: MEDICARE

## 2023-03-21 LAB
APTT PPP: 30.7 SECONDS (ref 61–76.5)
APTT PPP: 58.7 SECONDS (ref 61–76.5)
BACTERIA BLD CULT: NORMAL
BACTERIA UR QL AUTO: ABNORMAL /HPF
BH CV ECHO MEAS - ACS: 1.46 CM
BH CV ECHO MEAS - AO MAX PG: 3.6 MMHG
BH CV ECHO MEAS - AO MEAN PG: 2.1 MMHG
BH CV ECHO MEAS - AO ROOT DIAM: 2.9 CM
BH CV ECHO MEAS - AO V2 MAX: 94.7 CM/SEC
BH CV ECHO MEAS - AO V2 VTI: 14.6 CM
BH CV ECHO MEAS - AVA(I,D): 2.07 CM2
BH CV ECHO MEAS - EDV(CUBED): 100.4 ML
BH CV ECHO MEAS - EDV(MOD-SP4): 94.5 ML
BH CV ECHO MEAS - EF(MOD-SP4): 23.1 %
BH CV ECHO MEAS - ESV(CUBED): 83.1 ML
BH CV ECHO MEAS - ESV(MOD-SP4): 72.7 ML
BH CV ECHO MEAS - FS: 6.1 %
BH CV ECHO MEAS - IVS/LVPW: 1.14 CM
BH CV ECHO MEAS - IVSD: 1.26 CM
BH CV ECHO MEAS - LA DIMENSION: 5.4 CM
BH CV ECHO MEAS - LV DIASTOLIC VOL/BSA (35-75): 55.4 CM2
BH CV ECHO MEAS - LV MASS(C)D: 203 GRAMS
BH CV ECHO MEAS - LV MAX PG: 2.5 MMHG
BH CV ECHO MEAS - LV MEAN PG: 1.51 MMHG
BH CV ECHO MEAS - LV SYSTOLIC VOL/BSA (12-30): 42.6 CM2
BH CV ECHO MEAS - LV V1 MAX: 79.7 CM/SEC
BH CV ECHO MEAS - LV V1 VTI: 11.7 CM
BH CV ECHO MEAS - LVIDD: 4.6 CM
BH CV ECHO MEAS - LVIDS: 4.4 CM
BH CV ECHO MEAS - LVOT AREA: 2.6 CM2
BH CV ECHO MEAS - LVOT DIAM: 1.82 CM
BH CV ECHO MEAS - LVPWD: 1.1 CM
BH CV ECHO MEAS - MR MAX PG: 85.3 MMHG
BH CV ECHO MEAS - MR MAX VEL: 461.7 CM/SEC
BH CV ECHO MEAS - MV A MAX VEL: 0.44 CM/SEC
BH CV ECHO MEAS - MV DEC SLOPE: 387.8 CM/SEC2
BH CV ECHO MEAS - MV DEC TIME: 0.16 MSEC
BH CV ECHO MEAS - MV E MAX VEL: 62.6 CM/SEC
BH CV ECHO MEAS - MV E/A: 143.3
BH CV ECHO MEAS - MV MAX PG: 2.12 MMHG
BH CV ECHO MEAS - MV MEAN PG: 0.76 MMHG
BH CV ECHO MEAS - MV V2 VTI: 19.5 CM
BH CV ECHO MEAS - MVA(VTI): 1.55 CM2
BH CV ECHO MEAS - PA ACC TIME: 0.05 SEC
BH CV ECHO MEAS - PA PR(ACCEL): 57.2 MMHG
BH CV ECHO MEAS - PA V2 MAX: 53.3 CM/SEC
BH CV ECHO MEAS - RAP SYSTOLE: 15 MMHG
BH CV ECHO MEAS - RV MAX PG: 0.31 MMHG
BH CV ECHO MEAS - RV V1 MAX: 27.9 CM/SEC
BH CV ECHO MEAS - RV V1 VTI: 4.2 CM
BH CV ECHO MEAS - RVDD: 3 CM
BH CV ECHO MEAS - RVSP: 30.1 MMHG
BH CV ECHO MEAS - SI(MOD-SP4): 12.8 ML/M2
BH CV ECHO MEAS - SV(LVOT): 30.3 ML
BH CV ECHO MEAS - SV(MOD-SP4): 21.8 ML
BH CV ECHO MEAS - TR MAX PG: 15.1 MMHG
BH CV ECHO MEAS - TR MAX VEL: 194.2 CM/SEC
BH CV LEA LEFT ANT TIBIAL A MID PSV: -49.9 CM/S
BH CV LEA LEFT ANT TIBIAL A PROX PSV: -50.5 CM/S
BH CV LEA LEFT CFA PROX PSV: 92 CM/S
BH CV LEA LEFT DFA PROX PSV: 74 CM/S
BH CV LEA LEFT PERONEAL  DISTAL PSV: 46.1 CM/S
BH CV LEA LEFT PERONEAL  MID PSV: 65.3 CM/S
BH CV LEA LEFT PERONEAL  PROX PSV: 42.8 CM/S
BH CV LEA LEFT POPITEAL A  DISTAL PSV: -96.9 CM/S
BH CV LEA LEFT POPITEAL A  PROX PSV: 54.5 CM/S
BH CV LEA LEFT PTA DISTAL PSV: 47.7 CM/S
BH CV LEA LEFT PTA MID PSV: 45 CM/S
BH CV LEA LEFT PTA PROX PSV: 61.5 CM/S
BH CV LEA LEFT SFA DISTAL PSV: -58.2 CM/S
BH CV LEA LEFT SFA MID PSV: -87.8 CM/S
BH CV LEA LEFT SFA PROX PSV: 122 CM/S
BH CV LEA RIGHT ANT TIBIAL A MID PSV: -73.2 CM/S
BH CV LEA RIGHT CFA PROX PSV: 104 CM/S
BH CV LEA RIGHT DFA PROX PSV: 87 CM/S
BH CV LEA RIGHT PERONEAL  MID PSV: 42.8 CM/S
BH CV LEA RIGHT POPITEAL A  DISTAL PSV: -98.8 CM/S
BH CV LEA RIGHT POPITEAL A  PROX PSV: 82.3 CM/S
BH CV LEA RIGHT PTA MID PSV: 84.8 CM/S
BH CV LEA RIGHT SFA DISTAL PSV: -82.9 CM/S
BH CV LEA RIGHT SFA MID PSV: -70.8 CM/S
BH CV LEA RIGHT SFA PROX PSV: 116 CM/S
BH CV LOWER ARTERIAL LEFT ABI RATIO: 1.1
BH CV LOWER ARTERIAL RIGHT ABI RATIO: 1.06
BILIRUB UR QL STRIP: NEGATIVE
BOTTLE TYPE: NORMAL
CLARITY UR: CLEAR
COLOR UR: YELLOW
D-LACTATE SERPL-SCNC: 1.2 MMOL/L (ref 0.5–2)
GLUCOSE BLDC GLUCOMTR-MCNC: 172 MG/DL (ref 70–105)
GLUCOSE BLDC GLUCOMTR-MCNC: 190 MG/DL (ref 70–105)
GLUCOSE BLDC GLUCOMTR-MCNC: 202 MG/DL (ref 70–105)
GLUCOSE BLDC GLUCOMTR-MCNC: 217 MG/DL (ref 70–105)
GLUCOSE FLD-MCNC: 156 MG/DL
GLUCOSE UR STRIP-MCNC: NEGATIVE MG/DL
HGB UR QL STRIP.AUTO: NEGATIVE
HYALINE CASTS UR QL AUTO: ABNORMAL /LPF
KETONES UR QL STRIP: NEGATIVE
LDH FLD-CCNC: 77 U/L
LEFT GROIN CFA SYS: 91.8 CM/SEC
LEUKOCYTE ESTERASE UR QL STRIP.AUTO: NEGATIVE
MAGNESIUM SERPL-MCNC: 1.9 MG/DL (ref 1.6–2.4)
MAXIMAL PREDICTED HEART RATE: 139 BPM
MAXIMAL PREDICTED HEART RATE: 139 BPM
NITRITE UR QL STRIP: NEGATIVE
PH FLD: 7 [PH] (ref 6.5–7.5)
PH UR STRIP.AUTO: 6 [PH] (ref 5–8)
PHOSPHATE SERPL-MCNC: 4 MG/DL (ref 2.5–4.5)
PROT FLD-MCNC: 2 G/DL
PROT UR QL STRIP: ABNORMAL
RBC # UR STRIP: ABNORMAL /HPF
REF LAB TEST METHOD: ABNORMAL
RIGHT GROIN CFA SYS: 104 CM/SEC
SP GR UR STRIP: 1.03 (ref 1–1.03)
SQUAMOUS #/AREA URNS HPF: ABNORMAL /HPF
STRESS TARGET HR: 118 BPM
STRESS TARGET HR: 118 BPM
TSH SERPL DL<=0.05 MIU/L-ACNC: 3.4 UIU/ML (ref 0.27–4.2)
UROBILINOGEN UR QL STRIP: ABNORMAL
WBC # UR STRIP: ABNORMAL /HPF

## 2023-03-21 PROCEDURE — 0W993ZX DRAINAGE OF RIGHT PLEURAL CAVITY, PERCUTANEOUS APPROACH, DIAGNOSTIC: ICD-10-PCS | Performed by: HOSPITALIST

## 2023-03-21 PROCEDURE — 25010000002 SULFUR HEXAFLUORIDE MICROSPH 60.7-25 MG RECONSTITUTED SUSPENSION: Performed by: HOSPITALIST

## 2023-03-21 PROCEDURE — 25010000002 FUROSEMIDE PER 20 MG: Performed by: INTERNAL MEDICINE

## 2023-03-21 PROCEDURE — 83615 LACTATE (LD) (LDH) ENZYME: CPT | Performed by: NURSE PRACTITIONER

## 2023-03-21 PROCEDURE — 81241 F5 GENE: CPT | Performed by: NURSE PRACTITIONER

## 2023-03-21 PROCEDURE — 82668 ASSAY OF ERYTHROPOIETIN: CPT | Performed by: NURSE PRACTITIONER

## 2023-03-21 PROCEDURE — 81338 MPL GENE COMMON VARIANTS: CPT | Performed by: NURSE PRACTITIONER

## 2023-03-21 PROCEDURE — 0 LIDOCAINE 1 % SOLUTION: Performed by: RADIOLOGY

## 2023-03-21 PROCEDURE — 82945 GLUCOSE OTHER FLUID: CPT | Performed by: NURSE PRACTITIONER

## 2023-03-21 PROCEDURE — 81240 F2 GENE: CPT | Performed by: NURSE PRACTITIONER

## 2023-03-21 PROCEDURE — 99222 1ST HOSP IP/OBS MODERATE 55: CPT | Performed by: SURGERY

## 2023-03-21 PROCEDURE — 84157 ASSAY OF PROTEIN OTHER: CPT | Performed by: NURSE PRACTITIONER

## 2023-03-21 PROCEDURE — 81219 CALR GENE COM VARIANTS: CPT | Performed by: NURSE PRACTITIONER

## 2023-03-21 PROCEDURE — 82746 ASSAY OF FOLIC ACID SERUM: CPT | Performed by: HOSPITALIST

## 2023-03-21 PROCEDURE — 83986 ASSAY PH BODY FLUID NOS: CPT | Performed by: NURSE PRACTITIONER

## 2023-03-21 PROCEDURE — 71045 X-RAY EXAM CHEST 1 VIEW: CPT

## 2023-03-21 PROCEDURE — 25010000002 CEFAZOLIN PER 500 MG: Performed by: HOSPITALIST

## 2023-03-21 PROCEDURE — 93306 TTE W/DOPPLER COMPLETE: CPT | Performed by: INTERNAL MEDICINE

## 2023-03-21 PROCEDURE — 82607 VITAMIN B-12: CPT | Performed by: HOSPITALIST

## 2023-03-21 PROCEDURE — 25010000002 PIPERACILLIN SOD-TAZOBACTAM PER 1 G: Performed by: INTERNAL MEDICINE

## 2023-03-21 PROCEDURE — 83020 HEMOGLOBIN ELECTROPHORESIS: CPT | Performed by: NURSE PRACTITIONER

## 2023-03-21 PROCEDURE — 82962 GLUCOSE BLOOD TEST: CPT

## 2023-03-21 PROCEDURE — 83735 ASSAY OF MAGNESIUM: CPT | Performed by: HOSPITALIST

## 2023-03-21 PROCEDURE — 83605 ASSAY OF LACTIC ACID: CPT | Performed by: STUDENT IN AN ORGANIZED HEALTH CARE EDUCATION/TRAINING PROGRAM

## 2023-03-21 PROCEDURE — 87102 FUNGUS ISOLATION CULTURE: CPT | Performed by: HOSPITALIST

## 2023-03-21 PROCEDURE — 81270 JAK2 GENE: CPT | Performed by: NURSE PRACTITIONER

## 2023-03-21 PROCEDURE — C1751 CATH, INF, PER/CENT/MIDLINE: HCPCS

## 2023-03-21 PROCEDURE — 86146 BETA-2 GLYCOPROTEIN ANTIBODY: CPT | Performed by: NURSE PRACTITIONER

## 2023-03-21 PROCEDURE — 93306 TTE W/DOPPLER COMPLETE: CPT

## 2023-03-21 PROCEDURE — 80076 HEPATIC FUNCTION PANEL: CPT | Performed by: STUDENT IN AN ORGANIZED HEALTH CARE EDUCATION/TRAINING PROGRAM

## 2023-03-21 PROCEDURE — 84484 ASSAY OF TROPONIN QUANT: CPT | Performed by: HOSPITALIST

## 2023-03-21 PROCEDURE — 83018 HEAVY METAL QUAN EACH NES: CPT | Performed by: NURSE PRACTITIONER

## 2023-03-21 PROCEDURE — 85306 CLOT INHIBIT PROT S FREE: CPT | Performed by: HOSPITALIST

## 2023-03-21 PROCEDURE — 85730 THROMBOPLASTIN TIME PARTIAL: CPT | Performed by: STUDENT IN AN ORGANIZED HEALTH CARE EDUCATION/TRAINING PROGRAM

## 2023-03-21 PROCEDURE — 36410 VNPNXR 3YR/> PHY/QHP DX/THER: CPT

## 2023-03-21 PROCEDURE — 63710000001 INSULIN LISPRO (HUMAN) PER 5 UNITS: Performed by: HOSPITALIST

## 2023-03-21 PROCEDURE — 86147 CARDIOLIPIN ANTIBODY EA IG: CPT | Performed by: NURSE PRACTITIONER

## 2023-03-21 PROCEDURE — 84100 ASSAY OF PHOSPHORUS: CPT | Performed by: HOSPITALIST

## 2023-03-21 PROCEDURE — 87205 SMEAR GRAM STAIN: CPT | Performed by: NURSE PRACTITIONER

## 2023-03-21 PROCEDURE — 84443 ASSAY THYROID STIM HORMONE: CPT | Performed by: HOSPITALIST

## 2023-03-21 PROCEDURE — 25010000002 HEPARIN (PORCINE) 25000-0.45 UT/250ML-% SOLUTION: Performed by: NURSE PRACTITIONER

## 2023-03-21 PROCEDURE — 99223 1ST HOSP IP/OBS HIGH 75: CPT | Performed by: NURSE PRACTITIONER

## 2023-03-21 PROCEDURE — 81001 URINALYSIS AUTO W/SCOPE: CPT | Performed by: STUDENT IN AN ORGANIZED HEALTH CARE EDUCATION/TRAINING PROGRAM

## 2023-03-21 PROCEDURE — 87070 CULTURE OTHR SPECIMN AEROBIC: CPT | Performed by: NURSE PRACTITIONER

## 2023-03-21 PROCEDURE — 99232 SBSQ HOSP IP/OBS MODERATE 35: CPT | Performed by: INTERNAL MEDICINE

## 2023-03-21 PROCEDURE — C1729 CATH, DRAINAGE: HCPCS

## 2023-03-21 PROCEDURE — 88108 CYTOPATH CONCENTRATE TECH: CPT | Performed by: NURSE PRACTITIONER

## 2023-03-21 PROCEDURE — 76942 ECHO GUIDE FOR BIOPSY: CPT

## 2023-03-21 PROCEDURE — 93925 LOWER EXTREMITY STUDY: CPT

## 2023-03-21 PROCEDURE — 85730 THROMBOPLASTIN TIME PARTIAL: CPT | Performed by: HOSPITALIST

## 2023-03-21 PROCEDURE — 25010000002 AMPICILLIN-SULBACTAM PER 1.5 G: Performed by: INTERNAL MEDICINE

## 2023-03-21 RX ORDER — HEPARIN SODIUM 10000 [USP'U]/100ML
18 INJECTION, SOLUTION INTRAVENOUS
Status: DISCONTINUED | OUTPATIENT
Start: 2023-03-21 | End: 2023-03-24

## 2023-03-21 RX ORDER — SODIUM CHLORIDE 0.9 % (FLUSH) 0.9 %
10 SYRINGE (ML) INJECTION AS NEEDED
Status: DISCONTINUED | OUTPATIENT
Start: 2023-03-21 | End: 2023-03-23

## 2023-03-21 RX ORDER — LIDOCAINE HYDROCHLORIDE 10 MG/ML
INJECTION, SOLUTION INFILTRATION; PERINEURAL AS NEEDED
Status: COMPLETED | OUTPATIENT
Start: 2023-03-21 | End: 2023-03-21

## 2023-03-21 RX ORDER — SODIUM CHLORIDE 0.9 % (FLUSH) 0.9 %
10 SYRINGE (ML) INJECTION EVERY 12 HOURS SCHEDULED
Status: DISCONTINUED | OUTPATIENT
Start: 2023-03-21 | End: 2023-03-24 | Stop reason: HOSPADM

## 2023-03-21 RX ORDER — SODIUM CHLORIDE 0.9 % (FLUSH) 0.9 %
20 SYRINGE (ML) INJECTION AS NEEDED
Status: DISCONTINUED | OUTPATIENT
Start: 2023-03-21 | End: 2023-03-24 | Stop reason: HOSPADM

## 2023-03-21 RX ADMIN — FUROSEMIDE 40 MG: 10 INJECTION, SOLUTION INTRAMUSCULAR; INTRAVENOUS at 05:47

## 2023-03-21 RX ADMIN — Medication 10 ML: at 08:39

## 2023-03-21 RX ADMIN — METOPROLOL SUCCINATE 25 MG: 25 TABLET, EXTENDED RELEASE ORAL at 21:58

## 2023-03-21 RX ADMIN — FUROSEMIDE 40 MG: 10 INJECTION, SOLUTION INTRAMUSCULAR; INTRAVENOUS at 18:02

## 2023-03-21 RX ADMIN — LEVOTHYROXINE SODIUM 100 MCG: 100 TABLET ORAL at 05:48

## 2023-03-21 RX ADMIN — Medication 10 ML: at 22:02

## 2023-03-21 RX ADMIN — ATORVASTATIN CALCIUM 40 MG: 40 TABLET, FILM COATED ORAL at 21:58

## 2023-03-21 RX ADMIN — HEPARIN SODIUM 14 UNITS/KG/HR: 10000 INJECTION, SOLUTION INTRAVENOUS at 11:20

## 2023-03-21 RX ADMIN — AMPICILLIN SODIUM AND SULBACTAM SODIUM 3 G: 2; 1 INJECTION, POWDER, FOR SOLUTION INTRAMUSCULAR; INTRAVENOUS at 11:21

## 2023-03-21 RX ADMIN — SULFUR HEXAFLUORIDE 3 ML: KIT at 14:09

## 2023-03-21 RX ADMIN — PIPERACILLIN AND TAZOBACTAM 3.38 G: 3; .375 INJECTION, POWDER, LYOPHILIZED, FOR SOLUTION INTRAVENOUS at 21:58

## 2023-03-21 RX ADMIN — INSULIN LISPRO 4 UNITS: 100 INJECTION, SOLUTION INTRAVENOUS; SUBCUTANEOUS at 18:18

## 2023-03-21 RX ADMIN — Medication 10 ML: at 21:59

## 2023-03-21 RX ADMIN — INSULIN LISPRO 2 UNITS: 100 INJECTION, SOLUTION INTRAVENOUS; SUBCUTANEOUS at 12:08

## 2023-03-21 RX ADMIN — PIPERACILLIN AND TAZOBACTAM 3.38 G: 3; .375 INJECTION, POWDER, LYOPHILIZED, FOR SOLUTION INTRAVENOUS at 18:01

## 2023-03-21 RX ADMIN — INSULIN LISPRO 2 UNITS: 100 INJECTION, SOLUTION INTRAVENOUS; SUBCUTANEOUS at 23:03

## 2023-03-21 RX ADMIN — CEFAZOLIN 2 G: 2 INJECTION, POWDER, FOR SOLUTION INTRAMUSCULAR; INTRAVENOUS at 05:47

## 2023-03-21 RX ADMIN — METOPROLOL SUCCINATE 25 MG: 25 TABLET, EXTENDED RELEASE ORAL at 08:39

## 2023-03-21 RX ADMIN — LIDOCAINE HYDROCHLORIDE 5 ML: 10 INJECTION, SOLUTION INFILTRATION; PERINEURAL at 10:27

## 2023-03-21 NOTE — NURSING NOTE
A diet order was placed for this patient d/t no procedures scheduled. If thoracentesis scheduled, unlikely that procedure would be performed d/t heparin gtt. MD notified.   Alert and oriented, no focal deficits, no motor or sensory deficits.

## 2023-03-21 NOTE — CONSULTS
General Surgery Consult Note      Name: Jaquelin Valderrama ADMIT: 3/20/2023   : 1940  PCP: Minerva Chatterjee MD    MRN: [id number] LOS: 1 days   AGE/SEX: 83 y.o. female  ROOM: [address] [place] [address]      Patient Care Team:  Minerva Chatterjee MD as PCP - General (Internal Medicine)  Yordan Evans MD as Consulting Physician (Cardiology)  Amilcar Smallwood MD as Consulting Physician (Hematology and Oncology)  Chief Complaint   Patient presents with   • Foot Swelling     Bilateral feet swelling, painful, warm to touch on right leg.         Subjective   83-year-old lady with a history of congestive heart failure who was admitted with a chief complaint of lower extremity swelling tenderness and warmth.  This is being worked up and managed by multiple teams.  On her work-up she was found to have bacteremia and her admission CT scan showed some thickening of the gallbladder consider cholecystitis.  Because of these findings I was asked to see her    She denies any significant right upper quadrant tenderness to palpation.  She denies any abdominal pain.  She says she eats and drinks normally.  She has regular bowel function and denies any significant constipation.    Past Medical History:   Diagnosis Date   • Astigmatism, bilateral 2019   • Benign essential hypertension    • Bilateral presbyopia 2019   • CAD (coronary artery disease)    • CHF (congestive heart failure) (HCC)    • Closed right ankle fracture    • COVID-19 vaccination refused    • Hyperlipidemia    • Hypothyroidism    • Influenza vaccine needed    • Myocardial infarction (HCC)    • Prediabetes    • Pseudophakia, both eyes 2019   • Sleep apnea    • Thoracic back pain      Past Surgical History:   Procedure Laterality Date   • APPENDECTOMY     • CARDIAC CATHETERIZATION  2012    x3    • CORONARY ARTERY BYPASS GRAFT  2014   • CORONARY STENT PLACEMENT      x3   • CYSTOSCOPY WITH CLOT EVACUATION N/A 3/25/2022    Procedure: CYSTOSCOPY WITH CLOT  EVACUATION AND FULGURATION ;  Surgeon: Sukhdev Poole MD;  Location: [facility] MAIN OR;  Service: Urology;  Laterality: N/A;   • HARDWARE REMOVAL Right 2020    Procedure: ANKLE/FOOT HARDWARE REMOVAL;  Surgeon: Lincoln Sibley MD;  Location: [facility] MAIN OR;  Service: Orthopedics;  Laterality: Right;   • ORIF ANKLE FRACTURE Right 2019    Abelen- George Mem   • THORACOSCOPY WITH DECORTICATION Left 3/18/2022    Procedure: LEFT THORACOSCOPY VIDEO ASSISTED WITH COMPLETE DECORTICATION;  Surgeon: Sabra Kuo MD;  Location: [facility] MAIN OR;  Service: Thoracic;  Laterality: Left;     Family History   Problem Relation Age of Onset   • Hypertension Mother    • Stroke Mother    • Heart disease Mother    • Lung cancer Father    • Diabetes Brother    • Diabetes Other      Social History     Tobacco Use   • Smoking status: Former     Packs/day: 1.50     Types: Cigarettes     Quit date:      Years since quittin.2     Passive exposure: Past   • Smokeless tobacco: Never   • Tobacco comments:     CAFFEINE USE ICED TEA, OCCAS COFFEE   Vaping Use   • Vaping Use: Never used   Substance Use Topics   • Alcohol use: Not Currently   • Drug use: No     Medications Prior to Admission   Medication Sig Dispense Refill Last Dose   • acetaminophen (TYLENOL) 500 MG tablet Take 1 tablet by mouth Every 6 (Six) Hours As Needed for Mild Pain.   Past Week   • apixaban (ELIQUIS) 5 MG tablet tablet Take 1 tablet by mouth Daily. Only QD per Cardiologist   Past Week   • Cholecalciferol (VITAMIN D3) 2000 units tablet Take 1 tablet by mouth Daily.   3/20/2023   • furosemide (LASIX) 20 MG tablet Take 3 tablets by mouth Daily. 270 tablet 1 3/20/2023   • insulin aspart prot-insulin aspart (novoLOG 70/30) (70-30) 100 UNIT/ML injection Inject 10 Units under the skin into the appropriate area as directed 2 (Two) Times a Day As Needed (elevated blood sugar).   Past Week   • Krill Oil 300 MG capsule Take 1 capsule by mouth Daily.    3/20/2023   • levothyroxine (Synthroid) 100 MCG tablet Take 1 tablet by mouth Daily. 90 tablet 1 3/20/2023   • metoprolol succinate XL (TOPROL-XL) 25 MG 24 hr tablet Take 1 tablet by mouth twice daily 180 tablet 3 3/20/2023   • Multiple Vitamins-Minerals (MULTIVITAMIN ADULT EXTRA C PO) Take 1 tablet by mouth Daily.   3/20/2023   • mupirocin (BACTROBAN) 2 % ointment Apply 1 application topically to the appropriate area as directed 3 (Three) Times a Day. Rash on both feet 30 g 1 3/20/2023   • spironolactone (ALDACTONE) 25 MG tablet Take 1 tablet by mouth Daily. 90 tablet 3 3/20/2023     ampicillin-sulbactam, 3 g, Intravenous, Q6H  atorvastatin, 40 mg, Oral, Nightly  furosemide, 40 mg, Intravenous, Q12H  insulin lispro, 2-9 Units, Subcutaneous, 4x Daily With Meals & Nightly  levothyroxine, 100 mcg, Oral, Q AM  metoprolol succinate XL, 25 mg, Oral, BID  sodium chloride, 10 mL, Intravenous, Q12H      heparin, 18 Units/kg/hr, Last Rate: 14 Units/kg/hr (03/21/23 1120)  hold, 1 each      •  acetaminophen  •  senna-docusate sodium **AND** polyethylene glycol **AND** bisacodyl **AND** bisacodyl  •  dextrose  •  dextrose  •  glucagon (human recombinant)  •  heparin  •  heparin  •  hold  •  HYDROcodone-acetaminophen  •  melatonin  •  ondansetron  •  polyethyl glycol-propyl glycol  •  [COMPLETED] Insert Peripheral IV **AND** sodium chloride  •  sodium chloride  •  sodium chloride  Prednisone    Review of Systems   Constitutional: Negative for chills and fever.   HENT: Negative for sore throat and trouble swallowing.    Eyes: Negative for blurred vision and double vision.   Respiratory: Positive for shortness of breath. Negative for cough.    Cardiovascular: Positive for leg swelling. Negative for chest pain.   Gastrointestinal: Negative for abdominal distention, abdominal pain, blood in stool, constipation, diarrhea, nausea and vomiting.   Genitourinary: Negative for dysuria and hematuria.   Skin: Positive for color change and  wound. Negative for rash.   Psychiatric/Behavioral: Negative for depressed mood. The patient is not nervous/anxious.         Objective     Vital Signs and Labs:  Vital Signs Patient Vitals for the past 24 hrs:   BP Temp Temp src Pulse Resp SpO2   03/21/23 1158 125/90 97.3 °F (36.3 °C) Oral 75 23 95 %   03/21/23 1041 131/90 -- -- 84 -- 94 %   03/21/23 1031 125/81 -- -- 82 -- 97 %   03/21/23 1024 118/78 -- -- 89 -- 95 %   03/21/23 0408 131/88 97.9 °F (36.6 °C) Axillary 81 21 93 %   03/21/23 0121 124/75 97.5 °F (36.4 °C) Axillary 73 16 92 %   03/20/23 2100 -- -- -- 82 -- 95 %   03/20/23 1610 126/84 97 °F (36.1 °C) Axillary 79 14 100 %       Physical Exam:  Physical Exam  Constitutional:       Appearance: Normal appearance. She is well-developed.   HENT:      Head: Normocephalic and atraumatic.   Eyes:      General: No scleral icterus.     Conjunctiva/sclera: Conjunctivae normal.   Neck:      Trachea: No tracheal deviation.   Cardiovascular:      Rate and Rhythm: Normal rate.   Pulmonary:      Effort: Pulmonary effort is normal. No respiratory distress.   Abdominal:      General: There is no distension.      Palpations: Abdomen is soft.      Tenderness: There is no abdominal tenderness.   Musculoskeletal:         General: Swelling and tenderness present.      Cervical back: Neck supple. No tenderness. No muscular tenderness.      Comments: Lower extremity warmth tenderness palpation and edema right greater than left   Neurological:      General: No focal deficit present.      Mental Status: She is alert. Mental status is at baseline.   Psychiatric:         Mood and Affect: Mood normal.         Behavior: Behavior normal.         CBC    Results from last 7 days   Lab Units 03/20/23  0915   WBC 10*3/mm3 11.10*   HEMOGLOBIN g/dL 17.5*   PLATELETS 10*3/mm3 180     CMP   Results from last 7 days   Lab Units 03/20/23  0915   SODIUM mmol/L 138   POTASSIUM mmol/L 4.2   CHLORIDE mmol/L 100   CO2 mmol/L 26.0   BUN mg/dL 41*    CREATININE mg/dL 0.92   GLUCOSE mg/dL 115*   ALBUMIN g/dL 3.0*   BILIRUBIN mg/dL 1.4*   ALK PHOS U/L 204*   AST (SGOT) U/L 54*   ALT (SGPT) U/L 44*     Radiology(recent) CT Angio Abdominal Aorta Bilateral Iliofem Runoff    Result Date: 3/20/2023  1.No acute aortic dissection, aneurysm or aortic injury. No acute pulmonary emboli. 2.Large right and trace left pleural effusions. Near complete atelectasis of right lower lobe. Bibasilar and right middle lobe opacities may represent atelectasis or infiltrates. Follow-up recommended. 3.Cardiomegaly with reflux of contrast and IVC and hepatic veins suggest right heart failure. 4.Gallbladder wall thickening is a nonspecific finding can be seen in multiple inflammatory process including acute cholecystitis and volume overload. If there is clinical concern for acute cholecystitis, ultrasound can be obtained for further evaluation. 5.Mild ascites throughout abdomen and pelvis. 6.Anasarca. 7.Proximal left trifurcation is patent. Remainder of the left leg arteries including anterior tibial, posterior tibial, fibular arteries demonstrate markedly diminutive flow with no flow in left foot. Case discussed with nurse alin Smith 3/20/2023 8:17 PM EDT. Electronically Signed: Yoni Max  3/20/2023 8:18 PM EDT  Workstation ID: [id number]    XR Chest 1 View    Result Date: 3/21/2023  Impression: Interval near complete resolution of right effusion status post thoracentesis. No pneumothorax. Additional findings as above. Electronically Signed: Balwinder Klein  3/21/2023 11:24 AM EDT  Workstation ID: [id number]    XR Chest 1 View    Result Date: 3/21/2023  Impression: 1. Interval decrease in right pleural effusion. There appears to be some loculated fluid within the fissures now present. 2. Increasing airspace opacity in the right perihilar region suggesting pneumonia or asymmetric edema. 3. Bibasilar consolidations suggesting atelectasis or infiltrate. Small left effusion. 4.  Cardiomegaly. Electronically Signed: Balwinder Klein  3/21/2023 8:55 AM EDT  Workstation ID: [id number]    US Thoracentesis    Result Date: 3/21/2023  Impression: Technically successful ultrasound-guided large volume right-sided thoracentesis as described above. A sample of the turbid yellow pleural fluid was sent for laboratory evaluation. Electronically Signed: Antoine Carpio  3/21/2023 11:13 AM EDT  Workstation ID: [id number]    CT Angiogram Chest    Result Date: 3/20/2023  1.No acute aortic dissection, aneurysm or aortic injury. No acute pulmonary emboli. 2.Large right and trace left pleural effusions. Near complete atelectasis of right lower lobe. Bibasilar and right middle lobe opacities may represent atelectasis or infiltrates. Follow-up recommended. 3.Cardiomegaly with reflux of contrast and IVC and hepatic veins suggest right heart failure. 4.Gallbladder wall thickening is a nonspecific finding can be seen in multiple inflammatory process including acute cholecystitis and volume overload. If there is clinical concern for acute cholecystitis, ultrasound can be obtained for further evaluation. 5.Mild ascites throughout abdomen and pelvis. 6.Anasarca. 7.Proximal left trifurcation is patent. Remainder of the left leg arteries including anterior tibial, posterior tibial, fibular arteries demonstrate markedly diminutive flow with no flow in left foot. Case discussed with nurse alin Smith 3/20/2023 8:17 PM EDT. Electronically Signed: Yoni Max  3/20/2023 8:18 PM EDT  Workstation ID: [id number]      I reviewed the patient's new clinical results.  I reviewed the patient's new imaging results and agree with the interpretation.    Assessment & Plan       Cellulitis of right leg      83 y.o. female admitted for work-up of swelling and erythema of the right lower extremity.  Complicated presentation secondary to heart failure pleural effusions being off anticoagulation and consideration for thromboembolic  events.    Based on her clinical history she does not sound like she has cholecystitis.  However as her presentation is fairly complicated and there is a positive blood culture we will go ahead and evaluate the gallbladder for filling and emptying.  If the HIDA scan is negative likely no need to treat.  The HIDA scan is positive it could be a false positive or could be representative of cholecystitis.  If she does have cholecystitis I would likely recommend antibiotic treatment alone versus percutaneous cholecystostomy tube then will be reluctant to remove her gallbladder since she is clinically asymptomatic.       This note was created using Dragon Voice Recognition software.    Eliot Aguilar MD  03/21/23  13:15 EDT

## 2023-03-21 NOTE — NURSING NOTE
Received call from radiologist. Stated no blood flow to left leg. Made stat call to Dr. Suazo. Left message and requested callback.    Received call back from Dr Darin Greer. Orders as follows:  Hold heparin gtt for 90 minutes.  Resume and reduce drip rate by 2 units/kg/hour.  Repeat PTT 6 hours after resumption.

## 2023-03-21 NOTE — PROGRESS NOTES
[facility] Medicine Services   Daily Progress Note    Patient Name: Jaquelin Valderrama  : 1940  MRN: [id number]  Primary Care Physician:  Minerva Chatterjee MD  Date of admission: 3/20/2023  Date and Time of Service:  at       Subjective      Chief Complaint:     Patient feels about the same  Still has pain RLE.   No chest pain  Still feels short of breath      Objective      Vitals:   Temp:  [97 °F (36.1 °C)-97.9 °F (36.6 °C)] 97.9 °F (36.6 °C)  Heart Rate:  [73-89] 84  Resp:  [14-22] 21  BP: (118-136)/(73-90) 131/90  Flow (L/min):  [3] 3    Physical Exam   General: No acute distress  HEENT: neck supple, normal oral mucosa, no masses, no lymphadenopathy. + JVD  Lungs: diminished breath sounds right base. No crackles, No Rhonchi. Equal excursions.   CV - Normal S1/S2, no murmur, Iregular rhythm, rate controlled   Abdomin - Soft, non-tender, non-distended, normal bowel sounds  Extremities - bilateral LE pitting edema   Neuro - No focal weakness, normal sensation  Psych - Alert and oriented x3  Skin - Right LE erythema and tenderness and warmth - not much improvement. Bilateral feet cold to touch. Right foot discoloration >L        Result Review    Result Review:  I have personally reviewed the results from the time of this admission to 3/21/2023 11:12 EDT and agree with these findings:  [x]  Laboratory  [x]  Microbiology  [x]  Radiology  [x]  EKG/Telemetry   [x]  Cardiology/Vascular   []  Pathology  [x]  Old records  []  Other:  Most notable findings include:           Assessment & Plan      Brief Patient Summary:  Jaquelin Valderrama is a 83 y.o. female who       ampicillin-sulbactam, 3 g, Intravenous, Q6H  atorvastatin, 40 mg, Oral, Nightly  furosemide, 40 mg, Intravenous, Q12H  insulin lispro, 2-9 Units, Subcutaneous, 4x Daily With Meals & Nightly  levothyroxine, 100 mcg, Oral, Q AM  metoprolol succinate XL, 25 mg, Oral, BID  sodium chloride, 10 mL, Intravenous, Q12H       heparin, 18 Units/kg/hr  hold, 1  each         Active Hospital Problems:  Active Hospital Problems    Diagnosis    • **Cellulitis of right leg      Plan:       RLE Cellulitis/Erysipelas  - GNB bacteremia   - Switched to Unasyn  - ID on consult.      Right foot discoloration/ischemia  - concern for acute ischemia in setting of a fib and holding eliquis  - Vascular consulted.  - no large vessel occlusion. Likely microvascular thrombosis  - continue medical management and monitor demarcation.      Acute on chronic systolic heart failure with low output state  - Echo 8/2022 with EF 30% - will repeat - pending   - Will give Lasix IV and monitor. Still no repeat labs sent.   - not in ACEI/Arb, unsure why. Hold Aldactone for now   - Cardiology consult. Needs Optimization of her HF meds.      Pleural effusion R>L  - h/o Decortication  - Thoracic surgeon consulted - thoracentesis today      Distended GB  - could be related to vascular congestion  - Surgery consulted. Awaiting recs     Persistent A fib  - Continue Metoprolol  - Heparin gtt     CAD s/p CABG  - Continue Statin and BB  - Defer Antiplatelet to Cardiology     DM-2 - SSI. Monitor     Erythrocytosis - check B12 and folate and TSH  - Hematology consulted. JAK2 recently sent was negative      Hypothyroidism - Levothyroxine     Transfer to PCU    DVT prophylaxis:  Medical DVT prophylaxis orders are present.    CODE STATUS:         Disposition:  I expect patient to be discharged .    This patient has been  and discussed with . 03/21/23      Electronically signed by Lily Nesbitt MD, 03/21/23, 11:12 EDT.  [facility] Hospitalist Team

## 2023-03-21 NOTE — CASE MANAGEMENT/SOCIAL WORK
Continued Stay Note  GUERLINE Vazquez     Patient Name: Jaquelin Valderrama  MRN: [id number]  Today's Date: 3/21/2023    Admit Date: 3/20/2023    Plan: D/C plan: Anticipate home with . Watch for O2 needs, IV abx.   Discharge Plan     Row Name 03/21/23 1033       Plan    Plan D/C plan: Anticipate home with . Watch for O2 needs, IV abx.    Patient/Family in Agreement with Plan yes    Plan Comments Barrier to d/c: Hep gtt, IV abx, IV Lasix, 3L O2. Transfer orders in for higher level of care.                   Phone communication or documentation only - no physical contact with patient or family.      Jose Lemus RN

## 2023-03-21 NOTE — PROGRESS NOTES
"Name: Jaquelin Valderrama ADMIT: 3/20/2023   : 1940  PCP: Minerva Chatterjee MD    MRN: [id number] LOS: 1 days   AGE/SEX: 83 y.o. female  ROOM: [address] [place] [address]    [place], Subsequent Hospital Care    Chief Complaint   Patient presents with   • Foot Swelling     Bilateral feet swelling, painful, warm to touch on right leg.       CC: bilateral foot discoloration  Subjective     83 y.o. female with multiple medical problems who presented with 1-2 days of bilateral foot discoloration since holding her eliquis for a planned thoracentesis.  Right calf is erythematous and warm and bilateral feet and toes have purplish discoloration, R>L.  Patient overall had a good night and was able to rest well.  She is lethargic this morning after receiving pain medication but  was at bedside and states that she seems to be doing some better today.  She does have \"pins and needles feelings\" of bilateral feet, right slightly worse than left.  Respiratory status appears stable and she appears to be breathing comfortably at rest.    Review of Systems   Constitutional: Negative for chills and fever.   Respiratory: Positive for shortness of breath. Negative for cough.    Cardiovascular: Positive for leg swelling. Negative for chest pain.   Musculoskeletal: Positive for gait problem.   Skin: Positive for color change.   Neurological: Negative for dizziness and light-headedness.   Psychiatric/Behavioral: Negative for agitation and confusion.     Objective  resting in bed,  at bedside    Scheduled Medications:   atorvastatin, 40 mg, Oral, Nightly  ceFAZolin, 2 g, Intravenous, Q8H  furosemide, 40 mg, Intravenous, Q12H  insulin lispro, 2-9 Units, Subcutaneous, 4x Daily With Meals & Nightly  levothyroxine, 100 mcg, Oral, Q AM  metoprolol succinate XL, 25 mg, Oral, BID  sodium chloride, 10 mL, Intravenous, Q12H    Active Infusions:  heparin, 18 Units/kg/hr, Last Rate: 14 Units/kg/hr (23 0704)  hold, 1 each    As Needed " Medications:  •  acetaminophen  •  senna-docusate sodium **AND** polyethylene glycol **AND** bisacodyl **AND** bisacodyl  •  dextrose  •  dextrose  •  glucagon (human recombinant)  •  heparin  •  heparin  •  hold  •  HYDROcodone-acetaminophen  •  melatonin  •  ondansetron  •  polyethyl glycol-propyl glycol  •  [COMPLETED] Insert Peripheral IV **AND** sodium chloride  •  sodium chloride  •  sodium chloride    Vital Signs  Vital Signs (range)  Temp:  [97 °F (36.1 °C)-97.9 °F (36.6 °C)] 97.9 °F (36.6 °C)  Heart Rate:  [73-96] 81  Resp:  [14-22] 21  BP: (119-137)/(71-90) 131/88  I/O:  I/O last 3 completed shifts:  In: -   Out: 1050 [Urine:1050]    Physical Exam:  Physical Exam  Vitals and nursing note reviewed.   Constitutional:       General: She is not in acute distress.     Comments: Lethargic but awakens and answers questions appropriately   HENT:      Head: Normocephalic.   Cardiovascular:      Rate and Rhythm: Normal rate.      Comments: Palpable femoral pulses.  Doppler signals noted to right AT, peroneal, and PT as well as left DP and PT.   Pulmonary:      Effort: Pulmonary effort is normal. No respiratory distress.      Comments: 3L NC 02  Abdominal:      Palpations: Abdomen is soft.   Musculoskeletal:      Right lower leg: Edema present.      Left lower leg: Edema present.   Skin:     Comments: Erythema, edema, and warmth of right lower extremity consistent with cellulitis and now with blisters to lateral right lower leg. Diffuse purple discoloration noted to bilateral feet and toes, right > left. Capillary refill remains good.  Motor and sensory function remain intact. Legs warm down to her ankles been cool to touch past her ankles through her feet.  Scar noted from prior left leg GSV harvest. Multiple scars to right ankle following extensive orthopedic surgeries.   Cyanosis of bilateral hands/fingertips appears stable.   Neurological:      General: No focal deficit present.      Mental Status: She is alert  and oriented to person, place, and time. Mental status is at baseline.   Psychiatric:         Mood and Affect: Mood normal.         Behavior: Behavior normal.              Results Review:   CBC    Results from last 7 days   Lab Units 03/20/23  0915   WBC 10*3/mm3 11.10*   HEMOGLOBIN g/dL 17.5*   PLATELETS 10*3/mm3 180     BMP   Results from last 7 days   Lab Units 03/20/23  0915   SODIUM mmol/L 138   POTASSIUM mmol/L 4.2   CHLORIDE mmol/L 100   CO2 mmol/L 26.0   BUN mg/dL 41*   CREATININE mg/dL 0.92   GLUCOSE mg/dL 115*     Coag   Results from last 7 days   Lab Units 03/21/23  0528 03/20/23  1902   INR   --  1.29*   APTT seconds 58.7* 132.9*     Radiology(recent) CT Angio Abdominal Aorta Bilateral Iliofem Runoff    Result Date: 3/20/2023  1.No acute aortic dissection, aneurysm or aortic injury. No acute pulmonary emboli. 2.Large right and trace left pleural effusions. Near complete atelectasis of right lower lobe. Bibasilar and right middle lobe opacities may represent atelectasis or infiltrates. Follow-up recommended. 3.Cardiomegaly with reflux of contrast and IVC and hepatic veins suggest right heart failure. 4.Gallbladder wall thickening is a nonspecific finding can be seen in multiple inflammatory process including acute cholecystitis and volume overload. If there is clinical concern for acute cholecystitis, ultrasound can be obtained for further evaluation. 5.Mild ascites throughout abdomen and pelvis. 6.Anasarca. 7.Proximal left trifurcation is patent. Remainder of the left leg arteries including anterior tibial, posterior tibial, fibular arteries demonstrate markedly diminutive flow with no flow in left foot. Case discussed with nurse alin Smith 3/20/2023 8:17 PM EDT. Electronically Signed: Yoni Max  3/20/2023 8:18 PM EDT  Workstation ID: [id number]    CT Angiogram Chest    Result Date: 3/20/2023  1.No acute aortic dissection, aneurysm or aortic injury. No acute pulmonary emboli. 2.Large right and  trace left pleural effusions. Near complete atelectasis of right lower lobe. Bibasilar and right middle lobe opacities may represent atelectasis or infiltrates. Follow-up recommended. 3.Cardiomegaly with reflux of contrast and IVC and hepatic veins suggest right heart failure. 4.Gallbladder wall thickening is a nonspecific finding can be seen in multiple inflammatory process including acute cholecystitis and volume overload. If there is clinical concern for acute cholecystitis, ultrasound can be obtained for further evaluation. 5.Mild ascites throughout abdomen and pelvis. 6.Anasarca. 7.Proximal left trifurcation is patent. Remainder of the left leg arteries including anterior tibial, posterior tibial, fibular arteries demonstrate markedly diminutive flow with no flow in left foot. Case discussed with nurse alin boo @ 3/20/2023 8:17 PM EDT. Electronically Signed: Yoni Max  3/20/2023 8:18 PM EDT  Workstation ID: [id number]      Assessment & Plan     Assessment & Plan      Cellulitis of right leg      83 y.o. female with multiple medical comorbidities who presented yesterday complaining of 1-2 day history of bilateral foot discoloration as well as RLE erythema and warmth since holding her Eliquis for planned thoracentesis. She has normal waveforms on her ABIs at the ankles with normal pressures of 1.06 on the right and 1.1 on the left but severe digital ischemia bilaterally suggesting her issues are localized to the feet bilaterally.   CTA chest as well as abdominal aorta bilateral iliofemoral runoff yesterday shows no acute aortic dissection, aneurysm, oriented aortic injury and no acute pulmonary emboli. Bilateral renal arteries, celiac, SMA, MARINA, and iliac arteries are patent as well as bilateral common fem and superficial femoral arteries also patent. Right popliteal artery, trifurcation, anterior tibial, posterior tibial, and fibular arteries are patent. It did show proximal left trifurcation patent  with the remainder of the left leg arteries including AT, PT, and fibular arteries with markedly diminutive flow with no flow in the left foot, although this was inconsistent with her exam as she has doppler signals to left PT and DP arteries.   BLE arterial study was done today to follow-up on CT findings and shows multiphasic flow into the mid anterior tibial, distal posterior tibial, and distal peroneal arteries on the left with unchanged ABIs.  RLE arteries patent without significant stenosis.       -As stated yesterday, she is suffering from embolic or thrombotic phenomenon likely secondary to A. Fib and holding her Eliquis for planned thoracentesis. She was also identified with gram-negative bacteremia which could contribute to microvascular issues as well.   -No plans for any vascular surgery intervention given that her issues are localized to the microvasculature of her feet.  Motor and sensory function remain intact and pain is tolerable. We will continue with therapeutic anticoagulation and wound care to feet with Betadine paint BID. Patient and  aware that she is at risk for eventual amputation. TPA would not be recommended for her given her medical history and high risk of complications including bleeding with no guarantee of benefit.     -General surgery consult for CTA showing gallbladder thickening along with complaints of abdominal pain along with bacteremia. ID is following. Check UA.  -Thoracentesis today per IR and heparin will be held for 90 minutes prior to procedure.  -Hem/onc consult to r/o hypercoagulable disorder contributing to BLE small vessel thrombosis.   -Cardiology following and echo planned for today.   -We have reached out to her primary team to suggest transferring to a higher level of care given new findings of gram-negative bacteremia along with multiple other medical problems on this chronically-ill patient.       Minal Vidal, VALENTINE  03/21/23  08:32 EDT    Please call  my office with any question: [phone]    Active Hospital Problems    Diagnosis  POA   • **Cellulitis of right leg [L03.115]  Yes     Priority: Low      Resolved Hospital Problems   No resolved problems to display.

## 2023-03-21 NOTE — PROGRESS NOTES
Referring Provider: Lily Nesbitt MD    Reason for follow-up:  Lower extremity edema  Bilateral pleural effusions.     Patient Care Team:  Minerva Chatterjee MD as PCP - General (Internal Medicine)  Yordan Evans MD as Consulting Physician (Cardiology)    Subjective .      ROS    Since I have last seen, the patient has been without any chest discomfort ,shortness of breath, palpitations, dizziness or syncope.  Denies having any headache ,abdominal pain ,nausea, vomiting , diarrhea constipation, loss of weight or loss of appetite.  Denies having any excessive bruising ,hematuria or blood in the stool.    Review of all systems negative except as indicated    History  Past Medical History:   Diagnosis Date   • Astigmatism, bilateral 11/19/2019   • Benign essential hypertension    • Bilateral presbyopia 11/19/2019   • CAD (coronary artery disease)    • CHF (congestive heart failure) (HCC)    • Closed right ankle fracture    • COVID-19 vaccination refused    • Hyperlipidemia    • Hypothyroidism    • Influenza vaccine needed    • Myocardial infarction (HCC)    • Prediabetes    • Pseudophakia, both eyes 11/19/2019   • Sleep apnea    • Thoracic back pain        Past Surgical History:   Procedure Laterality Date   • APPENDECTOMY     • CARDIAC CATHETERIZATION  2012    x3    • CORONARY ARTERY BYPASS GRAFT  12/2014   • CORONARY STENT PLACEMENT      x3   • CYSTOSCOPY WITH CLOT EVACUATION N/A 3/25/2022    Procedure: CYSTOSCOPY WITH CLOT EVACUATION AND FULGURATION ;  Surgeon: Sukhdev Poole MD;  Location: [facility];  Service: Urology;  Laterality: N/A;   • HARDWARE REMOVAL Right 7/21/2020    Procedure: ANKLE/FOOT HARDWARE REMOVAL;  Surgeon: Lincoln Sibley MD;  Location: [facility] OR;  Service: Orthopedics;  Laterality: Right;   • ORIF ANKLE FRACTURE Right 04/23/2019    Radha Vazquez Mem   • THORACOSCOPY WITH DECORTICATION Left 3/18/2022    Procedure: LEFT THORACOSCOPY VIDEO ASSISTED WITH COMPLETE DECORTICATION;   Surgeon: Sabra Kuo MD;  Location: [facility] OR;  Service: Thoracic;  Laterality: Left;       Family History   Problem Relation Age of Onset   • Hypertension Mother    • Stroke Mother    • Heart disease Mother    • Lung cancer Father    • Diabetes Brother    • Diabetes Other        Social History     Tobacco Use   • Smoking status: Former     Packs/day: 1.50     Types: Cigarettes     Quit date:      Years since quittin.2     Passive exposure: Past   • Smokeless tobacco: Never   • Tobacco comments:     CAFFEINE USE ICED TEA, OCCAS COFFEE   Vaping Use   • Vaping Use: Never used   Substance Use Topics   • Alcohol use: Not Currently   • Drug use: No        Medications Prior to Admission   Medication Sig Dispense Refill Last Dose   • acetaminophen (TYLENOL) 500 MG tablet Take 1 tablet by mouth Every 6 (Six) Hours As Needed for Mild Pain.   Past Week   • apixaban (ELIQUIS) 5 MG tablet tablet Take 1 tablet by mouth Daily. Only QD per Cardiologist   Past Week   • Cholecalciferol (VITAMIN D3) 2000 units tablet Take 1 tablet by mouth Daily.   3/20/2023   • furosemide (LASIX) 20 MG tablet Take 3 tablets by mouth Daily. 270 tablet 1 3/20/2023   • insulin aspart prot-insulin aspart (novoLOG 70/30) (70-30) 100 UNIT/ML injection Inject 10 Units under the skin into the appropriate area as directed 2 (Two) Times a Day As Needed (elevated blood sugar).   Past Week   • Krill Oil 300 MG capsule Take 1 capsule by mouth Daily.   3/20/2023   • levothyroxine (Synthroid) 100 MCG tablet Take 1 tablet by mouth Daily. 90 tablet 1 3/20/2023   • metoprolol succinate XL (TOPROL-XL) 25 MG 24 hr tablet Take 1 tablet by mouth twice daily 180 tablet 3 3/20/2023   • Multiple Vitamins-Minerals (MULTIVITAMIN ADULT EXTRA C PO) Take 1 tablet by mouth Daily.   3/20/2023   • mupirocin (BACTROBAN) 2 % ointment Apply 1 application topically to the appropriate area as directed 3 (Three) Times a Day. Rash on both feet 30 g 1 3/20/2023   •  "spironolactone (ALDACTONE) 25 MG tablet Take 1 tablet by mouth Daily. 90 tablet 3 3/20/2023       Allergies  Prednisone    Scheduled Meds:atorvastatin, 40 mg, Oral, Nightly  ceFAZolin, 2 g, Intravenous, Q8H  furosemide, 40 mg, Intravenous, Q12H  insulin lispro, 2-9 Units, Subcutaneous, 4x Daily With Meals & Nightly  levothyroxine, 100 mcg, Oral, Q AM  metoprolol succinate XL, 25 mg, Oral, BID  sodium chloride, 10 mL, Intravenous, Q12H      Continuous Infusions:heparin, 18 Units/kg/hr, Last Rate: 16 Units/kg/hr (03/20/23 2208)  hold, 1 each      PRN Meds:.•  acetaminophen  •  senna-docusate sodium **AND** polyethylene glycol **AND** bisacodyl **AND** bisacodyl  •  dextrose  •  dextrose  •  glucagon (human recombinant)  •  heparin  •  heparin  •  hold  •  HYDROcodone-acetaminophen  •  melatonin  •  ondansetron  •  [COMPLETED] Insert Peripheral IV **AND** sodium chloride  •  sodium chloride  •  sodium chloride    Objective     VITAL SIGNS  Vitals:    03/20/23 1610 03/20/23 2100 03/21/23 0121 03/21/23 0408   BP: 126/84  124/75 131/88   BP Location: Right arm  Right arm Right arm   Patient Position: Lying  Lying Lying   Pulse: 79 82 73 81   Resp: 14  16 21   Temp: 97 °F (36.1 °C)  97.5 °F (36.4 °C) 97.9 °F (36.6 °C)   TempSrc: Axillary  Axillary Axillary   SpO2: 100% 95% 92% 93%   Weight:       Height:           Flowsheet Rows    Flowsheet Row First Filed Value   Admission Height 160 cm (63\") Documented at 03/20/2023 0850   Admission Weight 67.9 kg (149 lb 11.1 oz) Documented at 03/20/2023 0850            Intake/Output Summary (Last 24 hours) at 3/21/2023 0617  Last data filed at 3/20/2023 2219  Gross per 24 hour   Intake --   Output 700 ml   Net -700 ml        TELEMETRY: Atrial fibrillation  Physical Exam:  The patient is alert, oriented and in no distress.  Vital signs as noted above.  Head and neck revealed no carotid bruits or jugular venous distention.  No thyromegaly or lymphadenopathy is present  Lungs clear.  No " wheezing.  Breath sounds are normal bilaterally.  Heart normal first and second heart sounds.  No murmur. No precordial rub is present.  No gallop is present.  Abdomen soft and nontender.  No organomegaly is present.  Extremities with decreased peripheral pulses bilateral edema with redness and warmth.  Skin warm and dry.  Musculoskeletal system is grossly normal  CNS grossly normal    Reviewed and updated.      Results Review:   I reviewed the patient's new clinical results.  Lab Results (last 24 hours)     Procedure Component Value Units Date/Time    aPTT [[id number]] Collected: 03/21/23 0528    Specimen: Blood from Arm, Right Updated: 03/21/23 0603    Protime-INR [[id number]]  (Abnormal) Collected: 03/20/23 1902    Specimen: Blood Updated: 03/20/23 1959     Protime 13.1 Seconds      INR 1.29    aPTT [[id number]]  (Abnormal) Collected: 03/20/23 1902    Specimen: Blood Updated: 03/20/23 1959     .9 seconds     MRSA Screen, PCR (Inpatient) - Swab, Nares [[id number]]  (Normal) Collected: 03/20/23 1349    Specimen: Swab from Nares Updated: 03/20/23 1756     MRSA PCR No MRSA Detected    Narrative:      The negative predictive value of this diagnostic test is high and should only be used to consider de-escalating anti-MRSA therapy. A positive result may indicate colonization with MRSA and must be correlated clinically.    POC Glucose Once [[id number]]  (Abnormal) Collected: 03/20/23 1747    Specimen: Blood Updated: 03/20/23 1748     Glucose 115 mg/dL      Comment: Serial Number: [id number]:  [id number]       POC Glucose Once [[id number]]  (Abnormal) Collected: 03/20/23 1617    Specimen: Blood Updated: 03/20/23 1618     Glucose 123 mg/dL      Comment: Serial Number: [id number]:  [id number]       Blood Culture - Blood, Hand, Right [[id number]] Collected: 03/20/23 1116    Specimen: Blood from Hand, Right Updated: 03/20/23 1124    Comprehensive Metabolic Panel [[id number]]  (Abnormal) Collected: 03/20/23 0915     Specimen: Blood Updated: 03/20/23 0953     Glucose 115 mg/dL      BUN 41 mg/dL      Creatinine 0.92 mg/dL      Sodium 138 mmol/L      Potassium 4.2 mmol/L      Comment: Slight hemolysis detected by analyzer. Results may be affected.        Chloride 100 mmol/L      CO2 26.0 mmol/L      Calcium 9.0 mg/dL      Total Protein 6.0 g/dL      Albumin 3.0 g/dL      ALT (SGPT) 44 U/L      AST (SGOT) 54 U/L      Alkaline Phosphatase 204 U/L      Total Bilirubin 1.4 mg/dL      Globulin 3.0 gm/dL      A/G Ratio 1.0 g/dL      BUN/Creatinine Ratio 44.6     Anion Gap 12.0 mmol/L      eGFR 62.7 mL/min/1.73     Narrative:      GFR Normal >60  Chronic Kidney Disease <60  Kidney Failure <15    The GFR formula is only valid for adults with stable renal function between ages 18 and 70.    CK [[id number]]  (Normal) Collected: 03/20/23 0915    Specimen: Blood Updated: 03/20/23 0953     Creatine Kinase 43 U/L     Blood Culture - Blood, Arm, Left [[id number]] Collected: 03/20/23 0921    Specimen: Blood from Arm, Left Updated: 03/20/23 0925    CBC & Differential [[id number]]  (Abnormal) Collected: 03/20/23 0915    Specimen: Blood Updated: 03/20/23 0923    Narrative:      The following orders were created for panel order CBC & Differential.  Procedure                               Abnormality         Status                     ---------                               -----------         ------                     CBC Auto Differential[[id number]]        Abnormal            Final result                 Please view results for these tests on the individual orders.    CBC Auto Differential [[id number]]  (Abnormal) Collected: 03/20/23 0915    Specimen: Blood Updated: 03/20/23 0923     WBC 11.10 10*3/mm3      RBC 5.46 10*6/mm3      Hemoglobin 17.5 g/dL      Hematocrit 55.3 %      .3 fL      MCH 32.1 pg      MCHC 31.7 g/dL      RDW 16.0 %      RDW-SD 56.9 fl      MPV 8.3 fL      Platelets 180 10*3/mm3      Neutrophil % 90.2 %      Lymphocyte % 4.0  %      Monocyte % 5.1 %      Eosinophil % 0.1 %      Basophil % 0.6 %      Neutrophils, Absolute 10.00 10*3/mm3      Lymphocytes, Absolute 0.40 10*3/mm3      Monocytes, Absolute 0.60 10*3/mm3      Eosinophils, Absolute 0.00 10*3/mm3      Basophils, Absolute 0.10 10*3/mm3      nRBC 0.0 /100 WBC           Imaging Results (Last 24 Hours)     Procedure Component Value Units Date/Time    CT Angiogram Chest [[id number]] Collected: 03/20/23 1958     Updated: 03/20/23 2020    Narrative:      CT ANGIOGRAM CHEST, CT ANGIO ABDOMINAL AORTA BILAT ILIOFEM RUNOFF    Date of Exam: 3/20/2023 6:41 PM EDT    Indication: bilateral lower extremity ischemia, assess for possible chest source of emboli.    Comparison: Chest x-ray 3/13/2023.. CT chest 8/12/2022.    Technique: CTA of the chest was performed after the uneventful intravenous administration of iodinated contrast. Reconstructed coronal and sagittal images were also obtained. In addition, a 3-D volume rendered image was created for interpretation.   Automated exposure control and iterative reconstruction methods were used.     Findings:    VASCULATURE:  *No acute intramural hematoma, dissection, aneurysm or injury.  *No acute pulmonary emboli.  *Bilateral renal arteries, celiac artery, SMA, MARINA, and iliac arteries are patent.  *Bilateral common femoral arteries are patent.  *Bilateral superficial femoral arteries are patent.  *Right popliteal artery, trifurcation, anterior tibial, posterior tibial, and fibular arteries are patent.  *Left popliteal artery is patent.  *Proximal left trifurcation is patent.  *Remainder of the left leg arteries including anterior tibial, posterior tibial, fibular arteries demonstrate markedly diminutive flow with no flow in left foot.    CHEST:  *Lymphadenopathy: No thoracic lymphadenopathy.  *Lungs: Large right and trace left pleural effusions. Near complete atelectasis of right lower lobe. Bibasilar and right middle lobe opacities.  *Pneumothorax:  None.  *Heart: Cardiomegaly with reflux of contrast and IVC and hepatic veins suggest right heart failure.  *  ABDOMEN/PELVIS:  *Liver: Unremarkable.  *Gallbladder: Gallbladder wall thickening. No radiopaque gallstones.  *Pancreas: Normal.  *Spleen: Normal.  *Adrenal Glands: Normal.  *Kidneys: No renal stones or hydronephrosis bilaterally.  *Stomach:Gastric wall thickening.  *Bowel: Nonobstructive bowel gas pattern. No bowel wall inflammation.  *Appendix: Appendectomy.   *Peritoneal Cavity: No pneumoperitoneum.  No lymphadenopathy. Mild ascites throughout abdomen and pelvis.  *Urinary Bladder: Unremarkable.  *Pelvis: No free pelvic fluid.  *  BONES:  *No acute fractures or dislocations. No osseous destructive lesions. Multilevel spondylosis. Generalized osteopenia.      Impression:      1.No acute aortic dissection, aneurysm or aortic injury. No acute pulmonary emboli.  2.Large right and trace left pleural effusions. Near complete atelectasis of right lower lobe. Bibasilar and right middle lobe opacities may represent atelectasis or infiltrates. Follow-up recommended.  3.Cardiomegaly with reflux of contrast and IVC and hepatic veins suggest right heart failure.  4.Gallbladder wall thickening is a nonspecific finding can be seen in multiple inflammatory process including acute cholecystitis and volume overload. If there is clinical concern for acute cholecystitis, ultrasound can be obtained for further   evaluation.  5.Mild ascites throughout abdomen and pelvis.  6.Anasarca.  7.Proximal left trifurcation is patent. Remainder of the left leg arteries including anterior tibial, posterior tibial, fibular arteries demonstrate markedly diminutive flow with no flow in left foot.      Case discussed with nurse alin Smith 3/20/2023 8:17 PM EDT.    Electronically Signed: Yoni Max    3/20/2023 8:18 PM EDT    Workstation ID: [id number]    CT Angio Abdominal Aorta Bilateral Iliofem Runoff [[id number]] Collected:  03/20/23 1958     Updated: 03/20/23 2020    Narrative:      CT ANGIOGRAM CHEST, CT ANGIO ABDOMINAL AORTA BILAT ILIOFEM RUNOFF    Date of Exam: 3/20/2023 6:41 PM EDT    Indication: bilateral lower extremity ischemia, assess for possible chest source of emboli.    Comparison: Chest x-ray 3/13/2023.. CT chest 8/12/2022.    Technique: CTA of the chest was performed after the uneventful intravenous administration of iodinated contrast. Reconstructed coronal and sagittal images were also obtained. In addition, a 3-D volume rendered image was created for interpretation.   Automated exposure control and iterative reconstruction methods were used.     Findings:    VASCULATURE:  *No acute intramural hematoma, dissection, aneurysm or injury.  *No acute pulmonary emboli.  *Bilateral renal arteries, celiac artery, SMA, MARINA, and iliac arteries are patent.  *Bilateral common femoral arteries are patent.  *Bilateral superficial femoral arteries are patent.  *Right popliteal artery, trifurcation, anterior tibial, posterior tibial, and fibular arteries are patent.  *Left popliteal artery is patent.  *Proximal left trifurcation is patent.  *Remainder of the left leg arteries including anterior tibial, posterior tibial, fibular arteries demonstrate markedly diminutive flow with no flow in left foot.    CHEST:  *Lymphadenopathy: No thoracic lymphadenopathy.  *Lungs: Large right and trace left pleural effusions. Near complete atelectasis of right lower lobe. Bibasilar and right middle lobe opacities.  *Pneumothorax: None.  *Heart: Cardiomegaly with reflux of contrast and IVC and hepatic veins suggest right heart failure.  *  ABDOMEN/PELVIS:  *Liver: Unremarkable.  *Gallbladder: Gallbladder wall thickening. No radiopaque gallstones.  *Pancreas: Normal.  *Spleen: Normal.  *Adrenal Glands: Normal.  *Kidneys: No renal stones or hydronephrosis bilaterally.  *Stomach:Gastric wall thickening.  *Bowel: Nonobstructive bowel gas pattern. No bowel  wall inflammation.  *Appendix: Appendectomy.   *Peritoneal Cavity: No pneumoperitoneum.  No lymphadenopathy. Mild ascites throughout abdomen and pelvis.  *Urinary Bladder: Unremarkable.  *Pelvis: No free pelvic fluid.  *  BONES:  *No acute fractures or dislocations. No osseous destructive lesions. Multilevel spondylosis. Generalized osteopenia.      Impression:      1.No acute aortic dissection, aneurysm or aortic injury. No acute pulmonary emboli.  2.Large right and trace left pleural effusions. Near complete atelectasis of right lower lobe. Bibasilar and right middle lobe opacities may represent atelectasis or infiltrates. Follow-up recommended.  3.Cardiomegaly with reflux of contrast and IVC and hepatic veins suggest right heart failure.  4.Gallbladder wall thickening is a nonspecific finding can be seen in multiple inflammatory process including acute cholecystitis and volume overload. If there is clinical concern for acute cholecystitis, ultrasound can be obtained for further   evaluation.  5.Mild ascites throughout abdomen and pelvis.  6.Anasarca.  7.Proximal left trifurcation is patent. Remainder of the left leg arteries including anterior tibial, posterior tibial, fibular arteries demonstrate markedly diminutive flow with no flow in left foot.      Case discussed with nurse alin boo @ 3/20/2023 8:17 PM EDT.    Electronically Signed: Yoni Max    3/20/2023 8:18 PM EDT    Workstation ID: [id number]      LAB RESULTS (LAST 7 DAYS)    CBC  Results from last 7 days   Lab Units 03/20/23  0915   WBC 10*3/mm3 11.10*   RBC 10*6/mm3 5.46*   HEMOGLOBIN g/dL 17.5*   HEMATOCRIT % 55.3*   MCV fL 101.3*   PLATELETS 10*3/mm3 180       BMP  Results from last 7 days   Lab Units 03/20/23  0915   SODIUM mmol/L 138   POTASSIUM mmol/L 4.2   CHLORIDE mmol/L 100   CO2 mmol/L 26.0   BUN mg/dL 41*   CREATININE mg/dL 0.92   GLUCOSE mg/dL 115*       CMP   Results from last 7 days   Lab Units 03/20/23  0915   SODIUM mmol/L 138    POTASSIUM mmol/L 4.2   CHLORIDE mmol/L 100   CO2 mmol/L 26.0   BUN mg/dL 41*   CREATININE mg/dL 0.92   GLUCOSE mg/dL 115*   ALBUMIN g/dL 3.0*   BILIRUBIN mg/dL 1.4*   ALK PHOS U/L 204*   AST (SGOT) U/L 54*   ALT (SGPT) U/L 44*         BNP        TROPONIN  Results from last 7 days   Lab Units 03/20/23  0915   CK TOTAL U/L 43       CoAg  Results from last 7 days   Lab Units 03/20/23  1902   INR  1.29*   APTT seconds 132.9*       Creatinine Clearance  Estimated Creatinine Clearance: 44.4 mL/min (by C-G formula based on SCr of 0.92 mg/dL).    ABG        Radiology  CT Angio Abdominal Aorta Bilateral Iliofem Runoff    Result Date: 3/20/2023  1.No acute aortic dissection, aneurysm or aortic injury. No acute pulmonary emboli. 2.Large right and trace left pleural effusions. Near complete atelectasis of right lower lobe. Bibasilar and right middle lobe opacities may represent atelectasis or infiltrates. Follow-up recommended. 3.Cardiomegaly with reflux of contrast and IVC and hepatic veins suggest right heart failure. 4.Gallbladder wall thickening is a nonspecific finding can be seen in multiple inflammatory process including acute cholecystitis and volume overload. If there is clinical concern for acute cholecystitis, ultrasound can be obtained for further evaluation. 5.Mild ascites throughout abdomen and pelvis. 6.Anasarca. 7.Proximal left trifurcation is patent. Remainder of the left leg arteries including anterior tibial, posterior tibial, fibular arteries demonstrate markedly diminutive flow with no flow in left foot. Case discussed with nurse alin Smith 3/20/2023 8:17 PM EDT. Electronically Signed: Yoni Max  3/20/2023 8:18 PM EDT  Workstation ID: [id number]    CT Angiogram Chest    Result Date: 3/20/2023  1.No acute aortic dissection, aneurysm or aortic injury. No acute pulmonary emboli. 2.Large right and trace left pleural effusions. Near complete atelectasis of right lower lobe. Bibasilar and right middle  lobe opacities may represent atelectasis or infiltrates. Follow-up recommended. 3.Cardiomegaly with reflux of contrast and IVC and hepatic veins suggest right heart failure. 4.Gallbladder wall thickening is a nonspecific finding can be seen in multiple inflammatory process including acute cholecystitis and volume overload. If there is clinical concern for acute cholecystitis, ultrasound can be obtained for further evaluation. 5.Mild ascites throughout abdomen and pelvis. 6.Anasarca. 7.Proximal left trifurcation is patent. Remainder of the left leg arteries including anterior tibial, posterior tibial, fibular arteries demonstrate markedly diminutive flow with no flow in left foot. Case discussed with nurse alin boo @ 3/20/2023 8:17 PM EDT. Electronically Signed: Yoni Max  3/20/2023 8:18 PM EDT  Workstation ID: [id number]          EKG          I personally viewed and interpreted the patient's EKG/Telemetry data:    ECHOCARDIOGRAM:    Results for orders placed during the hospital encounter of 08/25/22    Adult Transthoracic Echo Complete W/ Cont if Necessary Per Protocol    Interpretation Summary  · Estimated left ventricular EF = 30% Left ventricular systolic function is moderately decreased.    Indications  Status post CABG    Technically satisfactory study.  Mitral valve is structurally normal.  Tricuspid valve is structurally normal.  Aortic valve is structurally normal.  Pulmonic valve could not be well visualized.  Moderate mitral and tricuspid regurgitation is present.  Left ventricle is enlarged with diffuse hypocontractility with ejection fraction of 30%.  Right ventricle enlargement is present.  Atrial septum is intact.  Aorta is normal.  No pericardial effusion or intracardiac thrombus is seen.    Impression  Moderate mitral and tricuspid regurgitation.  Biventricular enlargement and dysfunction with estimated left ventricle ejection fraction of 30%.          STRESS MYOVIEW:    Cardiolite (Tc-99m  Sestamibi) stress test    CARDIAC CATHETERIZATION:            OTHER:         Assessment & Plan     Principal Problem:    Cellulitis of right leg        ]]]]]]]]]]]]]]]]]]]]  Impression  ==========  - Significant lower extremity edema and erythema.  Significant digital ischemia.     -Past history of severe hematuria.-Improved.  CT scan of the abdomen showed significant clot burden in the bladder and probable anterior bladder perforation.     -Progressively worsening shortness of breath and leg edema-better.  Recurrent left pleural effusion which was loculated.  Patient had chest tube and subsequently had video-assisted thoracoscopy with complete decortication on the left side at [facility].  (March 2022.)     -Congestive heart failure  Left ventricle dysfunction with ejection fraction of 30%.     Echocardiogram 8/25/2022 revealed   Moderate mitral and tricuspid regurgitation.  Biventricular enlargement and dysfunction with estimated left ventricle ejection fraction of 30%.     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  Was not taking thyroid medicine replacement properly.     =================  Plan  ===========  Patient has significant lower extremity edema and erythema.  Patient has significant distal ischemia.  Appreciated vascular and thoracic consultation.     Patient was on Eliquis.  Eliquis is on hold at this time in anticipation of thoracentesis.     Chronic atrial fibrillation-rate is controlled now.       Renal dysfunction  BUN/creatinine-30/1.03  41/0.9- 3/21/2023      Ischemic cardiomyopathy  Echocardiogram 8/25/2022 revealed  Moderate mitral and tricuspid  regurgitation.  Biventricular enlargement and dysfunction with estimated left ventricle ejection fraction of 30%.     Intravenous diuresis with close observation of renal function.    Bilateral pleural effusions right more than left.  Patient to have paracentesis today.     Echocardiogram.-Not performed yet.    EKG-atrial fibrillation-3/21/2023  Follow-up labs ordered.     Further plan will depend on patient's progress.  ]]]]]]]]]]]]]]]]]         Yordan Evans MD  03/21/23  06:17 EDT

## 2023-03-21 NOTE — CONSULTS
picc team consult:    Procedure explained to patient and agrees to proceed.  Time out performed.  ML SL placed RUE cephalic vessel utilizing US guidance and modified seldinger technique with easily compressible vessel without difficulty.  Dark venous blood return noted and line flushes without difficulty.

## 2023-03-21 NOTE — CONSULTS
Infectious Diseases Consult Note    Referring Provider: Lily Nesbitt MD    Reason for Consultation: Bacteremia    Patient Care Team:  Minerva Chatterjee MD as PCP - General (Internal Medicine)  Yordan Evans MD as Consulting Physician (Cardiology)  Amilcar Smallwood MD as Consulting Physician (Hematology and Oncology)    Chief complaint bilateral leg edema and short of breath    Subjective     History of present illness:      This is 83-year-old female who was hospitalized [facility] on March 20, 2023.  The patient presented hospital with complaints of edema of both lower extremities.  She was found to have cyanosis of the left foot and patient was seen by vascular surgery service.  Blood culture was positive in 1 out of 2 sets for gram-negative bacilli.  The patient is currently on IV Unasyn.  The patient is hemodynamically stable.  The patient was noted to have large right-sided effusion and s/p ultrasound-guided thoracentesis earlier today.    Review of Systems   Review of Systems   Constitutional: Negative.    HENT: Negative.    Eyes: Negative.    Respiratory: Positive for shortness of breath.    Cardiovascular: Positive for leg swelling.   Gastrointestinal: Negative.    Genitourinary: Negative.    Musculoskeletal: Negative.    Skin: Negative.    Neurological: Negative.    Hematological: Negative.    Psychiatric/Behavioral: Negative.        Medications  Medications Prior to Admission   Medication Sig Dispense Refill Last Dose   • acetaminophen (TYLENOL) 500 MG tablet Take 1 tablet by mouth Every 6 (Six) Hours As Needed for Mild Pain.   Past Week   • apixaban (ELIQUIS) 5 MG tablet tablet Take 1 tablet by mouth Daily. Only QD per Cardiologist   Past Week   • Cholecalciferol (VITAMIN D3) 2000 units tablet Take 1 tablet by mouth Daily.   3/20/2023   • furosemide (LASIX) 20 MG tablet Take 3 tablets by mouth Daily. 270 tablet 1 3/20/2023   • insulin aspart prot-insulin aspart (novoLOG 70/30) (70-30) 100  UNIT/ML injection Inject 10 Units under the skin into the appropriate area as directed 2 (Two) Times a Day As Needed (elevated blood sugar).   Past Week   • Krill Oil 300 MG capsule Take 1 capsule by mouth Daily.   3/20/2023   • levothyroxine (Synthroid) 100 MCG tablet Take 1 tablet by mouth Daily. 90 tablet 1 3/20/2023   • metoprolol succinate XL (TOPROL-XL) 25 MG 24 hr tablet Take 1 tablet by mouth twice daily 180 tablet 3 3/20/2023   • Multiple Vitamins-Minerals (MULTIVITAMIN ADULT EXTRA C PO) Take 1 tablet by mouth Daily.   3/20/2023   • mupirocin (BACTROBAN) 2 % ointment Apply 1 application topically to the appropriate area as directed 3 (Three) Times a Day. Rash on both feet 30 g 1 3/20/2023   • spironolactone (ALDACTONE) 25 MG tablet Take 1 tablet by mouth Daily. 90 tablet 3 3/20/2023       History  Past Medical History:   Diagnosis Date   • Astigmatism, bilateral 11/19/2019   • Benign essential hypertension    • Bilateral presbyopia 11/19/2019   • CAD (coronary artery disease)    • CHF (congestive heart failure) ([facility])    • Closed right ankle fracture    • COVID-19 vaccination refused    • Hyperlipidemia    • Hypothyroidism    • Influenza vaccine needed    • Myocardial infarction (HCC)    • Prediabetes    • Pseudophakia, both eyes 11/19/2019   • Sleep apnea    • Thoracic back pain      Past Surgical History:   Procedure Laterality Date   • APPENDECTOMY     • CARDIAC CATHETERIZATION  2012    x3    • CORONARY ARTERY BYPASS GRAFT  12/2014   • CORONARY STENT PLACEMENT      x3   • CYSTOSCOPY WITH CLOT EVACUATION N/A 3/25/2022    Procedure: CYSTOSCOPY WITH CLOT EVACUATION AND FULGURATION ;  Surgeon: Sukhdev Poole MD;  Location: [facility] MAIN OR;  Service: Urology;  Laterality: N/A;   • HARDWARE REMOVAL Right 7/21/2020    Procedure: ANKLE/FOOT HARDWARE REMOVAL;  Surgeon: Lincoln Sibley MD;  Location: [facility] MAIN OR;  Service: Orthopedics;  Laterality: Right;   • ORIF ANKLE FRACTURE Right 04/23/2019     Abelen- George Mem   • THORACOSCOPY WITH DECORTICATION Left 3/18/2022    Procedure: LEFT THORACOSCOPY VIDEO ASSISTED WITH COMPLETE DECORTICATION;  Surgeon: Sabra Kuo MD;  Location: [facility];  Service: Thoracic;  Laterality: Left;       Family History  Family History   Problem Relation Age of Onset   • Hypertension Mother    • Stroke Mother    • Heart disease Mother    • Lung cancer Father    • Diabetes Brother    • Diabetes Other        Social History   reports that she quit smoking about 43 years ago. Her smoking use included cigarettes. She smoked an average of 1.5 packs per day. She has been exposed to tobacco smoke. She has never used smokeless tobacco. She reports that she does not currently use alcohol. She reports that she does not use drugs.    Allergies  Prednisone    Objective     Vital Signs   Vital Signs (last 24 hours)       03/20 0700  03/21 0659 03/21 0700  03/21 1540   Most Recent      Temp (°F) 97 -  97.9      97.3     97.3 (36.3) 03/21 1158    Heart Rate 73 -  96    75 -  89     75 03/21 1158    Resp 14 -  22      23     23 03/21 1158    /73 -  137/90    118/78 -  131/90     125/90 03/21 1521    SpO2 (%) 3 -  100    94 -  97     95 03/21 1158          Physical Exam:  Physical Exam  Vitals and nursing note reviewed.   Constitutional:       Appearance: She is well-developed.   HENT:      Head: Normocephalic and atraumatic.   Eyes:      Pupils: Pupils are equal, round, and reactive to light.   Cardiovascular:      Rate and Rhythm: Normal rate and regular rhythm.      Heart sounds: Normal heart sounds.   Pulmonary:      Effort: Pulmonary effort is normal. No respiratory distress.      Breath sounds: Normal breath sounds. No wheezing or rales.   Abdominal:      General: Bowel sounds are normal. There is no distension.      Palpations: Abdomen is soft. There is no mass.      Tenderness: There is no abdominal tenderness. There is no guarding or rebound.   Musculoskeletal:          General: No deformity. Normal range of motion.      Cervical back: Normal range of motion and neck supple.      Right lower leg: Edema present.      Left lower leg: Edema present.      Comments: Bilateral edema.  There was a cyanosis of the left foot with cold foot    Right leg was warm to touch and there is erythema but below the knee   Skin:     General: Skin is warm.      Findings: No erythema or rash.   Neurological:      Mental Status: She is alert and oriented to person, place, and time.      Cranial Nerves: No cranial nerve deficit.         Microbiology  Microbiology Results (last 10 days)     Procedure Component Value - Date/Time    Body Fluid Culture - Body Fluid, Pleural Cavity [[id number]] Collected: 03/21/23 1028    Lab Status: Preliminary result Specimen: Body Fluid from Pleural Cavity Updated: 03/21/23 1252     Gram Stain Few (2+) WBCs seen      No organisms seen    MRSA Screen, PCR (Inpatient) - Swab, Nares [[id number]]  (Normal) Collected: 03/20/23 1349    Lab Status: Final result Specimen: Swab from Nares Updated: 03/20/23 1756     MRSA PCR No MRSA Detected    Narrative:      The negative predictive value of this diagnostic test is high and should only be used to consider de-escalating anti-MRSA therapy. A positive result may indicate colonization with MRSA and must be correlated clinically.    Blood Culture - Blood, Hand, Right [[id number]]  (Normal) Collected: 03/20/23 1116    Lab Status: Preliminary result Specimen: Blood from Hand, Right Updated: 03/21/23 1131     Blood Culture No growth at 24 hours    Blood Culture - Blood, Arm, Left [[id number]]  (Abnormal) Collected: 03/20/23 0921    Lab Status: Preliminary result Specimen: Blood from Arm, Left Updated: 03/21/23 0641     Blood Culture Abnormal Stain     Gram Stain Anaerobic Bottle Gram negative bacilli    Narrative:      Less than seven (7) mL's of blood was collected.  Insufficient quantity may yield false negative results.    Blood Culture ID,  PCR - Blood, Arm, Left [[id number]] Collected: 03/20/23 0921    Lab Status: Final result Specimen: Blood from Arm, Left Updated: 03/21/23 0639     BCID, PCR Negative by BCID PCR. Culture to Follow.     BOTTLE TYPE Anaerobic Bottle          Laboratory  Results from last 7 days   Lab Units 03/20/23  0915   WBC 10*3/mm3 11.10*   HEMOGLOBIN g/dL 17.5*   HEMATOCRIT % 55.3*   PLATELETS 10*3/mm3 180     Results from last 7 days   Lab Units 03/20/23  0915   SODIUM mmol/L 138   POTASSIUM mmol/L 4.2   CHLORIDE mmol/L 100   CO2 mmol/L 26.0   BUN mg/dL 41*   CREATININE mg/dL 0.92   GLUCOSE mg/dL 115*   CALCIUM mg/dL 9.0     Results from last 7 days   Lab Units 03/20/23  0915   SODIUM mmol/L 138   POTASSIUM mmol/L 4.2   CHLORIDE mmol/L 100   CO2 mmol/L 26.0   BUN mg/dL 41*   CREATININE mg/dL 0.92   GLUCOSE mg/dL 115*   CALCIUM mg/dL 9.0     Results from last 7 days   Lab Units 03/20/23  0915   CK TOTAL U/L 43               Radiology  Imaging Results (Last 72 Hours)     Procedure Component Value Units Date/Time    XR Chest 1 View [[id number]] Collected: 03/21/23 1123     Updated: 03/21/23 1127    Narrative:      XR CHEST 1 VW    Date of Exam: 3/21/2023 10:45 AM EDT    Indication: s/p right thora.    Comparison: 3/21/2023 earlier same day    Findings:  Sternotomy wires again overlie the midline. There has been interval near complete resolution of right pleural fluid status post thoracentesis. No pneumothorax is identified. Previously seen right perihilar opacity may be due to fissural fluid. No fluid   remains within the fissure. Small left effusion remains. There is overlying consolidation in the left base which may be due to atelectasis or pneumonia. Cardiomegaly is unchanged.      Impression:      Impression:  Interval near complete resolution of right effusion status post thoracentesis. No pneumothorax.    Additional findings as above.    Electronically Signed: Balwinder Klein    3/21/2023 11:24 AM EDT    Workstation ID: [id number]     US Thoracentesis [[id number]] Collected: 03/21/23 1104    Specimen: Body Fluid Updated: 03/21/23 1115    Narrative:      US THORACENTESIS    Date of Exam: 3/21/2023 10:09 AM EDT    Indication: 83-year-old female with history congestive heart failure and a sizable right-sided pleural fluid collection with shortness of air.    Comparison: CT chest, March 20, 2023.    Technique: A detailed explanation of the procedure, including the risks, benefits, and alternatives was provided.  A preprocedure timeout was performed. The patient was placed into a seated position. Her right posterior chest was evaluated with   ultrasound skin site marked then prepped and draped using maximum sterile barrier technique. After obtaining adequate local anesthesia using 1% lidocaine, the pleural space was cannulated using a 4 [place] straight centesis catheter and the needle   removed. Approximately 1240 cc of cloudy appearing yellow fluid was aspirated out after which the catheter was removed and a sterile dressing applied. She tolerated the procedure well number no immediate complications. Her vital signs were monitored   throughout.    Fluoroscopic Time: None    Findings:  Ultrasound evaluation does demonstrate the presence of a large right-sided pleural fluid collection. A hardcopy ultrasound image was retained.      Impression:      Impression:  Technically successful ultrasound-guided large volume right-sided thoracentesis as described above.    A sample of the turbid yellow pleural fluid was sent for laboratory evaluation.    Electronically Signed: Antoine Carpio    3/21/2023 11:13 AM EDT    Workstation ID: [id number]    XR Chest 1 View [[id number]] Collected: 03/21/23 0853     Updated: 03/21/23 0857    Narrative:      XR CHEST 1 VW    Date of Exam: 3/21/2023 6:14 AM EDT    Indication: right effusion.    Comparison: 3/13/2023    Findings:  Sternotomy wires overlie the midline. There is a small right pleural effusion which appears decreased  as compared to prior. There is probable fluid loculated within the minor fissure and major fissure. Small left effusion persists. There is overlying   consolidation in the bases which may be due to atelectasis or infiltrate. Increasing haziness in the right perihilar region which may be due to pneumonia or asymmetric edema. Cardiomegaly is again noted. No pneumothorax is seen.      Impression:      Impression:    1. Interval decrease in right pleural effusion. There appears to be some loculated fluid within the fissures now present.  2. Increasing airspace opacity in the right perihilar region suggesting pneumonia or asymmetric edema.  3. Bibasilar consolidations suggesting atelectasis or infiltrate. Small left effusion.  4. Cardiomegaly.    Electronically Signed: Balwinder Klein    3/21/2023 8:55 AM EDT    Workstation ID: [id number]    CT Angiogram Chest [[id number]] Collected: 03/20/23 1958     Updated: 03/20/23 2020    Narrative:      CT ANGIOGRAM CHEST, CT ANGIO ABDOMINAL AORTA BILAT ILIOFEM RUNOFF    Date of Exam: 3/20/2023 6:41 PM EDT    Indication: bilateral lower extremity ischemia, assess for possible chest source of emboli.    Comparison: Chest x-ray 3/13/2023.. CT chest 8/12/2022.    Technique: CTA of the chest was performed after the uneventful intravenous administration of iodinated contrast. Reconstructed coronal and sagittal images were also obtained. In addition, a 3-D volume rendered image was created for interpretation.   Automated exposure control and iterative reconstruction methods were used.     Findings:    VASCULATURE:  *No acute intramural hematoma, dissection, aneurysm or injury.  *No acute pulmonary emboli.  *Bilateral renal arteries, celiac artery, SMA, MARINA, and iliac arteries are patent.  *Bilateral common femoral arteries are patent.  *Bilateral superficial femoral arteries are patent.  *Right popliteal artery, trifurcation, anterior tibial, posterior tibial, and fibular arteries are  patent.  *Left popliteal artery is patent.  *Proximal left trifurcation is patent.  *Remainder of the left leg arteries including anterior tibial, posterior tibial, fibular arteries demonstrate markedly diminutive flow with no flow in left foot.    CHEST:  *Lymphadenopathy: No thoracic lymphadenopathy.  *Lungs: Large right and trace left pleural effusions. Near complete atelectasis of right lower lobe. Bibasilar and right middle lobe opacities.  *Pneumothorax: None.  *Heart: Cardiomegaly with reflux of contrast and IVC and hepatic veins suggest right heart failure.  *  ABDOMEN/PELVIS:  *Liver: Unremarkable.  *Gallbladder: Gallbladder wall thickening. No radiopaque gallstones.  *Pancreas: Normal.  *Spleen: Normal.  *Adrenal Glands: Normal.  *Kidneys: No renal stones or hydronephrosis bilaterally.  *Stomach:Gastric wall thickening.  *Bowel: Nonobstructive bowel gas pattern. No bowel wall inflammation.  *Appendix: Appendectomy.   *Peritoneal Cavity: No pneumoperitoneum.  No lymphadenopathy. Mild ascites throughout abdomen and pelvis.  *Urinary Bladder: Unremarkable.  *Pelvis: No free pelvic fluid.  *  BONES:  *No acute fractures or dislocations. No osseous destructive lesions. Multilevel spondylosis. Generalized osteopenia.      Impression:      1.No acute aortic dissection, aneurysm or aortic injury. No acute pulmonary emboli.  2.Large right and trace left pleural effusions. Near complete atelectasis of right lower lobe. Bibasilar and right middle lobe opacities may represent atelectasis or infiltrates. Follow-up recommended.  3.Cardiomegaly with reflux of contrast and IVC and hepatic veins suggest right heart failure.  4.Gallbladder wall thickening is a nonspecific finding can be seen in multiple inflammatory process including acute cholecystitis and volume overload. If there is clinical concern for acute cholecystitis, ultrasound can be obtained for further   evaluation.  5.Mild ascites throughout abdomen and  pelvis.  6.Anasarca.  7.Proximal left trifurcation is patent. Remainder of the left leg arteries including anterior tibial, posterior tibial, fibular arteries demonstrate markedly diminutive flow with no flow in left foot.      Case discussed with nurse alin Smith 3/20/2023 8:17 PM EDT.    Electronically Signed: Yoni Mansoor    3/20/2023 8:18 PM EDT    Workstation ID: [id number]    CT Angio Abdominal Aorta Bilateral Iliofem Runoff [[id number]] Collected: 03/20/23 1958     Updated: 03/20/23 2020    Narrative:      CT ANGIOGRAM CHEST, CT ANGIO ABDOMINAL AORTA BILAT ILIOFEM RUNOFF    Date of Exam: 3/20/2023 6:41 PM EDT    Indication: bilateral lower extremity ischemia, assess for possible chest source of emboli.    Comparison: Chest x-ray 3/13/2023.. CT chest 8/12/2022.    Technique: CTA of the chest was performed after the uneventful intravenous administration of iodinated contrast. Reconstructed coronal and sagittal images were also obtained. In addition, a 3-D volume rendered image was created for interpretation.   Automated exposure control and iterative reconstruction methods were used.     Findings:    VASCULATURE:  *No acute intramural hematoma, dissection, aneurysm or injury.  *No acute pulmonary emboli.  *Bilateral renal arteries, celiac artery, SMA, MARINA, and iliac arteries are patent.  *Bilateral common femoral arteries are patent.  *Bilateral superficial femoral arteries are patent.  *Right popliteal artery, trifurcation, anterior tibial, posterior tibial, and fibular arteries are patent.  *Left popliteal artery is patent.  *Proximal left trifurcation is patent.  *Remainder of the left leg arteries including anterior tibial, posterior tibial, fibular arteries demonstrate markedly diminutive flow with no flow in left foot.    CHEST:  *Lymphadenopathy: No thoracic lymphadenopathy.  *Lungs: Large right and trace left pleural effusions. Near complete atelectasis of right lower lobe. Bibasilar and right  middle lobe opacities.  *Pneumothorax: None.  *Heart: Cardiomegaly with reflux of contrast and IVC and hepatic veins suggest right heart failure.  *  ABDOMEN/PELVIS:  *Liver: Unremarkable.  *Gallbladder: Gallbladder wall thickening. No radiopaque gallstones.  *Pancreas: Normal.  *Spleen: Normal.  *Adrenal Glands: Normal.  *Kidneys: No renal stones or hydronephrosis bilaterally.  *Stomach:Gastric wall thickening.  *Bowel: Nonobstructive bowel gas pattern. No bowel wall inflammation.  *Appendix: Appendectomy.   *Peritoneal Cavity: No pneumoperitoneum.  No lymphadenopathy. Mild ascites throughout abdomen and pelvis.  *Urinary Bladder: Unremarkable.  *Pelvis: No free pelvic fluid.  *  BONES:  *No acute fractures or dislocations. No osseous destructive lesions. Multilevel spondylosis. Generalized osteopenia.      Impression:      1.No acute aortic dissection, aneurysm or aortic injury. No acute pulmonary emboli.  2.Large right and trace left pleural effusions. Near complete atelectasis of right lower lobe. Bibasilar and right middle lobe opacities may represent atelectasis or infiltrates. Follow-up recommended.  3.Cardiomegaly with reflux of contrast and IVC and hepatic veins suggest right heart failure.  4.Gallbladder wall thickening is a nonspecific finding can be seen in multiple inflammatory process including acute cholecystitis and volume overload. If there is clinical concern for acute cholecystitis, ultrasound can be obtained for further   evaluation.  5.Mild ascites throughout abdomen and pelvis.  6.Anasarca.  7.Proximal left trifurcation is patent. Remainder of the left leg arteries including anterior tibial, posterior tibial, fibular arteries demonstrate markedly diminutive flow with no flow in left foot.      Case discussed with nurse alin Smith 3/20/2023 8:17 PM EDT.    Electronically Signed: Yoni Max    3/20/2023 8:18 PM EDT    Workstation ID: [id number]          Cardiology      Results Review:  I  have reviewed all clinical data, test, lab, and imaging results.       Schedule Meds  atorvastatin, 40 mg, Oral, Nightly  furosemide, 40 mg, Intravenous, Q12H  insulin lispro, 2-9 Units, Subcutaneous, 4x Daily With Meals & Nightly  levothyroxine, 100 mcg, Oral, Q AM  metoprolol succinate XL, 25 mg, Oral, BID  piperacillin-tazobactam, 3.375 g, Intravenous, Once  piperacillin-tazobactam, 3.375 g, Intravenous, Q8H  sodium chloride, 10 mL, Intravenous, Q12H        Infusion Meds  heparin, 18 Units/kg/hr, Last Rate: 18 Units/kg/hr (03/21/23 1514)  hold, 1 each        PRN Meds  •  acetaminophen  •  senna-docusate sodium **AND** polyethylene glycol **AND** bisacodyl **AND** bisacodyl  •  dextrose  •  dextrose  •  glucagon (human recombinant)  •  heparin  •  heparin  •  hold  •  HYDROcodone-acetaminophen  •  melatonin  •  ondansetron  •  polyethyl glycol-propyl glycol  •  [COMPLETED] Insert Peripheral IV **AND** sodium chloride  •  sodium chloride  •  sodium chloride      Assessment & Plan       Assessment    Bacteremia with a gram-negative bacilli.  The source is not very clear but possibly intra abdomen.    Congestive heart failure with volume overload associated with bilateral lower extremities edema and right-sided pleural effusion.  Patient s/p right-sided thoracentesis on March 21 and fluid analysis are pending    Right lower leg cellulitic changes.  Most of the erythematous probably related to the edema.  Superimposed infection cannot be ruled out.  There was no open wound    Ischemic changes to the left foot.  The patient was seen and evaluated by vascular surgery service.  Arterial Doppler showed patent vessels.  It might be micro embolism.    Possible acute cholecystitis.  General surgery service following the patient and recommending HIDA scan    Plan    Discontinue IV Unasyn  Start Zosyn 3.375 g IV every 8 hours waiting on final blood culture results  2D echo was requested to rule out vegetation and report is  pending  Continue supportive care  A.m. labs  Case was discussed with family at bedside    Yogesh Valadez MD  03/21/23  15:40 EDT    Note is dictated utilizing voice recognition software/Dragon

## 2023-03-21 NOTE — PLAN OF CARE
Problem: Fall Injury Risk  Goal: Absence of Fall and Fall-Related Injury  Outcome: Ongoing, Progressing  Intervention: Identify and Manage Contributors  Recent Flowsheet Documentation  Taken 3/21/2023 1810 by Anastasia Goyal RN  Medication Review/Management: medications reviewed  Taken 3/21/2023 1607 by Anastasia Goyal RN  Medication Review/Management: medications reviewed  Intervention: Promote Injury-Free Environment  Recent Flowsheet Documentation  Taken 3/21/2023 1810 by Anastasia Goyal RN  Safety Promotion/Fall Prevention:   safety round/check completed   room organization consistent   nonskid shoes/slippers when out of bed   lighting adjusted   fall prevention program maintained   clutter free environment maintained   assistive device/personal items within reach   activity supervised  Taken 3/21/2023 1607 by Anastasia Goyal RN  Safety Promotion/Fall Prevention:   safety round/check completed   room organization consistent   nonskid shoes/slippers when out of bed   lighting adjusted   fall prevention program maintained   clutter free environment maintained   assistive device/personal items within reach   activity supervised     Problem: Heart Failure Comorbidity  Goal: Maintenance of Heart Failure Symptom Control  Outcome: Ongoing, Progressing  Intervention: Maintain Heart Failure-Management  Recent Flowsheet Documentation  Taken 3/21/2023 1810 by Anastasia Goyal RN  Medication Review/Management: medications reviewed  Taken 3/21/2023 1607 by Anastasia Goyal RN  Medication Review/Management: medications reviewed     Problem: Osteoarthritis Comorbidity  Goal: Maintenance of Osteoarthritis Symptom Control  Outcome: Ongoing, Progressing  Intervention: Maintain Osteoarthritis Symptom Control  Recent Flowsheet Documentation  Taken 3/21/2023 1810 by Anastasia Goyal RN  Activity Management: bedrest  Medication Review/Management: medications reviewed  Taken 3/21/2023 1607 by Anastasia Goyal RN  Activity Management: bedrest  Medication  Review/Management: medications reviewed     Problem: Pain Chronic (Persistent) (Comorbidity Management)  Goal: Acceptable Pain Control and Functional Ability  Outcome: Ongoing, Progressing  Intervention: Manage Persistent Pain  Recent Flowsheet Documentation  Taken 3/21/2023 1810 by Anastasia Goyal RN  Medication Review/Management: medications reviewed  Taken 3/21/2023 1607 by Anastasia Goyal RN  Medication Review/Management: medications reviewed  Intervention: Optimize Psychosocial Wellbeing  Recent Flowsheet Documentation  Taken 3/21/2023 1607 by Anastasia Goyal RN  Diversional Activities: television  Family/Support System Care: caregiver stress acknowledged     Problem: Skin Injury Risk Increased  Goal: Skin Health and Integrity  Outcome: Ongoing, Progressing  Intervention: Optimize Skin Protection  Recent Flowsheet Documentation  Taken 3/21/2023 1810 by Anastasia Goyal RN  Head of Bed (HOB) Positioning: HOB at 30 degrees  Taken 3/21/2023 1607 by Anastasia Goyal RN  Head of Bed (HOB) Positioning: HOB at 30 degrees   Goal Outcome Evaluation:

## 2023-03-21 NOTE — CONSULTS
Hematology/Oncology Inpatient Consultation    Patient name: Jaquelin Valderrama  : 1940  MRN: [id number]  Referring Provider: Dr. Farzana Lacey  Reason for Consultation: Hypercoagulable state with BLE small vessel thrombosis    Chief complaint: BLE discoloration    History of present illness:    83 y.o. female presented to [facility] on 3/20/2023 with some chronic BLE edema that became worse over the few days prior to admission.   She stated her legs had been purple intermittently for a few months.  She was reported as holding Eliquis for chronic A. Fib for a few days before planned thoracentesis, although patient stated she had not been off her blood thinners.  She denied long car or plane ride recently, no fever/chills. CBC in ED revealed WBC 11.1, Hgb 17.5, .3, platelets 180,000.  Review of chart showed hemoglobin elevated since 2022 (ranged 16.4-19.7), with MCVs normal until 3/6/2023 (98.5). Platelets and WBC normal in reviewed time period.  Creatinine 0.92 in ED.  Blood cultures positive for gram negative bacilli. PT 13.1 (9-11.7), INR 1.29 (0.93-1.10). .9 after start of heparin.  Venous Doppler BLE was negative. NADEGE of BLE normal with severe digital ischemia bilaterally.  Normal arterial pressures of BUE.  CTA chest and abdomen showed large right and trace left pleural effusion with near complete atelectasis of right lower lobe. Additional bibasilar and right middle lobe opacities possible atelectasis vs infiltrates.  Additional findings included cardiomegaly with reflux of contrast in the IVC and hepatic veins suggesting right heart failure, nonspecific gallbladder wall thickening, mild ascites, anasarca, and patent proximal left trifurcation with markedly diminutive flow in remainder of left leg arteries and no flow in left foot.  Chest xray showed interval decrease in right pleural effusion with some loculated fluid present, increased airspace opacity in right perihilar  region showing pneumonia vs asymmetric edema, bibasilar consolidations suggestion atelectasis vs infiltrate, small left effusion, and cardiomegaly.  Repeat NADEGE remained normal.  Ultrasound thoracentesis was done 03/21 and approximately 1240 cc of cloudy yellow fluid was aspirated and sent for laboratory evaluation.      03/21/23  Hematology/Oncology was consulted by Dr. Farzana Lacey for small vessel thrombosis of bilateral feet.  Her cystoscopy with clot evacuation in 03/2022 was felt to have happened due to bladder wall tear after she fell and hit pergola with a very full bladder. She also had history of left VATS with removal of clotted hemorrhage post the 3/2022 fall. Hemoglobin has been elevated since July 2022 with Hgb 16.4, Hct 49. Hgb was as high as 19.7 on 3/6/23 at which time Jak2 was negative.     Past Medical History: astigmatism, hypertension, CAD, R ankle fracture, hyperlipidemia, hypothyroidism, MI, atrial fibrillation  Surgical History: Appendectomy, cardiac cath x 3 (2012), CABG (2014), coronary stent placement, cystoscopy with clot evacuation (3/25/2022), ankle hardware removal (7/2020), ORIF R ankle (04/2019), left VATS (03/2022)  Social History: She is .  She is a retired CPA.  She is a former smoker, quit in 1980.  She denies alcohol or recreational drug use.   Family History: hypertension (mother), stroke (mother), heart disease (mother), lung cancer (father), diabetes (brother)  Allergies: prednisone (increases blood pressure)    PCP: Minerva Chatterjee MD    History:  Past Medical History:   Diagnosis Date   • Astigmatism, bilateral 11/19/2019   • Benign essential hypertension    • Bilateral presbyopia 11/19/2019   • CAD (coronary artery disease)    • CHF (congestive heart failure) (HCC)    • Closed right ankle fracture    • COVID-19 vaccination refused    • Hyperlipidemia    • Hypothyroidism    • Influenza vaccine needed    • Myocardial infarction (HCC)    • Prediabetes    •  Pseudophakia, both eyes 2019   • Sleep apnea    • Thoracic back pain    ,   Past Surgical History:   Procedure Laterality Date   • APPENDECTOMY     • CARDIAC CATHETERIZATION  2012    x3    • CORONARY ARTERY BYPASS GRAFT  2014   • CORONARY STENT PLACEMENT      x3   • CYSTOSCOPY WITH CLOT EVACUATION N/A 3/25/2022    Procedure: CYSTOSCOPY WITH CLOT EVACUATION AND FULGURATION ;  Surgeon: Sukhdev Poole MD;  Location: [facility] MAIN OR;  Service: Urology;  Laterality: N/A;   • HARDWARE REMOVAL Right 2020    Procedure: ANKLE/FOOT HARDWARE REMOVAL;  Surgeon: Lincoln Sibley MD;  Location: [facility] MAIN OR;  Service: Orthopedics;  Laterality: Right;   • ORIF ANKLE FRACTURE Right 2019    Abelen- George Mem   • THORACOSCOPY WITH DECORTICATION Left 3/18/2022    Procedure: LEFT THORACOSCOPY VIDEO ASSISTED WITH COMPLETE DECORTICATION;  Surgeon: Sabra Kuo MD;  Location: [facility] MAIN OR;  Service: Thoracic;  Laterality: Left;   ,   Family History   Problem Relation Age of Onset   • Hypertension Mother    • Stroke Mother    • Heart disease Mother    • Lung cancer Father    • Diabetes Brother    • Diabetes Other    ,   Social History     Tobacco Use   • Smoking status: Former     Packs/day: 1.50     Types: Cigarettes     Quit date:      Years since quittin.2     Passive exposure: Past   • Smokeless tobacco: Never   • Tobacco comments:     CAFFEINE USE ICED TEA, OCCAS COFFEE   Vaping Use   • Vaping Use: Never used   Substance Use Topics   • Alcohol use: Not Currently   • Drug use: No   ,   Medications Prior to Admission   Medication Sig Dispense Refill Last Dose   • acetaminophen (TYLENOL) 500 MG tablet Take 1 tablet by mouth Every 6 (Six) Hours As Needed for Mild Pain.   Past Week   • apixaban (ELIQUIS) 5 MG tablet tablet Take 1 tablet by mouth Daily. Only QD per Cardiologist   Past Week   • Cholecalciferol (VITAMIN D3) 2000 units tablet Take 1 tablet by mouth Daily.   3/20/2023   • furosemide  (LASIX) 20 MG tablet Take 3 tablets by mouth Daily. 270 tablet 1 3/20/2023   • insulin aspart prot-insulin aspart (novoLOG 70/30) (70-30) 100 UNIT/ML injection Inject 10 Units under the skin into the appropriate area as directed 2 (Two) Times a Day As Needed (elevated blood sugar).   Past Week   • Krill Oil 300 MG capsule Take 1 capsule by mouth Daily.   3/20/2023   • levothyroxine (Synthroid) 100 MCG tablet Take 1 tablet by mouth Daily. 90 tablet 1 3/20/2023   • metoprolol succinate XL (TOPROL-XL) 25 MG 24 hr tablet Take 1 tablet by mouth twice daily 180 tablet 3 3/20/2023   • Multiple Vitamins-Minerals (MULTIVITAMIN ADULT EXTRA C PO) Take 1 tablet by mouth Daily.   3/20/2023   • mupirocin (BACTROBAN) 2 % ointment Apply 1 application topically to the appropriate area as directed 3 (Three) Times a Day. Rash on both feet 30 g 1 3/20/2023   • spironolactone (ALDACTONE) 25 MG tablet Take 1 tablet by mouth Daily. 90 tablet 3 3/20/2023   , Scheduled Meds:  ampicillin-sulbactam, 3 g, Intravenous, Q6H  atorvastatin, 40 mg, Oral, Nightly  furosemide, 40 mg, Intravenous, Q12H  insulin lispro, 2-9 Units, Subcutaneous, 4x Daily With Meals & Nightly  levothyroxine, 100 mcg, Oral, Q AM  metoprolol succinate XL, 25 mg, Oral, BID  sodium chloride, 10 mL, Intravenous, Q12H    , Continuous Infusions:  heparin, 18 Units/kg/hr, Last Rate: 14 Units/kg/hr (03/21/23 1120)  hold, 1 each    , PRN Meds:  •  acetaminophen  •  senna-docusate sodium **AND** polyethylene glycol **AND** bisacodyl **AND** bisacodyl  •  dextrose  •  dextrose  •  glucagon (human recombinant)  •  heparin  •  heparin  •  hold  •  HYDROcodone-acetaminophen  •  melatonin  •  ondansetron  •  polyethyl glycol-propyl glycol  •  [COMPLETED] Insert Peripheral IV **AND** sodium chloride  •  sodium chloride  •  sodium chloride   Allergies:  Prednisone    ROS:  Review of Systems   Constitutional: Positive for activity change. Negative for chills, fatigue, fever and unexpected  "weight change.   HENT: Negative for congestion, dental problem, hearing loss, mouth sores, nosebleeds, sore throat and trouble swallowing.    Eyes: Negative for photophobia and visual disturbance.   Respiratory: Negative for cough, chest tightness and shortness of breath (breathing better after thoracentesis).    Cardiovascular: Negative for chest pain, palpitations and leg swelling.   Gastrointestinal: Negative for abdominal distention, abdominal pain, blood in stool, constipation, diarrhea, nausea and vomiting.   Endocrine: Negative for cold intolerance and heat intolerance.   Genitourinary: Negative for decreased urine volume, difficulty urinating, frequency, hematuria and urgency.   Musculoskeletal: Negative for arthralgias and gait problem.   Skin: Negative for rash and wound.   Neurological: Negative for dizziness, tremors, facial asymmetry, weakness, light-headedness, numbness and headaches.   Hematological: Negative for adenopathy. Does not bruise/bleed easily.   Psychiatric/Behavioral: Negative for confusion and hallucinations. The patient is not nervous/anxious.    All other systems reviewed and are negative.       Objective     Vital Signs:   /90 (BP Location: Right arm, Patient Position: Lying)   Pulse 75   Temp 97.3 °F (36.3 °C) (Oral)   Resp 23   Ht 160 cm (63\")   Wt 67.9 kg (149 lb 11.1 oz)   SpO2 95%   BMI 26.52 kg/m²     Physical Exam:  Physical Exam  Vitals and nursing note reviewed.   Constitutional:       General: She is not in acute distress.     Appearance: Normal appearance. She is well-developed. She is not diaphoretic.   HENT:      Head: Normocephalic and atraumatic.      Comments: L forehead O2 monitor     Right Ear: External ear normal.      Left Ear: External ear normal.      Nose: Nose normal.      Mouth/Throat:      Mouth: Mucous membranes are moist.      Pharynx: Oropharynx is clear. No oropharyngeal exudate or posterior oropharyngeal erythema.      Comments: Dental " fillings  Eyes:      General: No scleral icterus.     Extraocular Movements: Extraocular movements intact.      Conjunctiva/sclera: Conjunctivae normal.      Pupils: Pupils are equal, round, and reactive to light.   Cardiovascular:      Rate and Rhythm: Normal rate and regular rhythm.      Heart sounds: Normal heart sounds. No murmur heard.  Pulmonary:      Effort: Pulmonary effort is normal. No respiratory distress.      Breath sounds: Normal breath sounds. No wheezing or rales.   Abdominal:      General: Bowel sounds are normal. There is no distension.      Palpations: Abdomen is soft. There is no mass.      Tenderness: There is no abdominal tenderness. There is no guarding.   Genitourinary:     Comments: Deferred   Musculoskeletal:         General: No swelling, tenderness or deformity. Normal range of motion.      Cervical back: Normal range of motion and neck supple.      Right lower leg: Edema present.      Left lower leg: Edema present.      Comments: 2+ BLE edema, RLE red with bilateral mottling of feet  LUE IV x 2   Lymphadenopathy:      Cervical: No cervical adenopathy.      Upper Body:      Right upper body: No supraclavicular adenopathy.      Left upper body: No supraclavicular adenopathy.   Skin:     General: Skin is warm and dry.      Coloration: Skin is not pale.      Findings: No bruising, erythema or rash.      Comments: Ecchymosis L forearm and R hand  Mid chest wall scar   Neurological:      General: No focal deficit present.      Mental Status: She is alert and oriented to person, place, and time.      Coordination: Coordination normal.   Psychiatric:         Mood and Affect: Mood normal.         Behavior: Behavior normal.         Thought Content: Thought content normal.          Results Review:  Lab Results (last 48 hours)     Procedure Component Value Units Date/Time    aPTT [[id number]]  (Abnormal) Collected: 03/21/23 0528    Specimen: Blood from Arm, Right Updated: 03/21/23 0643     PTT 58.7  seconds     Blood Culture - Blood, Arm, Left [[id number]]  (Abnormal) Collected: 03/20/23 0921    Specimen: Blood from Arm, Left Updated: 03/21/23 0641     Blood Culture Abnormal Stain     Gram Stain Anaerobic Bottle Gram negative bacilli    Narrative:      Less than seven (7) mL's of blood was collected.  Insufficient quantity may yield false negative results.    Blood Culture ID, PCR - Blood, Arm, Left [[id number]] Collected: 03/20/23 0921    Specimen: Blood from Arm, Left Updated: 03/21/23 0639     BCID, PCR Negative by BCID PCR. Culture to Follow.     BOTTLE TYPE Anaerobic Bottle    Protime-INR [[id number]]  (Abnormal) Collected: 03/20/23 1902    Specimen: Blood Updated: 03/20/23 1959     Protime 13.1 Seconds      INR 1.29    aPTT [[id number]]  (Abnormal) Collected: 03/20/23 1902    Specimen: Blood Updated: 03/20/23 1959     .9 seconds     MRSA Screen, PCR (Inpatient) - Swab, Nares [[id number]]  (Normal) Collected: 03/20/23 1349    Specimen: Swab from Nares Updated: 03/20/23 1756     MRSA PCR No MRSA Detected    Narrative:      The negative predictive value of this diagnostic test is high and should only be used to consider de-escalating anti-MRSA therapy. A positive result may indicate colonization with MRSA and must be correlated clinically.    POC Glucose Once [[id number]]  (Abnormal) Collected: 03/20/23 1747    Specimen: Blood Updated: 03/20/23 1748     Glucose 115 mg/dL      Comment: Serial Number: [id number]:  [id number]       POC Glucose Once [[id number]]  (Abnormal) Collected: 03/20/23 1617    Specimen: Blood Updated: 03/20/23 1618     Glucose 123 mg/dL      Comment: Serial Number: [id number]:  [id number]       Blood Culture - Blood, Hand, Right [[id number]] Collected: 03/20/23 1116    Specimen: Blood from Hand, Right Updated: 03/20/23 1124    Comprehensive Metabolic Panel [[id number]]  (Abnormal) Collected: 03/20/23 0915    Specimen: Blood Updated: 03/20/23 0953     Glucose 115 mg/dL      BUN  41 mg/dL      Creatinine 0.92 mg/dL      Sodium 138 mmol/L      Potassium 4.2 mmol/L      Comment: Slight hemolysis detected by analyzer. Results may be affected.        Chloride 100 mmol/L      CO2 26.0 mmol/L      Calcium 9.0 mg/dL      Total Protein 6.0 g/dL      Albumin 3.0 g/dL      ALT (SGPT) 44 U/L      AST (SGOT) 54 U/L      Alkaline Phosphatase 204 U/L      Total Bilirubin 1.4 mg/dL      Globulin 3.0 gm/dL      A/G Ratio 1.0 g/dL      BUN/Creatinine Ratio 44.6     Anion Gap 12.0 mmol/L      eGFR 62.7 mL/min/1.73     Narrative:      GFR Normal >60  Chronic Kidney Disease <60  Kidney Failure <15    The GFR formula is only valid for adults with stable renal function between ages 18 and 70.    CK [[id number]]  (Normal) Collected: 03/20/23 0915    Specimen: Blood Updated: 03/20/23 0953     Creatine Kinase 43 U/L     CBC & Differential [[id number]]  (Abnormal) Collected: 03/20/23 0915    Specimen: Blood Updated: 03/20/23 0923    Narrative:      The following orders were created for panel order CBC & Differential.  Procedure                               Abnormality         Status                     ---------                               -----------         ------                     CBC Auto Differential[[id number]]        Abnormal            Final result                 Please view results for these tests on the individual orders.    CBC Auto Differential [[id number]]  (Abnormal) Collected: 03/20/23 0915    Specimen: Blood Updated: 03/20/23 0923     WBC 11.10 10*3/mm3      RBC 5.46 10*6/mm3      Hemoglobin 17.5 g/dL      Hematocrit 55.3 %      .3 fL      MCH 32.1 pg      MCHC 31.7 g/dL      RDW 16.0 %      RDW-SD 56.9 fl      MPV 8.3 fL      Platelets 180 10*3/mm3      Neutrophil % 90.2 %      Lymphocyte % 4.0 %      Monocyte % 5.1 %      Eosinophil % 0.1 %      Basophil % 0.6 %      Neutrophils, Absolute 10.00 10*3/mm3      Lymphocytes, Absolute 0.40 10*3/mm3      Monocytes, Absolute 0.60 10*3/mm3       Eosinophils, Absolute 0.00 10*3/mm3      Basophils, Absolute 0.10 10*3/mm3      nRBC 0.0 /100 WBC            Pending Results: protein C, protein S, Cardiolipin Abs, beta 2 glycoprotein Abs, factor 5 leiden, PTG, LDH, Vit B12, Epo, Cobalt, Hgb Electrophoresis, HIDA scan and JAK2 panel.    Imaging Reviewed:   CT Angio Abdominal Aorta Bilateral Iliofem Runoff    Result Date: 3/20/2023  1.No acute aortic dissection, aneurysm or aortic injury. No acute pulmonary emboli. 2.Large right and trace left pleural effusions. Near complete atelectasis of right lower lobe. Bibasilar and right middle lobe opacities may represent atelectasis or infiltrates. Follow-up recommended. 3.Cardiomegaly with reflux of contrast and IVC and hepatic veins suggest right heart failure. 4.Gallbladder wall thickening is a nonspecific finding can be seen in multiple inflammatory process including acute cholecystitis and volume overload. If there is clinical concern for acute cholecystitis, ultrasound can be obtained for further evaluation. 5.Mild ascites throughout abdomen and pelvis. 6.Anasarca. 7.Proximal left trifurcation is patent. Remainder of the left leg arteries including anterior tibial, posterior tibial, fibular arteries demonstrate markedly diminutive flow with no flow in left foot. Case discussed with nurse alin boo @ 3/20/2023 8:17 PM EDT. Electronically Signed: Yoni Max  3/20/2023 8:18 PM EDT  Workstation ID: [id number]    XR Chest 1 View    Result Date: 3/21/2023  Impression: Interval near complete resolution of right effusion status post thoracentesis. No pneumothorax. Additional findings as above. Electronically Signed: Balwinder Klein  3/21/2023 11:24 AM EDT  Workstation ID: [id number]    XR Chest 1 View    Result Date: 3/21/2023  Impression: 1. Interval decrease in right pleural effusion. There appears to be some loculated fluid within the fissures now present. 2. Increasing airspace opacity in the right perihilar region  suggesting pneumonia or asymmetric edema. 3. Bibasilar consolidations suggesting atelectasis or infiltrate. Small left effusion. 4. Cardiomegaly. Electronically Signed: Balwinder Klein  3/21/2023 8:55 AM EDT  Workstation ID: [id number]    US Thoracentesis    Result Date: 3/21/2023  Impression: Technically successful ultrasound-guided large volume right-sided thoracentesis as described above. A sample of the turbid yellow pleural fluid was sent for laboratory evaluation. Electronically Signed: melvindelfin Carpio  3/21/2023 11:13 AM EDT  Workstation ID: [id number]    CT Angiogram Chest    Result Date: 3/20/2023  1.No acute aortic dissection, aneurysm or aortic injury. No acute pulmonary emboli. 2.Large right and trace left pleural effusions. Near complete atelectasis of right lower lobe. Bibasilar and right middle lobe opacities may represent atelectasis or infiltrates. Follow-up recommended. 3.Cardiomegaly with reflux of contrast and IVC and hepatic veins suggest right heart failure. 4.Gallbladder wall thickening is a nonspecific finding can be seen in multiple inflammatory process including acute cholecystitis and volume overload. If there is clinical concern for acute cholecystitis, ultrasound can be obtained for further evaluation. 5.Mild ascites throughout abdomen and pelvis. 6.Anasarca. 7.Proximal left trifurcation is patent. Remainder of the left leg arteries including anterior tibial, posterior tibial, fibular arteries demonstrate markedly diminutive flow with no flow in left foot. Case discussed with nurse alin Smith 3/20/2023 8:17 PM EDT. Electronically Signed: Yoni Max  3/20/2023 8:18 PM EDT  Workstation ID: [id number]      I have reviewed the patient's labs, imaging, reports, and other clinician documentation.         Assessment & Plan       ASSESSMENT  1. BLE discoloration with possible microclots/BLE digital ischemia- Sending partial thrombophilia workup. Will send Lupus AC if remains off Eliquis for  sufficient amount of time and will check ATIII when off all heparin.  2. Erythrocytosis - no recent smoking or known MAYELIN. Will send workup.  Previous JAK2 did not reflex to complete panel.  3. Recurrent left pleural effusion - no masses concerning for malignancy on CTs. Likely due to CHF. Per primary team.   4. A. Fib/CAD/ s/p CABG - On Eliquis prior to admit. Now on heparin gtt.   5. BLE cellulitis - abx per primary team.    PLAN  1. Thrombophilia workup.  2. Polycythemia workup.   3. Anticoagulation per vascular.  4. Follow CBC.   5. Lupus AC if remains off Eliquis for sufficient amount of time and will check ATIII when off all heparin  6. Outpatient RBC enzymes.      Note prepared by KAYLEE Wong.  Patient seen and examined by Amilcar Smallwood MD.  Electronically signed by VALENTINE Baca, 03/21/23, 2:06 PM EDT.         I have personally performed a face-to-face diagnostic evaluation on this patient via telehealth. I have performed a complete history and physical examination through video conferencing, reviewed laboratory studies, and radiographic examinations.  I have completed the majority and substantive portion of the medical decision making.  I have formulated the assessment on this patient and the plan of action as noted above. I have discussed the case with Michaela Chacon NP, have edited/reviewed the note, and agree with the care plan.  The patient was hospitalized with lower extremity edema and some emotion as hyperemia of the right lower extremity along with edema of both extremities.  ABIs reveal digital ischemia and hemoglobin is quite high.  JAK2 was recently negative but did not reflex to complete panel.  She is currently being treated with heparin.  Thrombophilia work-up initiated.        I discussed the patient's findings and my recommendations with patient and family.    Thank you for this consult.  We will be happy to follow along in the care of this patient.     Part of this note may be  an electronic transcription/translation of spoken language to printed text using the Dragon Dictation System.    Electronically signed by Amilcar Smallwood MD, 03/21/23, 7:26 PM EDT.

## 2023-03-21 NOTE — PLAN OF CARE
Goal Outcome Evaluation:      Discussed plan of care with patient and spouse. Pt was able to rest through night. Mild complaints of weakness. Both asked questions. RN was able to answer questions to pt and spouse's satisfaction.

## 2023-03-22 ENCOUNTER — HOSPITAL ENCOUNTER (OUTPATIENT)
Dept: INTERVENTIONAL RADIOLOGY/VASCULAR | Facility: HOSPITAL | Age: 83
Discharge: HOME | End: 2023-03-22
Payer: MEDICARE

## 2023-03-22 ENCOUNTER — APPOINTMENT (OUTPATIENT)
Dept: NUCLEAR MEDICINE | Facility: HOSPITAL | Age: 83
DRG: 291 | End: 2023-03-22
Payer: MEDICARE

## 2023-03-22 ENCOUNTER — APPOINTMENT (OUTPATIENT)
Dept: GENERAL RADIOLOGY | Facility: HOSPITAL | Age: 83
DRG: 291 | End: 2023-03-22
Payer: MEDICARE

## 2023-03-22 LAB
ALBUMIN SERPL-MCNC: 2.9 G/DL (ref 3.5–5.2)
ALBUMIN SERPL-MCNC: 3 G/DL (ref 3.5–5.2)
ALBUMIN/GLOB SERPL: 1.1 G/DL
ALP SERPL-CCNC: 145 U/L (ref 39–117)
ALP SERPL-CCNC: 182 U/L (ref 39–117)
ALT SERPL W P-5'-P-CCNC: 18 U/L (ref 1–33)
ALT SERPL W P-5'-P-CCNC: 22 U/L (ref 1–33)
ANION GAP SERPL CALCULATED.3IONS-SCNC: 6 MMOL/L (ref 5–15)
APTT PPP: 112.1 SECONDS (ref 61–76.5)
APTT PPP: 54.5 SECONDS (ref 61–76.5)
APTT PPP: 67.8 SECONDS (ref 61–76.5)
AST SERPL-CCNC: 26 U/L (ref 1–32)
AST SERPL-CCNC: 31 U/L (ref 1–32)
BASOPHILS # BLD AUTO: 0.1 10*3/MM3 (ref 0–0.2)
BASOPHILS NFR BLD AUTO: 1 % (ref 0–1.5)
BILIRUB CONJ SERPL-MCNC: 0.3 MG/DL (ref 0–0.3)
BILIRUB INDIRECT SERPL-MCNC: 0.5 MG/DL
BILIRUB SERPL-MCNC: 0.8 MG/DL (ref 0–1.2)
BILIRUB SERPL-MCNC: 0.8 MG/DL (ref 0–1.2)
BUN SERPL-MCNC: 42 MG/DL (ref 8–23)
BUN/CREAT SERPL: 38.2 (ref 7–25)
CALCIUM SPEC-SCNC: 8.8 MG/DL (ref 8.6–10.5)
CARDIOLIPIN IGA SER IA-ACNC: <9 APL U/ML (ref 0–11)
CARDIOLIPIN IGG SER IA-ACNC: <9 GPL U/ML (ref 0–14)
CARDIOLIPIN IGM SER IA-ACNC: 17 MPL U/ML (ref 0–12)
CHLORIDE SERPL-SCNC: 94 MMOL/L (ref 98–107)
CO2 SERPL-SCNC: 34 MMOL/L (ref 22–29)
CREAT SERPL-MCNC: 1.1 MG/DL (ref 0.57–1)
DEPRECATED RDW RBC AUTO: 55.1 FL (ref 37–54)
EGFRCR SERPLBLD CKD-EPI 2021: 50.6 ML/MIN/1.73
EOSINOPHIL # BLD AUTO: 0.1 10*3/MM3 (ref 0–0.4)
EOSINOPHIL NFR BLD AUTO: 1.8 % (ref 0.3–6.2)
ERYTHROCYTE [DISTWIDTH] IN BLOOD BY AUTOMATED COUNT: 15.5 % (ref 12.3–15.4)
F5 GENE MUT ANL BLD/T: NORMAL
FACTOR II, DNA ANALYSIS: NORMAL
FOLATE SERPL-MCNC: >20 NG/ML (ref 4.78–24.2)
GEN 5 2HR TROPONIN T REFLEX: 51 NG/L
GLOBULIN UR ELPH-MCNC: 2.6 GM/DL
GLUCOSE BLDC GLUCOMTR-MCNC: 124 MG/DL (ref 70–105)
GLUCOSE BLDC GLUCOMTR-MCNC: 149 MG/DL (ref 70–105)
GLUCOSE BLDC GLUCOMTR-MCNC: 167 MG/DL (ref 70–105)
GLUCOSE BLDC GLUCOMTR-MCNC: 192 MG/DL (ref 70–105)
GLUCOSE BLDC GLUCOMTR-MCNC: 83 MG/DL (ref 70–105)
GLUCOSE SERPL-MCNC: 69 MG/DL (ref 65–99)
HCT VFR BLD AUTO: 50.4 % (ref 34–46.6)
HGB BLD-MCNC: 16 G/DL (ref 12–15.9)
LAB AP CASE REPORT: NORMAL
LDH SERPL-CCNC: 311 U/L (ref 135–214)
LYMPHOCYTES # BLD AUTO: 0.8 10*3/MM3 (ref 0.7–3.1)
LYMPHOCYTES NFR BLD AUTO: 14.4 % (ref 19.6–45.3)
MAGNESIUM SERPL-MCNC: 1.8 MG/DL (ref 1.6–2.4)
MCH RBC QN AUTO: 32.3 PG (ref 26.6–33)
MCHC RBC AUTO-ENTMCNC: 31.7 G/DL (ref 31.5–35.7)
MCV RBC AUTO: 101.9 FL (ref 79–97)
MONOCYTES # BLD AUTO: 0.4 10*3/MM3 (ref 0.1–0.9)
MONOCYTES NFR BLD AUTO: 8.4 % (ref 5–12)
NEUTROPHILS NFR BLD AUTO: 3.9 10*3/MM3 (ref 1.7–7)
NEUTROPHILS NFR BLD AUTO: 74.4 % (ref 42.7–76)
NRBC BLD AUTO-RTO: 0.1 /100 WBC (ref 0–0.2)
PATH REPORT.FINAL DX SPEC: NORMAL
PATH REPORT.GROSS SPEC: NORMAL
PHOSPHATE SERPL-MCNC: 3.6 MG/DL (ref 2.5–4.5)
PLATELET # BLD AUTO: 157 10*3/MM3 (ref 140–450)
PMV BLD AUTO: 8.4 FL (ref 6–12)
POTASSIUM SERPL-SCNC: 4.2 MMOL/L (ref 3.5–5.2)
PROT C ACT/NOR PPP: 78 % (ref 70–130)
PROT SERPL-MCNC: 5.5 G/DL (ref 6–8.5)
PROT SERPL-MCNC: 5.9 G/DL (ref 6–8.5)
RBC # BLD AUTO: 4.95 10*6/MM3 (ref 3.77–5.28)
SODIUM SERPL-SCNC: 134 MMOL/L (ref 136–145)
TROPONIN T DELTA: -3 NG/L
TROPONIN T SERPL HS-MCNC: 54 NG/L
VIT B12 BLD-MCNC: 868 PG/ML (ref 211–946)
WBC NRBC COR # BLD: 5.3 10*3/MM3 (ref 3.4–10.8)

## 2023-03-22 PROCEDURE — 99232 SBSQ HOSP IP/OBS MODERATE 35: CPT | Performed by: SURGERY

## 2023-03-22 PROCEDURE — 71045 X-RAY EXAM CHEST 1 VIEW: CPT

## 2023-03-22 PROCEDURE — 85303 CLOT INHIBIT PROT C ACTIVITY: CPT | Performed by: NURSE PRACTITIONER

## 2023-03-22 PROCEDURE — 99232 SBSQ HOSP IP/OBS MODERATE 35: CPT | Performed by: NURSE PRACTITIONER

## 2023-03-22 PROCEDURE — 0 TECHNETIUM TC 99M MEBROFENIN KIT: Performed by: HOSPITALIST

## 2023-03-22 PROCEDURE — 83735 ASSAY OF MAGNESIUM: CPT | Performed by: HOSPITALIST

## 2023-03-22 PROCEDURE — 99232 SBSQ HOSP IP/OBS MODERATE 35: CPT | Performed by: INTERNAL MEDICINE

## 2023-03-22 PROCEDURE — 25010000002 PIPERACILLIN SOD-TAZOBACTAM PER 1 G: Performed by: INTERNAL MEDICINE

## 2023-03-22 PROCEDURE — 85025 COMPLETE CBC W/AUTO DIFF WBC: CPT | Performed by: NURSE PRACTITIONER

## 2023-03-22 PROCEDURE — 78226 HEPATOBILIARY SYSTEM IMAGING: CPT

## 2023-03-22 PROCEDURE — 85730 THROMBOPLASTIN TIME PARTIAL: CPT | Performed by: HOSPITALIST

## 2023-03-22 PROCEDURE — 84100 ASSAY OF PHOSPHORUS: CPT | Performed by: HOSPITALIST

## 2023-03-22 PROCEDURE — A9537 TC99M MEBROFENIN: HCPCS | Performed by: HOSPITALIST

## 2023-03-22 PROCEDURE — 25010000002 HEPARIN (PORCINE) 25000-0.45 UT/250ML-% SOLUTION: Performed by: NURSE PRACTITIONER

## 2023-03-22 PROCEDURE — 82962 GLUCOSE BLOOD TEST: CPT

## 2023-03-22 PROCEDURE — 63710000001 INSULIN LISPRO (HUMAN) PER 5 UNITS: Performed by: HOSPITALIST

## 2023-03-22 PROCEDURE — 25010000002 FUROSEMIDE PER 20 MG: Performed by: INTERNAL MEDICINE

## 2023-03-22 PROCEDURE — 84484 ASSAY OF TROPONIN QUANT: CPT | Performed by: HOSPITALIST

## 2023-03-22 PROCEDURE — 80053 COMPREHEN METABOLIC PANEL: CPT | Performed by: HOSPITALIST

## 2023-03-22 RX ORDER — LISINOPRIL 5 MG/1
5 TABLET ORAL
Status: DISCONTINUED | OUTPATIENT
Start: 2023-03-22 | End: 2023-03-24 | Stop reason: HOSPADM

## 2023-03-22 RX ORDER — MIDODRINE HYDROCHLORIDE 5 MG/1
5 TABLET ORAL
Status: DISCONTINUED | OUTPATIENT
Start: 2023-03-23 | End: 2023-03-23

## 2023-03-22 RX ORDER — SPIRONOLACTONE 25 MG/1
25 TABLET ORAL DAILY
Status: DISCONTINUED | OUTPATIENT
Start: 2023-03-22 | End: 2023-03-24 | Stop reason: HOSPADM

## 2023-03-22 RX ORDER — KIT FOR THE PREPARATION OF TECHNETIUM TC 99M MEBROFENIN 45 MG/10ML
1 INJECTION, POWDER, LYOPHILIZED, FOR SOLUTION INTRAVENOUS
Status: COMPLETED | OUTPATIENT
Start: 2023-03-22 | End: 2023-03-22

## 2023-03-22 RX ADMIN — HEPARIN SODIUM 15 UNITS/KG/HR: 10000 INJECTION, SOLUTION INTRAVENOUS at 07:00

## 2023-03-22 RX ADMIN — Medication 10 ML: at 20:32

## 2023-03-22 RX ADMIN — FUROSEMIDE 40 MG: 10 INJECTION, SOLUTION INTRAMUSCULAR; INTRAVENOUS at 03:06

## 2023-03-22 RX ADMIN — METOPROLOL SUCCINATE 25 MG: 25 TABLET, EXTENDED RELEASE ORAL at 10:33

## 2023-03-22 RX ADMIN — Medication 10 ML: at 07:43

## 2023-03-22 RX ADMIN — PIPERACILLIN AND TAZOBACTAM 3.38 G: 3; .375 INJECTION, POWDER, LYOPHILIZED, FOR SOLUTION INTRAVENOUS at 14:19

## 2023-03-22 RX ADMIN — PIPERACILLIN AND TAZOBACTAM 3.38 G: 3; .375 INJECTION, POWDER, LYOPHILIZED, FOR SOLUTION INTRAVENOUS at 07:00

## 2023-03-22 RX ADMIN — SPIRONOLACTONE 25 MG: 25 TABLET ORAL at 14:19

## 2023-03-22 RX ADMIN — LISINOPRIL 5 MG: 5 TABLET ORAL at 14:19

## 2023-03-22 RX ADMIN — PIPERACILLIN AND TAZOBACTAM 3.38 G: 3; .375 INJECTION, POWDER, LYOPHILIZED, FOR SOLUTION INTRAVENOUS at 23:00

## 2023-03-22 RX ADMIN — FUROSEMIDE 40 MG: 10 INJECTION, SOLUTION INTRAMUSCULAR; INTRAVENOUS at 16:19

## 2023-03-22 RX ADMIN — INSULIN LISPRO 4 UNITS: 100 INJECTION, SOLUTION INTRAVENOUS; SUBCUTANEOUS at 18:33

## 2023-03-22 RX ADMIN — ATORVASTATIN CALCIUM 40 MG: 40 TABLET, FILM COATED ORAL at 20:32

## 2023-03-22 RX ADMIN — MEBROFENIN 1 DOSE: 45 INJECTION, POWDER, LYOPHILIZED, FOR SOLUTION INTRAVENOUS at 08:30

## 2023-03-22 NOTE — PROGRESS NOTES
Hematology/Oncology Inpatient Progress Note    PATIENT NAME: Jaquelin Valderrama  : 1940  MRN: [id number]    CHIEF COMPLAINT: Hypercoagulable state with BLE small vessel thrombosis    HISTORY OF PRESENT ILLNESS:    83 y.o. female presented to [facility] with some chronic BLE edema that became worse over the few days prior to admission.   She stated her legs had been purple intermittently for a few months.  She was reported as holding Eliquis for chronic A. Fib for a few days before planned thoracentesis, although patient stated she had not been off her blood thinners.  She denied long car or plane ride recently, no fever/chills. CBC in ED revealed WBC 11.1, Hgb 17.5, .3, platelets 180,000.  Review of chart showed hemoglobin elevated since 2022 (ranged 16.4-19.7), with MCVs normal until 3/6/2023 (98.5). Platelets and WBC normal in reviewed time period.  Creatinine 0.92 in ED.  Blood cultures positive for gram negative bacilli. PT 13.1 (9-11.7), INR 1.29 (0.93-1.10). .9 after start of heparin.  Venous Doppler BLE was negative. NADEGE of BLE revealed severe digital ischemia bilaterally with all other arterial studies of the lower extremities and upper extremities negative.  CTA chest and abdomen showed large right and trace left pleural effusion with near complete atelectasis of right lower lobe. Additional bibasilar and right middle lobe opacities possible atelectasis vs infiltrates.  Additional findings included cardiomegaly with reflux of contrast in the IVC and hepatic veins suggesting right heart failure, nonspecific gallbladder wall thickening, mild ascites, anasarca, and patent proximal left trifurcation with markedly diminutive flow in remainder of left leg arteries and no flow in left foot.  Chest xray showed interval decrease in right pleural effusion with some loculated fluid present, increased airspace opacity in right perihilar region showing pneumonia vs  asymmetric edema, bibasilar consolidations suggestion atelectasis vs infiltrate, small left effusion, and cardiomegaly.  Repeat NADEGE remained normal.  Ultrasound thoracentesis was done 03/21 and approximately 1240 cc of cloudy yellow fluid was aspirated and sent for laboratory evaluation.       03/21/23  Hematology/Oncology was consulted by Dr. Farzana Lacey for small vessel thrombosis of bilateral feet.  Her cystoscopy with clot evacuation in 03/2022 was felt to have happened due to bladder wall tear after she fell and hit pergola with a very full bladder. She also had history of left VATS with removal of clotted hemorrhage post the 3/2022 fall. Hemoglobin has been elevated since July 2022 with Hgb 16.4, Hct 49. Hgb was as high as 19.7 on 3/6/23 at which time Jak2 was negative.      Past Medical History: astigmatism, hypertension, CAD, R ankle fracture, hyperlipidemia, hypothyroidism, MI, atrial fibrillation  Surgical History: Appendectomy, cardiac cath x 3 (2012), CABG (2014), coronary stent placement, cystoscopy with clot evacuation (3/25/2022), ankle hardware removal (7/2020), ORIF R ankle (04/2019), left VATS (03/2022)  Social History: She is .  She is a retired CPA.  She is a former smoker, quit in 1980.  She denies alcohol or recreational drug use.   Family History: hypertension (mother), stroke (mother), heart disease (mother), lung cancer (father), diabetes (brother)  Allergies: prednisone (increases blood pressure)     PCP: Minerva Chatterjee MD      INTERVAL HISTORY:  • 3/22/2023- hemoglobin 16, hematocrit 50.4, .9.   (135-214).  Vitamin B12 868 (211-946), folate greater than 20.  Prothrombin gene mutation and factor V Leiden both normal.    History of present illness reviewed since last visit and changes noted on 03/22/23.    Subjective   She complains of pain in her toes.  Her daughter was present and reported she feels the color was improved.    ROS:  Review of Systems    Constitutional: Negative for activity change, chills, fatigue, fever and unexpected weight change.   HENT: Negative for congestion, dental problem, hearing loss, mouth sores, nosebleeds, sore throat and trouble swallowing.    Eyes: Negative for photophobia and visual disturbance.   Respiratory: Negative for cough, chest tightness and shortness of breath.    Cardiovascular: Negative for chest pain, palpitations and leg swelling.   Gastrointestinal: Negative for abdominal distention, abdominal pain, blood in stool, constipation, diarrhea, nausea and vomiting.   Endocrine: Negative for cold intolerance and heat intolerance.   Genitourinary: Negative for decreased urine volume, difficulty urinating, frequency, hematuria and urgency.   Musculoskeletal: Negative for arthralgias and gait problem.        Bilateral toe pain   Skin: Negative for rash and wound.   Neurological: Negative for dizziness, tremors, weakness, light-headedness, numbness and headaches.   Hematological: Negative for adenopathy. Does not bruise/bleed easily.   Psychiatric/Behavioral: Negative for confusion and hallucinations. The patient is not nervous/anxious.    All other systems reviewed and are negative.       MEDICATIONS:    Scheduled Meds:  atorvastatin, 40 mg, Oral, Nightly  furosemide, 40 mg, Intravenous, Q12H  insulin lispro, 2-9 Units, Subcutaneous, 4x Daily With Meals & Nightly  levothyroxine, 100 mcg, Oral, Q AM  metoprolol succinate XL, 25 mg, Oral, BID  piperacillin-tazobactam, 3.375 g, Intravenous, Q8H  sodium chloride, 10 mL, Intravenous, Q12H  sodium chloride, 10 mL, Intravenous, Q12H       Continuous Infusions:  heparin, 18 Units/kg/hr, Last Rate: 15 Units/kg/hr (03/22/23 0700)  hold, 1 each       PRN Meds:  •  acetaminophen  •  senna-docusate sodium **AND** polyethylene glycol **AND** bisacodyl **AND** bisacodyl  •  dextrose  •  dextrose  •  glucagon (human recombinant)  •  heparin  •  heparin  •  hold  •   "HYDROcodone-acetaminophen  •  melatonin  •  ondansetron  •  polyethyl glycol-propyl glycol  •  [COMPLETED] Insert Peripheral IV **AND** sodium chloride  •  sodium chloride  •  sodium chloride  •  sodium chloride  •  sodium chloride     ALLERGIES:    Allergies   Allergen Reactions   • Prednisone Other (See Comments)     Increased BP       Objective    VITALS:   /73   Pulse 70   Temp 96.9 °F (36.1 °C) (Axillary)   Resp 17   Ht 160 cm (63\")   Wt 67.6 kg (149 lb)   SpO2 97%   BMI 26.39 kg/m²     PHYSICAL EXAM:  Physical Exam  Vitals and nursing note reviewed.   Constitutional:       General: She is not in acute distress.     Appearance: Normal appearance. She is well-developed. She is not diaphoretic.   HENT:      Head: Normocephalic and atraumatic.      Comments: Frontal alopecia     Nose: Nose normal.      Comments: O2 per NC     Mouth/Throat:      Mouth: Mucous membranes are moist.      Pharynx: Oropharynx is clear. No oropharyngeal exudate or posterior oropharyngeal erythema.      Comments: Dental fillings  Eyes:      General: No scleral icterus.     Extraocular Movements: Extraocular movements intact.      Conjunctiva/sclera: Conjunctivae normal.      Pupils: Pupils are equal, round, and reactive to light.      Comments: Eyeglasses   Cardiovascular:      Rate and Rhythm: Normal rate and regular rhythm.      Heart sounds: Normal heart sounds. No murmur heard.     Comments: Cardiac monitor leads.  Pulmonary:      Effort: Pulmonary effort is normal. No respiratory distress.      Breath sounds: Normal breath sounds. No wheezing or rales.   Abdominal:      General: Bowel sounds are normal. There is no distension.      Palpations: Abdomen is soft. There is no mass.      Tenderness: There is no abdominal tenderness. There is no guarding.   Genitourinary:     Comments: Deferred   Musculoskeletal:         General: No swelling, tenderness or deformity. Normal range of motion.      Cervical back: Normal range of " motion and neck supple.      Right lower leg: No edema.      Left lower leg: No edema.      Comments: BUE IVs.   Lymphadenopathy:      Cervical: No cervical adenopathy.      Upper Body:      Right upper body: No supraclavicular adenopathy.      Left upper body: No supraclavicular adenopathy.   Skin:     General: Skin is warm and dry.      Coloration: Skin is not pale.      Findings: No bruising, erythema (Hyperemia of the bilateral lower extremities, right greater than left) or rash.   Neurological:      General: No focal deficit present.      Mental Status: She is alert and oriented to person, place, and time.      Coordination: Coordination normal.   Psychiatric:         Mood and Affect: Mood normal.         Behavior: Behavior normal.         Thought Content: Thought content normal.           RECENT LABS:  Lab Results (last 24 hours)     Procedure Component Value Units Date/Time    Factor II, DNA Analysis [[id number]]  (Normal) Collected: 03/21/23 1502    Specimen: Blood Updated: 03/22/23 0949     Factor II, DNA Analysis Normal    Narrative:      A point mutation ([id number]) in the factor II (prothrombin) gene is the second most common cause of inherited thrombophilia.  The Factor II or Prothrombin mutation refers to the G to A transition at nucleotide in the untranslated region of the gene and is associated with increased plasma levels of prothrombin.    The incidence of this mutation in the [place]  population is about 2% and in the  population it is approximately 0.5%. This mutation is rare in the  and  population. Being heterozygous for a prothrombin mutation increases the risk for developing venous thrombosis about 2 to 3 times above the general population risk. Being homozygous for the prothrombin gene mutation increases the relative risk for venous thrombosis further, although it is not yet known how much further the risk is increased. In women heterozygous for the  prothrombin gene mutation, the use of estrogen containing oral contraceptives increases the relative risk of venous thrombosis about 16 times and the risk of developing cerebral thrombosis is also significantly increased. In pregnancy, the prothrombin gene mutation increases risk for venous thrombosis and may increase risk for stillbirth, placental abruption, pre-eclampsia and fetal growth restriction.     The expression of Factor II thrombophilia is impacted by coexisting genetic thrombophilic disorders, acquired thrombophilic disorders (eg, malignancy, hyperhomocysteinemia, high factor VIII levels), and circumstances including: pregnancy, oral contraceptive use, hormone replacement therapy, selective estrogen receptor modulators, travel, central venous catheters, surgery, and organ transplantation.    In order to derive the most meaningful benefit from this testing, it may be necessary that the results and subsequent options from these complex genetic tests be discussed with patients by a trained genetic professional.    Factor 5 Leiden [[id number]]  (Normal) Collected: 03/21/23 1502    Specimen: Blood Updated: 03/22/23 0949     Factor V Leiden Normal    Narrative:      Factor V Leiden is a specific mutation (R506Q) in the factor V gene that is associated with an increased risk of venous thrombosis.  Factor V Leiden is more resistant to inactivation by activated protein C.  Factor V Leiden refers to the G to A transition at nucleotide position 1691 of the Factor V gene, resulting in the substitution of the amino acid arginine by glutamine in the Factor V protein, causing resistance to cleavage by Activated Protein C (APC).    As a result, factor V persists in the circulation leading to a mild hyper-coagulable state.  The Leiden mutation accounts for 90% - 95% of APC resistance.  Factor V Leiden has been reported in patients with deep vein thrombosis, pulmonary embolus, central retinal vein occlusion, cerebral sinus  thrombosis and hepatic vein thrombosis.  Other risk factors to be considered in the workup for venous thrombosis include the X06918D mutation in the factor II (prothrombin) gene, protein S and C deficiency, and antithrombin deficiencies. Anticardiolipin antibody and lupus anticoagulant analysis may be appropriate for certain patients, as well as homocysteine levels.    The expression of Factor V Leiden thrombophilia is impacted by coexisting genetic thrombophilic disorders, acquired thrombophilic disorders (malignancy, hyperhomocysteinemia, high factor VIII levels), and circumstances including: pregnancy, oral contraceptive use, hormone replacement therapy, selective estrogen receptor modulators, travel, central venous catheters, surgery, transplantation and advanced age.    In order to derive the most meaningful benefit from this testing, it may be necessary that the results and subsequent options from these complex genetic tests be discussed with patients by a trained genetic professional.    POC Glucose Once [[id number]]  (Normal) Collected: 03/22/23 0723    Specimen: Blood Updated: 03/22/23 0724     Glucose 83 mg/dL      Comment: Serial Number: [id number]:  [id number]       Blood Culture - Blood, Arm, Left [[id number]]  (Abnormal) Collected: 03/20/23 0921    Specimen: Blood from Arm, Left Updated: 03/22/23 0724     Blood Culture Gram Negative Bacilli     Isolated from Anaerobic Bottle     Gram Stain Anaerobic Bottle Gram negative bacilli    Narrative:      Less than seven (7) mL's of blood was collected.  Insufficient quantity may yield false negative results.    Body Fluid Culture - Body Fluid, Pleural Cavity [[id number]] Collected: 03/21/23 1028    Specimen: Body Fluid from Pleural Cavity Updated: 03/22/23 0717     Body Fluid Culture No growth at 24 hours     Gram Stain Few (2+) WBCs seen      No organisms seen    Comprehensive Metabolic Panel [[id number]]  (Abnormal) Collected: 03/22/23 0301    Specimen:  Blood Updated: 03/22/23 0342     Glucose 69 mg/dL      BUN 42 mg/dL      Creatinine 1.10 mg/dL      Sodium 134 mmol/L      Potassium 4.2 mmol/L      Comment: Slight hemolysis detected by analyzer. Results may be affected.        Chloride 94 mmol/L      CO2 34.0 mmol/L      Calcium 8.8 mg/dL      Total Protein 5.5 g/dL      Albumin 2.9 g/dL      ALT (SGPT) 18 U/L      AST (SGOT) 26 U/L      Comment: Slight hemolysis detected by analyzer. Results may be affected.        Alkaline Phosphatase 145 U/L      Total Bilirubin 0.8 mg/dL      Globulin 2.6 gm/dL      A/G Ratio 1.1 g/dL      BUN/Creatinine Ratio 38.2     Anion Gap 6.0 mmol/L      eGFR 50.6 mL/min/1.73     Narrative:      GFR Normal >60  Chronic Kidney Disease <60  Kidney Failure <15    The GFR formula is only valid for adults with stable renal function between ages 18 and 70.    High Sensitivity Troponin T 2Hr [[id number]]  (Abnormal) Collected: 03/22/23 0301    Specimen: Blood Updated: 03/22/23 0341     HS Troponin T 51 ng/L      Troponin T Delta -3 ng/L     Narrative:      High Sensitive Troponin T Reference Range:  <10.0 ng/L- Negative Female for AMI  <15.0 ng/L- Negative Male for AMI  >=10 - Abnormal Female indicating possible myocardial injury.  >=15 - Abnormal Male indicating possible myocardial injury.   Clinicians would have to utilize clinical acumen, EKG, Troponin, and serial changes to determine if it is an Acute Myocardial Infarction or myocardial injury due to an underlying chronic condition.         Phosphorus [[id number]]  (Normal) Collected: 03/22/23 0301    Specimen: Blood Updated: 03/22/23 0341     Phosphorus 3.6 mg/dL     Magnesium [[id number]]  (Normal) Collected: 03/22/23 0301    Specimen: Blood Updated: 03/22/23 0341     Magnesium 1.8 mg/dL     CBC & Differential [[id number]]  (Abnormal) Collected: 03/22/23 0301    Specimen: Blood Updated: 03/22/23 0325    Narrative:      The following orders were created for panel order CBC &  Differential.  Procedure                               Abnormality         Status                     ---------                               -----------         ------                     CBC Auto Differential[[id number]]        Abnormal            Final result                 Please view results for these tests on the individual orders.    CBC Auto Differential [[id number]]  (Abnormal) Collected: 03/22/23 0301    Specimen: Blood Updated: 03/22/23 0325     WBC 5.30 10*3/mm3      RBC 4.95 10*6/mm3      Hemoglobin 16.0 g/dL      Hematocrit 50.4 %      .9 fL      MCH 32.3 pg      MCHC 31.7 g/dL      RDW 15.5 %      RDW-SD 55.1 fl      MPV 8.4 fL      Platelets 157 10*3/mm3      Neutrophil % 74.4 %      Lymphocyte % 14.4 %      Monocyte % 8.4 %      Eosinophil % 1.8 %      Basophil % 1.0 %      Neutrophils, Absolute 3.90 10*3/mm3      Lymphocytes, Absolute 0.80 10*3/mm3      Monocytes, Absolute 0.40 10*3/mm3      Eosinophils, Absolute 0.10 10*3/mm3      Basophils, Absolute 0.10 10*3/mm3      nRBC 0.1 /100 WBC     Folate [[id number]]  (Normal) Collected: 03/21/23 1620    Specimen: Blood Updated: 03/22/23 0152     Folate >20.00 ng/mL     Narrative:      Results may be falsely increased if patient taking Biotin.      Vitamin B12 [[id number]]  (Normal) Collected: 03/21/23 1620    Specimen: Blood Updated: 03/22/23 0135     Vitamin B-12 868 pg/mL     Narrative:      Results may be falsely increased if patient taking Biotin.      aPTT [[id number]]  (Abnormal) Collected: 03/21/23 2219    Specimen: Blood Updated: 03/22/23 0112     .1 seconds     Lactate Dehydrogenase [[id number]]  (Abnormal) Collected: 03/21/23 2219    Specimen: Blood Updated: 03/22/23 0027      U/L      Comment: Specimen hemolyzed.  Results may be affected.       High Sensitivity Troponin T [[id number]]  (Abnormal) Collected: 03/21/23 2308    Specimen: Blood Updated: 03/22/23 0024     HS Troponin T 54 ng/L     Narrative:      High Sensitive  Troponin T Reference Range:  <10.0 ng/L- Negative Female for AMI  <15.0 ng/L- Negative Male for AMI  >=10 - Abnormal Female indicating possible myocardial injury.  >=15 - Abnormal Male indicating possible myocardial injury.   Clinicians would have to utilize clinical acumen, EKG, Troponin, and serial changes to determine if it is an Acute Myocardial Infarction or myocardial injury due to an underlying chronic condition.         Hepatic Function Panel [[id number]]  (Abnormal) Collected: 03/21/23 2219    Specimen: Blood Updated: 03/22/23 0021     Total Protein 5.9 g/dL      Albumin 3.0 g/dL      ALT (SGPT) 22 U/L      AST (SGOT) 31 U/L      Alkaline Phosphatase 182 U/L      Total Bilirubin 0.8 mg/dL      Bilirubin, Direct 0.3 mg/dL      Bilirubin, Indirect 0.5 mg/dL     Protein S Functional [[id number]] Collected: 03/21/23 2308    Specimen: Blood Updated: 03/21/23 2354    JAK2 V617F, Rfx CALR / MPL - Blood, [[id number]] Collected: 03/21/23 2219    Specimen: Blood Updated: 03/21/23 2354    Hemoglobinopathy Fractionation Cascade [[id number]] Collected: 03/21/23 2219    Specimen: Blood Updated: 03/21/23 2354    Erythropoietin [[id number]] Collected: 03/21/23 2219    Specimen: Blood Updated: 03/21/23 2354    C[place]alt [[id number]] Collected: 03/21/23 2219    Specimen: Blood Updated: 03/21/23 2354    POC Glucose Once [[id number]]  (Abnormal) Collected: 03/21/23 2213    Specimen: Blood Updated: 03/21/23 2215     Glucose 172 mg/dL      Comment: Serial Number: [id number]:  [id number]       POC Glucose Once [[id number]]  (Abnormal) Collected: 03/21/23 2033    Specimen: Blood Updated: 03/21/23 2035     Glucose 202 mg/dL      Comment: Serial Number: [id number]:  [id number]       TSH [[id number]]  (Normal) Collected: 03/21/23 1620    Specimen: Blood Updated: 03/21/23 1843     TSH 3.400 uIU/mL     Lactic Acid, Plasma [[id number]]  (Normal) Collected: 03/21/23 1620    Specimen: Blood Updated: 03/21/23 1837     Lactate 1.2 mmol/L      Phosphorus [[id number]]  (Normal) Collected: 03/21/23 1620    Specimen: Blood Updated: 03/21/23 1836     Phosphorus 4.0 mg/dL     Cardiolipin Antibody [[id number]] Collected: 03/21/23 1620    Specimen: Blood Updated: 03/21/23 1813    Magnesium [[id number]]  (Normal) Collected: 03/21/23 1502    Specimen: Blood Updated: 03/21/23 1732     Magnesium 1.9 mg/dL     Beta-2 Glycoprotein Antibodies [[id number]] Collected: 03/21/23 1502    Specimen: Blood Updated: 03/21/23 1705    Protein C Activity [[id number]] Collected: 03/21/23 1502    Specimen: Blood Updated: 03/21/23 1705    Protein, Body Fluid - Pleural Fluid, Pleural Cavity [[id number]] Collected: 03/21/23 1028    Specimen: Pleural Fluid from Pleural Cavity Updated: 03/21/23 1616     Protein, Total, Fluid 2.0 g/dL     Narrative:      No Reference Ranges Established.    A serous fluid total fluid (TP) greater than 50 percent of the serum TP suggests the fluid is an exudate.      1. Pleural TP/Serum TP >0.5  2. Pleural LD/Serum LD >0.6  3. Pleural LD >2/3 of the upper limit of normal for serum LDH    This test was developed, it performance characteristics determined and judged suitable for clinical purposes by [facility].  It has not been cleared or approved by the FDA.  The laboratory is regulated under CLIA as qualified to perform high-complexity testing.     Lactate Dehydrogenase, Body Fluid - Pleural Fluid, Pleural Cavity [[id number]] Collected: 03/21/23 1028    Specimen: Pleural Fluid from Pleural Cavity Updated: 03/21/23 1616     Lactate Dehydrogenase (LD), Fluid 77 U/L     Narrative:      No Reference Ranges Established.    Serous fluid LDH greater than 60 percent of the serum LDH or serous fluid LDH two-thirds of the upper limit of normal for serum LDH suggests the fluid is an exudate.     1. Pleural TP/Serum TP >0.5  2. Pleural LD/Serum LD >0.6  3. Pleural LD >2/3 of the upper limit of normal for serum LDH    This test was developed, it  performance characteristics determined and judged suitable for clinical purposes by [facility].  It has not been cleared or approved by the FDA.  The laboratory is regulated under CLIA as qualified to perform high-complexity testing.     Glucose, Body Fluid - Pleural Fluid, Pleural Cavity [[id number]] Collected: 03/21/23 1028    Specimen: Pleural Fluid from Pleural Cavity Updated: 03/21/23 1616     Glucose, Fluid 156 mg/dL     Narrative:      No Reference Ranges Established.    Serous fluid glucose less than 60 mg/dL or less than 30 mg/dL below serum glucose suggests an infectious or malignant exudate.     This test was developed, it performance characteristics determined and judged suitable for clinical purposes by [facility].  It has not been cleared or approved by the FDA.  The laboratory is regulated under CLIA as qualified to perform high-complexity testing.     POC Glucose Once [[id number]]  (Abnormal) Collected: 03/21/23 1606    Specimen: Blood Updated: 03/21/23 1607     Glucose 217 mg/dL      Comment: Serial Number: [id number]:  [id number]       aPTT [[id number]]  (Abnormal) Collected: 03/21/23 1148    Specimen: Blood Updated: 03/21/23 1322     PTT 30.7 seconds     Urinalysis, Microscopic Only - Urine, Clean Catch [[id number]]  (Abnormal) Collected: 03/21/23 1144    Specimen: Urine, Clean Catch Updated: 03/21/23 1317     RBC, UA 0-2 /HPF      WBC, UA 0-2 /HPF      Comment: Urine culture not indicated.        Bacteria, UA None Seen /HPF      Squamous Epithelial Cells, UA 0-2 /HPF      Hyaline Casts, UA 0-2 /LPF      Methodology Automated Microscopy    Urinalysis With Culture If Indicated - Urine, Clean Catch [[id number]]  (Abnormal) Collected: 03/21/23 1144    Specimen: Urine, Clean Catch Updated: 03/21/23 1317     Color, UA Yellow     Appearance, UA Clear     pH, UA 6.0     Specific Gravity, UA 1.032     Glucose, UA Negative     Ketones, UA Negative      Bilirubin, UA Negative     Blood, UA Negative     Protein, UA 30 mg/dL (1+)     Leuk Esterase, UA Negative     Nitrite, UA Negative     Urobilinogen, UA 0.2 E.U./dL    Narrative:      In absence of clinical symptoms, the presence of pyuria, bacteria, and/or nitrites on the urinalysis result does not correlate with infection.    DEEPALI AURIS SCREEN - Swab, Axilla Right, Axilla Left and Groin [[id number]] Collected: 03/21/23 1145    Specimen: Swab from Axilla Right, Axilla Left and Groin Updated: 03/21/23 1308    POC Glucose Once [[id number]]  (Abnormal) Collected: 03/21/23 1154    Specimen: Blood Updated: 03/21/23 1157     Glucose 190 mg/dL      Comment: Serial Number: [id number]:  [id number]       Blood Culture - Blood, Hand, Right [[id number]]  (Normal) Collected: 03/20/23 1116    Specimen: Blood from Hand, Right Updated: 03/21/23 1131     Blood Culture No growth at 24 hours    pH, Body Fluid - Pleural Fluid, Pleural Cavity [[id number]]  (Normal) Collected: 03/21/23 1028    Specimen: Pleural Fluid from Pleural Cavity Updated: 03/21/23 1057     pH, Fluid 7.00    Non-gynecologic Cytology [[id number]] Collected: 03/21/23 1028    Specimen: Pleural Fluid from Pleural Cavity Updated: 03/21/23 1055          PENDING RESULTS: protein C, protein S, Cardiolipin Abs, beta 2 glycoprotein Abs, Epo, Cobalt, Hgb Electrophoresis, HIDA scan and JAK2 panel. HIDA    IMAGING REVIEWED:  CT Angio Abdominal Aorta Bilateral Iliofem Runoff    Result Date: 3/20/2023  1.No acute aortic dissection, aneurysm or aortic injury. No acute pulmonary emboli. 2.Large right and trace left pleural effusions. Near complete atelectasis of right lower lobe. Bibasilar and right middle lobe opacities may represent atelectasis or infiltrates. Follow-up recommended. 3.Cardiomegaly with reflux of contrast and IVC and hepatic veins suggest right heart failure. 4.Gallbladder wall thickening is a nonspecific finding can be seen in multiple inflammatory process  including acute cholecystitis and volume overload. If there is clinical concern for acute cholecystitis, ultrasound can be obtained for further evaluation. 5.Mild ascites throughout abdomen and pelvis. 6.Anasarca. 7.Proximal left trifurcation is patent. Remainder of the left leg arteries including anterior tibial, posterior tibial, fibular arteries demonstrate markedly diminutive flow with no flow in left foot. Case discussed with nurse alin Smith 3/20/2023 8:17 PM EDT. Electronically Signed: Yoni Max  3/20/2023 8:18 PM EDT  Workstation ID: [id number]    XR Chest 1 View    Result Date: 3/22/2023  Impression: 1. New right basilar opacity may represent reaccumulation of small right pleural effusion, with right basilar atelectasis or pneumonia. 2. Increase in left basilar airspace disease, favored to represent increased atelectasis. 3. Interstitial edema is again noted, without significant change. 4. Stable cardiac enlargement. Electronically Signed: Saadia Islas  3/22/2023 7:32 AM EDT  Workstation ID: [id number]    XR Chest 1 View    Result Date: 3/21/2023  Impression: Interval near complete resolution of right effusion status post thoracentesis. No pneumothorax. Additional findings as above. Electronically Signed: Balwinder Klein  3/21/2023 11:24 AM EDT  Workstation ID: [id number]    XR Chest 1 View    Result Date: 3/21/2023  Impression: 1. Interval decrease in right pleural effusion. There appears to be some loculated fluid within the fissures now present. 2. Increasing airspace opacity in the right perihilar region suggesting pneumonia or asymmetric edema. 3. Bibasilar consolidations suggesting atelectasis or infiltrate. Small left effusion. 4. Cardiomegaly. Electronically Signed: Balwinder Klein  3/21/2023 8:55 AM EDT  Workstation ID: [id number]    US Thoracentesis    Result Date: 3/21/2023  Impression: Technically successful ultrasound-guided large volume right-sided thoracentesis as described above. A sample of  the turbid yellow pleural fluid was sent for laboratory evaluation. Electronically Signed: melvindelfin Carpio  3/21/2023 11:13 AM EDT  Workstation ID: [id number]    CT Angiogram Chest    Result Date: 3/20/2023  1.No acute aortic dissection, aneurysm or aortic injury. No acute pulmonary emboli. 2.Large right and trace left pleural effusions. Near complete atelectasis of right lower lobe. Bibasilar and right middle lobe opacities may represent atelectasis or infiltrates. Follow-up recommended. 3.Cardiomegaly with reflux of contrast and IVC and hepatic veins suggest right heart failure. 4.Gallbladder wall thickening is a nonspecific finding can be seen in multiple inflammatory process including acute cholecystitis and volume overload. If there is clinical concern for acute cholecystitis, ultrasound can be obtained for further evaluation. 5.Mild ascites throughout abdomen and pelvis. 6.Anasarca. 7.Proximal left trifurcation is patent. Remainder of the left leg arteries including anterior tibial, posterior tibial, fibular arteries demonstrate markedly diminutive flow with no flow in left foot. Case discussed with nurse alin Smith 3/20/2023 8:17 PM EDT. Electronically Signed: Yoni Max  3/20/2023 8:18 PM EDT  Workstation ID: [id number]      I have reviewed the patient's labs, imaging, reports, and other clinician documentation.    Assessment & Plan   ASSESSMENT:  1. BLE digital ischemia-noted on NADEGE with all other arterial and venous imaging of the lower extremities and arterial imaging of the upper extremities negative.  Remains on heparin drip.  No evidence of factor V Leiden or prothrombin gene mutation.  Remainder of work-up in progress.  Will send Lupus AC if remains off Eliquis for sufficient amount of time and will check ATIII when off all heparin.  Will likely need to switch to warfarin as current events happened while she was taking Eliquis.  2. Erythrocytosis - Macrocytosis appears acute. No recent smoking or  known MAYELIN.  Vitamin B12 is not elevated.  LDH is mildly elevated. Previous JAK2 did not reflex to complete panel and checking JAK2 panel along with additional work-up in progress.  3. Recurrent left pleural effusion - no masses concerning for malignancy on CTs. Likely due to CHF. Per primary team.   4. A. Fib/CAD/ s/p CABG - On Eliquis prior to admit. Now on heparin gtt. Echo with LVEF less than 20%.  There was significant tricuspid regurgitation and significant biatrial biventricular enlargement..  5. BLE cellulitis - abx per ID.   6. Gallbladder wall thickening- Surgery does not feel patient's clinical picture is that of cholecystitis and feels abnormal imaging was likely a manifestation of her heart failure. HIDA scan pending.      PLAN:  1. Await remaining thrombophilia and polycythemia work-ups.  2. Follow CBC.  3. Anticoagulation per vascular.  4. Would recommend coumadinization as current event happened on Eliquis.  5. Antibiotics per ID.  6. Lupus AC if she remains off Eliquis for sufficient amount of time and will check ATIII when off on heparin.  7. Outpatient RBC enzymes.            I discussed the patient's findings and my recommendations with patient and family.    Part of this note may be an electronic transcription/translation of spoken language to printed text using the Dragon Dictation System.    Electronically signed by Amilcar Smallwood MD, 03/22/23, 5:31 PM EDT.

## 2023-03-22 NOTE — CASE MANAGEMENT/SOCIAL WORK
Continued Stay Note   [facility]     Patient Name: Jaquelin Valderrama  MRN: [id number]  Today's Date: 3/22/2023    Admit Date: 3/20/2023    Plan: D/C Plan: Home with spouse; Watch for IV antibix, and 02   Discharge Plan     Row Name 03/22/23 0906       Plan    Plan Comments Barriers to D/C: Hep gtt; IV antibx; IV Lasix; 3l02    Row Name 03/22/23 0904       Plan    Plan D/C Plan: Home with spouse; Watch for IV antibix, and 02    Row Name 03/22/23 0840               Expected Discharge Date and Time     Expected Discharge Date Expected Discharge Time    Mar 24, 2023             Renae Spencer RN

## 2023-03-22 NOTE — PROGRESS NOTES
"    Chief Complaint: Right pleural effusion  S/P: Right thoracentesis  POD # 1    Subjective:  Symptoms:  Improved.  She reports weakness.  No shortness of breath, chest pain or chest pressure.    Diet:  Poor intake.    Activity level: Impaired due to weakness.    Pain:  She reports no pain.    Resting comfortably in bed.  Off supplemental oxygen.    Vital Signs:  Temp:  [96.9 °F (36.1 °C)-97.8 °F (36.6 °C)] 97.5 °F (36.4 °C)  Heart Rate:  [68-79] 68  Resp:  [17-24] 23  BP: ()/(41-90) 126/84    Intake & Output (last day)       03/21 0701  03/22 0700 03/22 0701  03/23 0700    P.O.  240    Total Intake(mL/kg)  240 (3.6)    Urine (mL/kg/hr) 900 (0.6)     Other 1240     Total Output 2140     Net -2140 +240          Urine Unmeasured Occurrence 1 x           Objective:  General Appearance:  Comfortable, well-appearing and in no acute distress.    Vital signs: (most recent): Blood pressure 126/84, pulse 68, temperature 97.5 °F (36.4 °C), temperature source Oral, resp. rate 23, height 160 cm (63\"), weight 67.6 kg (149 lb), SpO2 91 %, not currently breastfeeding.    HEENT: Normal HEENT exam.    Lungs:  Normal effort and tachypnea.  She is not in respiratory distress.    Heart: Normal rate.    Abdomen: Abdomen is soft.    Extremities: Decreased range of motion.  There is dependent edema.    Skin:  Warm and dry.  (Erythema to bilateral lower extremities)              Results Review:     I reviewed the patient's new clinical results.  I reviewed the patient's new imaging results and agree with the interpretation.  I reviewed the patient's other test results and agree with the interpretation  Discussed with patient, RN and Dr. Moody    Imaging Results (Last 24 Hours)     Procedure Component Value Units Date/Time    NM HIDA SCAN WITHOUT PHARMACOLOGICAL INTERVENTION [[id number]] Collected: 03/22/23 1055     Updated: 03/22/23 1059    Narrative:      NM HIDA SCAN WITHOUT PHARMACOLOGICAL INTERVENTION    Date of Exam: 3/22/2023 " 8:29 AM EDT    Indication: possible cholecystitis on ct.    Comparison: None available.    Technique: The patient received 5.8  mCi of technetium 99m Choletec intravenously and images were obtained of the abdomen in the anterior projection through 120 minutes. Gallbladder stimulation was not performed.    Findings:  Normal radiopharmaceutical accumulation within the liver. The gallbladder is promptly visualized within the first 30 minutes of imaging, indicating patency of the cystic duct. There is no scintigraphic evidence of acute or chronic cholecystitis.   Progression of radiopharmaceutical into the small bowel indicates patency of the common bile duct. The gallbladder ejection fraction is normal, 49%.      Impression:      Impression:  Normal HIDA scan. Gallbladder ejection fraction is 49%.    Electronically Signed: Saadia Islas    3/22/2023 10:57 AM EDT    Workstation ID: [id number]    XR Chest 1 View [[id number]] Collected: 03/22/23 0730     Updated: 03/22/23 0734    Narrative:      XR CHEST 1 VW    Date of Exam: 3/22/2023 2:30 AM EDT    Indication: s/p thoracentesis.    Comparison: AP portable chest 3/21/2023. CT chest 3/20/2023    Findings:  Skinfold artifact projects over the right lower thorax. Small bilateral pleural effusions are present. Bibasilar alveolar opacities are present, new on the right, increased on the left. Fine interstitial thickening is present in both lungs. Moderate   cardiac enlargement is stable. Median sternotomy changes are present. No visible pneumothorax. No acute osseous abnormality.      Impression:      Impression:    1. New right basilar opacity may represent reaccumulation of small right pleural effusion, with right basilar atelectasis or pneumonia.  2. Increase in left basilar airspace disease, favored to represent increased atelectasis.  3. Interstitial edema is again noted, without significant change.  4. Stable cardiac enlargement.    Electronically Signed: Saadia Islas     "3/22/2023 7:32 AM EDT    Workstation ID: [id number]          Lab Results:     Lab Results (last 24 hours)     Procedure Component Value Units Date/Time    CANDIDA AURIS SCREEN - Swab, Axilla Right, Axilla Left and Groin [[id number]]  (Normal) Collected: 03/21/23 1145    Specimen: Swab from Axilla Right, Axilla Left and Groin Updated: 03/22/23 1315     Candida Auris Screen Culture No Candida auris isolated at 24 hours    Non-gynecologic Cytology [[id number]] Collected: 03/21/23 1028    Specimen: Pleural Fluid from Pleural Cavity Updated: 03/22/23 1134     Case Report --     Medical Cytology Report                           Case: [id number]                                  Authorizing Provider:  Montserrat Humphries DNP,     Collected:           03/21/2023 10:28 AM                                 APRN                                                                         Ordering Location:     [address]    Received:            03/21/2023 10:55 AM                                 MEDICAL INPATIENT                                                            Pathologist:           Darin Carrasco MD                                                            Specimen:    Pleural Cavity                                                                              Final Diagnosis --     Pleural fluid with smears and cytospin:    Chronic inflammation, macrophages and reactive mesothelial cells    No malignancy identified     MARCO/tkd        Gross Description --     1. Pleural Cavity.  Received in [place] and designated \"Pleural cavity\" are 20 mL of cloudy, green fluid. Particulate matter is present. This specimen is processed as per protocol.        Blood Culture - Blood, Hand, Right [[id number]]  (Normal) Collected: 03/20/23 1116    Specimen: Blood from Hand, Right Updated: 03/22/23 1130     Blood Culture No growth at 2 days    POC Glucose Once [[id number]]  (Abnormal) Collected: 03/22/23 1114    Specimen: Blood Updated: " 03/22/23 1116     Glucose 124 mg/dL      Comment: Serial Number: [id number]:  [id number]       aPTT [[id number]]  (Normal) Collected: 03/22/23 1038    Specimen: Blood Updated: 03/22/23 1103     PTT 67.8 seconds     Protein C Activity [[id number]]  (Normal) Collected: 03/22/23 1038    Specimen: Blood Updated: 03/22/23 1103     Protein C Activity 78 %     Factor II, DNA Analysis [[id number]]  (Normal) Collected: 03/21/23 1502    Specimen: Blood Updated: 03/22/23 0949     Factor II, DNA Analysis Normal    Narrative:      A point mutation ([id number]) in the factor II (prothrombin) gene is the second most common cause of inherited thrombophilia.  The Factor II or Prothrombin mutation refers to the G to A transition at nucleotide in the untranslated region of the gene and is associated with increased plasma levels of prothrombin.    The incidence of this mutation in the [place]  population is about 2% and in the  population it is approximately 0.5%. This mutation is rare in the  and  population. Being heterozygous for a prothrombin mutation increases the risk for developing venous thrombosis about 2 to 3 times above the general population risk. Being homozygous for the prothrombin gene mutation increases the relative risk for venous thrombosis further, although it is not yet known how much further the risk is increased. In women heterozygous for the prothrombin gene mutation, the use of estrogen containing oral contraceptives increases the relative risk of venous thrombosis about 16 times and the risk of developing cerebral thrombosis is also significantly increased. In pregnancy, the prothrombin gene mutation increases risk for venous thrombosis and may increase risk for stillbirth, placental abruption, pre-eclampsia and fetal growth restriction.     The expression of Factor II thrombophilia is impacted by coexisting genetic thrombophilic disorders, acquired thrombophilic  disorders (eg, malignancy, hyperhomocysteinemia, high factor VIII levels), and circumstances including: pregnancy, oral contraceptive use, hormone replacement therapy, selective estrogen receptor modulators, travel, central venous catheters, surgery, and organ transplantation.    In order to derive the most meaningful benefit from this testing, it may be necessary that the results and subsequent options from these complex genetic tests be discussed with patients by a trained genetic professional.    Factor 5 Leiden [[id number]]  (Normal) Collected: 03/21/23 1502    Specimen: Blood Updated: 03/22/23 0949     Factor V Leiden Normal    Narrative:      Factor V Leiden is a specific mutation (R506Q) in the factor V gene that is associated with an increased risk of venous thrombosis.  Factor V Leiden is more resistant to inactivation by activated protein C.  Factor V Leiden refers to the G to A transition at nucleotide position 1691 of the Factor V gene, resulting in the substitution of the amino acid arginine by glutamine in the Factor V protein, causing resistance to cleavage by Activated Protein C (APC).    As a result, factor V persists in the circulation leading to a mild hyper-coagulable state.  The Leiden mutation accounts for 90% - 95% of APC resistance.  Factor V Leiden has been reported in patients with deep vein thrombosis, pulmonary embolus, central retinal vein occlusion, cerebral sinus thrombosis and hepatic vein thrombosis.  Other risk factors to be considered in the workup for venous thrombosis include the [id number] mutation in the factor II (prothrombin) gene, protein S and C deficiency, and antithrombin deficiencies. Anticardiolipin antibody and lupus anticoagulant analysis may be appropriate for certain patients, as well as homocysteine levels.    The expression of Factor V Leiden thrombophilia is impacted by coexisting genetic thrombophilic disorders, acquired thrombophilic disorders (malignancy,  hyperhomocysteinemia, high factor VIII levels), and circumstances including: pregnancy, oral contraceptive use, hormone replacement therapy, selective estrogen receptor modulators, travel, central venous catheters, surgery, transplantation and advanced age.    In order to derive the most meaningful benefit from this testing, it may be necessary that the results and subsequent options from these complex genetic tests be discussed with patients by a trained genetic professional.    POC Glucose Once [[id number]]  (Normal) Collected: 03/22/23 0723    Specimen: Blood Updated: 03/22/23 0724     Glucose 83 mg/dL      Comment: Serial Number: [id number]:  [id number]       Blood Culture - Blood, Arm, Left [[id number]]  (Abnormal) Collected: 03/20/23 0921    Specimen: Blood from Arm, Left Updated: 03/22/23 0724     Blood Culture Gram Negative Bacilli     Isolated from Anaerobic Bottle     Gram Stain Anaerobic Bottle Gram negative bacilli    Narrative:      Less than seven (7) mL's of blood was collected.  Insufficient quantity may yield false negative results.    Body Fluid Culture - Body Fluid, Pleural Cavity [[id number]] Collected: 03/21/23 1028    Specimen: Body Fluid from Pleural Cavity Updated: 03/22/23 0717     Body Fluid Culture No growth at 24 hours     Gram Stain Few (2+) WBCs seen      No organisms seen    Comprehensive Metabolic Panel [[id number]]  (Abnormal) Collected: 03/22/23 0301    Specimen: Blood Updated: 03/22/23 0342     Glucose 69 mg/dL      BUN 42 mg/dL      Creatinine 1.10 mg/dL      Sodium 134 mmol/L      Potassium 4.2 mmol/L      Comment: Slight hemolysis detected by analyzer. Results may be affected.        Chloride 94 mmol/L      CO2 34.0 mmol/L      Calcium 8.8 mg/dL      Total Protein 5.5 g/dL      Albumin 2.9 g/dL      ALT (SGPT) 18 U/L      AST (SGOT) 26 U/L      Comment: Slight hemolysis detected by analyzer. Results may be affected.        Alkaline Phosphatase 145 U/L      Total Bilirubin 0.8  mg/dL      Globulin 2.6 gm/dL      A/G Ratio 1.1 g/dL      BUN/Creatinine Ratio 38.2     Anion Gap 6.0 mmol/L      eGFR 50.6 mL/min/1.73     Narrative:      GFR Normal >60  Chronic Kidney Disease <60  Kidney Failure <15    The GFR formula is only valid for adults with stable renal function between ages 18 and 70.    High Sensitivity Troponin T 2Hr [[id number]]  (Abnormal) Collected: 03/22/23 0301    Specimen: Blood Updated: 03/22/23 0341     HS Troponin T 51 ng/L      Troponin T Delta -3 ng/L     Narrative:      High Sensitive Troponin T Reference Range:  <10.0 ng/L- Negative Female for AMI  <15.0 ng/L- Negative Male for AMI  >=10 - Abnormal Female indicating possible myocardial injury.  >=15 - Abnormal Male indicating possible myocardial injury.   Clinicians would have to utilize clinical acumen, EKG, Troponin, and serial changes to determine if it is an Acute Myocardial Infarction or myocardial injury due to an underlying chronic condition.         Phosphorus [[id number]]  (Normal) Collected: 03/22/23 0301    Specimen: Blood Updated: 03/22/23 0341     Phosphorus 3.6 mg/dL     Magnesium [[id number]]  (Normal) Collected: 03/22/23 0301    Specimen: Blood Updated: 03/22/23 0341     Magnesium 1.8 mg/dL     CBC & Differential [[id number]]  (Abnormal) Collected: 03/22/23 0301    Specimen: Blood Updated: 03/22/23 0325    Narrative:      The following orders were created for panel order CBC & Differential.  Procedure                               Abnormality         Status                     ---------                               -----------         ------                     CBC Auto Differential[[id number]]        Abnormal            Final result                 Please view results for these tests on the individual orders.    CBC Auto Differential [[id number]]  (Abnormal) Collected: 03/22/23 0301    Specimen: Blood Updated: 03/22/23 0325     WBC 5.30 10*3/mm3      RBC 4.95 10*6/mm3      Hemoglobin 16.0 g/dL      Hematocrit  50.4 %      .9 fL      MCH 32.3 pg      MCHC 31.7 g/dL      RDW 15.5 %      RDW-SD 55.1 fl      MPV 8.4 fL      Platelets 157 10*3/mm3      Neutrophil % 74.4 %      Lymphocyte % 14.4 %      Monocyte % 8.4 %      Eosinophil % 1.8 %      Basophil % 1.0 %      Neutrophils, Absolute 3.90 10*3/mm3      Lymphocytes, Absolute 0.80 10*3/mm3      Monocytes, Absolute 0.40 10*3/mm3      Eosinophils, Absolute 0.10 10*3/mm3      Basophils, Absolute 0.10 10*3/mm3      nRBC 0.1 /100 WBC     Folate [[id number]]  (Normal) Collected: 03/21/23 1620    Specimen: Blood Updated: 03/22/23 0152     Folate >20.00 ng/mL     Narrative:      Results may be falsely increased if patient taking Biotin.      Vitamin B12 [[id number]]  (Normal) Collected: 03/21/23 1620    Specimen: Blood Updated: 03/22/23 0135     Vitamin B-12 868 pg/mL     Narrative:      Results may be falsely increased if patient taking Biotin.      aPTT [[id number]]  (Abnormal) Collected: 03/21/23 2219    Specimen: Blood Updated: 03/22/23 0112     .1 seconds     Lactate Dehydrogenase [[id number]]  (Abnormal) Collected: 03/21/23 2219    Specimen: Blood Updated: 03/22/23 0027      U/L      Comment: Specimen hemolyzed.  Results may be affected.       High Sensitivity Troponin T [[id number]]  (Abnormal) Collected: 03/21/23 2308    Specimen: Blood Updated: 03/22/23 0024     HS Troponin T 54 ng/L     Narrative:      High Sensitive Troponin T Reference Range:  <10.0 ng/L- Negative Female for AMI  <15.0 ng/L- Negative Male for AMI  >=10 - Abnormal Female indicating possible myocardial injury.  >=15 - Abnormal Male indicating possible myocardial injury.   Clinicians would have to utilize clinical acumen, EKG, Troponin, and serial changes to determine if it is an Acute Myocardial Infarction or myocardial injury due to an underlying chronic condition.         Hepatic Function Panel [[id number]]  (Abnormal) Collected: 03/21/23 2219    Specimen: Blood Updated: 03/22/23 0021      Total Protein 5.9 g/dL      Albumin 3.0 g/dL      ALT (SGPT) 22 U/L      AST (SGOT) 31 U/L      Alkaline Phosphatase 182 U/L      Total Bilirubin 0.8 mg/dL      Bilirubin, Direct 0.3 mg/dL      Bilirubin, Indirect 0.5 mg/dL     Protein S Functional [[id number]] Collected: 03/21/23 2308    Specimen: Blood Updated: 03/21/23 2354    JAK2 V617F, Rfx CALR / MPL - Blood, [[id number]] Collected: 03/21/23 2219    Specimen: Blood Updated: 03/21/23 2354    Hemoglobinopathy Fractionation Cascade [[id number]] Collected: 03/21/23 2219    Specimen: Blood Updated: 03/21/23 2354    Erythropoietin [[id number]] Collected: 03/21/23 2219    Specimen: Blood Updated: 03/21/23 2354    [place] [[id number]] Collected: 03/21/23 2219    Specimen: Blood Updated: 03/21/23 2354    POC Glucose Once [[id number]]  (Abnormal) Collected: 03/21/23 2213    Specimen: Blood Updated: 03/21/23 2215     Glucose 172 mg/dL      Comment: Serial Number: [id number]:  [id number]       POC Glucose Once [[id number]]  (Abnormal) Collected: 03/21/23 2033    Specimen: Blood Updated: 03/21/23 2035     Glucose 202 mg/dL      Comment: Serial Number: [id number]:  [id number]       TSH [[id number]]  (Normal) Collected: 03/21/23 1620    Specimen: Blood Updated: 03/21/23 1843     TSH 3.400 uIU/mL     Lactic Acid, Plasma [[id number]]  (Normal) Collected: 03/21/23 1620    Specimen: Blood Updated: 03/21/23 1837     Lactate 1.2 mmol/L     Phosphorus [[id number]]  (Normal) Collected: 03/21/23 1620    Specimen: Blood Updated: 03/21/23 1836     Phosphorus 4.0 mg/dL     Cardiolipin Antibody [[id number]] Collected: 03/21/23 1620    Specimen: Blood Updated: 03/21/23 1813    Magnesium [[id number]]  (Normal) Collected: 03/21/23 1502    Specimen: Blood Updated: 03/21/23 1732     Magnesium 1.9 mg/dL     Beta-2 Glycoprotein Antibodies [[id number]] Collected: 03/21/23 1502    Specimen: Blood Updated: 03/21/23 1705    Protein, Body Fluid - Pleural Fluid, Pleural Cavity [[id number]] Collected:  03/21/23 1028    Specimen: Pleural Fluid from Pleural Cavity Updated: 03/21/23 1616     Protein, Total, Fluid 2.0 g/dL     Narrative:      No Reference Ranges Established.    A serous fluid total fluid (TP) greater than 50 percent of the serum TP suggests the fluid is an exudate.      1. Pleural TP/Serum TP >0.5  2. Pleural LD/Serum LD >0.6  3. Pleural LD >2/3 of the upper limit of normal for serum LDH    This test was developed, it performance characteristics determined and judged suitable for clinical purposes by [facility].  It has not been cleared or approved by the FDA.  The laboratory is regulated under CLIA as qualified to perform high-complexity testing.     Lactate Dehydrogenase, Body Fluid - Pleural Fluid, Pleural Cavity [[id number]] Collected: 03/21/23 1028    Specimen: Pleural Fluid from Pleural Cavity Updated: 03/21/23 1616     Lactate Dehydrogenase (LD), Fluid 77 U/L     Narrative:      No Reference Ranges Established.    Serous fluid LDH greater than 60 percent of the serum LDH or serous fluid LDH two-thirds of the upper limit of normal for serum LDH suggests the fluid is an exudate.     1. Pleural TP/Serum TP >0.5  2. Pleural LD/Serum LD >0.6  3. Pleural LD >2/3 of the upper limit of normal for serum LDH    This test was developed, it performance characteristics determined and judged suitable for clinical purposes by [facility].  It has not been cleared or approved by the FDA.  The laboratory is regulated under CLIA as qualified to perform high-complexity testing.     Glucose, Body Fluid - Pleural Fluid, Pleural Cavity [[id number]] Collected: 03/21/23 1028    Specimen: Pleural Fluid from Pleural Cavity Updated: 03/21/23 1616     Glucose, Fluid 156 mg/dL     Narrative:      No Reference Ranges Established.    Serous fluid glucose less than 60 mg/dL or less than 30 mg/dL below serum glucose suggests an infectious or malignant exudate.     This test was  developed, it performance characteristics determined and judged suitable for clinical purposes by [facility].  It has not been cleared or approved by the FDA.  The laboratory is regulated under CLIA as qualified to perform high-complexity testing.     POC Glucose Once [[id number]]  (Abnormal) Collected: 03/21/23 1606    Specimen: Blood Updated: 03/21/23 1607     Glucose 217 mg/dL      Comment: Serial Number: [id number]:  [id number]              Assessment & Plan       Cellulitis of right leg       Assessment & Plan    I have independently reviewed this morning's chest x-ray which demonstrates which demonstrates persistent right basilar opacity and atelectasis consistent with pneumonia.  No pneumothorax status post thoracentesis.    Right pleural effusion: Likely parapneumonic.  Patient is status post thoracentesis yesterday with approximately 1240 cc pleural fluid removed.  Cytology is negative for malignancy.  Pleural fluid with no growth to date.  Patient's shortness of breath is improved and she is now on room air.     Pneumonia: Continue IV antibiotics, incentive spirometry and OPEP 10 times per hour.  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  Continue heparin drip per vascular surgery.    Nothing further to add from a thoracic surgery perspective.  Recommend right thoracentesis as needed for shortness of breath.  We will sign off.  Thank you for letting us precipitate in the care of Ms. Valderrama.    Montserrat Humphries DNP, APRN  [facility]  03/22/23  14:49 EDT

## 2023-03-22 NOTE — PROGRESS NOTES
General Surgery Progress Note    Name: Jaquelin Valderrama ADMIT: 3/20/2023   : 1940  PCP: Minerva Chatterjee MD    MRN: [id number] LOS: 2 days   AGE/SEX: 83 y.o. female  ROOM: [facility]/[address] [place] [address]    Chief Complaint   Patient presents with   • Foot Swelling     Bilateral feet swelling, painful, warm to touch on right leg.       Subjective     83 y.o. female admitted with lower extremity cellulitis and edema, found to have bacteremia gram-negative's on the Gram stain, thickened gallbladder on CT imaging consult for possible cholecystitis contributing to her presentation    In general no major issues overnight.  Denies any fevers denies any new abdominal pain denies any nausea or vomiting.    Objective     Scheduled Medications:   atorvastatin, 40 mg, Oral, Nightly  furosemide, 40 mg, Intravenous, Q12H  insulin lispro, 2-9 Units, Subcutaneous, 4x Daily With Meals & Nightly  levothyroxine, 100 mcg, Oral, Q AM  metoprolol succinate XL, 25 mg, Oral, BID  piperacillin-tazobactam, 3.375 g, Intravenous, Q8H  sodium chloride, 10 mL, Intravenous, Q12H  sodium chloride, 10 mL, Intravenous, Q12H        Active Infusions:  heparin, 18 Units/kg/hr, Last Rate: 15 Units/kg/hr (23 0700)  hold, 1 each        As Needed Medications:  •  acetaminophen  •  senna-docusate sodium **AND** polyethylene glycol **AND** bisacodyl **AND** bisacodyl  •  dextrose  •  dextrose  •  glucagon (human recombinant)  •  heparin  •  heparin  •  hold  •  HYDROcodone-acetaminophen  •  melatonin  •  ondansetron  •  polyethyl glycol-propyl glycol  •  [COMPLETED] Insert Peripheral IV **AND** sodium chloride  •  sodium chloride  •  sodium chloride  •  sodium chloride  •  sodium chloride    Vital Signs  Vital Signs Patient Vitals for the past 24 hrs:   BP Temp Temp src Pulse Resp SpO2 Height Weight   23 0740 120/73 -- -- 70 17 97 % -- --   23 0525 117/85 96.9 °F (36.1 °C) Axillary 73 18 92 % -- --   23 0205 115/85 97.3 °F (36.3 °C) Axillary 74  "17 94 % -- --   03/21/23 2248 115/82 97.8 °F (36.6 °C) Axillary 78 23 90 % -- --   03/21/23 1607 130/85 97.7 °F (36.5 °C) Axillary 78 19 98 % -- --   03/21/23 1521 125/90 -- -- -- -- -- 160 cm (63\") 67.6 kg (149 lb)   03/21/23 1158 125/90 97.3 °F (36.3 °C) Oral 75 23 95 % -- --   03/21/23 1041 131/90 -- -- 84 -- 94 % -- --   03/21/23 1031 125/81 -- -- 82 -- 97 % -- --   03/21/23 1024 118/78 -- -- 89 -- 95 % -- --       Physical Exam:  Physical Exam  Constitutional:       General: She is not in acute distress.     Appearance: She is ill-appearing.   Pulmonary:      Effort: Pulmonary effort is normal. No respiratory distress.   Abdominal:      Palpations: Abdomen is soft.      Comments: No right upper quadrant tenderness to palpation   Neurological:      Mental Status: She is alert.         Results Review:     CBC    Results from last 7 days   Lab Units 03/22/23  0301 03/20/23  0915   WBC 10*3/mm3 5.30 11.10*   HEMOGLOBIN g/dL 16.0* 17.5*   PLATELETS 10*3/mm3 157 180     CMP   Results from last 7 days   Lab Units 03/22/23  0301 03/21/23  2219 03/20/23  0915   SODIUM mmol/L 134*  --  138   POTASSIUM mmol/L 4.2  --  4.2   CHLORIDE mmol/L 94*  --  100   CO2 mmol/L 34.0*  --  26.0   BUN mg/dL 42*  --  41*   CREATININE mg/dL 1.10*  --  0.92   GLUCOSE mg/dL 69  --  115*   ALBUMIN g/dL 2.9* 3.0* 3.0*   BILIRUBIN mg/dL 0.8 0.8 1.4*   ALK PHOS U/L 145* 182* 204*   AST (SGOT) U/L 26 31 54*   ALT (SGPT) U/L 18 22 44*     Radiology(recent) CT Angio Abdominal Aorta Bilateral Iliofem Runoff    Result Date: 3/20/2023  1.No acute aortic dissection, aneurysm or aortic injury. No acute pulmonary emboli. 2.Large right and trace left pleural effusions. Near complete atelectasis of right lower lobe. Bibasilar and right middle lobe opacities may represent atelectasis or infiltrates. Follow-up recommended. 3.Cardiomegaly with reflux of contrast and IVC and hepatic veins suggest right heart failure. 4.Gallbladder wall thickening is a " nonspecific finding can be seen in multiple inflammatory process including acute cholecystitis and volume overload. If there is clinical concern for acute cholecystitis, ultrasound can be obtained for further evaluation. 5.Mild ascites throughout abdomen and pelvis. 6.Anasarca. 7.Proximal left trifurcation is patent. Remainder of the left leg arteries including anterior tibial, posterior tibial, fibular arteries demonstrate markedly diminutive flow with no flow in left foot. Case discussed with nurse alin Smith 3/20/2023 8:17 PM EDT. Electronically Signed: Yoni Max  3/20/2023 8:18 PM EDT  Workstation ID: [id number]    XR Chest 1 View    Result Date: 3/22/2023  Impression: 1. New right basilar opacity may represent reaccumulation of small right pleural effusion, with right basilar atelectasis or pneumonia. 2. Increase in left basilar airspace disease, favored to represent increased atelectasis. 3. Interstitial edema is again noted, without significant change. 4. Stable cardiac enlargement. Electronically Signed: Saadia Islas  3/22/2023 7:32 AM EDT  Workstation ID: [id number]    XR Chest 1 View    Result Date: 3/21/2023  Impression: Interval near complete resolution of right effusion status post thoracentesis. No pneumothorax. Additional findings as above. Electronically Signed: Balwinder Klein  3/21/2023 11:24 AM EDT  Workstation ID: [id number]    XR Chest 1 View    Result Date: 3/21/2023  Impression: 1. Interval decrease in right pleural effusion. There appears to be some loculated fluid within the fissures now present. 2. Increasing airspace opacity in the right perihilar region suggesting pneumonia or asymmetric edema. 3. Bibasilar consolidations suggesting atelectasis or infiltrate. Small left effusion. 4. Cardiomegaly. Electronically Signed: Balwinder Klein  3/21/2023 8:55 AM EDT  Workstation ID: [id number]    US Thoracentesis    Result Date: 3/21/2023  Impression: Technically successful ultrasound-guided large  volume right-sided thoracentesis as described above. A sample of the turbid yellow pleural fluid was sent for laboratory evaluation. Electronically Signed: Antoine Carpio  3/21/2023 11:13 AM EDT  Workstation ID: [id number]    CT Angiogram Chest    Result Date: 3/20/2023  1.No acute aortic dissection, aneurysm or aortic injury. No acute pulmonary emboli. 2.Large right and trace left pleural effusions. Near complete atelectasis of right lower lobe. Bibasilar and right middle lobe opacities may represent atelectasis or infiltrates. Follow-up recommended. 3.Cardiomegaly with reflux of contrast and IVC and hepatic veins suggest right heart failure. 4.Gallbladder wall thickening is a nonspecific finding can be seen in multiple inflammatory process including acute cholecystitis and volume overload. If there is clinical concern for acute cholecystitis, ultrasound can be obtained for further evaluation. 5.Mild ascites throughout abdomen and pelvis. 6.Anasarca. 7.Proximal left trifurcation is patent. Remainder of the left leg arteries including anterior tibial, posterior tibial, fibular arteries demonstrate markedly diminutive flow with no flow in left foot. Case discussed with nurse alin boo @ 3/20/2023 8:17 PM EDT. Electronically Signed: Yoni Max  3/20/2023 8:18 PM EDT  Workstation ID: [id number]      I reviewed the patient's new clinical results.  I reviewed the patient's new imaging results and agree with the interpretation.    Assessment & Plan       Cellulitis of right leg      83 y.o. female admitted with lower extremity cellulitis history of heart failure and pleural effusions CT imaging thickened gallbladder wall and gram-negative bacteremia    She is getting HIDA scan today my her initial reviewed the pictures looks like the gallbladder fills.  This in addition to her lack of the right upper quadrant Carson pain would speak against cholecystitis being the etiology of her gram-negative bacteremia for my  standpoint no no need for therapy directed at the gallbladder.  The thickening on the CT scan is likely a manifestation of her heart failure.         This note was created using Dragon Voice Recognition software.    Eliot Aguilar MD  03/22/23  09:00 EDT

## 2023-03-22 NOTE — PROGRESS NOTES
Name: Jaquelin Valderrama ADMIT: 3/20/2023   : 1940  PCP: Minerva Chatterjee MD    MRN: [id number] LOS: 2 days   AGE/SEX: 83 y.o. female  ROOM:  [place] [address]/1     CC: Microthrombosis  Interval History: Patient off the floor getting testing  Subjective   Subjective     Review of Systems  Objective   Objective     Vitals:   Temp:  [96.9 °F (36.1 °C)-97.8 °F (36.6 °C)] 96.9 °F (36.1 °C)  Heart Rate:  [70-89] 70  Resp:  [17-23] 17  BP: (115-131)/(73-90) 120/73    No intake/output data recorded.    Scheduled Meds:     atorvastatin, 40 mg, Oral, Nightly  furosemide, 40 mg, Intravenous, Q12H  insulin lispro, 2-9 Units, Subcutaneous, 4x Daily With Meals & Nightly  levothyroxine, 100 mcg, Oral, Q AM  metoprolol succinate XL, 25 mg, Oral, BID  piperacillin-tazobactam, 3.375 g, Intravenous, Q8H  sodium chloride, 10 mL, Intravenous, Q12H  sodium chloride, 10 mL, Intravenous, Q12H      IV Meds:   heparin, 18 Units/kg/hr, Last Rate: 15 Units/kg/hr (23 0700)  hold, 1 each        Physical Exam  Vascular: Photograph of the legs reviewed with family.  Improved appearance on the left    Data Reviewed:  CBC    Results from last 7 days   Lab Units 23  0301 23  0915   WBC 10*3/mm3 5.30 11.10*   HEMOGLOBIN g/dL 16.0* 17.5*   PLATELETS 10*3/mm3 157 180     BMP   Results from last 7 days   Lab Units 23  0301 23  1620 23  1502 23  0915   SODIUM mmol/L 134*  --   --  138   POTASSIUM mmol/L 4.2  --   --  4.2   CHLORIDE mmol/L 94*  --   --  100   CO2 mmol/L 34.0*  --   --  26.0   BUN mg/dL 42*  --   --  41*   CREATININE mg/dL 1.10*  --   --  0.92   GLUCOSE mg/dL 69  --   --  115*   MAGNESIUM mg/dL 1.8  --  1.9  --    PHOSPHORUS mg/dL 3.6 4.0  --   --      Coag   Results from last 7 days   Lab Units 239 23  1148 23  0528 23  1902   INR   --   --   --  1.29*   APTT seconds 112.1* 30.7* 58.7* 132.9*     HbA1C   Lab Results   Component Value Date    HGBA1C 9.20 (H) 2023     HGBA1C 9.40 (H) 01/10/2023    HGBA1C 7.1 (H) 03/03/2022     Infection   Results from last 7 days   Lab Units 03/21/23  1028 03/20/23  1116 03/20/23  0921   BLOODCX   --  No growth at 24 hours Gram Negative Bacilli*   BODYFLDCX  No growth at 24 hours  --   --    BCIDPCR   --   --  Negative by BCID PCR. Culture to Follow.     Radiology(recent) CT Angio Abdominal Aorta Bilateral Iliofem Runoff    Result Date: 3/20/2023  1.No acute aortic dissection, aneurysm or aortic injury. No acute pulmonary emboli. 2.Large right and trace left pleural effusions. Near complete atelectasis of right lower lobe. Bibasilar and right middle lobe opacities may represent atelectasis or infiltrates. Follow-up recommended. 3.Cardiomegaly with reflux of contrast and IVC and hepatic veins suggest right heart failure. 4.Gallbladder wall thickening is a nonspecific finding can be seen in multiple inflammatory process including acute cholecystitis and volume overload. If there is clinical concern for acute cholecystitis, ultrasound can be obtained for further evaluation. 5.Mild ascites throughout abdomen and pelvis. 6.Anasarca. 7.Proximal left trifurcation is patent. Remainder of the left leg arteries including anterior tibial, posterior tibial, fibular arteries demonstrate markedly diminutive flow with no flow in left foot. Case discussed with nurse alin boo @ 3/20/2023 8:17 PM EDT. Electronically Signed: Yoni Max  3/20/2023 8:18 PM EDT  Workstation ID: [id number]    XR Chest 1 View    Result Date: 3/22/2023  Impression: 1. New right basilar opacity may represent reaccumulation of small right pleural effusion, with right basilar atelectasis or pneumonia. 2. Increase in left basilar airspace disease, favored to represent increased atelectasis. 3. Interstitial edema is again noted, without significant change. 4. Stable cardiac enlargement. Electronically Signed: Saadia Islas  3/22/2023 7:32 AM EDT  Workstation ID: [id number]    XR  Chest 1 View    Result Date: 3/21/2023  Impression: Interval near complete resolution of right effusion status post thoracentesis. No pneumothorax. Additional findings as above. Electronically Signed: Balwinder Klein  3/21/2023 11:24 AM EDT  Workstation ID: [id number]    XR Chest 1 View    Result Date: 3/21/2023  Impression: 1. Interval decrease in right pleural effusion. There appears to be some loculated fluid within the fissures now present. 2. Increasing airspace opacity in the right perihilar region suggesting pneumonia or asymmetric edema. 3. Bibasilar consolidations suggesting atelectasis or infiltrate. Small left effusion. 4. Cardiomegaly. Electronically Signed: Balwinder Klein  3/21/2023 8:55 AM EDT  Workstation ID: [id number]    US Thoracentesis    Result Date: 3/21/2023  Impression: Technically successful ultrasound-guided large volume right-sided thoracentesis as described above. A sample of the turbid yellow pleural fluid was sent for laboratory evaluation. Electronically Signed: Antoine Carpio  3/21/2023 11:13 AM EDT  Workstation ID: [id number]    CT Angiogram Chest    Result Date: 3/20/2023  1.No acute aortic dissection, aneurysm or aortic injury. No acute pulmonary emboli. 2.Large right and trace left pleural effusions. Near complete atelectasis of right lower lobe. Bibasilar and right middle lobe opacities may represent atelectasis or infiltrates. Follow-up recommended. 3.Cardiomegaly with reflux of contrast and IVC and hepatic veins suggest right heart failure. 4.Gallbladder wall thickening is a nonspecific finding can be seen in multiple inflammatory process including acute cholecystitis and volume overload. If there is clinical concern for acute cholecystitis, ultrasound can be obtained for further evaluation. 5.Mild ascites throughout abdomen and pelvis. 6.Anasarca. 7.Proximal left trifurcation is patent. Remainder of the left leg arteries including anterior tibial, posterior tibial, fibular arteries  demonstrate markedly diminutive flow with no flow in left foot. Case discussed with nurse alin Smith 3/20/2023 8:17 PM EDT. Electronically Signed: Yoni Max  3/20/2023 8:18 PM EDT  Workstation ID: [id number]      Most notable findings include: White blood cell count normal.  Blood cultures positive in 1 out of 2.  A1c elevated.    Active Hospital Problems:   Active Hospital Problems    Diagnosis  POA   • **Cellulitis of right leg [L03.115]  Yes      Resolved Hospital Problems   No resolved problems to display.      Assessment & Plan   Billin, Post Op Global/did not see  Assessment / Plan   Patient actually off the floor for testing.  Discussed with family at the bedside this morning.    CT angiogram chest, abdomen, and pelvis with runoff from yesterday, as well as her ABIs, WBI, and lower extremity arterial duplex studies from yesterday and today respectively.  There was no significant plaque or thrombus present in the thoracic aorta.  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  The SMA and MARINA are widely patent.  The infrarenal aorta is patent with no thrombus.  The iliac arteries are patent bilaterally.  For the runoff arteriogram, she does not have significant stenosis of the femoral or popliteal arteries bilaterally.  There is 3 vessel runoff to the right foot.  There is a cutoff of contrast on the left after the trifurcation in the calf, but no evidence of thrombus.  Dr. Lacey felt that this is a technical contrast timing issue.  She obtained an arterial duplex this yesterday which confirmed patency of all three left tibial vessels to the distal calf and ABIs remain normal at >1 bilaterally.  WBI and digit pressures were normal.    She does have gram negative bacilli in her blood cultures.    General surgery, hematology, ID, and thoracic have all seen.    Dr. Lacey discussed her ischemia with her .   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  Due to her age and multiple other medical comorbidities, she is not a candidate for thrombolysis.  Nor would an open revascularization be of benefit in this situation.  From a vascular standpoint, she will need continued therapeutic anticoagulation and time to allow for tissue demarcation.  She is at high risk of limb loss but thankfully appearance has improved a bit today.  Ejection fraction noted to be less than 20% which seems to portend a very poor prognosis..  Will need to be allowed to demarcate.     Eren Smiley MD  03/22/23  08:44 EDT  Office Number [phone]

## 2023-03-22 NOTE — PROGRESS NOTES
[facility] Medicine Services   Daily Progress Note    Patient Name: Jaquelin Valderrama  : 1940  MRN: [id number]  Primary Care Physician:  Minerva Chatterjee MD  Date of admission: 3/20/2023  Date and Time of Service:  at       Subjective      Chief Complaint:     Patient feels better.   Still has pain that has improved  No chest pain  No SOB. Breathing improved       Objective      Vitals:   Temp:  [96.9 °F (36.1 °C)-97.8 °F (36.6 °C)] 97.2 °F (36.2 °C)  Heart Rate:  [70-79] 79  Resp:  [17-24] 24  BP: ()/(41-90) 94/41  Flow (L/min):  [1-3] 1    Physical Exam   General: No acute distress  HEENT: neck supple, normal oral mucosa, no masses, no lymphadenopathy.   Lungs:  No crackles, No Rhonchi. Equal excursions.   CV - Normal S1/S2, no murmur, Iregular rhythm, rate controlled   Abdomin - Soft, non-tender, non-distended, normal bowel sounds  Extremities - bilateral LE pitting edema - improving   Neuro - No focal weakness, normal sensation  Psych - Alert and oriented x3  Skin - bluish discoloration almost resolved bilateral LE. RLE erythema and tenderness improved. Still has swelling        Result Review    Result Review:  I have personally reviewed the results from the time of this admission to 3/22/2023 11:33 EDT and agree with these findings:  [x]  Laboratory  [x]  Microbiology  [x]  Radiology  [x]  EKG/Telemetry   [x]  Cardiology/Vascular   []  Pathology  [x]  Old records  []  Other:  Most notable findings include:           Assessment & Plan      Brief Patient Summary:  Jaquelin Valderrama is a 83 y.o. female who       atorvastatin, 40 mg, Oral, Nightly  furosemide, 40 mg, Intravenous, Q12H  insulin lispro, 2-9 Units, Subcutaneous, 4x Daily With Meals & Nightly  levothyroxine, 100 mcg, Oral, Q AM  metoprolol succinate XL, 25 mg, Oral, BID  piperacillin-tazobactam, 3.375 g, Intravenous, Q8H  sodium chloride, 10 mL, Intravenous, Q12H  sodium chloride, 10 mL, Intravenous, Q12H       heparin, 18  Units/kg/hr, Last Rate: 15 Units/kg/hr (03/22/23 1113)  hold, 1 each         Active Hospital Problems:  Active Hospital Problems    Diagnosis    • **Cellulitis of right leg      Plan:       RLE Cellulitis/Erysipelas  - GNB (in anaerobic bottle) bacteremia   - IV Zosyn per ID.   - follow cultures      Right foot discoloration/ischemia  - concern for acute ischemia in setting of a fib and holding eliquis  - Vascular consulted.  - no large vessel occlusion. Likely microvascular thrombosis  - continue medical management - improved.      Acute on chronic systolic heart failure with low output state  - Echo 8/2022 with EF 30%   - repeat Echo with EF 15-20%   - Continue IV Lasix. Monitor for toxicity. Monitor Creatinine   - not in ACEI/Arb, unsure why. Hold Aldactone for now   - Cardiology consult.  - low dose aldactone and Lisinopril added by cardiology for optimization      Pleural effusion R>L  - h/o Decortication  - Thoracic surgeon consulted - s/p thoracentesis      Distended GB  - could be related to vascular congestion  - Surgery consulted. HIDA pending     Persistent A fib  - Continue Metoprolol  - Heparin gtt     CAD s/p CABG  - Continue Statin and BB  - Defer Antiplatelet to Cardiology     DM-2 - SSI. Monitor     Erythrocytosis - check B12 and folate and TSH  - Hematology consulted. Work-up sent      Hypothyroidism - Levothyroxine       DVT prophylaxis:  Medical DVT prophylaxis orders are present.    CODE STATUS:         Disposition:  I expect patient to be discharged .    This patient has been  and discussed with . 03/22/23      Electronically signed by Lily Nesbitt MD, 03/22/23, 11:33 EDT.  [facility] Hospitalist Team

## 2023-03-22 NOTE — PROGRESS NOTES
Infectious Diseases Progress Note      LOS: 2 days   Patient Care Team:  Minerva Chatterjee MD as PCP - General (Internal Medicine)  Yordan Evans MD as Consulting Physician (Cardiology)  Amilcar Smallwood MD as Consulting Physician (Hematology and Oncology)    Chief Complaint: Bilateral leg edema, shortness of breath at admission    Subjective       The patient has been afebrile for the last 24 hours.  The patient is on room air, hemodynamically stable, and is tolerating antimicrobial therapy.  States her breathing is much better since having the thoracentesis yesterday      Review of Systems:   Review of Systems   Constitutional: Negative.    HENT: Negative.    Eyes: Negative.    Respiratory: Positive for shortness of breath.         Improved   Cardiovascular: Positive for leg swelling.   Gastrointestinal: Negative.    Endocrine: Negative.    Genitourinary: Negative.    Musculoskeletal: Negative.    Skin: Negative.    Neurological: Negative.    Psychiatric/Behavioral: Negative.    All other systems reviewed and are negative.       Objective     Vital Signs  Temp:  [96.9 °F (36.1 °C)-97.8 °F (36.6 °C)] 97.2 °F (36.2 °C)  Heart Rate:  [70-79] 79  Resp:  [17-24] 24  BP: ()/(41-90) 94/41    Physical Exam:  Physical Exam  Vitals and nursing note reviewed.   Constitutional:       General: She is not in acute distress.     Appearance: She is well-developed and normal weight. She is ill-appearing. She is not diaphoretic.   HENT:      Head: Normocephalic and atraumatic.   Eyes:      General: No scleral icterus.     Extraocular Movements: Extraocular movements intact.      Conjunctiva/sclera: Conjunctivae normal.      Pupils: Pupils are equal, round, and reactive to light.   Cardiovascular:      Rate and Rhythm: Normal rate and regular rhythm.      Heart sounds: Normal heart sounds, S1 normal and S2 normal. No murmur heard.  Pulmonary:      Effort: Pulmonary effort is normal. No respiratory distress.      Breath  sounds: Normal breath sounds. No stridor. No wheezing or rales.   Chest:      Chest wall: No tenderness.   Abdominal:      General: Bowel sounds are normal. There is no distension.      Palpations: Abdomen is soft. There is no mass.      Tenderness: There is no abdominal tenderness. There is no guarding.   Genitourinary:     Comments: External catheter  Musculoskeletal:         General: No swelling, tenderness or deformity. Normal range of motion.      Cervical back: Neck supple.      Right lower leg: Edema present.      Left lower leg: Edema present.   Skin:     General: Skin is warm and dry.      Coloration: Skin is not pale.      Findings: No bruising, erythema or rash.      Comments:  Bilateral edema.  There was a cyanosis of the left foot with cold foot    Right leg  has no warmth today but there is still erythema below the knee   Neurological:      General: No focal deficit present.      Mental Status: She is alert and oriented to person, place, and time. Mental status is at baseline.      Cranial Nerves: No cranial nerve deficit.   Psychiatric:         Mood and Affect: Mood normal.          Results Review:    I have reviewed all clinical data, test, lab, and imaging results.     Radiology  Adult Transthoracic Echo Complete W/ Cont if Necessary Per Protocol    Result Date: 3/21/2023    Left ventricular ejection fraction appears to be less than 20%.   Estimated right ventricular systolic pressure from tricuspid regurgitation is normal (<35 mmHg). Indications Shortness of breath Technically satisfactory study. Mitral valve is structurally normal.  Moderate mitral regurgitation Tricuspid valve is structurally normal.  Mild coaptation of the tricuspid valve with significant tricuspid regurgitation. Aortic valve is structurally normal. Pulmonic valve could not be well visualized. Biatrial and significant biventricular enlargement. Left ventricular severe diffuse hypocontractility with ejection fraction of 15 to  20%. Atrial septum is intact. Aorta is normal. No pericardial effusion or intracardiac thrombus is seen. Impression Moderate mitral regurgitation. Significant tricuspid regurgitation with normal coaptation of tricuspid valve. Significant biatrial biventricular enlargement. Left ventricular severe diffuse hypocontractility with ejection fraction of 15 to 20%.     NM HIDA SCAN WITHOUT PHARMACOLOGICAL INTERVENTION    Result Date: 3/22/2023  NM HIDA SCAN WITHOUT PHARMACOLOGICAL INTERVENTION Date of Exam: 3/22/2023 8:29 AM EDT Indication: possible cholecystitis on ct. Comparison: None available. Technique: The patient received 5.8  mCi of technetium 99m Choletec intravenously and images were obtained of the abdomen in the anterior projection through 120 minutes. Gallbladder stimulation was not performed. Findings: Normal radiopharmaceutical accumulation within the liver. The gallbladder is promptly visualized within the first 30 minutes of imaging, indicating patency of the cystic duct. There is no scintigraphic evidence of acute or chronic cholecystitis. Progression of radiopharmaceutical into the small bowel indicates patency of the common bile duct. The gallbladder ejection fraction is normal, 49%.     Impression: Normal HIDA scan. Gallbladder ejection fraction is 49%. Electronically Signed: Saadia Islas  3/22/2023 10:57 AM EDT  Workstation ID: [id number]    XR Chest 1 View    Result Date: 3/22/2023  XR CHEST 1 VW Date of Exam: 3/22/2023 2:30 AM EDT Indication: s/p thoracentesis. Comparison: AP portable chest 3/21/2023. CT chest 3/20/2023 Findings: Skinfold artifact projects over the right lower thorax. Small bilateral pleural effusions are present. Bibasilar alveolar opacities are present, new on the right, increased on the left. Fine interstitial thickening is present in both lungs. Moderate cardiac enlargement is stable. Median sternotomy changes are present. No visible pneumothorax. No acute osseous abnormality.      Impression: 1. New right basilar opacity may represent reaccumulation of small right pleural effusion, with right basilar atelectasis or pneumonia. 2. Increase in left basilar airspace disease, favored to represent increased atelectasis. 3. Interstitial edema is again noted, without significant change. 4. Stable cardiac enlargement. Electronically Signed: Saadia Islas  3/22/2023 7:32 AM EDT  Workstation ID: [id number]      Cardiology    Laboratory    Results from last 7 days   Lab Units 03/22/23  0301 03/20/23  0915   WBC 10*3/mm3 5.30 11.10*   HEMOGLOBIN g/dL 16.0* 17.5*   HEMATOCRIT % 50.4* 55.3*   PLATELETS 10*3/mm3 157 180     Results from last 7 days   Lab Units 03/22/23  0301 03/21/23 2219 03/20/23  0915   SODIUM mmol/L 134*  --  138   POTASSIUM mmol/L 4.2  --  4.2   CHLORIDE mmol/L 94*  --  100   CO2 mmol/L 34.0*  --  26.0   BUN mg/dL 42*  --  41*   CREATININE mg/dL 1.10*  --  0.92   GLUCOSE mg/dL 69  --  115*   ALBUMIN g/dL 2.9* 3.0* 3.0*   BILIRUBIN mg/dL 0.8 0.8 1.4*   ALK PHOS U/L 145* 182* 204*   AST (SGOT) U/L 26 31 54*   ALT (SGPT) U/L 18 22 44*   CALCIUM mg/dL 8.8  --  9.0     Results from last 7 days   Lab Units 03/20/23  0915   CK TOTAL U/L 43             Microbiology   Microbiology Results (last 10 days)       Procedure Component Value - Date/Time    Body Fluid Culture - Body Fluid, Pleural Cavity [[id number]] Collected: 03/21/23 1028    Lab Status: Preliminary result Specimen: Body Fluid from Pleural Cavity Updated: 03/22/23 0717     Body Fluid Culture No growth at 24 hours     Gram Stain Few (2+) WBCs seen      No organisms seen    MRSA Screen, PCR (Inpatient) - Swab, Nares [[id number]]  (Normal) Collected: 03/20/23 1349    Lab Status: Final result Specimen: Swab from Nares Updated: 03/20/23 4386     MRSA PCR No MRSA Detected    Narrative:      The negative predictive value of this diagnostic test is high and should only be used to consider de-escalating anti-MRSA therapy. A positive result may  indicate colonization with MRSA and must be correlated clinically.    Blood Culture - Blood, Hand, Right [[id number]]  (Normal) Collected: 03/20/23 1116    Lab Status: Preliminary result Specimen: Blood from Hand, Right Updated: 03/22/23 1130     Blood Culture No growth at 2 days    Blood Culture - Blood, Arm, Left [[id number]]  (Abnormal) Collected: 03/20/23 0921    Lab Status: Preliminary result Specimen: Blood from Arm, Left Updated: 03/22/23 0724     Blood Culture Gram Negative Bacilli     Isolated from Anaerobic Bottle     Gram Stain Anaerobic Bottle Gram negative bacilli    Narrative:      Less than seven (7) mL's of blood was collected.  Insufficient quantity may yield false negative results.    Blood Culture ID, PCR - Blood, Arm, Left [[id number]] Collected: 03/20/23 0921    Lab Status: Final result Specimen: Blood from Arm, Left Updated: 03/21/23 0639     BCID, PCR Negative by BCID PCR. Culture to Follow.     BOTTLE TYPE Anaerobic Bottle            Medication Review:       Schedule Meds  atorvastatin, 40 mg, Oral, Nightly  furosemide, 40 mg, Intravenous, Q12H  insulin lispro, 2-9 Units, Subcutaneous, 4x Daily With Meals & Nightly  levothyroxine, 100 mcg, Oral, Q AM  lisinopril, 5 mg, Oral, Q24H  metoprolol succinate XL, 25 mg, Oral, BID  piperacillin-tazobactam, 3.375 g, Intravenous, Q8H  sodium chloride, 10 mL, Intravenous, Q12H  sodium chloride, 10 mL, Intravenous, Q12H  spironolactone, 25 mg, Oral, Daily        Infusion Meds  heparin, 18 Units/kg/hr, Last Rate: 15 Units/kg/hr (03/22/23 1113)  hold, 1 each        PRN Meds    acetaminophen    senna-docusate sodium **AND** polyethylene glycol **AND** bisacodyl **AND** bisacodyl    dextrose    dextrose    glucagon (human recombinant)    heparin    heparin    hold    HYDROcodone-acetaminophen    melatonin    ondansetron    polyethyl glycol-propyl glycol    [COMPLETED] Insert Peripheral IV **AND** sodium chloride    sodium chloride    sodium chloride    sodium  chloride    sodium chloride        Assessment & Plan       Antimicrobial Therapy   1.  IV Zosyn        2.        3.        4.        5.            Assessment     Bacteremia with a gram-negative bacilli and PCR did not detect specific organism.  The source is not very clear but possibly intra abdomen.    Congestive heart failure with volume overload associated with bilateral lower extremities edema and right-sided pleural effusion.  Patient s/p right-sided thoracentesis on March 21 and fluid analysis are pending.  Cultures negative so far and fluid analysis is not indicative of infection however fluid differential was not performed   -2D echo showed an EF of less than 20% with significant tricuspid regurgitation but no vegetation     Right lower leg cellulitic changes.  Most of the erythematous probably related to the edema.  Superimposed infection cannot be ruled out.  There was no open wound     Ischemic changes to the left foot.  The patient was seen and evaluated by vascular surgery service.  Arterial Doppler showed patent vessels.  It might be micro embolism.  Vascular service does not feel that she is a surgical candidate.     Cholecystitis ruled out- General surgery service following the patient.  HIDA scan was normal     Plan     Continue Zosyn 3.375 g IV every 8 hours   Waiting on final blood culture results  Continue supportive care  A.m. labs  Case was discussed with family at bedside  Patient is at high risk for limb loss    Katya Rapp, APRN  03/22/23  13:05 EDT    Note is dictated utilizing voice recognition software/Dragon

## 2023-03-22 NOTE — PROGRESS NOTES
Referring Provider: Lily Nesbitt MD    Reason for follow-up:  Lower extremity edema  Bilateral pleural effusions.     Patient Care Team:  Minerva Chatterjee MD as PCP - General (Internal Medicine)  Yordan Evans MD as Consulting Physician (Cardiology)  Amilcar Smallwood MD as Consulting Physician (Hematology and Oncology)    Subjective .      ROS    Since I have last seen, the patient has been without any chest discomfort ,shortness of breath, palpitations, dizziness or syncope.  Denies having any headache ,abdominal pain ,nausea, vomiting , diarrhea constipation, loss of weight or loss of appetite.  Denies having any excessive bruising ,hematuria or blood in the stool.    Review of all systems negative except as indicated    History  Past Medical History:   Diagnosis Date   • Astigmatism, bilateral 11/19/2019   • Benign essential hypertension    • Bilateral presbyopia 11/19/2019   • CAD (coronary artery disease)    • CHF (congestive heart failure) (HCC)    • Closed right ankle fracture    • COVID-19 vaccination refused    • Hyperlipidemia    • Hypothyroidism    • Influenza vaccine needed    • Myocardial infarction (HCC)    • Prediabetes    • Pseudophakia, both eyes 11/19/2019   • Sleep apnea    • Thoracic back pain        Past Surgical History:   Procedure Laterality Date   • APPENDECTOMY     • CARDIAC CATHETERIZATION  2012    x3    • CORONARY ARTERY BYPASS GRAFT  12/2014   • CORONARY STENT PLACEMENT      x3   • CYSTOSCOPY WITH CLOT EVACUATION N/A 3/25/2022    Procedure: CYSTOSCOPY WITH CLOT EVACUATION AND FULGURATION ;  Surgeon: Sukhdev Poole MD;  Location: [facility];  Service: Urology;  Laterality: N/A;   • HARDWARE REMOVAL Right 7/21/2020    Procedure: ANKLE/FOOT HARDWARE REMOVAL;  Surgeon: Lincoln Sibley MD;  Location: [facility] OR;  Service: Orthopedics;  Laterality: Right;   • ORIF ANKLE FRACTURE Right 04/23/2019    Radha Vazquez Mem   • THORACOSCOPY WITH DECORTICATION Left 3/18/2022     Procedure: LEFT THORACOSCOPY VIDEO ASSISTED WITH COMPLETE DECORTICATION;  Surgeon: Sabra Kuo MD;  Location: [facility] OR;  Service: Thoracic;  Laterality: Left;       Family History   Problem Relation Age of Onset   • Hypertension Mother    • Stroke Mother    • Heart disease Mother    • Lung cancer Father    • Diabetes Brother    • Diabetes Other        Social History     Tobacco Use   • Smoking status: Former     Packs/day: 1.50     Types: Cigarettes     Quit date:      Years since quittin.2     Passive exposure: Past   • Smokeless tobacco: Never   • Tobacco comments:     CAFFEINE USE ICED TEA, OCCAS COFFEE   Vaping Use   • Vaping Use: Never used   Substance Use Topics   • Alcohol use: Not Currently   • Drug use: No        Medications Prior to Admission   Medication Sig Dispense Refill Last Dose   • acetaminophen (TYLENOL) 500 MG tablet Take 1 tablet by mouth Every 6 (Six) Hours As Needed for Mild Pain.   Past Week   • apixaban (ELIQUIS) 5 MG tablet tablet Take 1 tablet by mouth Daily. Only QD per Cardiologist   Past Week   • Cholecalciferol (VITAMIN D3) 2000 units tablet Take 1 tablet by mouth Daily.   3/20/2023   • furosemide (LASIX) 20 MG tablet Take 3 tablets by mouth Daily. 270 tablet 1 3/20/2023   • insulin aspart prot-insulin aspart (novoLOG 70/30) (70-30) 100 UNIT/ML injection Inject 10 Units under the skin into the appropriate area as directed 2 (Two) Times a Day As Needed (elevated blood sugar).   Past Week   • Krill Oil 300 MG capsule Take 1 capsule by mouth Daily.   3/20/2023   • levothyroxine (Synthroid) 100 MCG tablet Take 1 tablet by mouth Daily. 90 tablet 1 3/20/2023   • metoprolol succinate XL (TOPROL-XL) 25 MG 24 hr tablet Take 1 tablet by mouth twice daily 180 tablet 3 3/20/2023   • Multiple Vitamins-Minerals (MULTIVITAMIN ADULT EXTRA C PO) Take 1 tablet by mouth Daily.   3/20/2023   • mupirocin (BACTROBAN) 2 % ointment Apply 1 application topically to the appropriate area as  "directed 3 (Three) Times a Day. Rash on both feet 30 g 1 3/20/2023   • spironolactone (ALDACTONE) 25 MG tablet Take 1 tablet by mouth Daily. 90 tablet 3 3/20/2023       Allergies  Prednisone    Scheduled Meds:atorvastatin, 40 mg, Oral, Nightly  furosemide, 40 mg, Intravenous, Q12H  insulin lispro, 2-9 Units, Subcutaneous, 4x Daily With Meals & Nightly  levothyroxine, 100 mcg, Oral, Q AM  metoprolol succinate XL, 25 mg, Oral, BID  piperacillin-tazobactam, 3.375 g, Intravenous, Q8H  sodium chloride, 10 mL, Intravenous, Q12H  sodium chloride, 10 mL, Intravenous, Q12H      Continuous Infusions:heparin, 18 Units/kg/hr, Last Rate: 15 Units/kg/hr (03/22/23 0310)  hold, 1 each      PRN Meds:.•  acetaminophen  •  senna-docusate sodium **AND** polyethylene glycol **AND** bisacodyl **AND** bisacodyl  •  dextrose  •  dextrose  •  glucagon (human recombinant)  •  heparin  •  heparin  •  hold  •  HYDROcodone-acetaminophen  •  melatonin  •  ondansetron  •  polyethyl glycol-propyl glycol  •  [COMPLETED] Insert Peripheral IV **AND** sodium chloride  •  sodium chloride  •  sodium chloride  •  sodium chloride  •  sodium chloride    Objective     VITAL SIGNS  Vitals:    03/21/23 1607 03/21/23 2248 03/22/23 0205 03/22/23 0525   BP: 130/85 115/82 115/85 117/85   BP Location: Right arm Right arm Right arm Right arm   Patient Position: Lying Lying Lying Lying   Pulse: 78 78 74 73   Resp: 19 23 17 18   Temp: 97.7 °F (36.5 °C) 97.8 °F (36.6 °C) 97.3 °F (36.3 °C) 96.9 °F (36.1 °C)   TempSrc: Axillary Axillary Axillary Axillary   SpO2: 98% 90% 94% 92%   Weight:       Height:           Flowsheet Rows    Flowsheet Row First Filed Value   Admission Height 160 cm (63\") Documented at 03/20/2023 0850   Admission Weight 67.9 kg (149 lb 11.1 oz) Documented at 03/20/2023 0850            Intake/Output Summary (Last 24 hours) at 3/22/2023 0655  Last data filed at 3/22/2023 0525  Gross per 24 hour   Intake --   Output 2140 ml   Net -2140 ml      "   TELEMETRY: Atrial fibrillation  Physical Exam:  The patient is alert, oriented and in no distress.  Vital signs as noted above.  Head and neck revealed no carotid bruits or jugular venous distention.  No thyromegaly or lymphadenopathy is present  Lungs clear.  No wheezing.  Breath sounds are normal bilaterally.  Heart normal first and second heart sounds.  No murmur. No precordial rub is present.  No gallop is present.  Abdomen soft and nontender.  No organomegaly is present.  Extremities with decreased peripheral pulses bilateral edema with redness and warmth.  Skin warm and dry.  Musculoskeletal system is grossly normal  CNS grossly normal    Reviewed and updated.      Results Review:   I reviewed the patient's new clinical results.  Lab Results (last 24 hours)     Procedure Component Value Units Date/Time    Comprehensive Metabolic Panel [[id number]]  (Abnormal) Collected: 03/22/23 0301    Specimen: Blood Updated: 03/22/23 0342     Glucose 69 mg/dL      BUN 42 mg/dL      Creatinine 1.10 mg/dL      Sodium 134 mmol/L      Potassium 4.2 mmol/L      Comment: Slight hemolysis detected by analyzer. Results may be affected.        Chloride 94 mmol/L      CO2 34.0 mmol/L      Calcium 8.8 mg/dL      Total Protein 5.5 g/dL      Albumin 2.9 g/dL      ALT (SGPT) 18 U/L      AST (SGOT) 26 U/L      Comment: Slight hemolysis detected by analyzer. Results may be affected.        Alkaline Phosphatase 145 U/L      Total Bilirubin 0.8 mg/dL      Globulin 2.6 gm/dL      A/G Ratio 1.1 g/dL      BUN/Creatinine Ratio 38.2     Anion Gap 6.0 mmol/L      eGFR 50.6 mL/min/1.73     Narrative:      GFR Normal >60  Chronic Kidney Disease <60  Kidney Failure <15    The GFR formula is only valid for adults with stable renal function between ages 18 and 70.    High Sensitivity Troponin T 2Hr [[id number]]  (Abnormal) Collected: 03/22/23 0301    Specimen: Blood Updated: 03/22/23 0341     HS Troponin T 51 ng/L      Troponin T Delta -3 ng/L      Narrative:      High Sensitive Troponin T Reference Range:  <10.0 ng/L- Negative Female for AMI  <15.0 ng/L- Negative Male for AMI  >=10 - Abnormal Female indicating possible myocardial injury.  >=15 - Abnormal Male indicating possible myocardial injury.   Clinicians would have to utilize clinical acumen, EKG, Troponin, and serial changes to determine if it is an Acute Myocardial Infarction or myocardial injury due to an underlying chronic condition.         Phosphorus [[id number]]  (Normal) Collected: 03/22/23 0301    Specimen: Blood Updated: 03/22/23 0341     Phosphorus 3.6 mg/dL     Magnesium [[id number]]  (Normal) Collected: 03/22/23 0301    Specimen: Blood Updated: 03/22/23 0341     Magnesium 1.8 mg/dL     CBC & Differential [[id number]]  (Abnormal) Collected: 03/22/23 0301    Specimen: Blood Updated: 03/22/23 0325    Narrative:      The following orders were created for panel order CBC & Differential.  Procedure                               Abnormality         Status                     ---------                               -----------         ------                     CBC Auto Differential[[id number]]        Abnormal            Final result                 Please view results for these tests on the individual orders.    CBC Auto Differential [[id number]]  (Abnormal) Collected: 03/22/23 0301    Specimen: Blood Updated: 03/22/23 0325     WBC 5.30 10*3/mm3      RBC 4.95 10*6/mm3      Hemoglobin 16.0 g/dL      Hematocrit 50.4 %      .9 fL      MCH 32.3 pg      MCHC 31.7 g/dL      RDW 15.5 %      RDW-SD 55.1 fl      MPV 8.4 fL      Platelets 157 10*3/mm3      Neutrophil % 74.4 %      Lymphocyte % 14.4 %      Monocyte % 8.4 %      Eosinophil % 1.8 %      Basophil % 1.0 %      Neutrophils, Absolute 3.90 10*3/mm3      Lymphocytes, Absolute 0.80 10*3/mm3      Monocytes, Absolute 0.40 10*3/mm3      Eosinophils, Absolute 0.10 10*3/mm3      Basophils, Absolute 0.10 10*3/mm3      nRBC 0.1 /100 WBC     Folate  [[id number]]  (Normal) Collected: 03/21/23 1620    Specimen: Blood Updated: 03/22/23 0152     Folate >20.00 ng/mL     Narrative:      Results may be falsely increased if patient taking Biotin.      Vitamin B12 [[id number]]  (Normal) Collected: 03/21/23 1620    Specimen: Blood Updated: 03/22/23 0135     Vitamin B-12 868 pg/mL     Narrative:      Results may be falsely increased if patient taking Biotin.      aPTT [[id number]]  (Abnormal) Collected: 03/21/23 2219    Specimen: Blood Updated: 03/22/23 0112     .1 seconds     Lactate Dehydrogenase [[id number]]  (Abnormal) Collected: 03/21/23 2219    Specimen: Blood Updated: 03/22/23 0027      U/L      Comment: Specimen hemolyzed.  Results may be affected.       High Sensitivity Troponin T [[id number]]  (Abnormal) Collected: 03/21/23 2308    Specimen: Blood Updated: 03/22/23 0024     HS Troponin T 54 ng/L     Narrative:      High Sensitive Troponin T Reference Range:  <10.0 ng/L- Negative Female for AMI  <15.0 ng/L- Negative Male for AMI  >=10 - Abnormal Female indicating possible myocardial injury.  >=15 - Abnormal Male indicating possible myocardial injury.   Clinicians would have to utilize clinical acumen, EKG, Troponin, and serial changes to determine if it is an Acute Myocardial Infarction or myocardial injury due to an underlying chronic condition.         Hepatic Function Panel [[id number]]  (Abnormal) Collected: 03/21/23 2219    Specimen: Blood Updated: 03/22/23 0021     Total Protein 5.9 g/dL      Albumin 3.0 g/dL      ALT (SGPT) 22 U/L      AST (SGOT) 31 U/L      Alkaline Phosphatase 182 U/L      Total Bilirubin 0.8 mg/dL      Bilirubin, Direct 0.3 mg/dL      Bilirubin, Indirect 0.5 mg/dL     Protein S Functional [[id number]] Collected: 03/21/23 2308    Specimen: Blood Updated: 03/21/23 2354    JAK2 V617F, Rfx CALR / MPL - Blood, [[id number]] Collected: 03/21/23 2219    Specimen: Blood Updated: 03/21/23 2354    Hemoglobinopathy Fractionation Cascade  [[id number]] Collected: 03/21/23 2219    Specimen: Blood Updated: 03/21/23 2354    Erythropoietin [[id number]] Collected: 03/21/23 2219    Specimen: Blood Updated: 03/21/23 2354    [place] [[id number]] Collected: 03/21/23 2219    Specimen: Blood Updated: 03/21/23 2354    POC Glucose Once [[id number]]  (Abnormal) Collected: 03/21/23 2213    Specimen: Blood Updated: 03/21/23 2215     Glucose 172 mg/dL      Comment: Serial Number: [id number]:  [id number]       POC Glucose Once [[id number]]  (Abnormal) Collected: 03/21/23 2033    Specimen: Blood Updated: 03/21/23 2035     Glucose 202 mg/dL      Comment: Serial Number: [id number]:  [id number]       TSH [[id number]]  (Normal) Collected: 03/21/23 1620    Specimen: Blood Updated: 03/21/23 1843     TSH 3.400 uIU/mL     Lactic Acid, Plasma [[id number]]  (Normal) Collected: 03/21/23 1620    Specimen: Blood Updated: 03/21/23 1837     Lactate 1.2 mmol/L     Phosphorus [[id number]]  (Normal) Collected: 03/21/23 1620    Specimen: Blood Updated: 03/21/23 1836     Phosphorus 4.0 mg/dL     Cardiolipin Antibody [[id number]] Collected: 03/21/23 1620    Specimen: Blood Updated: 03/21/23 1813    Magnesium [[id number]]  (Normal) Collected: 03/21/23 1502    Specimen: Blood Updated: 03/21/23 1732     Magnesium 1.9 mg/dL     Factor II, DNA Analysis [[id number]] Collected: 03/21/23 1502    Specimen: Blood Updated: 03/21/23 1705    Factor 5 Leiden [[id number]] Collected: 03/21/23 1502    Specimen: Blood Updated: 03/21/23 1705    Beta-2 Glycoprotein Antibodies [[id number]] Collected: 03/21/23 1502    Specimen: Blood Updated: 03/21/23 1705    Protein C Activity [[id number]] Collected: 03/21/23 1502    Specimen: Blood Updated: 03/21/23 1705    Protein, Body Fluid - Pleural Fluid, Pleural Cavity [[id number]] Collected: 03/21/23 1028    Specimen: Pleural Fluid from Pleural Cavity Updated: 03/21/23 1616     Protein, Total, Fluid 2.0 g/dL     Narrative:      No Reference Ranges Established.    A serous  fluid total fluid (TP) greater than 50 percent of the serum TP suggests the fluid is an exudate.      1. Pleural TP/Serum TP >0.5  2. Pleural LD/Serum LD >0.6  3. Pleural LD >2/3 of the upper limit of normal for serum LDH    This test was developed, it performance characteristics determined and judged suitable for clinical purposes by [facility].  It has not been cleared or approved by the FDA.  The laboratory is regulated under CLIA as qualified to perform high-complexity testing.     Lactate Dehydrogenase, Body Fluid - Pleural Fluid, Pleural Cavity [[id number]] Collected: 03/21/23 1028    Specimen: Pleural Fluid from Pleural Cavity Updated: 03/21/23 1616     Lactate Dehydrogenase (LD), Fluid 77 U/L     Narrative:      No Reference Ranges Established.    Serous fluid LDH greater than 60 percent of the serum LDH or serous fluid LDH two-thirds of the upper limit of normal for serum LDH suggests the fluid is an exudate.     1. Pleural TP/Serum TP >0.5  2. Pleural LD/Serum LD >0.6  3. Pleural LD >2/3 of the upper limit of normal for serum LDH    This test was developed, it performance characteristics determined and judged suitable for clinical purposes by [facility].  It has not been cleared or approved by the FDA.  The laboratory is regulated under CLIA as qualified to perform high-complexity testing.     Glucose, Body Fluid - Pleural Fluid, Pleural Cavity [[id number]] Collected: 03/21/23 1028    Specimen: Pleural Fluid from Pleural Cavity Updated: 03/21/23 1616     Glucose, Fluid 156 mg/dL     Narrative:      No Reference Ranges Established.    Serous fluid glucose less than 60 mg/dL or less than 30 mg/dL below serum glucose suggests an infectious or malignant exudate.     This test was developed, it performance characteristics determined and judged suitable for clinical purposes by [facility].  It has not been cleared or approved by the FDA.   The laboratory is regulated under CLIA as qualified to perform high-complexity testing.     POC Glucose Once [[id number]]  (Abnormal) Collected: 03/21/23 1606    Specimen: Blood Updated: 03/21/23 1607     Glucose 217 mg/dL      Comment: Serial Number: [id number]:  [id number]       aPTT [[id number]]  (Abnormal) Collected: 03/21/23 1148    Specimen: Blood Updated: 03/21/23 1322     PTT 30.7 seconds     Urinalysis, Microscopic Only - Urine, Clean Catch [[id number]]  (Abnormal) Collected: 03/21/23 1144    Specimen: Urine, Clean Catch Updated: 03/21/23 1317     RBC, UA 0-2 /HPF      WBC, UA 0-2 /HPF      Comment: Urine culture not indicated.        Bacteria, UA None Seen /HPF      Squamous Epithelial Cells, UA 0-2 /HPF      Hyaline Casts, UA 0-2 /LPF      Methodology Automated Microscopy    Urinalysis With Culture If Indicated - Urine, Clean Catch [[id number]]  (Abnormal) Collected: 03/21/23 1144    Specimen: Urine, Clean Catch Updated: 03/21/23 1317     Color, UA Yellow     Appearance, UA Clear     pH, UA 6.0     Specific Gravity, UA 1.032     Glucose, UA Negative     Ketones, UA Negative     Bilirubin, UA Negative     Blood, UA Negative     Protein, UA 30 mg/dL (1+)     Leuk Esterase, UA Negative     Nitrite, UA Negative     Urobilinogen, UA 0.2 E.U./dL    Narrative:      In absence of clinical symptoms, the presence of pyuria, bacteria, and/or nitrites on the urinalysis result does not correlate with infection.    DEEPALI AURIS SCREEN - Swab, Axilla Right, Axilla Left and Groin [[id number]] Collected: 03/21/23 1145    Specimen: Swab from Axilla Right, Axilla Left and Groin Updated: 03/21/23 1308    Body Fluid Culture - Body Fluid, Pleural Cavity [[id number]] Collected: 03/21/23 1028    Specimen: Body Fluid from Pleural Cavity Updated: 03/21/23 1252     Gram Stain Few (2+) WBCs seen      No organisms seen    POC Glucose Once [[id number]]  (Abnormal) Collected: 03/21/23 1154    Specimen: Blood Updated: 03/21/23 1157      Glucose 190 mg/dL      Comment: Serial Number: [id number]:  [id number]       Blood Culture - Blood, Hand, Right [[id number]]  (Normal) Collected: 03/20/23 1116    Specimen: Blood from Hand, Right Updated: 03/21/23 1131     Blood Culture No growth at 24 hours    pH, Body Fluid - Pleural Fluid, Pleural Cavity [[id number]]  (Normal) Collected: 03/21/23 1028    Specimen: Pleural Fluid from Pleural Cavity Updated: 03/21/23 1057     pH, Fluid 7.00    Non-gynecologic Cytology [[id number]] Collected: 03/21/23 1028    Specimen: Pleural Fluid from Pleural Cavity Updated: 03/21/23 1055          Imaging Results (Last 24 Hours)     Procedure Component Value Units Date/Time    XR Chest 1 View [[id number]] Resulted: 03/22/23 0250     Updated: 03/22/23 0250    XR Chest 1 View [[id number]] Collected: 03/21/23 1123     Updated: 03/21/23 1127    Narrative:      XR CHEST 1 VW    Date of Exam: 3/21/2023 10:45 AM EDT    Indication: s/p right thora.    Comparison: 3/21/2023 earlier same day    Findings:  Sternotomy wires again overlie the midline. There has been interval near complete resolution of right pleural fluid status post thoracentesis. No pneumothorax is identified. Previously seen right perihilar opacity may be due to fissural fluid. No fluid   remains within the fissure. Small left effusion remains. There is overlying consolidation in the left base which may be due to atelectasis or pneumonia. Cardiomegaly is unchanged.      Impression:      Impression:  Interval near complete resolution of right effusion status post thoracentesis. No pneumothorax.    Additional findings as above.    Electronically Signed: Balwinder Klein    3/21/2023 11:24 AM EDT    Workstation ID: [id number]    US Thoracentesis [[id number]] Collected: 03/21/23 1104    Specimen: Body Fluid Updated: 03/21/23 1115    Narrative:      US THORACENTESIS    Date of Exam: 3/21/2023 10:09 AM EDT    Indication: 83-year-old female with history congestive heart failure and a  sizable right-sided pleural fluid collection with shortness of air.    Comparison: CT chest, March 20, 2023.    Technique: A detailed explanation of the procedure, including the risks, benefits, and alternatives was provided.  A preprocedure timeout was performed. The patient was placed into a seated position. Her right posterior chest was evaluated with   ultrasound skin site marked then prepped and draped using maximum sterile barrier technique. After obtaining adequate local anesthesia using 1% lidocaine, the pleural space was cannulated using a 4 [place] straight centesis catheter and the needle   removed. Approximately 1240 cc of cloudy appearing yellow fluid was aspirated out after which the catheter was removed and a sterile dressing applied. She tolerated the procedure well number no immediate complications. Her vital signs were monitored   throughout.    Fluoroscopic Time: None    Findings:  Ultrasound evaluation does demonstrate the presence of a large right-sided pleural fluid collection. A hardcopy ultrasound image was retained.      Impression:      Impression:  Technically successful ultrasound-guided large volume right-sided thoracentesis as described above.    A sample of the turbid yellow pleural fluid was sent for laboratory evaluation.    Electronically Signed: Antoine Carpio    3/21/2023 11:13 AM EDT    Workstation ID: [id number]    XR Chest 1 View [[id number]] Collected: 03/21/23 0853     Updated: 03/21/23 0857    Narrative:      XR CHEST 1 VW    Date of Exam: 3/21/2023 6:14 AM EDT    Indication: right effusion.    Comparison: 3/13/2023    Findings:  Sternotomy wires overlie the midline. There is a small right pleural effusion which appears decreased as compared to prior. There is probable fluid loculated within the minor fissure and major fissure. Small left effusion persists. There is overlying   consolidation in the bases which may be due to atelectasis or infiltrate. Increasing haziness in the  right perihilar region which may be due to pneumonia or asymmetric edema. Cardiomegaly is again noted. No pneumothorax is seen.      Impression:      Impression:    1. Interval decrease in right pleural effusion. There appears to be some loculated fluid within the fissures now present.  2. Increasing airspace opacity in the right perihilar region suggesting pneumonia or asymmetric edema.  3. Bibasilar consolidations suggesting atelectasis or infiltrate. Small left effusion.  4. Cardiomegaly.    Electronically Signed: Balwinder Ernie    3/21/2023 8:55 AM EDT    Workstation ID: [id number]      LAB RESULTS (LAST 7 DAYS)    CBC  Results from last 7 days   Lab Units 03/22/23  0301 03/20/23  0915   WBC 10*3/mm3 5.30 11.10*   RBC 10*6/mm3 4.95 5.46*   HEMOGLOBIN g/dL 16.0* 17.5*   HEMATOCRIT % 50.4* 55.3*   MCV fL 101.9* 101.3*   PLATELETS 10*3/mm3 157 180       BMP  Results from last 7 days   Lab Units 03/22/23  0301 03/21/23  1620 03/21/23  1502 03/20/23  0915   SODIUM mmol/L 134*  --   --  138   POTASSIUM mmol/L 4.2  --   --  4.2   CHLORIDE mmol/L 94*  --   --  100   CO2 mmol/L 34.0*  --   --  26.0   BUN mg/dL 42*  --   --  41*   CREATININE mg/dL 1.10*  --   --  0.92   GLUCOSE mg/dL 69  --   --  115*   MAGNESIUM mg/dL 1.8  --  1.9  --    PHOSPHORUS mg/dL 3.6 4.0  --   --        CMP   Results from last 7 days   Lab Units 03/22/23  0301 03/21/23  2219 03/20/23  0915   SODIUM mmol/L 134*  --  138   POTASSIUM mmol/L 4.2  --  4.2   CHLORIDE mmol/L 94*  --  100   CO2 mmol/L 34.0*  --  26.0   BUN mg/dL 42*  --  41*   CREATININE mg/dL 1.10*  --  0.92   GLUCOSE mg/dL 69  --  115*   ALBUMIN g/dL 2.9* 3.0* 3.0*   BILIRUBIN mg/dL 0.8 0.8 1.4*   ALK PHOS U/L 145* 182* 204*   AST (SGOT) U/L 26 31 54*   ALT (SGPT) U/L 18 22 44*         BNP        TROPONIN  Results from last 7 days   Lab Units 03/22/23  0301 03/21/23  2308 03/20/23  0915   CK TOTAL U/L  --   --  43   HSTROP T ng/L 51*   < >  --     < > = values in this interval not  displayed.       CoAg  Results from last 7 days   Lab Units 03/21/23  2219 03/21/23  1148 03/21/23  0528 03/20/23  1902   INR   --   --   --  1.29*   APTT seconds 112.1* 30.7* 58.7* 132.9*       Creatinine Clearance  Estimated Creatinine Clearance: 37 mL/min (A) (by C-G formula based on SCr of 1.1 mg/dL (H)).    ABG        Radiology  CT Angio Abdominal Aorta Bilateral Iliofem Runoff    Result Date: 3/20/2023  1.No acute aortic dissection, aneurysm or aortic injury. No acute pulmonary emboli. 2.Large right and trace left pleural effusions. Near complete atelectasis of right lower lobe. Bibasilar and right middle lobe opacities may represent atelectasis or infiltrates. Follow-up recommended. 3.Cardiomegaly with reflux of contrast and IVC and hepatic veins suggest right heart failure. 4.Gallbladder wall thickening is a nonspecific finding can be seen in multiple inflammatory process including acute cholecystitis and volume overload. If there is clinical concern for acute cholecystitis, ultrasound can be obtained for further evaluation. 5.Mild ascites throughout abdomen and pelvis. 6.Anasarca. 7.Proximal left trifurcation is patent. Remainder of the left leg arteries including anterior tibial, posterior tibial, fibular arteries demonstrate markedly diminutive flow with no flow in left foot. Case discussed with nurse alin boo @ 3/20/2023 8:17 PM EDT. Electronically Signed: Yoni Max  3/20/2023 8:18 PM EDT  Workstation ID: [id number]    XR Chest 1 View    Result Date: 3/21/2023  Impression: Interval near complete resolution of right effusion status post thoracentesis. No pneumothorax. Additional findings as above. Electronically Signed: Balwinder Klein  3/21/2023 11:24 AM EDT  Workstation ID: [id number]    XR Chest 1 View    Result Date: 3/21/2023  Impression: 1. Interval decrease in right pleural effusion. There appears to be some loculated fluid within the fissures now present. 2. Increasing airspace opacity in the  right perihilar region suggesting pneumonia or asymmetric edema. 3. Bibasilar consolidations suggesting atelectasis or infiltrate. Small left effusion. 4. Cardiomegaly. Electronically Signed: Balwinder Klein  3/21/2023 8:55 AM EDT  Workstation ID: [id number]    US Thoracentesis    Result Date: 3/21/2023  Impression: Technically successful ultrasound-guided large volume right-sided thoracentesis as described above. A sample of the turbid yellow pleural fluid was sent for laboratory evaluation. Electronically Signed: Antoine Carpio  3/21/2023 11:13 AM EDT  Workstation ID: [id number]    CT Angiogram Chest    Result Date: 3/20/2023  1.No acute aortic dissection, aneurysm or aortic injury. No acute pulmonary emboli. 2.Large right and trace left pleural effusions. Near complete atelectasis of right lower lobe. Bibasilar and right middle lobe opacities may represent atelectasis or infiltrates. Follow-up recommended. 3.Cardiomegaly with reflux of contrast and IVC and hepatic veins suggest right heart failure. 4.Gallbladder wall thickening is a nonspecific finding can be seen in multiple inflammatory process including acute cholecystitis and volume overload. If there is clinical concern for acute cholecystitis, ultrasound can be obtained for further evaluation. 5.Mild ascites throughout abdomen and pelvis. 6.Anasarca. 7.Proximal left trifurcation is patent. Remainder of the left leg arteries including anterior tibial, posterior tibial, fibular arteries demonstrate markedly diminutive flow with no flow in left foot. Case discussed with nurse alin Smith 3/20/2023 8:17 PM EDT. Electronically Signed: Yoni Max  3/20/2023 8:18 PM EDT  Workstation ID: [id number]          EKG          I personally viewed and interpreted the patient's EKG/Telemetry data: Atrial fibrillation    ECHOCARDIOGRAM:    Results for orders placed during the hospital encounter of 03/20/23    Adult Transthoracic Echo Complete W/ Cont if Necessary Per  Protocol    Interpretation Summary  •  Left ventricular ejection fraction appears to be less than 20%.  •  Estimated right ventricular systolic pressure from tricuspid regurgitation is normal (<35 mmHg).    Indications  Shortness of breath    Technically satisfactory study.  Mitral valve is structurally normal.  Moderate mitral regurgitation  Tricuspid valve is structurally normal.  Mild coaptation of the tricuspid valve with significant tricuspid regurgitation.  Aortic valve is structurally normal.  Pulmonic valve could not be well visualized.  Biatrial and significant biventricular enlargement.  Left ventricular severe diffuse hypocontractility with ejection fraction of 15 to 20%.  Atrial septum is intact.  Aorta is normal.  No pericardial effusion or intracardiac thrombus is seen.    Impression  Moderate mitral regurgitation.  Significant tricuspid regurgitation with normal coaptation of tricuspid valve.  Significant biatrial biventricular enlargement.  Left ventricular severe diffuse hypocontractility with ejection fraction of 15 to 20%.          STRESS MYOVIEW:    Cardiolite (Tc-99m Sestamibi) stress test    CARDIAC CATHETERIZATION:            OTHER:         Assessment & Plan     Principal Problem:    Cellulitis of right leg        ]]]]]]]]]]]]]]]]]]]]  Impression  ==========  - Significant lower extremity edema and erythema.  Significant digital ischemia.     -Past history of severe hematuria.-Improved.  CT scan of the abdomen showed significant clot burden in the bladder and probable anterior bladder perforation.     -Progressively worsening shortness of breath and leg edema-better.  Recurrent left pleural effusion which was loculated.  Patient had chest tube and subsequently had video-assisted thoracoscopy with complete decortication on the left side at [facility].  (March 2022.)     -Congestive heart failure  Left ventricle dysfunction with ejection fraction of 30%.    Echocardiogram 3/28/2023  revealed  Moderate mitral regurgitation.  Significant tricuspid regurgitation with normal coaptation of tricuspid valve.  Significant biatrial biventricular enlargement.  Left ventricular severe diffuse hypocontractility with ejection fraction of 15 to 20%.     Echocardiogram 8/25/2022 revealed   Moderate mitral and tricuspid regurgitation.  Biventricular enlargement and dysfunction with estimated left ventricle ejection fraction of 30%.     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  Was not taking thyroid medicine replacement properly.     =================  Plan  ===========  Patient has significant lower extremity edema and erythema.  Patient has significant distal ischemia.  Appreciated vascular and thoracic consultation.    Anticoagulation  Patient is on Eliquis.-On hold     Chronic atrial fibrillation-rate is controlled now.       Renal dysfunction  BUN/creatinine-30/1.03  41/0.9- 3/21/2023  42/1.1- 3/22/2023      Ischemic cardiomyopathy    Echocardiogram 3/28/2023 revealed  Moderate mitral regurgitation.  Significant tricuspid regurgitation with normal coaptation of tricuspid valve.  Significant biatrial biventricular enlargement.  Left ventricular severe diffuse hypocontractility with ejection fraction of 15 to 20%.    Echocardiogram 8/25/2022 revealed  Moderate mitral and tricuspid regurgitation.  Biventricular enlargement and dysfunction with estimated left ventricle ejection fraction of 30%.     Intense precautions intravenous diuresis with close observation of renal function.    Bilateral pleural effusions right more than left.  Status post right thoracentesis and  removal of 1300 cc of fluid 3/21/2023     I had a lengthy discussion with patient's family regarding her care.    Medications were reviewed and updated.  Patient is on atorvastatin Lasix 40 mg IV every 12 hours levothyroxine metoprolol 25 mg twice daily.    Start spironolactone.  Start ACE inhibitor.    We will discuss with family regarding possible ICD.  Patient has normal intraventricular conduction.    Follow-up labs ordered.    Further plan will depend on patient's progress.  ]]]]]]]]]]]]]]]]]         Yordan Evans MD  03/22/23  06:55 EDT

## 2023-03-22 NOTE — CASE MANAGEMENT/SOCIAL WORK
Continued Stay Note  [facility]     Patient Name: Jaquelin Valderrama  MRN: [id number]  Today's Date: 3/22/2023    Admit Date: 3/20/2023    Plan: D/C Plan: Home with spouse; Watch for IV antibix, and 02   Discharge Plan     Row Name 03/22/23 1552       Plan    Plan Comments D/C Barriers:  IV diuretics; IV antibiotics awaiting final cultures.           Expected Discharge Date and Time     Expected Discharge Date Expected Discharge Time    Mar 24, 2023             Renae Spencer RN

## 2023-03-23 ENCOUNTER — TELEPHONE (OUTPATIENT)
Dept: INTERNAL MEDICINE | Facility: CLINIC | Age: 83
End: 2023-03-23

## 2023-03-23 LAB
ANION GAP SERPL CALCULATED.3IONS-SCNC: 6 MMOL/L (ref 5–15)
APTT PPP: 104.7 SECONDS (ref 61–76.5)
APTT PPP: 133.7 SECONDS (ref 61–76.5)
APTT PPP: 30.2 SECONDS (ref 61–76.5)
APTT PPP: 46.1 SECONDS (ref 61–76.5)
APTT PPP: 62.4 SECONDS (ref 61–76.5)
B2 GLYCOPROT1 IGA SER-ACNC: <9 GPI IGA UNITS (ref 0–25)
B2 GLYCOPROT1 IGG SER-ACNC: <9 GPI IGG UNITS (ref 0–20)
B2 GLYCOPROT1 IGM SER-ACNC: <9 GPI IGM UNITS (ref 0–32)
BASOPHILS # BLD AUTO: 0 10*3/MM3 (ref 0–0.2)
BASOPHILS NFR BLD AUTO: 0.8 % (ref 0–1.5)
BUN SERPL-MCNC: 41 MG/DL (ref 8–23)
BUN/CREAT SERPL: 34.7 (ref 7–25)
CALCIUM SPEC-SCNC: 8.3 MG/DL (ref 8.6–10.5)
CHLORIDE SERPL-SCNC: 94 MMOL/L (ref 98–107)
CO2 SERPL-SCNC: 34 MMOL/L (ref 22–29)
CREAT SERPL-MCNC: 1.18 MG/DL (ref 0.57–1)
DEPRECATED RDW RBC AUTO: 55.1 FL (ref 37–54)
EGFRCR SERPLBLD CKD-EPI 2021: 46.5 ML/MIN/1.73
EOSINOPHIL # BLD AUTO: 0.1 10*3/MM3 (ref 0–0.4)
EOSINOPHIL NFR BLD AUTO: 1.8 % (ref 0.3–6.2)
EPO SERPL-ACNC: 208.1 MIU/ML (ref 2.6–18.5)
ERYTHROCYTE [DISTWIDTH] IN BLOOD BY AUTOMATED COUNT: 15.2 % (ref 12.3–15.4)
GLUCOSE BLDC GLUCOMTR-MCNC: 103 MG/DL (ref 70–105)
GLUCOSE BLDC GLUCOMTR-MCNC: 182 MG/DL (ref 70–105)
GLUCOSE BLDC GLUCOMTR-MCNC: 186 MG/DL (ref 70–105)
GLUCOSE BLDC GLUCOMTR-MCNC: 256 MG/DL (ref 70–105)
GLUCOSE SERPL-MCNC: 102 MG/DL (ref 65–99)
HCT VFR BLD AUTO: 45 % (ref 34–46.6)
HGB A MFR BLD ELPH: 97.6 % (ref 96.4–98.8)
HGB A2 MFR BLD ELPH: 2.4 % (ref 1.8–3.2)
HGB BLD-MCNC: 14.9 G/DL (ref 12–15.9)
HGB F MFR BLD ELPH: 0 % (ref 0–2)
HGB FRACT BLD-IMP: NORMAL
HGB S MFR BLD ELPH: 0 %
INR PPP: 1.18 (ref 0.93–1.1)
LYMPHOCYTES # BLD AUTO: 0.8 10*3/MM3 (ref 0.7–3.1)
LYMPHOCYTES NFR BLD AUTO: 18.3 % (ref 19.6–45.3)
MAGNESIUM SERPL-MCNC: 1.9 MG/DL (ref 1.6–2.4)
MCH RBC QN AUTO: 32.9 PG (ref 26.6–33)
MCHC RBC AUTO-ENTMCNC: 33.2 G/DL (ref 31.5–35.7)
MCV RBC AUTO: 99.2 FL (ref 79–97)
MONOCYTES # BLD AUTO: 0.4 10*3/MM3 (ref 0.1–0.9)
MONOCYTES NFR BLD AUTO: 8.8 % (ref 5–12)
NEUTROPHILS NFR BLD AUTO: 2.9 10*3/MM3 (ref 1.7–7)
NEUTROPHILS NFR BLD AUTO: 70.3 % (ref 42.7–76)
NRBC BLD AUTO-RTO: 0.2 /100 WBC (ref 0–0.2)
PLATELET # BLD AUTO: 139 10*3/MM3 (ref 140–450)
PMV BLD AUTO: 8.9 FL (ref 6–12)
POTASSIUM SERPL-SCNC: 4 MMOL/L (ref 3.5–5.2)
PROTHROMBIN TIME: 12 SECONDS (ref 9.6–11.7)
QT INTERVAL: 375 MS
RBC # BLD AUTO: 4.53 10*6/MM3 (ref 3.77–5.28)
SODIUM SERPL-SCNC: 134 MMOL/L (ref 136–145)
WBC NRBC COR # BLD: 4.1 10*3/MM3 (ref 3.4–10.8)

## 2023-03-23 PROCEDURE — 25010000002 PIPERACILLIN SOD-TAZOBACTAM PER 1 G: Performed by: INTERNAL MEDICINE

## 2023-03-23 PROCEDURE — 85610 PROTHROMBIN TIME: CPT | Performed by: NURSE PRACTITIONER

## 2023-03-23 PROCEDURE — 86022 PLATELET ANTIBODIES: CPT | Performed by: NURSE PRACTITIONER

## 2023-03-23 PROCEDURE — 85025 COMPLETE CBC W/AUTO DIFF WBC: CPT | Performed by: HOSPITALIST

## 2023-03-23 PROCEDURE — 83735 ASSAY OF MAGNESIUM: CPT | Performed by: INTERNAL MEDICINE

## 2023-03-23 PROCEDURE — 86665 EPSTEIN-BARR CAPSID VCA: CPT | Performed by: NURSE PRACTITIONER

## 2023-03-23 PROCEDURE — 97166 OT EVAL MOD COMPLEX 45 MIN: CPT

## 2023-03-23 PROCEDURE — 63710000001 INSULIN LISPRO (HUMAN) PER 5 UNITS: Performed by: HOSPITALIST

## 2023-03-23 PROCEDURE — 97162 PT EVAL MOD COMPLEX 30 MIN: CPT

## 2023-03-23 PROCEDURE — 85730 THROMBOPLASTIN TIME PARTIAL: CPT | Performed by: STUDENT IN AN ORGANIZED HEALTH CARE EDUCATION/TRAINING PROGRAM

## 2023-03-23 PROCEDURE — 25010000002 HEPARIN (PORCINE) 25000-0.45 UT/250ML-% SOLUTION: Performed by: NURSE PRACTITIONER

## 2023-03-23 PROCEDURE — 82962 GLUCOSE BLOOD TEST: CPT

## 2023-03-23 PROCEDURE — 99232 SBSQ HOSP IP/OBS MODERATE 35: CPT | Performed by: INTERNAL MEDICINE

## 2023-03-23 PROCEDURE — 80048 BASIC METABOLIC PNL TOTAL CA: CPT | Performed by: HOSPITALIST

## 2023-03-23 PROCEDURE — 86645 CMV ANTIBODY IGM: CPT | Performed by: NURSE PRACTITIONER

## 2023-03-23 PROCEDURE — 85730 THROMBOPLASTIN TIME PARTIAL: CPT | Performed by: HOSPITALIST

## 2023-03-23 RX ORDER — MIDODRINE HYDROCHLORIDE 5 MG/1
5 TABLET ORAL
Status: DISCONTINUED | OUTPATIENT
Start: 2023-03-23 | End: 2023-03-23

## 2023-03-23 RX ORDER — MIDODRINE HYDROCHLORIDE 5 MG/1
5 TABLET ORAL 3 TIMES DAILY PRN
Status: DISCONTINUED | OUTPATIENT
Start: 2023-03-23 | End: 2023-03-24 | Stop reason: HOSPADM

## 2023-03-23 RX ORDER — FUROSEMIDE 40 MG/1
40 TABLET ORAL DAILY
Status: DISCONTINUED | OUTPATIENT
Start: 2023-03-23 | End: 2023-03-24 | Stop reason: HOSPADM

## 2023-03-23 RX ORDER — CARVEDILOL 3.12 MG/1
3.12 TABLET ORAL 2 TIMES DAILY WITH MEALS
Status: DISCONTINUED | OUTPATIENT
Start: 2023-03-23 | End: 2023-03-24 | Stop reason: HOSPADM

## 2023-03-23 RX ADMIN — METOPROLOL SUCCINATE 25 MG: 25 TABLET, EXTENDED RELEASE ORAL at 08:05

## 2023-03-23 RX ADMIN — INSULIN LISPRO 6 UNITS: 100 INJECTION, SOLUTION INTRAVENOUS; SUBCUTANEOUS at 21:07

## 2023-03-23 RX ADMIN — ATORVASTATIN CALCIUM 40 MG: 40 TABLET, FILM COATED ORAL at 21:07

## 2023-03-23 RX ADMIN — PIPERACILLIN AND TAZOBACTAM 3.38 G: 3; .375 INJECTION, POWDER, LYOPHILIZED, FOR SOLUTION INTRAVENOUS at 06:11

## 2023-03-23 RX ADMIN — Medication 10 ML: at 21:07

## 2023-03-23 RX ADMIN — SPIRONOLACTONE 25 MG: 25 TABLET ORAL at 08:05

## 2023-03-23 RX ADMIN — PIPERACILLIN AND TAZOBACTAM 3.38 G: 3; .375 INJECTION, POWDER, LYOPHILIZED, FOR SOLUTION INTRAVENOUS at 15:48

## 2023-03-23 RX ADMIN — Medication 10 ML: at 08:07

## 2023-03-23 RX ADMIN — MIDODRINE HYDROCHLORIDE 5 MG: 5 TABLET ORAL at 01:39

## 2023-03-23 RX ADMIN — INSULIN LISPRO 2 UNITS: 100 INJECTION, SOLUTION INTRAVENOUS; SUBCUTANEOUS at 11:28

## 2023-03-23 RX ADMIN — LISINOPRIL 5 MG: 5 TABLET ORAL at 12:58

## 2023-03-23 RX ADMIN — INSULIN LISPRO 4 UNITS: 100 INJECTION, SOLUTION INTRAVENOUS; SUBCUTANEOUS at 17:20

## 2023-03-23 RX ADMIN — POLYETHYLENE GLYCOL 3350 17 G: 17 POWDER, FOR SOLUTION ORAL at 08:04

## 2023-03-23 RX ADMIN — DOCUSATE SODIUM 50MG AND SENNOSIDES 8.6MG 2 TABLET: 8.6; 5 TABLET, FILM COATED ORAL at 08:05

## 2023-03-23 RX ADMIN — Medication 10 ML: at 08:04

## 2023-03-23 RX ADMIN — CARVEDILOL 3.12 MG: 3.12 TABLET, FILM COATED ORAL at 17:20

## 2023-03-23 RX ADMIN — HEPARIN SODIUM 15 UNITS/KG/HR: 10000 INJECTION, SOLUTION INTRAVENOUS at 13:39

## 2023-03-23 RX ADMIN — HEPARIN SODIUM 18 UNITS/KG/HR: 10000 INJECTION, SOLUTION INTRAVENOUS at 07:01

## 2023-03-23 RX ADMIN — FUROSEMIDE 40 MG: 40 TABLET ORAL at 15:48

## 2023-03-23 RX ADMIN — LEVOTHYROXINE SODIUM 100 MCG: 100 TABLET ORAL at 06:11

## 2023-03-23 RX ADMIN — PIPERACILLIN AND TAZOBACTAM 3.38 G: 3; .375 INJECTION, POWDER, LYOPHILIZED, FOR SOLUTION INTRAVENOUS at 23:52

## 2023-03-23 NOTE — THERAPY EVALUATION
Patient Name: Jaquelin Valderrama  : 1940    MRN: [id number]                              Today's Date: 3/23/2023       Admit Date: 3/20/2023    Visit Dx:     ICD-10-CM ICD-9-CM   1. Cellulitis of right leg  L03.115 682.6     Patient Active Problem List   Diagnosis   • Other hyperlipidemia   • Essential hypertension   • Hypothyroidism   • Coronary artery disease involving coronary bypass graft of native heart without angina pectoris   • Astigmatism, bilateral   • Bilateral presbyopia   • Pseudophakia, both eyes   • Atrial fibrillation ([facility])   • Systolic HF (heart failure) ([facility])   • Pseudophakia   • Cellulitis of right leg     Past Medical History:   Diagnosis Date   • Astigmatism, bilateral 2019   • Benign essential hypertension    • Bilateral presbyopia 2019   • CAD (coronary artery disease)    • CHF (congestive heart failure) ([facility])    • Closed right ankle fracture    • COVID-19 vaccination refused    • Hyperlipidemia    • Hypothyroidism    • Influenza vaccine needed    • Myocardial infarction ([facility])    • Prediabetes    • Pseudophakia, both eyes 2019   • Sleep apnea    • Thoracic back pain      Past Surgical History:   Procedure Laterality Date   • APPENDECTOMY     • CARDIAC CATHETERIZATION  2012    x3    • CORONARY ARTERY BYPASS GRAFT  2014   • CORONARY STENT PLACEMENT      x3   • CYSTOSCOPY WITH CLOT EVACUATION N/A 3/25/2022    Procedure: CYSTOSCOPY WITH CLOT EVACUATION AND FULGURATION ;  Surgeon: Sukhdev Poole MD;  Location: [facility];  Service: Urology;  Laterality: N/A;   • HARDWARE REMOVAL Right 2020    Procedure: ANKLE/FOOT HARDWARE REMOVAL;  Surgeon: Lincoln Sibley MD;  Location: [facility];  Service: Orthopedics;  Laterality: Right;   • ORIF ANKLE FRACTURE Right 2019    [facility]   • THORACOSCOPY WITH DECORTICATION Left 3/18/2022    Procedure: LEFT THORACOSCOPY VIDEO ASSISTED WITH COMPLETE DECORTICATION;  Surgeon: Sabra Kuo MD;  Location:  [facility];  Service: Thoracic;  Laterality: Left;      General Information     Row Name 03/23/23 1318          Physical Therapy Time and Intention    Document Type evaluation  -SS (r) MB (t) SS (c)     Mode of Treatment physical therapy  -SS (r) MB (t) SS (c)     Row Name 03/23/23 1318          General Information    Patient Profile Reviewed yes  -SS (r) MB (t) SS (c)     Prior Level of Function independent:;all household mobility;community mobility;gait;transfer;ADL's;home management;yard work  -SS (r) MB (t) SS (c)     Existing Precautions/Restrictions no known precautions/restrictions  -SS (r) MB (t) SS (c)     Barriers to Rehab none identified  -SS (r) MB (t) SS (c)     Row Name 03/23/23 1318          Living Environment    People in Home spouse  -SS (r) MB (t) SS (c)     Row Name 03/23/23 1318          Home Main Entrance    Number of Stairs, Main Entrance two  -SS (r) MB (t) SS (c)     Stair Railings, Main Entrance railings safe and in good condition  -SS (r) MB (t) SS (c)     Row Name 03/23/23 1318          Stairs Within Home, Primary    Stairs Comment, Within Home, Primary One flight of 12 steps to basement, one flight of 12 steps to second level  -SS (r) MB (t) SS (c)     Row Name 03/23/23 1318          Cognition    Orientation Status (Cognition) oriented x 4  -SS (r) MB (t) SS (c)     Row Name 03/23/23 1318          Safety Issues, Functional Mobility    Impairments Affecting Function (Mobility) endurance/activity tolerance;shortness of breath;strength  -SS (r) MB (t) SS (c)           User Key  (r) = Recorded By, (t) = Taken By, (c) = Cosigned By    Initials Name Provider Type    SS Jenny Childers, PT Physical Therapist    Rosendo Berumen, PT Student PT Student               Mobility     Row Name 03/23/23 1320          Bed Mobility    Comment, (Bed Mobility) not att, began and ended session in recliner  -SS (r) MB (t) SS (c)     Row Name 03/23/23 1320          Bed-Chair Transfer    Bed-Chair  [place] (Transfers) standby assist  -SS (r) MB (t) SS (c)     Assistive Device (Bed-Chair Transfers) walker, front-wheeled  -SS (r) MB (t) SS (c)     Row Name 03/23/23 1320          Sit-Stand Transfer    Sit-Stand [place] (Transfers) standby assist  -SS (r) MB (t) SS (c)     Assistive Device (Sit-Stand Transfers) walker, front-wheeled  -SS (r) MB (t) SS (c)     Row Name 03/23/23 1320          Gait/Stairs (Locomotion)    [place] Level (Gait) contact guard;standby assist  -SS (r) MB (t) SS (c)     Assistive Device (Gait) walker, front-wheeled  -SS (r) MB (t) SS (c)     Distance in Feet (Gait) 125  -SS (r) MB (t) SS (c)     Deviations/Abnormal Patterns (Gait) other (see comments)  Stephanie increased, step length decreased. VCs for increased step legnth and decreased stephanie.  -SS (r) MB (t) SS (c)     Comment, (Gait/Stairs) 125' CGA>SBA with RW  -SS (r) MB (t) SS (c)           User Key  (r) = Recorded By, (t) = Taken By, (c) = Cosigned By    Initials Name Provider Type    SS Jenny Childers, PT Physical Therapist    Rosendo Berumen, JONATHAN Student PT Student               Obj/Interventions     Row Name 03/23/23 1322          Balance    Balance Assessment sitting static balance;sit to stand dynamic balance;sitting dynamic balance;standing static balance;standing dynamic balance  -SS (r) MB (t) SS (c)     Static Sitting Balance independent  -SS (r) MB (t) SS (c)     Dynamic Sitting Balance independent  -SS (r) MB (t) SS (c)     Position, Sitting Balance sitting in chair;supported  -SS (r) MB (t) SS (c)     Sit to Stand Dynamic Balance standby assist  -SS (r) MB (t) SS (c)     Static Standing Balance contact guard  -SS (r) MB (t) SS (c)     Dynamic Standing Balance minimal assist  -SS (r) MB (t) SS (c)     Row Name 03/23/23 1322          Sensory Assessment (Somatosensory)    Sensory Assessment (Somatosensory) sensation intact  -SS (r) MB (t) SS (c)           User Key  (r) = Recorded By, (t) = Taken By, (c) =  Cosigned By    Initials Name Provider Type    SS Jenny Childers, PT Physical Therapist    Rosendo Berumen, PT Student PT Student               Goals/Plan     Row Name 03/23/23 1327          Bed Mobility Goal 1 (PT)    Activity/Assistive Device (Bed Mobility Goal 1, PT) bed mobility activities, all  -SS (r) MB (t) SS (c)     [place] Level/Cues Needed (Bed Mobility Goal 1, PT) independent  -SS (r) MB (t) SS (c)     Time Frame (Bed Mobility Goal 1, PT) long term goal (LTG);2 weeks  -SS (r) MB (t) SS (c)     Row Name 03/23/23 1327          Transfer Goal 1 (PT)    Activity/Assistive Device (Transfer Goal 1, PT) transfers, all  -SS (r) MB (t) SS (c)     [place] Level/Cues Needed (Transfer Goal 1, PT) independent  -SS (r) MB (t) SS (c)     Time Frame (Transfer Goal 1, PT) long term goal (LTG);2 weeks  -SS (r) MB (t) SS (c)     Row Name 03/23/23 1327          Gait Training Goal 1 (PT)    Activity/Assistive Device (Gait Training Goal 1, PT) gait (walking locomotion);walker, rolling  -SS (r) MB (t) SS (c)     [place] Level (Gait Training Goal 1, PT) independent  -SS (r) MB (t) SS (c)     Distance (Gait Training Goal 1, PT) 300  -SS (r) MB (t) SS (c)     Time Frame (Gait Training Goal 1, PT) long term goal (LTG);2 weeks  -SS (r) MB (t) SS (c)     Row Name 03/23/23 1327          Therapy Assessment/Plan (PT)    Planned Therapy Interventions (PT) gait training;home exercise program;patient/family education;strengthening;transfer training  -SS (r) MB (t) SS (c)           User Key  (r) = Recorded By, (t) = Taken By, (c) = Cosigned By    Initials Name Provider Type     Jenny Childers, PT Physical Therapist    Rosendo Berumen, PT Student PT Student               Clinical Impression     Row Name 03/23/23 1323          Pain    Additional Documentation Pain Scale: FACES Pre/Post-Treatment (Group)  -SS (r) MB (t) SS (c)     Row Name 03/23/23 1320          Pain Scale: FACES Pre/Post-Treatment    Pain: FACES  Scale, Pretreatment 0-->no hurt  -SS (r) MB (t) SS (c)     Posttreatment Pain Rating 0-->no hurt  -SS (r) MB (t) SS (c)     Row Name 03/23/23 1344 03/23/23 1323       Plan of Care Review    Plan of Care Reviewed With -- patient;spouse  -SS (r) MB (t) SS (c)    Progress -- improving  -SS (r) MB (t) SS (c)    Outcome Evaluation Pt is a 84 y/o F admitted to [facility] on [facility]23 with c/o of increased R leg swelling and edema that has progressively worsened over the past couple days. PMH includes CHF, CABG, HTN, DM, CAD, and A-fib. Diagnosed with cellulitis of the R leg in the ED. Pt is currently living with spouse in multi-level with 12 steps to each floor and 2 steps to enter. Prior to functional decline, pt was IND with household mobility, community amb, and ADLs. Spouse has since purchased RW, rollator, and w/c for pt to help maintain mobility throughout household despite functional decline. Currently pt is able to complete transfers SBA and amb 125' CGA with RW. She shows mild SOA and LE weakness near end of bout, all with stable vitals. Pt was provided education on minimizing use of w/c to limit muscle atrophy and wasting, and advised to amb with rollator, taking seated rest breaks as necessary. She was educated on remaining active and continuing therapy at d/c to improve CV endurance to return back to active lifestyle. Pt will continue to benefit from skilled PT intervention during stay for improved CV and BLE endurance, and will recommend OPPT services at d/c for continued therapy. No DME needs at d/c, but encouragement for rollator/RW over w/c usage.  -SS (r) MB (t) SS (c) --    Row Name 03/23/23 1323          Therapy Assessment/Plan (PT)    Criteria for Skilled Interventions Met (PT) yes  -SS (r) MB (t) SS (c)     Therapy Frequency (PT) 3 times/wk  -SS (r) MB (t) SS (c)     Predicted Duration of Therapy Intervention (PT) until d/c  -SS (r) MB (t) SS (c)     Row Name 03/23/23 1323          Vital Signs    O2 Delivery  Pre Treatment room air  -SS (r) MB (t) SS (c)     O2 Delivery Intra Treatment room air  -SS (r) MB (t) SS (c)     O2 Delivery Post Treatment room air  -SS (r) MB (t) SS (c)     Pre Patient Position Sitting  -SS (r) MB (t) SS (c)     Intra Patient Position Standing  -SS (r) MB (t) SS (c)     Post Patient Position Sitting  -SS (r) MB (t) SS (c)     Row Name 03/23/23 1323          Positioning and Restraints    Pre-Treatment Position sitting in chair/recliner  -SS (r) MB (t) SS (c)     Post Treatment Position chair  -SS (r) MB (t) SS (c)     In Chair notified nsg;reclined;sitting;call light within reach;encouraged to call for assist;exit alarm on;with family/caregiver  -SS (r) MB (t) SS (c)           User Key  (r) = Recorded By, (t) = Taken By, (c) = Cosigned By    Initials Name Provider Type    SS Jenny Childers, PT Physical Therapist    Rosendo Berumen, PT Student PT Student               Outcome Measures     Row Name 03/23/23 1328          How much help from another person do you currently need...    Turning from your back to your side while in flat bed without using bedrails? 4  -SS (r) MB (t) SS (c)     Moving from lying on back to sitting on the side of a flat bed without bedrails? 4  -SS (r) MB (t) SS (c)     Moving to and from a bed to a chair (including a wheelchair)? 4  -SS (r) MB (t) SS (c)     Standing up from a chair using your arms (e.g., wheelchair, bedside chair)? 4  -SS (r) MB (t) SS (c)     Climbing 3-5 steps with a railing? 3  -SS (r) MB (t) SS (c)     To walk in hospital room? 3  -SS (r) MB (t) SS (c)     AM-PAC 6 Clicks Score (PT) 22  -SS (r) MB (t)     Highest level of mobility 7 --> Walked 25 feet or more  -SS (r) MB (t)     Row Name 03/23/23 1345 03/23/23 1328       Functional Assessment    Outcome Measure Options AM-PAC 6 Clicks Daily Activity (OT)  - AM-PAC 6 Clicks Basic Mobility (PT)  -SS (r) MB (t) SS (c)          User Key  (r) = Recorded By, (t) = Taken By, (c) = Cosigned By     Initials Name Provider Type    SS Jenny Childers, PT Physical Therapist    Ahsan Gaines, OT Occupational Therapist    Rosendo Berumen, PT Student PT Student                             Physical Therapy Education     Title: PT OT SLP Therapies (In Progress)     Topic: Physical Therapy (Done)     Point: Mobility training (Done)     Learning Progress Summary           Patient Acceptance, E,TB, VU by MB at 3/23/2023 1329                   Point: Home exercise program (Done)     Learning Progress Summary           Patient Acceptance, E,TB, VU by MB at 3/23/2023 1329                   Point: Body mechanics (Done)     Learning Progress Summary           Patient Acceptance, E,TB, VU by MB at 3/23/2023 1329                   Point: Precautions (Done)     Learning Progress Summary           Patient Acceptance, E,TB, VU by MB at 3/23/2023 1329                               User Key     Initials Effective Dates Name Provider Type Discipline    MB 01/30/23 -  Rosendo Goyal, PT Student PT Student PT              PT Recommendation and Plan  Planned Therapy Interventions (PT): gait training, home exercise program, patient/family education, strengthening, transfer training  Plan of Care Reviewed With: patient, spouse  Progress: improving  Outcome Evaluation: Pt is a 82 y/o F admitted to [facility] on 3/20/23 with c/o of increased R leg swelling and edema that has progressively worsened over the past couple days. PMH includes CHF, CABG, HTN, DM, CAD, and A-fib. Diagnosed with cellulitis of the R leg in the ED. Pt is currently living with spouse in multi-level with 12 steps to each floor and 2 steps to enter. Prior to functional decline, pt was IND with household mobility, community amb, and ADLs. Spouse has since purchased RW, rollator, and w/c for pt to help maintain mobility throughout household despite functional decline. Currently pt is able to complete transfers SBA and amb 125' CGA with RW. She shows mild SOA and LE  weakness near end of bout, all with stable vitals. Pt was provided education on minimizing use of w/c to limit muscle atrophy and wasting, and advised to amb with rollator, taking seated rest breaks as necessary. She was educated on remaining active and continuing therapy at d/c to improve CV endurance to return back to active lifestyle. Pt will continue to benefit from skilled PT intervention during stay for improved CV and BLE endurance, and will recommend OPPT services at d/c for continued therapy. No DME needs at d/c, but encouragement for rollator/RW over w/c usage.     Time Calculation:    PT Charges     Row Name 03/23/23 1329             Time Calculation    Start Time 1155  -SS (r) MB (t) SS (c)      Stop Time 1222  -SS (r) MB (t) SS (c)      Time Calculation (min) 27 min  -SS (r) MB (t)      PT Received On 03/23/23  -SS (r) MB (t) SS (c)      PT - Next Appointment 03/26/23  -SS (r) MB (t) SS (c)      PT Goal Re-Cert Due Date 04/06/23  -SS (r) MB (t) SS (c)         Time Calculation- PT    Total Timed Code Minutes- PT 0 minute(s)  -SS (r) MB (t) SS (c)            User Key  (r) = Recorded By, (t) = Taken By, (c) = Cosigned By    Initials Name Provider Type     Jenny Childers, PT Physical Therapist    Rosendo Berumen, PT Student PT Student              Therapy Charges for Today     Code Description Service Date Service Provider Modifiers Qty    [id number] HC PT EVAL MOD COMPLEXITY 4 3/23/2023 Rosendo Goyal, PT Student GP 1          PT G-Codes  Outcome Measure Options: AM-PAC 6 Clicks Daily Activity (OT)  AM-PAC 6 Clicks Score (PT): 22  AM-PAC 6 Clicks Score (OT): 17  PT Discharge Summary  Anticipated Discharge Disposition (PT): home with outpatient therapy services    Rosendo Goyal PT Student  3/23/2023

## 2023-03-23 NOTE — PROGRESS NOTES
Name: Jaquelin Valderrama ADMIT: 3/20/2023   : 1940  PCP: Minerva Chatterjee MD    MRN: [id number] LOS: 3 days   AGE/SEX: 83 y.o. female  ROOM:  [facility] 257/1     CC: Microthrombosis  Interval History: Patient overall feeling much better today. On room air this morning. Denies pain in her lower extremities. BLE discoloration has significantly improved since I saw her 2 days ago.   Subjective   Subjective     Review of Systems   Constitutional: Negative for chills and fever.   Respiratory: Negative for cough and shortness of breath.    Cardiovascular: Positive for leg swelling. Negative for chest pain.   Gastrointestinal: Negative for abdominal pain.   Musculoskeletal: Positive for gait problem.   Skin: Positive for color change.   Neurological: Negative for dizziness and light-headedness.   Psychiatric/Behavioral: Negative for agitation and confusion.     Objective  resting in bed, no acute distress,  at beside   Objective     Vitals:   Temp:  [96.5 °F (35.8 °C)-97.9 °F (36.6 °C)] 97 °F (36.1 °C)  Heart Rate:  [63-79] 71  Resp:  [15-24] 15  BP: ()/(36-84) 116/60    I/O this shift:  In: 240 [P.O.:240]  Out: -     Scheduled Meds:     atorvastatin, 40 mg, Oral, Nightly  insulin lispro, 2-9 Units, Subcutaneous, 4x Daily With Meals & Nightly  levothyroxine, 100 mcg, Oral, Q AM  lisinopril, 5 mg, Oral, Q24H  metoprolol succinate XL, 25 mg, Oral, BID  midodrine, 5 mg, Oral, TID AC  piperacillin-tazobactam, 3.375 g, Intravenous, Q8H  sodium chloride, 10 mL, Intravenous, Q12H  sodium chloride, 10 mL, Intravenous, Q12H  spironolactone, 25 mg, Oral, Daily    IV Meds:   heparin, 18 Units/kg/hr, Last Rate: 18 Units/kg/hr (23 0701)  hold, 1 each      Physical Exam  Vitals and nursing note reviewed.   Constitutional:       General: She is not in acute distress.  HENT:      Head: Normocephalic.   Cardiovascular:      Rate and Rhythm: Normal rate.      Pulses:           Dorsalis pedis pulses are detected w/  Doppler on the right side and detected w/ Doppler on the left side.        Posterior tibial pulses are detected w/ Doppler on the right side and detected w/ Doppler on the left side.   Pulmonary:      Effort: Pulmonary effort is normal. No respiratory distress.   Abdominal:      Palpations: Abdomen is soft.   Skin:     Comments: Discoloration of bilateral feet significantly improved and now with hyperemic appearance of feet bilaterally.  Right lower leg erythema gradually improving as well.  Motor and sensory function of her bilateral lower extremities remains intact.   Neurological:      General: No focal deficit present.      Mental Status: She is alert and oriented to person, place, and time. Mental status is at baseline.   Psychiatric:         Mood and Affect: Mood normal.         Behavior: Behavior normal.         Thought Content: Thought content normal.       Data Reviewed:  CBC    Results from last 7 days   Lab Units 03/23/23  0435 03/22/23  0301 03/20/23  0915   WBC 10*3/mm3 4.10 5.30 11.10*   HEMOGLOBIN g/dL 14.9 16.0* 17.5*   PLATELETS 10*3/mm3 139* 157 180     BMP   Results from last 7 days   Lab Units 03/23/23  0435 03/22/23  0301 03/21/23  1620 03/21/23  1502 03/20/23  0915   SODIUM mmol/L 134* 134*  --   --  138   POTASSIUM mmol/L 4.0 4.2  --   --  4.2   CHLORIDE mmol/L 94* 94*  --   --  100   CO2 mmol/L 34.0* 34.0*  --   --  26.0   BUN mg/dL 41* 42*  --   --  41*   CREATININE mg/dL 1.18* 1.10*  --   --  0.92   GLUCOSE mg/dL 102* 69  --   --  115*   MAGNESIUM mg/dL 1.9 1.8  --  1.9  --    PHOSPHORUS mg/dL  --  3.6 4.0  --   --      Coag   Results from last 7 days   Lab Units 03/23/23  0435 03/22/23  2309 03/22/23  1625 03/22/23  1038 03/21/23  2219 03/21/23  1148 03/21/23  0528 03/20/23  1902   INR   --   --   --   --   --   --   --  1.29*   APTT seconds 30.2* 104.7* 54.5* 67.8 112.1* 30.7* 58.7* 132.9*     HbA1C   Lab Results   Component Value Date    HGBA1C 9.20 (H) 03/06/2023    HGBA1C 9.40 (H)  01/10/2023    HGBA1C 7.1 (H) 03/03/2022     Infection   Results from last 7 days   Lab Units 03/21/23  1028 03/20/23  1116 03/20/23  0921   BLOODCX   --  No growth at 2 days Gram Negative Bacilli*   BODYFLDCX  No growth at 2 days  --   --    BCIDPCR   --   --  Negative by BCID PCR. Culture to Follow.     Radiology(recent) NM HIDA SCAN WITHOUT PHARMACOLOGICAL INTERVENTION    Result Date: 3/22/2023  Impression: Normal HIDA scan. Gallbladder ejection fraction is 49%. Electronically Signed: Saadia Islsa  3/22/2023 10:57 AM EDT  Workstation ID: [id number]    XR Chest 1 View    Result Date: 3/22/2023  Impression: 1. New right basilar opacity may represent reaccumulation of small right pleural effusion, with right basilar atelectasis or pneumonia. 2. Increase in left basilar airspace disease, favored to represent increased atelectasis. 3. Interstitial edema is again noted, without significant change. 4. Stable cardiac enlargement. Electronically Signed: Saadia Islas  3/22/2023 7:32 AM EDT  Workstation ID: [id number]    XR Chest 1 View    Result Date: 3/21/2023  Impression: Interval near complete resolution of right effusion status post thoracentesis. No pneumothorax. Additional findings as above. Electronically Signed: Balwinder Klein  3/21/2023 11:24 AM EDT  Workstation ID: [id number]    US Thoracentesis    Result Date: 3/21/2023  Impression: Technically successful ultrasound-guided large volume right-sided thoracentesis as described above. A sample of the turbid yellow pleural fluid was sent for laboratory evaluation. Electronically Signed: Antoine Carpio  3/21/2023 11:13 AM EDT  Workstation ID: [id number]      Most notable findings include: White blood cell count normal.  Blood cultures positive in 1 out of 2.  A1c elevated.    Active Hospital Problems:   Active Hospital Problems    Diagnosis  POA   • **Cellulitis of right leg [L03.115]  Yes     Priority: Low      Resolved Hospital Problems   No resolved problems to display.       Assessment & Plan   [place]illin, Subsequent Hospital Care  Assessment / Plan     83 y.o. female with multiple medical comorbidities who presented on 3/20 complaining of 1-2 day history of bilateral foot discoloration R>L as well as RLE erythema and warmth since holding her Eliquis for planned thoracentesis.     She had normal waveforms on her ABIs at the ankles with normal pressures of 1.06 on the right and 1.1 on the left but severe digital ischemia bilaterally suggesting her issues are localized to the feet bilaterally.   CT angiogram chest, abdomen, and pelvis with runoff showed no significant plaque or thrombus present in the thoracic aorta.  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  The SMA and MARINA are widely patent.  The infrarenal aorta is patent with no thrombus.  The iliac arteries are patent bilaterally.  For the runoff arteriogram, she does not have significant stenosis of the femoral or popliteal arteries bilaterally.  There is 3 vessel runoff to the right foot.  There is a cutoff of contrast on the left after the trifurcation in the calf, but no evidence of thrombus and Dr. Lacey felt that this was a technical contrast timing issue.  Arterial duplex was obtained following the CTA and confirmed patency of all three left tibial vessels to the distal calf and ABIs remained normal at >1 bilaterally. WBI and digit pressures were normal.    She does have gram negative bacilli in her blood cultures.  General surgery, hematology, ID, and thoracic have all seen.    Patient is overall doing much better today with significant improvement in BLE discoloration and now with more hyperemic-appearance to bilateral feet.  Motor and sensory function remain intact.  She has good pedal Doppler signals bilaterally.  As previously discussed, patient may have experienced microvascular thrombosis in bilateral feet after holding anticoagulation for planned thoracentesis.  Patient also with severe heart failure likely contributing to poor peripheral perfusion along.    From our standpoint patient was felt not to be a candidate for thrombolysis given age and multiple medical comorbidities and we will continue with therapeutic anticoagulation and time to allow for tissue demarcation.      Minal Vidal, VALENTINE  03/23/23  08:18 EDT  Office Number [phone]

## 2023-03-23 NOTE — PROGRESS NOTES
Hematology/Oncology Inpatient Progress Note    PATIENT NAME: Jaquelin Valderrama  : 1940  MRN: [id number]    CHIEF COMPLAINT: Hypercoagulable state with BLE small vessel thrombosis    HISTORY OF PRESENT ILLNESS:    83 y.o. female presented to [facility] on 3/20/2023 with some chronic BLE edema that became worse over the few days prior to admission.   She stated her legs had been purple intermittently for a few months.  She was reported as holding Eliquis for chronic A. Fib for a few days before planned thoracentesis, although patient stated she had not been off her blood thinners.  She denied long car or plane ride recently, no fever/chills. CBC in ED revealed WBC 11.1, Hgb 17.5, .3, platelets 180,000.  Review of chart showed hemoglobin elevated since 2022 (ranged 16.4-19.7), with MCVs normal until 3/6/2023 (98.5). Platelets and WBC normal in reviewed time period.  Creatinine 0.92 in ED.  Blood cultures positive for gram negative bacilli. PT 13.1 (9-11.7), INR 1.29 (0.93-1.10). .9 after start of heparin.  Venous Doppler BLE was negative. NADEGE of BLE revealed severe digital ischemia bilaterally with all other arterial studies of the lower extremities and upper extremities negative.  CTA chest and abdomen showed large right and trace left pleural effusion with near complete atelectasis of right lower lobe. Additional bibasilar and right middle lobe opacities possible atelectasis vs infiltrates.  Additional findings included cardiomegaly with reflux of contrast in the IVC and hepatic veins suggesting right heart failure, nonspecific gallbladder wall thickening, mild ascites, anasarca, and patent proximal left trifurcation with markedly diminutive flow in remainder of left leg arteries and no flow in left foot.  Chest xray showed interval decrease in right pleural effusion with some loculated fluid present, increased airspace opacity in right perihilar region showing pneumonia vs  asymmetric edema, bibasilar consolidations suggestion atelectasis vs infiltrate, small left effusion, and cardiomegaly.  Repeat NADEGE remained normal.  Ultrasound thoracentesis was done 03/21 and approximately 1240 cc of cloudy yellow fluid was aspirated and sent for laboratory evaluation.       03/21/23  Hematology/Oncology was consulted by Dr. Farzana Lacey for small vessel thrombosis of bilateral feet.  Her cystoscopy with clot evacuation in 03/2022 was felt to have happened due to bladder wall tear after she fell and hit pergola with a very full bladder. She also had history of left VATS with removal of clotted hemorrhage post the 3/2022 fall. Hemoglobin has been elevated since July 2022 with Hgb 16.4, Hct 49. Hgb was as high as 19.7 on 3/6/23 at which time Jak2 was negative.      Past Medical History: astigmatism, hypertension, CAD, R ankle fracture, hyperlipidemia, hypothyroidism, MI, atrial fibrillation  Surgical History: Appendectomy, cardiac cath x 3 (2012), CABG (2014), coronary stent placement, cystoscopy with clot evacuation (3/25/2022), ankle hardware removal (7/2020), ORIF R ankle (04/2019), left VATS (03/2022)  Social History: She is .  She is a retired CPA.  She is a former smoker, quit in 1980.  She denies alcohol or recreational drug use.   Family History: hypertension (mother), stroke (mother), heart disease (mother), lung cancer (father), diabetes (brother)  Allergies: prednisone (increases blood pressure)     PCP: Minerva Chatterjee MD      INTERVAL HISTORY:  • 3/22/2023- hemoglobin 16, hematocrit 50.4, .9.   (135-214).  Vitamin B12 868 (211-946), folate greater than 20.  Prothrombin gene mutation and factor V Leiden both normal.  • 3/23/2023- WBC 4.1, hemoglobin 14.9, platelets 139,000.  Patient now states that she had been taking Eliquis 5 mg only once daily for 2 months.  Cardiolipin antibodies positive, beta-2 glycoprotein antibodies negative, Protein C 78 ().   Hemoglobin electrophoresis negative.  HIDA scan was negative.    History of present illness reviewed since last visit and changes noted on 03/23/23.    Subjective   She states that she has been on Eliquis 5 mg once daily for the last 2 months.    ROS:  Review of Systems   Constitutional: Negative for activity change, chills, fatigue, fever and unexpected weight change.   HENT: Negative for congestion, dental problem, hearing loss, mouth sores, nosebleeds, sore throat and trouble swallowing.    Eyes: Negative for photophobia and visual disturbance.   Respiratory: Negative for cough, chest tightness, shortness of breath and stridor.    Cardiovascular: Negative for chest pain, palpitations and leg swelling.   Gastrointestinal: Negative for abdominal distention, abdominal pain, blood in stool, constipation, diarrhea, nausea and vomiting.   Endocrine: Negative for cold intolerance and heat intolerance.   Genitourinary: Negative for decreased urine volume, difficulty urinating, frequency, hematuria and urgency.   Musculoskeletal: Negative for arthralgias and gait problem.   Skin: Negative for rash and wound.   Neurological: Negative for dizziness, tremors, weakness, light-headedness, numbness and headaches.   Hematological: Negative for adenopathy. Does not bruise/bleed easily.   Psychiatric/Behavioral: Negative for confusion and hallucinations. The patient is not nervous/anxious.    All other systems reviewed and are negative.       MEDICATIONS:    Scheduled Meds:  atorvastatin, 40 mg, Oral, Nightly  insulin lispro, 2-9 Units, Subcutaneous, 4x Daily With Meals & Nightly  levothyroxine, 100 mcg, Oral, Q AM  lisinopril, 5 mg, Oral, Q24H  metoprolol succinate XL, 25 mg, Oral, BID  piperacillin-tazobactam, 3.375 g, Intravenous, Q8H  sodium chloride, 10 mL, Intravenous, Q12H  spironolactone, 25 mg, Oral, Daily       Continuous Infusions:  heparin, 18 Units/kg/hr, Last Rate: Stopped (03/23/23 1240)  hold, 1 each       PRN  "Meds:  •  acetaminophen  •  senna-docusate sodium **AND** polyethylene glycol **AND** bisacodyl **AND** bisacodyl  •  dextrose  •  dextrose  •  glucagon (human recombinant)  •  heparin  •  heparin  •  hold  •  HYDROcodone-acetaminophen  •  melatonin  •  midodrine  •  ondansetron  •  polyethyl glycol-propyl glycol  •  [COMPLETED] Insert Peripheral IV **AND** sodium chloride  •  sodium chloride  •  sodium chloride     ALLERGIES:    Allergies   Allergen Reactions   • Prednisone Other (See Comments)     Increased BP       Objective    VITALS:   /58 (BP Location: Left leg, Patient Position: Lying)   Pulse 64   Temp 97.3 °F (36.3 °C) (Oral)   Resp 13   Ht 160 cm (63\")   Wt 68 kg (149 lb 14.6 oz)   SpO2 97%   BMI 26.56 kg/m²     PHYSICAL EXAM:  Physical Exam  Vitals and nursing note reviewed.   Constitutional:       General: She is not in acute distress.     Appearance: Normal appearance. She is well-developed. She is not diaphoretic.   HENT:      Head: Normocephalic and atraumatic.      Comments: Frontal alopecia     Right Ear: External ear normal.      Left Ear: External ear normal.      Nose: Nose normal.      Comments: O2 per NC     Mouth/Throat:      Mouth: Mucous membranes are moist.      Pharynx: Oropharynx is clear. No oropharyngeal exudate or posterior oropharyngeal erythema.      Comments: Dental fillings  Eyes:      General: No scleral icterus.     Extraocular Movements: Extraocular movements intact.      Conjunctiva/sclera: Conjunctivae normal.      Pupils: Pupils are equal, round, and reactive to light.      Comments: Eyeglasses   Cardiovascular:      Rate and Rhythm: Normal rate and regular rhythm.      Heart sounds: Normal heart sounds. No murmur heard.     Comments: Cardiac monitor leads.  Midline vertical chest wall scar  Pulmonary:      Effort: Pulmonary effort is normal. No respiratory distress.      Breath sounds: Normal breath sounds. No wheezing or rales.   Abdominal:      General: Bowel " sounds are normal. There is no distension.      Palpations: Abdomen is soft. There is no mass.      Tenderness: There is no abdominal tenderness. There is no guarding.   Genitourinary:     Comments: Deferred   Musculoskeletal:         General: No swelling, tenderness or deformity. Normal range of motion.      Cervical back: Normal range of motion and neck supple.      Right lower leg: No edema.      Left lower leg: No edema.      Comments: BUE IVs.  Left hand O2 monitor.   Lymphadenopathy:      Cervical: No cervical adenopathy.      Upper Body:      Right upper body: No supraclavicular adenopathy.      Left upper body: No supraclavicular adenopathy.   Skin:     General: Skin is warm and dry.      Coloration: Skin is not pale.      Findings: Erythema (Improving hyperemia of the bilateral lower extremities) present. No bruising or rash.   Neurological:      General: No focal deficit present.      Mental Status: She is alert and oriented to person, place, and time.      Coordination: Coordination normal.   Psychiatric:         Mood and Affect: Mood normal.         Behavior: Behavior normal.         Thought Content: Thought content normal.           RECENT LABS:  Lab Results (last 24 hours)     Procedure Component Value Units Date/Time    CANDIDA AURIS SCREEN - Swab, Axilla Right, Axilla Left and Groin [[id number]]  (Normal) Collected: 03/21/23 1145    Specimen: Swab from Axilla Right, Axilla Left and Groin Updated: 03/23/23 1316     Candida Auris Screen Culture No Candida auris isolated at 2 days    Hemoglobinopathy Fractionation Cascade [[id number]] Collected: 03/21/23 2219    Specimen: Blood Updated: 03/23/23 1310     Hgb F Quant 0.0 %      Hgb A 97.6 %      Hgb A2 Quant 2.4 %      Hgb S 0.0 %      Hgb Interp. Comment     Comment: Normal hemoglobin present; no hemoglobin variant or beta thalassemia  identified.  Note: Alpha thalassemia may not be detected by the Hgb Fractionation  Cascade panel. If alpha thalassemia is  suspected, [facility] offers  Alpha-Thalassemia DNA Analysis (#[id number]).       Narrative:      Performed at:  [address]  [id number]  : Сергей Gambino PhD, Phone:  [phone]    aPTT [[id number]]  (Abnormal) Collected: 03/23/23 1132    Specimen: Blood Updated: 03/23/23 1235     .7 seconds     Blood Culture - Blood, Hand, Right [[id number]]  (Normal) Collected: 03/20/23 1116    Specimen: Blood from Hand, Right Updated: 03/23/23 1130     Blood Culture No growth at 3 days    POC Glucose Once [[id number]]  (Abnormal) Collected: 03/23/23 1113    Specimen: Blood Updated: 03/23/23 1114     Glucose 182 mg/dL      Comment: Serial Number: [id number]:  [id number]       POC Glucose Once [[id number]]  (Normal) Collected: 03/23/23 0722    Specimen: Blood Updated: 03/23/23 0724     Glucose 103 mg/dL      Comment: Serial Number: [id number]:  [id number]       Body Fluid Culture - Body Fluid, Pleural Cavity [[id number]] Collected: 03/21/23 1028    Specimen: Body Fluid from Pleural Cavity Updated: 03/23/23 0632     Body Fluid Culture No growth at 2 days     Gram Stain Few (2+) WBCs seen      No organisms seen    Blood Culture - Blood, Arm, Left [[id number]]  (Abnormal) Collected: 03/20/23 0921    Specimen: Blood from Arm, Left Updated: 03/23/23 0629     Blood Culture Gram Negative Bacilli     Isolated from Anaerobic Bottle     Gram Stain Anaerobic Bottle Gram negative bacilli    Narrative:      Sending to [facility] for ID and MICs    Less than seven (7) mL's of blood was collected.  Insufficient quantity may yield false negative results.    aPTT [[id number]]  (Abnormal) Collected: 03/23/23 0435    Specimen: Blood Updated: 03/23/23 0516     PTT 30.2 seconds     Basic Metabolic Panel [[id number]]  (Abnormal) Collected: 03/23/23 0435    Specimen: Blood Updated: 03/23/23 0510     Glucose 102 mg/dL      BUN 41 mg/dL      Creatinine 1.18 mg/dL      Sodium 134 mmol/L      Potassium 4.0  mmol/L      Chloride 94 mmol/L      CO2 34.0 mmol/L      Calcium 8.3 mg/dL      BUN/Creatinine Ratio 34.7     Anion Gap 6.0 mmol/L      eGFR 46.5 mL/min/1.73     Narrative:      GFR Normal >60  Chronic Kidney Disease <60  Kidney Failure <15    The GFR formula is only valid for adults with stable renal function between ages 18 and 70.    Magnesium [[id number]]  (Normal) Collected: 03/23/23 0435    Specimen: Blood Updated: 03/23/23 0510     Magnesium 1.9 mg/dL     CBC & Differential [[id number]]  (Abnormal) Collected: 03/23/23 0435    Specimen: Blood Updated: 03/23/23 0453    Narrative:      The following orders were created for panel order CBC & Differential.  Procedure                               Abnormality         Status                     ---------                               -----------         ------                     CBC Auto Differential[[id number]]        Abnormal            Final result                 Please view results for these tests on the individual orders.    CBC Auto Differential [[id number]]  (Abnormal) Collected: 03/23/23 0435    Specimen: Blood Updated: 03/23/23 0453     WBC 4.10 10*3/mm3      RBC 4.53 10*6/mm3      Hemoglobin 14.9 g/dL      Hematocrit 45.0 %      MCV 99.2 fL      MCH 32.9 pg      MCHC 33.2 g/dL      RDW 15.2 %      RDW-SD 55.1 fl      MPV 8.9 fL      Platelets 139 10*3/mm3      Neutrophil % 70.3 %      Lymphocyte % 18.3 %      Monocyte % 8.8 %      Eosinophil % 1.8 %      Basophil % 0.8 %      Neutrophils, Absolute 2.90 10*3/mm3      Lymphocytes, Absolute 0.80 10*3/mm3      Monocytes, Absolute 0.40 10*3/mm3      Eosinophils, Absolute 0.10 10*3/mm3      Basophils, Absolute 0.00 10*3/mm3      nRBC 0.2 /100 WBC     Beta-2 Glycoprotein Antibodies [[id number]] Collected: 03/21/23 1502    Specimen: Blood Updated: 03/23/23 0410     Beta-2 Glyco 1 IgG <9 GPI IgG units      Comment: The reference interval reflects a 3SD or 99th percentile interval,  which is thought to represent a  potentially clinically significant  result in accordance with the International Consensus Statement on  the classification criteria for definitive antiphospholipid syndrome  (APS). J Thromb Haem 2006;4:295-306.        Beta-2 Glyco 1 IgA <9 GPI IgA units      Comment: The reference interval reflects a 3SD or 99th percentile interval,  which is thought to represent a potentially clinically significant  result in accordance with the International Consensus Statement on  the classification criteria for definitive antiphospholipid syndrome  (APS). J Thromb Haem 2006;4:295-306.        Beta-2 Glyco 1 IgM <9 GPI IgM units      Comment: The reference interval reflects a 3SD or 99th percentile interval,  which is thought to represent a potentially clinically significant  result in accordance with the International Consensus Statement on  the classification criteria for definitive antiphospholipid syndrome  (APS). J Thromb Haem 2006;4:295-306.       Narrative:      Performed at:  [address] [place] [address]  [id number]  : Luana Andrew MD, Phone:  [phone]    aPTT [[id number]]  (Abnormal) Collected: 03/22/23 2309    Specimen: Blood Updated: 03/23/23 0056     .7 seconds     POC Glucose Once [[id number]]  (Abnormal) Collected: 03/22/23 2308    Specimen: Blood Updated: 03/22/23 2310     Glucose 149 mg/dL      Comment: Serial Number: [id number]:  [id number]       POC Glucose Once [[id number]]  (Abnormal) Collected: 03/22/23 2050    Specimen: Blood Updated: 03/22/23 2051     Glucose 192 mg/dL      Comment: Serial Number: [id number]:  [id number]       aPTT [[id number]]  (Abnormal) Collected: 03/22/23 1625    Specimen: Blood Updated: 03/22/23 1722     PTT 54.5 seconds     POC Glucose Once [[id number]]  (Abnormal) Collected: 03/22/23 1613    Specimen: Blood Updated: 03/22/23 1614     Glucose 167 mg/dL      Comment: Serial Number: [id number]:  [id number]       Cardiolipin  Antibody [[id number]]  (Abnormal) Collected: 03/21/23 1620    Specimen: Blood Updated: 03/22/23 1613     Anticardiolipin IgG <9 GPL U/mL      Comment:                           Negative:              <15                            Indeterminate:     15 - 20                            Low-Med Positive: >20 - 80                            High Positive:         >80        Anticardiolipin IgM 17 MPL U/mL      Comment:                           Negative:              <13                            Indeterminate:     13 - 20                            Low-Med Positive: >20 - 80                            High Positive:         >80        Anticardiolipin IgA <9 APL U/mL      Comment:                           Negative:              <12                            Indeterminate:     12 - 20                            Low-Med Positive: >20 - 80                            High Positive:         >80       Narrative:      Performed at:  [address] [place] [address]  [id number]  : Сергей Gambino PhD, Phone:  [phone]          PENDING RESULTS: protein S, Epo, Cobalt, JAK2 panel, PT, CMV and EBV IgMs, HIT Ab, lupus anticoagulant.    IMAGING REVIEWED:  NM HIDA SCAN WITHOUT PHARMACOLOGICAL INTERVENTION    Result Date: 3/22/2023  Impression: Normal HIDA scan. Gallbladder ejection fraction is 49%. Electronically Signed: Saadia Islas  3/22/2023 10:57 AM EDT  Workstation ID: [id number]    XR Chest 1 View    Result Date: 3/22/2023  Impression: 1. New right basilar opacity may represent reaccumulation of small right pleural effusion, with right basilar atelectasis or pneumonia. 2. Increase in left basilar airspace disease, favored to represent increased atelectasis. 3. Interstitial edema is again noted, without significant change. 4. Stable cardiac enlargement. Electronically Signed: Saadia Islas  3/22/2023 7:32 AM EDT  Workstation ID: [id number]      I have reviewed the patient's labs, imaging, reports,  and other clinician documentation.    Assessment & Plan   ASSESSMENT:  1. BLE digital ischemia/cardiolipin antibody positivity-digital ischemia noted on NADEGE with all other arterial and venous imaging of the lower extremities and arterial imaging of the upper extremities negative.  Remains on heparin drip.  No evidence of factor V Leiden or prothrombin gene mutation.  Cardiolipin antibodies were intermediate positive.  Beta-2 glycoprotein antibodies were negative.  Protein C was normal.  Protein S remains pending.  Will send Lupus AC in AM and will check ATIII when off all heparin.  We originally felt she would need to switch to warfarin as she was on Eliquis at time of current events however she now reports she was only taking Eliquis once daily for 2 months prior to admission.   2. Erythrocytosis - Macrocytosis appears acute. No recent smoking or known MAYELIN.  Vitamin B12 is not elevated.  LDH is mildly elevated. Hgb electrophoresis is negative. Previous JAK2 did not reflex to complete panel and checking JAK2 panel along with remaining work-up in progress. Hgb normalized today. No known bleeding on heparin drip. Will follow.  3. Acute thrombocytopenia-on heparin drip and will check HIT along with remainder of acute workup. No folate or B12 deficiencies.  Could be antibiotic induced.  4. Recurrent left pleural effusion - no masses concerning for malignancy on CTs. Likely due to CHF. Per primary team.   5. A. Fib/CAD/ s/p CABG - On Eliquis prior to admit. Now on heparin gtt. Echo with LVEF less than 20%.  There was significant tricuspid regurgitation and significant biatrial biventricular enlargement..  6. BLE cellulitis/bacteremia-no vegetation on echo.  abx per ID.   7. Gallbladder wall thickening- Surgery does not feel patient's clinical picture is that of cholecystitis and feels abnormal imaging was likely a manifestation of her heart failure. HIDA scan was normal.      PLAN:  1. Acute thrombocytopenia  labs.  2. Await remaining thrombophilia and polycythemia work-ups.  3. Check lupus anticoagulant.  4. Daily CBC.  5. Anticoagulation per vascular-recommend switching to Eliquis 5 mg twice daily.  6. Antibiotics per ID.  7. Will check ATIII when off on heparin.  8. Outpatient RBC enzymes.        Note prepared by KAYLEE Wong.  Patient seen and examined by Amilcar Smallwood MD.  Electronically signed by VALENTINE Baca, 03/23/23, 2:00 PM EDT.    I have personally performed a face-to-face diagnostic evaluation on this patient. I have performed a complete history and physical examination, reviewed laboratory studies, and radiographic examinations.  I have completed the majority and substantive portion of the medical decision making.  I have formulated the assessment on this patient and the plan of action as noted above. I have discussed the case with Michaela Chacon NP, have edited/reviewed the note, and agree with the care plan.  Patient claims to be feeling better.  On examination she has less hyperemia of the lower extremities.  She has developed thrombocytopenia and HIT antibodies have been sent.  She tells me that she was taking Eliquis 5 mg once a day and has may do okay on 5 mg twice a day dose on switching from heparin.        I discussed the patient's findings and my recommendations with patient and family.    Part of this note may be an electronic transcription/translation of spoken language to printed text using the Dragon Dictation System.    Electronically signed by Amilcar Smallwood MD, 03/23/23, 7:29 PM EDT.

## 2023-03-23 NOTE — CONSULTS
"Diabetes Education  Assessment/Teaching    Patient Name:  Jaquelin Valderrama  YOB: 1940  MRN: [id number]  Admit Date:  3/20/2023      Assessment Date:  3/23/2023  Flowsheet Row Most Recent Value   General Information     Referral From: A1c  [Pt seen due to A1c on 3/6/2023 of 9.2%.]   Height 160 cm (63\")   Height Method Stated   Weight 68 kg (149 lb 14.6 oz)   Weight Method Bed scale   Pregnancy Assessment    Diabetes History    What type of diabetes do you have? Type 2   Have you had diabetes education/teaching in the past? yes   When and where was your diabetes education? Pt was seen by inpatient diabetes educator at [facility] on 3/3/2022   Do you test your blood sugar at home? yes   Frequency of checks throughout the day   Meter type wears Freestyle Jensen 2 CGMS, has been wearing for 1 week   Who performs the test? self/   Typical readings bs averaging in the 120s   Have you had low blood sugar? (<70mg/dl) yes   How often do you have low blood sugar? occasionally   Education Preferences    Nutrition Information    Assessment Topics    DM Goals           Flowsheet Row Most Recent Value   DM Education Needs    Blood Glucose Target Range Discussed A1c result of 9.2%. Reviewed healthy bs range and healthy A1c target. Discussed importance of bs control.   Medication Insulin, Vial  [Pt recently started on Novolog 70/30 10 units bid. Pt has insulin vials/syringes.  is preparing and giving pt the injections.]   Problem Solving Hypoglycemia, Signs, Symptoms, Treatment  [Discussed keeping low bs tx available.]   Reducing Risks A1C testing  [Gave A1c info sheet.]   Healthy Eating --  [Pt usually eating 2 meals/day. Follows intermittent fasting meal plan and lower carbohydrate meal plan]   Motivation Engaged   Teaching Method Explanation, Discussion, Handouts   Patient Response Verbalized understanding            Other Comments:  Met with pt and  at bedside. Pt states since starting on insulin, bs " is doing much better. Discussed importance of routine follow up with PCP. Reviewed if pt having lows, MD may need to decrease insulin dosage.  states he has been adjusting the dosage some since bs was going low. Pt states has necessary supplies at home and no additional teaching needed at this time.       Electronically signed by:  Patricia Soto RN  03/23/23 19:18 EDT

## 2023-03-23 NOTE — PLAN OF CARE
Goal Outcome Evaluation:  Outcome Evaluation: Pt is a 84 y/o F admitted for right LE swelling.  Pt dx with cellulitis of RLE.    PMHx: hypertension, dyslipidemia, hypothyroidism, diabetes, CAD status post CABG's status post stent, persistent A-fib on Eliquis, chronic systolic heart failure with ejection fraction 30%.  Pt reports she lives in a two story home with  and has to use 13 steps with HR daily.  They also have a home in [place].  Pt was Independent with ADLs, IADLs, transfers, and driving. Pt has a w/c for long distances but was using no AD.  Pt has a very supportive .  Pt completed bed mobility with MIN A.  Pt completed donning socks with MOD A.   Pt t/f from bed <> chair with CGA.  Pt presents with deficits in self care, transfers, funcitonal mobility, strength, and activity tolerance indicating need for skilled OT services.  OT recommending home with home health.

## 2023-03-23 NOTE — PLAN OF CARE
Problem: Fall Injury Risk  Goal: Absence of Fall and Fall-Related Injury  Outcome: Ongoing, Progressing  Intervention: Identify and Manage Contributors  Recent Flowsheet Documentation  Taken 3/23/2023 1600 by Anastasia Goyal RN  Medication Review/Management: medications reviewed  Taken 3/23/2023 1405 by Anastasia Goyal RN  Medication Review/Management: medications reviewed  Intervention: Promote Injury-Free Environment  Recent Flowsheet Documentation  Taken 3/23/2023 1600 by Anastasia Goyal RN  Safety Promotion/Fall Prevention:   safety round/check completed   room organization consistent   nonskid shoes/slippers when out of bed   lighting adjusted   fall prevention program maintained   clutter free environment maintained   assistive device/personal items within reach   activity supervised  Taken 3/23/2023 1405 by Anastasia Goyal RN  Safety Promotion/Fall Prevention:   assistive device/personal items within reach   toileting scheduled     Problem: Heart Failure Comorbidity  Goal: Maintenance of Heart Failure Symptom Control  Outcome: Ongoing, Progressing  Intervention: Maintain Heart Failure-Management  Recent Flowsheet Documentation  Taken 3/23/2023 1600 by Anastasia Goyal RN  Medication Review/Management: medications reviewed  Taken 3/23/2023 1405 by Anastasia Goyal RN  Medication Review/Management: medications reviewed     Problem: Osteoarthritis Comorbidity  Goal: Maintenance of Osteoarthritis Symptom Control  Outcome: Ongoing, Progressing  Intervention: Maintain Osteoarthritis Symptom Control  Recent Flowsheet Documentation  Taken 3/23/2023 1600 by Anastasia Goyal RN  Activity Management: activity adjusted per tolerance  Medication Review/Management: medications reviewed  Taken 3/23/2023 1405 by Anastasia Goyal RN  Activity Management: up in chair  Medication Review/Management: medications reviewed     Problem: Pain Chronic (Persistent) (Comorbidity Management)  Goal: Acceptable Pain Control and Functional Ability  Outcome: Ongoing,  Progressing  Intervention: Manage Persistent Pain  Recent Flowsheet Documentation  Taken 3/23/2023 1600 by Anastasia Goyal, RN  Medication Review/Management: medications reviewed  Taken 3/23/2023 1405 by Anastasia Goyal RN  Medication Review/Management: medications reviewed  Intervention: Optimize Psychosocial Wellbeing  Recent Flowsheet Documentation  Taken 3/23/2023 1600 by Anastasia Goyal RN  Diversional Activities:   smartphone   television  Family/Support System Care: caregiver stress acknowledged     Problem: Skin Injury Risk Increased  Goal: Skin Health and Integrity  Outcome: Ongoing, Progressing  Intervention: Optimize Skin Protection  Recent Flowsheet Documentation  Taken 3/23/2023 1600 by Anastasia Goyal, RN  Head of Bed (HOB) Positioning: HOB elevated   Goal Outcome Evaluation:

## 2023-03-23 NOTE — PROGRESS NOTES
[facility] Medicine Services   Daily Progress Note    Patient Name: Jaquelin Valderrama  : 1940  MRN: [id number]  Primary Care Physician:  Minerva Chatterjee MD  Date of admission: 3/20/2023  Date and Time of Service:  at       Subjective      Chief Complaint:     Patient feels better.   No pain today   No chest pain  No SOB. Breathing improved         Objective      Vitals:   Temp:  [96.5 °F (35.8 °C)-97.9 °F (36.6 °C)] 97 °F (36.1 °C)  Heart Rate:  [63-79] 71  Resp:  [15-24] 15  BP: ()/(36-84) 116/60    Physical Exam   General: No acute distress  HEENT: neck supple, normal oral mucosa, no masses, no lymphadenopathy.   Lungs:  No crackles, No Rhonchi. Equal excursions.   CV - Normal S1/S2, no murmur, Iregular rhythm, rate controlled   Abdomin - Soft, non-tender, non-distended, normal bowel sounds  Extremities - bilateral LE pitting edema - improving   Neuro - No focal weakness, normal sensation  Psych - Alert and oriented x3  Skin - bluish discoloration almost resolved bilateral LE. RLE erythema and tenderness improved. Still has swelling - improved        Result Review    Result Review:  I have personally reviewed the results from the time of this admission to 3/23/2023 10:00 EDT and agree with these findings:  [x]  Laboratory  [x]  Microbiology  [x]  Radiology  [x]  EKG/Telemetry   [x]  Cardiology/Vascular   []  Pathology  [x]  Old records  []  Other:  Most notable findings include:           Assessment & Plan      Brief Patient Summary:  Jaquelin Valderrama is a 83 y.o. female who       atorvastatin, 40 mg, Oral, Nightly  insulin lispro, 2-9 Units, Subcutaneous, 4x Daily With Meals & Nightly  levothyroxine, 100 mcg, Oral, Q AM  lisinopril, 5 mg, Oral, Q24H  metoprolol succinate XL, 25 mg, Oral, BID  piperacillin-tazobactam, 3.375 g, Intravenous, Q8H  sodium chloride, 10 mL, Intravenous, Q12H  sodium chloride, 10 mL, Intravenous, Q12H  spironolactone, 25 mg, Oral, Daily       heparin, 18  Units/kg/hr, Last Rate: 18 Units/kg/hr (03/23/23 0701)  hold, 1 each         Active Hospital Problems:  Active Hospital Problems    Diagnosis    • **Cellulitis of right leg      Plan:       RLE Cellulitis/Erysipelas  - GNB (in anaerobic bottle) bacteremia   - IV Zosyn per ID.   - follow cultures - awaiting speciation      Right foot discoloration/ischemia  - concern for acute ischemia in setting of a fib and holding eliquis  - Vascular consulted.  - no large vessel occlusion. Likely microvascular thrombosis  - continue medical management - improved.      Acute on chronic systolic heart failure with low output state  - Echo 8/2022 with EF 30%   - repeat Echo with EF 15-20%   - Continue IV Lasix. Monitor for toxicity. Monitor Creatinine   - Cardiology consult.  - had some hypotension last night and meds were held. Started on Midodrine. Will switch to PRN and make scheduled if needed   - low dose aldactone and Lisinopril added by cardiology for optimization      Pleural effusion R>L  - h/o Decortication  - Thoracic surgeon consulted - s/p thoracentesis      Distended GB  - could be related to vascular congestion  - Surgery consulted. HIDA noted. Low likelihood of acute cholecystitis     Persistent A fib  - Continue Metoprolol  - Heparin gtt     CAD s/p CABG  - Continue Statin and BB  - Defer Antiplatelet to Cardiology     DM-2 - SSI. Monitor     Erythrocytosis - check B12 and folate and TSH  - Hematology consulted. Work-up sent      Hypothyroidism - Levothyroxine       PT/OT eval. Discharge planning     DVT prophylaxis:  Medical DVT prophylaxis orders are present.    CODE STATUS:         Disposition:  I expect patient to be discharged .    This patient has been  and discussed with . 03/23/23      Electronically signed by Lily Nesbitt MD, 03/23/23, 10:00 EDT.  [facility] Hospitalist Team

## 2023-03-23 NOTE — PROGRESS NOTES
Referring Provider: Lily Nesbitt MD    Reason for follow-up:  Lower extremity edema  Bilateral pleural effusions.  Cardiomyopathy     Patient Care Team:  Minerva Chatterjee MD as PCP - General (Internal Medicine)  Yordan Evans MD as Consulting Physician (Cardiology)  Amilcar Smallwood MD as Consulting Physician (Hematology and Oncology)    Subjective .      ROS    Since I have last seen her yesterday, the patient has been without any chest discomfort ,shortness of breath, palpitations, dizziness or syncope.  Denies having any headache ,abdominal pain ,nausea, vomiting , diarrhea constipation, loss of weight or loss of appetite.  Denies having any excessive bruising ,hematuria or blood in the stool.    Review of all systems negative except as indicated    History  Past Medical History:   Diagnosis Date   • Astigmatism, bilateral 11/19/2019   • Benign essential hypertension    • Bilateral presbyopia 11/19/2019   • CAD (coronary artery disease)    • CHF (congestive heart failure) (HCC)    • Closed right ankle fracture    • COVID-19 vaccination refused    • Hyperlipidemia    • Hypothyroidism    • Influenza vaccine needed    • Myocardial infarction (HCC)    • Prediabetes    • Pseudophakia, both eyes 11/19/2019   • Sleep apnea    • Thoracic back pain        Past Surgical History:   Procedure Laterality Date   • APPENDECTOMY     • CARDIAC CATHETERIZATION  2012    x3    • CORONARY ARTERY BYPASS GRAFT  12/2014   • CORONARY STENT PLACEMENT      x3   • CYSTOSCOPY WITH CLOT EVACUATION N/A 3/25/2022    Procedure: CYSTOSCOPY WITH CLOT EVACUATION AND FULGURATION ;  Surgeon: Sukhdev Poole MD;  Location: [facility];  Service: Urology;  Laterality: N/A;   • HARDWARE REMOVAL Right 7/21/2020    Procedure: ANKLE/FOOT HARDWARE REMOVAL;  Surgeon: Lincoln Sibley MD;  Location: [facility];  Service: Orthopedics;  Laterality: Right;   • ORIF ANKLE FRACTURE Right 04/23/2019    Radha Vazquez Mem   • THORACOSCOPY WITH  DECORTICATION Left 3/18/2022    Procedure: LEFT THORACOSCOPY VIDEO ASSISTED WITH COMPLETE DECORTICATION;  Surgeon: Sabra Kuo MD;  Location: [facility];  Service: Thoracic;  Laterality: Left;       Family History   Problem Relation Age of Onset   • Hypertension Mother    • Stroke Mother    • Heart disease Mother    • Lung cancer Father    • Diabetes Brother    • Diabetes Other        Social History     Tobacco Use   • Smoking status: Former     Packs/day: 1.50     Types: Cigarettes     Quit date:      Years since quittin.2     Passive exposure: Past   • Smokeless tobacco: Never   • Tobacco comments:     CAFFEINE USE ICED TEA, OCCAS COFFEE   Vaping Use   • Vaping Use: Never used   Substance Use Topics   • Alcohol use: Not Currently   • Drug use: No        Medications Prior to Admission   Medication Sig Dispense Refill Last Dose   • acetaminophen (TYLENOL) 500 MG tablet Take 1 tablet by mouth Every 6 (Six) Hours As Needed for Mild Pain.   Past Week   • apixaban (ELIQUIS) 5 MG tablet tablet Take 1 tablet by mouth Daily. Only QD per Cardiologist   Past Week   • Cholecalciferol (VITAMIN D3) 2000 units tablet Take 1 tablet by mouth Daily.   3/20/2023   • furosemide (LASIX) 20 MG tablet Take 3 tablets by mouth Daily. 270 tablet 1 3/20/2023   • insulin aspart prot-insulin aspart (novoLOG 70/30) (70-30) 100 UNIT/ML injection Inject 10 Units under the skin into the appropriate area as directed 2 (Two) Times a Day As Needed (elevated blood sugar).   Past Week   • Krill Oil 300 MG capsule Take 1 capsule by mouth Daily.   3/20/2023   • levothyroxine (Synthroid) 100 MCG tablet Take 1 tablet by mouth Daily. 90 tablet 1 3/20/2023   • metoprolol succinate XL (TOPROL-XL) 25 MG 24 hr tablet Take 1 tablet by mouth twice daily 180 tablet 3 3/20/2023   • Multiple Vitamins-Minerals (MULTIVITAMIN ADULT EXTRA C PO) Take 1 tablet by mouth Daily.   3/20/2023   • mupirocin (BACTROBAN) 2 % ointment Apply 1 application  "topically to the appropriate area as directed 3 (Three) Times a Day. Rash on both feet 30 g 1 3/20/2023   • spironolactone (ALDACTONE) 25 MG tablet Take 1 tablet by mouth Daily. 90 tablet 3 3/20/2023       Allergies  Prednisone    Scheduled Meds:atorvastatin, 40 mg, Oral, Nightly  insulin lispro, 2-9 Units, Subcutaneous, 4x Daily With Meals & Nightly  levothyroxine, 100 mcg, Oral, Q AM  lisinopril, 5 mg, Oral, Q24H  metoprolol succinate XL, 25 mg, Oral, BID  midodrine, 5 mg, Oral, TID AC  piperacillin-tazobactam, 3.375 g, Intravenous, Q8H  sodium chloride, 10 mL, Intravenous, Q12H  sodium chloride, 10 mL, Intravenous, Q12H  spironolactone, 25 mg, Oral, Daily      Continuous Infusions:heparin, 18 Units/kg/hr, Last Rate: 18 Units/kg/hr (03/23/23 0606)  hold, 1 each      PRN Meds:.•  acetaminophen  •  senna-docusate sodium **AND** polyethylene glycol **AND** bisacodyl **AND** bisacodyl  •  dextrose  •  dextrose  •  glucagon (human recombinant)  •  heparin  •  heparin  •  hold  •  HYDROcodone-acetaminophen  •  melatonin  •  ondansetron  •  polyethyl glycol-propyl glycol  •  [COMPLETED] Insert Peripheral IV **AND** sodium chloride  •  sodium chloride  •  sodium chloride  •  sodium chloride  •  sodium chloride    Objective     VITAL SIGNS  Vitals:    03/23/23 0001 03/23/23 0137 03/23/23 0512 03/23/23 0634   BP: 106/60 110/70 116/60    BP Location:  Left leg Left leg    Patient Position:  Lying Lying    Pulse: 73 63 71    Resp:  19 15    Temp:  96.5 °F (35.8 °C) 97 °F (36.1 °C)    TempSrc:  Axillary Axillary    SpO2: 94% 94% 97%    Weight:    68 kg (149 lb 14.6 oz)   Height:           Flowsheet Rows    Flowsheet Row First Filed Value   Admission Height 160 cm (63\") Documented at 03/20/2023 0850   Admission Weight 67.9 kg (149 lb 11.1 oz) Documented at 03/20/2023 0850            Intake/Output Summary (Last 24 hours) at 3/23/2023 0651  Last data filed at 3/23/2023 0551  Gross per 24 hour   Intake 480 ml   Output 950 ml   Net " -470 ml        TELEMETRY: Atrial fibrillation  Physical Exam:  The patient is alert, oriented and in no distress.  Vital signs as noted above.  Head and neck revealed no carotid bruits or jugular venous distention.  No thyromegaly or lymphadenopathy is present  Lungs clear.  No wheezing.  Breath sounds are normal bilaterally.  Heart normal first and second heart sounds.  No murmur. No precordial rub is present.  No gallop is present.  Abdomen soft and nontender.  No organomegaly is present.  Extremities with decreased peripheral pulses bilateral edema with redness and warmth.  Lower extremity edema and redness is better.  Skin warm and dry.  Musculoskeletal system is grossly normal  CNS grossly normal    Reviewed and updated.      Results Review:   I reviewed the patient's new clinical results.  Lab Results (last 24 hours)     Procedure Component Value Units Date/Time    Body Fluid Culture - Body Fluid, Pleural Cavity [[id number]] Collected: 03/21/23 1028    Specimen: Body Fluid from Pleural Cavity Updated: 03/23/23 0632     Body Fluid Culture No growth at 2 days     Gram Stain Few (2+) WBCs seen      No organisms seen    Blood Culture - Blood, Arm, Left [[id number]]  (Abnormal) Collected: 03/20/23 0921    Specimen: Blood from Arm, Left Updated: 03/23/23 0629     Blood Culture Gram Negative Bacilli     Isolated from Anaerobic Bottle     Gram Stain Anaerobic Bottle Gram negative bacilli    Narrative:      Sending to [facility] for ID and MICs    Less than seven (7) mL's of blood was collected.  Insufficient quantity may yield false negative results.    aPTT [[id number]]  (Abnormal) Collected: 03/23/23 0435    Specimen: Blood Updated: 03/23/23 0516     PTT 30.2 seconds     Basic Metabolic Panel [[id number]]  (Abnormal) Collected: 03/23/23 0435    Specimen: Blood Updated: 03/23/23 0510     Glucose 102 mg/dL      BUN 41 mg/dL      Creatinine 1.18 mg/dL      Sodium 134 mmol/L      Potassium 4.0 mmol/L      Chloride 94 mmol/L       CO2 34.0 mmol/L      Calcium 8.3 mg/dL      BUN/Creatinine Ratio 34.7     Anion Gap 6.0 mmol/L      eGFR 46.5 mL/min/1.73     Narrative:      GFR Normal >60  Chronic Kidney Disease <60  Kidney Failure <15    The GFR formula is only valid for adults with stable renal function between ages 18 and 70.    Magnesium [[id number]]  (Normal) Collected: 03/23/23 0435    Specimen: Blood Updated: 03/23/23 0510     Magnesium 1.9 mg/dL     CBC & Differential [[id number]]  (Abnormal) Collected: 03/23/23 0435    Specimen: Blood Updated: 03/23/23 0453    Narrative:      The following orders were created for panel order CBC & Differential.  Procedure                               Abnormality         Status                     ---------                               -----------         ------                     CBC Auto Differential[[id number]]        Abnormal            Final result                 Please view results for these tests on the individual orders.    CBC Auto Differential [[id number]]  (Abnormal) Collected: 03/23/23 0435    Specimen: Blood Updated: 03/23/23 0453     WBC 4.10 10*3/mm3      RBC 4.53 10*6/mm3      Hemoglobin 14.9 g/dL      Hematocrit 45.0 %      MCV 99.2 fL      MCH 32.9 pg      MCHC 33.2 g/dL      RDW 15.2 %      RDW-SD 55.1 fl      MPV 8.9 fL      Platelets 139 10*3/mm3      Neutrophil % 70.3 %      Lymphocyte % 18.3 %      Monocyte % 8.8 %      Eosinophil % 1.8 %      Basophil % 0.8 %      Neutrophils, Absolute 2.90 10*3/mm3      Lymphocytes, Absolute 0.80 10*3/mm3      Monocytes, Absolute 0.40 10*3/mm3      Eosinophils, Absolute 0.10 10*3/mm3      Basophils, Absolute 0.00 10*3/mm3      nRBC 0.2 /100 WBC     Beta-2 Glycoprotein Antibodies [[id number]] Collected: 03/21/23 1502    Specimen: Blood Updated: 03/23/23 0410     Beta-2 Glyco 1 IgG <9 GPI IgG units      Comment: The reference interval reflects a 3SD or 99th percentile interval,  which is thought to represent a potentially clinically  significant  result in accordance with the International Consensus Statement on  the classification criteria for definitive antiphospholipid syndrome  (APS). J Thromb Haem 2006;4:295-306.        Beta-2 Glyco 1 IgA <9 GPI IgA units      Comment: The reference interval reflects a 3SD or 99th percentile interval,  which is thought to represent a potentially clinically significant  result in accordance with the International Consensus Statement on  the classification criteria for definitive antiphospholipid syndrome  (APS). J Thromb Haem 2006;4:295-306.        Beta-2 Glyco 1 IgM <9 GPI IgM units      Comment: The reference interval reflects a 3SD or 99th percentile interval,  which is thought to represent a potentially clinically significant  result in accordance with the International Consensus Statement on  the classification criteria for definitive antiphospholipid syndrome  (APS). J Thromb Haem 2006;4:295-306.       Narrative:      Performed at:  [address] [place] [address]  [id number]  : Luana Andrew MD, Phone:  [phone]    aPTT [[id number]]  (Abnormal) Collected: 03/22/23 2309    Specimen: Blood Updated: 03/23/23 0056     .7 seconds     POC Glucose Once [[id number]]  (Abnormal) Collected: 03/22/23 2308    Specimen: Blood Updated: 03/22/23 2310     Glucose 149 mg/dL      Comment: Serial Number: [id number]:  [id number]       POC Glucose Once [[id number]]  (Abnormal) Collected: 03/22/23 2050    Specimen: Blood Updated: 03/22/23 2051     Glucose 192 mg/dL      Comment: Serial Number: [id number]:  [id number]       aPTT [[id number]]  (Abnormal) Collected: 03/22/23 1625    Specimen: Blood Updated: 03/22/23 1722     PTT 54.5 seconds     POC Glucose Once [[id number]]  (Abnormal) Collected: 03/22/23 1613    Specimen: Blood Updated: 03/22/23 1614     Glucose 167 mg/dL      Comment: Serial Number: [id number]:  [id number]       Cardiolipin Antibody [[id number]]   (Abnormal) Collected: 03/21/23 1620    Specimen: Blood Updated: 03/22/23 1613     Anticardiolipin IgG <9 GPL U/mL      Comment:                           Negative:              <15                            Indeterminate:     15 - 20                            Low-Med Positive: >20 - 80                            High Positive:         >80        Anticardiolipin IgM 17 MPL U/mL      Comment:                           Negative:              <13                            Indeterminate:     13 - 20                            Low-Med Positive: >20 - 80                            High Positive:         >80        Anticardiolipin IgA <9 APL U/mL      Comment:                           Negative:              <12                            Indeterminate:     12 - 20                            Low-Med Positive: >20 - 80                            High Positive:         >80       Narrative:      Performed at:  [address] [place] [address]  [id number]  : Сергей Gambino PhD, Phone:  [phone]    CANDIDA AURIS SCREEN - Swab, Axilla Right, Axilla Left and Groin [[id number]]  (Normal) Collected: 03/21/23 1145    Specimen: Swab from Axilla Right, Axilla Left and Groin Updated: 03/22/23 1315     Candida Auris Screen Culture No Candida auris isolated at 24 hours    Non-gynecologic Cytology [[id number]] Collected: 03/21/23 1028    Specimen: Pleural Fluid from Pleural Cavity Updated: 03/22/23 1134     Case Report --     Medical Cytology Report                           Case: [id number]                                  Authorizing Provider:  Montserrat Humphries DNP,     Collected:           03/21/2023 10:28 AM                                 APRN                                                                         Ordering Location:     [address]    Received:            03/21/2023 10:55 AM                                 MEDICAL INPATIENT                                                   "          Pathologist:           Darin Carrasco MD                                                            Specimen:    Pleural Cavity                                                                              Final Diagnosis --     Pleural fluid with smears and cytospin:    Chronic inflammation, macrophages and reactive mesothelial cells    No malignancy identified     MARCO/tkd        Gross Description --     1. Pleural Cavity.  Received in [place] and designated \"Pleural cavity\" are 20 mL of cloudy, green fluid. Particulate matter is present. This specimen is processed as per protocol.        Blood Culture - Blood, Hand, Right [[id number]]  (Normal) Collected: 03/20/23 1116    Specimen: Blood from Hand, Right Updated: 03/22/23 1130     Blood Culture No growth at 2 days    POC Glucose Once [[id number]]  (Abnormal) Collected: 03/22/23 1114    Specimen: Blood Updated: 03/22/23 1116     Glucose 124 mg/dL      Comment: Serial Number: [id number]:  [id number]       aPTT [[id number]]  (Normal) Collected: 03/22/23 1038    Specimen: Blood Updated: 03/22/23 1103     PTT 67.8 seconds     Protein C Activity [[id number]]  (Normal) Collected: 03/22/23 1038    Specimen: Blood Updated: 03/22/23 1103     Protein C Activity 78 %     Factor II, DNA Analysis [[id number]]  (Normal) Collected: 03/21/23 1502    Specimen: Blood Updated: 03/22/23 0949     Factor II, DNA Analysis Normal    Narrative:      A point mutation ([id number]) in the factor II (prothrombin) gene is the second most common cause of inherited thrombophilia.  The Factor II or Prothrombin mutation refers to the G to A transition at nucleotide in the untranslated region of the gene and is associated with increased plasma levels of prothrombin.    The incidence of this mutation in the [place]  population is about 2% and in the  population it is approximately 0.5%. This mutation is rare in the  and  population. Being heterozygous " for a prothrombin mutation increases the risk for developing venous thrombosis about 2 to 3 times above the general population risk. Being homozygous for the prothrombin gene mutation increases the relative risk for venous thrombosis further, although it is not yet known how much further the risk is increased. In women heterozygous for the prothrombin gene mutation, the use of estrogen containing oral contraceptives increases the relative risk of venous thrombosis about 16 times and the risk of developing cerebral thrombosis is also significantly increased. In pregnancy, the prothrombin gene mutation increases risk for venous thrombosis and may increase risk for stillbirth, placental abruption, pre-eclampsia and fetal growth restriction.     The expression of Factor II thrombophilia is impacted by coexisting genetic thrombophilic disorders, acquired thrombophilic disorders (eg, malignancy, hyperhomocysteinemia, high factor VIII levels), and circumstances including: pregnancy, oral contraceptive use, hormone replacement therapy, selective estrogen receptor modulators, travel, central venous catheters, surgery, and organ transplantation.    In order to derive the most meaningful benefit from this testing, it may be necessary that the results and subsequent options from these complex genetic tests be discussed with patients by a trained genetic professional.    Factor 5 Leiden [[id number]]  (Normal) Collected: 03/21/23 1502    Specimen: Blood Updated: 03/22/23 0949     Factor V Leiden Normal    Narrative:      Factor V Leiden is a specific mutation (R506Q) in the factor V gene that is associated with an increased risk of venous thrombosis.  Factor V Leiden is more resistant to inactivation by activated protein C.  Factor V Leiden refers to the G to A transition at nucleotide position 1691 of the Factor V gene, resulting in the substitution of the amino acid arginine by glutamine in the Factor V protein, causing  resistance to cleavage by Activated Protein C (APC).    As a result, factor V persists in the circulation leading to a mild hyper-coagulable state.  The Leiden mutation accounts for 90% - 95% of APC resistance.  Factor V Leiden has been reported in patients with deep vein thrombosis, pulmonary embolus, central retinal vein occlusion, cerebral sinus thrombosis and hepatic vein thrombosis.  Other risk factors to be considered in the workup for venous thrombosis include the J06644M mutation in the factor II (prothrombin) gene, protein S and C deficiency, and antithrombin deficiencies. Anticardiolipin antibody and lupus anticoagulant analysis may be appropriate for certain patients, as well as homocysteine levels.    The expression of Factor V Leiden thrombophilia is impacted by coexisting genetic thrombophilic disorders, acquired thrombophilic disorders (malignancy, hyperhomocysteinemia, high factor VIII levels), and circumstances including: pregnancy, oral contraceptive use, hormone replacement therapy, selective estrogen receptor modulators, travel, central venous catheters, surgery, transplantation and advanced age.    In order to derive the most meaningful benefit from this testing, it may be necessary that the results and subsequent options from these complex genetic tests be discussed with patients by a trained genetic professional.    POC Glucose Once [[id number]]  (Normal) Collected: 03/22/23 0723    Specimen: Blood Updated: 03/22/23 0724     Glucose 83 mg/dL      Comment: Serial Number: [id number]:  [id number]             Imaging Results (Last 24 Hours)     Procedure Component Value Units Date/Time    NM HIDA SCAN WITHOUT PHARMACOLOGICAL INTERVENTION [[id number]] Collected: 03/22/23 1055     Updated: 03/22/23 1059    Narrative:      NM HIDA SCAN WITHOUT PHARMACOLOGICAL INTERVENTION    Date of Exam: 3/22/2023 8:29 AM EDT    Indication: possible cholecystitis on ct.    Comparison: None  available.    Technique: The patient received 5.8  mCi of technetium 99m Choletec intravenously and images were obtained of the abdomen in the anterior projection through 120 minutes. Gallbladder stimulation was not performed.    Findings:  Normal radiopharmaceutical accumulation within the liver. The gallbladder is promptly visualized within the first 30 minutes of imaging, indicating patency of the cystic duct. There is no scintigraphic evidence of acute or chronic cholecystitis.   Progression of radiopharmaceutical into the small bowel indicates patency of the common bile duct. The gallbladder ejection fraction is normal, 49%.      Impression:      Impression:  Normal HIDA scan. Gallbladder ejection fraction is 49%.    Electronically Signed: Saadia Islas    3/22/2023 10:57 AM EDT    Workstation ID: [id number]    XR Chest 1 View [[id number]] Collected: 03/22/23 0730     Updated: 03/22/23 0734    Narrative:      XR CHEST 1 VW    Date of Exam: 3/22/2023 2:30 AM EDT    Indication: s/p thoracentesis.    Comparison: AP portable chest 3/21/2023. CT chest 3/20/2023    Findings:  Skinfold artifact projects over the right lower thorax. Small bilateral pleural effusions are present. Bibasilar alveolar opacities are present, new on the right, increased on the left. Fine interstitial thickening is present in both lungs. Moderate   cardiac enlargement is stable. Median sternotomy changes are present. No visible pneumothorax. No acute osseous abnormality.      Impression:      Impression:    1. New right basilar opacity may represent reaccumulation of small right pleural effusion, with right basilar atelectasis or pneumonia.  2. Increase in left basilar airspace disease, favored to represent increased atelectasis.  3. Interstitial edema is again noted, without significant change.  4. Stable cardiac enlargement.    Electronically Signed: Saadia Islas    3/22/2023 7:32 AM EDT    Workstation ID: [id number]      LAB RESULTS (LAST 7  DAYS)    CBC  Results from last 7 days   Lab Units 03/23/23  0435 03/22/23  0301 03/20/23  0915   WBC 10*3/mm3 4.10 5.30 11.10*   RBC 10*6/mm3 4.53 4.95 5.46*   HEMOGLOBIN g/dL 14.9 16.0* 17.5*   HEMATOCRIT % 45.0 50.4* 55.3*   MCV fL 99.2* 101.9* 101.3*   PLATELETS 10*3/mm3 139* 157 180       BMP  Results from last 7 days   Lab Units 03/23/23  0435 03/22/23 0301 03/21/23  1620 03/21/23  1502 03/20/23  0915   SODIUM mmol/L 134* 134*  --   --  138   POTASSIUM mmol/L 4.0 4.2  --   --  4.2   CHLORIDE mmol/L 94* 94*  --   --  100   CO2 mmol/L 34.0* 34.0*  --   --  26.0   BUN mg/dL 41* 42*  --   --  41*   CREATININE mg/dL 1.18* 1.10*  --   --  0.92   GLUCOSE mg/dL 102* 69  --   --  115*   MAGNESIUM mg/dL 1.9 1.8  --  1.9  --    PHOSPHORUS mg/dL  --  3.6 4.0  --   --        CMP   Results from last 7 days   Lab Units 03/23/23 0435 03/22/23 0301 03/21/23  2219 03/20/23  0915   SODIUM mmol/L 134* 134*  --  138   POTASSIUM mmol/L 4.0 4.2  --  4.2   CHLORIDE mmol/L 94* 94*  --  100   CO2 mmol/L 34.0* 34.0*  --  26.0   BUN mg/dL 41* 42*  --  41*   CREATININE mg/dL 1.18* 1.10*  --  0.92   GLUCOSE mg/dL 102* 69  --  115*   ALBUMIN g/dL  --  2.9* 3.0* 3.0*   BILIRUBIN mg/dL  --  0.8 0.8 1.4*   ALK PHOS U/L  --  145* 182* 204*   AST (SGOT) U/L  --  26 31 54*   ALT (SGPT) U/L  --  18 22 44*         BNP        TROPONIN  Results from last 7 days   Lab Units 03/22/23  0301 03/21/23  2308 03/20/23  0915   CK TOTAL U/L  --   --  43   HSTROP T ng/L 51*   < >  --     < > = values in this interval not displayed.       CoAg  Results from last 7 days   Lab Units 03/23/23  0435 03/22/23  2309 03/22/23  1625 03/22/23  1038 03/21/23  2219 03/21/23  1148 03/21/23  0528 03/20/23  1902   INR   --   --   --   --   --   --   --  1.29*   APTT seconds 30.2* 104.7* 54.5* 67.8 112.1* 30.7* 58.7* 132.9*       Creatinine Clearance  Estimated Creatinine Clearance: 34.6 mL/min (A) (by C-G formula based on SCr of 1.18 mg/dL (H)).    ABG     Walking     Radiology  NM HIDA SCAN WITHOUT PHARMACOLOGICAL INTERVENTION    Result Date: 3/22/2023  Impression: Normal HIDA scan. Gallbladder ejection fraction is 49%. Electronically Signed: Saadia Islas  3/22/2023 10:57 AM EDT  Workstation ID: [id number]    XR Chest 1 View    Result Date: 3/22/2023  Impression: 1. New right basilar opacity may represent reaccumulation of small right pleural effusion, with right basilar atelectasis or pneumonia. 2. Increase in left basilar airspace disease, favored to represent increased atelectasis. 3. Interstitial edema is again noted, without significant change. 4. Stable cardiac enlargement. Electronically Signed: Saadia Islas  3/22/2023 7:32 AM EDT  Workstation ID: [id number]    XR Chest 1 View    Result Date: 3/21/2023  Impression: Interval near complete resolution of right effusion status post thoracentesis. No pneumothorax. Additional findings as above. Electronically Signed: Balwinder Klein  3/21/2023 11:24 AM EDT  Workstation ID: [id number]    US Thoracentesis    Result Date: 3/21/2023  Impression: Technically successful ultrasound-guided large volume right-sided thoracentesis as described above. A sample of the turbid yellow pleural fluid was sent for laboratory evaluation. Electronically Signed: Antoine Carpio  3/21/2023 11:13 AM EDT  Workstation ID: [id number]          EKG          I personally viewed and interpreted the patient's EKG/Telemetry data: Atrial fibrillation    ECHOCARDIOGRAM:    Results for orders placed during the hospital encounter of 03/20/23    Adult Transthoracic Echo Complete W/ Cont if Necessary Per Protocol    Interpretation Summary  •  Left ventricular ejection fraction appears to be less than 20%.  •  Estimated right ventricular systolic pressure from tricuspid regurgitation is normal (<35 mmHg).    Indications  Shortness of breath    Technically satisfactory study.  Mitral valve is structurally normal.  Moderate mitral regurgitation  Tricuspid valve is structurally  normal.  Mild coaptation of the tricuspid valve with significant tricuspid regurgitation.  Aortic valve is structurally normal.  Pulmonic valve could not be well visualized.  Biatrial and significant biventricular enlargement.  Left ventricular severe diffuse hypocontractility with ejection fraction of 15 to 20%.  Atrial septum is intact.  Aorta is normal.  No pericardial effusion or intracardiac thrombus is seen.    Impression  Moderate mitral regurgitation.  Significant tricuspid regurgitation with normal coaptation of tricuspid valve.  Significant biatrial biventricular enlargement.  Left ventricular severe diffuse hypocontractility with ejection fraction of 15 to 20%.          STRESS MYOVIEW:    Cardiolite (Tc-99m Sestamibi) stress test    CARDIAC CATHETERIZATION:            OTHER:         Assessment & Plan     Principal Problem:    Cellulitis of right leg        ]]]]]]]]]]]]]]]]]]]]  Impression  ==========  - Significant lower extremity edema and erythema.  Significant digital ischemia.     -Past history of severe hematuria.-Improved.  CT scan of the abdomen showed significant clot burden in the bladder and probable anterior bladder perforation.     -Progressively worsening shortness of breath and leg edema-better.  Recurrent left pleural effusion which was loculated.  Patient had chest tube and subsequently had video-assisted thoracoscopy with complete decortication on the left side at [facility].  (March 2022.)     -Congestive heart failure  Left ventricle dysfunction with ejection fraction of 30%.    Echocardiogram 3/28/2023 revealed  Moderate mitral regurgitation.  Significant tricuspid regurgitation with normal coaptation of tricuspid valve.  Significant biatrial biventricular enlargement.  Left ventricular severe diffuse hypocontractility with ejection fraction of 15 to 20%.     Echocardiogram 8/25/2022 revealed   Moderate mitral and tricuspid regurgitation.  Biventricular enlargement and  dysfunction with estimated left ventricle ejection fraction of 30%.     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  Was not taking thyroid medicine replacement properly.     =================  Plan  ===========  Patient has significant lower extremity edema and erythema.  Patient has significant distal ischemia.  Appreciated vascular and thoracic consultation.    Anticoagulation  Patient is on Eliquis.-On hold     Chronic atrial fibrillation-rate is controlled now.       Renal dysfunction  BUN/creatinine-30/1.03  41/0.9- 3/21/2023  42/1.1- 3/22/2023  41/1.18-3/23/2023      Ischemic cardiomyopathy    Echocardiogram 3/28/2023 revealed  Moderate mitral regurgitation.  Significant tricuspid regurgitation with normal coaptation of tricuspid valve.  Significant biatrial biventricular enlargement.  Left ventricular severe diffuse hypocontractility with ejection fraction of 15 to 20%.    Echocardiogram 8/25/2022 revealed  Moderate mitral and tricuspid regurgitation.  Biventricular enlargement and dysfunction with estimated left ventricle ejection fraction of 30%.     Intense but cautious intravenous diuresis with close observation of renal function.    Bilateral pleural effusions right more than left.  Status post right thoracentesis and removal of 1300 cc of fluid 3/21/2023     I had a lengthy discussion with patient's family regarding her care.    Medications were reviewed and updated.  Patient is on atorvastatin insulin levothyroxine lisinopril metoprolol spironolactone.  Patient will be started on p.o. Lasix.    Have discussed with family regarding possible ICD.  Patient has normal  intraventricular conduction.    Follow-up labs ordered.    Further plan will depend on patient's progress.  ]]]]]]]]]]]]]]]]]         Yordan Evans MD  03/23/23  06:51 EDT

## 2023-03-23 NOTE — PLAN OF CARE
#Hypotension    - BP 95/43, patient asymptomatic   - Currently on Metoprolol, Lisinopril, Aldactone, lasix   - will hold off IVF due to edema    - Start midodrine 5mg PO TID    - Metoprolol held, stopped lasix    - defer diuretics and anti-hypertensive to cardiology    Electronically signed by Jaja Artis DO, 03/22/23, 10:38 PM EDT.

## 2023-03-23 NOTE — THERAPY EVALUATION
Patient Name: Jaquelin Valderrama  : 1940    MRN: [id number]                              Today's Date: 3/23/2023       Admit Date: 3/20/2023    Visit Dx:     ICD-10-CM ICD-9-CM   1. Cellulitis of right leg  L03.115 682.6     Patient Active Problem List   Diagnosis   • Other hyperlipidemia   • Essential hypertension   • Hypothyroidism   • Coronary artery disease involving coronary bypass graft of native heart without angina pectoris   • Astigmatism, bilateral   • Bilateral presbyopia   • Pseudophakia, both eyes   • Atrial fibrillation ([facility])   • Systolic HF (heart failure) ([facility])   • Pseudophakia   • Cellulitis of right leg     Past Medical History:   Diagnosis Date   • Astigmatism, bilateral 2019   • Benign essential hypertension    • Bilateral presbyopia 2019   • CAD (coronary artery disease)    • CHF (congestive heart failure) ([facility])    • Closed right ankle fracture    • COVID-19 vaccination refused    • Hyperlipidemia    • Hypothyroidism    • Influenza vaccine needed    • Myocardial infarction ([facility])    • Prediabetes    • Pseudophakia, both eyes 2019   • Sleep apnea    • Thoracic back pain      Past Surgical History:   Procedure Laterality Date   • APPENDECTOMY     • CARDIAC CATHETERIZATION  2012    x3    • CORONARY ARTERY BYPASS GRAFT  2014   • CORONARY STENT PLACEMENT      x3   • CYSTOSCOPY WITH CLOT EVACUATION N/A 3/25/2022    Procedure: CYSTOSCOPY WITH CLOT EVACUATION AND FULGURATION ;  Surgeon: Sukhdev Poole MD;  Location: [facility];  Service: Urology;  Laterality: N/A;   • HARDWARE REMOVAL Right 2020    Procedure: ANKLE/FOOT HARDWARE REMOVAL;  Surgeon: Lincoln Sibley MD;  Location: [facility];  Service: Orthopedics;  Laterality: Right;   • ORIF ANKLE FRACTURE Right 2019    Radha Vazquez Mem   • THORACOSCOPY WITH DECORTICATION Left 3/18/2022    Procedure: LEFT THORACOSCOPY VIDEO ASSISTED WITH COMPLETE DECORTICATION;  Surgeon: Sabra Kuo MD;  Location:  [facility];  Service: Thoracic;  Laterality: Left;      General Information     Row Name 03/23/23 1311          OT Time and Intention    Document Type evaluation  -     Mode of Treatment occupational therapy  -     Row Name 03/23/23 1311          General Information    Prior Level of Function independent:;ADL's;all household mobility;driving;home management  -     Existing Precautions/Restrictions fall  -     Row Name 03/23/23 1311          Living Environment    People in Home spouse  -     Row Name 03/23/23 1311          Home Main Entrance    Number of Stairs, Main Entrance one  -     Row Name 03/23/23 1311          Stairs Within Home, Primary    Number of Stairs, Within Home, Primary other (see comments)  13  -     Stair Railings, Within Home, Primary railings safe and in good condition  -     Row Name 03/23/23 1311          Cognition    Orientation Status (Cognition) oriented x 4  -     Row Name 03/23/23 1311          Safety Issues, Functional Mobility    Impairments Affecting Function (Mobility) balance;endurance/activity tolerance;strength;pain  -           User Key  (r) = Recorded By, (t) = Taken By, (c) = Cosigned By    Initials Name Provider Type     Ahsan Vega, ЕЛЕНА Occupational Therapist                 Mobility/ADL's     Row Name 03/23/23 1322          Bed Mobility    Bed Mobility bed mobility (all) activities  -     All Activities, [place] (Bed Mobility) minimum assist (75% patient effort)  -     Assistive Device (Bed Mobility) bed rails  -     Row Name 03/23/23 1322          Transfers    Transfers bed-chair transfer;sit-stand transfer  -     Row Name 03/23/23 1322          Bed-Chair Transfer    Bed-Chair [place] (Transfers) contact guard  -     Row Name 03/23/23 1322          Sit-Stand Transfer    Sit-Stand [place] (Transfers) contact guard  -     Row Name 03/23/23 1322          Activities of Daily Living    BADL Assessment/Intervention lower body  dressing  -     Row Name 03/23/23 1322          Lower Body Dressing Assessment/Training    [place] Level (Lower Body Dressing) socks;moderate assist (50% patient effort)  -           User Key  (r) = Recorded By, (t) = Taken By, (c) = Cosigned By    Initials Name Provider Type    Ahsan Gaines OT Occupational Therapist               Obj/Interventions     Row Name 03/23/23 1322          Range of Motion Comprehensive    General Range of Motion bilateral upper extremity ROM WNL  Simultaneous filing. User may be unaware of other data.  -     Row Name 03/23/23 1322          Strength Comprehensive (MMT)    General Manual Muscle Testing (MMT) Assessment upper extremity strength deficits identified  -     Comment, General Manual Muscle Testing (MMT) Assessment BUE strength grossly 3+/5  Simultaneous filing. User may be unaware of other data.  -           User Key  (r) = Recorded By, (t) = Taken By, (c) = Cosigned By    Initials Name Provider Type    Ahsan Gaines OT Occupational Therapist               Goals/Plan     Row Name 03/23/23 1344          Bathing Goal 1 (OT)    Activity/Device (Bathing Goal 1, OT) bathing skills, all  -MK     [place] Level/Cues Needed (Bathing Goal 1, OT) supervision required  -MK     Time Frame (Bathing Goal 1, OT) 2 weeks  -     Row Name 03/23/23 1344          Dressing Goal 1 (OT)    Activity/Device (Dressing Goal 1, OT) dressing skills, all  -MK     [place]/Cues Needed (Dressing Goal 1, OT) supervision required  -     Time Frame (Dressing Goal 1, OT) 2 weeks  -     Row Name 03/23/23 1344          Toileting Goal 1 (OT)    Activity/Device (Toileting Goal 1, OT) toileting skills, all  -MK     [place] Level/Cues Needed (Toileting Goal 1, OT) supervision required  -     Time Frame (Toileting Goal 1, OT) 2 weeks  -     Row Name 03/23/23 1344          Therapy Assessment/Plan (OT)    Planned Therapy Interventions (OT) activity tolerance  training;functional balance retraining;BADL retraining;neuromuscular control/coordination retraining;patient/caregiver education/training;transfer/mobility retraining;ROM/therapeutic exercise;strengthening exercise  -           User Key  (r) = Recorded By, (t) = Taken By, (c) = Cosigned By    Initials Name Provider Type    MEMO Ahsan Vega, ЕЛЕНА Occupational Therapist               Clinical Impression     Row Name 03/23/23 1323          Pain Assessment    Pretreatment Pain Rating 0/10 - no pain  -     Posttreatment Pain Rating 2/10  -     Pain Location - Side/Orientation Right  -     Pain Location lower  -     Pain Location - extremity  -     Pain Intervention(s) Therapeutic presence  -     Row Name 03/23/23 1334 03/23/23 1323       Plan of Care Review    Plan of Care Reviewed With patient  -MEMO --    Outcome Evaluation Pt is a 84 y/o F admitted for right LE swelling.  Pt dx with cellulitis of RLE.    PMHx: hypertension, dyslipidemia, hypothyroidism, diabetes, CAD status post CABG's status post stent, persistent A-fib on Eliquis, chronic systolic heart failure with ejection fraction 30%.  Pt reports she lives in a two story home with  and has to use 13 steps with HR daily.  They also have a home in [place].  Pt was Independent with ADLs, IADLs, transfers, and driving. Pt has a w/c for long distances but was using no AD.  Pt has a very supportive .  Pt completed bed mobility with MIN A.  Pt completed donning socks with MOD A.   Pt t/f from bed <> chair with CGA.  Pt presents with deficits in self care, transfers, funcitonal mobility, strength, and activity tolerance indicating need for skilled OT services.  OT recommending home with home health.  - Pt is a 84 y/o F admitted for right LE swelling.  Pt dx with cellulitis of RLE.    PMHx: hypertension, dyslipidemia, hypothyroidism, diabetes, CAD status post CABG's status post stent, persistent A-fib on Eliquis, chronic systolic heart  failure with ejection fraction 30%.  Pt completed  -    Row Name 03/23/23 1334 03/23/23 1323       Therapy Assessment/Plan (OT)    Criteria for Skilled Therapeutic Interventions Met (OT) yes;meets criteria  -MK yes;meets criteria  -MK    Therapy Frequency (OT) 5 times/wk  -MK 5 times/wk  -MK    Predicted Duration of Therapy Intervention (OT) until D/C  -MK until D/C  -MK    Row Name 03/23/23 1334 03/23/23 1323       Therapy Plan Review/Discharge Plan (OT)    Anticipated Discharge Disposition (OT) home with home health  - home with home health  -    Row Name 03/23/23 1334          Positioning and Restraints    Pre-Treatment Position in bed  -MK     Post Treatment Position chair  -MK     In Chair notified nsg;reclined;call light within reach;encouraged to call for assist;exit alarm on;with family/caregiver  -           User Key  (r) = Recorded By, (t) = Taken By, (c) = Cosigned By    Initials Name Provider Type    Ahsan Gaines, OT Occupational Therapist               Outcome Measures     Row Name 03/23/23 1345          How much help from another is currently needed...    Putting on and taking off regular lower body clothing? 2  -MK     Bathing (including washing, rinsing, and drying) 2  -MK     Toileting (which includes using toilet bed pan or urinal) 2  -MK     Putting on and taking off regular upper body clothing 3  -MK     Taking care of personal grooming (such as brushing teeth) 4  -MK     Eating meals 4  -MK     AM-PAC 6 Clicks Score (OT) 17  -     Row Name 03/23/23 1328          How much help from another person do you currently need...    Turning from your back to your side while in flat bed without using bedrails? 4 (P)   -MB     Moving from lying on back to sitting on the side of a flat bed without bedrails? 4 (P)   -MB     Moving to and from a bed to a chair (including a wheelchair)? 4 (P)   -MB     Standing up from a chair using your arms (e.g., wheelchair, bedside chair)? 4 (P)   -MB      Climbing 3-5 steps with a railing? 3 (P)   -MB     To walk in hospital room? 3 (P)   -MB     AM-PAC 6 Clicks Score (PT) 22 (P)   -MB     Highest level of mobility 7 --> Walked 25 feet or more (P)   -MB     Row Name 03/23/23 1345 03/23/23 1328       Functional Assessment    Outcome Measure Options AM-PAC 6 Clicks Daily Activity (OT)  - AM-PAC 6 Clicks Basic Mobility (PT) (P)   -MB          User Key  (r) = Recorded By, (t) = Taken By, (c) = Cosigned By    Initials Name Provider Type     Ahsan Vega OT Occupational Therapist    Rosendo Berumen, PT Student PT Student                Occupational Therapy Education     Title: PT OT SLP Therapies (In Progress)     Topic: Occupational Therapy (In Progress)     Point: ADL training (Done)     Description:   Instruct learner(s) on proper safety adaptation and remediation techniques during self care or transfers.   Instruct in proper use of assistive devices.              Learning Progress Summary           Patient Acceptance, E, VU by  at 3/23/2023 1345   Family Acceptance, E, VU by  at 3/23/2023 1345                   Point: Home exercise program (Not Started)     Description:   Instruct learner(s) on appropriate technique for monitoring, assisting and/or progressing therapeutic exercises/activities.              Learner Progress:  Not documented in this visit.          Point: Precautions (Not Started)     Description:   Instruct learner(s) on prescribed precautions during self-care and functional transfers.              Learner Progress:  Not documented in this visit.          Point: Body mechanics (Not Started)     Description:   Instruct learner(s) on proper positioning and spine alignment during self-care, functional mobility activities and/or exercises.              Learner Progress:  Not documented in this visit.                      User Key     Initials Effective Dates Name Provider Type [facility] 02/17/22 -  Ahsan Vega OT Occupational  Therapist OT              OT Recommendation and Plan  Planned Therapy Interventions (OT): activity tolerance training, functional balance retraining, BADL retraining, neuromuscular control/coordination retraining, patient/caregiver education/training, transfer/mobility retraining, ROM/therapeutic exercise, strengthening exercise  Therapy Frequency (OT): 5 times/wk  Plan of Care Review  Plan of Care Reviewed With: patient  Outcome Evaluation: Pt is a 84 y/o F admitted for right LE swelling.  Pt dx with cellulitis of RLE.    PMHx: hypertension, dyslipidemia, hypothyroidism, diabetes, CAD status post CABG's status post stent, persistent A-fib on Eliquis, chronic systolic heart failure with ejection fraction 30%.  Pt reports she lives in a two story home with  and has to use 13 steps with HR daily.  They also have a home in [place].  Pt was Independent with ADLs, IADLs, transfers, and driving. Pt has a w/c for long distances but was using no AD.  Pt has a very supportive .  Pt completed bed mobility with MIN A.  Pt completed donning socks with MOD A.   Pt t/f from bed <> chair with CGA.  Pt presents with deficits in self care, transfers, funcitonal mobility, strength, and activity tolerance indicating need for skilled OT services.  OT recommending home with home health.     Time Calculation:    Time Calculation- OT     Row Name 03/23/23 1349             Time Calculation- OT    OT Start Time 1042  -      OT Stop Time 1114  -      OT Time Calculation (min) 32 min  -      Total Timed Code Minutes- OT 0 minute(s)  -      OT Received On 03/23/23  -      OT - Next Appointment 03/24/23  -      OT Goal Re-Cert Due Date 04/06/23  -            User Key  (r) = Recorded By, (t) = Taken By, (c) = Cosigned By    Initials Name Provider Type    Ahsan Gaines OT Occupational Therapist              Therapy Charges for Today     Code Description Service Date Service Provider Modifiers Qty    [id number]  HC OT EVAL MOD COMPLEXITY 4 3/23/2023 Ahsan Vega, OT GO 1               Ahsan Vega, ЕЛЕНА  3/23/2023

## 2023-03-23 NOTE — TELEPHONE ENCOUNTER
Spoke with  he just wanted you to know Jaquelin is in hospital ([facility]) and they will follow up with you when she is out

## 2023-03-23 NOTE — PLAN OF CARE
Goal Outcome Evaluation:  Plan of Care Reviewed With: patient, spouse        Progress: improving  Outcome Evaluation: Pt is a 82 y/o F admitted to [facility] on 3/20/23 with c/o of increased R leg swelling and edema that has progressively worsened over the past couple days. PMH includes CHF, CABG, HTN, DM, CAD, and A-fib. Diagnosed with cellulitis of the R leg in the ED. Pt is currently living with spouse in multi-level with 12 steps to each floor and 2 steps to enter. Prior to functional decline, pt was IND with household mobility, community amb, and ADLs. Spouse has since purchased RW, rollator, and w/c for pt to help maintain mobility throughout household despite functional decline. Currently pt is able to complete transfers SBA and amb 125' CGA with RW. She shows mild SOA and LE weakness near end of bout, all with stable vitals. Pt was provided education on minimizing use of w/c to limit muscle atrophy and wasting, and advised to amb with rollator, taking seated rest breaks as necessary. She was educated on remaining active and continuing therapy at d/c to improve CV endurance to return back to active lifestyle. Pt will continue to benefit from skilled PT intervention during stay for improved CV and BLE endurance, and will recommend OPPT services at d/c for continued therapy. No DME needs at d/c, but encouragement for rollator/RW over w/c usage.

## 2023-03-23 NOTE — PROGRESS NOTES
Infectious Diseases Progress Note      LOS: 3 days   Patient Care Team:  Minerva Chatterjee MD as PCP - General (Internal Medicine)  Yordan Evans MD as Consulting Physician (Cardiology)  Amilcar Smallwood MD as Consulting Physician (Hematology and Oncology)    Chief Complaint: Bilateral leg edema, shortness of breath at admission    Subjective       The patient has been afebrile for the last 24 hours.  The patient is on room air, hemodynamically stable, and is tolerating antimicrobial therapy.  Patient is in the chair and feeling much better today      Review of Systems:   Review of Systems   Constitutional: Negative.    HENT: Negative.    Eyes: Negative.    Respiratory: Positive for shortness of breath.         Improved   Cardiovascular: Positive for leg swelling.   Gastrointestinal: Negative.    Endocrine: Negative.    Genitourinary: Negative.    Musculoskeletal: Negative.    Skin: Negative.    Neurological: Negative.    Psychiatric/Behavioral: Negative.    All other systems reviewed and are negative.       Objective     Vital Signs  Temp:  [96.5 °F (35.8 °C)-98.7 °F (37.1 °C)] 98.7 °F (37.1 °C)  Heart Rate:  [63-75] 68  Resp:  [13-23] 21  BP: ()/(36-72) 131/72    Physical Exam:  Physical Exam  Vitals and nursing note reviewed.   Constitutional:       General: She is not in acute distress.     Appearance: She is well-developed and normal weight. She is ill-appearing. She is not diaphoretic.   HENT:      Head: Normocephalic and atraumatic.   Eyes:      General: No scleral icterus.     Extraocular Movements: Extraocular movements intact.      Conjunctiva/sclera: Conjunctivae normal.      Pupils: Pupils are equal, round, and reactive to light.   Cardiovascular:      Rate and Rhythm: Normal rate and regular rhythm.      Heart sounds: Normal heart sounds, S1 normal and S2 normal. No murmur heard.  Pulmonary:      Effort: Pulmonary effort is normal. No respiratory distress.      Breath sounds: Normal breath  sounds. No stridor. No wheezing or rales.   Chest:      Chest wall: No tenderness.   Abdominal:      General: Bowel sounds are normal. There is no distension.      Palpations: Abdomen is soft. There is no mass.      Tenderness: There is no abdominal tenderness. There is no guarding.   Genitourinary:     Comments: External catheter  Musculoskeletal:         General: No swelling, tenderness or deformity. Normal range of motion.      Cervical back: Neck supple.      Right lower leg: Edema present.      Left lower leg: Edema present.   Skin:     General: Skin is warm and dry.      Coloration: Skin is not pale.      Findings: No bruising, erythema or rash.      Comments:  Bilateral edema.  There was a cyanosis of the left foot with cold foot    Right leg  has no warmth today but there is still erythema below the knee   Neurological:      General: No focal deficit present.      Mental Status: She is alert and oriented to person, place, and time. Mental status is at baseline.      Cranial Nerves: No cranial nerve deficit.   Psychiatric:         Mood and Affect: Mood normal.          Results Review:    I have reviewed all clinical data, test, lab, and imaging results.     Radiology  No Radiology Exams Resulted Within Past 24 Hours    Cardiology    Laboratory    Results from last 7 days   Lab Units 03/23/23  0435 03/22/23  0301 03/20/23  0915   WBC 10*3/mm3 4.10 5.30 11.10*   HEMOGLOBIN g/dL 14.9 16.0* 17.5*   HEMATOCRIT % 45.0 50.4* 55.3*   PLATELETS 10*3/mm3 139* 157 180     Results from last 7 days   Lab Units 03/23/23  0435 03/22/23  0301 03/21/23 2219 03/20/23  0915   SODIUM mmol/L 134* 134*  --  138   POTASSIUM mmol/L 4.0 4.2  --  4.2   CHLORIDE mmol/L 94* 94*  --  100   CO2 mmol/L 34.0* 34.0*  --  26.0   BUN mg/dL 41* 42*  --  41*   CREATININE mg/dL 1.18* 1.10*  --  0.92   GLUCOSE mg/dL 102* 69  --  115*   ALBUMIN g/dL  --  2.9* 3.0* 3.0*   BILIRUBIN mg/dL  --  0.8 0.8 1.4*   ALK PHOS U/L  --  145* 182* 204*   AST  (SGOT) U/L  --  26 31 54*   ALT (SGPT) U/L  --  18 22 44*   CALCIUM mg/dL 8.3* 8.8  --  9.0     Results from last 7 days   Lab Units 03/20/23  0915   CK TOTAL U/L 43             Microbiology   Microbiology Results (last 10 days)     Procedure Component Value - Date/Time    CANDIDA AURIS SCREEN - Swab, Axilla Right, Axilla Left and Groin [[id number]]  (Normal) Collected: 03/21/23 1145    Lab Status: Preliminary result Specimen: Swab from Axilla Right, Axilla Left and Groin Updated: 03/23/23 1316     Candida Auris Screen Culture No Candida auris isolated at 2 days    Body Fluid Culture - Body Fluid, Pleural Cavity [[id number]] Collected: 03/21/23 1028    Lab Status: Preliminary result Specimen: Body Fluid from Pleural Cavity Updated: 03/23/23 0632     Body Fluid Culture No growth at 2 days     Gram Stain Few (2+) WBCs seen      No organisms seen    MRSA Screen, PCR (Inpatient) - Swab, Nares [[id number]]  (Normal) Collected: 03/20/23 1349    Lab Status: Final result Specimen: Swab from Nares Updated: 03/20/23 1756     MRSA PCR No MRSA Detected    Narrative:      The negative predictive value of this diagnostic test is high and should only be used to consider de-escalating anti-MRSA therapy. A positive result may indicate colonization with MRSA and must be correlated clinically.    Blood Culture - Blood, Hand, Right [[id number]]  (Normal) Collected: 03/20/23 1116    Lab Status: Preliminary result Specimen: Blood from Hand, Right Updated: 03/23/23 1130     Blood Culture No growth at 3 days    Blood Culture - Blood, Arm, Left [[id number]]  (Abnormal) Collected: 03/20/23 0921    Lab Status: Preliminary result Specimen: Blood from Arm, Left Updated: 03/23/23 0629     Blood Culture Gram Negative Bacilli     Isolated from Anaerobic Bottle     Gram Stain Anaerobic Bottle Gram negative bacilli    Narrative:      Sending to [facility] for ID and MICs    Less than seven (7) mL's of blood was collected.  Insufficient quantity may yield false  negative results.    Blood Culture ID, PCR - Blood, Arm, Left [[id number]] Collected: 03/20/23 0921    Lab Status: Final result Specimen: Blood from Arm, Left Updated: 03/21/23 0639     BCID, PCR Negative by BCID PCR. Culture to Follow.     BOTTLE TYPE Anaerobic Bottle          Medication Review:       Schedule Meds  atorvastatin, 40 mg, Oral, Nightly  carvedilol, 3.125 mg, Oral, BID With Meals  furosemide, 40 mg, Oral, Daily  insulin lispro, 2-9 Units, Subcutaneous, 4x Daily With Meals & Nightly  levothyroxine, 100 mcg, Oral, Q AM  lisinopril, 5 mg, Oral, Q24H  piperacillin-tazobactam, 3.375 g, Intravenous, Q8H  sodium chloride, 10 mL, Intravenous, Q12H  spironolactone, 25 mg, Oral, Daily        Infusion Meds  heparin, 18 Units/kg/hr, Last Rate: 15 Units/kg/hr (03/23/23 1339)  hold, 1 each        PRN Meds  •  acetaminophen  •  senna-docusate sodium **AND** polyethylene glycol **AND** bisacodyl **AND** bisacodyl  •  dextrose  •  dextrose  •  glucagon (human recombinant)  •  heparin  •  heparin  •  hold  •  HYDROcodone-acetaminophen  •  melatonin  •  midodrine  •  ondansetron  •  polyethyl glycol-propyl glycol  •  [COMPLETED] Insert Peripheral IV **AND** sodium chloride  •  sodium chloride  •  sodium chloride        Assessment & Plan       Antimicrobial Therapy   1.  IV Zosyn        2.        3.        4.        5.            Assessment     Bacteremia with a gram-negative bacilli and PCR did not detect specific organism.  The source is not very clear but possibly intra abdomen.  This also could be contamination    Congestive heart failure with volume overload associated with bilateral lower extremities edema and right-sided pleural effusion.  Patient s/p right-sided thoracentesis on March 21 and fluid analysis are pending.  Cultures negative so far and fluid analysis is not indicative of infection however fluid differential was not performed   -2D echo showed an EF of less than 20% with significant tricuspid  regurgitation but no vegetation     Right lower leg cellulitic changes.  Most of the erythematous probably related to the edema.  Superimposed infection cannot be ruled out.  There was no open wound.  Continues to improve     Ischemic changes to the left foot.  The patient was seen and evaluated by vascular surgery service.  Arterial Doppler showed patent vessels.  It might be micro embolism.  Vascular service does not feel that she is a surgical candidate.     Cholecystitis ruled out- General surgery service following the patient.  HIDA scan was normal     Plan     Continue Zosyn 3.375 g IV every 8 hours   Waiting on final blood culture results  Continue supportive care  A.m. labs  Case was discussed with family at bedside  Patient is at high risk for limb loss    Katya Rapp, APRN  03/23/23  15:22 EDT    Note is dictated utilizing voice recognition software/Dragon

## 2023-03-23 NOTE — TELEPHONE ENCOUNTER
Caller: MILLIE WILKS    Relationship to patient: Emergency Contact    Best call back number: [phone]  [phone]    Patient is needing: PATIENT  STATES THAT PATIENT IS USING THE  [facility] MONITORING DEVICE FOR HER GLUCOSE AND  WOULD LIKE A CALL BACK TO LET HER KNOW WHAT HER NEXT STEPS ARE, AND IF DR. VÁZQUEZ WOULD LIKE HERS AVERAGES/INFORMATION FROM THE MONITORING DEVICE.  PLEASE ADVISE.       STATES THAT PATIENT IS IN THE HOSPITAL AT [facility], AND ASKS THAT DR. VÁZQUEZ REVIEW HER CHART.     HE STATES THAT THE 3/28 THORACENTESIS NEEDS TO BE CANCELED AS THEY DID IT AT [facility] SO HE NEEDS TO BE ADVISED IF THE OFFICE WILL CANCEL THAT.

## 2023-03-24 ENCOUNTER — READMISSION MANAGEMENT (OUTPATIENT)
Dept: CALL CENTER | Facility: HOSPITAL | Age: 83
End: 2023-03-24
Payer: MEDICARE

## 2023-03-24 VITALS
RESPIRATION RATE: 18 BRPM | OXYGEN SATURATION: 95 % | HEIGHT: 63 IN | TEMPERATURE: 97.5 F | SYSTOLIC BLOOD PRESSURE: 135 MMHG | BODY MASS INDEX: 28.79 KG/M2 | HEART RATE: 96 BPM | DIASTOLIC BLOOD PRESSURE: 78 MMHG | WEIGHT: 162.48 LBS

## 2023-03-24 PROBLEM — I50.21 ACUTE SYSTOLIC HEART FAILURE: Status: ACTIVE | Noted: 2023-03-24

## 2023-03-24 PROBLEM — I50.22 CHRONIC SYSTOLIC HEART FAILURE: Status: ACTIVE | Noted: 2023-03-24

## 2023-03-24 PROBLEM — I50.23 ACUTE ON CHRONIC SYSTOLIC HEART FAILURE: Status: ACTIVE | Noted: 2023-03-24

## 2023-03-24 LAB
ANION GAP SERPL CALCULATED.3IONS-SCNC: 7 MMOL/L (ref 5–15)
APTT PPP: 74.8 SECONDS (ref 61–76.5)
BASOPHILS # BLD AUTO: 0 10*3/MM3 (ref 0–0.2)
BASOPHILS NFR BLD AUTO: 0.8 % (ref 0–1.5)
BUN SERPL-MCNC: 37 MG/DL (ref 8–23)
BUN/CREAT SERPL: 33 (ref 7–25)
CALCIUM SPEC-SCNC: 8.1 MG/DL (ref 8.6–10.5)
CHLORIDE SERPL-SCNC: 96 MMOL/L (ref 98–107)
CMV IGM SERPL IA-ACNC: <30 AU/ML (ref 0–29.9)
CO2 SERPL-SCNC: 34 MMOL/L (ref 22–29)
CREAT SERPL-MCNC: 1.12 MG/DL (ref 0.57–1)
DEPRECATED RDW RBC AUTO: 54.7 FL (ref 37–54)
EBV VCA IGM SER IA-ACNC: <36 U/ML (ref 0–35.9)
EGFRCR SERPLBLD CKD-EPI 2021: 49.5 ML/MIN/1.73
EOSINOPHIL # BLD AUTO: 0.1 10*3/MM3 (ref 0–0.4)
EOSINOPHIL NFR BLD AUTO: 2.5 % (ref 0.3–6.2)
ERYTHROCYTE [DISTWIDTH] IN BLOOD BY AUTOMATED COUNT: 15.1 % (ref 12.3–15.4)
GLUCOSE BLDC GLUCOMTR-MCNC: 101 MG/DL (ref 70–105)
GLUCOSE BLDC GLUCOMTR-MCNC: 175 MG/DL (ref 70–105)
GLUCOSE SERPL-MCNC: 115 MG/DL (ref 65–99)
HCT VFR BLD AUTO: 44.5 % (ref 34–46.6)
HGB BLD-MCNC: 14.7 G/DL (ref 12–15.9)
LYMPHOCYTES # BLD AUTO: 0.7 10*3/MM3 (ref 0.7–3.1)
LYMPHOCYTES NFR BLD AUTO: 20.1 % (ref 19.6–45.3)
MCH RBC QN AUTO: 32.8 PG (ref 26.6–33)
MCHC RBC AUTO-ENTMCNC: 33.1 G/DL (ref 31.5–35.7)
MCV RBC AUTO: 99.1 FL (ref 79–97)
MONOCYTES # BLD AUTO: 0.4 10*3/MM3 (ref 0.1–0.9)
MONOCYTES NFR BLD AUTO: 10 % (ref 5–12)
NEUTROPHILS NFR BLD AUTO: 2.4 10*3/MM3 (ref 1.7–7)
NEUTROPHILS NFR BLD AUTO: 66.6 % (ref 42.7–76)
NRBC BLD AUTO-RTO: 0.1 /100 WBC (ref 0–0.2)
PLATELET # BLD AUTO: 150 10*3/MM3 (ref 140–450)
PMV BLD AUTO: 8.8 FL (ref 6–12)
POTASSIUM SERPL-SCNC: 3.8 MMOL/L (ref 3.5–5.2)
PROT S ACT/NOR PPP: 93 % (ref 63–140)
RBC # BLD AUTO: 4.49 10*6/MM3 (ref 3.77–5.28)
SODIUM SERPL-SCNC: 137 MMOL/L (ref 136–145)
WBC NRBC COR # BLD: 3.5 10*3/MM3 (ref 3.4–10.8)

## 2023-03-24 PROCEDURE — 85705 THROMBOPLASTIN INHIBITION: CPT | Performed by: INTERNAL MEDICINE

## 2023-03-24 PROCEDURE — 85598 HEXAGNAL PHOSPH PLTLT NEUTRL: CPT | Performed by: INTERNAL MEDICINE

## 2023-03-24 PROCEDURE — 85670 THROMBIN TIME PLASMA: CPT | Performed by: INTERNAL MEDICINE

## 2023-03-24 PROCEDURE — 85025 COMPLETE CBC W/AUTO DIFF WBC: CPT | Performed by: HOSPITALIST

## 2023-03-24 PROCEDURE — 80048 BASIC METABOLIC PNL TOTAL CA: CPT | Performed by: HOSPITALIST

## 2023-03-24 PROCEDURE — 85732 THROMBOPLASTIN TIME PARTIAL: CPT | Performed by: INTERNAL MEDICINE

## 2023-03-24 PROCEDURE — 25010000002 PIPERACILLIN SOD-TAZOBACTAM PER 1 G: Performed by: INTERNAL MEDICINE

## 2023-03-24 PROCEDURE — 63710000001 INSULIN LISPRO (HUMAN) PER 5 UNITS: Performed by: HOSPITALIST

## 2023-03-24 PROCEDURE — 25010000002 HEPARIN (PORCINE) 25000-0.45 UT/250ML-% SOLUTION: Performed by: NURSE PRACTITIONER

## 2023-03-24 PROCEDURE — 82962 GLUCOSE BLOOD TEST: CPT

## 2023-03-24 PROCEDURE — 85730 THROMBOPLASTIN TIME PARTIAL: CPT | Performed by: HOSPITALIST

## 2023-03-24 PROCEDURE — 85613 RUSSELL VIPER VENOM DILUTED: CPT | Performed by: INTERNAL MEDICINE

## 2023-03-24 PROCEDURE — 99232 SBSQ HOSP IP/OBS MODERATE 35: CPT | Performed by: INTERNAL MEDICINE

## 2023-03-24 RX ORDER — LISINOPRIL 5 MG/1
5 TABLET ORAL
Qty: 90 TABLET | Refills: 0 | Status: SHIPPED | OUTPATIENT
Start: 2023-03-25 | End: 2023-04-03 | Stop reason: SDUPTHER

## 2023-03-24 RX ORDER — SPIRONOLACTONE 25 MG/1
25 TABLET ORAL DAILY
Qty: 90 TABLET | Refills: 0 | Status: SHIPPED | OUTPATIENT
Start: 2023-03-24 | End: 2023-04-11 | Stop reason: SDUPTHER

## 2023-03-24 RX ORDER — ATORVASTATIN CALCIUM 40 MG/1
40 TABLET, FILM COATED ORAL NIGHTLY
Qty: 90 TABLET | Refills: 0 | Status: SHIPPED | OUTPATIENT
Start: 2023-03-24 | End: 2023-04-03

## 2023-03-24 RX ORDER — LEVOTHYROXINE SODIUM 0.1 MG/1
100 TABLET ORAL DAILY
Qty: 90 TABLET | Refills: 0 | Status: SHIPPED | OUTPATIENT
Start: 2023-03-24

## 2023-03-24 RX ORDER — CARVEDILOL 3.12 MG/1
3.12 TABLET ORAL 2 TIMES DAILY WITH MEALS
Qty: 180 TABLET | Refills: 0 | Status: SHIPPED | OUTPATIENT
Start: 2023-03-24 | End: 2023-04-11 | Stop reason: SDUPTHER

## 2023-03-24 RX ORDER — LEVOFLOXACIN 500 MG/1
500 TABLET, FILM COATED ORAL EVERY 24 HOURS
Status: DISCONTINUED | OUTPATIENT
Start: 2023-03-24 | End: 2023-03-24 | Stop reason: HOSPADM

## 2023-03-24 RX ORDER — FUROSEMIDE 40 MG/1
40 TABLET ORAL DAILY
Qty: 90 TABLET | Refills: 0 | Status: SHIPPED | OUTPATIENT
Start: 2023-03-25 | End: 2023-04-11 | Stop reason: SDUPTHER

## 2023-03-24 RX ORDER — LEVOFLOXACIN 500 MG/1
500 TABLET, FILM COATED ORAL EVERY 24 HOURS
Qty: 3 TABLET | Refills: 0 | Status: SHIPPED | OUTPATIENT
Start: 2023-03-25 | End: 2023-03-28

## 2023-03-24 RX ADMIN — INSULIN LISPRO 2 UNITS: 100 INJECTION, SOLUTION INTRAVENOUS; SUBCUTANEOUS at 12:13

## 2023-03-24 RX ADMIN — CARVEDILOL 3.12 MG: 3.12 TABLET, FILM COATED ORAL at 09:05

## 2023-03-24 RX ADMIN — LEVOTHYROXINE SODIUM 100 MCG: 100 TABLET ORAL at 05:22

## 2023-03-24 RX ADMIN — PIPERACILLIN AND TAZOBACTAM 3.38 G: 3; .375 INJECTION, POWDER, LYOPHILIZED, FOR SOLUTION INTRAVENOUS at 05:21

## 2023-03-24 RX ADMIN — FUROSEMIDE 40 MG: 40 TABLET ORAL at 09:05

## 2023-03-24 RX ADMIN — Medication 10 ML: at 10:12

## 2023-03-24 RX ADMIN — LISINOPRIL 5 MG: 5 TABLET ORAL at 09:05

## 2023-03-24 RX ADMIN — APIXABAN 5 MG: 5 TABLET, FILM COATED ORAL at 10:13

## 2023-03-24 RX ADMIN — SPIRONOLACTONE 25 MG: 25 TABLET ORAL at 09:05

## 2023-03-24 RX ADMIN — HEPARIN SODIUM 17 UNITS/KG/HR: 10000 INJECTION, SOLUTION INTRAVENOUS at 05:34

## 2023-03-24 RX ADMIN — Medication 20 ML: at 09:06

## 2023-03-24 RX ADMIN — LEVOFLOXACIN 500 MG: 500 TABLET, FILM COATED ORAL at 14:08

## 2023-03-24 NOTE — PROGRESS NOTES
Infectious Diseases Progress Note      LOS: 4 days   Patient Care Team:  Minerva Chatterjee MD as PCP - General (Internal Medicine)  Yordan Evans MD as Consulting Physician (Cardiology)  Amilcar Smallwood MD as Consulting Physician (Hematology and Oncology)    Chief Complaint: Bilateral leg edema, shortness of breath at admission    Subjective       The patient has been afebrile for the last 24 hours.  The patient is on room air, hemodynamically stable, and is tolerating antimicrobial therapy.  Patient continues to feel better      Review of Systems:   Review of Systems   Constitutional: Negative.    HENT: Negative.    Eyes: Negative.    Respiratory: Positive for shortness of breath.         Improved   Cardiovascular: Positive for leg swelling.   Gastrointestinal: Negative.    Endocrine: Negative.    Genitourinary: Negative.    Musculoskeletal: Negative.    Skin: Negative.    Neurological: Negative.    Psychiatric/Behavioral: Negative.    All other systems reviewed and are negative.       Objective     Vital Signs  Temp:  [97.3 °F (36.3 °C)-98.7 °F (37.1 °C)] 97.5 °F (36.4 °C)  Heart Rate:  [64-73] 67  Resp:  [16-25] 20  BP: (105-131)/(61-83) 112/61    Physical Exam:  Physical Exam  Vitals and nursing note reviewed.   Constitutional:       General: She is not in acute distress.     Appearance: She is well-developed and normal weight. She is ill-appearing. She is not diaphoretic.   HENT:      Head: Normocephalic and atraumatic.   Eyes:      General: No scleral icterus.     Extraocular Movements: Extraocular movements intact.      Conjunctiva/sclera: Conjunctivae normal.      Pupils: Pupils are equal, round, and reactive to light.   Cardiovascular:      Rate and Rhythm: Normal rate and regular rhythm.      Heart sounds: Normal heart sounds, S1 normal and S2 normal. No murmur heard.  Pulmonary:      Effort: Pulmonary effort is normal. No respiratory distress.      Breath sounds: Normal breath sounds. No stridor. No  wheezing or rales.   Chest:      Chest wall: No tenderness.   Abdominal:      General: Bowel sounds are normal. There is no distension.      Palpations: Abdomen is soft. There is no mass.      Tenderness: There is no abdominal tenderness. There is no guarding.   Genitourinary:     Comments: External catheter  Musculoskeletal:         General: No swelling, tenderness or deformity. Normal range of motion.      Cervical back: Neck supple.      Right lower leg: Edema present.      Left lower leg: Edema present.   Skin:     General: Skin is warm and dry.      Coloration: Skin is not pale.      Findings: No bruising, erythema or rash.      Comments:  Bilateral edema.  There was a cyanosis of the left foot with cold foot    Right leg  has no warmth today but there is still erythema below the knee   Neurological:      General: No focal deficit present.      Mental Status: She is alert and oriented to person, place, and time. Mental status is at baseline.      Cranial Nerves: No cranial nerve deficit.   Psychiatric:         Mood and Affect: Mood normal.          Results Review:    I have reviewed all clinical data, test, lab, and imaging results.     Radiology  No Radiology Exams Resulted Within Past 24 Hours    Cardiology    Laboratory    Results from last 7 days   Lab Units 03/24/23  0421 03/23/23  0435 03/22/23  0301 03/20/23  0915   WBC 10*3/mm3 3.50 4.10 5.30 11.10*   HEMOGLOBIN g/dL 14.7 14.9 16.0* 17.5*   HEMATOCRIT % 44.5 45.0 50.4* 55.3*   PLATELETS 10*3/mm3 150 139* 157 180     Results from last 7 days   Lab Units 03/24/23  0421 03/23/23 0435 03/22/23  0301 03/21/23 2219 03/20/23  0915   SODIUM mmol/L 137 134* 134*  --  138   POTASSIUM mmol/L 3.8 4.0 4.2  --  4.2   CHLORIDE mmol/L 96* 94* 94*  --  100   CO2 mmol/L 34.0* 34.0* 34.0*  --  26.0   BUN mg/dL 37* 41* 42*  --  41*   CREATININE mg/dL 1.12* 1.18* 1.10*  --  0.92   GLUCOSE mg/dL 115* 102* 69  --  115*   ALBUMIN g/dL  --   --  2.9* 3.0* 3.0*   BILIRUBIN  mg/dL  --   --  0.8 0.8 1.4*   ALK PHOS U/L  --   --  145* 182* 204*   AST (SGOT) U/L  --   --  26 31 54*   ALT (SGPT) U/L  --   --  18 22 44*   CALCIUM mg/dL 8.1* 8.3* 8.8  --  9.0     Results from last 7 days   Lab Units 03/20/23  0915   CK TOTAL U/L 43             Microbiology   Microbiology Results (last 10 days)     Procedure Component Value - Date/Time    CANDIDA AURIS SCREEN - Swab, Axilla Right, Axilla Left and Groin [[id number]]  (Normal) Collected: 03/21/23 1145    Lab Status: Preliminary result Specimen: Swab from Axilla Right, Axilla Left and Groin Updated: 03/24/23 1316     [place] Auris Screen Culture No Candida auris isolated at 3 days    Body Fluid Culture - Body Fluid, Pleural Cavity [[id number]] Collected: 03/21/23 1028    Lab Status: Preliminary result Specimen: Body Fluid from Pleural Cavity Updated: 03/24/23 0648     Body Fluid Culture No growth at 3 days     Gram Stain Few (2+) WBCs seen      No organisms seen    MRSA Screen, PCR (Inpatient) - Swab, Nares [[id number]]  (Normal) Collected: 03/20/23 1349    Lab Status: Final result Specimen: Swab from Nares Updated: 03/20/23 1756     MRSA PCR No MRSA Detected    Narrative:      The negative predictive value of this diagnostic test is high and should only be used to consider de-escalating anti-MRSA therapy. A positive result may indicate colonization with MRSA and must be correlated clinically.    Blood Culture - Blood, Hand, Right [[id number]]  (Normal) Collected: 03/20/23 1116    Lab Status: Preliminary result Specimen: Blood from Hand, Right Updated: 03/24/23 1130     Blood Culture No growth at 4 days    Blood Culture - Blood, Arm, Left [[id number]]  (Abnormal) Collected: 03/20/23 0921    Lab Status: Preliminary result Specimen: Blood from Arm, Left Updated: 03/23/23 0629     Blood Culture Gram Negative Bacilli     Isolated from Anaerobic Bottle     Gram Stain Anaerobic Bottle Gram negative bacilli    Narrative:      Sending to [facility] for ID and  MICs    Less than seven (7) mL's of blood was collected.  Insufficient quantity may yield false negative results.    Blood Culture ID, PCR - Blood, Arm, Left [[id number]] Collected: 03/20/23 0921    Lab Status: Final result Specimen: Blood from Arm, Left Updated: 03/21/23 0639     BCID, PCR Negative by BCID PCR. Culture to Follow.     BOTTLE TYPE Anaerobic Bottle          Medication Review:       Schedule Meds  apixaban, 5 mg, Oral, Q12H  atorvastatin, 40 mg, Oral, Nightly  carvedilol, 3.125 mg, Oral, BID With Meals  furosemide, 40 mg, Oral, Daily  insulin lispro, 2-9 Units, Subcutaneous, 4x Daily With Meals & Nightly  levoFLOXacin, 500 mg, Oral, Q24H  levothyroxine, 100 mcg, Oral, Q AM  lisinopril, 5 mg, Oral, Q24H  sodium chloride, 10 mL, Intravenous, Q12H  spironolactone, 25 mg, Oral, Daily        Infusion Meds       PRN Meds  •  acetaminophen  •  senna-docusate sodium **AND** polyethylene glycol **AND** bisacodyl **AND** bisacodyl  •  dextrose  •  dextrose  •  glucagon (human recombinant)  •  HYDROcodone-acetaminophen  •  melatonin  •  midodrine  •  ondansetron  •  polyethyl glycol-propyl glycol  •  [COMPLETED] Insert Peripheral IV **AND** sodium chloride  •  sodium chloride  •  sodium chloride        Assessment & Plan       Antimicrobial Therapy   1.  IV Zosyn        2.  P.o. Levaquin        3.        4.        5.            Assessment     Bacteremia with a gram-negative bacilli and PCR did not detect specific organism.  The source is not very clear but possibly intra abdomen.  This also could be contamination.  Cultures been sent to an outside lab may take over a week to result    Congestive heart failure with volume overload associated with bilateral lower extremities edema and right-sided pleural effusion.  Patient s/p right-sided thoracentesis on March 21 and fluid analysis are pending.  Cultures negative so far and fluid analysis is not indicative of infection however fluid differential was not performed    -2D echo showed an EF of less than 20% with significant tricuspid regurgitation but no vegetation     Right lower leg cellulitic changes.  Most of the erythematous probably related to the edema.  Superimposed infection cannot be ruled out.  There was no open wound.  Continues to improve     Ischemic changes to the left foot.  The patient was seen and evaluated by vascular surgery service.  Arterial Doppler showed patent vessels.  It might be micro embolism.  Vascular service does not feel that she is a surgical candidate.     Cholecystitis ruled out- General surgery service following the patient.  HIDA scan was normal     Plan     Discontinue Zosyn 3.375  Start p.o. Levaquin 500 mg daily for 4 days for 7 days of treatment  QTC interval was 429  Continue supportive care  Okay to discharge from Infectious Disease standpoint  Case was discussed with family at bedside  Patient is at high risk for limb loss    Katya Rapp, VALENTINE  03/24/23  14:06 EDT    Note is dictated utilizing voice recognition software/Dragon

## 2023-03-24 NOTE — PROGRESS NOTES
Referring Provider: Lily Nesbitt MD    Reason for follow-up:  Lower extremity edema  Bilateral pleural effusions.  Cardiomyopathy     Patient Care Team:  Minerva Chatterjee MD as PCP - General (Internal Medicine)  Yordan Evans MD as Consulting Physician (Cardiology)  Amilcar Smallwood MD as Consulting Physician (Hematology and Oncology)    Subjective .      ROS    Since I have last seen her yesterday, the patient has been without any chest discomfort ,shortness of breath, palpitations, dizziness or syncope.  Denies having any headache ,abdominal pain ,nausea, vomiting , diarrhea constipation, loss of weight or loss of appetite.  Denies having any excessive bruising ,hematuria or blood in the stool.    Review of all systems negative except as indicated    History  Past Medical History:   Diagnosis Date   • Astigmatism, bilateral 11/19/2019   • Benign essential hypertension    • Bilateral presbyopia 11/19/2019   • CAD (coronary artery disease)    • CHF (congestive heart failure) (HCC)    • Closed right ankle fracture    • COVID-19 vaccination refused    • Hyperlipidemia    • Hypothyroidism    • Influenza vaccine needed    • Myocardial infarction (HCC)    • Prediabetes    • Pseudophakia, both eyes 11/19/2019   • Sleep apnea    • Thoracic back pain        Past Surgical History:   Procedure Laterality Date   • APPENDECTOMY     • CARDIAC CATHETERIZATION  2012    x3    • CORONARY ARTERY BYPASS GRAFT  12/2014   • CORONARY STENT PLACEMENT      x3   • CYSTOSCOPY WITH CLOT EVACUATION N/A 3/25/2022    Procedure: CYSTOSCOPY WITH CLOT EVACUATION AND FULGURATION ;  Surgeon: Sukhdev Poole MD;  Location: [facility];  Service: Urology;  Laterality: N/A;   • HARDWARE REMOVAL Right 7/21/2020    Procedure: ANKLE/FOOT HARDWARE REMOVAL;  Surgeon: Lincoln Sibley MD;  Location: [facility];  Service: Orthopedics;  Laterality: Right;   • ORIF ANKLE FRACTURE Right 04/23/2019    Radha Vazquez Mem   • THORACOSCOPY WITH  DECORTICATION Left 3/18/2022    Procedure: LEFT THORACOSCOPY VIDEO ASSISTED WITH COMPLETE DECORTICATION;  Surgeon: Sabra Kuo MD;  Location: [facility];  Service: Thoracic;  Laterality: Left;       Family History   Problem Relation Age of Onset   • Hypertension Mother    • Stroke Mother    • Heart disease Mother    • Lung cancer Father    • Diabetes Brother    • Diabetes Other        Social History     Tobacco Use   • Smoking status: Former     Packs/day: 1.50     Types: Cigarettes     Quit date:      Years since quittin.2     Passive exposure: Past   • Smokeless tobacco: Never   • Tobacco comments:     CAFFEINE USE ICED TEA, OCCAS COFFEE   Vaping Use   • Vaping Use: Never used   Substance Use Topics   • Alcohol use: Not Currently   • Drug use: No        Medications Prior to Admission   Medication Sig Dispense Refill Last Dose   • acetaminophen (TYLENOL) 500 MG tablet Take 1 tablet by mouth Every 6 (Six) Hours As Needed for Mild Pain.   Past Week   • apixaban (ELIQUIS) 5 MG tablet tablet Take 1 tablet by mouth Daily. Only QD per Cardiologist   Past Week   • Cholecalciferol (VITAMIN D3) 2000 units tablet Take 1 tablet by mouth Daily.   3/20/2023   • furosemide (LASIX) 20 MG tablet Take 3 tablets by mouth Daily. 270 tablet 1 3/20/2023   • insulin aspart prot-insulin aspart (novoLOG 70/30) (70-30) 100 UNIT/ML injection Inject 10 Units under the skin into the appropriate area as directed 2 (Two) Times a Day As Needed (elevated blood sugar).   Past Week   • Krill Oil 300 MG capsule Take 1 capsule by mouth Daily.   3/20/2023   • levothyroxine (Synthroid) 100 MCG tablet Take 1 tablet by mouth Daily. 90 tablet 1 3/20/2023   • metoprolol succinate XL (TOPROL-XL) 25 MG 24 hr tablet Take 1 tablet by mouth twice daily 180 tablet 3 3/20/2023   • Multiple Vitamins-Minerals (MULTIVITAMIN ADULT EXTRA C PO) Take 1 tablet by mouth Daily.   3/20/2023   • mupirocin (BACTROBAN) 2 % ointment Apply 1 application  "topically to the appropriate area as directed 3 (Three) Times a Day. Rash on both feet 30 g 1 3/20/2023   • spironolactone (ALDACTONE) 25 MG tablet Take 1 tablet by mouth Daily. 90 tablet 3 3/20/2023       Allergies  Prednisone    Scheduled Meds:atorvastatin, 40 mg, Oral, Nightly  carvedilol, 3.125 mg, Oral, BID With Meals  furosemide, 40 mg, Oral, Daily  insulin lispro, 2-9 Units, Subcutaneous, 4x Daily With Meals & Nightly  levothyroxine, 100 mcg, Oral, Q AM  lisinopril, 5 mg, Oral, Q24H  piperacillin-tazobactam, 3.375 g, Intravenous, Q8H  sodium chloride, 10 mL, Intravenous, Q12H  spironolactone, 25 mg, Oral, Daily      Continuous Infusions:heparin, 18 Units/kg/hr, Last Rate: 17 Units/kg/hr (03/24/23 0534)  hold, 1 each      PRN Meds:.•  acetaminophen  •  senna-docusate sodium **AND** polyethylene glycol **AND** bisacodyl **AND** bisacodyl  •  dextrose  •  dextrose  •  glucagon (human recombinant)  •  heparin  •  heparin  •  hold  •  HYDROcodone-acetaminophen  •  melatonin  •  midodrine  •  ondansetron  •  polyethyl glycol-propyl glycol  •  [COMPLETED] Insert Peripheral IV **AND** sodium chloride  •  sodium chloride  •  sodium chloride    Objective     VITAL SIGNS  Vitals:    03/23/23 1714 03/23/23 2251 03/24/23 0247 03/24/23 0550   BP: 120/70 120/61 114/70 126/64   BP Location: Left leg Left leg Left leg Left leg   Patient Position: Lying Lying Lying Lying   Pulse: 69 68 68 64   Resp: 23 22 16 22   Temp: 97.4 °F (36.3 °C) 97.3 °F (36.3 °C) 97.3 °F (36.3 °C) 97.3 °F (36.3 °C)   TempSrc: Oral Oral Oral Oral   SpO2: 94% 96% 97% 96%   Weight:    73.7 kg (162 lb 7.7 oz)   Height:           Flowsheet Rows    Flowsheet Row First Filed Value   Admission Height 160 cm (63\") Documented at 03/20/2023 0850   Admission Weight 67.9 kg (149 lb 11.1 oz) Documented at 03/20/2023 0850            Intake/Output Summary (Last 24 hours) at 3/24/2023 0656  Last data filed at 3/24/2023 0400  Gross per 24 hour   Intake 960 ml   Output " 300 ml   Net 660 ml        TELEMETRY: Atrial fibrillation  Physical Exam:  The patient is alert, oriented and in no distress.  Vital signs as noted above.  Head and neck revealed no carotid bruits or jugular venous distention.  No thyromegaly or lymphadenopathy is present  Lungs clear.  No wheezing.  Breath sounds are normal bilaterally.  Heart normal first and second heart sounds.  No murmur. No precordial rub is present.  No gallop is present.  Abdomen soft and nontender.  No organomegaly is present.  Extremities with decreased peripheral pulses bilateral edema with redness and warmth.  Lower extremity edema and redness is better.  Skin warm and dry.  Musculoskeletal system is grossly normal  CNS grossly normal    Reviewed and updated.      Results Review:   I reviewed the patient's new clinical results.  Lab Results (last 24 hours)     Procedure Component Value Units Date/Time    Body Fluid Culture - Body Fluid, Pleural Cavity [[id number]] Collected: 03/21/23 1028    Specimen: Body Fluid from Pleural Cavity Updated: 03/24/23 0648     Body Fluid Culture No growth at 3 days     Gram Stain Few (2+) WBCs seen      No organisms seen    Protein S Functional [[id number]] Collected: 03/21/23 2300    Specimen: Blood Updated: 03/24/23 0509     Protein S-Functional 93 %      Comment: Protein S activity may be falsely increased (masking an abnormal, low  result) in patients receiving direct Xa inhibitor (e.g., rivaroxaban,  apixaban, edoxaban) or a direct thrombin inhibitor (e.g., dabigatran)  anticoagulant treatment due to assay interference by these drugs.       Narrative:      Performed at:  [address] [place] [address]  [id number]  : Luana Andrew MD, Phone:  [phone]    Basic Metabolic Panel [[id number]]  (Abnormal) Collected: 03/24/23 0421    Specimen: Blood Updated: 03/24/23 0452     Glucose 115 mg/dL      BUN 37 mg/dL      Creatinine 1.12 mg/dL      Sodium 137 mmol/L       Potassium 3.8 mmol/L      Comment: Slight hemolysis detected by analyzer. Results may be affected.        Chloride 96 mmol/L      CO2 34.0 mmol/L      Calcium 8.1 mg/dL      BUN/Creatinine Ratio 33.0     Anion Gap 7.0 mmol/L      eGFR 49.5 mL/min/1.73     Narrative:      GFR Normal >60  Chronic Kidney Disease <60  Kidney Failure <15    The GFR formula is only valid for adults with stable renal function between ages 18 and 70.    aPTT [[id number]]  (Normal) Collected: 03/24/23 0421    Specimen: Blood Updated: 03/24/23 0447     PTT 74.8 seconds     CBC & Differential [[id number]]  (Abnormal) Collected: 03/24/23 0421    Specimen: Blood Updated: 03/24/23 0433    Narrative:      The following orders were created for panel order CBC & Differential.  Procedure                               Abnormality         Status                     ---------                               -----------         ------                     CBC Auto Differential[[id number]]        Abnormal            Final result                 Please view results for these tests on the individual orders.    CBC Auto Differential [[id number]]  (Abnormal) Collected: 03/24/23 0421    Specimen: Blood Updated: 03/24/23 0433     WBC 3.50 10*3/mm3      RBC 4.49 10*6/mm3      Hemoglobin 14.7 g/dL      Hematocrit 44.5 %      MCV 99.1 fL      MCH 32.8 pg      MCHC 33.1 g/dL      RDW 15.1 %      RDW-SD 54.7 fl      MPV 8.8 fL      Platelets 150 10*3/mm3      Neutrophil % 66.6 %      Lymphocyte % 20.1 %      Monocyte % 10.0 %      Eosinophil % 2.5 %      Basophil % 0.8 %      Neutrophils, Absolute 2.40 10*3/mm3      Lymphocytes, Absolute 0.70 10*3/mm3      Monocytes, Absolute 0.40 10*3/mm3      Eosinophils, Absolute 0.10 10*3/mm3      Basophils, Absolute 0.00 10*3/mm3      nRBC 0.1 /100 WBC     Lupus Anticoagulant [[id number]] Collected: 03/24/23 0421    Specimen: Blood Updated: 03/24/23 0429    aPTT [[id number]]  (Abnormal) Collected: 03/23/23 2106    Specimen: Blood  Updated: 03/23/23 2158     PTT 46.1 seconds     POC Glucose Once [[id number]]  (Abnormal) Collected: 03/23/23 2051    Specimen: Blood Updated: 03/23/23 2052     Glucose 256 mg/dL      Comment: Serial Number: [id number]:  [id number]       POC Glucose Once [[id number]]  (Abnormal) Collected: 03/23/23 1613    Specimen: Blood Updated: 03/23/23 1614     Glucose 186 mg/dL      Comment: Serial Number: [id number]:  [id number]       Erythropoietin [[id number]]  (Abnormal) Collected: 03/21/23 2219    Specimen: Blood Updated: 03/23/23 1511     Erythropoietin 208.1 mIU/mL      Comment: fake company 2.0 DxI 800 Immunoassay System  Values obtained with different assay methods or kits cannot be used  interchangeably. Results cannot be interpreted as absolute evidence  of the presence or absence of malignant disease.       Narrative:      Performed at:  [address] [place] [address]  [id number]  : Сергей Gambino PhD, Phone:  [phone]    Protime-INR [[id number]]  (Abnormal) Collected: 03/23/23 1132    Specimen: Blood Updated: 03/23/23 1507     Protime 12.0 Seconds      INR 1.18    aPTT [[id number]]  (Normal) Collected: 03/23/23 1441    Specimen: Blood Updated: 03/23/23 1504     PTT 62.4 seconds     Heparin Induced PLT Antibody With / Rfx [[id number]] Collected: 03/23/23 1441    Specimen: Blood Updated: 03/23/23 1441    Heparin-induced Platelet Antibody [[id number]] Collected: 03/23/23 1441    Specimen: Blood Updated: 03/23/23 1441    Cytomegalovirus Antibody, IgM [[id number]] Collected: 03/23/23 1441    Specimen: Blood Updated: 03/23/23 1441    Alysa-Barr Virus VCA, IgM [[id number]] Collected: 03/23/23 1441    Specimen: Blood Updated: 03/23/23 1441    CANDIDA AURIS SCREEN - Swab, Axilla Right, Axilla Left and Groin [[id number]]  (Normal) Collected: 03/21/23 1145    Specimen: Swab from Axilla Right, Axilla Left and Groin Updated: 03/23/23 1316     Candida Auris Screen Culture No Candida  birgit isolated at 2 days    Hemoglobinopathy Fractionation Cascade [[id number]] Collected: 03/21/23 2219    Specimen: Blood Updated: 03/23/23 1310     Hgb F Quant 0.0 %      Hgb A 97.6 %      Hgb A2 Quant 2.4 %      Hgb S 0.0 %      Hgb Interp. Comment     Comment: Normal hemoglobin present; no hemoglobin variant or beta thalassemia  identified.  Note: Alpha thalassemia may not be detected by the Hgb Fractionation  Cascade panel. If alpha thalassemia is suspected, [facility] offers  Alpha-Thalassemia DNA Analysis (#[id number]).       Narrative:      Performed at:  [address]  [id number]  : Сергей Gambino PhD, Phone:  [phone]    aPTT [[id number]]  (Abnormal) Collected: 03/23/23 1132    Specimen: Blood Updated: 03/23/23 1235     .7 seconds     Blood Culture - Blood, Hand, Right [[id number]]  (Normal) Collected: 03/20/23 1116    Specimen: Blood from Hand, Right Updated: 03/23/23 1130     Blood Culture No growth at 3 days    POC Glucose Once [[id number]]  (Abnormal) Collected: 03/23/23 1113    Specimen: Blood Updated: 03/23/23 1114     Glucose 182 mg/dL      Comment: Serial Number: [id number]:  [id number]       POC Glucose Once [[id number]]  (Normal) Collected: 03/23/23 0722    Specimen: Blood Updated: 03/23/23 0724     Glucose 103 mg/dL      Comment: Serial Number: [id number]:  [id number]             Imaging Results (Last 24 Hours)     ** No results found for the last 24 hours. **      LAB RESULTS (LAST 7 DAYS)    CBC  Results from last 7 days   Lab Units 03/24/23  0421 03/23/23  0435 03/22/23  0301 03/20/23  0915   WBC 10*3/mm3 3.50 4.10 5.30 11.10*   RBC 10*6/mm3 4.49 4.53 4.95 5.46*   HEMOGLOBIN g/dL 14.7 14.9 16.0* 17.5*   HEMATOCRIT % 44.5 45.0 50.4* 55.3*   MCV fL 99.1* 99.2* 101.9* 101.3*   PLATELETS 10*3/mm3 150 139* 157 180       BMP  Results from last 7 days   Lab Units 03/24/23  0421 03/23/23  0435 03/22/23  0301 03/21/23  1620 03/21/23  1502  03/20/23  0915   SODIUM mmol/L 137 134* 134*  --   --  138   POTASSIUM mmol/L 3.8 4.0 4.2  --   --  4.2   CHLORIDE mmol/L 96* 94* 94*  --   --  100   CO2 mmol/L 34.0* 34.0* 34.0*  --   --  26.0   BUN mg/dL 37* 41* 42*  --   --  41*   CREATININE mg/dL 1.12* 1.18* 1.10*  --   --  0.92   GLUCOSE mg/dL 115* 102* 69  --   --  115*   MAGNESIUM mg/dL  --  1.9 1.8  --  1.9  --    PHOSPHORUS mg/dL  --   --  3.6 4.0  --   --        CMP   Results from last 7 days   Lab Units 03/24/23 0421 03/23/23  0435 03/22/23 0301 03/21/23 2219 03/20/23  0915   SODIUM mmol/L 137 134* 134*  --  138   POTASSIUM mmol/L 3.8 4.0 4.2  --  4.2   CHLORIDE mmol/L 96* 94* 94*  --  100   CO2 mmol/L 34.0* 34.0* 34.0*  --  26.0   BUN mg/dL 37* 41* 42*  --  41*   CREATININE mg/dL 1.12* 1.18* 1.10*  --  0.92   GLUCOSE mg/dL 115* 102* 69  --  115*   ALBUMIN g/dL  --   --  2.9* 3.0* 3.0*   BILIRUBIN mg/dL  --   --  0.8 0.8 1.4*   ALK PHOS U/L  --   --  145* 182* 204*   AST (SGOT) U/L  --   --  26 31 54*   ALT (SGPT) U/L  --   --  18 22 44*         BNP        TROPONIN  Results from last 7 days   Lab Units 03/22/23 0301 03/21/23 2308 03/20/23  0915   CK TOTAL U/L  --   --  43   HSTROP T ng/L 51*   < >  --     < > = values in this interval not displayed.       CoAg  Results from last 7 days   Lab Units 03/24/23  0421 03/23/23  2106 03/23/23  1441 03/23/23  1132 03/23/23  0435 03/22/23  2309 03/22/23  1625 03/21/23  0528 03/20/23  1902   INR   --   --   --  1.18*  --   --   --   --  1.29*   APTT seconds 74.8 46.1* 62.4 133.7* 30.2* 104.7* 54.5*   < > 132.9*    < > = values in this interval not displayed.       Creatinine Clearance  Estimated Creatinine Clearance: 37.9 mL/min (A) (by C-G formula based on SCr of 1.12 mg/dL (H)).    ABG        Radiology  NM HIDA SCAN WITHOUT PHARMACOLOGICAL INTERVENTION    Result Date: 3/22/2023  Impression: Normal HIDA scan. Gallbladder ejection fraction is 49%. Electronically Signed: Saadia Islas  3/22/2023 10:57 AM EDT   Workstation ID: [id number]          EKG          I personally viewed and interpreted the patient's EKG/Telemetry data: Atrial fibrillation    ECHOCARDIOGRAM:    Results for orders placed during the hospital encounter of 03/20/23    Adult Transthoracic Echo Complete W/ Cont if Necessary Per Protocol    Interpretation Summary  •  Left ventricular ejection fraction appears to be less than 20%.  •  Estimated right ventricular systolic pressure from tricuspid regurgitation is normal (<35 mmHg).    Indications  Shortness of breath    Technically satisfactory study.  Mitral valve is structurally normal.  Moderate mitral regurgitation  Tricuspid valve is structurally normal.  Mild coaptation of the tricuspid valve with significant tricuspid regurgitation.  Aortic valve is structurally normal.  Pulmonic valve could not be well visualized.  Biatrial and significant biventricular enlargement.  Left ventricular severe diffuse hypocontractility with ejection fraction of 15 to 20%.  Atrial septum is intact.  Aorta is normal.  No pericardial effusion or intracardiac thrombus is seen.    Impression  Moderate mitral regurgitation.  Significant tricuspid regurgitation with normal coaptation of tricuspid valve.  Significant biatrial biventricular enlargement.  Left ventricular severe diffuse hypocontractility with ejection fraction of 15 to 20%.          STRESS MYOVIEW:    Cardiolite (Tc-99m Sestamibi) stress test    CARDIAC CATHETERIZATION:            OTHER:         Assessment & Plan     Principal Problem:    Cellulitis of right leg        ]]]]]]]]]]]]]]]]]]]]  Impression  ==========  - Significant lower extremity edema and erythema.  Significant digital ischemia.     -Past history of severe hematuria.-Improved.  CT scan of the abdomen showed significant clot burden in the bladder and probable anterior bladder perforation.     -Progressively worsening shortness of breath and leg edema-better.  Recurrent left pleural effusion which was  loculated.  Patient had chest tube and subsequently had video-assisted thoracoscopy with complete decortication on the left side at [facility].  (March 2022.)     -Congestive heart failure  Left ventricle dysfunction with ejection fraction of 30%.    Echocardiogram 3/28/2023 revealed  Moderate mitral regurgitation.  Significant tricuspid regurgitation with normal coaptation of tricuspid valve.  Significant biatrial biventricular enlargement.  Left ventricular severe diffuse hypocontractility with ejection fraction of 15 to 20%.     Echocardiogram 8/25/2022 revealed   Moderate mitral and tricuspid regurgitation.  Biventricular enlargement and dysfunction with estimated left ventricle ejection fraction of 30%.     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  Was not taking thyroid medicine replacement properly.     =================  Plan  ===========  Patient has significant lower extremity edema and erythema.  Doing better.  Continue diuresis.    Patient has significant distal ischemia.  Appreciated vascular and thoracic consultation.    Anticoagulation  Patient was restarted.  Observe for toxic effects.    Chronic atrial fibrillation-rate is controlled now.       Renal dysfunction  BUN/creatinine-30/1.03  41/0.9- 3/21/2023  42/1.1- 3/22/2023  41/1.18-3/23/2023  37/1.12-3/24/2023      Ischemic cardiomyopathy  GDMT.  Patient is on Coreg Lasix spironolactone and lisinopril.    Echocardiogram 3/28/2023 revealed  Moderate mitral regurgitation.  Significant tricuspid regurgitation with normal coaptation of tricuspid valve.  Significant biatrial biventricular  enlargement.  Left ventricular severe diffuse hypocontractility with ejection fraction of 15 to 20%.    Bilateral pleural effusions right more than left.  Status post right thoracentesis and removal of 1300 cc of fluid 3/21/2023     Medications were reviewed and updated.  Patient is on atorvastatin insulin levothyroxine lisinopril Coreg Lasix p.o. spironolactone.    Have discussed with family regarding possible ICD.  Patient has normal intraventricular conduction.    Follow-up labs ordered.    Further plan will depend on patient's progress.  ]]]]]]]]]]]]]]]]]         Yordan Evans MD  03/24/23  06:56 EDT

## 2023-03-24 NOTE — PLAN OF CARE
Goal Outcome Evaluation:     Problem: Fall Injury Risk  Goal: Absence of Fall and Fall-Related Injury  Outcome: Ongoing, Progressing  Intervention: Identify and Manage Contributors  Recent Flowsheet Documentation  Taken 3/24/2023 0200 by Kalani Diallo RN  Medication Review/Management: medications reviewed  Intervention: Promote Injury-Free Environment  Recent Flowsheet Documentation  Taken 3/24/2023 0200 by Kalani Diallo RN  Safety Promotion/Fall Prevention:   safety round/check completed   room organization consistent   nonskid shoes/slippers when out of bed   lighting adjusted   fall prevention program maintained   clutter free environment maintained   assistive device/personal items within reach     Problem: Heart Failure Comorbidity  Goal: Maintenance of Heart Failure Symptom Control  Outcome: Ongoing, Progressing  Intervention: Maintain Heart Failure-Management  Recent Flowsheet Documentation  Taken 3/24/2023 0200 by Kalani Diallo RN  Medication Review/Management: medications reviewed     Problem: Osteoarthritis Comorbidity  Goal: Maintenance of Osteoarthritis Symptom Control  Outcome: Ongoing, Progressing  Intervention: Maintain Osteoarthritis Symptom Control  Recent Flowsheet Documentation  Taken 3/24/2023 0200 by Kalani Diallo RN  Medication Review/Management: medications reviewed     Problem: Pain Chronic (Persistent) (Comorbidity Management)  Goal: Acceptable Pain Control and Functional Ability  Outcome: Ongoing, Progressing  Intervention: Manage Persistent Pain  Recent Flowsheet Documentation  Taken 3/24/2023 0200 by Kalani Diallo RN  Medication Review/Management: medications reviewed     Problem: Skin Injury Risk Increased  Goal: Skin Health and Integrity  Outcome: Ongoing, Progressing         Patient resting abed with spouse at bedside.  She is alert and oriented and is able to voice her wants and needs to the staff. Heparin drip continues as per the orders.  She denies pain or  distress.  No complaints or concerns voiced at this time.

## 2023-03-24 NOTE — SIGNIFICANT NOTE
03/24/23 1456   OTHER   Discipline occupational therapist   Rehab Time/Intention   Session Not Performed other (see comments)  (Per nurse patient has d/c orders pending discharge today)   Therapy Assessment/Plan (PT)   Criteria for Skilled Interventions Met (PT) yes;meets criteria;skilled treatment is necessary   Recommendation   OT - Next Appointment 03/27/23

## 2023-03-24 NOTE — PROGRESS NOTES
Hematology/Oncology Inpatient Progress Note    PATIENT NAME: Jaquelin Valderrama  : 1940  MRN: [id number]    CHIEF COMPLAINT: Hypercoagulable state with BLE small vessel thrombosis    HISTORY OF PRESENT ILLNESS:    83 y.o. female presented to [facility] with some chronic BLE edema that became worse over the few days prior to admission.   She stated her legs had been purple intermittently for a few months.  She was reported as holding Eliquis for chronic A. Fib for a few days before planned thoracentesis, although patient stated she had not been off her blood thinners.  She denied long car or plane ride recently, no fever/chills. CBC in ED revealed WBC 11.1, Hgb 17.5, .3, platelets 180,000.  Review of chart showed hemoglobin elevated since 2022 (ranged 16.4-19.7), with MCVs normal until 3/6/2023 (98.5). Platelets and WBC normal in reviewed time period.  Creatinine 0.92 in ED.  Blood cultures positive for gram negative bacilli. PT 13.1 (9-11.7), INR 1.29 (0.93-1.10). .9 after start of heparin.  Venous Doppler BLE was negative. NADEGE of BLE revealed severe digital ischemia bilaterally with all other arterial studies of the lower extremities and upper extremities negative.  CTA chest and abdomen showed large right and trace left pleural effusion with near complete atelectasis of right lower lobe. Additional bibasilar and right middle lobe opacities possible atelectasis vs infiltrates.  Additional findings included cardiomegaly with reflux of contrast in the IVC and hepatic veins suggesting right heart failure, nonspecific gallbladder wall thickening, mild ascites, anasarca, and patent proximal left trifurcation with markedly diminutive flow in remainder of left leg arteries and no flow in left foot.  Chest xray showed interval decrease in right pleural effusion with some loculated fluid present, increased airspace opacity in right perihilar region showing pneumonia vs  asymmetric edema, bibasilar consolidations suggestion atelectasis vs infiltrate, small left effusion, and cardiomegaly.  Repeat NADEGE remained normal.  Ultrasound thoracentesis was done 03/21 and approximately 1240 cc of cloudy yellow fluid was aspirated and sent for laboratory evaluation.       03/21/23  Hematology/Oncology was consulted by Dr. Farzana Lacey for small vessel thrombosis of bilateral feet.  Her cystoscopy with clot evacuation in 03/2022 was felt to have happened due to bladder wall tear after she fell and hit pergola with a very full bladder. She also had history of left VATS with removal of clotted hemorrhage post the 3/2022 fall. Hemoglobin has been elevated since July 2022 with Hgb 16.4, Hct 49. Hgb was as high as 19.7 on 3/6/23 at which time Jak2 was negative.      Past Medical History: astigmatism, hypertension, CAD, R ankle fracture, hyperlipidemia, hypothyroidism, MI, atrial fibrillation  Surgical History: Appendectomy, cardiac cath x 3 (2012), CABG (2014), coronary stent placement, cystoscopy with clot evacuation (3/25/2022), ankle hardware removal (7/2020), ORIF R ankle (04/2019), left VATS (03/2022)  Social History: She is .  She is a retired CPA.  She is a former smoker, quit in 1980.  She denies alcohol or recreational drug use.   Family History: hypertension (mother), stroke (mother), heart disease (mother), lung cancer (father), diabetes (brother)  Allergies: prednisone (increases blood pressure)     PCP: Minerva Chatterjee MD      INTERVAL HISTORY:  • 3/22/2023- hemoglobin 16, hematocrit 50.4, .9.   (135-214).  Vitamin B12 868 (211-946), folate greater than 20.  Prothrombin gene mutation and factor V Leiden both normal.  • 3/23/2023- WBC 4.1, hemoglobin 14.9, platelets 139,000.  Patient now states that she had been taking Eliquis 5 mg only once daily for 2 months.  Cardiolipin antibodies positive, beta-2 glycoprotein antibodies negative, Protein C 78 ().   Hemoglobin electrophoresis negative.  HIDA scan was negative.  • 3/24/2023- WBC 3.5, hemoglobin 14.7, platelets 150,000.  PT 12 (9.6-11.7) CMV IgM and EBV IgM negative.  Protein S 93 ().  Erythropoietin 208.1 (2.6-18.5)    History of present illness reviewed since last visit and changes noted on 03/24/23.    Subjective   She states she feels all right.    ROS:  Review of Systems   Constitutional: Negative for activity change, chills, fatigue, fever and unexpected weight change.   HENT: Negative for congestion, dental problem, hearing loss, mouth sores, nosebleeds, sore throat and trouble swallowing.    Eyes: Negative for photophobia and visual disturbance.   Respiratory: Negative for cough, chest tightness, shortness of breath and stridor.    Cardiovascular: Negative for chest pain, palpitations and leg swelling.   Gastrointestinal: Negative for abdominal distention, abdominal pain, blood in stool, constipation, diarrhea, nausea and vomiting.   Endocrine: Negative for cold intolerance and heat intolerance.   Genitourinary: Negative for decreased urine volume, difficulty urinating, dysuria, frequency, hematuria and urgency.   Musculoskeletal: Negative for arthralgias and gait problem.   Skin: Negative for rash and wound.   Neurological: Negative for dizziness, tremors, weakness, light-headedness, numbness and headaches.   Hematological: Negative for adenopathy. Does not bruise/bleed easily.   Psychiatric/Behavioral: Negative for confusion and hallucinations. The patient is not nervous/anxious.    All other systems reviewed and are negative.       MEDICATIONS:    Scheduled Meds:  apixaban, 5 mg, Oral, Q12H  atorvastatin, 40 mg, Oral, Nightly  carvedilol, 3.125 mg, Oral, BID With Meals  furosemide, 40 mg, Oral, Daily  insulin lispro, 2-9 Units, Subcutaneous, 4x Daily With Meals & Nightly  levoFLOXacin, 500 mg, Oral, Q24H  levothyroxine, 100 mcg, Oral, Q AM  lisinopril, 5 mg, Oral, Q24H  sodium chloride, 10 mL,  "Intravenous, Q12H  spironolactone, 25 mg, Oral, Daily       Continuous Infusions:      PRN Meds:  •  acetaminophen  •  senna-docusate sodium **AND** polyethylene glycol **AND** bisacodyl **AND** bisacodyl  •  dextrose  •  dextrose  •  glucagon (human recombinant)  •  HYDROcodone-acetaminophen  •  melatonin  •  midodrine  •  ondansetron  •  polyethyl glycol-propyl glycol  •  [COMPLETED] Insert Peripheral IV **AND** sodium chloride  •  sodium chloride  •  sodium chloride     ALLERGIES:    Allergies   Allergen Reactions   • Prednisone Other (See Comments)     Increased BP       Objective    VITALS:   /78 (BP Location: Left leg, Patient Position: Lying)   Pulse 96   Temp 97.5 °F (36.4 °C) (Oral)   Resp 18   Ht 160 cm (63\")   Wt 73.7 kg (162 lb 7.7 oz)   SpO2 95%   BMI 28.78 kg/m²     PHYSICAL EXAM:  Physical Exam  Vitals and nursing note reviewed.   Constitutional:       General: She is not in acute distress.     Appearance: Normal appearance. She is well-developed. She is not diaphoretic.   HENT:      Head: Normocephalic and atraumatic.      Comments: Frontal alopecia     Right Ear: External ear normal.      Left Ear: External ear normal.      Nose: Nose normal.      Mouth/Throat:      Mouth: Mucous membranes are moist.      Pharynx: Oropharynx is clear. No oropharyngeal exudate or posterior oropharyngeal erythema.      Comments: Dental fillings  Eyes:      General: No scleral icterus.     Extraocular Movements: Extraocular movements intact.      Conjunctiva/sclera: Conjunctivae normal.      Pupils: Pupils are equal, round, and reactive to light.      Comments: Eyeglasses   Cardiovascular:      Rate and Rhythm: Normal rate and regular rhythm.      Heart sounds: Normal heart sounds. No murmur heard.     Comments: Cardiac monitor leads.  Midline vertical chest wall scar  Pulmonary:      Effort: Pulmonary effort is normal. No respiratory distress.      Breath sounds: Normal breath sounds. No wheezing or " rales.   Abdominal:      General: Bowel sounds are normal. There is no distension.      Palpations: Abdomen is soft. There is no mass.      Tenderness: There is no abdominal tenderness. There is no guarding.   Genitourinary:     Comments: External urinary cath  Musculoskeletal:         General: No swelling, tenderness or deformity. Normal range of motion.      Cervical back: Normal range of motion and neck supple. No rigidity.      Right lower leg: No edema.      Left lower leg: No edema.      Comments: BUE IVs.  Left hand O2 monitor.   Lymphadenopathy:      Cervical: No cervical adenopathy.      Upper Body:      Right upper body: No supraclavicular adenopathy.      Left upper body: No supraclavicular adenopathy.   Skin:     General: Skin is warm and dry.      Coloration: Skin is not pale.      Findings: No bruising, erythema or rash.   Neurological:      General: No focal deficit present.      Mental Status: She is alert and oriented to person, place, and time.      Coordination: Coordination normal.   Psychiatric:         Mood and Affect: Mood normal.         Behavior: Behavior normal.         Thought Content: Thought content normal.           RECENT LABS:  Lab Results (last 24 hours)     Procedure Component Value Units Date/Time    Alysa-Barr Virus VCA, IgM [[id number]] Collected: 03/23/23 1441    Specimen: Blood Updated: 03/24/23 1511     EBV VCA IgM <36.0 U/mL      Comment:                                  Negative        <36.0                                   Equivocal 36.0 - 43.9                                   Positive        >43.9       Narrative:      Performed at:  [address] [place] [address]  [id number]  : Сергей Gambino PhD, Phone:  [phone]    CANDIDA AURIS SCREEN - Swab, Axilla Right, Axilla Left and Groin [[id number]]  (Normal) Collected: 03/21/23 1145    Specimen: Swab from Axilla Right, Axilla Left and Groin Updated: 03/24/23 1316     Candida Auris Screen  Culture No Candida auris isolated at 3 days    POC Glucose Once [[id number]]  (Abnormal) Collected: 03/24/23 1201    Specimen: Blood Updated: 03/24/23 1203     Glucose 175 mg/dL      Comment: Serial Number: [id number]:  [id number]       Blood Culture - Blood, Hand, Right [[id number]]  (Normal) Collected: 03/20/23 1116    Specimen: Blood from Hand, Right Updated: 03/24/23 1130     Blood Culture No growth at 4 days    Cytomegalovirus Antibody, IgM [[id number]] Collected: 03/23/23 1441    Specimen: Blood Updated: 03/24/23 1013     CMV IgM <30.0 AU/mL      Comment:                                 Negative         <30.0                                  Equivocal  30.0 - 34.9                                  Positive         >34.9  A positive result is generally indicative of acute  infection, reactivation or persistent IgM production.       Narrative:      Performed at:  [address] [place] [address]  [id number]  : Сергей Gambino PhD, Phone:  [phone]    POC Glucose Once [[id number]]  (Normal) Collected: 03/24/23 0750    Specimen: Blood Updated: 03/24/23 0751     Glucose 101 mg/dL      Comment: Serial Number: [id number]:  [id number]       Body Fluid Culture - Body Fluid, Pleural Cavity [[id number]] Collected: 03/21/23 1028    Specimen: Body Fluid from Pleural Cavity Updated: 03/24/23 0648     Body Fluid Culture No growth at 3 days     Gram Stain Few (2+) WBCs seen      No organisms seen    Protein S Functional [[id number]] Collected: 03/21/23 2308    Specimen: Blood Updated: 03/24/23 0509     Protein S-Functional 93 %      Comment: Protein S activity may be falsely increased (masking an abnormal, low  result) in patients receiving direct Xa inhibitor (e.g., rivaroxaban,  apixaban, edoxaban) or a direct thrombin inhibitor (e.g., dabigatran)  anticoagulant treatment due to assay interference by these drugs.       Narrative:      Performed at:  [facility]  [place], [place]  [id number]  : Luana Andrew MD, Phone:  [phone]    Basic Metabolic Panel [[id number]]  (Abnormal) Collected: 03/24/23 0421    Specimen: Blood Updated: 03/24/23 0452     Glucose 115 mg/dL      BUN 37 mg/dL      Creatinine 1.12 mg/dL      Sodium 137 mmol/L      Potassium 3.8 mmol/L      Comment: Slight hemolysis detected by analyzer. Results may be affected.        Chloride 96 mmol/L      CO2 34.0 mmol/L      Calcium 8.1 mg/dL      BUN/Creatinine Ratio 33.0     Anion Gap 7.0 mmol/L      eGFR 49.5 mL/min/1.73     Narrative:      GFR Normal >60  Chronic Kidney Disease <60  Kidney Failure <15    The GFR formula is only valid for adults with stable renal function between ages 18 and 70.    aPTT [[id number]]  (Normal) Collected: 03/24/23 0421    Specimen: Blood Updated: 03/24/23 0447     PTT 74.8 seconds     CBC & Differential [[id number]]  (Abnormal) Collected: 03/24/23 0421    Specimen: Blood Updated: 03/24/23 0433    Narrative:      The following orders were created for panel order CBC & Differential.  Procedure                               Abnormality         Status                     ---------                               -----------         ------                     CBC Auto Differential[[id number]]        Abnormal            Final result                 Please view results for these tests on the individual orders.    CBC Auto Differential [[id number]]  (Abnormal) Collected: 03/24/23 0421    Specimen: Blood Updated: 03/24/23 0433     WBC 3.50 10*3/mm3      RBC 4.49 10*6/mm3      Hemoglobin 14.7 g/dL      Hematocrit 44.5 %      MCV 99.1 fL      MCH 32.8 pg      MCHC 33.1 g/dL      RDW 15.1 %      RDW-SD 54.7 fl      MPV 8.8 fL      Platelets 150 10*3/mm3      Neutrophil % 66.6 %      Lymphocyte % 20.1 %      Monocyte % 10.0 %      Eosinophil % 2.5 %      Basophil % 0.8 %      Neutrophils, Absolute 2.40 10*3/mm3      Lymphocytes, Absolute 0.70 10*3/mm3      Monocytes, Absolute 0.40  10*3/mm3      Eosinophils, Absolute 0.10 10*3/mm3      Basophils, Absolute 0.00 10*3/mm3      nRBC 0.1 /100 WBC     Lupus Anticoagulant [[id number]] Collected: 03/24/23 0421    Specimen: Blood Updated: 03/24/23 0429    aPTT [[id number]]  (Abnormal) Collected: 03/23/23 2106    Specimen: Blood Updated: 03/23/23 2158     PTT 46.1 seconds     POC Glucose Once [[id number]]  (Abnormal) Collected: 03/23/23 2051    Specimen: Blood Updated: 03/23/23 2052     Glucose 256 mg/dL      Comment: Serial Number: [id number]:  [id number]       POC Glucose Once [[id number]]  (Abnormal) Collected: 03/23/23 1613    Specimen: Blood Updated: 03/23/23 1614     Glucose 186 mg/dL      Comment: Serial Number: [id number]:  [id number]             PENDING RESULTS:  [place], JAK2 panel, HIT Ab, lupus anticoagulant.    IMAGING REVIEWED:  No radiology results for the last day    I have reviewed the patient's labs, imaging, reports, and other clinician documentation.    Assessment & Plan   ASSESSMENT:  1. BLE digital ischemia/cardiolipin antibody positivity-digital ischemia noted on NADEGE with all other arterial and venous imaging of the lower extremities and arterial imaging of the upper extremities negative.  Remains on heparin drip.  No evidence of factor V Leiden or prothrombin gene mutation.  Cardiolipin antibodies were intermediate positive.  Beta-2 glycoprotein antibodies were negative.  Protein C & S were normal.  Lupus AC is pending and will check ATIII when off all heparin.  We originally felt she would need to switch to warfarin as she was on Eliquis at time of current events however she now reports she was only taking Eliquis once daily for 2 months prior to admission.   2. Erythrocytosis - Macrocytosis appears acute. No recent smoking or known MAYELIN.  Vitamin B12 is not elevated.  LDH is mildly elevated. Hgb electrophoresis is negative.  Erythropoietin was not low previous JAK2 did not reflex to complete panel and JAK2 panel along  with Sundance remain pending. Hgb normalized 3/23. No known bleeding on heparin drip. Will follow.  3. Acute thrombocytopenia-Had been on heparin drip with HIT pending. PT is mildly elevated. CMV and EBV negative. No folate or B12 deficiencies.  Could be antibiotic induced. Platelets now normal.   4. Recurrent left pleural effusion - no masses concerning for malignancy on CTs. Likely due to CHF. Per primary team.   5. A. Fib/CAD/ s/p CABG - On Eliquis prior to admit patient reported she had only been dosing 5 mg once daily. Now on heparin gtt. Echo with LVEF less than 20%.  There was significant tricuspid regurgitation and significant biatrial biventricular enlargement. Received heparin gtt initially post admit and now back on Eliquis.   6. BLE cellulitis/bacteremia-no vegetation on echo.  abx per ID.   7. Gallbladder wall thickening- Surgery does not feel patient's clinical picture is that of cholecystitis and feels abnormal imaging was likely a manifestation of her heart failure. HIDA scan was normal.      PLAN:  1. Await remaining thrombophilia and polycythemia work-ups.  2. Follow CBC.  3. Eliquis 5 mg twice daily per vascular.  4. Antibiotics per ID.  5. Will check ATIII when off on heparin for sufficient amount of time.  6. Outpatient RBC enzymes.        Note prepared by KAYLEE Wong.  Patient seen and examined by Amilcar Smallwood MD.  Electronically signed by VALENTINE Baca, 03/24/23, 5:05 PM EDT.      I have personally performed a face-to-face diagnostic evaluation on this patient. I have performed a complete history and physical examination, reviewed laboratory studies, and radiographic examinations.  I have completed the majority and substantive portion of the medical decision making.  I have formulated the assessment on this patient and the plan of action as noted above. I have discussed the case with Michaela Chacon NP, have edited/reviewed the note, and agree with the care plan.  The patient  claims to be feeling better.  On examination she has midline chest wall vertical scar and external urinary catheter.  Hemoglobin and platelet count is normal.  She has been switched over to Eliquis.  We will schedule outpatient follow-up.    I discussed the patient's findings and my recommendations with patient and family.    Part of this note may be an electronic transcription/translation of spoken language to printed text using the Dragon Dictation System.    Electronically signed by Amilcar Smallwood MD, 03/24/23, 9:54 PM EDT.

## 2023-03-24 NOTE — OUTREACH NOTE
Prep Survey    Flowsheet Row Responses   [facility] facility patient discharged from? [facility]   Is LACE score < 7 ? No   Eligibility [facility]   Date of Admission 03/20/23   Date of Discharge 03/24/23   Discharge Disposition Home-Health Care [facility]   Discharge diagnosis Cellulitis of right leg   Acute systolic heart failure    Does the patient have one of the following disease processes/diagnoses(primary or secondary)? CHF   Does the patient have Home health ordered? No   Is there a DME ordered? No   Prep survey completed? Yes          DALI MONTAÑO - Registered Nurse

## 2023-03-24 NOTE — PROGRESS NOTES
Name: Jaquelin Valderrama ADMIT: 3/20/2023   : 1940  PCP: Minerva Chatterjee MD    MRN: [id number] LOS: 4 days   AGE/SEX: 83 y.o. female  ROOM:  [facility] 257/1     CC: lower extremity ischemia  Interval History: Patient continues to feel better. Rested well overnight. She worked with PT yesterday and reportedly ambulated around nurses station without difficulty. On room air this morning. Denies pain in her lower extremities. BLE discoloration continues improving.    Subjective   Subjective     Review of Systems   Constitutional: Negative for chills and fever.   Respiratory: Negative for cough and shortness of breath.    Cardiovascular: Positive for leg swelling. Negative for chest pain.   Gastrointestinal: Negative for abdominal pain.   Musculoskeletal: Negative for arthralgias, gait problem and myalgias.   Skin: Positive for color change.   Neurological: Negative for dizziness and light-headedness.   Psychiatric/Behavioral: Negative for agitation and confusion.     Objective  resting in bed, no acute distress,  at beside   Objective     Vitals:   Temp:  [97.3 °F (36.3 °C)-98.7 °F (37.1 °C)] 97.3 °F (36.3 °C)  Heart Rate:  [64-69] 64  Resp:  [16-23] 22  BP: (114-131)/(61-72) 126/64    No intake/output data recorded.    Scheduled Meds:     atorvastatin, 40 mg, Oral, Nightly  carvedilol, 3.125 mg, Oral, BID With Meals  furosemide, 40 mg, Oral, Daily  insulin lispro, 2-9 Units, Subcutaneous, 4x Daily With Meals & Nightly  levothyroxine, 100 mcg, Oral, Q AM  lisinopril, 5 mg, Oral, Q24H  piperacillin-tazobactam, 3.375 g, Intravenous, Q8H  sodium chloride, 10 mL, Intravenous, Q12H  spironolactone, 25 mg, Oral, Daily    IV Meds:   heparin, 18 Units/kg/hr, Last Rate: 17 Units/kg/hr (23 0534)  hold, 1 each      Physical Exam  Vitals and nursing note reviewed.   Constitutional:       General: She is not in acute distress.  HENT:      Head: Normocephalic.   Cardiovascular:      Rate and Rhythm: Normal rate.       Pulses:           Dorsalis pedis pulses are detected w/ Doppler on the right side and 1+ on the left side.        Posterior tibial pulses are detected w/ Doppler on the right side and 1+ on the left side.   Pulmonary:      Effort: Pulmonary effort is normal. No respiratory distress.      Comments: On room air  Abdominal:      Palpations: Abdomen is soft.   Musculoskeletal:      Right lower leg: Edema present.   Skin:     General: Skin is warm.      Capillary Refill: Capillary refill takes less than 2 seconds.      Comments: Discoloration of bilateral feet continues improving and now with mild hyperemic appearance of feet bilaterally.  Right lower leg erythema also continues improving as well.  Motor and sensory function of her BLE remains intact. Scarring noted to right ankle from prior ortho surgeries.    Neurological:      General: No focal deficit present.      Mental Status: She is alert and oriented to person, place, and time. Mental status is at baseline.   Psychiatric:         Mood and Affect: Mood normal.         Behavior: Behavior normal.         Thought Content: Thought content normal.       Data Reviewed:  CBC    Results from last 7 days   Lab Units 03/24/23  0421 03/23/23  0435 03/22/23  0301 03/20/23  0915   WBC 10*3/mm3 3.50 4.10 5.30 11.10*   HEMOGLOBIN g/dL 14.7 14.9 16.0* 17.5*   PLATELETS 10*3/mm3 150 139* 157 180     BMP   Results from last 7 days   Lab Units 03/24/23  0421 03/23/23  0435 03/22/23  0301 03/21/23  1620 03/21/23  1502 03/20/23  0915   SODIUM mmol/L 137 134* 134*  --   --  138   POTASSIUM mmol/L 3.8 4.0 4.2  --   --  4.2   CHLORIDE mmol/L 96* 94* 94*  --   --  100   CO2 mmol/L 34.0* 34.0* 34.0*  --   --  26.0   BUN mg/dL 37* 41* 42*  --   --  41*   CREATININE mg/dL 1.12* 1.18* 1.10*  --   --  0.92   GLUCOSE mg/dL 115* 102* 69  --   --  115*   MAGNESIUM mg/dL  --  1.9 1.8  --  1.9  --    PHOSPHORUS mg/dL  --   --  3.6 4.0  --   --      Coag   Results from last 7 days   Lab Units  23  0421 23  2106 23  1441 23  1132 23  0435 23  2309 23  1625 23  0528 23  1902   INR   --   --   --  1.18*  --   --   --   --  1.29*   APTT seconds 74.8 46.1* 62.4 133.7* 30.2* 104.7* 54.5*   < > 132.9*    < > = values in this interval not displayed.     HbA1C   Lab Results   Component Value Date    HGBA1C 9.20 (H) 2023    HGBA1C 9.40 (H) 01/10/2023    HGBA1C 7.1 (H) 2022     Infection   Results from last 7 days   Lab Units 23  1028 23  1116 23  0921   BLOODCX   --  No growth at 3 days Gram Negative Bacilli*   BODYFLDCX  No growth at 3 days  --   --    BCIDPCR   --   --  Negative by BCID PCR. Culture to Follow.     Radiology(recent) NM HIDA SCAN WITHOUT PHARMACOLOGICAL INTERVENTION    Result Date: 3/22/2023  Impression: Normal HIDA scan. Gallbladder ejection fraction is 49%. Electronically Signed: Saadia Islas  3/22/2023 10:57 AM EDT  Workstation ID: [id number]      Active Hospital Problems:   Active Hospital Problems    Diagnosis  POA   • **Cellulitis of right leg [L03.115]  Yes     Priority: Low      Resolved Hospital Problems   No resolved problems to display.      Assessment & Plan   [place]illin, Subsequent Hospital Care  Assessment / Plan     83 y.o. female with multiple medical comorbidities who presented on 3/20 complaining of 1-2 day history of bilateral foot discoloration, R>L, as well as RLE erythema and warmth since holding her Eliquis for planned thoracentesis.     She had normal waveforms on her ABIs at the ankles with normal pressures of 1.06 on the right and 1.1 on the left but severe digital ischemia bilaterally. CT angiogram chest, abdomen, and pelvis with runoff showed no significant plaque or thrombus present in the thoracic aorta.  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  The SMA and MARINA are widely patent.  The infrarenal aorta is patent with no thrombus.  The  iliac arteries are patent bilaterally.  For the runoff arteriogram, she does not have significant stenosis of the femoral or popliteal arteries bilaterally.  There is 3 vessel runoff to the right foot.  There is a cutoff of contrast on the left after the trifurcation in the calf, but no evidence of thrombus and Dr. Lacey felt that this was a technical contrast timing issue.  Arterial duplex was obtained following the CTA and confirmed patency of all three left tibial vessels to the distal calf and ABIs remained normal at >1 bilaterally. WBI and digit pressures were normal. As previously discussed, patient may have experienced microvascular thrombosis in bilateral feet after holding anticoagulation for planned thoracentesis vs malperfusion to her periphery with severe heart failure diagnosis.   She does have gram negative bacilli in her blood cultures in 1 out of 2 bottles. General surgery, hematology, ID, and thoracic have all seen.    Patient continues feeling much better and BLE discoloration continues to improve.  Motor and sensory function remain intact.  She continues to have strong pedal signals bilaterally.  She was able to walk around the nurse's station yesterday with PT without significant difficulty.  Remains on heparin drip with stable CBC today.  PTT 74.8.  Hematology is following and thrombophilia workup and polycythemia workups remain in progress.  We will stop heparin drip and start patient on Eliquis 5 mg twice a day per hematology recommendations. She is okay for discharge from our standpoint and we will arrange follow-up at our office in 2-3 weeks.       VALENTINE Jensen  03/24/23  09:04 EDT  Office Number [phone]

## 2023-03-24 NOTE — DISCHARGE SUMMARY
[facility] Medicine Services  Discharge Summary    Date of Service: 23  Patient condition: Stable    Patient Name: Jaquelin Valderrama  : 1940  MRN: [id number]    Date of Admission: 3/20/2023  Discharge Diagnosis: acute on chronic systolic heart failure. RLE cellulitis, Ischmic right limb. Pleural effusion   Date of Discharge:  23    Primary Care Physician: Minerva Chatterjee MD      Presenting Problem:   Cellulitis of right leg [L03.115]    Active and Resolved Hospital Problems:  Active Hospital Problems    Diagnosis POA   • **Cellulitis of right leg [L03.115] Yes   • Acute systolic heart failure (CMS/HCC) [I50.21] Unknown   • Acute on chronic systolic heart failure (CMS/HCC) [I50.23] Unknown   • Chronic systolic heart failure (CMS/HCC) [I50.22] Unknown      Resolved Hospital Problems   No resolved problems to display.         Hospital Course     Hospital Course:  Jaquelin Valderrama is a 83 y.o. female Patient is a 83-year-old female who presented to the emergency room with chief complaint of right lower extremity swelling and redness with pain.  She was admitted and treated for right lower extremity cellulitis/erysipelas and had gram-negative bacilli in anaerobic bottle bacteremia which was likely contaminant.  Patient was treated with IV Zosyn with significant improvement of her symptoms.  She is transition to oral levofloxacin to complete 7 days.  Patient also had right foot discoloration ischemia concern for ischemic limb in setting of A-fib and holding Eliquis.  Vascular was consulted and she had imaging done showed no large vessel occlusion.  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  She was likely a low cardiac output state with acute on chronic systolic heart failure with worsening ejection fraction 15-20%.  She was treated with IV Lasix seen by cardiology who adjusted her medications and  optimized to titrate as an outpatient.  Patient feeling significantly better and her lower extremity edema has improved significantly.  She will continue to have outpatient follow-up with her cardiologist as well as primary care physician.  Patient also had pleural effusion right more than left with history of decortication was seen by thoracic surgery underwent thoracentesis with improvement of her symptoms.  Patient also had distended gallbladder was seen by surgery had a HIDA scan which showed low likelihood of cholecystitis.  Patient also had erythrocytosis and was seen by hematology will follow-up as an outpatient.        DISCHARGE Follow Up Recommendations for labs and diagnostics:       Reasons For Change In Medications and Indications for New Medications:      Day of Discharge     Vital Signs:  Temp:  [97.3 °F (36.3 °C)-97.5 °F (36.4 °C)] 97.5 °F (36.4 °C)  Heart Rate:  [64-96] 96  Resp:  [16-25] 18  BP: (105-135)/(61-83) 135/78    Physical Exam:  Physical Exam   General: No acute distress  HEENT: neck supple, normal oral mucosa, no masses, no lymphadenopathy.   Lungs:  No crackles, No Rhonchi. Equal excursions.   CV - Normal S1/S2, no murmur, Iregular rhythm, rate controlled   Abdomin - Soft, non-tender, non-distended, normal bowel sounds  Extremities - mild bilateral LE pitting edema - improved  Neuro - No focal weakness, normal sensation  Psych - Alert and oriented x3  Skin - bluish discoloration almost resolved bilateral LE. RLE erythema and tenderness almost reslved. No tenderness       Pertinent  and/or Most Recent Results     LAB RESULTS:      Lab 03/24/23  0421 03/23/23  2106 03/23/23  1441 03/23/23  1132 03/23/23  0435 03/22/23  1038 03/22/23  0301 03/21/23  2219 03/21/23  1620 03/21/23  0528 03/20/23  1902 03/20/23  0915   WBC 3.50  --   --   --  4.10  --  5.30  --   --   --   --  11.10*   HEMOGLOBIN 14.7  --   --   --  14.9  --  16.0*  --   --   --   --  17.5*   HEMATOCRIT 44.5  --   --   --  45.0   --  50.4*  --   --   --   --  55.3*   PLATELETS 150  --   --   --  139*  --  157  --   --   --   --  180   NEUTROS ABS 2.40  --   --   --  2.90  --  3.90  --   --   --   --  10.00*   LYMPHS ABS 0.70  --   --   --  0.80  --  0.80  --   --   --   --  0.40*   MONOS ABS 0.40  --   --   --  0.40  --  0.40  --   --   --   --  0.60   EOS ABS 0.10  --   --   --  0.10  --  0.10  --   --   --   --  0.00   MCV 99.1*  --   --   --  99.2*  --  101.9*  --   --   --   --  101.3*   LACTATE  --   --   --   --   --   --   --   --  1.2  --   --   --    LDH  --   --   --   --   --   --   --  311*  --   --   --   --    PROTIME  --   --   --  12.0*  --   --   --   --   --   --  13.1*  --    APTT 74.8 46.1* 62.4 133.7* 30.2*   < >  --  112.1*  --    < > 132.9*  --     < > = values in this interval not displayed.         Lab 03/24/23  0421 03/23/23  0435 03/22/23  0301 03/21/23  1620 03/21/23  1502 03/20/23  0915   SODIUM 137 134* 134*  --   --  138   POTASSIUM 3.8 4.0 4.2  --   --  4.2   CHLORIDE 96* 94* 94*  --   --  100   CO2 34.0* 34.0* 34.0*  --   --  26.0   ANION GAP 7.0 6.0 6.0  --   --  12.0   BUN 37* 41* 42*  --   --  41*   CREATININE 1.12* 1.18* 1.10*  --   --  0.92   EGFR 49.5* 46.5* 50.6*  --   --  62.7   GLUCOSE 115* 102* 69  --   --  115*   CALCIUM 8.1* 8.3* 8.8  --   --  9.0   MAGNESIUM  --  1.9 1.8  --  1.9  --    PHOSPHORUS  --   --  3.6 4.0  --   --    TSH  --   --   --  3.400  --   --          Lab 03/22/23  0301 03/21/23  2219 03/21/23  1502 03/20/23  0915   TOTAL PROTEIN 5.5* 5.9*  --  6.0   ALBUMIN 2.9* 3.0*  --  3.0*   GLOBULIN 2.6  --   --  3.0   ALT (SGPT) 18 22  --  44*   AST (SGOT) 26 31  --  54*   BILIRUBIN 0.8 0.8  --  1.4*   INDIRECT BILIRUBIN  --  0.5  --   --    BETA 2 GLYCO 1 IGG  --   --  <9  --    BETA 2 GLYCO 1 IGA  --   --  <9  --    BETA 2 GLYCO 1 IGM  --   --  <9  --    BILIRUBIN DIRECT  --  0.3  --   --    ALK PHOS 145* 182*  --  204*         Lab 03/23/23  1132 03/22/23  0301 03/21/23  230  03/20/23  1902   HSTROP T  --  51* 54*  --    PROTIME 12.0*  --   --  13.1*   INR 1.18*  --   --  1.29*             Lab 03/21/23  1620   FOLATE >20.00   VITAMIN B 12 868         Brief Urine Lab Results  (Last result in the past 365 days)      Color   Clarity   Blood   Leuk Est   Nitrite   Protein   CREAT   Urine HCG        03/21/23 1144 Yellow   Clear   Negative   Negative   Negative   30 mg/dL (1+)               Microbiology Results (last 10 days)     Procedure Component Value - Date/Time    CANDIDA AURIS SCREEN - Swab, Axilla Right, Axilla Left and Groin [[id number]]  (Normal) Collected: 03/21/23 1145    Lab Status: Preliminary result Specimen: Swab from Axilla Right, Axilla Left and Groin Updated: 03/24/23 1316     [place] Auris Screen Culture No Candida auris isolated at 3 days    Body Fluid Culture - Body Fluid, Pleural Cavity [[id number]] Collected: 03/21/23 1028    Lab Status: Preliminary result Specimen: Body Fluid from Pleural Cavity Updated: 03/24/23 0648     Body Fluid Culture No growth at 3 days     Gram Stain Few (2+) WBCs seen      No organisms seen    MRSA Screen, PCR (Inpatient) - Swab, Nares [[id number]]  (Normal) Collected: 03/20/23 1349    Lab Status: Final result Specimen: Swab from Nares Updated: 03/20/23 1756     MRSA PCR No MRSA Detected    Narrative:      The negative predictive value of this diagnostic test is high and should only be used to consider de-escalating anti-MRSA therapy. A positive result may indicate colonization with MRSA and must be correlated clinically.    Blood Culture - Blood, Hand, Right [[id number]]  (Normal) Collected: 03/20/23 1116    Lab Status: Preliminary result Specimen: Blood from Hand, Right Updated: 03/24/23 1130     Blood Culture No growth at 4 days    Blood Culture - Blood, Arm, Left [[id number]]  (Abnormal) Collected: 03/20/23 0921    Lab Status: Preliminary result Specimen: Blood from Arm, Left Updated: 03/23/23 0629     Blood Culture Gram Negative Bacilli      Isolated from Anaerobic Bottle     Gram Stain Anaerobic Bottle Gram negative bacilli    Narrative:      Sending to [facility] for ID and MICs    Less than seven (7) mL's of blood was collected.  Insufficient quantity may yield false negative results.    Blood Culture ID, PCR - Blood, Arm, Left [[id number]] Collected: 03/20/23 0921    Lab Status: Final result Specimen: Blood from Arm, Left Updated: 03/21/23 0639     BCID, PCR Negative by BCID PCR. Culture to Follow.     BOTTLE TYPE Anaerobic Bottle          XR Chest 2 View    Result Date: 3/13/2023  Impression: Impression: 1.Moderate right and small left pleural effusions. 2.Bibasilar opacities with right greater than left basilar consolidation. Findings may related to pneumonia, atelectasis, or other infiltrate. 3.Cardiomegaly. Electronically Signed: Jacek Jameson  3/13/2023 1:13 PM EDT  Workstation ID: [id number]    NM HIDA SCAN WITHOUT PHARMACOLOGICAL INTERVENTION    Result Date: 3/22/2023  Impression: Impression: Normal HIDA scan. Gallbladder ejection fraction is 49%. Electronically Signed: Saadia Islas  3/22/2023 10:57 AM EDT  Workstation ID: [id number]    CT Angio Abdominal Aorta Bilateral Iliofem Runoff    Result Date: 3/20/2023  Impression: 1.No acute aortic dissection, aneurysm or aortic injury. No acute pulmonary emboli. 2.Large right and trace left pleural effusions. Near complete atelectasis of right lower lobe. Bibasilar and right middle lobe opacities may represent atelectasis or infiltrates. Follow-up recommended. 3.Cardiomegaly with reflux of contrast and IVC and hepatic veins suggest right heart failure. 4.Gallbladder wall thickening is a nonspecific finding can be seen in multiple inflammatory process including acute cholecystitis and volume overload. If there is clinical concern for acute cholecystitis, ultrasound can be obtained for further evaluation. 5.Mild ascites throughout abdomen and pelvis. 6.Anasarca. 7.Proximal left trifurcation is patent. Remainder  of the left leg arteries including anterior tibial, posterior tibial, fibular arteries demonstrate markedly diminutive flow with no flow in left foot. Case discussed with nurse alin Smith 3/20/2023 8:17 PM EDT. Electronically Signed: Yoni Mansoor  3/20/2023 8:18 PM EDT  Workstation ID: [id number]    XR Chest 1 View    Result Date: 3/22/2023  Impression: Impression: 1. New right basilar opacity may represent reaccumulation of small right pleural effusion, with right basilar atelectasis or pneumonia. 2. Increase in left basilar airspace disease, favored to represent increased atelectasis. 3. Interstitial edema is again noted, without significant change. 4. Stable cardiac enlargement. Electronically Signed: Saadia Islas  3/22/2023 7:32 AM EDT  Workstation ID: [id number]    XR Chest 1 View    Result Date: 3/21/2023  Impression: Impression: Interval near complete resolution of right effusion status post thoracentesis. No pneumothorax. Additional findings as above. Electronically Signed: Balwinder Klein  3/21/2023 11:24 AM EDT  Workstation ID: [id number]    XR Chest 1 View    Result Date: 3/21/2023  Impression: Impression: 1. Interval decrease in right pleural effusion. There appears to be some loculated fluid within the fissures now present. 2. Increasing airspace opacity in the right perihilar region suggesting pneumonia or asymmetric edema. 3. Bibasilar consolidations suggesting atelectasis or infiltrate. Small left effusion. 4. Cardiomegaly. Electronically Signed: Balwinder Klein  3/21/2023 8:55 AM EDT  Workstation ID: [id number]    US Thoracentesis    Result Date: 3/21/2023  Impression: Impression: Technically successful ultrasound-guided large volume right-sided thoracentesis as described above. A sample of the turbid yellow pleural fluid was sent for laboratory evaluation. Electronically Signed: Antoine Carpio  3/21/2023 11:13 AM EDT  Workstation ID: [id number]    CT Angiogram Chest    Result Date: 3/20/2023  Impression:  1.No acute aortic dissection, aneurysm or aortic injury. No acute pulmonary emboli. 2.Large right and trace left pleural effusions. Near complete atelectasis of right lower lobe. Bibasilar and right middle lobe opacities may represent atelectasis or infiltrates. Follow-up recommended. 3.Cardiomegaly with reflux of contrast and IVC and hepatic veins suggest right heart failure. 4.Gallbladder wall thickening is a nonspecific finding can be seen in multiple inflammatory process including acute cholecystitis and volume overload. If there is clinical concern for acute cholecystitis, ultrasound can be obtained for further evaluation. 5.Mild ascites throughout abdomen and pelvis. 6.Anasarca. 7.Proximal left trifurcation is patent. Remainder of the left leg arteries including anterior tibial, posterior tibial, fibular arteries demonstrate markedly diminutive flow with no flow in left foot. Case discussed with nurse alin Smith 3/20/2023 8:17 PM EDT. Electronically Signed: Yoni Max  3/20/2023 8:18 PM EDT  Workstation ID: [id number]      Results for orders placed during the hospital encounter of 03/20/23    Duplex Lower Extremity Art / Grafts - Bilateral CAR    Interpretation Summary  •  Right lower extremity arteries are patent and without significant stenosis. Left lower extremity arteries are patent and without significant stenosis.  •  There is multiphasic flow into the mid anterior tibial artery, distal posterior tibial artery, and distal peroneal arteries on the left.  NADEGE are normal and unchanged form prior exam on 3/20/23      Results for orders placed during the hospital encounter of 03/20/23    Duplex Lower Extremity Art / Grafts - Bilateral CAR    Interpretation Summary  •  Right lower extremity arteries are patent and without significant stenosis. Left lower extremity arteries are patent and without significant stenosis.  •  There is multiphasic flow into the mid anterior tibial artery, distal posterior  tibial artery, and distal peroneal arteries on the left.  NADEGE are normal and unchanged form prior exam on 3/20/23      Results for orders placed during the hospital encounter of 03/20/23    Adult Transthoracic Echo Complete W/ Cont if Necessary Per Protocol    Interpretation Summary  •  Left ventricular ejection fraction appears to be less than 20%.  •  Estimated right ventricular systolic pressure from tricuspid regurgitation is normal (<35 mmHg).    Indications  Shortness of breath    Technically satisfactory study.  Mitral valve is structurally normal.  Moderate mitral regurgitation  Tricuspid valve is structurally normal.  Mild coaptation of the tricuspid valve with significant tricuspid regurgitation.  Aortic valve is structurally normal.  Pulmonic valve could not be well visualized.  Biatrial and significant biventricular enlargement.  Left ventricular severe diffuse hypocontractility with ejection fraction of 15 to 20%.  Atrial septum is intact.  Aorta is normal.  No pericardial effusion or intracardiac thrombus is seen.    Impression  Moderate mitral regurgitation.  Significant tricuspid regurgitation with normal coaptation of tricuspid valve.  Significant biatrial biventricular enlargement.  Left ventricular severe diffuse hypocontractility with ejection fraction of 15 to 20%.      Labs Pending at Discharge:  Pending Labs     Order Current Status    [place] In process    Heparin Induced PLT Antibody With / Rfx In process    Heparin-induced Platelet Antibody In process    JAK2 [id number], Rfx CALR / MPL - Blood, In process    Lupus Anticoagulant In process    Blood Culture - Blood, Arm, Left Preliminary result    Blood Culture - Blood, Hand, Right Preliminary result    Body Fluid Culture - Body Fluid, Pleural Cavity Preliminary result    CANDIDA AURIS SCREEN - Swab, Axilla Right, Axilla Left and Groin Preliminary result          Procedures Performed           Consults:   Consults     Date and Time Order Name  Status Description    3/21/2023  8:56 AM Inpatient General Surgery Consult      3/21/2023  8:48 AM Hematology & Oncology Inpatient Consult Completed     3/20/2023  1:53 PM Inpatient Infectious Diseases Consult Completed     3/20/2023  1:53 PM Inpatient Cardiology Consult      3/20/2023 12:40 PM Inpatient Vascular Surgery Consult Completed     3/20/2023 11:39 AM Inpatient Thoracic Surgery Consult Completed     3/20/2023 10:11 AM Hospitalist (on-call MD unless specified)              Discharge Details        Discharge Medications      New Medications      Instructions Start Date   atorvastatin 40 MG tablet  Commonly known as: LIPITOR   40 mg, Oral, Nightly      carvedilol 3.125 MG tablet  Commonly known as: COREG   3.125 mg, Oral, 2 Times Daily With Meals      levoFLOXacin 500 MG tablet  Commonly known as: LEVAQUIN   Take 1 tablet by mouth Daily at 3 pm for 3 doses   Start Date: March 25, 2023     lisinopril 5 MG tablet  Commonly known as: PRINIVIL,ZESTRIL   5 mg, Oral, Every 24 Hours Scheduled   Start Date: March 25, 2023        Changes to Medications      Instructions Start Date   apixaban 5 MG tablet tablet  Commonly known as: ELIQUIS  What changed:   · when to take this  · additional instructions   5 mg, Oral, Every 12 Hours Scheduled      furosemide 40 MG tablet  Commonly known as: LASIX  What changed:   · medication strength  · how much to take   40 mg, Oral, Daily   Start Date: March 25, 2023        Continue These Medications      Instructions Start Date   acetaminophen 500 MG tablet  Commonly known as: TYLENOL   500 mg, Oral, Every 6 Hours PRN      insulin aspart prot-insulin aspart (70-30) 100 UNIT/ML injection  Commonly known as: novoLOG 70/30   10 Units, Subcutaneous, 2 Times Daily PRN      Krill Oil 300 MG capsule   1 capsule, Oral, Daily      levothyroxine 100 MCG tablet  Commonly known as: Synthroid   100 mcg, Oral, Daily      MULTIVITAMIN ADULT EXTRA C PO   1 tablet, Oral, Daily      mupirocin 2 %  ointment  Commonly known as: BACTROBAN   1 application, Topical, 3 Times Daily, Rash on both feet      spironolactone 25 MG tablet  Commonly known as: ALDACTONE   25 mg, Oral, Daily      Vitamin D3 50 MCG (2000 UT) tablet   1 tablet, Oral, Daily         Stop These Medications    metoprolol succinate XL 25 MG 24 hr tablet  Commonly known as: TOPROL-XL            Allergies   Allergen Reactions   • Prednisone Other (See Comments)     Increased BP         Discharge Disposition: home with OP follow up  Home-Health Care Svc    Diet:  Hospital:  Diet Order   Procedures   • Diet: Cardiac Diets, Diabetic Diets; Healthy Heart (2-3 Na+); Consistent Carbohydrate; Texture: Regular Texture (IDDSI 7); Fluid Consistency: Thin (IDDSI 0)         Discharge Activity:         CODE STATUS:  There are no questions and answers to display.         Future Appointments   Date Time Provider Department Center   3/28/2023 11:30 AM [facility] 1 [facility]   4/11/2023  1:40 PM Barbara Joiner APRN [facility]   4/11/2023  3:15 PM Montserrat Hupmhries DNP, [facility]   7/17/2023 10:15 AM Minerva Chatterjee MD [facility]           Time spent on Discharge including face to face service:  33 minutes    This patient has been  and discussed with . 03/24/23      Signature: Electronically signed by Lily Nesbitt MD, 03/24/23, 16:12 EDT.  [facility] Hospitalist Team

## 2023-03-25 LAB
BACTERIA SPEC AEROBE CULT: NORMAL
PF4 HEPARIN CMPLX IGG SERPL IA: 0.07 OD (ref 0–0.4)
PF4 HEPARIN CMPLX IGG SERPL IA: 0.08 OD (ref 0–0.4)

## 2023-03-26 LAB
BACTERIA FLD CULT: NORMAL
BACTERIA ISLT: NORMAL
GRAM STN SPEC: NORMAL
GRAM STN SPEC: NORMAL

## 2023-03-27 ENCOUNTER — TRANSITIONAL CARE MANAGEMENT TELEPHONE ENCOUNTER (OUTPATIENT)
Dept: CALL CENTER | Facility: HOSPITAL | Age: 83
End: 2023-03-27
Payer: MEDICARE

## 2023-03-27 LAB
BACTERIA SPEC AEROBE CULT: ABNORMAL
COBALT SERPL-MCNC: <1 UG/L (ref 0–0.9)
GRAM STN SPEC: ABNORMAL
ISOLATED FROM: ABNORMAL

## 2023-03-27 NOTE — OUTREACH NOTE
Call Center TCM Note    Flowsheet Row Responses   [facility] patient discharged from? [facility]   Does the patient have one of the following disease processes/diagnoses(primary or secondary)? CHF   TCM attempt successful? Yes   Call start time 1502   Call end time 1510   Discharge diagnosis Cellulitis of right leg   Acute systolic heart failure    Meds reviewed with patient/caregiver? Yes   Is the patient having any side effects they believe may be caused by any medication additions or changes? No   Does the patient have all medications ordered at discharge? Yes   Is the patient taking all medications as directed (includes completed medication regime)? Yes   Comments Hospital f/u appt is 04/03/23 @ 415 TCM complete.   Does the patient have an appointment with their PCP within 7 days of discharge? Yes   Has home health visited the patient within 72 hours of discharge? N/A   Pulse Ox monitoring None   Did the patient receive a copy of their discharge instructions? Yes   Nursing interventions Reviewed instructions with patient   What is the patient's perception of their health status since discharge? Improving   Nursing interventions Nurse provided patient education   Is the patient able to teach back signs and symptoms of worsening condition? (i.e. weight gain, shortness of air, etc.) Yes   If the patient is a current smoker, are they able to teach back resources for cessation? Not a smoker   Is the patient/caregiver able to teach back the hierarchy of who to call/visit for symptoms/problems? PCP, Specialist, Home health nurse, Urgent Care, ED, 911 Yes   Additional teach back comments Still with edema, staying around 150 lbs she says. She is walking more. She is working on getting her leg strength back.   Notified Case Management Education issues   Is the patient able to teach back Heart Failure Zones? Yes   CHF Zone this Call Yellow Zone   Yellow Zone Not feeling as good as usual, Dry, hacking cough, Worsening  shortness of breath with activity, Sudden weight gain of more than 2-3 lbs in a 24 hour period (or 5 lbs in a week), Discomfort or swelling in the abdomen, Trouble sleeping   TCM call completed? Yes   Wrap up additional comments She is anxious to do a  trip in June. She will discuss her medications with the office prior to the thoracentesis.   Call end time 1510   Would this patient benefit from a Referral to [facility] Social Work? No   Is the patient interested in additional calls from an ambulatory ?  NOTE:  applies to high risk patients requiring additional follow-up. No           Grace Gutierrez, RN    3/27/2023, 15:11 EDT

## 2023-03-27 NOTE — CASE MANAGEMENT/SOCIAL WORK
Case Management Discharge Note      Final Note: Home declined services         Selected Continued Care - Discharged on 3/24/2023 Admission date: 3/20/2023 - Discharge disposition: Home-Health Care Svc            Transportation Services  Private: Car    Final Discharge Disposition Code: 01 - home or self-care

## 2023-03-28 ENCOUNTER — HOSPITAL ENCOUNTER (OUTPATIENT)
Dept: ULTRASOUND IMAGING | Facility: HOSPITAL | Age: 83
Discharge: HOME | End: 2023-03-28
Payer: MEDICARE

## 2023-03-28 RX ORDER — SODIUM CHLORIDE 0.9 % (FLUSH) 0.9 %
10 SYRINGE (ML) INJECTION AS NEEDED
OUTPATIENT
Start: 2023-03-28

## 2023-03-28 RX ORDER — SODIUM CHLORIDE 0.9 % (FLUSH) 0.9 %
3 SYRINGE (ML) INJECTION EVERY 12 HOURS SCHEDULED
OUTPATIENT
Start: 2023-03-28

## 2023-03-28 RX ORDER — SODIUM CHLORIDE 9 MG/ML
40 INJECTION, SOLUTION INTRAVENOUS AS NEEDED
OUTPATIENT
Start: 2023-03-28

## 2023-03-30 LAB
APTT HEX PL PPP: 6 SEC (ref 0–11)
APTT SCREEN TO CONFIRM RATIO: 0.82 RATIO (ref 0–1.34)
CALR EXON 9 MUT ANL BLD/T: NORMAL
CITATION REF LAB TEST: NORMAL
CITATION REF LAB TEST: NORMAL
CONFIRM APTT/NORMAL: 42.3 SEC (ref 0–47.6)
EXTRACTION: NORMAL
JAK2 P.V617F BLD/T QL: NORMAL
LA 2 SCREEN W REFLEX-IMP: ABNORMAL
LAB DIRECTOR NAME PROVIDER: NORMAL
LABORATORY COMMENT REPORT: NORMAL
Lab: NORMAL
MIXING APTT: 44.9 SEC (ref 0–40.5)
MPL GENE MUT TESTED BLD/T: NORMAL
MPL P.W515L+W515K+S505N BLD/T QL: NORMAL
REF LAB TEST METHOD: NORMAL
REFLEX: NORMAL
SCREEN APTT: 48.1 SEC (ref 0–43.5)
SCREEN DRVVT: 42.8 SEC (ref 0–47)
SERVICE CMNT-IMP: NORMAL
THROMBIN TIME: 119.6 SEC (ref 0–23)
TT IMM NP PPP: 38.3 SEC (ref 0–23)
TT P HPASE PPP: 19.2 SEC (ref 0–23)

## 2023-04-03 ENCOUNTER — OFFICE VISIT (OUTPATIENT)
Dept: INTERNAL MEDICINE | Facility: CLINIC | Age: 83
End: 2023-04-03
Payer: MEDICARE

## 2023-04-03 VITALS
DIASTOLIC BLOOD PRESSURE: 92 MMHG | WEIGHT: 141 LBS | SYSTOLIC BLOOD PRESSURE: 148 MMHG | HEART RATE: 88 BPM | OXYGEN SATURATION: 97 % | BODY MASS INDEX: 24.98 KG/M2 | HEIGHT: 63 IN

## 2023-04-03 DIAGNOSIS — L03.115 CELLULITIS OF RIGHT LEG: ICD-10-CM

## 2023-04-03 DIAGNOSIS — E11.59 TYPE 2 DIABETES MELLITUS WITH OTHER CIRCULATORY COMPLICATION, WITHOUT LONG-TERM CURRENT USE OF INSULIN: ICD-10-CM

## 2023-04-03 DIAGNOSIS — I50.21 ACUTE SYSTOLIC HEART FAILURE: Primary | ICD-10-CM

## 2023-04-03 RX ORDER — LISINOPRIL 10 MG/1
10 TABLET ORAL
Qty: 90 TABLET | Refills: 1 | Status: SHIPPED | OUTPATIENT
Start: 2023-04-03 | End: 2023-04-11

## 2023-04-03 NOTE — PROGRESS NOTES
"Jaquelin Valderrama is a 83 y.o. female admitted to [facility]  and  is seen for follow up of cellulitis    Admission date 3/20/2023 D/C date 3/24/2023  Discharge summary is available.  Risk for Readmission (LACE) No data recorded    Admitted with cellulitis and acute on chronic CHF.  First thought she had ischemia of the extremity that was ruled out- perhaps just microvascular issues, tx with heparin. She also had thoracentesis of 1240 cc.   BS have been low, trouble keeping BS > 100.   has decreased her insulin to prn only- currently 5U at a time.   She has increased her furosemide back to 60 mg since her legs started to swell again at home. She is checking her weights.  She has been trying to ride a recumbent bicycle at home- 10 minutes a time.   Stopped taking her atorvastatin- doesn't believe in it \"been there, done that with statin\"  BP readings have been 140-150/+  Slight cough, hard to get an answer about SOB, says she just feels tired.      Current outpatient and discharge medications have been reconciled for the patient.  Reviewed by: Minerva Chatterjee MD      She was admitted for the following reason(s):  Primary admitting diagnosis at discharge was cellulitis.  Pertinent secondary diagnoses include acute on chronic CHF.  Course During Hospital Stay:  Reviewed in detail- complex- worsening EF, discussed ICD. Told they think she has MAYELIN as alarms were going off at night when she was sleeping..  has suspected but she has refused to pursue in the past.   Procedures Performed in Hospital: please see EPIC record for further details of results and finding  Pending tests: none  Pain Control:  no pain  Diet: low salt  Activity after Discharge: activity as tolerated    Items to be addressed at first follow up visit include:  1. Medication changes: change in insulin, increase in furosemide.   2.     She reports her overall condition are improving since discharge.  Specific complaints: see " "HPI.  Social support is said to be adequate to meet her needs. .     Objective   Vitals:    04/03/23 1612   BP: 148/92   Pulse: 88   SpO2: 97%   Weight: 64 kg (141 lb)   Height: 160 cm (63\")     Body mass index is 24.98 kg/m².    Physical Exam  Constitutional:       Appearance: She is ill-appearing.   Cardiovascular:      Rate and Rhythm: Normal rate. Rhythm irregular.   Pulmonary:      Effort: Pulmonary effort is normal.      Breath sounds: Examination of the left-middle field reveals rhonchi. Examination of the right-lower field reveals decreased breath sounds. Decreased breath sounds and rhonchi present. No wheezing.      Comments: Congestion in L lung clears with cough  Feet:      Right foot:      Skin integrity: Erythema present.      Left foot:      Skin integrity: Erythema present.      Comments: Pulses aren't palpable but due to edema- feet/toes are warm. There is no tissue breakdown. Few spots on lower leg with weeping clear fluide  Lymphadenopathy:      Cervical: No cervical adenopathy.          Assessment & Plan    Problem List Items Addressed This Visit        Cardiac and Vasculature    Acute systolic heart failure (CMS/HCC) - Primary       Endocrine and Metabolic    Type 2 diabetes mellitus with circulatory disorder, without long-term current use of insulin       Infectious Diseases    Cellulitis of right leg   1/ Will remain on furosemide 60 mg daily, cut back to 40 if she feels bad or weight dropping, call with questions.  2- feet look better but not great- keep elevated, compressed  3-much improved per report- continue with prn insulin now.   Keep f/u with heart failure clinic.      {Followup: 23]    This post hospital patient care was coordinated using transitional care management strategy:  I certify that the following are true:  1. Communication was made within two business days of discharge.  2. Complexity of MDM is HIGH  3. A Face to Face visit occurred within within 14 days      "

## 2023-04-03 NOTE — PROGRESS NOTES
Transitional Care Follow Up Visit  Subjective     Jaquelin Valderrama is a 83 y.o. female who presents for a transitional care management visit.    Within 48 business hours after discharge our office contacted her via telephone to coordinate her care and needs.      I reviewed and discussed the details of that call along with the discharge summary, hospital problems, inpatient lab results, inpatient diagnostic studies, and consultation reports with Jaquelin.     Current outpatient and discharge medications have been reconciled for the patient.  Reviewed by: Minerva Roberts MA      Date of TCM Phone Call 3/24/2023   [facility]   Date of Admission 3/20/2023   Date of Discharge 3/24/2023   Discharge Disposition Home-Health Care [facility]     Risk for Readmission (LACE) Score: 9 (3/24/2023  6:00 AM)      History of Present Illness   Course During Hospital Stay:  ***     {Common H&P Review Areas:34477}    Review of Systems    Objective   Physical Exam    Assessment & Plan   {Assess/PlanSmartLinks:83593}

## 2023-04-04 ENCOUNTER — TELEPHONE (OUTPATIENT)
Dept: INTERNAL MEDICINE | Facility: CLINIC | Age: 83
End: 2023-04-04

## 2023-04-04 ENCOUNTER — READMISSION MANAGEMENT (OUTPATIENT)
Dept: CALL CENTER | Facility: HOSPITAL | Age: 83
End: 2023-04-04
Payer: MEDICARE

## 2023-04-04 NOTE — OUTREACH NOTE
CHF Week 2 Survey    Flowsheet Row Responses   [facility] patient discharged from? [facility]   Does the patient have one of the following disease processes/diagnoses(primary or secondary)? CHF   Week 2 attempt successful? Yes   Call start time 1156   Call end time 1200   Discharge diagnosis Cellulitis of right leg   Acute systolic heart failure    Medication alerts for this patient taking OTC cold meds for congestion.   Meds reviewed with patient/caregiver? Yes   Is the patient taking all medications as directed (includes completed medication regime)? Yes   Does the patient have a primary care provider?  Yes   Does the patient have an appointment with their PCP within 7 days of discharge? Yes   Has the patient kept scheduled appointments due by today? Yes   Pulse Ox monitoring Intermittent   Pulse Ox device source Patient   O2 Sat comments RA 97%   O2 Sat: education provided Sat levels, Monitoring frequency, When to seek care   Psychosocial issues? No   Comments LE edema improving, she has been soaking in epsom salt. Still having SOA during call.   What is the patient's perception of their health status since discharge? Improving   Nursing interventions Nurse provided patient education   Is the patient/caregiver able to teach back the hierarchy of who to call/visit for symptoms/problems? PCP, Specialist, Home health nurse, Urgent Care, ED, 911 Yes   CHF Zone this Call Yellow Zone   Yellow Zone Not feeling as good as usual, Dry, hacking cough, Discomfort or swelling in the abdomen   CHF Week 2 call completed? Yes   Is the patient interested in additional calls from an ambulatory ?  NOTE:  applies to high risk patients requiring additional follow-up. No   Call end time 1200          Babita JUNG - Registered Nurse

## 2023-04-11 ENCOUNTER — OFFICE VISIT (OUTPATIENT)
Dept: SURGERY | Facility: CLINIC | Age: 83
End: 2023-04-11
Payer: MEDICARE

## 2023-04-11 ENCOUNTER — TELEPHONE (OUTPATIENT)
Dept: CARDIOLOGY | Facility: CLINIC | Age: 83
End: 2023-04-11

## 2023-04-11 ENCOUNTER — OFFICE VISIT (OUTPATIENT)
Dept: CARDIOLOGY | Facility: CLINIC | Age: 83
End: 2023-04-11
Payer: MEDICARE

## 2023-04-11 ENCOUNTER — READMISSION MANAGEMENT (OUTPATIENT)
Dept: CALL CENTER | Facility: HOSPITAL | Age: 83
End: 2023-04-11
Payer: MEDICARE

## 2023-04-11 VITALS
WEIGHT: 135 LBS | BODY MASS INDEX: 23.92 KG/M2 | DIASTOLIC BLOOD PRESSURE: 83 MMHG | TEMPERATURE: 97.5 F | HEIGHT: 63 IN | SYSTOLIC BLOOD PRESSURE: 127 MMHG | HEART RATE: 80 BPM | OXYGEN SATURATION: 96 %

## 2023-04-11 VITALS
OXYGEN SATURATION: 95 % | SYSTOLIC BLOOD PRESSURE: 114 MMHG | DIASTOLIC BLOOD PRESSURE: 70 MMHG | HEART RATE: 81 BPM | HEIGHT: 55 IN | BODY MASS INDEX: 31.7 KG/M2 | WEIGHT: 137 LBS

## 2023-04-11 DIAGNOSIS — J90 PLEURAL EFFUSION ON RIGHT: Primary | ICD-10-CM

## 2023-04-11 DIAGNOSIS — I48.21 PERMANENT ATRIAL FIBRILLATION: ICD-10-CM

## 2023-04-11 DIAGNOSIS — I25.810 CORONARY ARTERY DISEASE INVOLVING CORONARY BYPASS GRAFT OF NATIVE HEART WITHOUT ANGINA PECTORIS: ICD-10-CM

## 2023-04-11 DIAGNOSIS — L03.115 CELLULITIS OF RIGHT LEG: ICD-10-CM

## 2023-04-11 DIAGNOSIS — Z95.1 HX OF CABG: ICD-10-CM

## 2023-04-11 DIAGNOSIS — R60.0 PEDAL EDEMA: ICD-10-CM

## 2023-04-11 DIAGNOSIS — I50.23 ACUTE ON CHRONIC SYSTOLIC HEART FAILURE: Primary | ICD-10-CM

## 2023-04-11 RX ORDER — SACUBITRIL AND VALSARTAN 24; 26 MG/1; MG/1
1 TABLET, FILM COATED ORAL 2 TIMES DAILY
Qty: 180 TABLET | Refills: 3 | Status: SHIPPED | OUTPATIENT
Start: 2023-04-11 | End: 2023-04-11 | Stop reason: SDUPTHER

## 2023-04-11 RX ORDER — SACUBITRIL AND VALSARTAN 24; 26 MG/1; MG/1
1 TABLET, FILM COATED ORAL 2 TIMES DAILY
Qty: 60 TABLET | Refills: 0 | Status: SHIPPED | OUTPATIENT
Start: 2023-04-11

## 2023-04-11 RX ORDER — FUROSEMIDE 40 MG/1
40 TABLET ORAL DAILY
Qty: 90 TABLET | Refills: 3 | Status: SHIPPED | OUTPATIENT
Start: 2023-04-11

## 2023-04-11 RX ORDER — SPIRONOLACTONE 25 MG/1
25 TABLET ORAL DAILY
Qty: 90 TABLET | Refills: 3 | Status: SHIPPED | OUTPATIENT
Start: 2023-04-11

## 2023-04-11 RX ORDER — CARVEDILOL 3.12 MG/1
3.12 TABLET ORAL 2 TIMES DAILY WITH MEALS
Qty: 180 TABLET | Refills: 3 | Status: SHIPPED | OUTPATIENT
Start: 2023-04-11 | End: 2023-04-20 | Stop reason: SDUPTHER

## 2023-04-11 NOTE — OUTREACH NOTE
CHF Week 3 Survey    Flowsheet Row Responses   [facility] patient discharged from? [facility]   Does the patient have one of the following disease processes/diagnoses(primary or secondary)? CHF   Week 3 attempt successful? Yes   Call start time 1254   Call end time 1257   Discharge diagnosis Cellulitis of right leg   Acute systolic heart failure    Meds reviewed with patient/caregiver? Yes   Does the patient have a primary care provider?  Yes   Has the patient kept scheduled appointments due by today? Yes   DME comments No O2 at home.   Pulse Ox monitoring Intermittent   Pulse Ox device source Patient   O2 Sat comments RA 95%   O2 Sat: education provided Sat levels, Monitoring frequency, When to seek care   Psychosocial issues? No   Comments LE edema improving, but able to wear shoes today. SOA improved, overall she reports feeling better.   What is the patient's perception of their health status since discharge? Improving   Is the patient able to teach back signs and symptoms of worsening condition? (i.e. weight gain, shortness of air, etc.) Yes   CHF Zone this Call Yellow Zone   Yellow Zone Not feeling as good as usual   CHF Week 3 call completed? Yes   Is the patient interested in additional calls from an ambulatory ?  NOTE:  applies to high risk patients requiring additional follow-up. No   Call end time 1257          Babita JUNG - Registered Nurse

## 2023-04-11 NOTE — PROGRESS NOTES
CARDIOLOGY        Patient Name: Jaquelin Valderrama  :1940  Age: 83 y.o.  Primary Cardiologist: Tyler Dill MD  Encounter Provider:  VALENTINE Mitchell    Date of Service: 23      CHIEF COMPLAINT / REASON FOR OFFICE VISIT     Follow-up and Congestive Heart Failure      HISTORY OF PRESENT ILLNESS       HPI  Jaquelin Valderrama is a 83 y.o. female who presents today for hospital follow-up.     Pt has a  history significant for systolic heart failure, ischemic right limb, pleural effusion.    Review of medical records reveals patient was hospitalized with discharge date 3/24/2023.  She presented to the emergency room with right lower extremity swelling, redness, pain.  Patient was treated for right lower extremity cellulitis.  This revealed gram-negative bacilli in anaerobic bottle bacteremia which was likely contaminant.  Patient treated with IV Zosyn with improvement of symptoms.  Patient was transitioned to oral levothyroxine for 7 days.  Patient also had right foot discoloration concerning for an ischemic limb in the setting of holding Eliquis for atrial fibrillation.  Vascular was consulted and imaging revealed no large vessel occlusion likely microvascular thrombosis that was treated medically with heparin drip with significant improvement of discoloration.  Patient had echocardiogram which revealed worsening LVEF of 15-20%.  Patient treated with IV furosemide and medications were optimized.    Patient reports that she feels significantly improved since discharge from the hospital.  Reports that her legs still have some lower extremity edema but overall weeping and wounds are improving.  She would be interested in referral to lymphedema clinic.  Patient reports that since released from the hospital she is lost a total of approximately 30 pounds over the past month.  She still has generalized fatigue.  Currently denies dyspnea at rest, with exertion or orthopnea.   notes that there was  "witnessed sleep apnea spells during hospitalization and he would like patient to be evaluated by sleep medicine.    The following portions of the patient's history were reviewed and updated as appropriate: allergies, current medications, past family history, past medical history, past social history, past surgical history and problem list.      VITAL SIGNS     Visit Vitals  /70 (BP Location: Right arm, Patient Position: Sitting, Cuff Size: Adult)   Pulse 81   Ht 53 cm (20.87\")   Wt 62.1 kg (137 lb)   SpO2 95%   .23 kg/m²         Wt Readings from Last 3 Encounters:   04/11/23 61.2 kg (135 lb)   04/11/23 62.1 kg (137 lb)   04/03/23 64 kg (141 lb)     Body mass index is 221.23 kg/m².      REVIEW OF SYSTEMS   Review of Systems   Constitutional: Positive for malaise/fatigue. Negative for chills, fever, weight gain and weight loss.   Cardiovascular: Positive for leg swelling.   Respiratory: Negative for cough, snoring and wheezing.    Hematologic/Lymphatic: Negative for bleeding problem. Does not bruise/bleed easily.   Skin: Negative for color change.   Musculoskeletal: Negative for falls, joint pain and myalgias.   Gastrointestinal: Negative for melena.   Genitourinary: Negative for hematuria.   Neurological: Negative for excessive daytime sleepiness.   Psychiatric/Behavioral: Negative for depression. The patient is not nervous/anxious.            PHYSICAL EXAMINATION     Vitals and nursing note reviewed.   Constitutional:       Appearance: Normal appearance. Well-developed.   Eyes:      Conjunctiva/sclera: Conjunctivae normal.   Neck:      Vascular: No carotid bruit.   Pulmonary:      Breath sounds: Normal breath sounds.   Cardiovascular:      Normal rate. Regular rhythm. Normal S1 with normal intensity. Normal S2 with normal intensity.      Murmurs: There is no murmur.      No gallop. No click. No rub.   Edema:     Peripheral edema present.     Pretibial: bilateral 1+ edema of the pretibial area.     " Ankle: bilateral 1+ edema of the ankle.     Feet: bilateral 1+ edema of the feet.  Musculoskeletal: Normal range of motion. Skin:     General: Skin is warm and dry.   Neurological:      Mental Status: Alert and oriented to person, place, and time.      GCS: GCS eye subscore is 4. GCS verbal subscore is 5. GCS motor subscore is 6.   Psychiatric:         Speech: Speech normal.         Behavior: Behavior normal.         Thought Content: Thought content normal.         Judgment: Judgment normal.           REVIEWED DATA     Procedures    Cardiac Procedures:  1. Echocardiogram 3/21/2023 LVEF 15-20%.  Moderate mitral valve regurgitation.  Significant tricuspid valve regurgitation.  Biatrial biventricular enlargement.    Lipid Panel        1/10/2023    09:57   Lipid Panel   Total Cholesterol 216     Triglycerides 108     HDL Cholesterol 55     VLDL Cholesterol 19     LDL Cholesterol  142       BUN   Date Value Ref Range Status   03/24/2023 37 (H) 8 - 23 mg/dL Final     Creatinine   Date Value Ref Range Status   03/24/2023 1.12 (H) 0.57 - 1.00 mg/dL Final     Potassium   Date Value Ref Range Status   03/24/2023 3.8 3.5 - 5.2 mmol/L Final     Comment:     Slight hemolysis detected by analyzer. Results may be affected.     ALT (SGPT)   Date Value Ref Range Status   03/22/2023 18 1 - 33 U/L Final     AST (SGOT)   Date Value Ref Range Status   03/22/2023 26 1 - 32 U/L Final     Comment:     Slight hemolysis detected by analyzer. Results may be affected.           ASSESSMENT & PLAN     Diagnoses and all orders for this visit:    1. Acute on chronic systolic heart failure (Primary)  · Patient with recent hospitalization for exacerbation of systolic heart failure.  Most recent LVEF 15-20%  · Lower extremity edema currently improved, patient would benefit from lymphedema clinic  · Continue furosemide 40 mg/day, carvedilol 3.125 mg twice daily, spironolactone 25 mg/day.  Due to reduced LVEF we had long discussion about transitioning  from lisinopril to Entresto.  Patient is willing to try this medication.  She will start with 24/26 mg twice daily.  She will have a 72-hour washout of lisinopril and was educated not to start Entresto until Saturday.  Patient will need repeat BMP and follow-up with me in 4 weeks  -     Ambulatory Referral to Sleep Medicine  -     Ambulatory Referral to Lymphedema Clinic  -     Basic Metabolic Panel; Future    2. Pedal edema  · Patient recently treated for lower extremity cellulitis.  · Remains with lower extremity edema as well as some erythema.  · Referral to lymphedema clinic placed  -     Ambulatory Referral to Lymphedema Clinic    3. Cellulitis of right leg    4. Coronary artery disease involving coronary bypass graft of native heart without angina pectoris  5. Hx of CABG  · Denies angina or dyspnea  · Continue carvedilol, furosemide, Entresto, spironolactone    6. Permanent atrial fibrillation  · Rate currently controlled with carvedilol 3.125 mg twice daily  · Anticoagulated with apixaban, denies bleeding complications    CHADS-VASc Risk Assessment            6 Total Score    1 CHF    1 Hypertension    2 Age >/= 75    1 DM    1 Sex: Female        Criteria that do not apply:    PRIOR STROKE/TIA/THROMBO    Vascular Disease    Age 65-74              Other orders  -     Discontinue: sacubitril-valsartan (Entresto) 24-26 MG tablet; Take 1 tablet by mouth 2 (Two) Times a Day.  Dispense: 180 tablet; Refill: 3  -     spironolactone (ALDACTONE) 25 MG tablet; Take 1 tablet by mouth Daily.  Dispense: 90 tablet; Refill: 3  -     furosemide (LASIX) 40 MG tablet; Take 1 tablet by mouth Daily.  Dispense: 90 tablet; Refill: 3  -     carvedilol (COREG) 3.125 MG tablet; Take 1 tablet by mouth 2 (Two) Times a Day With Meals.  Dispense: 180 tablet; Refill: 3  -     apixaban (ELIQUIS) 5 MG tablet tablet; Take 1 tablet by mouth Every 12 (Twelve) Hours. Indications: Atrial Fibrillation  Dispense: 180 tablet; Refill: 3  -      sacubitril-valsartan (Entresto) 24-26 MG tablet; Take 1 tablet by mouth 2 (Two) Times a Day.  Dispense: 60 tablet; Refill: 0    Time Spent: I spent 43 minutes caring for Jaquelin on this date of service. This time includes time spent by me in the following activities: preparing for the visit, reviewing tests, performing a medically appropriate examination and/or evaluation, counseling and educating the patient/family/caregiver, ordering medications, tests, or procedures, documenting information in the medical record, independently interpreting results and communicating that information with the patient/family/caregiver and care coordination.     Return in about 4 weeks (around 5/9/2023) for VALENTINE Bailey.    Future Appointments       Provider Department Center    4/13/2023 2:30 PM ROOM 1, [facility]     5/15/2023 2:00 PM Barbara Joiner APRN [facility] [place]    7/17/2023 10:15 AM Minerva Chatterjee MD [facility] [place]                MEDICATIONS         Discharge Medications          Accurate as of April 11, 2023  3:45 PM. If you have any questions, ask your nurse or doctor.            New Medications      Instructions Start Date   Entresto 24-26 MG tablet  Generic drug: sacubitril-valsartan  Started by: VALENTINE Mitchell   1 tablet, Oral, 2 Times Daily         Continue These Medications      Instructions Start Date   acetaminophen 500 MG tablet  Commonly known as: TYLENOL   500 mg, Oral, Every 6 Hours PRN      apixaban 5 MG tablet tablet  Commonly known as: ELIQUIS   5 mg, Oral, Every 12 Hours Scheduled      carvedilol 3.125 MG tablet  Commonly known as: COREG   3.125 mg, Oral, 2 Times Daily With Meals      furosemide 40 MG tablet  Commonly known as: LASIX   40 mg, Oral, Daily      insulin aspart prot-insulin aspart (70-30) 100 UNIT/ML injection  Commonly known as: novoLOG 70/30   10 Units,  Subcutaneous, 2 Times Daily PRN      Krill Oil 300 MG capsule   1 capsule, Oral, Daily      levothyroxine 100 MCG tablet  Commonly known as: Synthroid   100 mcg, Oral, Daily      MULTIVITAMIN ADULT EXTRA C PO   1 tablet, Oral, Daily      spironolactone 25 MG tablet  Commonly known as: ALDACTONE   25 mg, Oral, Daily      Vitamin D3 50 MCG (2000 UT) tablet   1 tablet, Oral, Daily         Stop These Medications    lisinopril 10 MG tablet  Commonly known as: PRINIVIL,ZESTRIL  Stopped by: VALENTINE Mitchell                **Dragon Disclaimer:   Much of this encounter note is an electronic transcription/translation of spoken language to printed text. The electronic translation of spoken language may permit erroneous, or at times, nonsensical words or phrases to be inadvertently transcribed. Although I have reviewed the note for such errors, some may still exist.

## 2023-04-11 NOTE — PROGRESS NOTES
"Chief Complaint  Right pleural effusion, follow up    Subjective        Jaquelin Valderrama presents to [facility] THORACIC SURGERY  History of Present Illness    Ms. Valderrama is an 83-year-old lady known to our service status post left VATS with decortication for empyema by Dr. Kuo on 3/18/2022.  She called our office in mid March and was seen for shortness of breath secondary to a large right-sided pleural effusion for which a thoracentesis was ordered.  However her dyspnea and bilateral lower extremity swelling became so severe that she was admitted to the hospital in late March 2023.  She was evaluated by our service then and underwent a right thoracentesis on 3/21/2023 with approximately 1240 mL removed.  Cytology was negative for malignancy.  Etiology of effusion was thought to be congestive heart failure.  Of note, patient also had bilateral lower extremity swelling and erythema and was placed on a heparin drip for microvascular issues.  She was also initiated on IV antibiotics secondary to cellulitis of her legs.  She presents today in follow-up and reports that she is doing much better since being discharged.    She was seen by cardiology this morning. Her most recent echo from 3/21/23 shows worsening EF of less than 20%.  She is much improved today since when she was last seen in the hospital. She does not require any supplemental oxygen and can ambulate without a wheelchair.  She has some mild lower extremity swelling and a referral to the lymphedema clinic has been made, which she is very eager to begin.        Objective   Vital Signs:  /83 (BP Location: Left arm, Patient Position: Sitting, Cuff Size: Adult)   Pulse 80   Temp 97.5 °F (36.4 °C) (Infrared)   Ht 160 cm (63\")   Wt 61.2 kg (135 lb)   SpO2 96%   BMI 23.91 kg/m²   Estimated body mass index is 23.91 kg/m² as calculated from the following:    Height as of this encounter: 160 cm (63\").    Weight as of this encounter: 61.2 " kg (135 lb).       BMI is within normal parameters. No other follow-up for BMI required.      Physical Exam  Constitutional:       General: She is not in acute distress.     Appearance: Normal appearance. She is not ill-appearing.   HENT:      Head: Normocephalic and atraumatic.   Cardiovascular:      Rate and Rhythm: Normal rate.   Pulmonary:      Effort: Pulmonary effort is normal. No respiratory distress.      Breath sounds: No decreased breath sounds or wheezing.   Musculoskeletal:         General: Normal range of motion.      Cervical back: Normal range of motion.      Right lower leg: Edema present.      Left lower leg: Edema present.   Skin:     General: Skin is warm.   Neurological:      General: No focal deficit present.      Mental Status: She is alert. Mental status is at baseline.   Psychiatric:         Mood and Affect: Mood normal.        Result Review :          I have independently reviewed the chest x-ray performed on March 22, 2023, status post right-sided thoracentesis.  There is improvement to right pleural effusion.  There is left-sided airspace disease, favor atelectasis.           Assessment and Plan   Diagnoses and all orders for this visit:    1. Pleural effusion on right (Primary)    Ms. Valderrama is remarkably improved since she was seen by our service in the hospital approximately 3 weeks ago.  Her heart failure and diuresis is being managed by cardiology.  She has been referred to the lymphedema clinic.  She is scheduled to follow-up with vascular surgery later this week.  Unfortunately, she was unable to obtain a chest x-ray prior to her visit today, but given stable clinical appearance do not think this is necessary.  She is scheduled to follow-up with pulmonary in the coming week as well.  Recommend thoracentesis as needed for symptomatic shortness of breath.  She may follow-up with us as needed.  This was discussed with the patient and her  at length and they agreed to the plan of  care.  Thank you for letting us precipitate in the care of Jaquelin Valderrama.       I spent 47 minutes caring for Jaquelin on this date of service. This time includes time spent by me in the following activities:preparing for the visit, reviewing tests, obtaining and/or reviewing a separately obtained history, performing a medically appropriate examination and/or evaluation , documenting information in the medical record and independently interpreting results and communicating that information with the patient/family/caregiver     Follow Up   Return if symptoms worsen or fail to improve.  Patient was given instructions and counseling regarding her condition or for health maintenance advice. Please see specific information pulled into the AVS if appropriate.

## 2023-04-11 NOTE — TELEPHONE ENCOUNTER
Message from pt's [facility] calling in regards to a drug interaction between pt's Entresto and Lisinopril.   Called and spoke with pharmacist to inform that lisinopril was discontinued. Pharmacist verbalized understanding and is going to proceed with the refill of Entresto. // HG

## 2023-04-20 ENCOUNTER — APPOINTMENT (OUTPATIENT)
Dept: VASCULAR SURGERY | Facility: HOSPITAL | Age: 83
End: 2023-04-20
Payer: MEDICARE

## 2023-04-20 ENCOUNTER — HOSPITAL ENCOUNTER (OUTPATIENT)
Dept: PHYSICAL THERAPY | Facility: HOSPITAL | Age: 83
Setting detail: THERAPIES SERIES
Discharge: HOME | End: 2023-04-20
Payer: MEDICARE

## 2023-04-20 ENCOUNTER — TRANSCRIBE ORDERS (OUTPATIENT)
Dept: ADMINISTRATIVE | Facility: HOSPITAL | Age: 83
End: 2023-04-20
Payer: MEDICARE

## 2023-04-20 DIAGNOSIS — I89.0 LYMPHEDEMA: Primary | ICD-10-CM

## 2023-04-20 DIAGNOSIS — I73.9 PERIPHERAL VASCULAR DISEASE, UNSPECIFIED: Primary | ICD-10-CM

## 2023-04-20 PROCEDURE — G0463 HOSPITAL OUTPT CLINIC VISIT: HCPCS

## 2023-04-20 PROCEDURE — 97162 PT EVAL MOD COMPLEX 30 MIN: CPT

## 2023-04-20 RX ORDER — CARVEDILOL 3.12 MG/1
3.12 TABLET ORAL 2 TIMES DAILY WITH MEALS
Qty: 180 TABLET | Refills: 3 | Status: SHIPPED | OUTPATIENT
Start: 2023-04-20 | End: 2023-04-21 | Stop reason: SDUPTHER

## 2023-04-20 NOTE — THERAPY EVALUATION
Physical Therapy Lymphedema Initial Evaluation  [facility]     Patient Name: Jaquelin Valderrama  : 1940  MRN: [id number]  Today's Date: 2023      Visit Date: 2023    Visit Dx:    ICD-10-CM ICD-9-CM   1. Lymphedema  I89.0 457.1       Patient Active Problem List   Diagnosis   • Other hyperlipidemia   • Essential hypertension   • Hypothyroidism   • Coronary artery disease involving coronary bypass graft of native heart without angina pectoris   • Astigmatism, bilateral   • Bilateral presbyopia   • Pseudophakia, both eyes   • Atrial fibrillation   • Systolic HF (heart failure)   • Cellulitis of right leg   • Acute systolic heart failure (CMS/HCC)   • Acute on chronic systolic heart failure (CMS/HCC)   • Chronic systolic heart failure (CMS/HCC)   • Type 2 diabetes mellitus with circulatory disorder, without long-term current use of insulin        Past Medical History:   Diagnosis Date   • Astigmatism, bilateral 2019   • Benign essential hypertension    • Bilateral presbyopia 2019   • CAD (coronary artery disease)    • CHF (congestive heart failure)    • Closed right ankle fracture    • COVID-19 vaccination refused    • Hyperlipidemia    • Hypothyroidism    • Influenza vaccine needed    • Myocardial infarction    • Prediabetes    • Pseudophakia, both eyes 2019   • Sleep apnea    • Thoracic back pain         Past Surgical History:   Procedure Laterality Date   • APPENDECTOMY     • CARDIAC CATHETERIZATION  2012    x3    • CORONARY ARTERY BYPASS GRAFT  2014   • CORONARY STENT PLACEMENT      x3   • CYSTOSCOPY WITH CLOT EVACUATION N/A 3/25/2022    Procedure: CYSTOSCOPY WITH CLOT EVACUATION AND FULGURATION ;  Surgeon: Sukhdev Poole MD;  Location: [facility];  Service: Urology;  Laterality: N/A;   • HARDWARE REMOVAL Right 2020    Procedure: ANKLE/FOOT HARDWARE REMOVAL;  Surgeon: Lincoln Sibley MD;  Location: [facility] OR;  Service: Orthopedics;  Laterality: Right;    • ORIF ANKLE FRACTURE Right 04/23/2019    [facility]   • THORACOSCOPY WITH DECORTICATION Left 3/18/2022    Procedure: LEFT THORACOSCOPY VIDEO ASSISTED WITH COMPLETE DECORTICATION;  Surgeon: Sabra Kuo MD;  Location: [facility] OR;  Service: Thoracic;  Laterality: Left;       Visit Dx:    ICD-10-CM ICD-9-CM   1. Lymphedema  I89.0 457.1        Patient History     Row Name 04/20/23 1200             History    Chief Complaint Swelling;Tightness  R>L  -KD      Date Current Problem(s) Began 04/11/23  referred to [facility]  -KD      Brief Description of Current Complaint Pt had cellulitis RLE about 1 month ago. Swelling of legs has worsened in the last 2 months. Started with some swelling in right lower leg post ankle fracture with fixation in 2019. Eventually left leg started swelling as well.  -KD      Patient/Caregiver Goals Decrease swelling  -KD      How has patient tried to help current problem? wears compression stockings (not every day), compression pump daily  -KD      Related/Recent Hospitalizations Yes  -KD      Date of Hospitalization 03/20/23  3/20/23-3/24/23 for cellulitis RLE, CHF  -KD      Are you or can you be pregnant No  -KD         Pain     Pain Location Back  -KD      Pain at Present 2  -KD      Is your sleep disturbed? Yes  -KD      Difficulties with recreational activities? mod difficulties with routine home and leisure activities.  -KD         Fall Risk Assessment    Any falls in the past year: Yes  -KD      Number of falls reported in the last 12 months 1  -KD      Factors that contributed to the fall: --  unsure  -KD         Services    Are you currently receiving Home Health services No  -KD         Daily Activities    Primary Language English  -KD      Are you able to read Yes  -KD      Are you able to write Yes  -KD      How does patient learn best? Reading  -KD      Teaching needs identified Home Exercise Program;Management of Condition  -KD      Patient is concerned  "about/has problems with Performing home management (household chores, shopping, care of dependents);Performing job responsibilities/community activities (work, school,  -KD      Does patient have problems with the following? None  -KD      Pt Participated in POC and Goals Yes  -KD         Safety    Are you being hurt, hit, or frightened by anyone at home or in your life? No  -KD      Are you being neglected by a caregiver No  -KD            User Key  (r) = Recorded By, (t) = Taken By, (c) = Cosigned By    Initials Name Provider Type    KD Ingrid Andres, PT Physical Therapist                 Lymphedema     Row Name 04/20/23 1200             Subjective Pain    Able to rate subjective pain? yes  -KD      Pre-Treatment Pain Level 2  -KD      Subjective Pain Comment back pain  -KD         Subjective Comments    Subjective Comments States her leg is better than it was. She's been using her compression pump daily which seems to help.  very helpful--lightly massages her legs at night. Has compression stockings which she wears some (not every day).  -KD         Lymphedema Assessment    Lymphedema Classification RLE:;LLE:;stage 2 (Spontaneously Irreversible)  -KD      Infections or Cellulitis? yes  -KD      Infection/Cellulitis Treatment hospitalized 3/20/23-3/24/23; antibiotics  -KD         LLIS - Physical Concerns    The amount of pain associated with my lymphedema is: 2  -KD      The amount of limb heaviness associated with my lymphedema is: 2  -KD      The amount of skin tightness associated with my lymphedema is: 2  -KD      The size of my swollen limb(s) seems: 2  -KD      Lymphedema affects the movement of my swollen limb(s): 2  -KD      The strength in my swollen limb(s) is: 2  -KD         LLIS - Psychosocial Concerns    Lymphedema affects my body image (i.e., \"how I think I look\"). 1  -KD      Lymphedema affects my socializing with others. 1  -KD      Lymphedema affects my intimate relations with spouse or " "partner (rate 0 if not applicable 0  -KD      Lymphedema \"gets me down\" (i.e., depression, frustration, or anger) 1  -KD      I must rely on others for help due to my lymphedema. 1  -KD      I know what to do to manage my lymphedema 2  -KD         LLIS - Functional Concerns    Lymphedema affects my ability to perform self-care activities (i.e. eating, dressing, hygiene) 0  -KD      Lymphedema affects my ability to perform routine home or work-related activities. 2  -KD      Lymphedema affects my performance of preferred leisure activities. 2  -KD      Lymphedema affects proper fit of clothing/shoes 1  -KD      Lymphedema affects my sleep 2  -KD         Posture/Observations    Posture/Observations Comments Pt amb into dept independently  -KD         General ROM    GENERAL ROM COMMENTS Gen WFL LEs  -KD         MMT (Manual Muscle Testing)    General MMT Comments Gen at least 3+ to 4/5 B LEs  -KD         Lymphedema Edema Assessment    Edema Assessment Comment Min to mod 2+ pitting edema bilat feet to knees  -KD         Skin Changes/Observations    Location/Assessment Lower Extremity  -KD      Skin Observations Comment skin is dry/flaky and light red B lower legs; noted small scab ant right lower leg, no weeping  -KD         Lymphedema Sensation    Lymphedema Sensation Comments Intact to lt touch BLEs, no reports of N/T  -KD         Lymphedema Measurements    Measurement Type(s) Circumferential  -KD      Circumferential Areas Lower extremities  -KD         BLE Circumferential (cm)    Measurement Location 1 Knee  -KD      Left 1 33 cm  -KD      Right 1 34 cm  -KD      Measurement Location 2 10cm below knee  -KD      Left 2 29 cm  -KD      Right 2 33 cm  -KD      Measurement Location 3 20cm below knee  -KD      Left 3 28.5 cm  -KD      Right 3 27 cm  -KD      Measurement Location 4 30cm below knee  -KD      Left 4 22 cm  -KD      Right 4 22 cm  -KD      Measurement Location 5 Ankle  -KD      Left 5 25 cm  -KD      Right 5 " 24 cm  -KD      Measurement Location 6 Midfoot  -KD      Left 6 20 cm  -KD      Right 6 20 cm  -KD      LLE Circumferential Total 157.5 cm  -KD      RLE Circumferential Total 160 cm  -KD         Lymphedema Life Impact Scale Totals    A.  Total Q1 - Q17 (Do not include Q18) 25  -KD      B.  Total number of questions answered (Q1-Q17) 17  -KD      C. Divide A by B 1.47  -KD      D. Multiple C by 25 36.75  -KD            User Key  (r) = Recorded By, (t) = Taken By, (c) = Cosigned By    Initials Name Provider Type    Ingrid Guerra, PT Physical Therapist                                Therapy Education  Education Details: Discussed condition and treatment protocol; suggested she purchase and apply Eucerin lotion to legs to help with dryness.  Given: Symptoms/condition management  Program: New  How Provided: Verbal, Written (Healthy Habits for Patients at Risk for Lymphedema per NLN)  Provided to: Patient, Other (comment) ( present)  Level of Understanding: Verbalized       OP Exercises     Row Name 04/20/23 1200             Subjective Comments    Subjective Comments States her leg is better than it was. She's been using her compression pump daily which seems to help.  very helpful--lightly massages her legs at night. Has compression stockings which she wears some (not every day).  -KD         Subjective Pain    Able to rate subjective pain? yes  -KD      Pre-Treatment Pain Level 2  -KD      Subjective Pain Comment back pain  -KD            User Key  (r) = Recorded By, (t) = Taken By, (c) = Cosigned By    Initials Name Provider Type    Ingrid Guerra, PT Physical Therapist                             PT OP Goals     Row Name 04/20/23 1200          PT Short Term Goals    STG Date to Achieve 05/04/23  -KD     STG 1 Pt to demonstrate awareness of condition and precautions for improved prevention, management, care of symptoms, and ease of transition to self-care of condition.  -KD     STG 1 Progress New   -KD     STG 2 Patient/family independent with self-wrapping techniques of compression bandages as indicated for improved self-management of condition.  -KD     STG 2 Progress New  -KD     STG 3 Patient demonstrate decreased net edema of BLEs  >/=5-10cm for decreased edema symptoms, decreased risk of infection, and improved skin care.  -KD     STG 3 Progress New  -KD        Long Term Goals    LTG Date to Achieve 05/20/23  -KD     LTG 1 Patient/family independent with self-care techniques for self-management of condition.  -KD     LTG 1 Progress New  -KD     LTG 2 Patient/family independent with compression garments as indicated for self-management of condition.  -KD     LTG 2 Progress New  -KD     LTG 3 Skin on right lower extremity to be intact, no weeping, to decrease risk of infection.  -KD     LTG 3 Progress New  -KD        Time Calculation    PT Goal Re-Cert Due Date 07/19/23  -KD           User Key  (r) = Recorded By, (t) = Taken By, (c) = Cosigned By    Initials Name Provider Type    Ingrid Guerra, PT Physical Therapist                 PT Assessment/Plan     Row Name 04/20/23 1257          PT Assessment    Functional Limitations Limitation in home management;Performance in leisure activities  -KD     Impairments Edema;Impaired lymphatic circulation;Integumentary integrity  -KD     Assessment Comments Pt presents in the Lymphedema Clinic today with min to mod 2+ pitting edema of LEs. Total circumferential measurements: R RI=114hi; L SO=566.5cm. Skin on lower legs is dry/flaky, light red; small scab ant right leg.  Sensation is intact, no reports of N/T.  General mobility is functional. Lymphedema Lifestyle Impact Scale score is 36.75.   Functional concerns include mod interference with routine home and leisure activities as well as sleep; min interference with fit of clothes/shoes. Physical concerns include pain, heaviness/tightness in limbs, change in movement/strength of legs. Pt lives with  who  is willing and able to help pt/learn bandaging for home. Patient is a good candidate for Complete Decongestive Therapy to reduce swelling, protect/promote skin integrity, decrease risk of infection, and instruction to patient/family of self-management skills. Pt agrees to participate in Phase I (skilled care) and Phase II (self-management) of CDT.  -KD     Rehab Potential Good  -KD     Patient/caregiver participated in establishment of treatment plan and goals Yes  -KD     Patient would benefit from skilled therapy intervention Yes  -KD        PT Plan    PT Frequency 3x/week  -KD     Predicted Duration of Therapy Intervention (PT) 4 weeks  -KD     Planned CPT's? PT EVAL MOD COMPLELITY: 98284;PT RE-EVAL: 97653;PT THER PROC EA 15 MIN: 39427;PT THER ACT EA 15 MIN: 22993;PT MANUAL THERAPY EA 15 MIN: 13890;PT SELF CARE/HOME MGMT/TRAIN EA 15: 84855  -KD     Physical Therapy Interventions (Optional Details) bandaging;home exercise program;manual lymphatic drainage;patient/family education  -KD     PT Plan Comments See pt per PT Plan.  -KD           User Key  (r) = Recorded By, (t) = Taken By, (c) = Cosigned By    Initials Name Provider Type    Ingrid Guerra, PT Physical Therapist                             Time Calculation:   Start Time: 1015  Stop Time: 1104  Time Calculation (min): 49 min   Therapy Charges for Today     Code Description Service Date Service Provider Modifiers Qty    [id number]  PT EVAL MOD COMPLEXITY 3 4/20/2023 Ingrid Andres, PT GP 1                    Ingrid Andres, PT  4/20/2023

## 2023-04-20 NOTE — TELEPHONE ENCOUNTER
Caller: Jaquelin Valderrama    Relationship: Self    Best call back number:   [phone]    Requested Prescriptions:   Requested Prescriptions     Pending Prescriptions Disp Refills   • carvedilol (COREG) 3.125 MG tablet 180 tablet 3     Sig: Take 1 tablet by mouth 2 (Two) Times a Day With Meals.        Pharmacy where request should be sent: [facility] [address] [place], [place] [phone] [place] [phone] FX     Last office visit with prescribing clinician: 4/3/2023   Last telemedicine visit with prescribing clinician: 7/17/2023   Next office visit with prescribing clinician: 7/17/2023     Additional details provided by patient:   NEEDS A TWO WEEK SUPPLY OF MEDICATION CALLED IN LOCALLY UNTIL HER MAIL ORDER SUPPLY COMES IN .     WILL BE OUT OF TOWN NEXT WEEK AND WILL RUN OUT OF MEDICATION               Does the patient have less than a 3 day supply:  [x] Yes  [] No    Would you like a call back once the refill request has been completed: [x] Yes [] No    If the office needs to give you a call back, can they leave a voicemail: [x] Yes [] No    Nayana Rosas Rep   04/20/23 16:12 EDT

## 2023-04-20 NOTE — PROGRESS NOTES
Enter Query Response Below      Query Response: Unable to determine             If applicable, please update the problem list.     Patient: Jaquelin Valderrama        : 1940  Account: [id number]           Admit Date: 3/20/2023        How to Respond to this query:       a. Click New Note     b. Answer query within the yellow box.                c. Update the Problem List, if applicable.    ,    83 year old female with CAD, HTN, type 2 DM, Atrial fibrillation, systolic HF was admitted 3/20 for decompensated HF, Pleural effusions, ischemic right limb and Cellulitis of the RLE. Patient takes insulin at home. Diabetes educator notes last A1c was 9.2 on 3/2023. No A1c this admission. Admit glucose was 115. Subsequent Glucose levels were .  Patient was treated with sliding scale insulin, Zosyn (3/21-3/24 ), Levaquin (3/24), Cefazolin( 3/20-3/21), Unasyn ( 3/21).    Please further clarify the RLE Cellulitis as:    - Due to type 2 Diabetes Mellitus  - Unrelated to type 2 Diabetes Mellitus  - Other_____________( please specify)  - Unable to determine    By submitting this query, we are merely seeking further clarification of documentation to accurately reflect all conditions that you are monitoring, evaluating, treating or that extend the hospitalization or utilize additional resources of care. Please utilize your independent clinical judgment when addressing the question(s) above.     This query and your response, once completed, will be entered into the legal medical record.    Sincerely,  Domingo Bardales RN CDI  Clinical Documentation Integrity Program   [email or web address]

## 2023-04-20 NOTE — PROGRESS NOTES
"Enter Query Response Below      Query Response: Pneumonia not present             If applicable, please update the problem list.     Patient: Jaquelin Valderrama        : 1940  Account: [id number]           Admit Date: 3/20/2023        How to Respond to this query:       a. Click New Note     b. Answer query within the yellow box.                c. Update the Problem List, if applicable.    ,    83 year old female with CAD, HTN, type 2 DM, Atrial fibrillation, systolic HF was admitted 3/20 for decompensated HF, Pleural effusions, ischemic right limb and Cellulitis of the RLE.  H&P \" Patient had chest x-ray which showed moderate right and small left pleural effusion bibasilar opacities with right greater than left basilar consolidation could be due to low pneumonia atelectasis or infiltrate.\"  CXR 3/21 \"Increasing haziness in the right perihilar region which may be due to pneumonia or asymmetric edema.\"  Pneumonia was not mentioned in the discharge summary.  Patient was treated with Zosyn (3/21-3/24 ), Levaquin (3/24), Cefazolin( 3/20-3/21), Unasyn ( 3/21). Patient was discharged on Levaquin.     Please further clarify this patients condition as:    - Pneumonia is ruled in, present on admission  - Pneumonia is ruled out  - Other______________( please specify)  - Unable to determine    By submitting this query, we are merely seeking further clarification of documentation to accurately reflect all conditions that you are monitoring, evaluating, treating or that extend the hospitalization or utilize additional resources of care. Please utilize your independent clinical judgment when addressing the question(s) above.     This query and your response, once completed, will be entered into the legal medical record.    Sincerely,  Domingo Bardales RN CDI  Clinical Documentation Integrity Program   [email or web address]  "

## 2023-04-21 ENCOUNTER — TELEPHONE (OUTPATIENT)
Dept: INTERNAL MEDICINE | Facility: CLINIC | Age: 83
End: 2023-04-21
Payer: MEDICARE

## 2023-04-21 RX ORDER — CARVEDILOL 3.12 MG/1
3.12 TABLET ORAL 2 TIMES DAILY WITH MEALS
Qty: 180 TABLET | Refills: 3 | Status: SHIPPED | OUTPATIENT
Start: 2023-04-21

## 2023-05-15 ENCOUNTER — OFFICE VISIT (OUTPATIENT)
Dept: CARDIOLOGY | Facility: CLINIC | Age: 83
End: 2023-05-15
Payer: MEDICARE

## 2023-05-15 VITALS
DIASTOLIC BLOOD PRESSURE: 80 MMHG | BODY MASS INDEX: 23.5 KG/M2 | OXYGEN SATURATION: 98 % | HEART RATE: 81 BPM | HEIGHT: 63 IN | SYSTOLIC BLOOD PRESSURE: 122 MMHG | WEIGHT: 132.6 LBS

## 2023-05-15 DIAGNOSIS — I10 ESSENTIAL HYPERTENSION: ICD-10-CM

## 2023-05-15 DIAGNOSIS — I50.42 CHRONIC COMBINED SYSTOLIC AND DIASTOLIC CONGESTIVE HEART FAILURE: Primary | ICD-10-CM

## 2023-05-15 DIAGNOSIS — Z95.1 HX OF CABG: ICD-10-CM

## 2023-05-15 DIAGNOSIS — I25.810 CORONARY ARTERY DISEASE INVOLVING CORONARY BYPASS GRAFT OF NATIVE HEART WITHOUT ANGINA PECTORIS: ICD-10-CM

## 2023-05-15 DIAGNOSIS — I48.21 PERMANENT ATRIAL FIBRILLATION: ICD-10-CM

## 2023-05-15 NOTE — PROGRESS NOTES
CARDIOLOGY        Patient Name: Jaquelin Valderrama  :1940  Age: 83 y.o.  Primary Cardiologist: Tyler Dill MD  Encounter Provider:  VALENTINE Mitchell    Date of Service: 05/15/23      CHIEF COMPLAINT / REASON FOR OFFICE VISIT     Coronary Artery Disease (1 month f/u)      HISTORY OF PRESENT ILLNESS       Coronary Artery Disease  Symptoms include leg swelling. Pertinent negatives include no weight gain.     Jaquelin Valderrama is a 83 y.o. female who presents today for 1 month hospital follow-up.  Pt has a  history significant for systolic heart failure, ischemic right limb, pleural effusion.    Review of medical records reveals patient was hospitalized with discharge date 3/24/2023.  She presented to the emergency room with right lower extremity swelling, redness, pain.  Patient was treated for right lower extremity cellulitis.  This revealed gram-negative bacilli in anaerobic bottle bacteremia which was likely contaminant.  Patient treated with IV Zosyn with improvement of symptoms.  Patient was transitioned to oral levothyroxine for 7 days.  Patient also had right foot discoloration concerning for an ischemic limb in the setting of holding Eliquis for atrial fibrillation.  Vascular was consulted and imaging revealed no large vessel occlusion likely microvascular thrombosis that was treated medically with heparin drip with significant improvement of discoloration.  Patient had echocardiogram which revealed worsening LVEF of 15-20%.  Patient treated with IV furosemide and medications were optimized.    At last assessment patient was transitioned from lisinopril to Entresto.  Patient reports that she feels good on this current regimen.  She reports that she is actually feeling so good that she is going to proceed with her planned trip to [place] which is upcoming in a few weeks.  She does continue to have some trace lower extremity edema.  She does continue to wear compression hose.  Currently denies  "chest pain, dyspnea at rest or with exertion, palpitations, lightheadedness, edema, fatigue.    The following portions of the patient's history were reviewed and updated as appropriate: allergies, current medications, past family history, past medical history, past social history, past surgical history and problem list.      VITAL SIGNS     Visit Vitals  /80 (BP Location: Left arm, Patient Position: Sitting, Cuff Size: Adult)   Pulse 81   Ht 160 cm (63\")   Wt 60.1 kg (132 lb 9.6 oz)   SpO2 98%   BMI 23.49 kg/m²         Wt Readings from Last 3 Encounters:   05/15/23 60.1 kg (132 lb 9.6 oz)   04/11/23 61.2 kg (135 lb)   04/11/23 62.1 kg (137 lb)     Body mass index is 23.49 kg/m².      REVIEW OF SYSTEMS   Review of Systems   Constitutional: Negative for chills, fever, malaise/fatigue, weight gain and weight loss.   Cardiovascular: Positive for leg swelling.   Respiratory: Negative for cough, snoring and wheezing.    Hematologic/Lymphatic: Negative for bleeding problem. Does not bruise/bleed easily.   Skin: Negative for color change.   Musculoskeletal: Negative for falls, joint pain and myalgias.   Gastrointestinal: Negative for melena.   Genitourinary: Negative for hematuria.   Neurological: Negative for excessive daytime sleepiness.   Psychiatric/Behavioral: Negative for depression. The patient is not nervous/anxious.            PHYSICAL EXAMINATION     Vitals and nursing note reviewed.   Constitutional:       Appearance: Normal appearance. Well-developed.   Eyes:      Conjunctiva/sclera: Conjunctivae normal.   Neck:      Vascular: No carotid bruit.   Pulmonary:      Breath sounds: Normal breath sounds.   Cardiovascular:      Normal rate. Regular rhythm. Normal S1 with normal intensity. Normal S2 with normal intensity.      Murmurs: There is no murmur.      No gallop. No click. No rub.   Edema:     Peripheral edema present.     Pretibial: bilateral 1+ edema of the pretibial area.     Ankle: bilateral 1+ edema " of the ankle.     Feet: bilateral 1+ edema of the feet.  Musculoskeletal: Normal range of motion. Skin:     General: Skin is warm and dry.   Neurological:      Mental Status: Alert and oriented to person, place, and time.      GCS: GCS eye subscore is 4. GCS verbal subscore is 5. GCS motor subscore is 6.   Psychiatric:         Speech: Speech normal.         Behavior: Behavior normal.         Thought Content: Thought content normal.         Judgment: Judgment normal.           REVIEWED DATA     Procedures    Cardiac Procedures:  1. Echocardiogram 3/21/2023 LVEF 15-20%.  Moderate mitral valve regurgitation.  Significant tricuspid valve regurgitation.  Biatrial biventricular enlargement.    Lipid Panel        1/10/2023    09:57   Lipid Panel   Total Cholesterol 216     Triglycerides 108     HDL Cholesterol 55     VLDL Cholesterol 19     LDL Cholesterol  142       BUN   Date Value Ref Range Status   03/24/2023 37 (H) 8 - 23 mg/dL Final     Creatinine   Date Value Ref Range Status   03/24/2023 1.12 (H) 0.57 - 1.00 mg/dL Final     Potassium   Date Value Ref Range Status   03/24/2023 3.8 3.5 - 5.2 mmol/L Final     Comment:     Slight hemolysis detected by analyzer. Results may be affected.     ALT (SGPT)   Date Value Ref Range Status   03/22/2023 18 1 - 33 U/L Final     AST (SGOT)   Date Value Ref Range Status   03/22/2023 26 1 - 32 U/L Final     Comment:     Slight hemolysis detected by analyzer. Results may be affected.           ASSESSMENT & PLAN     Diagnoses and all orders for this visit:    1. Chronic combined systolic and diastolic congestive heart failure (Primary)  · Patient reports improvement of generalized fatigue.  Denies dyspnea, dyspnea with exertion.  She does still have mild lower extremity edema.  · Continue compression hose  · Continue GDMT with furosemide 40 mg/day, Entresto 24/26 mg/day, spironolactone 25 mg/day, carvedilol 3.125 mg twice daily  · Consider SGLT2 inhibitor at next appointment.  Patient  has an out of the country trip planned and would like to get through this before adding any other medications  -     XR Chest 2 View; Future    2. Coronary artery disease involving coronary bypass graft of native heart without angina pectoris  3. Hx of CABG  · Denies angina or dyspnea  · Continue Entresto, carvedilol    4. Permanent atrial fibrillation  · Heart rate currently controlled.  Continue carvedilol  · Anticoagulated with apixaban    5. Essential hypertension  · Blood pressure currently controlled  · Continue current regimen          Future Appointments       Provider Department Center    6/29/2023 1:30 PM [facility] CARDIOV[facility] NON-INVASIVE  Arrive at[facility]  [facility]    6/29/2023 2:15 PM ROOM 1, [facility]     7/11/2023 10:40 AM Tyler Dill MD [facility] CARDIOLOGY [place]    7/17/2023 10:15 AM Minerva Chatterjee MD [facility] PRIMARY CARE [place]                MEDICATIONS         Discharge Medications          Accurate as of May 15, 2023  2:35 PM. If you have any questions, ask your nurse or doctor.            Continue These Medications      Instructions Start Date   acetaminophen 500 MG tablet  Commonly known as: TYLENOL   500 mg, Oral, Every 6 Hours PRN      apixaban 5 MG tablet tablet  Commonly known as: ELIQUIS   5 mg, Oral, Every 12 Hours Scheduled      carvedilol 3.125 MG tablet  Commonly known as: COREG   3.125 mg, Oral, 2 Times Daily With Meals      Entresto 24-26 MG tablet  Generic drug: sacubitril-valsartan   1 tablet, Oral, 2 Times Daily      furosemide 40 MG tablet  Commonly known as: LASIX   40 mg, Oral, Daily      Krill Oil 300 MG capsule   1 capsule, Oral, Daily      levothyroxine 100 MCG tablet  Commonly known as: Synthroid   100 mcg, Oral, Daily      MULTIVITAMIN ADULT EXTRA C PO   1 tablet, Oral, Daily      spironolactone 25 MG tablet  Commonly known as:  ALDACTONE   25 mg, Oral, Daily      Vitamin D3 50 MCG (2000 UT) tablet   1 tablet, Oral, Daily                 **Dragon Disclaimer:   Much of this encounter note is an electronic transcription/translation of spoken language to printed text. The electronic translation of spoken language may permit erroneous, or at times, nonsensical words or phrases to be inadvertently transcribed. Although I have reviewed the note for such errors, some may still exist.    PRINCIPAL DISCHARGE DIAGNOSIS  Diagnosis: AICD problem  Assessment and Plan of Treatment: AICD fired x 4, interrogate, s/p EP study, no ablation, f.u with EP for PPM/AICD interrogation on 4/20/20      SECONDARY DISCHARGE DIAGNOSES  Diagnosis: Other cardiomyopathy  Assessment and Plan of Treatment: cont ACEI and BB,    Diagnosis: Chest pain  Assessment and Plan of Treatment: 2/2 AICD firing, resolved PRINCIPAL DISCHARGE DIAGNOSIS  Diagnosis: AICD problem  Assessment and Plan of Treatment: -Increased metoprolol to 50 mg po BID  -Continue optimal medical therapy/ management of HF  -Encourage aerobic exercise  -Continue remote monitoring  -Follow-up with PCP and Dr Mcguire  within 2-3 weeks      SECONDARY DISCHARGE DIAGNOSES  Diagnosis: Other cardiomyopathy  Assessment and Plan of Treatment: - LV function has improved since 2014 with EF now 50-55%; continue medical therapy with metoprolol; Lisinopril was changed to Entresto yesterday -- would change back to ACE-I as patient unable to afford entresto    Diagnosis: Chest pain  Assessment and Plan of Treatment: 2/2 AICD firing, resolved

## 2023-05-16 ENCOUNTER — HOSPITAL ENCOUNTER (OUTPATIENT)
Dept: GENERAL RADIOLOGY | Facility: HOSPITAL | Age: 83
Discharge: HOME | End: 2023-05-16
Payer: MEDICARE

## 2023-05-16 ENCOUNTER — LAB (OUTPATIENT)
Dept: LAB | Facility: HOSPITAL | Age: 83
End: 2023-05-16
Payer: MEDICARE

## 2023-05-16 DIAGNOSIS — I50.42 CHRONIC COMBINED SYSTOLIC AND DIASTOLIC CONGESTIVE HEART FAILURE: ICD-10-CM

## 2023-05-16 DIAGNOSIS — I50.23 ACUTE ON CHRONIC SYSTOLIC HEART FAILURE: ICD-10-CM

## 2023-05-16 LAB
ANION GAP SERPL CALCULATED.3IONS-SCNC: 10.6 MMOL/L (ref 5–15)
BUN SERPL-MCNC: 34 MG/DL (ref 8–23)
BUN/CREAT SERPL: 36.2 (ref 7–25)
CALCIUM SPEC-SCNC: 10.1 MG/DL (ref 8.6–10.5)
CHLORIDE SERPL-SCNC: 100 MMOL/L (ref 98–107)
CO2 SERPL-SCNC: 29.4 MMOL/L (ref 22–29)
CREAT SERPL-MCNC: 0.94 MG/DL (ref 0.57–1)
EGFRCR SERPLBLD CKD-EPI 2021: 61.1 ML/MIN/1.73
GLUCOSE SERPL-MCNC: 196 MG/DL (ref 65–99)
POTASSIUM SERPL-SCNC: 4.2 MMOL/L (ref 3.5–5.2)
SODIUM SERPL-SCNC: 140 MMOL/L (ref 136–145)

## 2023-05-16 PROCEDURE — 36415 COLL VENOUS BLD VENIPUNCTURE: CPT

## 2023-05-16 PROCEDURE — 80048 BASIC METABOLIC PNL TOTAL CA: CPT

## 2023-05-16 PROCEDURE — 71046 X-RAY EXAM CHEST 2 VIEWS: CPT

## 2023-05-16 NOTE — PROGRESS NOTES
I saw this patient in clinic yesterday.  She is doing wonderful.  She has diagnosis of HFrEF.  Can you please let her know that her chest x-ray shows improvement of vascular congestion.  Her BMP shows that her renal function and electrolytes are stable.  She has a planned out of country trip and wanted to know these things before leaving.  No change to her regimen.

## 2023-05-30 RX ORDER — SACUBITRIL AND VALSARTAN 24; 26 MG/1; MG/1
1 TABLET, FILM COATED ORAL 2 TIMES DAILY
Qty: 180 TABLET | Refills: 3 | Status: SHIPPED | OUTPATIENT
Start: 2023-05-30 | End: 2023-06-05 | Stop reason: SDUPTHER

## 2023-05-31 ENCOUNTER — TELEPHONE (OUTPATIENT)
Dept: CARDIOLOGY | Facility: CLINIC | Age: 83
End: 2023-05-31

## 2023-05-31 NOTE — TELEPHONE ENCOUNTER
Called and spoke with Dodie. Covermymeds key code was generated.     PA submitted to plan via cover my meds. Awaiting determination. // HG

## 2023-05-31 NOTE — TELEPHONE ENCOUNTER
Pt left msg saying Dodie is challenging her Entresto that was sent in yesterday and would like you to call [phone].     ThanksCourtney

## 2023-06-05 RX ORDER — SACUBITRIL AND VALSARTAN 24; 26 MG/1; MG/1
TABLET, FILM COATED ORAL
Qty: 60 TABLET | Refills: 0 | OUTPATIENT
Start: 2023-06-05

## 2023-06-05 RX ORDER — SACUBITRIL AND VALSARTAN 24; 26 MG/1; MG/1
1 TABLET, FILM COATED ORAL 2 TIMES DAILY
Qty: 180 TABLET | Refills: 3 | Status: SHIPPED | OUTPATIENT
Start: 2023-06-05

## 2023-08-14 ENCOUNTER — HOSPITAL ENCOUNTER (OUTPATIENT)
Dept: CARDIOLOGY | Facility: HOSPITAL | Age: 83
Discharge: HOME | End: 2023-08-14
Admitting: NURSE PRACTITIONER
Payer: MEDICARE

## 2023-08-14 DIAGNOSIS — I73.9 PERIPHERAL VASCULAR DISEASE, UNSPECIFIED: ICD-10-CM

## 2023-08-14 LAB
BH CV LOWER ARTERIAL LEFT ABI RATIO: 1.4
BH CV LOWER ARTERIAL LEFT DORSALIS PEDIS SYS MAX: 145
BH CV LOWER ARTERIAL LEFT GREAT TOE SYS MAX: 93
BH CV LOWER ARTERIAL LEFT POST TIBIAL SYS MAX: 165
BH CV LOWER ARTERIAL LEFT TBI RATIO: 0.79
BH CV LOWER ARTERIAL RIGHT ABI RATIO: 1.4
BH CV LOWER ARTERIAL RIGHT DORSALIS PEDIS SYS MAX: 138
BH CV LOWER ARTERIAL RIGHT GREAT TOE SYS MAX: 50
BH CV LOWER ARTERIAL RIGHT POST TIBIAL SYS MAX: 165
BH CV LOWER ARTERIAL RIGHT TBI RATIO: 0.42
UPPER ARTERIAL LEFT ARM BRACHIAL SYS MAX: 118 MMHG
UPPER ARTERIAL RIGHT ARM BRACHIAL SYS MAX: 117 MMHG

## 2023-08-14 PROCEDURE — 93922 UPR/L XTREMITY ART 2 LEVELS: CPT

## 2023-08-30 DIAGNOSIS — I50.41 ACUTE COMBINED SYSTOLIC AND DIASTOLIC CONGESTIVE HEART FAILURE: Chronic | ICD-10-CM

## 2023-08-30 RX ORDER — FUROSEMIDE 20 MG/1
60 TABLET ORAL DAILY
Qty: 270 TABLET | Refills: 0 | OUTPATIENT
Start: 2023-08-30

## 2023-09-12 ENCOUNTER — APPOINTMENT (OUTPATIENT)
Dept: VASCULAR SURGERY | Facility: HOSPITAL | Age: 83
End: 2023-09-12
Payer: MEDICARE

## 2023-09-12 PROCEDURE — G0463 HOSPITAL OUTPT CLINIC VISIT: HCPCS

## 2023-10-25 ENCOUNTER — OFFICE VISIT (OUTPATIENT)
Dept: CARDIOLOGY | Facility: CLINIC | Age: 83
End: 2023-10-25
Payer: MEDICARE

## 2023-10-25 VITALS
HEIGHT: 63 IN | WEIGHT: 140 LBS | DIASTOLIC BLOOD PRESSURE: 78 MMHG | SYSTOLIC BLOOD PRESSURE: 128 MMHG | BODY MASS INDEX: 24.8 KG/M2 | HEART RATE: 76 BPM | OXYGEN SATURATION: 99 %

## 2023-10-25 DIAGNOSIS — I10 ESSENTIAL HYPERTENSION: Chronic | ICD-10-CM

## 2023-10-25 DIAGNOSIS — I25.810 CORONARY ARTERY DISEASE INVOLVING CORONARY BYPASS GRAFT OF NATIVE HEART WITHOUT ANGINA PECTORIS: Primary | Chronic | ICD-10-CM

## 2023-10-25 DIAGNOSIS — I50.22 CHRONIC SYSTOLIC HEART FAILURE: ICD-10-CM

## 2023-10-25 DIAGNOSIS — I48.21 PERMANENT ATRIAL FIBRILLATION: Chronic | ICD-10-CM

## 2023-10-25 NOTE — PROGRESS NOTES
"      CARDIOLOGY    Tyler Dill MD    ENCOUNTER DATE:  10/25/2023    Jaquelin Valderrama / 83 y.o. / female        CHIEF COMPLAINT / REASON FOR OFFICE VISIT     Chronic combined systolic and diastolic congestive heart fa (05/15/2023 Follow up)  Atrial fibrillation  Coronary artery disease    HISTORY OF PRESENT ILLNESS       HPI  Jaquelin Valderrama is a 83 y.o. female who presents today for reevaluation.  Clinically she says she is doing well no chest pain shortness of breath palpitations lightheadedness swelling or fatigue.  She did see her pulmonologist who is doing intermittent thoracentesis.  I did discuss about initiating a SGL T1 inhibitor.  At this point she is not interested in any new medications.  She says this is the best she has felt for a while and does not want to add anything else.      The following portions of the patient's history were reviewed and updated as appropriate: allergies, current medications, past family history, past medical history, past social history, past surgical history and problem list.      VITAL SIGNS     Visit Vitals  /78 (BP Location: Left arm)   Pulse 76   Ht 160 cm (63\")   Wt 63.5 kg (140 lb)   SpO2 99%   BMI 24.80 kg/m²         Wt Readings from Last 3 Encounters:   10/25/23 63.5 kg (140 lb)   05/15/23 60.1 kg (132 lb 9.6 oz)   04/11/23 61.2 kg (135 lb)     Body mass index is 24.8 kg/m².      REVIEW OF SYSTEMS   ROS        PHYSICAL EXAMINATION     Vitals reviewed.   Constitutional:       Appearance: Healthy appearance.   Pulmonary:      Breath sounds: Examination of the left-lower field reveals decreased breath sounds. Decreased breath sounds present.   Cardiovascular:      Normal rate. Regular rhythm. Normal S1. Normal S2.       Murmurs: There is no murmur.      No gallop.  No click. No rub.   Pulses:     Intact distal pulses.   Edema:     Peripheral edema absent.           REVIEWED DATA     Procedures    Cardiac Procedures:      Lipid Panel          1/10/2023    09:57 "   Lipid Panel   Total Cholesterol 216    Triglycerides 108    HDL Cholesterol 55    VLDL Cholesterol 19    LDL Cholesterol  142          ASSESSMENT & PLAN      Diagnosis Plan   1. Coronary artery disease involving coronary bypass graft of native heart without angina pectoris        2. Permanent atrial fibrillation        3. Chronic systolic heart failure        4. Essential hypertension              SUMMARY/DISCUSSION  Systolic and diastolic heart failure.  Patient currently is doing well.  She appears to be a near card association class II.  Chronic atrial fibrillation.  Heart rate still well controlled she is anticoagulated.  Hypertension blood pressures good  Recurrent pleural effusions.  I did tell her that the SGL T1 inhibitor could help with some diuresis but again she is not interested in adding any more medications.  Follow-up 6 months sooner if issues occur.        MEDICATIONS         Discharge Medications            Accurate as of October 25, 2023  2:40 PM. If you have any questions, ask your nurse or doctor.                Continue These Medications        Instructions Start Date   acetaminophen 500 MG tablet  Commonly known as: TYLENOL   500 mg, Oral, Every 6 Hours PRN      apixaban 5 MG tablet tablet  Commonly known as: ELIQUIS   5 mg, Oral, Every 12 Hours Scheduled      carvedilol 3.125 MG tablet  Commonly known as: COREG   3.125 mg, Oral, 2 Times Daily With Meals      Entresto 24-26 MG tablet  Generic drug: sacubitril-valsartan   1 tablet, Oral, 2 Times Daily      furosemide 40 MG tablet  Commonly known as: LASIX   40 mg, Oral, Daily      Krill Oil 300 MG capsule   1 capsule, Oral, Daily      levothyroxine 100 MCG tablet  Commonly known as: SYNTHROID, LEVOTHROID   Take 1 tablet by mouth once daily      MULTIVITAMIN ADULT EXTRA C PO   1 tablet, Oral, Daily      spironolactone 25 MG tablet  Commonly known as: ALDACTONE   25 mg, Oral, Daily      Vitamin D3 50 MCG (2000 UT) tablet   1 tablet, Oral,  Daily                   **Dragon Disclaimer:   Much of this encounter note is an electronic transcription/translation of spoken language to printed text. The electronic translation of spoken language may permit erroneous, or at times, nonsensical words or phrases to be inadvertently transcribed. Although I have reviewed the note for such errors, some may still exist.

## 2023-11-10 ENCOUNTER — TELEPHONE (OUTPATIENT)
Dept: SURGERY | Facility: CLINIC | Age: 83
End: 2023-11-10

## 2023-11-10 NOTE — TELEPHONE ENCOUNTER
Provider: MAUREEN BOWMAN    Caller: MARCY WILKS    Relationship to Patient: SELF    Phone Number: [phone]    Reason for Call: PT IS CALLING IN REQUESTING THAT GRACIE GET A THORACENTESES SET UP FOR HER.     PER HER PULMONOLOGIST- SHE IS DUE FOR ANOTHER.     PT STATES HER  FEELS THAT WANTS HER TO GET THIS DONE RIGHT AWAY & GET THIS DONE BEFORE THE HOLIDAYS- PREFERRED NOV 20-21ST IF THAT IS AVAILABLE.

## 2023-12-01 ENCOUNTER — TELEPHONE (OUTPATIENT)
Dept: SURGERY | Facility: CLINIC | Age: 83
End: 2023-12-01

## 2023-12-01 NOTE — TELEPHONE ENCOUNTER
Caller: Jaquelin Valderrama    Relationship: Self    Best call back number: [phone]     What orders are you requesting (i.e. lab or imaging): THORACENTESIS     In what timeframe would the patient need to come in: ASAP     Where will you receive your lab/imaging services: [facility] OR FIRST AVAILABLE LOCATION     Additional notes: PATIENT WOULD LIKE A CALL BACK

## 2023-12-05 ENCOUNTER — LAB (OUTPATIENT)
Dept: LAB | Facility: HOSPITAL | Age: 83
End: 2023-12-05
Payer: MEDICARE

## 2023-12-05 PROCEDURE — 80053 COMPREHEN METABOLIC PANEL: CPT | Performed by: INTERNAL MEDICINE

## 2023-12-05 PROCEDURE — 84443 ASSAY THYROID STIM HORMONE: CPT | Performed by: INTERNAL MEDICINE

## 2023-12-05 PROCEDURE — 85025 COMPLETE CBC W/AUTO DIFF WBC: CPT | Performed by: INTERNAL MEDICINE

## 2023-12-05 PROCEDURE — 83036 HEMOGLOBIN GLYCOSYLATED A1C: CPT | Performed by: INTERNAL MEDICINE

## 2023-12-06 ENCOUNTER — TELEPHONE (OUTPATIENT)
Dept: INTERNAL MEDICINE | Facility: CLINIC | Age: 83
End: 2023-12-06

## 2023-12-06 ENCOUNTER — OFFICE VISIT (OUTPATIENT)
Dept: INTERNAL MEDICINE | Facility: CLINIC | Age: 83
End: 2023-12-06
Payer: MEDICARE

## 2023-12-06 VITALS
HEIGHT: 63 IN | HEART RATE: 70 BPM | WEIGHT: 136 LBS | SYSTOLIC BLOOD PRESSURE: 122 MMHG | DIASTOLIC BLOOD PRESSURE: 74 MMHG | BODY MASS INDEX: 24.1 KG/M2

## 2023-12-06 DIAGNOSIS — E11.65 TYPE 2 DIABETES MELLITUS WITH HYPERGLYCEMIA, WITH LONG-TERM CURRENT USE OF INSULIN: Chronic | ICD-10-CM

## 2023-12-06 DIAGNOSIS — I50.22 CHRONIC SYSTOLIC HEART FAILURE: Chronic | ICD-10-CM

## 2023-12-06 DIAGNOSIS — I73.9 PVD (PERIPHERAL VASCULAR DISEASE): ICD-10-CM

## 2023-12-06 DIAGNOSIS — Z00.00 MEDICARE ANNUAL WELLNESS VISIT, SUBSEQUENT: ICD-10-CM

## 2023-12-06 DIAGNOSIS — E03.8 HYPOTHYROIDISM DUE TO HASHIMOTO'S THYROIDITIS: Primary | Chronic | ICD-10-CM

## 2023-12-06 DIAGNOSIS — I48.21 PERMANENT ATRIAL FIBRILLATION: Chronic | ICD-10-CM

## 2023-12-06 DIAGNOSIS — E06.3 HYPOTHYROIDISM DUE TO HASHIMOTO'S THYROIDITIS: Primary | Chronic | ICD-10-CM

## 2023-12-06 DIAGNOSIS — Z79.4 TYPE 2 DIABETES MELLITUS WITH HYPERGLYCEMIA, WITH LONG-TERM CURRENT USE OF INSULIN: Chronic | ICD-10-CM

## 2023-12-06 PROBLEM — L03.115 CELLULITIS OF RIGHT LEG: Status: RESOLVED | Noted: 2023-03-20 | Resolved: 2023-12-06

## 2023-12-06 RX ORDER — LEVOTHYROXINE SODIUM 0.07 MG/1
75 TABLET ORAL DAILY
Qty: 90 TABLET | Refills: 1 | Status: SHIPPED | OUTPATIENT
Start: 2023-12-06

## 2023-12-06 NOTE — TELEPHONE ENCOUNTER
Patient was seen in the office today with Dr. Chatterjee and states she has an elevated thyroid .012 and is wanting to make an appointment with Dr. Dalila Weaver here at [facility] and they are needing a referral, please advise?  [phone] Jaquelin Valderrama

## 2023-12-06 NOTE — PROGRESS NOTES
The ABCs of the Annual Wellness Visit  Subsequent Medicare Wellness Visit    Subjective    Jaquelin Valderrama is a 83 y.o. female who presents for a Subsequent Medicare Wellness Visit.    The following portions of the patient's history were reviewed and   updated as appropriate: allergies, current medications, past family history, past medical history, past social history, past surgical history, and problem list.    Compared to one year ago, the patient feels her physical   health is the same.    Compared to one year ago, the patient feels her mental   health is the same.    Recent Hospitalizations:  This patient has had a [facility] admission record on file within the last 365 days.    Current Medical Providers:  Patient Care Team:  Minerva Chatterjee MD as PCP - General (Internal Medicine)  Yordan Evans MD as Consulting Physician (Cardiology)  Amilcar Smallwood MD as Consulting Physician (Hematology and Oncology)  Joan Chao MD as Consulting Physician (Pulmonary Disease)    Outpatient Medications Prior to Visit   Medication Sig Dispense Refill    acetaminophen (TYLENOL) 500 MG tablet Take 1 tablet by mouth Every 6 (Six) Hours As Needed for Mild Pain.      carvedilol (COREG) 3.125 MG tablet Take 1 tablet by mouth 2 (Two) Times a Day With Meals. 180 tablet 3    Cholecalciferol (VITAMIN D3) 2000 units tablet Take 1 tablet by mouth Daily.      furosemide (LASIX) 40 MG tablet Take 1 tablet by mouth Daily. 90 tablet 3    Multiple Vitamins-Minerals (MULTIVITAMIN ADULT EXTRA C PO) Take 1 tablet by mouth Daily.      sacubitril-valsartan (Entresto) 24-26 MG tablet Take 1 tablet by mouth 2 (Two) Times a Day. 180 tablet 3    spironolactone (ALDACTONE) 25 MG tablet Take 1 tablet by mouth Daily. 90 tablet 3    apixaban (ELIQUIS) 5 MG tablet tablet Take 1 tablet by mouth Every 12 (Twelve) Hours. Indications: Atrial Fibrillation 180 tablet 3    levothyroxine (SYNTHROID, LEVOTHROID) 100 MCG tablet Take 1 tablet by mouth  "once daily 90 tablet 2    Krill Oil 300 MG capsule Take 1 capsule by mouth Daily.       No facility-administered medications prior to visit.       No opioid medication identified on active medication list. I have reviewed chart for other potential  high risk medication/s and harmful drug interactions in the elderly.        Aspirin is not on active medication list.  Aspirin use is not indicated based on review of current medical condition/s. Risk of harm outweighs potential benefits.  .    Patient Active Problem List   Diagnosis    Other hyperlipidemia    Essential hypertension    Hypothyroidism    Coronary artery disease involving coronary bypass graft of native heart without angina pectoris    Astigmatism, bilateral    Bilateral presbyopia    Pseudophakia, both eyes    Atrial fibrillation    Systolic HF (heart failure)    Acute systolic heart failure    Acute on chronic systolic heart failure    Chronic systolic heart failure    Type 2 diabetes mellitus with hyperglycemia, with long-term current use of insulin    PVD (peripheral vascular disease)     Advance Care Planning   Advance Care Planning     Advance Directive is on file.  ACP discussion was held with the patient during this visit. Patient has an advance directive in EMR which is still valid.      Objective    Vitals:    12/06/23 0833   BP: 122/74   Pulse: 70   Weight: 61.7 kg (136 lb)   Height: 160 cm (63\")     Estimated body mass index is 24.09 kg/m² as calculated from the following:    Height as of this encounter: 160 cm (63\").    Weight as of this encounter: 61.7 kg (136 lb).    BMI is within normal parameters. No other follow-up for BMI required.      Does the patient have evidence of cognitive impairment? No    Lab Results   Component Value Date    HGBA1C 8.60 (H) 12/05/2023        HEALTH RISK ASSESSMENT    Smoking Status:  Social History     Tobacco Use   Smoking Status Former    Packs/day: 1.5    Types: Cigarettes    Quit date: 1980    Years since " quittin.9    Passive exposure: Past   Smokeless Tobacco Never   Tobacco Comments    CAFFEINE USE ICED TEA, OCCAS COFFEE     Alcohol Consumption:  Social History     Substance and Sexual Activity   Alcohol Use Not Currently     Fall Risk Screen:    FLORENCIA Fall Risk Assessment was completed, and patient is at LOW risk for falls.Assessment completed on:2023    Depression Screenin/6/2023     8:50 AM   PHQ-2/PHQ-9 Depression Screening   Little Interest or Pleasure in Doing Things 0-->not at all   Feeling Down, Depressed or Hopeless 0-->not at all   PHQ-9: Brief Depression Severity Measure Score 0       Health Habits and Functional and Cognitive Screenin/6/2023     8:50 AM   Functional & Cognitive Status   Do you have difficulty preparing food and eating? No   Do you have difficulty bathing yourself, getting dressed or grooming yourself? No   Do you have difficulty using the toilet? No   Do you have difficulty moving around from place to place? No   Do you have trouble with steps or getting out of a bed or a chair? No   Current Diet Well Balanced Diet   Dental Exam Up to date   Eye Exam Up to date   Exercise (times per week) 0 times per week   Current Exercises Include No Regular Exercise   Do you need help using the phone?  No   Are you deaf or do you have serious difficulty hearing?  No   Do you need help to go to places out of walking distance? No   Do you need help shopping? No   Do you need help preparing meals?  No   Do you need help with housework?  No   Do you need help with laundry? No   Do you need help taking your medications? No   Do you need help managing money? No   Do you ever drive or ride in a car without wearing a seat belt? No   Have you felt unusual stress, anger or loneliness in the last month? No   Who do you live with? Spouse   If you need help, do you have trouble finding someone available to you? No   Have you been bothered in the last four weeks by sexual problems?  No   Do you have difficulty concentrating, remembering or making decisions? No       Age-appropriate Screening Schedule:  Refer to the list below for future screening recommendations based on patient's age, sex and/or medical conditions. Orders for these recommended tests are listed in the plan section. The patient has been provided with a written plan.    Health Maintenance   Topic Date Due    URINE MICROALBUMIN  Never done    COVID-19 Vaccine (1) Never done    TDAP/TD VACCINES (1 - Tdap) Never done    ZOSTER VACCINE (1 of 2) Never done    DXA SCAN  01/03/2016    ANNUAL WELLNESS VISIT  07/14/2023    INFLUENZA VACCINE  Never done    DIABETIC EYE EXAM  12/16/2023    LIPID PANEL  01/10/2024    HEMOGLOBIN A1C  06/05/2024    Pneumococcal Vaccine 65+  Completed                  CMS Preventative Services Quick Reference  Risk Factors Identified During Encounter  Reviewed and refused.   The above risks/problems have been discussed with the patient.  Pertinent information has been shared with the patient in the After Visit Summary.  An After Visit Summary and PPPS were made available to the patient.    Follow Up:   Next Medicare Wellness visit to be scheduled in 1 year.       Additional E&M Note during same encounter follows:  Patient has multiple medical problems which are significant and separately identifiable that require additional work above and beyond the Medicare Wellness Visit.      Chief Complaint  Diabetes and Congestive Heart Failure    Subjective        Diabetes    Congestive Heart Failure      Jaquelin Valderrama is also being seen today for diabetes, heart failure  She is also having exacerbation in her chronic back issues- saw a Dr. Duncan in [place] years ago and wants to see him again.   She is also seeing pulm about recurrent pulm effusion- she gets periodic thoracentesis. She has appt now.   Refuses to take medications for DM- discussed positive indications for Jardiance   Did not allow any peripheral vascular  "procedures during hospitalization in March- hasn't had trouble since- walk a she can, rides stationary bicycle on occ.        Objective   Vital Signs:  /74   Pulse 70   Ht 160 cm (63\")   Wt 61.7 kg (136 lb)   BMI 24.09 kg/m²     Physical Exam  Constitutional:       Appearance: Normal appearance. She is not ill-appearing.   Cardiovascular:      Rate and Rhythm: Normal rate and regular rhythm.   Pulmonary:      Comments: Dullness L base  Musculoskeletal:      Right lower leg: Right lower leg edema: trivial B.   Lymphadenopathy:      Cervical: No cervical adenopathy.   Psychiatric:         Mood and Affect: Mood normal.         Thought Content: Thought content normal.          The following data was reviewed by: Minerva Chatterjee MD on 12/06/2023:  CMP          3/24/2023    04:21 5/16/2023    08:07 12/5/2023    07:26   CMP   Glucose 115  196  222    BUN 37  34  39    Creatinine 1.12  0.94  1.14    EGFR 49.5  61.1  48.5    Sodium 137  140  140    Potassium 3.8  4.2  3.9    Chloride 96  100  99    Calcium 8.1  10.1  10.0    Total Protein   6.9    Albumin   4.4    Globulin   2.5    Total Bilirubin   0.5    Alkaline Phosphatase   104    AST (SGOT)   20    ALT (SGPT)   19    Albumin/Globulin Ratio   1.8    BUN/Creatinine Ratio 33.0  36.2  34.2    Anion Gap 7.0  10.6  13.6      CBC          3/23/2023    04:35 3/24/2023    04:21 12/5/2023    07:26   CBC   WBC 4.10  3.50  4.41    RBC 4.53  4.49  4.31    Hemoglobin 14.9  14.7  13.5    Hematocrit 45.0  44.5  40.3    MCV 99.2  99.1  93.5    MCH 32.9  32.8  31.3    MCHC 33.2  33.1  33.5    RDW 15.2  15.1  12.4    Platelets 139  150  197      Lipid Panel          1/10/2023    09:57   Lipid Panel   Total Cholesterol 216    Triglycerides 108    HDL Cholesterol 55    VLDL Cholesterol 19    LDL Cholesterol  142      A1C Last 3 Results          1/10/2023    09:57 3/6/2023    09:18 12/5/2023    07:26   HGBA1C Last 3 Results   Hemoglobin A1C 9.40  9.20  8.60      Data reviewed : " Consultant notes cardiology,  pulmonary           Assessment and Plan   Diagnoses and all orders for this visit:    1. Hypothyroidism due to Hashimoto's thyroiditis (Primary)  Comments:  reduce dose of levothyroxine to 75 mcg- recheck TSH at f/u  Orders:  -     levothyroxine (Synthroid) 75 MCG tablet; Take 1 tablet by mouth Daily.  Dispense: 90 tablet; Refill: 1  -     TSH; Future    2. Type 2 diabetes mellitus with hyperglycemia, with long-term current use of insulin  Comments:  A1C slightly improved but she refuses meds- discussed role of SGLT2 but doesn't want more meds.  Orders:  -     Comprehensive Metabolic Panel; Future  -     Hemoglobin A1c; Future    3. Chronic systolic heart failure  Comments:  she actually looks good- encouraged her to see pulm if fluid has reaccumulated.    4. PVD (peripheral vascular disease)  Comments:  encouraged her to cont to ride exercise bike    5. Permanent atrial fibrillation  Comments:  no problems with anticoagulation.  Orders:  -     apixaban (ELIQUIS) 5 MG tablet tablet; Take 1 tablet by mouth Every 12 (Twelve) Hours. Indications: Atrial Fibrillation  Dispense: 180 tablet; Refill: 1    6. Medicare annual wellness visit, subsequent  Comments:  refuses all vaccines- she does eat a healthy diet and enjoys her life, ricky her grandchildren a lot. Encouraged daily stationary bicycle riding.             Follow Up   Return in about 6 months (around 6/6/2024) for Recheck, Lab Before FUP.  Patient was given instructions and counseling regarding her condition or for health maintenance advice. Please see specific information pulled into the AVS if appropriate.

## 2023-12-07 DIAGNOSIS — E06.3 HYPOTHYROIDISM DUE TO HASHIMOTO'S THYROIDITIS: Primary | ICD-10-CM

## 2023-12-07 DIAGNOSIS — E03.8 HYPOTHYROIDISM DUE TO HASHIMOTO'S THYROIDITIS: Primary | ICD-10-CM

## 2023-12-11 ENCOUNTER — HOSPITAL ENCOUNTER (OUTPATIENT)
Dept: INTERVENTIONAL RADIOLOGY/VASCULAR | Facility: HOSPITAL | Age: 83
Discharge: HOME | End: 2023-12-11
Payer: MEDICARE

## 2023-12-11 ENCOUNTER — TRANSCRIBE ORDERS (OUTPATIENT)
Dept: VASCULAR SURGERY | Facility: HOSPITAL | Age: 83
End: 2023-12-11
Payer: MEDICARE

## 2023-12-11 DIAGNOSIS — I73.9 PVD (PERIPHERAL VASCULAR DISEASE): Primary | ICD-10-CM

## 2023-12-14 ENCOUNTER — TELEPHONE (OUTPATIENT)
Dept: ENDOCRINOLOGY | Facility: CLINIC | Age: 83
End: 2023-12-14

## 2023-12-14 NOTE — TELEPHONE ENCOUNTER
Caller: Jaquelin Valderrama    Relationship to patient: Self    Best call back number: [phone]    Patient is needing: PT LAST LABS SHOWS TSH AT .012 AND PT IS VERY WORRIED AND WOULD LIKE TO BE SEEN SOONER IF AT ALL POSSIBLE

## 2024-02-05 DIAGNOSIS — E11.65 TYPE 2 DIABETES MELLITUS WITH HYPERGLYCEMIA, WITH LONG-TERM CURRENT USE OF INSULIN: Primary | ICD-10-CM

## 2024-02-05 DIAGNOSIS — Z79.4 TYPE 2 DIABETES MELLITUS WITH HYPERGLYCEMIA, WITH LONG-TERM CURRENT USE OF INSULIN: Primary | ICD-10-CM

## 2024-02-05 RX ORDER — BLOOD-GLUCOSE SENSOR
1 EACH MISCELLANEOUS
Qty: 6 EACH | Refills: 1 | Status: SHIPPED | OUTPATIENT
Start: 2024-02-05

## 2024-02-05 RX ORDER — BLOOD-GLUCOSE,RECEIVER,CONT
1 EACH MISCELLANEOUS CONTINUOUS
Qty: 1 EACH | Refills: 0 | Status: SHIPPED | OUTPATIENT
Start: 2024-02-05

## 2024-03-01 ENCOUNTER — OFFICE VISIT (OUTPATIENT)
Age: 84
End: 2024-03-01
Payer: MEDICARE

## 2024-03-01 VITALS
HEIGHT: 63 IN | WEIGHT: 151 LBS | HEART RATE: 75 BPM | BODY MASS INDEX: 26.75 KG/M2 | SYSTOLIC BLOOD PRESSURE: 130 MMHG | DIASTOLIC BLOOD PRESSURE: 74 MMHG

## 2024-03-01 DIAGNOSIS — I25.5 ISCHEMIC CARDIOMYOPATHY: ICD-10-CM

## 2024-03-01 DIAGNOSIS — I25.708 CORONARY ARTERY DISEASE OF BYPASS GRAFT OF NATIVE HEART WITH STABLE ANGINA PECTORIS: Primary | ICD-10-CM

## 2024-03-01 DIAGNOSIS — I48.21 PERMANENT ATRIAL FIBRILLATION: ICD-10-CM

## 2024-03-01 DIAGNOSIS — Z95.1 HX OF CABG: ICD-10-CM

## 2024-03-01 PROCEDURE — 3075F SYST BP GE 130 - 139MM HG: CPT | Performed by: NURSE PRACTITIONER

## 2024-03-01 PROCEDURE — 93000 ELECTROCARDIOGRAM COMPLETE: CPT | Performed by: NURSE PRACTITIONER

## 2024-03-01 PROCEDURE — 1159F MED LIST DOCD IN RCRD: CPT | Performed by: NURSE PRACTITIONER

## 2024-03-01 PROCEDURE — 1160F RVW MEDS BY RX/DR IN RCRD: CPT | Performed by: NURSE PRACTITIONER

## 2024-03-01 PROCEDURE — 3078F DIAST BP <80 MM HG: CPT | Performed by: NURSE PRACTITIONER

## 2024-03-01 PROCEDURE — 99214 OFFICE O/P EST MOD 30 MIN: CPT | Performed by: NURSE PRACTITIONER

## 2024-03-01 NOTE — PROGRESS NOTES
CARDIOLOGY        Patient Name: Jaquelin Valderrama  :1940  Age: 84 y.o.  Primary Cardiologist: Tyler Dill MD  Encounter Provider:  VALENTINE Mitchell    Date of Service: 24      CHIEF COMPLAINT / REASON FOR OFFICE VISIT     Coronary Artery Disease      HISTORY OF PRESENT ILLNESS       Coronary Artery Disease  Pertinent negatives include no leg swelling or weight gain.     Jaquelin Valderrama is a 84 y.o. female who presents today for 6 month hospital follow-up.    Pt has a  history significant for systolic heart failure, ischemic right limb, pleural effusion.    Review of medical records reveals patient was hospitalized with discharge date 3/24/2023.  She presented to the emergency room with right lower extremity swelling, redness, pain.  Patient was treated for right lower extremity cellulitis.  This revealed gram-negative bacilli in anaerobic bottle bacteremia which was likely contaminant.  Patient treated with IV Zosyn with improvement of symptoms.  Patient was transitioned to oral levothyroxine for 7 days.  Patient also had right foot discoloration concerning for an ischemic limb in the setting of holding Eliquis for atrial fibrillation.  Vascular was consulted and imaging revealed no large vessel occlusion likely microvascular thrombosis that was treated medically with heparin drip with significant improvement of discoloration.  Patient had echocardiogram which revealed worsening LVEF of 15-20%.  Patient treated with IV furosemide and medications were optimized.    Patient reports that she has done fairly well since last assessment.  She does report that about 2 weeks ago, she had 2 episodes of chest pain.  She notes that she took NTG SL x 2 with relief of symptoms.  She notes that this pain is different from the pain that she has experienced in the past.  She denies associated dyspnea, nausea or diaphoresis.  She has not had any further episodes.  She does not currently exercise.  She  "reports some generalized fatigue.  She does take her dog on a short walks.      The following portions of the patient's history were reviewed and updated as appropriate: allergies, current medications, past family history, past medical history, past social history, past surgical history and problem list.      VITAL SIGNS     Visit Vitals  /74   Pulse 75   Ht 160 cm (63\")   Wt 68.5 kg (151 lb)   BMI 26.75 kg/m²         Wt Readings from Last 3 Encounters:   03/01/24 68.5 kg (151 lb)   12/06/23 61.7 kg (136 lb)   10/25/23 63.5 kg (140 lb)     Body mass index is 26.75 kg/m².      REVIEW OF SYSTEMS   Review of Systems   Constitutional: Negative for chills, fever, malaise/fatigue, weight gain and weight loss.   Cardiovascular:  Negative for leg swelling.   Respiratory:  Negative for cough, snoring and wheezing.    Hematologic/Lymphatic: Negative for bleeding problem. Does not bruise/bleed easily.   Skin:  Negative for color change.   Musculoskeletal:  Negative for falls, joint pain and myalgias.   Gastrointestinal:  Negative for melena.   Genitourinary:  Negative for hematuria.   Neurological:  Negative for excessive daytime sleepiness.   Psychiatric/Behavioral:  Negative for depression. The patient is not nervous/anxious.            PHYSICAL EXAMINATION     Vitals and nursing note reviewed.   Constitutional:       Appearance: Normal appearance. Well-developed.   Eyes:      Conjunctiva/sclera: Conjunctivae normal.   Neck:      Vascular: No carotid bruit.   Pulmonary:      Breath sounds: Normal breath sounds.   Cardiovascular:      Normal rate. Regular rhythm. Normal S1 with normal intensity. Normal S2 with normal intensity.       Murmurs: There is no murmur.      No gallop.  No click. No rub.   Edema:     Peripheral edema absent.   Musculoskeletal: Normal range of motion. Skin:     General: Skin is warm and dry.   Neurological:      Mental Status: Alert and oriented to person, place, and time.      GCS: GCS eye " subscore is 4. GCS verbal subscore is 5. GCS motor subscore is 6.   Psychiatric:         Speech: Speech normal.         Behavior: Behavior normal.         Thought Content: Thought content normal.         Judgment: Judgment normal.           REVIEWED DATA       ECG 12 Lead    Date/Time: 3/1/2024 10:22 AM  Performed by: Barbara Joiner APRN    Authorized by: Barbara Joiner APRN  Comparison: compared with previous ECG from 3/20/2023  Rhythm: atrial fibrillation  Rate: normal  BPM: 77  Conduction: non-specific intraventricular conduction delay  T inversion: II, III, aVL, aVF, V5 and V6  QRS axis: left          Cardiac Procedures:  Echocardiogram 3/21/2023 LVEF 15-20%.  Moderate mitral valve regurgitation.  Significant tricuspid valve regurgitation.  Biatrial biventricular enlargement.        BUN   Date Value Ref Range Status   12/05/2023 39 (H) 8 - 23 mg/dL Final     Creatinine   Date Value Ref Range Status   12/05/2023 1.14 (H) 0.57 - 1.00 mg/dL Final     Potassium   Date Value Ref Range Status   12/05/2023 3.9 3.5 - 5.2 mmol/L Final     ALT (SGPT)   Date Value Ref Range Status   12/05/2023 19 1 - 33 U/L Final     AST (SGOT)   Date Value Ref Range Status   12/05/2023 20 1 - 32 U/L Final           ASSESSMENT & PLAN     Diagnoses and all orders for this visit:    1. Coronary artery disease of bypass graft of native heart with stable angina pectoris (Primary)  Assessment & Plan:  Patient with prior MI, she has had CABG several years ago out of town.  Reports episodes of chest pain that occurred approximately 2 weeks ago which were relieved with nitroglycerin  Some typical and atypical symptoms.  She does have some nonspecific T wave inversions on EKG which are new for her.  Would like to further evaluate with myocardial perfusion stress test  Continue GDMT:  Carvedilol    Orders:  -     Stress Test With Myocardial Perfusion One Day; Future  -     Adult Transthoracic Echo Complete W/ Cont if Necessary Per Protocol;  Future    2. Hx of CABG  -     Stress Test With Myocardial Perfusion One Day; Future  -     Adult Transthoracic Echo Complete W/ Cont if Necessary Per Protocol; Future    3. Ischemic cardiomyopathy  Assessment & Plan:  Last LVEF 15-20%  She has had 2 episodes of chest pain which could be relieved by nitroglycerin  Given her history would like to further evaluate, recommending myocardial perfusion stress test and echocardiogram  Euvolemic on exam  Continue GDMT:  Furosemide 40 mg/day  Carvedilol 3.125 mg twice daily  Entresto 24/26 mg twice daily  Spironolactone 25 mg/day    Orders:  -     Stress Test With Myocardial Perfusion One Day; Future  -     Adult Transthoracic Echo Complete W/ Cont if Necessary Per Protocol; Future    4. Permanent atrial fibrillation  Assessment & Plan:  Patient with rate controlled atrial fibrillation  Continue carvedilol  Anticoagulated with apixaban and denies bleeding complications    CHADS-VASc Risk Assessment              7 Total Score    1 CHF    1 Hypertension    2 Age >/= 75    1 DM    1 Vascular Disease    1 Sex: Female        Criteria that do not apply:    PRIOR STROKE/TIA/THROMBO    Age 65-74                Other orders  -     ECG 12 Lead        No follow-ups on file.    Future Appointments         Provider Department Center    3/12/2024 7:00 AM [facility] ECHO/VAS [facility] OUTPATIENT ECHOCARDIOGRAPHY [facility]    3/12/2024 7:30 AM [facility] NUC CARD [facility]    5/14/2024 9:00 AM Dalila Weaver MD [facility] ENDOCRINOLOGY [facility]    6/6/2024 9:15 AM (Arrive by 8:45 AM) Minerva Chatterjee MD [facility] PRIMARY CARE [facility]    9/16/2024 10:30 AM [facility] CARDIOV[facility]ULAR NON-INVASIVE  Arrive at: [facility] CARDIOLOGY [facility]              MEDICATIONS         Discharge Medications            Accurate as of March 1, 2024  2:26 PM. If you have any questions, ask your nurse or doctor.                 Changes to Medications        Instructions Start Date   furosemide 40 MG tablet  Commonly known as: LASIX  What changed: additional instructions   40 mg, Oral, Daily             Continue These Medications        Instructions Start Date   acetaminophen 500 MG tablet  Commonly known as: TYLENOL   500 mg, Oral, Every 6 Hours PRN      apixaban 5 MG tablet tablet  Commonly known as: ELIQUIS   5 mg, Oral, Every 12 Hours Scheduled      carvedilol 3.125 MG tablet  Commonly known as: COREG   3.125 mg, Oral, 2 Times Daily With Meals      Entresto 24-26 MG tablet  Generic drug: sacubitril-valsartan   1 tablet, Oral, 2 Times Daily      FreeStyle Jensen 3 Gravity device   1 Device, Does not apply, Continuous      FreeStyle Jensen 3 Sensor misc   1 Device, Does not apply, Every 14 Days      levothyroxine 75 MCG tablet  Commonly known as: Synthroid   75 mcg, Oral, Daily      MULTIVITAMIN ADULT EXTRA C PO   1 tablet, Oral, Daily      spironolactone 25 MG tablet  Commonly known as: ALDACTONE   25 mg, Oral, Daily      Vitamin D3 50 MCG (2000 UT) tablet   1 tablet, Oral, Daily                   **Dragon Disclaimer:   Much of this encounter note is an electronic transcription/translation of spoken language to printed text. The electronic translation of spoken language may permit erroneous, or at times, nonsensical words or phrases to be inadvertently transcribed. Although I have reviewed the note for such errors, some may still exist.

## 2024-03-01 NOTE — ASSESSMENT & PLAN NOTE
Patient with rate controlled atrial fibrillation  Continue carvedilol  Anticoagulated with apixaban and denies bleeding complications    CHADS-VASc Risk Assessment              7 Total Score    1 CHF    1 Hypertension    2 Age >/= 75    1 DM    1 Vascular Disease    1 Sex: Female        Criteria that do not apply:    PRIOR STROKE/TIA/THROMBO    Age 65-74

## 2024-03-01 NOTE — ASSESSMENT & PLAN NOTE
Last LVEF 15-20%  She has had 2 episodes of chest pain which could be relieved by nitroglycerin  Given her history would like to further evaluate, recommending myocardial perfusion stress test and echocardiogram  Euvolemic on exam  Continue GDMT:  Furosemide 40 mg/day  Carvedilol 3.125 mg twice daily  Entresto 24/26 mg twice daily  Spironolactone 25 mg/day

## 2024-03-06 ENCOUNTER — TELEPHONE (OUTPATIENT)
Dept: INTERNAL MEDICINE | Facility: CLINIC | Age: 84
End: 2024-03-06
Payer: MEDICARE

## 2024-03-06 DIAGNOSIS — I10 ESSENTIAL HYPERTENSION: Primary | ICD-10-CM

## 2024-03-06 RX ORDER — SACUBITRIL AND VALSARTAN 24; 26 MG/1; MG/1
1 TABLET, FILM COATED ORAL 2 TIMES DAILY
Qty: 180 TABLET | Refills: 3 | Status: SHIPPED | OUTPATIENT
Start: 2024-03-06

## 2024-03-06 NOTE — TELEPHONE ENCOUNTER
Caller: Jaquelin Valderrama    Relationship to patient: Self    Best call back number:     Patient is needing: PATIENT STATES SHE IS NEEDING A WRITTEN PRESCRIPTION FOR HER ENTRESTO SO THAT SHE CAN SEND IT OFF TO THE PHARMACY IN [place] BECAUSE THE COST IS SO MUCH LESS THAN IN THE [place].   SHE STATES SHE HAS AN APPOINTMENT IN [place] TOMORROW AND IS HOPING TO BE ABLE TO PICK THE PRESCRIPTION UP TOMORROW WHEN SHE IS IN TOWN.      PLEASE CALL THE PATIENT TO ADVISE IF THE PRESCRIPTION WILL BE READY FOR HER TO  TOMORROW.

## 2024-03-08 ENCOUNTER — TELEPHONE (OUTPATIENT)
Dept: INTERNAL MEDICINE | Facility: CLINIC | Age: 84
End: 2024-03-08
Payer: MEDICARE

## 2024-03-08 NOTE — TELEPHONE ENCOUNTER
Spoke with Puma at [facility] patients  states that he wanted patients Novolog Mix 70/30 refilled per the request of Dr. Chatterjee no notation found and nothing sent to pharmacy to authenticate request patients  advised Dr. Chatterjee is out of office and once she returns Monday will be made aware of his request and if she approves presumption will be sent to pharmacy, he understood no further action needed

## 2024-03-11 ENCOUNTER — TELEPHONE (OUTPATIENT)
Dept: CARDIOLOGY | Facility: CLINIC | Age: 84
End: 2024-03-11
Payer: MEDICARE

## 2024-03-12 ENCOUNTER — HOSPITAL ENCOUNTER (OUTPATIENT)
Dept: CARDIOLOGY | Facility: HOSPITAL | Age: 84
Discharge: HOME | End: 2024-03-12
Payer: MEDICARE

## 2024-03-12 VITALS
HEART RATE: 78 BPM | WEIGHT: 151.01 LBS | HEIGHT: 63 IN | SYSTOLIC BLOOD PRESSURE: 136 MMHG | BODY MASS INDEX: 26.76 KG/M2 | OXYGEN SATURATION: 97 % | DIASTOLIC BLOOD PRESSURE: 86 MMHG

## 2024-03-12 VITALS — BODY MASS INDEX: 26.76 KG/M2 | WEIGHT: 151.01 LBS | HEIGHT: 63 IN

## 2024-03-12 DIAGNOSIS — I25.708 CORONARY ARTERY DISEASE OF BYPASS GRAFT OF NATIVE HEART WITH STABLE ANGINA PECTORIS: ICD-10-CM

## 2024-03-12 DIAGNOSIS — Z95.1 HX OF CABG: ICD-10-CM

## 2024-03-12 DIAGNOSIS — I25.5 ISCHEMIC CARDIOMYOPATHY: ICD-10-CM

## 2024-03-12 LAB
AORTIC DIMENSIONLESS INDEX: 0.9 (DI)
ASCENDING AORTA: 3 CM
BH CV ECHO LEFT VENTRICLE GLOBAL LONGITUDINAL STRAIN: -13.9 %
BH CV ECHO MEAS - ACS: 1.27 CM
BH CV ECHO MEAS - AO MAX PG: 4.7 MMHG
BH CV ECHO MEAS - AO MEAN PG: 2.47 MMHG
BH CV ECHO MEAS - AO V2 MAX: 108.6 CM/SEC
BH CV ECHO MEAS - AO V2 VTI: 21.9 CM
BH CV ECHO MEAS - AVA(I,D): 2.28 CM2
BH CV ECHO MEAS - EDV(CUBED): 209.3 ML
BH CV ECHO MEAS - EDV(MOD-SP2): 129 ML
BH CV ECHO MEAS - EDV(MOD-SP4): 93 ML
BH CV ECHO MEAS - EF(MOD-BP): 61.2 %
BH CV ECHO MEAS - EF(MOD-SP2): 61.2 %
BH CV ECHO MEAS - EF(MOD-SP4): 62.4 %
BH CV ECHO MEAS - ESV(CUBED): 98.2 ML
BH CV ECHO MEAS - ESV(MOD-SP2): 50 ML
BH CV ECHO MEAS - ESV(MOD-SP4): 35 ML
BH CV ECHO MEAS - FS: 22.3 %
BH CV ECHO MEAS - IVS/LVPW: 0.88 CM
BH CV ECHO MEAS - IVSD: 0.91 CM
BH CV ECHO MEAS - LAT PEAK E' VEL: 9.5 CM/SEC
BH CV ECHO MEAS - LV DIASTOLIC VOL/BSA (35-75): 54.2 CM2
BH CV ECHO MEAS - LV MASS(C)D: 233.4 GRAMS
BH CV ECHO MEAS - LV MAX PG: 3.2 MMHG
BH CV ECHO MEAS - LV MEAN PG: 2.05 MMHG
BH CV ECHO MEAS - LV SYSTOLIC VOL/BSA (12-30): 20.4 CM2
BH CV ECHO MEAS - LV V1 MAX: 89.5 CM/SEC
BH CV ECHO MEAS - LV V1 VTI: 20.5 CM
BH CV ECHO MEAS - LVIDD: 5.9 CM
BH CV ECHO MEAS - LVIDS: 4.6 CM
BH CV ECHO MEAS - LVOT AREA: 2.45 CM2
BH CV ECHO MEAS - LVOT DIAM: 1.77 CM
BH CV ECHO MEAS - LVPWD: 1.03 CM
BH CV ECHO MEAS - MED PEAK E' VEL: 9 CM/SEC
BH CV ECHO MEAS - MR MAX PG: 73.7 MMHG
BH CV ECHO MEAS - MR MAX VEL: 429.4 CM/SEC
BH CV ECHO MEAS - MV DEC SLOPE: 506.1 CM/SEC2
BH CV ECHO MEAS - MV DEC TIME: 0.1 SEC
BH CV ECHO MEAS - MV E MAX VEL: 65.1 CM/SEC
BH CV ECHO MEAS - MV MAX PG: 3.3 MMHG
BH CV ECHO MEAS - MV MEAN PG: 2 MMHG
BH CV ECHO MEAS - MV P1/2T: 54.1 MSEC
BH CV ECHO MEAS - MV V2 VTI: 17 CM
BH CV ECHO MEAS - MVA(P1/2T): 4.1 CM2
BH CV ECHO MEAS - MVA(VTI): 3 CM2
BH CV ECHO MEAS - PA ACC TIME: 0.09 SEC
BH CV ECHO MEAS - PA V2 MAX: 80.1 CM/SEC
BH CV ECHO MEAS - PULM DIAS VEL: 26.1 CM/SEC
BH CV ECHO MEAS - PULM S/D: 1.38
BH CV ECHO MEAS - PULM SYS VEL: 36 CM/SEC
BH CV ECHO MEAS - QP/QS: 0.49
BH CV ECHO MEAS - RAP SYSTOLE: 3 MMHG
BH CV ECHO MEAS - RV MAX PG: 0.89 MMHG
BH CV ECHO MEAS - RV V1 MAX: 47.1 CM/SEC
BH CV ECHO MEAS - RV V1 VTI: 8.5 CM
BH CV ECHO MEAS - RVOT DIAM: 1.92 CM
BH CV ECHO MEAS - RVSP: 46.9 MMHG
BH CV ECHO MEAS - SI(MOD-SP2): 46 ML/M2
BH CV ECHO MEAS - SI(MOD-SP4): 33.8 ML/M2
BH CV ECHO MEAS - SV(LVOT): 50.1 ML
BH CV ECHO MEAS - SV(MOD-SP2): 79 ML
BH CV ECHO MEAS - SV(MOD-SP4): 58 ML
BH CV ECHO MEAS - SV(RVOT): 24.4 ML
BH CV ECHO MEAS - TAPSE (>1.6): 1 CM
BH CV ECHO MEAS - TR MAX PG: 43.9 MMHG
BH CV ECHO MEAS - TR MAX VEL: 331.2 CM/SEC
BH CV ECHO MEASUREMENTS AVERAGE E/E' RATIO: 7.04
BH CV NUCLEAR PRIOR STUDY: 2
BH CV REST NUCLEAR ISOTOPE DOSE: 60 MCI
BH CV STRESS BP STAGE 1: NORMAL
BH CV STRESS COMMENTS STAGE 1: NORMAL
BH CV STRESS DOSE REGADENOSON STAGE 1: 0.4
BH CV STRESS DURATION MIN STAGE 1: 0
BH CV STRESS DURATION SEC STAGE 1: 10
BH CV STRESS HR STAGE 1: 81
BH CV STRESS NUCLEAR ISOTOPE DOSE: 60 MCI
BH CV STRESS PROTOCOL 1: NORMAL
BH CV STRESS RECOVERY BP: NORMAL MMHG
BH CV STRESS RECOVERY HR: 81 BPM
BH CV STRESS STAGE 1: 1
BH CV XLRA - RV BASE: 3.6 CM
BH CV XLRA - RV LENGTH: 5.4 CM
BH CV XLRA - RV MID: 2.9 CM
BH CV XLRA - TDI S': 6.5 CM/SEC
LEFT ATRIUM VOLUME INDEX: 65.1 ML/M2
LV EF NUC BP: 57 %
MAXIMAL PREDICTED HEART RATE: 138 BPM
PERCENT MAX PREDICTED HR: 58.7 %
SINUS: 2.9 CM
STJ: 2.1 CM
STRESS BASELINE BP: NORMAL MMHG
STRESS BASELINE HR: 74 BPM
STRESS PERCENT HR: 69 %
STRESS POST EXERCISE DUR SEC: 10 SEC
STRESS POST PEAK BP: NORMAL MMHG
STRESS POST PEAK HR: 81 BPM
STRESS TARGET HR: 117 BPM

## 2024-03-12 PROCEDURE — 78492 MYOCRD IMG PET MLT RST&STRS: CPT | Performed by: INTERNAL MEDICINE

## 2024-03-12 PROCEDURE — 93017 CV STRESS TEST TRACING ONLY: CPT

## 2024-03-12 PROCEDURE — 93306 TTE W/DOPPLER COMPLETE: CPT

## 2024-03-12 PROCEDURE — 93356 MYOCRD STRAIN IMG SPCKL TRCK: CPT | Performed by: INTERNAL MEDICINE

## 2024-03-12 PROCEDURE — 93018 CV STRESS TEST I&R ONLY: CPT | Performed by: INTERNAL MEDICINE

## 2024-03-12 PROCEDURE — 25010000002 REGADENOSON 0.4 MG/5ML SOLUTION: Performed by: NURSE PRACTITIONER

## 2024-03-12 PROCEDURE — 25510000001 PERFLUTREN (DEFINITY) 8.476 MG IN SODIUM CHLORIDE (PF) 0.9 % 10 ML INJECTION: Performed by: NURSE PRACTITIONER

## 2024-03-12 PROCEDURE — 0 RUBIDIUM CHLORIDE: Performed by: NURSE PRACTITIONER

## 2024-03-12 PROCEDURE — 78492 MYOCRD IMG PET MLT RST&STRS: CPT

## 2024-03-12 PROCEDURE — 93356 MYOCRD STRAIN IMG SPCKL TRCK: CPT

## 2024-03-12 PROCEDURE — 93306 TTE W/DOPPLER COMPLETE: CPT | Performed by: INTERNAL MEDICINE

## 2024-03-12 PROCEDURE — A9555 RB82 RUBIDIUM: HCPCS | Performed by: NURSE PRACTITIONER

## 2024-03-12 PROCEDURE — 93016 CV STRESS TEST SUPVJ ONLY: CPT | Performed by: INTERNAL MEDICINE

## 2024-03-12 RX ORDER — REGADENOSON 0.08 MG/ML
0.4 INJECTION, SOLUTION INTRAVENOUS
Status: COMPLETED | OUTPATIENT
Start: 2024-03-12 | End: 2024-03-12

## 2024-03-12 RX ADMIN — REGADENOSON 0.4 MG: 0.08 INJECTION, SOLUTION INTRAVENOUS at 08:35

## 2024-03-12 RX ADMIN — PERFLUTREN 4 ML: 6.52 INJECTION, SUSPENSION INTRAVENOUS at 08:04

## 2024-03-13 NOTE — PROGRESS NOTES
Patient was complaining of intermittent episodes of chest pain which were somewhat typical and somewhat atypical.  She does have history of prior CABG, unclear distribution of CABG anatomy.  Can you please review her stress test and make recommendations?

## 2024-03-13 NOTE — PROGRESS NOTES
Dr. Dill and myself have reviewed the echocardiogram as well as stress test.  I believe that area of infarct is from patient's prior episodes.  She reports that she is feeling better today.  LVEF has improved.  Patient will need follow-up with Dr. Dill in 6 months.

## 2024-03-16 DIAGNOSIS — E06.3 HYPOTHYROIDISM DUE TO HASHIMOTO'S THYROIDITIS: Chronic | ICD-10-CM

## 2024-03-16 DIAGNOSIS — E03.8 HYPOTHYROIDISM DUE TO HASHIMOTO'S THYROIDITIS: Chronic | ICD-10-CM

## 2024-03-18 DIAGNOSIS — I10 ESSENTIAL HYPERTENSION: Primary | ICD-10-CM

## 2024-03-18 RX ORDER — CARVEDILOL 3.12 MG/1
3.12 TABLET ORAL 2 TIMES DAILY WITH MEALS
Qty: 180 TABLET | Refills: 1 | Status: SHIPPED | OUTPATIENT
Start: 2024-03-18

## 2024-03-18 RX ORDER — LEVOTHYROXINE SODIUM 0.1 MG/1
TABLET ORAL
Qty: 90 TABLET | Refills: 0 | OUTPATIENT
Start: 2024-03-18

## 2024-03-18 RX ORDER — CARVEDILOL 3.12 MG/1
3.12 TABLET ORAL 2 TIMES DAILY WITH MEALS
Qty: 180 TABLET | Refills: 0 | OUTPATIENT
Start: 2024-03-18

## 2024-03-19 RX ORDER — FUROSEMIDE 40 MG/1
40 TABLET ORAL DAILY
Qty: 90 TABLET | Refills: 0 | Status: SHIPPED | OUTPATIENT
Start: 2024-03-19

## 2024-03-25 ENCOUNTER — TELEPHONE (OUTPATIENT)
Dept: INTERNAL MEDICINE | Facility: CLINIC | Age: 84
End: 2024-03-25
Payer: MEDICARE

## 2024-03-25 NOTE — TELEPHONE ENCOUNTER
Caller: [facility]    Relationship:     Best call back number: [phone]     What is the best time to reach you:ANYTIME     Who are you requesting to speak with (clinical staff, provider,  specific staff member): CLINICAL    What was the call regarding: JAMARI CALLING TO FOLLOW UP ON THE "[employer] JUSTINE 3 IT WAS FAXED ON 03/12/24 SHE STATED THEY ARE NEEDING MEDICAL RECORDS AS WELL

## 2024-03-28 ENCOUNTER — DOCUMENTATION (OUTPATIENT)
Dept: PHYSICAL THERAPY | Facility: HOSPITAL | Age: 84
End: 2024-03-28
Payer: MEDICARE

## 2024-03-28 DIAGNOSIS — I89.0 LYMPHEDEMA: Primary | ICD-10-CM

## 2024-03-28 NOTE — THERAPY DISCHARGE NOTE
Outpatient Physical Therapy Discharge Summary         Patient Name: Jaquelin Valderrama  : 1940  MRN: [id number]    Today's Date: 3/28/2024    Visit Dx:    ICD-10-CM ICD-9-CM   1. Lymphedema  I89.0 457.1        PT OP Goals       Row Name 24 1000          PT Short Term Goals    STG Date to Achieve 23  -KD     STG 1 Pt to demonstrate awareness of condition and precautions for improved prevention, management, care of symptoms, and ease of transition to self-care of condition.  -KD     STG 1 Progress Not Met  -KD     STG 1 Progress Comments Pt did not return for therapy.  -KD     STG 2 Patient/family independent with self-wrapping techniques of compression bandages as indicated for improved self-management of condition.  -KD     STG 2 Progress Not Met  -KD     STG 2 Progress Comments Pt did not return for therapy.  -KD     STG 3 Patient demonstrate decreased net edema of BLEs  >/=5-10cm for decreased edema symptoms, decreased risk of infection, and improved skin care.  -KD     STG 3 Progress Not Met  -KD     STG 3 Progress Comments Pt did not return for therapy.  -KD        Long Term Goals    LTG Date to Achieve 23  -KD     LTG 1 Patient/family independent with self-care techniques for self-management of condition.  -KD     LTG 1 Progress Not Met  -KD     LTG 1 Progress Comments Pt did not return for therapy.  -KD     LTG 2 Patient/family independent with compression garments as indicated for self-management of condition.  -KD     LTG 2 Progress Not Met  -KD     LTG 2 Progress Comments Pt did not return for therapy.  -KD     LTG 3 Skin on right lower extremity to be intact, no weeping, to decrease risk of infection.  -KD     LTG 3 Progress Not Met  -KD     LTG 3 Progress Comments Pt did not return for therapy.  -KD        Time Calculation    PT Goal Re-Cert Due Date 23  -KD               User Key  (r) = Recorded By, (t) = Taken By, (c) = Cosigned By      Initials Name Provider Type    KD  Ingrid Andres, PT Physical Therapist                    OP PT Discharge Summary  Date of Discharge: 03/28/24  Reason for Discharge: other (comment) (Pt did not return for therapy.)  Outcomes Achieved: Other (Pt did not return for therapy.)  Discharge Destination: Unknown      Time Calculation:                    Ingrid Andres, PT  3/28/2024

## 2024-04-05 ENCOUNTER — TELEPHONE (OUTPATIENT)
Dept: INTERNAL MEDICINE | Facility: CLINIC | Age: 84
End: 2024-04-05
Payer: MEDICARE

## 2024-04-05 NOTE — TELEPHONE ENCOUNTER
Caller: [facility]    Relationship:     Best call back number: [phone]     What is the best time to reach you: ANYTIME    Who are you requesting to speak with (clinical staff, provider,  specific staff member): CLINICAL    What was the call regarding: [facility] CALLING TO FOLLOW UP ON THE FAX THAT WAS FAXED ON 04/05/24. SHE STATED THAT THE ORDER THEY RECEIVED IS MISSING THE DIAGNOSES CODE.

## 2024-04-29 ENCOUNTER — TELEPHONE (OUTPATIENT)
Dept: INTERNAL MEDICINE | Facility: CLINIC | Age: 84
End: 2024-04-29
Payer: MEDICARE

## 2024-05-14 ENCOUNTER — LAB (OUTPATIENT)
Dept: LAB | Facility: HOSPITAL | Age: 84
End: 2024-05-14
Payer: MEDICARE

## 2024-05-14 ENCOUNTER — OFFICE VISIT (OUTPATIENT)
Dept: ENDOCRINOLOGY | Facility: CLINIC | Age: 84
End: 2024-05-14
Payer: MEDICARE

## 2024-05-14 VITALS
SYSTOLIC BLOOD PRESSURE: 120 MMHG | DIASTOLIC BLOOD PRESSURE: 80 MMHG | HEART RATE: 80 BPM | HEIGHT: 63 IN | WEIGHT: 153 LBS | OXYGEN SATURATION: 97 % | BODY MASS INDEX: 27.11 KG/M2

## 2024-05-14 DIAGNOSIS — E03.9 ACQUIRED HYPOTHYROIDISM: ICD-10-CM

## 2024-05-14 DIAGNOSIS — E11.65 TYPE 2 DIABETES MELLITUS WITH HYPERGLYCEMIA, WITHOUT LONG-TERM CURRENT USE OF INSULIN: ICD-10-CM

## 2024-05-14 DIAGNOSIS — E11.65 TYPE 2 DIABETES MELLITUS WITH HYPERGLYCEMIA, WITHOUT LONG-TERM CURRENT USE OF INSULIN: Primary | ICD-10-CM

## 2024-05-14 LAB
ALBUMIN SERPL-MCNC: 4.6 G/DL (ref 3.5–5.2)
ALBUMIN UR-MCNC: <1.2 MG/DL
ALBUMIN/GLOB SERPL: 1.8 G/DL
ALP SERPL-CCNC: 104 U/L (ref 39–117)
ALT SERPL W P-5'-P-CCNC: 15 U/L (ref 1–33)
ANION GAP SERPL CALCULATED.3IONS-SCNC: 11 MMOL/L (ref 5–15)
AST SERPL-CCNC: 17 U/L (ref 1–32)
BILIRUB SERPL-MCNC: 0.6 MG/DL (ref 0–1.2)
BUN SERPL-MCNC: 40 MG/DL (ref 8–23)
BUN/CREAT SERPL: 34.2 (ref 7–25)
CALCIUM SPEC-SCNC: 9.6 MG/DL (ref 8.6–10.5)
CHLORIDE SERPL-SCNC: 98 MMOL/L (ref 98–107)
CHOLEST SERPL-MCNC: 288 MG/DL (ref 0–200)
CO2 SERPL-SCNC: 27 MMOL/L (ref 22–29)
CREAT SERPL-MCNC: 1.17 MG/DL (ref 0.57–1)
CREAT UR-MCNC: 15.8 MG/DL
EGFRCR SERPLBLD CKD-EPI 2021: 46.7 ML/MIN/1.73
GLOBULIN UR ELPH-MCNC: 2.6 GM/DL
GLUCOSE SERPL-MCNC: 261 MG/DL (ref 65–99)
HBA1C MFR BLD: 9.5 % (ref 4.8–5.6)
HDLC SERPL-MCNC: 58 MG/DL (ref 40–60)
LDLC SERPL CALC-MCNC: 188 MG/DL (ref 0–100)
LDLC/HDLC SERPL: 3.2 {RATIO}
MICROALBUMIN/CREAT UR: NORMAL MG/G{CREAT}
POTASSIUM SERPL-SCNC: 4.5 MMOL/L (ref 3.5–5.2)
PROT SERPL-MCNC: 7.2 G/DL (ref 6–8.5)
SODIUM SERPL-SCNC: 136 MMOL/L (ref 136–145)
T4 FREE SERPL-MCNC: 1.77 NG/DL (ref 0.93–1.7)
TRIGL SERPL-MCNC: 223 MG/DL (ref 0–150)
TSH SERPL DL<=0.05 MIU/L-ACNC: 0.44 UIU/ML (ref 0.27–4.2)
VLDLC SERPL-MCNC: 42 MG/DL (ref 5–40)

## 2024-05-14 PROCEDURE — 3079F DIAST BP 80-89 MM HG: CPT | Performed by: INTERNAL MEDICINE

## 2024-05-14 PROCEDURE — 80053 COMPREHEN METABOLIC PANEL: CPT

## 2024-05-14 PROCEDURE — 82043 UR ALBUMIN QUANTITATIVE: CPT

## 2024-05-14 PROCEDURE — 83036 HEMOGLOBIN GLYCOSYLATED A1C: CPT

## 2024-05-14 PROCEDURE — 3074F SYST BP LT 130 MM HG: CPT | Performed by: INTERNAL MEDICINE

## 2024-05-14 PROCEDURE — 1160F RVW MEDS BY RX/DR IN RCRD: CPT | Performed by: INTERNAL MEDICINE

## 2024-05-14 PROCEDURE — 36415 COLL VENOUS BLD VENIPUNCTURE: CPT

## 2024-05-14 PROCEDURE — 84443 ASSAY THYROID STIM HORMONE: CPT

## 2024-05-14 PROCEDURE — 84439 ASSAY OF FREE THYROXINE: CPT

## 2024-05-14 PROCEDURE — 80061 LIPID PANEL: CPT

## 2024-05-14 PROCEDURE — 99204 OFFICE O/P NEW MOD 45 MIN: CPT | Performed by: INTERNAL MEDICINE

## 2024-05-14 PROCEDURE — 82570 ASSAY OF URINE CREATININE: CPT

## 2024-05-14 PROCEDURE — 1159F MED LIST DOCD IN RCRD: CPT | Performed by: INTERNAL MEDICINE

## 2024-05-14 NOTE — PROGRESS NOTES
Endocrine Consult Outpatient  Referred by Dr. Minerva Chattejree for diabetes and thyroid consultation  Patient Care Team:  Minerva Chatterjee MD as PCP - General (Internal Medicine)  Yordan Evans MD as Consulting Physician (Cardiology)  Amilcar Smallwood MD as Consulting Physician (Hematology and Oncology)  Joan Chao MD as Consulting Physician (Pulmonary Disease)     Chief Complaint: Diabetes and thyroid consultation         HPI: This is a 84-year-old female with history of type 2 diabetes and hypothyroidism is here for evaluation.    With regards to type 2 diabetes: Initial diagnosis was in March 2022, she has not done formal diabetes education.  She checks her blood sugars almost on a daily basis and she tells me they are all over.  She tells me that she has tried insulin in the past which developed low blood sugars.  So she stopped it.  She is currently not taking any medications for diabetes.    For hypothyroidism: She is currently on levothyroxine at 75 mcg p.o. daily.  This dose was reduced from 100 mcg to 75 mcg back in December 2023 with a low TSH.    With regards to other history she does have high blood pressure, hyperlipidemia, CAD with CABG done in the past.    Past Medical History:   Diagnosis Date    Astigmatism, bilateral 11/19/2019    Benign essential hypertension     Bilateral presbyopia 11/19/2019    CAD (coronary artery disease)     CHF (congestive heart failure)     Closed right ankle fracture     COVID-19 vaccination refused     Heart attack     Hyperlipidemia     Hypothyroidism     Influenza vaccine needed     Myocardial infarction     Prediabetes     Pseudophakia, both eyes 11/19/2019    Sleep apnea     Thoracic back pain        Social History     Socioeconomic History    Marital status:      Spouse name: Rodney    Number of children: 3    Years of education: 16   Tobacco Use    Smoking status: Former     Current packs/day: 0.00     Types: Cigarettes     Quit date: 1980     Years  since quittin.3     Passive exposure: Past    Smokeless tobacco: Never    Tobacco comments:     CAFFEINE USE ICED TEA, OCCAS COFFEE   Vaping Use    Vaping status: Never Used   Substance and Sexual Activity    Alcohol use: Yes     Comment: occ    Drug use: No    Sexual activity: Defer       Family History   Problem Relation Age of Onset    Hypertension Mother     Stroke Mother     Heart disease Mother     Lung cancer Father     Thyroid disease Sister     Diabetes Brother     Diabetes Other        Allergies   Allergen Reactions    Prednisone Other (See Comments)     Increased BP    Statins Unknown - Low Severity       ROS:   Constitutional:  Denies fatigue, tiredness.    Eyes:  Denies change in visual acuity   HENT:  Denies nasal congestion or sore throat   Respiratory: denies cough, shortness of breath.   Cardiovascular:  denies chest pain, edema   GI:  Denies abdominal pain, nausea, vomiting.    :  Denies dysuria   Musculoskeletal:  Denies back pain or joint pain   Integument:  Denies dry skin, rash   Neurologic:  Denies headache, focal weakness or sensory changes   Endocrine:  Denies polyuria or polydipsia   Psychiatric:  Denies depression or anxiety      Vitals:    24 0856   BP: 120/80   Pulse: 80   SpO2: 97%     Body mass index is 27.11 kg/m².      Physical Exam:  GEN: NAD, conversant  EYES: EOMI, PERRL  NECK: no thyromegaly, full ROM   CV: RRR  LUNG: CTA  SKIN: no rashes, no acanthosis  MSK: no deformities,   NEURO: no tremors, DTR normal  PSYCH: Awake and coherent      Results Review:     I reviewed the patient's new clinical results.    Lab Results   Component Value Date    GLUCOSE 222 (H) 2023    BUN 39 (H) 2023    CREATININE 1.14 (H) 2023    EGFRIFNONA 70 2021    EGFRIFAFRI 81 2021    BCR 34.2 (H) 2023    K 3.9 2023    CO2 27.4 2023    CALCIUM 10.0 2023    PROTENTOTREF 6.1 01/10/2023    ALBUMIN 4.4 2023    LABIL2 2.6 01/10/2023    AST  20 12/05/2023    ALT 19 12/05/2023    CHOL 154 03/05/2022    TRIG 108 01/10/2023    HDL 55 01/10/2023     (H) 01/10/2023     Lab Results   Component Value Date    HGBA1C 8.60 (H) 12/05/2023    HGBA1C 9.20 (H) 03/06/2023    HGBA1C 9.40 (H) 01/10/2023     Lab Results   Component Value Date    CREATININE 1.14 (H) 12/05/2023     Lab Results   Component Value Date    TSH 0.012 (L) 12/05/2023    FREET4 1.17 03/02/2022       Medication Review: Reviewed.       Current Outpatient Medications:     acetaminophen (TYLENOL) 500 MG tablet, Take 1 tablet by mouth Every 6 (Six) Hours As Needed for Mild Pain., Disp: , Rfl:     apixaban (ELIQUIS) 5 MG tablet tablet, Take 1 tablet by mouth Every 12 (Twelve) Hours. Indications: Atrial Fibrillation, Disp: 180 tablet, Rfl: 1    carvedilol (COREG) 3.125 MG tablet, Take 1 tablet by mouth 2 (Two) Times a Day With Meals., Disp: 180 tablet, Rfl: 1    Cholecalciferol (VITAMIN D3) 2000 units tablet, Take 1 tablet by mouth Daily., Disp: , Rfl:     Continuous Blood Gluc  (FreeStyle Jensen 3 Kennard) device, Use 1 Device Continuous., Disp: 1 each, Rfl: 0    Continuous Blood Gluc Sensor (FreeStyle Jensen 3 Sensor) misc, Use 1 Device Every 14 (Fourteen) Days., Disp: 6 each, Rfl: 1    furosemide (LASIX) 40 MG tablet, Take 1 tablet by mouth once daily, Disp: 90 tablet, Rfl: 0    levothyroxine (Synthroid) 75 MCG tablet, Take 1 tablet by mouth Daily., Disp: 90 tablet, Rfl: 1    Multiple Vitamins-Minerals (MULTIVITAMIN ADULT EXTRA C PO), Take 1 tablet by mouth Daily., Disp: , Rfl:     sacubitril-valsartan (Entresto) 24-26 MG tablet, Take 1 tablet by mouth 2 (Two) Times a Day., Disp: 180 tablet, Rfl: 3    spironolactone (ALDACTONE) 25 MG tablet, Take 1 tablet by mouth Daily., Disp: 90 tablet, Rfl: 3        Assessment and plan:  Diabetes mellitus type 2 with hyperglycemia: This is a 84-year-old female who was diagnosed with diabetes back in March 2022 with an A1c of 7.1%.  She subsequently had  developed higher A1c but has been trying to work on her diet and activity.  She has tried insulin in the past which caused hypoglycemia and she is not currently taking any antidiabetic agents.  At this time I will refer her for comprehensive diabetes education and recheck A1c and then make further recommendations.    Hypothyroidism: Currently on levothyroxine at 75 mcg p.o. daily we will check TSH and free T4 and then make further recommendations.    Thank you very much for the consultation.    Dalila Weaver MD FACE.

## 2024-05-14 NOTE — PROGRESS NOTES
TSH low normal, free T4 high, A1c is very high at 9.5%.  Recommend:  1.  Reduce levothyroxine to 50 mcg p.o. daily  2.  Start metformin ER 5 mg once a day  3.  Check A1c, TSH, free T4, CMP before next follow-up.

## 2024-05-14 NOTE — PATIENT INSTRUCTIONS
Please continue to work on your diet and activity  Check labs today  Arrange for comprehensive diabetes education

## 2024-05-15 ENCOUNTER — TELEPHONE (OUTPATIENT)
Dept: ENDOCRINOLOGY | Facility: CLINIC | Age: 84
End: 2024-05-15

## 2024-05-15 NOTE — TELEPHONE ENCOUNTER
Caller: Jaquelin Valderrama    Relationship: Self    Best call back number: [phone]     What is the best time to reach you: NEXT AVAILABLE    Who are you requesting to speak with (clinical staff, provider,  specific staff member): CLINICAL    What was the call regarding: LAB RESULTS    Is it okay if the provider responds through MyChart: NO

## 2024-05-16 ENCOUNTER — OFFICE VISIT (OUTPATIENT)
Dept: ENDOCRINOLOGY | Facility: CLINIC | Age: 84
End: 2024-05-16
Payer: MEDICARE

## 2024-05-16 DIAGNOSIS — E11.65 TYPE 2 DIABETES MELLITUS WITH HYPERGLYCEMIA, WITHOUT LONG-TERM CURRENT USE OF INSULIN: Primary | ICD-10-CM

## 2024-05-16 PROCEDURE — G0108 DIAB MANAGE TRN  PER INDIV: HCPCS | Performed by: INTERNAL MEDICINE

## 2024-05-16 NOTE — PROGRESS NOTES
Patient seen for an initial individual assessment appointment.  Patient here for 1 hour from 1115 To 1215  Discussed action of diabetes and dx criteria.  Discussed importance of inspecting the feet daily. Discussed the one plate method with patient, but eats a keto diet and will not stray from that.      Patient is currently using the One Touch Meter.  Patient was able to correctly perform a blood sugar check using a fingerstick using the demo meter.  Blood sugar 214.       Patient scheduled for Class 1 on Thursday, 6/10/24

## 2024-05-22 ENCOUNTER — TELEPHONE (OUTPATIENT)
Dept: CARDIOLOGY | Facility: CLINIC | Age: 84
End: 2024-05-22
Payer: MEDICARE

## 2024-05-22 DIAGNOSIS — I48.21 PERMANENT ATRIAL FIBRILLATION: Chronic | ICD-10-CM

## 2024-05-22 NOTE — TELEPHONE ENCOUNTER
Caller: Jaquelin Valderrama     Relationship: PATIENT    Best call back number: [phone]    What is your medical concern? PT IS CALLING TO SEE IF SHE CAN GET A WRITTEN PRESCRIPTION OF ELIQUIS 5 MG AND PICK IT UP TODAY.

## 2024-05-22 NOTE — TELEPHONE ENCOUNTER
Pt wants us to print out her Rx for Eliquis so she can pick it up today--she wants to take it to [facility].  Is this OK?    LOV 03/01/24, NOV 09/13/24    Thanks,  Courtney

## 2024-05-28 ENCOUNTER — TELEPHONE (OUTPATIENT)
Dept: INTERNAL MEDICINE | Facility: CLINIC | Age: 84
End: 2024-05-28
Payer: MEDICARE

## 2024-05-28 NOTE — TELEPHONE ENCOUNTER
Svitlana from [facility] called regarding paperwork  for freestyle alem order. Stated original paperwork was missing diagnosis code. Resent on 5/15/24 and will resend to be filled out.     # [phone]

## 2024-05-29 DIAGNOSIS — E03.8 HYPOTHYROIDISM DUE TO HASHIMOTO'S THYROIDITIS: Chronic | ICD-10-CM

## 2024-05-29 DIAGNOSIS — E06.3 HYPOTHYROIDISM DUE TO HASHIMOTO'S THYROIDITIS: Chronic | ICD-10-CM

## 2024-05-29 RX ORDER — LEVOTHYROXINE SODIUM 0.07 MG/1
75 TABLET ORAL DAILY
Qty: 90 TABLET | Refills: 1 | Status: SHIPPED | OUTPATIENT
Start: 2024-05-29

## 2024-06-12 RX ORDER — FUROSEMIDE 40 MG/1
40 TABLET ORAL DAILY
Qty: 90 TABLET | Refills: 0 | Status: SHIPPED | OUTPATIENT
Start: 2024-06-12

## 2024-06-19 RX ORDER — SPIRONOLACTONE 25 MG/1
25 TABLET ORAL DAILY
Qty: 90 TABLET | Refills: 0 | Status: SHIPPED | OUTPATIENT
Start: 2024-06-19

## 2024-07-25 ENCOUNTER — PATIENT MESSAGE (OUTPATIENT)
Dept: ENDOCRINOLOGY | Facility: CLINIC | Age: 84
End: 2024-07-25
Payer: MEDICARE

## 2024-07-31 ENCOUNTER — TELEPHONE (OUTPATIENT)
Dept: INTERNAL MEDICINE | Facility: CLINIC | Age: 84
End: 2024-07-31
Payer: MEDICARE

## 2024-07-31 DIAGNOSIS — E03.8 HYPOTHYROIDISM DUE TO HASHIMOTO'S THYROIDITIS: Chronic | ICD-10-CM

## 2024-07-31 DIAGNOSIS — E06.3 HYPOTHYROIDISM DUE TO HASHIMOTO'S THYROIDITIS: Chronic | ICD-10-CM

## 2024-07-31 RX ORDER — LEVOTHYROXINE SODIUM 0.05 MG/1
50 TABLET ORAL DAILY
Qty: 90 TABLET | Refills: 0 | Status: SHIPPED | OUTPATIENT
Start: 2024-07-31

## 2024-07-31 NOTE — TELEPHONE ENCOUNTER
PATIENT CALLED FOR MEDICATION REFILL/ NEW PRESCRIPTION OF  levothyroxine (SYNTHROID, LEVOTHROID) 50 MCG    THIS DOSAGE WAS DECREASED FROM 75 MCG BY DR. ORTA, [place] [id number] - [phone] [place] [phone] [facility] [phone]     CALL BACK NUMBER [phone]

## 2024-08-07 DIAGNOSIS — E03.8 HYPOTHYROIDISM DUE TO HASHIMOTO'S THYROIDITIS: Chronic | ICD-10-CM

## 2024-08-07 DIAGNOSIS — E06.3 HYPOTHYROIDISM DUE TO HASHIMOTO'S THYROIDITIS: Chronic | ICD-10-CM

## 2024-08-07 RX ORDER — LEVOTHYROXINE SODIUM 0.05 MG/1
50 TABLET ORAL DAILY
Qty: 90 TABLET | Refills: 2 | Status: CANCELLED | OUTPATIENT
Start: 2024-08-07

## 2024-08-08 RX ORDER — LEVOTHYROXINE SODIUM 0.05 MG/1
50 TABLET ORAL DAILY
Qty: 90 TABLET | Refills: 1 | Status: SHIPPED | OUTPATIENT
Start: 2024-08-08

## 2024-08-14 ENCOUNTER — TELEPHONE (OUTPATIENT)
Dept: CARDIOLOGY | Facility: CLINIC | Age: 84
End: 2024-08-14
Payer: MEDICARE

## 2024-08-14 NOTE — TELEPHONE ENCOUNTER
Pt called and said she needed her Eliquis script sent to [facility] in [place]. Faxed order to the number she gave me. I asked her to call back with any issues.    Thank you,    Neema Dillard, RN  Triage [facility]  08/14/24 10:07 EDT

## 2024-08-22 ENCOUNTER — OFFICE VISIT (OUTPATIENT)
Dept: INTERNAL MEDICINE | Facility: CLINIC | Age: 84
End: 2024-08-22
Payer: MEDICARE

## 2024-08-22 DIAGNOSIS — I50.22 CHRONIC SYSTOLIC HEART FAILURE: Chronic | ICD-10-CM

## 2024-08-22 DIAGNOSIS — E11.65 TYPE 2 DIABETES MELLITUS WITH HYPERGLYCEMIA, WITHOUT LONG-TERM CURRENT USE OF INSULIN: Chronic | ICD-10-CM

## 2024-08-22 DIAGNOSIS — I10 ESSENTIAL HYPERTENSION: Primary | Chronic | ICD-10-CM

## 2024-08-22 PROCEDURE — 1160F RVW MEDS BY RX/DR IN RCRD: CPT | Performed by: INTERNAL MEDICINE

## 2024-08-22 PROCEDURE — 3075F SYST BP GE 130 - 139MM HG: CPT | Performed by: INTERNAL MEDICINE

## 2024-08-22 PROCEDURE — 99214 OFFICE O/P EST MOD 30 MIN: CPT | Performed by: INTERNAL MEDICINE

## 2024-08-22 PROCEDURE — G2211 COMPLEX E/M VISIT ADD ON: HCPCS | Performed by: INTERNAL MEDICINE

## 2024-08-22 PROCEDURE — 1159F MED LIST DOCD IN RCRD: CPT | Performed by: INTERNAL MEDICINE

## 2024-08-22 PROCEDURE — 3079F DIAST BP 80-89 MM HG: CPT | Performed by: INTERNAL MEDICINE

## 2024-08-22 NOTE — PROGRESS NOTES
"Chief Complaint  Hypertension    Subjective        Jaquelin Valderrama presents to [facility]  History of Present Illness  Here for reg f/u- has been seeing an endocrinologist about her thyroid and blood sugars- he recommended she start medication but she has refused. She does prolonged fasts and watches carbs . She does take her BS regularly, up and down- she is refusing to take anything- thought the insulin brought it down too much a few years ago. Never adjusted.   Repeat echo in March with improved EF, still some hypokinesis. Stress was negative.   No recent illness- cough occ productive     Objective   Vital Signs:  /88   Pulse 72   Ht 160 cm (62.99\")   Wt 71.2 kg (157 lb)   BMI 27.82 kg/m²   Estimated body mass index is 27.82 kg/m² as calculated from the following:    Height as of this encounter: 160 cm (62.99\").    Weight as of this encounter: 71.2 kg (157 lb).          Physical Exam  Constitutional:       Appearance: Normal appearance.   Cardiovascular:      Rate and Rhythm: Normal rate and regular rhythm.   Pulmonary:      Effort: Pulmonary effort is normal.      Breath sounds: Normal breath sounds.   Musculoskeletal:      Right lower leg: No edema.      Left lower leg: No edema.   Lymphadenopathy:      Cervical: No cervical adenopathy.        Result Review :                Assessment and Plan   Diagnoses and all orders for this visit:    1. Essential hypertension (Primary)  Comments:  Viryer here today- no changes made    2. Chronic systolic heart failure  Comments:  appears well compensated- cont same    3. Type 2 diabetes mellitus with hyperglycemia, without long-term current use of insulin  Comments:  reviewed endo notes/labs with her- encouraged better control- use of meds. She refuses and says she'll control with intermittent fasting.    Has done amazingly well since heart failure dx.  She refuses all vaccines.          Follow Up   Return in about 6 months (around " 2/22/2025) for Medicare Wellness.  Patient was given instructions and counseling regarding her condition or for health maintenance advice. Please see specific information pulled into the AVS if appropriate.

## 2024-08-26 VITALS
HEIGHT: 63 IN | WEIGHT: 157 LBS | DIASTOLIC BLOOD PRESSURE: 88 MMHG | SYSTOLIC BLOOD PRESSURE: 136 MMHG | HEART RATE: 72 BPM | BODY MASS INDEX: 27.82 KG/M2

## 2024-08-26 PROBLEM — I50.23 ACUTE ON CHRONIC SYSTOLIC HEART FAILURE: Status: RESOLVED | Noted: 2023-03-24 | Resolved: 2024-08-26

## 2024-08-27 DIAGNOSIS — I48.21 PERMANENT ATRIAL FIBRILLATION: Chronic | ICD-10-CM

## 2024-09-07 DIAGNOSIS — I10 ESSENTIAL HYPERTENSION: ICD-10-CM

## 2024-09-09 RX ORDER — CARVEDILOL 3.12 MG/1
3.12 TABLET ORAL 2 TIMES DAILY WITH MEALS
Qty: 180 TABLET | Refills: 0 | Status: SHIPPED | OUTPATIENT
Start: 2024-09-09

## 2024-09-09 RX ORDER — FUROSEMIDE 40 MG
40 TABLET ORAL DAILY
Qty: 90 TABLET | Refills: 0 | Status: SHIPPED | OUTPATIENT
Start: 2024-09-09

## 2024-09-11 RX ORDER — SPIRONOLACTONE 25 MG/1
25 TABLET ORAL DAILY
Qty: 90 TABLET | Refills: 0 | Status: SHIPPED | OUTPATIENT
Start: 2024-09-11

## 2024-09-13 ENCOUNTER — OFFICE VISIT (OUTPATIENT)
Dept: CARDIOLOGY | Facility: CLINIC | Age: 84
End: 2024-09-13
Payer: MEDICARE

## 2024-09-13 VITALS
BODY MASS INDEX: 28.17 KG/M2 | DIASTOLIC BLOOD PRESSURE: 70 MMHG | SYSTOLIC BLOOD PRESSURE: 132 MMHG | WEIGHT: 159 LBS | HEIGHT: 63 IN | HEART RATE: 80 BPM | OXYGEN SATURATION: 98 %

## 2024-09-13 DIAGNOSIS — I10 ESSENTIAL HYPERTENSION: Chronic | ICD-10-CM

## 2024-09-13 DIAGNOSIS — I48.21 PERMANENT ATRIAL FIBRILLATION: Chronic | ICD-10-CM

## 2024-09-13 DIAGNOSIS — I25.708 CORONARY ARTERY DISEASE OF BYPASS GRAFT OF NATIVE HEART WITH STABLE ANGINA PECTORIS: ICD-10-CM

## 2024-09-13 DIAGNOSIS — I25.5 ISCHEMIC CARDIOMYOPATHY: Primary | ICD-10-CM

## 2024-09-13 NOTE — PROGRESS NOTES
"      CARDIOLOGY    Tyler Dill MD    ENCOUNTER DATE:  09/13/2024    Jaquelin Valderrama / 84 y.o. / female        CHIEF COMPLAINT / REASON FOR OFFICE VISIT     Coronary Artery Disease and Congestive Heart Failure  Atrial fibrillation  Hypertension  Old MI 2012  HISTORY OF PRESENT ILLNESS       Coronary Artery Disease  Her past medical history is significant for CHF.   Congestive Heart Failure  Her past medical history is significant for CAD.     Jaquelin Valderrama is a 84 y.o. female who presents today for reevaluation.  Patient is doing well.  Since had seen her last she has started exercising more.  Patient denies shortness of breath chest discomfort palpitations lightheadedness.  She says she is up to walking about 2 miles at once taking her about 40 minutes to do so.  She continues to work on getting her stamina back she said at peak she was doing about a mile in 15 minutes.  Overall she says she feels good.  She looked great.      The following portions of the patient's history were reviewed and updated as appropriate: allergies, current medications, past family history, past medical history, past social history, past surgical history and problem list.      VITAL SIGNS     Visit Vitals  /70 (BP Location: Left arm)   Pulse 80   Ht 160 cm (63\")   Wt 72.1 kg (159 lb)   SpO2 98%   BMI 28.17 kg/m²         Wt Readings from Last 3 Encounters:   09/13/24 72.1 kg (159 lb)   08/22/24 71.2 kg (157 lb)   05/14/24 69.4 kg (153 lb)     Body mass index is 28.17 kg/m².      REVIEW OF SYSTEMS   ROS        PHYSICAL EXAMINATION     Vitals reviewed.   Constitutional:       Appearance: Healthy appearance.   Pulmonary:      Effort: Pulmonary effort is normal.   Cardiovascular:      Normal rate. Regular rhythm. Normal S1. Normal S2.       Murmurs: There is no murmur.      No gallop.  No click. No rub.   Pulses:     Intact distal pulses.   Edema:     Peripheral edema absent.   Neurological:      Mental Status: Alert. "           REVIEWED DATA     Procedures    Cardiac Procedures:      Lipid Panel          5/14/2024    09:38   Lipid Panel   Total Cholesterol 288    Triglycerides 223    HDL Cholesterol 58    VLDL Cholesterol 42    LDL Cholesterol  188    LDL/HDL Ratio 3.20          ASSESSMENT & PLAN      Diagnosis Plan   1. Ischemic cardiomyopathy        2. 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 Permanent atrial fibrillation        4. Essential hypertension              SUMMARY/DISCUSSION  Ischemic cardiomyopathy.  Patient had a myocardial infarction back in 2012.  Coronary artery disease status post coronary artery bypass grafting.  Hypertension blood pressures great  Atrial fibrillation patient remains on Eliquis.  Heart rate is well-controlled.  Follow-up 1 year sooner if issues.  Will continue to provide longitudinal care.        MEDICATIONS         Discharge Medications            Accurate as of September 13, 2024 11:13 AM. If you have any questions, ask your nurse or doctor.                Continue These Medications        Instructions Start Date   acetaminophen 500 MG tablet  Commonly known as: TYLENOL   500 mg, Oral, Every 6 Hours PRN      apixaban 5 MG tablet tablet  Commonly known as: ELIQUIS   5 mg, Oral, Every 12 Hours Scheduled      carvedilol 3.125 MG tablet  Commonly known as: COREG   3.125 mg, Oral, 2 Times Daily With Meals      Entresto 24-26 MG tablet  Generic drug: sacubitril-valsartan   1 tablet, Oral, 2 Times Daily      FreeStyle Jensen 3 Logan device   1 Device, Does not apply, Continuous      FreeStyle Jensen 3 Sensor misc   1 Device, Does not apply, Every 14 Days      furosemide 40 MG tablet  Commonly known as: LASIX   40 mg, Oral, Daily      levothyroxine 50 MCG tablet  Commonly known as: SYNTHROID, LEVOTHROID   50 mcg, Oral, Daily      MULTIVITAMIN ADULT EXTRA C PO   1 tablet, Oral, Daily      spironolactone 25 MG tablet  Commonly known as: ALDACTONE    25 mg, Oral, Daily      Vitamin D3 50 MCG (2000 UT) tablet   1 tablet, Oral, Daily                   **Dragon Disclaimer:   Much of this encounter note is an electronic transcription/translation of spoken language to printed text. The electronic translation of spoken language may permit erroneous, or at times, nonsensical words or phrases to be inadvertently transcribed. Although I have reviewed the note for such errors, some may still exist.

## 2024-12-03 DIAGNOSIS — E06.3 HYPOTHYROIDISM DUE TO HASHIMOTO'S THYROIDITIS: Chronic | ICD-10-CM

## 2024-12-03 DIAGNOSIS — I10 ESSENTIAL HYPERTENSION: ICD-10-CM

## 2024-12-03 RX ORDER — FUROSEMIDE 40 MG/1
40 TABLET ORAL DAILY
Qty: 90 TABLET | Refills: 0 | Status: SHIPPED | OUTPATIENT
Start: 2024-12-03

## 2024-12-03 RX ORDER — CARVEDILOL 3.12 MG/1
3.12 TABLET ORAL 2 TIMES DAILY WITH MEALS
Qty: 180 TABLET | Refills: 0 | Status: SHIPPED | OUTPATIENT
Start: 2024-12-03

## 2024-12-03 RX ORDER — LEVOTHYROXINE SODIUM 75 UG/1
75 TABLET ORAL DAILY
Qty: 90 TABLET | Refills: 0 | Status: SHIPPED | OUTPATIENT
Start: 2024-12-03

## 2024-12-06 RX ORDER — SPIRONOLACTONE 25 MG/1
25 TABLET ORAL DAILY
Qty: 90 TABLET | Refills: 0 | Status: SHIPPED | OUTPATIENT
Start: 2024-12-06

## 2025-01-06 NOTE — CONSULTS
Called to evaluate for pulmonary consultation  Patient was getting ready to leave AGAINST MEDICAL ADVICE  We will cancel the consult, please call us if the patient change her mind and decided to stay  
I was requested to see patient regarding her Advance Directive.  Even though it says we have a copy in the chart, I was unable to locate it.  In speaking to the patient, she stated she has a copy at home and would prefer to have it there.  She only wants to give it to us, if needed.  Her family will provide it for us if needed.      We did talk about her spiritual life.  She is of the [employer]; however, she has not been active.  Still has a strong connection with God and has a good support system.  We conclude our time with prayer.    
No

## 2025-02-05 DIAGNOSIS — I10 ESSENTIAL HYPERTENSION: ICD-10-CM

## 2025-02-05 RX ORDER — SACUBITRIL AND VALSARTAN 24; 26 MG/1; MG/1
1 TABLET, FILM COATED ORAL 2 TIMES DAILY
Qty: 180 TABLET | Refills: 3 | Status: SHIPPED | OUTPATIENT
Start: 2025-02-05

## 2025-02-25 ENCOUNTER — OFFICE VISIT (OUTPATIENT)
Dept: INTERNAL MEDICINE | Facility: CLINIC | Age: 85
End: 2025-02-25
Payer: MEDICARE

## 2025-02-25 VITALS
HEIGHT: 63 IN | DIASTOLIC BLOOD PRESSURE: 82 MMHG | WEIGHT: 160 LBS | SYSTOLIC BLOOD PRESSURE: 142 MMHG | BODY MASS INDEX: 28.35 KG/M2 | HEART RATE: 84 BPM

## 2025-02-25 DIAGNOSIS — I50.22 CHRONIC SYSTOLIC HEART FAILURE: Chronic | ICD-10-CM

## 2025-02-25 DIAGNOSIS — Z00.00 MEDICARE ANNUAL WELLNESS VISIT, SUBSEQUENT: Primary | ICD-10-CM

## 2025-02-25 DIAGNOSIS — E06.3 HYPOTHYROIDISM DUE TO HASHIMOTO'S THYROIDITIS: Chronic | ICD-10-CM

## 2025-02-25 DIAGNOSIS — I10 ESSENTIAL HYPERTENSION: Chronic | ICD-10-CM

## 2025-02-25 DIAGNOSIS — E11.65 TYPE 2 DIABETES MELLITUS WITH HYPERGLYCEMIA, WITHOUT LONG-TERM CURRENT USE OF INSULIN: Chronic | ICD-10-CM

## 2025-02-25 DIAGNOSIS — I25.708 CORONARY ARTERY DISEASE OF BYPASS GRAFT OF NATIVE HEART WITH STABLE ANGINA PECTORIS: Chronic | ICD-10-CM

## 2025-02-25 PROCEDURE — 3079F DIAST BP 80-89 MM HG: CPT | Performed by: INTERNAL MEDICINE

## 2025-02-25 PROCEDURE — G0439 PPPS, SUBSEQ VISIT: HCPCS | Performed by: INTERNAL MEDICINE

## 2025-02-25 PROCEDURE — 1160F RVW MEDS BY RX/DR IN RCRD: CPT | Performed by: INTERNAL MEDICINE

## 2025-02-25 PROCEDURE — 1170F FXNL STATUS ASSESSED: CPT | Performed by: INTERNAL MEDICINE

## 2025-02-25 PROCEDURE — 1159F MED LIST DOCD IN RCRD: CPT | Performed by: INTERNAL MEDICINE

## 2025-02-25 PROCEDURE — 3077F SYST BP >= 140 MM HG: CPT | Performed by: INTERNAL MEDICINE

## 2025-02-25 NOTE — PROGRESS NOTES
Subjective   The ABCs of the Annual Wellness Visit  Medicare Wellness Visit      Jaquelin Valderrama is a 85 y.o. patient who presents for a Medicare Wellness Visit.    The following portions of the patient's history were reviewed and   updated as appropriate: allergies, current medications, past family history, past medical history, past social history, past surgical history, and problem list.    Compared to one year ago, the patient's physical   health is the same.  Compared to one year ago, the patient's mental   health is the same.    Recent Hospitalizations:  She was not admitted to the hospital during the last year.     Current Medical Providers:  Patient Care Team:  Minerva Chatterjee MD as PCP - General (Internal Medicine)  Amilcar Smallwood MD as Consulting Physician (Hematology and Oncology)  Joan Chao MD as Consulting Physician (Pulmonary Disease)  Tyler Dill MD as Consulting Physician (Cardiology)    Outpatient Medications Prior to Visit   Medication Sig Dispense Refill    apixaban (ELIQUIS) 5 MG tablet tablet Take 1 tablet by mouth Every 12 (Twelve) Hours. Indications: Atrial Fibrillation 180 tablet 3    carvedilol (COREG) 3.125 MG tablet TAKE 1 TABLET BY MOUTH TWICE DAILY WITH MEALS 180 tablet 0    Cholecalciferol (VITAMIN D3) 2000 units tablet Take 1 tablet by mouth Daily.      furosemide (LASIX) 40 MG tablet Take 1 tablet by mouth once daily 90 tablet 0    levothyroxine (SYNTHROID, LEVOTHROID) 50 MCG tablet Take 1 tablet by mouth Daily. 90 tablet 1    sacubitril-valsartan (Entresto) 24-26 MG tablet Take 1 tablet by mouth 2 (Two) Times a Day. 180 tablet 3    spironolactone (ALDACTONE) 25 MG tablet Take 1 tablet by mouth once daily 90 tablet 0    acetaminophen (TYLENOL) 500 MG tablet Take 1 tablet by mouth Every 6 (Six) Hours As Needed for Mild Pain. (Patient not taking: Reported on 2/25/2025)      Continuous Blood Gluc  (FreeStyle Jensen 3 [place]) device Use 1 Device Continuous. 1 each  "0    Continuous Blood Gluc Sensor (FreeStyle Jensen 3 Sensor) misc Use 1 Device Every 14 (Fourteen) Days. 6 each 1    gabapentin (NEURONTIN) 100 MG capsule Take 3 capsules by mouth 3 (Three) Times a Day. 270 capsule 0    levothyroxine (SYNTHROID, LEVOTHROID) 75 MCG tablet Take 1 tablet by mouth Daily. (Patient not taking: Reported on 2/25/2025) 90 tablet 0    Multiple Vitamins-Minerals (MULTIVITAMIN ADULT EXTRA C PO) Take 1 tablet by mouth Daily. (Patient not taking: Reported on 2/25/2025)       No facility-administered medications prior to visit.     No opioid medication identified on active medication list. I have reviewed chart for other potential  high risk medication/s and harmful drug interactions in the elderly.      Aspirin is not on active medication list.  Aspirin use is not indicated based on review of current medical condition/s. Risk of harm outweighs potential benefits.  .    Patient Active Problem List   Diagnosis    Other hyperlipidemia    Essential hypertension    Acquired hypothyroidism    Coronary artery disease of bypass graft of native heart with stable angina pectoris    Astigmatism, bilateral    Bilateral presbyopia    Pseudophakia, both eyes    Atrial fibrillation    Systolic HF (heart failure)    Acute systolic heart failure    Chronic systolic heart failure    Type 2 diabetes mellitus with hyperglycemia, without long-term current use of insulin    PVD (peripheral vascular disease)    Hx of CABG    Ischemic cardiomyopathy     Advance Care Planning Advance Directive is on file.  ACP discussion was held with the patient during this visit. Patient has an advance directive in EMR which is still valid.             Objective   Vitals:    02/25/25 1327   BP: 142/82   Pulse: 84   Weight: 72.6 kg (160 lb)   Height: 160 cm (63\")       Estimated body mass index is 28.34 kg/m² as calculated from the following:    Height as of this encounter: 160 cm (63\").    Weight as of this encounter: 72.6 kg (160 " lb).    BMI is >= 25 and <30. (Overweight) The following options were offered after discussion;: exercise counseling/recommendations and nutrition counseling/recommendations           Does the patient have evidence of cognitive impairment? No                                                                                                Health  Risk Assessment    Smoking Status:  Social History     Tobacco Use   Smoking Status Former    Current packs/day: 0.00    Types: Cigarettes    Quit date:     Years since quittin.1    Passive exposure: Past   Smokeless Tobacco Never   Tobacco Comments    CAFFEINE USE ICED TEA, OCCAS COFFEE     Alcohol Consumption:  Social History     Substance and Sexual Activity   Alcohol Use Yes    Comment: occ       Fall Risk Screen  STEADI Fall Risk Assessment was completed, and patient is at LOW risk for falls.Assessment completed on:2025    Depression Screening   Little interest or pleasure in doing things? Not at all   Feeling down, depressed, or hopeless? Not at all   PHQ-2 Total Score 0      Health Habits and Functional and Cognitive Screenin/25/2025     1:29 PM   Functional & Cognitive Status   Do you have difficulty preparing food and eating? No   Do you have difficulty bathing yourself, getting dressed or grooming yourself? No   Do you have difficulty using the toilet? No   Do you have difficulty moving around from place to place? No   Do you have trouble with steps or getting out of a bed or a chair? No   Current Diet Well Balanced Diet   Dental Exam Up to date   Eye Exam Up to date   Exercise (times per week) 0 times per week   Current Exercises Include No Regular Exercise   Do you need help using the phone?  No   Are you deaf or do you have serious difficulty hearing?  No   Do you need help to go to places out of walking distance? No   Do you need help shopping? No   Do you need help preparing meals?  No   Do you need help with housework?  No   Do you need  help with laundry? No   Do you need help taking your medications? No   Do you need help managing money? No   Do you ever drive or ride in a car without wearing a seat belt? No   Have you felt unusual stress, anger or loneliness in the last month? No   Who do you live with? Spouse   If you need help, do you have trouble finding someone available to you? No   Have you been bothered in the last four weeks by sexual problems? No   Do you have difficulty concentrating, remembering or making decisions? No           Age-appropriate Screening Schedule:  Refer to the list below for future screening recommendations based on patient's age, sex and/or medical conditions. Orders for these recommended tests are listed in the plan section. The patient has been provided with a written plan.    Health Maintenance List  Health Maintenance   Topic Date Due    BMI FOLLOWUP  Never done    DIABETIC EYE EXAM  12/16/2023    ANNUAL WELLNESS VISIT  12/06/2024    DXA SCAN  02/25/2026 (Originally 1/3/2016)    HEMOGLOBIN A1C  03/01/2028 (Originally 11/14/2024)    LIPID PANEL  05/14/2025    Pneumococcal Vaccine 50+  Completed    COVID-19 Vaccine  Discontinued    RSV Vaccine - Adults  Discontinued    INFLUENZA VACCINE  Discontinued    URINE MICROALBUMIN-CREATININE RATIO (uACR)  Discontinued    TDAP/TD VACCINES  Discontinued    ZOSTER VACCINE  Discontinued                                                                                                                                                CMS Preventative Services Quick Reference  Risk Factors Identified During Encounter  Multiple vaccines, etc discussed and refused    The above risks/problems have been discussed with the patient.  Pertinent information has been shared with the patient in the After Visit Summary.  An After Visit Summary and PPPS were made available to the patient.    Follow Up:   Next Medicare Wellness visit to be scheduled in 1 year.         Additional E&M Note during  "same encounter follows:  Patient has additional, significant, and separately identifiable condition(s)/problem(s) that require work above and beyond the Medicare Wellness Visit     Chief Complaint  Medicare Wellness-subsequent    Subjective   HPI  Jaquelin is also being seen today for additional medical problem/s.   Seeing cardiologist regularly- doing great- walking regularly.  She has refused statins- afraid of memory issues and reducing \"cellular function\" - thinks CAD is caused by inflammation and not cholesterol.  She is not taking any medications for her diabetes- fingersticks 1-3 times per day.  Usually in low 200s. Eats a healthy diet, does intermittent fasting.   She denies any SOB, lower extremity edema. She takes furosemide 40 mg daily and 60 if wt up 2 lbs- does this about weekly.   Discussed repeat diabetes testing- feels comfortable with not checking with the full understanding that there are medications she could be taking to help lower blood sugar and understands and cont to refuse.               Objective   Vital Signs:  /82   Pulse 84   Ht 160 cm (63\")   Wt 72.6 kg (160 lb)   BMI 28.34 kg/m²   Physical Exam  Constitutional:       Appearance: Normal appearance.   Cardiovascular:      Rate and Rhythm: Normal rate and regular rhythm.   Pulmonary:      Effort: Pulmonary effort is normal.   Musculoskeletal:      Comments: Trace B lower ext edema                 Assessment and Plan               Essential hypertension    Hypothyroidism due to Hashimoto's thyroiditis    Chronic systolic heart failure    Type 2 diabetes mellitus with hyperglycemia, without long-term current use of insulin    Coronary artery disease of bypass graft of native heart with stable angina pectoris    Medicare annual wellness visit, subsequent    Diagnoses and all orders for this visit:    1. Medicare annual wellness visit, subsequent (Primary)  Comments:  she feels gerat and understands the risks of continuing with her " diabetes uncontrolled- she is ok- stays active, happy.  No changes made. refuses vaccines.    2. Essential hypertension  Comments:  not great control but doesn't want more medication she monitors at home, has been better there- will call if elevated  Assessment & Plan:      Orders:  -     Comprehensive Metabolic Panel    3. Hypothyroidism due to Hashimoto's thyroiditis  Comments:  check TSH today  Orders:  -     TSH    4. Chronic systolic heart failure  Comments:  no evidnece of decompensation today- has close f/u with Dr. Dill- takes the extra furosemide prn    5. Type 2 diabetes mellitus with hyperglycemia, without long-term current use of insulin  Comments:  not controlled- refuses to escalate treatment- she    6. Coronary artery disease of bypass graft of native heart with stable angina pectoris  Comments:  refuses to treat cholesterol medically- cont healthy habits, clean eating.               Follow Up   Return in about 6 months (around 8/25/2025) for Recheck, Lab Today.  Patient was given instructions and counseling regarding her condition or for health maintenance advice. Please see specific information pulled into the AVS if appropriate.

## 2025-02-26 LAB
ALBUMIN SERPL-MCNC: 4.5 G/DL (ref 3.5–5.2)
ALBUMIN/GLOB SERPL: 1.5 G/DL
ALP SERPL-CCNC: 123 U/L (ref 39–117)
ALT SERPL-CCNC: 19 U/L (ref 1–33)
AST SERPL-CCNC: 17 U/L (ref 1–32)
BILIRUB SERPL-MCNC: 0.8 MG/DL (ref 0–1.2)
BUN SERPL-MCNC: 41 MG/DL (ref 8–23)
BUN/CREAT SERPL: 28.1 (ref 7–25)
CALCIUM SERPL-MCNC: 10.3 MG/DL (ref 8.6–10.5)
CHLORIDE SERPL-SCNC: 93 MMOL/L (ref 98–107)
CO2 SERPL-SCNC: 29.5 MMOL/L (ref 22–29)
CREAT SERPL-MCNC: 1.46 MG/DL (ref 0.57–1)
EGFRCR SERPLBLD CKD-EPI 2021: 35.6 ML/MIN/1.73
GLOBULIN SER CALC-MCNC: 3.1 GM/DL
GLUCOSE SERPL-MCNC: 271 MG/DL (ref 65–99)
POTASSIUM SERPL-SCNC: 4.5 MMOL/L (ref 3.5–5.2)
PROT SERPL-MCNC: 7.6 G/DL (ref 6–8.5)
SODIUM SERPL-SCNC: 136 MMOL/L (ref 136–145)
TSH SERPL DL<=0.005 MIU/L-ACNC: 1.5 UIU/ML (ref 0.27–4.2)

## 2025-02-28 RX ORDER — SPIRONOLACTONE 25 MG/1
25 TABLET ORAL DAILY
Qty: 90 TABLET | Refills: 0 | Status: SHIPPED | OUTPATIENT
Start: 2025-02-28

## 2025-03-01 DIAGNOSIS — I10 ESSENTIAL HYPERTENSION: ICD-10-CM

## 2025-03-03 RX ORDER — CARVEDILOL 3.12 MG/1
3.12 TABLET ORAL 2 TIMES DAILY WITH MEALS
Qty: 180 TABLET | Refills: 0 | Status: SHIPPED | OUTPATIENT
Start: 2025-03-03

## 2025-03-03 RX ORDER — FUROSEMIDE 40 MG/1
40 TABLET ORAL DAILY
Qty: 90 TABLET | Refills: 0 | Status: SHIPPED | OUTPATIENT
Start: 2025-03-03

## 2025-04-01 DIAGNOSIS — E06.3 HYPOTHYROIDISM DUE TO HASHIMOTO'S THYROIDITIS: Chronic | ICD-10-CM

## 2025-04-01 DIAGNOSIS — I10 ESSENTIAL HYPERTENSION: ICD-10-CM

## 2025-04-01 RX ORDER — SPIRONOLACTONE 25 MG/1
25 TABLET ORAL DAILY
Qty: 90 TABLET | Refills: 1 | Status: SHIPPED | OUTPATIENT
Start: 2025-04-01

## 2025-04-01 RX ORDER — CARVEDILOL 3.12 MG/1
3.12 TABLET ORAL 2 TIMES DAILY WITH MEALS
Qty: 180 TABLET | Refills: 1 | Status: SHIPPED | OUTPATIENT
Start: 2025-04-01

## 2025-04-01 RX ORDER — LEVOTHYROXINE SODIUM 50 UG/1
50 TABLET ORAL DAILY
Qty: 90 TABLET | Refills: 1 | Status: SHIPPED | OUTPATIENT
Start: 2025-04-01

## 2025-04-01 RX ORDER — FUROSEMIDE 40 MG/1
40 TABLET ORAL DAILY
Qty: 90 TABLET | Refills: 1 | Status: SHIPPED | OUTPATIENT
Start: 2025-04-01

## 2025-04-01 NOTE — TELEPHONE ENCOUNTER
PATIENT CALLED AND STATES SHE LEFT HER MEDICATION AT HOME AND IS NOW OUT OF STATE. SHE WILL BE GONE UNTIL 4/27/25   SHE IS REQUESTING A REFILL OF  furosemide (LASIX) 40 MG tablet       carvedilol (COREG) 3.125 MG tablet       spironolactone (ALDACTONE) 25 MG tablet       levothyroxine (SYNTHROID, LEVOTHROID) 50 MCG tablet       [facility] [address] [place] [address] [facility] MORALES - [phone]  - [phone]  [phone]     CALL BACK NUMBER [phone]

## 2025-04-30 DIAGNOSIS — E06.3 HYPOTHYROIDISM DUE TO HASHIMOTO'S THYROIDITIS: Chronic | ICD-10-CM

## 2025-04-30 RX ORDER — LEVOTHYROXINE SODIUM 50 UG/1
50 TABLET ORAL DAILY
Qty: 90 TABLET | Refills: 0 | OUTPATIENT
Start: 2025-04-30

## 2025-05-13 DIAGNOSIS — E06.3 HYPOTHYROIDISM DUE TO HASHIMOTO'S THYROIDITIS: Chronic | ICD-10-CM

## 2025-05-13 RX ORDER — LEVOTHYROXINE SODIUM 50 UG/1
50 TABLET ORAL DAILY
Qty: 90 TABLET | Refills: 1 | Status: SHIPPED | OUTPATIENT
Start: 2025-05-13

## 2025-05-13 NOTE — TELEPHONE ENCOUNTER
PATIENT CALLED REQUESTING A REFILL OF  levothyroxine (SYNTHROID, LEVOTHROID) 50 MCG tablet     SHE IS OUT OF MEDICATION    [address] [place] - [facility] [place] - [phone] [place] [phone] [facility] [phone]     CALL BACK NUMBER [phone]

## 2025-05-27 DIAGNOSIS — I10 ESSENTIAL HYPERTENSION: ICD-10-CM

## 2025-05-27 RX ORDER — CARVEDILOL 3.12 MG/1
3.12 TABLET ORAL 2 TIMES DAILY WITH MEALS
Qty: 180 TABLET | Refills: 1 | Status: SHIPPED | OUTPATIENT
Start: 2025-05-27

## 2025-05-27 RX ORDER — SPIRONOLACTONE 25 MG/1
25 TABLET ORAL DAILY
Qty: 90 TABLET | Refills: 0 | OUTPATIENT
Start: 2025-05-27

## 2025-05-28 RX ORDER — FUROSEMIDE 40 MG/1
40 TABLET ORAL DAILY
Qty: 90 TABLET | Refills: 0 | Status: SHIPPED | OUTPATIENT
Start: 2025-05-28

## 2025-06-05 ENCOUNTER — TELEPHONE (OUTPATIENT)
Dept: CARDIOLOGY | Age: 85
End: 2025-06-05
Payer: MEDICARE

## 2025-06-05 DIAGNOSIS — I48.21 PERMANENT ATRIAL FIBRILLATION: Chronic | ICD-10-CM

## 2025-06-05 NOTE — TELEPHONE ENCOUNTER
Called pt back and left voicemail that RX hard copmary for her and her  are both ready for  and will be at the  at [address], filed under their last name.       Patient called and has been getting prescriptions written for Apixaban 5 mg 2x daily and having prescription mailed to them to purchase through [facility]. They were trying to get more medication and the pharmacy informed her they needed new scripts. She is calling on behalf of her  Rodney as well. I will send a separate request in his chart. Patient would like to  the prescriptions from our office tomorrow morning on her way to [place]. She won't be at her home address for the next 10 days, so she wants to make sure she physically has them.     I told patient I would send to the provider and call her back to confirm when it would be ready. [phone] Pt call back phone.    LOV 09/13/2024   NOV 09/17/2025       Please indicate when I should advise patient to  in our office. Thank you.

## 2025-06-18 RX ORDER — SPIRONOLACTONE 25 MG/1
25 TABLET ORAL DAILY
Qty: 90 TABLET | Refills: 3 | Status: SHIPPED | OUTPATIENT
Start: 2025-06-18

## 2025-08-25 RX ORDER — FUROSEMIDE 40 MG/1
40 TABLET ORAL DAILY
Qty: 90 TABLET | Refills: 0 | Status: SHIPPED | OUTPATIENT
Start: 2025-08-25

## 2025-08-26 ENCOUNTER — OFFICE VISIT (OUTPATIENT)
Dept: INTERNAL MEDICINE | Facility: CLINIC | Age: 85
End: 2025-08-26
Payer: MEDICARE

## 2025-08-26 VITALS
BODY MASS INDEX: 28.7 KG/M2 | HEIGHT: 63 IN | HEART RATE: 74 BPM | DIASTOLIC BLOOD PRESSURE: 82 MMHG | SYSTOLIC BLOOD PRESSURE: 134 MMHG | WEIGHT: 162 LBS

## 2025-08-26 DIAGNOSIS — E11.65 TYPE 2 DIABETES MELLITUS WITH HYPERGLYCEMIA, WITHOUT LONG-TERM CURRENT USE OF INSULIN: Primary | Chronic | ICD-10-CM

## 2025-08-26 DIAGNOSIS — I50.22 CHRONIC SYSTOLIC HEART FAILURE: Chronic | ICD-10-CM

## 2025-08-26 DIAGNOSIS — I10 ESSENTIAL HYPERTENSION: Chronic | ICD-10-CM

## 2025-08-26 PROBLEM — E78.2 MIXED HYPERLIPIDEMIA: Status: ACTIVE | Noted: 2019-05-07

## 2025-08-26 PROBLEM — I50.21 ACUTE SYSTOLIC HEART FAILURE: Status: RESOLVED | Noted: 2023-03-24 | Resolved: 2025-08-26

## 2025-08-27 LAB
ALBUMIN SERPL-MCNC: 4.9 G/DL (ref 3.5–5.2)
ALBUMIN/GLOB SERPL: 1.9 G/DL
ALP SERPL-CCNC: 89 U/L (ref 39–117)
ALT SERPL-CCNC: 12 U/L (ref 1–33)
AST SERPL-CCNC: 15 U/L (ref 1–32)
BILIRUB SERPL-MCNC: 0.7 MG/DL (ref 0–1.2)
BUN SERPL-MCNC: 31 MG/DL (ref 8–23)
BUN/CREAT SERPL: 23.5 (ref 7–25)
CALCIUM SERPL-MCNC: 10 MG/DL (ref 8.6–10.5)
CHLORIDE SERPL-SCNC: 99 MMOL/L (ref 98–107)
CHOLEST SERPL-MCNC: 327 MG/DL (ref 0–200)
CHOLEST/HDLC SERPL: 5.03 {RATIO}
CO2 SERPL-SCNC: 27.8 MMOL/L (ref 22–29)
CREAT SERPL-MCNC: 1.32 MG/DL (ref 0.57–1)
EGFRCR SERPLBLD CKD-EPI 2021: 40.1 ML/MIN/1.73
GLOBULIN SER CALC-MCNC: 2.6 GM/DL
GLUCOSE SERPL-MCNC: 211 MG/DL (ref 65–99)
HBA1C MFR BLD: 9.3 % (ref 4.8–5.6)
HDLC SERPL-MCNC: 65 MG/DL (ref 40–60)
LDLC SERPL CALC-MCNC: 226 MG/DL (ref 0–100)
POTASSIUM SERPL-SCNC: 4.2 MMOL/L (ref 3.5–5.2)
PROT SERPL-MCNC: 7.5 G/DL (ref 6–8.5)
SODIUM SERPL-SCNC: 142 MMOL/L (ref 136–145)
TRIGL SERPL-MCNC: 188 MG/DL (ref 0–150)
UNABLE TO VOID: NORMAL
VLDLC SERPL CALC-MCNC: 36 MG/DL (ref 5–40)

## (undated) DEVICE — KT SURG TURNOVER 050

## (undated) DEVICE — SUT SILK 0 PSL 18IN 580H

## (undated) DEVICE — BAG,DRAINAGE,4L,A/R TOWER,LL,SLIDE TAP: Brand: MEDLINE

## (undated) DEVICE — SPNG LAP 18X18IN LF STRL PK/5

## (undated) DEVICE — GLV SURG DERMASSURE GRN LF PF 8.5

## (undated) DEVICE — TUBING, SUCTION, 1/4" X 20', STRAIGHT: Brand: MEDLINE INDUSTRIES, INC.

## (undated) DEVICE — CONTAINER,SPECIMEN,OR STERILE,4OZ: Brand: MEDLINE

## (undated) DEVICE — APPL CHLORAPREP HI/LITE TINTED 10.5ML ORNG

## (undated) DEVICE — PK CYSTO 50

## (undated) DEVICE — UNIVERSAL PACK: Brand: CARDINAL HEALTH

## (undated) DEVICE — ADHS SKIN SURG TISS VISC PREMIERPRO EXOFIN HI/VISC FAST/DRY

## (undated) DEVICE — SOL IRRIG NACL 9PCT 1000ML BTL

## (undated) DEVICE — 28 FR STRAIGHT – SOFT PVC CATHETER: Brand: PVC THORACIC CATHETERS

## (undated) DEVICE — WOUND RETRACTOR AND PROTECTOR: Brand: ALEXIS WOUND PROTECTOR-RETRACTOR

## (undated) DEVICE — SUT ETHLN 4/0 PS2 18IN 1667H

## (undated) DEVICE — IRRIGATOR BULB ASEPTO 60CC STRL

## (undated) DEVICE — BNDG ESMARK 4IN 12FT LF STRL BLU

## (undated) DEVICE — TOTAL TRAY, 16FR 10ML SIL FOLEY, URN: Brand: MEDLINE

## (undated) DEVICE — SOL IRRG H2O BG 3000ML STRL

## (undated) DEVICE — CONN TBG Y 5 IN 1 LF STRL

## (undated) DEVICE — GLV SURG SIGNATURE ESSENTIAL PF LTX SZ7

## (undated) DEVICE — ANTIBACTERIAL UNDYED BRAIDED (POLYGLACTIN 910), SYNTHETIC ABSORBABLE SUTURE: Brand: COATED VICRYL

## (undated) DEVICE — SMOKE EVACUATION TUBING WITH 7/8 IN TO 1/4 IN REDUCER: Brand: BUFFALO FILTER

## (undated) DEVICE — SYRINGE,TOOMEY,IRRIGATION,70CC,STERILE: Brand: MEDLINE

## (undated) DEVICE — PK PROC TURNOVER

## (undated) DEVICE — GLV SURG SIGNATURE ESSENTIAL PF LTX SZ8.5

## (undated) DEVICE — APPL CHLORAPREP HI/LITE 26ML ORNG

## (undated) DEVICE — LP ELECTRD BRN

## (undated) DEVICE — PENCL ES MEGADINE EZ/CLEAN BUTN W/HOLSTR 10FT

## (undated) DEVICE — GOWN,REINFORCE,POLY,SIRUS,BREATH SLV,XLG: Brand: MEDLINE

## (undated) DEVICE — ELECTRD BLD EZ CLN MOD XLNG 2.75IN

## (undated) DEVICE — SPNG GZ AVANT 6PLY 4X4IN STRL PK/2

## (undated) DEVICE — NDL HYPO PRECISIONGLIDE REG 20G 1 1/2

## (undated) DEVICE — PK EXTREM 50

## (undated) DEVICE — OCCLUSIVE GAUZE STRIP OVERWRAP,3% BISMUTH TRIBROMOPHENATE IN PETROLATUM BLEND: Brand: XEROFORM

## (undated) DEVICE — SOL IRRIG H2O 1000ML STRL

## (undated) DEVICE — SUT VIC 3/0 SH 27IN J416H

## (undated) DEVICE — SOLUTION,WATER,IRRIGATION,1000ML,STERILE: Brand: MEDLINE

## (undated) DEVICE — OASIS DRAIN, SINGLE, INLINE & ATS COMPATIBLE: Brand: OASIS

## (undated) DEVICE — EXTENSION SET, MALE LUER LOCK ADAPTER WITH RETRACTABLE COLLAR

## (undated) DEVICE — CATH FOL SIMPLASTIC 3WY 24F 30CC

## (undated) DEVICE — TRAP,MUCUS SPECIMEN, 80CC: Brand: MEDLINE

## (undated) DEVICE — GLV SURG BIOGEL LTX PF 6

## (undated) DEVICE — VISUALIZATION SYSTEM: Brand: CLEARIFY

## (undated) DEVICE — PATIENT RETURN ELECTRODE, SINGLE-USE, CONTACT QUALITY MONITORING, ADULT, WITH 9FT CORD, FOR PATIENTS WEIGING OVER 33LBS. (15KG): Brand: MEGADYNE

## (undated) DEVICE — GLV SURG SENSICARE PI ORTHO SZ8 LF STRL

## (undated) DEVICE — LOU THORACIC: Brand: MEDLINE INDUSTRIES, INC.

## (undated) DEVICE — SOL IRR NACL 0.9PCT 3000ML